# Patient Record
Sex: MALE | Race: WHITE | NOT HISPANIC OR LATINO | Employment: OTHER | URBAN - METROPOLITAN AREA
[De-identification: names, ages, dates, MRNs, and addresses within clinical notes are randomized per-mention and may not be internally consistent; named-entity substitution may affect disease eponyms.]

---

## 2017-05-22 ENCOUNTER — HOSPITAL ENCOUNTER (INPATIENT)
Facility: HOSPITAL | Age: 57
LOS: 5 days | Discharge: HOME | DRG: 254 | End: 2017-05-27
Attending: EMERGENCY MEDICINE | Admitting: SURGERY
Payer: MEDICAID

## 2017-05-22 DIAGNOSIS — I73.9 PAD (PERIPHERAL ARTERY DISEASE): ICD-10-CM

## 2017-05-22 DIAGNOSIS — I82.402 ACUTE EMBOLISM AND THROMBOSIS OF DEEP VEIN OF LEFT LOWER EXTREMITY: ICD-10-CM

## 2017-05-22 DIAGNOSIS — I70.209 ARTERIAL OCCLUSION, LOWER EXTREMITY: ICD-10-CM

## 2017-05-22 DIAGNOSIS — M79.671 ISCHEMIC PAIN OF RIGHT FOOT: ICD-10-CM

## 2017-05-22 DIAGNOSIS — Z99.11 ON MECHANICALLY ASSISTED VENTILATION: ICD-10-CM

## 2017-05-22 DIAGNOSIS — I10 UNCONTROLLED HYPERTENSION: Chronic | ICD-10-CM

## 2017-05-22 DIAGNOSIS — I99.8 ISCHEMIC PAIN OF RIGHT FOOT: ICD-10-CM

## 2017-05-22 DIAGNOSIS — I82.401 ACUTE DEEP VEIN THROMBOSIS (DVT) OF RIGHT LOWER EXTREMITY, UNSPECIFIED VEIN: ICD-10-CM

## 2017-05-22 DIAGNOSIS — I70.209 ARTERIAL OCCLUSION, LOWER EXTREMITY: Primary | ICD-10-CM

## 2017-05-22 DIAGNOSIS — E87.6 HYPOKALEMIA: ICD-10-CM

## 2017-05-22 DIAGNOSIS — E78.2 MIXED HYPERLIPIDEMIA: Chronic | ICD-10-CM

## 2017-05-22 DIAGNOSIS — J44.9 CHRONIC OBSTRUCTIVE PULMONARY DISEASE, UNSPECIFIED COPD TYPE: ICD-10-CM

## 2017-05-22 LAB
ALBUMIN SERPL BCP-MCNC: 3.6 G/DL
ALP SERPL-CCNC: 90 U/L
ALT SERPL W/O P-5'-P-CCNC: 17 U/L
ANION GAP SERPL CALC-SCNC: 8 MMOL/L
APTT BLDCRRT: 32.1 SEC
AST SERPL-CCNC: 22 U/L
BASOPHILS # BLD AUTO: 0.02 K/UL
BASOPHILS NFR BLD: 0.4 %
BILIRUB SERPL-MCNC: 0.3 MG/DL
BUN SERPL-MCNC: 15 MG/DL
CALCIUM SERPL-MCNC: 9.5 MG/DL
CHLORIDE SERPL-SCNC: 103 MMOL/L
CK SERPL-CCNC: 556 U/L
CO2 SERPL-SCNC: 27 MMOL/L
CREAT SERPL-MCNC: 0.9 MG/DL
DIFFERENTIAL METHOD: ABNORMAL
EOSINOPHIL # BLD AUTO: 0.1 K/UL
EOSINOPHIL NFR BLD: 2.2 %
ERYTHROCYTE [DISTWIDTH] IN BLOOD BY AUTOMATED COUNT: 13.2 %
EST. GFR  (AFRICAN AMERICAN): >60 ML/MIN/1.73 M^2
EST. GFR  (NON AFRICAN AMERICAN): >60 ML/MIN/1.73 M^2
GLUCOSE SERPL-MCNC: 100 MG/DL
HCT VFR BLD AUTO: 34.1 %
HGB BLD-MCNC: 11.4 G/DL
INR PPP: 1.2
LYMPHOCYTES # BLD AUTO: 1.4 K/UL
LYMPHOCYTES NFR BLD: 24.5 %
MCH RBC QN AUTO: 28.2 PG
MCHC RBC AUTO-ENTMCNC: 33.4 %
MCV RBC AUTO: 84 FL
MONOCYTES # BLD AUTO: 0.7 K/UL
MONOCYTES NFR BLD: 11.7 %
NEUTROPHILS # BLD AUTO: 3.4 K/UL
NEUTROPHILS NFR BLD: 61.2 %
PLATELET # BLD AUTO: 272 K/UL
PMV BLD AUTO: 8.5 FL
POTASSIUM SERPL-SCNC: 3.3 MMOL/L
PROT SERPL-MCNC: 7.4 G/DL
PROTHROMBIN TIME: 12.1 SEC
RBC # BLD AUTO: 4.04 M/UL
SODIUM SERPL-SCNC: 138 MMOL/L
WBC # BLD AUTO: 5.56 K/UL

## 2017-05-22 PROCEDURE — 63600175 PHARM REV CODE 636 W HCPCS: Performed by: EMERGENCY MEDICINE

## 2017-05-22 PROCEDURE — 11000001 HC ACUTE MED/SURG PRIVATE ROOM

## 2017-05-22 PROCEDURE — 25000003 PHARM REV CODE 250: Performed by: EMERGENCY MEDICINE

## 2017-05-22 PROCEDURE — 85025 COMPLETE CBC W/AUTO DIFF WBC: CPT

## 2017-05-22 PROCEDURE — 85610 PROTHROMBIN TIME: CPT

## 2017-05-22 PROCEDURE — 85730 THROMBOPLASTIN TIME PARTIAL: CPT

## 2017-05-22 PROCEDURE — 96374 THER/PROPH/DIAG INJ IV PUSH: CPT

## 2017-05-22 PROCEDURE — 82550 ASSAY OF CK (CPK): CPT

## 2017-05-22 PROCEDURE — 96376 TX/PRO/DX INJ SAME DRUG ADON: CPT

## 2017-05-22 PROCEDURE — 80053 COMPREHEN METABOLIC PANEL: CPT

## 2017-05-22 PROCEDURE — 99285 EMERGENCY DEPT VISIT HI MDM: CPT | Mod: 25

## 2017-05-22 PROCEDURE — 96375 TX/PRO/DX INJ NEW DRUG ADDON: CPT

## 2017-05-22 RX ORDER — MORPHINE SULFATE 4 MG/ML
4 INJECTION, SOLUTION INTRAMUSCULAR; INTRAVENOUS EVERY 4 HOURS PRN
Status: DISCONTINUED | OUTPATIENT
Start: 2017-05-23 | End: 2017-05-24

## 2017-05-22 RX ORDER — ATORVASTATIN CALCIUM 20 MG/1
10 TABLET, FILM COATED ORAL DAILY
COMMUNITY
End: 2023-06-08

## 2017-05-22 RX ORDER — ONDANSETRON 2 MG/ML
4 INJECTION INTRAMUSCULAR; INTRAVENOUS
Status: COMPLETED | OUTPATIENT
Start: 2017-05-22 | End: 2017-05-22

## 2017-05-22 RX ORDER — ONDANSETRON 2 MG/ML
4 INJECTION INTRAMUSCULAR; INTRAVENOUS EVERY 12 HOURS PRN
Status: DISCONTINUED | OUTPATIENT
Start: 2017-05-23 | End: 2017-05-27 | Stop reason: HOSPADM

## 2017-05-22 RX ORDER — NAPROXEN SODIUM 220 MG/1
81 TABLET, FILM COATED ORAL DAILY
COMMUNITY
End: 2019-05-20

## 2017-05-22 RX ORDER — DEXTROSE MONOHYDRATE AND SODIUM CHLORIDE 5; .45 G/100ML; G/100ML
INJECTION, SOLUTION INTRAVENOUS CONTINUOUS
Status: DISCONTINUED | OUTPATIENT
Start: 2017-05-23 | End: 2017-05-25

## 2017-05-22 RX ORDER — MORPHINE SULFATE 4 MG/ML
4 INJECTION, SOLUTION INTRAMUSCULAR; INTRAVENOUS
Status: COMPLETED | OUTPATIENT
Start: 2017-05-22 | End: 2017-05-22

## 2017-05-22 RX ORDER — HEPARIN SODIUM,PORCINE/D5W 25000/250
17 INTRAVENOUS SOLUTION INTRAVENOUS CONTINUOUS
Status: DISCONTINUED | OUTPATIENT
Start: 2017-05-22 | End: 2017-05-24

## 2017-05-22 RX ORDER — AMLODIPINE BESYLATE 10 MG/1
10 TABLET ORAL DAILY
COMMUNITY

## 2017-05-22 RX ADMIN — ONDANSETRON 4 MG: 2 INJECTION INTRAMUSCULAR; INTRAVENOUS at 10:05

## 2017-05-22 RX ADMIN — HEPARIN SODIUM AND DEXTROSE 17 UNITS/KG/HR: 10000; 5 INJECTION INTRAVENOUS at 10:05

## 2017-05-22 RX ADMIN — MORPHINE SULFATE 4 MG: 4 INJECTION, SOLUTION INTRAMUSCULAR; INTRAVENOUS at 10:05

## 2017-05-23 PROBLEM — J44.1 COPD EXACERBATION: Status: ACTIVE | Noted: 2017-05-23

## 2017-05-23 PROBLEM — E87.6 HYPOKALEMIA: Status: ACTIVE | Noted: 2017-05-23

## 2017-05-23 PROBLEM — J44.9 COPD (CHRONIC OBSTRUCTIVE PULMONARY DISEASE): Status: ACTIVE | Noted: 2017-05-23

## 2017-05-23 PROBLEM — I10 HTN (HYPERTENSION): Status: ACTIVE | Noted: 2017-05-23

## 2017-05-23 LAB — APTT BLDCRRT: 50.4 SEC

## 2017-05-23 PROCEDURE — 11000001 HC ACUTE MED/SURG PRIVATE ROOM

## 2017-05-23 PROCEDURE — 25000003 PHARM REV CODE 250: Performed by: EMERGENCY MEDICINE

## 2017-05-23 PROCEDURE — 63600175 PHARM REV CODE 636 W HCPCS: Performed by: EMERGENCY MEDICINE

## 2017-05-23 PROCEDURE — 63600175 PHARM REV CODE 636 W HCPCS: Performed by: SURGERY

## 2017-05-23 PROCEDURE — 21400001 HC TELEMETRY ROOM

## 2017-05-23 PROCEDURE — 25000003 PHARM REV CODE 250: Performed by: HOSPITALIST

## 2017-05-23 PROCEDURE — 63600175 PHARM REV CODE 636 W HCPCS: Performed by: HOSPITALIST

## 2017-05-23 PROCEDURE — 85730 THROMBOPLASTIN TIME PARTIAL: CPT

## 2017-05-23 PROCEDURE — 36415 COLL VENOUS BLD VENIPUNCTURE: CPT

## 2017-05-23 RX ORDER — DIPHENHYDRAMINE HYDROCHLORIDE 50 MG/ML
25 INJECTION INTRAMUSCULAR; INTRAVENOUS EVERY 6 HOURS PRN
Status: DISCONTINUED | OUTPATIENT
Start: 2017-05-23 | End: 2017-05-27 | Stop reason: HOSPADM

## 2017-05-23 RX ORDER — ATORVASTATIN CALCIUM 10 MG/1
20 TABLET, FILM COATED ORAL DAILY
Status: DISCONTINUED | OUTPATIENT
Start: 2017-05-23 | End: 2017-05-27 | Stop reason: HOSPADM

## 2017-05-23 RX ORDER — HYDRALAZINE HYDROCHLORIDE 20 MG/ML
10 INJECTION INTRAMUSCULAR; INTRAVENOUS EVERY 6 HOURS PRN
Status: DISCONTINUED | OUTPATIENT
Start: 2017-05-23 | End: 2017-05-24

## 2017-05-23 RX ORDER — AMLODIPINE BESYLATE 10 MG/1
10 TABLET ORAL DAILY
Status: DISCONTINUED | OUTPATIENT
Start: 2017-05-23 | End: 2017-05-27 | Stop reason: HOSPADM

## 2017-05-23 RX ORDER — POTASSIUM CHLORIDE 7.45 MG/ML
10 INJECTION INTRAVENOUS ONCE
Status: COMPLETED | OUTPATIENT
Start: 2017-05-23 | End: 2017-05-23

## 2017-05-23 RX ORDER — NAPROXEN SODIUM 220 MG/1
81 TABLET, FILM COATED ORAL DAILY
Status: DISCONTINUED | OUTPATIENT
Start: 2017-05-23 | End: 2017-05-27 | Stop reason: HOSPADM

## 2017-05-23 RX ORDER — IPRATROPIUM BROMIDE AND ALBUTEROL SULFATE 2.5; .5 MG/3ML; MG/3ML
3 SOLUTION RESPIRATORY (INHALATION) EVERY 4 HOURS PRN
Status: DISCONTINUED | OUTPATIENT
Start: 2017-05-23 | End: 2017-05-27 | Stop reason: HOSPADM

## 2017-05-23 RX ORDER — POTASSIUM CHLORIDE 14.9 MG/ML
20 INJECTION INTRAVENOUS ONCE
Status: DISCONTINUED | OUTPATIENT
Start: 2017-05-23 | End: 2017-05-23

## 2017-05-23 RX ADMIN — MORPHINE SULFATE 4 MG: 4 INJECTION, SOLUTION INTRAMUSCULAR; INTRAVENOUS at 04:05

## 2017-05-23 RX ADMIN — DEXTROSE AND SODIUM CHLORIDE: 5; .45 INJECTION, SOLUTION INTRAVENOUS at 10:05

## 2017-05-23 RX ADMIN — DEXTROSE AND SODIUM CHLORIDE: 5; .45 INJECTION, SOLUTION INTRAVENOUS at 05:05

## 2017-05-23 RX ADMIN — ATORVASTATIN CALCIUM 20 MG: 10 TABLET, FILM COATED ORAL at 10:05

## 2017-05-23 RX ADMIN — HEPARIN SODIUM AND DEXTROSE 17 UNITS/KG/HR: 10000; 5 INJECTION INTRAVENOUS at 12:05

## 2017-05-23 RX ADMIN — POTASSIUM CHLORIDE 10 MEQ: 10 INJECTION, SOLUTION INTRAVENOUS at 06:05

## 2017-05-23 RX ADMIN — POTASSIUM CHLORIDE 10 MEQ: 10 INJECTION, SOLUTION INTRAVENOUS at 08:05

## 2017-05-23 RX ADMIN — MORPHINE SULFATE 4 MG: 4 INJECTION, SOLUTION INTRAMUSCULAR; INTRAVENOUS at 08:05

## 2017-05-23 RX ADMIN — AMLODIPINE BESYLATE 10 MG: 10 TABLET ORAL at 10:05

## 2017-05-23 RX ADMIN — MORPHINE SULFATE 4 MG: 4 INJECTION, SOLUTION INTRAMUSCULAR; INTRAVENOUS at 11:05

## 2017-05-23 RX ADMIN — DEXTROSE AND SODIUM CHLORIDE: 5; .45 INJECTION, SOLUTION INTRAVENOUS at 12:05

## 2017-05-23 RX ADMIN — ASPIRIN 81 MG CHEWABLE TABLET 81 MG: 81 TABLET CHEWABLE at 10:05

## 2017-05-23 RX ADMIN — MORPHINE SULFATE 4 MG: 4 INJECTION, SOLUTION INTRAMUSCULAR; INTRAVENOUS at 12:05

## 2017-05-23 RX ADMIN — DIPHENHYDRAMINE HYDROCHLORIDE 25 MG: 50 INJECTION, SOLUTION INTRAMUSCULAR; INTRAVENOUS at 06:05

## 2017-05-23 RX ADMIN — MORPHINE SULFATE 4 MG: 4 INJECTION, SOLUTION INTRAMUSCULAR; INTRAVENOUS at 03:05

## 2017-05-23 NOTE — PROGRESS NOTES
Pt transferred from ER to tele. Pt pivoted from stretcher to bed, non weight bearing to RLE. Vitals stable, tele monitor placed. Hep gtt and INF continued. MD notified. Bed locked and in low position, call light in reach. Will continue to monitor.

## 2017-05-23 NOTE — ED NOTES
LOC: The patient is awake, alert and aware of environment with an appropriate affect, the patient is oriented x 3 and speaking appropriately.  APPEARANCE: Patient resting comfortably and in no acute distress, patient is clean and well groomed, patient's clothing is properly fastened.  SKIN: The skin is warm and dry, color consistent with ethnicity, patient has normal skin turgor and moist mucus membranes, skin intact, no breakdown or bruising noted. Pale cool right lower ext   MUSCULOSKELETAL: Patient moving all extremities spontaneously, no obvious swelling or deformities noted.  RESPIRATORY: Airway is open and patent, respirations are spontaneous, patient has a normal effort and rate, no accessory muscle use noted, bilateral breath sounds equal and clear to auscultation. Patient reports no shortness of breath, no distress noted.  CARDIAC: Patient has a normal rate and regular rhythm, no periphreal edema noted, capillary refill < 3 seconds. Right lower ext without pulse.   ABDOMEN: Soft and non tender to palpation, no distention noted, normoactive bowel sounds present in all four quadrants.  NEUROLOGIC: PERRL, 3 mm bilaterally, eyes open spontaneously, behavior appropriate to situation, follows commands, facial expression symmetrical, bilateral hand grasp equal and even, purposeful motor response noted, normal sensation in all extremities when touched with a finger.  PSCYH: Brief WNL

## 2017-05-23 NOTE — SUBJECTIVE & OBJECTIVE
Past Medical History:   Diagnosis Date    H/O aorto-femoral bypass     Hyperlipemia     Hypertension     Pre-diabetes        Past Surgical History:   Procedure Laterality Date    back fusion      FEMORAL BYPASS         Review of patient's allergies indicates:   Allergen Reactions    Pcn [penicillins] Other (See Comments)     Other      Ceclor [cefaclor] Rash    Latex, natural rubber Rash       No current facility-administered medications on file prior to encounter.      No current outpatient prescriptions on file prior to encounter.     Family History     Problem Relation (Age of Onset)    COPD Mother, Father    Diabetes Mother, Father    Heart attack Mother    Heart disease Mother    Stroke Mother        Social History Main Topics    Smoking status: Former Smoker    Smokeless tobacco: Former User     Quit date: 10/22/2016    Alcohol use No    Drug use: No    Sexual activity: Yes     Review of Systems   Constitutional: Negative for activity change, appetite change, chills, fatigue and fever.   HENT: Negative for congestion, rhinorrhea, sore throat and tinnitus.    Eyes: Negative for pain, discharge, redness and itching.   Respiratory: Negative for apnea, cough, choking, chest tightness, wheezing and stridor.    Cardiovascular: Negative for chest pain and leg swelling.   Gastrointestinal: Negative for abdominal distention, abdominal pain, constipation, diarrhea, nausea and vomiting.   Genitourinary: Negative for difficulty urinating, dysuria, flank pain, frequency and hematuria.   Musculoskeletal: Negative for arthralgias, back pain and neck pain.        Right foot pain    Neurological: Negative for dizziness, tremors, seizures, facial asymmetry, weakness and numbness.     Objective:     Vital Signs (Most Recent):  Temp: 97.5 °F (36.4 °C) (05/23/17 0802)  Pulse: 84 (05/23/17 0802)  Resp: 20 (05/23/17 0802)  BP: (!) 142/72 (05/23/17 0802)  SpO2: 98 % (05/23/17 0802) Vital Signs (24h Range):  Temp:  [97.5  °F (36.4 °C)-98.3 °F (36.8 °C)] 97.5 °F (36.4 °C)  Pulse:  [71-86] 84  Resp:  [13-20] 20  SpO2:  [97 %-100 %] 98 %  BP: (142-191)/(72-97) 142/72     Weight: 81.1 kg (178 lb 12.8 oz)  Body mass index is 25.66 kg/m².    Physical Exam   Constitutional: No distress.   HENT:   Head: Normocephalic and atraumatic.   Eyes: EOM are normal. Pupils are equal, round, and reactive to light.   Neck: Normal range of motion. Neck supple. No JVD present.   Cardiovascular: Normal rate and regular rhythm.  Exam reveals no gallop and no friction rub.    No murmur heard.  Pulmonary/Chest: Effort normal and breath sounds normal. No stridor. No respiratory distress. He has no wheezes. He has no rales. He exhibits no tenderness.   Abdominal: Soft. Bowel sounds are normal. He exhibits no distension. There is no tenderness. There is no rebound and no guarding.   Musculoskeletal: Normal range of motion. He exhibits no deformity.   There is absent pulse in the Rt. PD, Rt. PT, pale looking of the Rt.foot   Lymphadenopathy:     He has no cervical adenopathy.   Neurological: He displays normal reflexes. No cranial nerve deficit. He exhibits normal muscle tone. Coordination normal.   Skin: Skin is warm and dry. He is not diaphoretic.   Psychiatric: He has a normal mood and affect. His behavior is normal.        Significant Labs:   BMP:     Recent Labs  Lab 05/22/17 2120         K 3.3*      CO2 27   BUN 15   CREATININE 0.9   CALCIUM 9.5     CBC:     Recent Labs  Lab 05/22/17 2120   WBC 5.56   HGB 11.4*   HCT 34.1*          Significant Imaging:   Imaging Results          X-Ray Chest 1 View (Final result)  Result time 05/23/17 09:21:01    Final result by Omega Harris III, MD (05/23/17 09:21:01)                 Impression:     No acute disease identified in the chest.        Electronically signed by: OMEGA HARRIS MD  Date:     05/23/17  Time:    09:21              Narrative:    XR CHEST 1 VIEW    Clinical history: Chest  pain.      Findings: Cardiomediastinal silhouette is within normal limits for AP technique. The lungs appear clear of active disease. There is no evidence of pneumonia, pulmonary edema, pleural effusion, pneumothorax or other acute disease.                             US Lower Extremity Arteries Right (Final result)  Result time 05/22/17 21:51:18    Final result by Damaris Velasco MD (05/22/17 21:51:18)                 Impression:      No flow is seen distal to the common femoral artery.  The patient appears to have femoral graft and popliteal graft on the right but no flow is seen distally.      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    Comparison:  No prior relevant studies available    Findings: Standard technique was utilized with Doppler imaging.    The findings suggest the patient has a femoral graft with separate popliteal graft.  Normal flow is not identified within the superficial femoral artery, popliteal artery, anterior tibialis artery, posterior tibialis artery, or dorsalis pedis artery.  Profunda femoral artery velocity is 46.4 cm/s.  Common femoral artery is 90.4 cm/s.

## 2017-05-23 NOTE — SUBJECTIVE & OBJECTIVE
Past Medical History:   Diagnosis Date    H/O aorto-femoral bypass     Hyperlipemia     Hypertension     Pre-diabetes        Past Surgical History:   Procedure Laterality Date    back fusion      FEMORAL BYPASS         Review of patient's allergies indicates:   Allergen Reactions    Pcn [penicillins] Other (See Comments)     Other      Ceclor [cefaclor] Rash    Latex, natural rubber Rash       No current facility-administered medications on file prior to encounter.      No current outpatient prescriptions on file prior to encounter.     Family History     Problem Relation (Age of Onset)    COPD Mother, Father    Diabetes Mother, Father    Heart attack Mother    Heart disease Mother    Stroke Mother        Social History Main Topics    Smoking status: Former Smoker    Smokeless tobacco: Former User     Quit date: 10/22/2016    Alcohol use No    Drug use: No    Sexual activity: Yes     Review of Systems   Constitutional: Negative for activity change, appetite change, chills, fatigue and fever.   HENT: Negative for congestion, rhinorrhea, sore throat and tinnitus.    Eyes: Negative for pain, discharge, redness and itching.   Respiratory: Negative for apnea, cough, choking, chest tightness, wheezing and stridor.    Cardiovascular: Negative for chest pain and leg swelling.   Gastrointestinal: Negative for abdominal distention, abdominal pain, constipation, diarrhea, nausea and vomiting.   Genitourinary: Negative for difficulty urinating, dysuria, flank pain, frequency and hematuria.   Musculoskeletal: Negative for arthralgias, back pain and neck pain.        Leg pain    Neurological: Negative for dizziness, tremors, seizures, facial asymmetry, weakness and numbness.     Objective:     Vital Signs (Most Recent):  Temp: 97.8 °F (36.6 °C) (05/23/17 0354)  Pulse: 71 (05/23/17 0354)  Resp: 20 (05/23/17 0354)  BP: (!) 163/88 (05/23/17 0354)  SpO2: 97 % (05/23/17 0354) Vital Signs (24h Range):  Temp:  [97.8 °F  (36.6 °C)-98.3 °F (36.8 °C)] 97.8 °F (36.6 °C)  Pulse:  [71-86] 71  Resp:  [13-20] 20  SpO2:  [97 %-100 %] 97 %  BP: (151-191)/(78-97) 163/88     Weight: 81.1 kg (178 lb 12.8 oz)  Body mass index is 25.66 kg/m².    Physical Exam   Constitutional: No distress.   HENT:   Head: Normocephalic and atraumatic.   Eyes: EOM are normal. Pupils are equal, round, and reactive to light.   Neck: Normal range of motion. Neck supple. No JVD present.   Cardiovascular: Normal rate and regular rhythm.  Exam reveals no gallop and no friction rub.    No murmur heard.  Pulmonary/Chest: Effort normal and breath sounds normal. No stridor. No respiratory distress. He has no wheezes. He has no rales. He exhibits no tenderness.   Abdominal: Soft. Bowel sounds are normal. He exhibits no distension. There is no tenderness. There is no rebound and no guarding.   Musculoskeletal: Normal range of motion. He exhibits no deformity.   There is absent pulse in the Rt. PD, Rt. PT, pale looking of the Rt.foot   Lymphadenopathy:     He has no cervical adenopathy.   Neurological: He displays normal reflexes. No cranial nerve deficit. He exhibits normal muscle tone. Coordination normal.   Skin: Skin is warm and dry. He is not diaphoretic.   Psychiatric: He has a normal mood and affect. His behavior is normal.        Significant Labs:   BMP:   Recent Labs  Lab 05/22/17 2120         K 3.3*      CO2 27   BUN 15   CREATININE 0.9   CALCIUM 9.5     CBC:   Recent Labs  Lab 05/22/17 2120   WBC 5.56   HGB 11.4*   HCT 34.1*          Significant Imaging:   Imaging Results          US Lower Extremity Arteries Right (Final result)  Result time 05/22/17 21:51:18    Final result by Dennis Wong MD (05/22/17 21:51:18)                 Impression:      No flow is seen distal to the common femoral artery.  The patient appears to have femoral graft and popliteal graft on the right but no flow is seen distally.      Electronically signed  by: DAMARIS VELASCO MD  Date:     05/22/17  Time:    21:51              Narrative:    Exam: US LOWER EXTREMITY ARTERIES RIGHT,    Date:  05/22/17 21:15:00    History: Embolism and thrombosis of the arteries of the lower extremities.    Comparison:  No prior relevant studies available    Findings: Standard technique was utilized with Doppler imaging.    The findings suggest the patient has a femoral graft with separate popliteal graft.  Normal flow is not identified within the superficial femoral artery, popliteal artery, anterior tibialis artery, posterior tibialis artery, or dorsalis pedis artery.  Profunda femoral artery velocity is 46.4 cm/s.  Common femoral artery is 90.4 cm/s.

## 2017-05-23 NOTE — HPI
56 y.o male patient with PMHx of HTN, prediabetic, COPD, stroke, PAD s/p Bypass with graft in the Rt. LE , was referred from his PCP office due to complain of pain in the rt. Foot. Pain started couple days ago and start to get worse, it is cramping in nature and feels tight, he also noticed discoloration of the foot as well. Patient takes Xarelto for PAD. Patient had fall week ago where he went to urgent care for c/o rib pain, CXR was done and it was negative for fracture but told him there is spot there.  He was admitted here for medical management

## 2017-05-23 NOTE — PLAN OF CARE
Met with patient. Patient expressed concern about his health situation and possibility of amputation. Provided support and education. Patient stated that he has been unable to work for about a year, he was denied disability. 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 He stated that he has been unable to stand on his leg to allow him to cook. Reviewed SSD qualifications and encouraged him to reapply for disability. He stated that the family has had financial difficulty which has led to eviction notices and having to work with [employer] for utility assistance. Patient has knowledge of resources. Provided supportive services.      05/23/17 1103   Discharge Assessment   Assessment Type Discharge Planning Assessment   Confirmed/corrected address and phone number on facesheet? Yes   Assessment information obtained from? Patient;Medical Record   Expected Length of Stay (days) (TBD)   Communicated expected length of stay with patient/caregiver yes   Prior to hospitilization cognitive status: Alert/Oriented   Prior to hospitalization functional status: Needs Assistance   Current cognitive status: Alert/Oriented   Current Functional Status: Needs Assistance   Arrived From home or self-care   Lives With spouse   Able to Return to Prior Arrangements yes   Is patient able to care for self after discharge? No   How many people do you have in your home that can help with your care after discharge? 2   Who are your caregiver(s) and their phone number(s)? Ray Guardado, wife of 35 years, [phone]. Adult son, has two sons. Oldest son lives in his own home.    Patient's perception of discharge disposition home or selfcare   Readmission Within The Last 30 Days no previous admission in last 30 days   Patient currently being followed by outpatient case management? No   Patient currently receives home health services? No   Does the patient currently use HME? No  (Uses his mother's cane when needed.)   Patient currently  receives private duty nursing? No   Equipment Currently Used at Home none   Do you have any problems affording any of your prescribed medications? TBD   Is the patient taking medications as prescribed? yes   Do you have any financial concerns preventing you from receiving the healthcare you need? Yes  (Patient stated that he has been unable to work for a year but has not qualified for disability. )   Does the patient have transportation to healthcare appointments? Yes   Transportation Available family or friend will provide   On Dialysis? No   Discharge Plan A Home with family   Discharge Plan B (Dependent on the surgery. Possible rehab. )   Patient/Family In Agreement With Plan yes

## 2017-05-23 NOTE — H&P
[facility] -   History & Physical    Subjective:      Chief Complaint/Reason for Admission: right foot pain    HPI:    56 y.o male patient with PMHx of HTN, prediabetic, COPD, stroke, PAD s/p Bypass with graft in the Rt. LE , was referred from his PCP office due to complain of pain in the rt. Foot. Pain started couple days ago and start to get worse, it is more cramping and feel tight, he also noticed discoloration of the foot too. Patient takes Xarelto for PAD.  Patient had fall week ago where he went to urgent care, CXR was done and it was negative for fracture but told him there is spot there.     Had right femoral to above knee bypass which failed and now has right femoral to below knee PTFE bypass two months ago both with Dr. Hartley at [facility]. On Xarelto for bypass patency which he took yesterday afternoon.Now, has occlusion of right leg bypass with decreased senstion to right foot.     Past Medical History:   Diagnosis Date    H/O aorto-femoral bypass     Hyperlipemia     Hypertension     Pre-diabetes      Past Surgical History:   Procedure Laterality Date    back fusion      FEMORAL BYPASS       Family History   Problem Relation Age of Onset    COPD Mother     Diabetes Mother     Heart disease Mother     Stroke Mother     Heart attack Mother     COPD Father     Diabetes Father      Social History   Substance Use Topics    Smoking status: Former Smoker    Smokeless tobacco: Former User     Quit date: 10/22/2016    Alcohol use No       PTA Medications   Medication Sig    amlodipine (NORVASC) 10 MG tablet Take 10 mg by mouth once daily.    aspirin 81 MG Chew Take 81 mg by mouth once daily.    atorvastatin (LIPITOR) 20 MG tablet Take 20 mg by mouth once daily.    rivaroxaban (XARELTO) 20 mg Tab Take 20 mg by mouth daily with dinner or evening meal.     Review of patient's allergies indicates:   Allergen Reactions    Pcn [penicillins] Other (See Comments)     Other      Ceclor  [cefaclor] Rash    Latex, natural rubber Rash        ROS    Objective:      Vital Signs (Most Recent)  Temp: 97.8 °F (36.6 °C) (05/23/17 0354)  Pulse: 71 (05/23/17 0354)  Resp: 20 (05/23/17 0354)  BP: (!) 163/88 (05/23/17 0354)  SpO2: 97 % (05/23/17 0354)    Vital Signs Range (Last 24H):  Temp:  [97.8 °F (36.6 °C)-98.3 °F (36.8 °C)]   Pulse:  [71-86]   Resp:  [13-20]   BP: (151-191)/(78-97)   SpO2:  [97 %-100 %]     Physical Exam     [facility] RRR  Lungs CTAB  Vasc: + 2 bilateral femoral pulses with no palp right pop nor pedals.  Musc/neuro: full ROM right foot with decreased senstion    Data Review:    CBC:   Lab Results   Component Value Date    WBC 5.56 05/22/2017    RBC 4.04 (L) 05/22/2017    HGB 11.4 (L) 05/22/2017    HCT 34.1 (L) 05/22/2017     05/22/2017     BMP:   Lab Results   Component Value Date     05/22/2017     05/22/2017    K 3.3 (L) 05/22/2017     05/22/2017    CO2 27 05/22/2017    BUN 15 05/22/2017    CREATININE 0.9 05/22/2017    CALCIUM 9.5 05/22/2017     Coagulation:   Lab Results   Component Value Date    INR 1.2 05/22/2017    APTT 50.4 (H) 05/23/2017      ECG: normal sinus rhythm, no blocks or conduction defects, no ischemic changes.     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       Plan:    Class IIA ischemia right leg.  Unfortunately he is too high risk for bleeding today due to Xarelto. Therefore, we will have to wait until tomorrow for a right leg angiogram with TPA thrombolysis of PTFE graft. He stopped Xarelto today. He is still high risk for bleeding but he is also high risk for leg amputation as well. Difficult situation overall.

## 2017-05-23 NOTE — ED NOTES
Patient placed on continuous cardiac monitor, automatic blood pressure cuff and continuous pulse oximeter. SR on CM

## 2017-05-23 NOTE — PLAN OF CARE
Problem: Patient Care Overview  Goal: Plan of Care Review  Outcome: Ongoing (interventions implemented as appropriate)  Pt alert and oriented. RLE cool, pale, >3 cap refill,  with numbness to toes noted. C/o 10/10 pain, PRN pain medication administered. No pulse noted with doppler, weak pulse to LLE noted with doppler- area marked. Hep gtt @ 17 units/kg/hr, D5 1/2NS @ 125 ml/hr. NPO after midnight. Stable on RA. Encouraged pt to reposition self, urinal at bedside, pt remained free of falls during shift, wife in room and offers support, bed alarm set, call light in reach, room free of clutter, side rails up X2, pt on telemetry monitor, will continue to monitor.

## 2017-05-23 NOTE — HOSPITAL COURSE
Pt admitted with right foot pain and was found to have occlusion of right leg bypass/ischemic leg. Pt on Xarelto which is now on hold. Care discussed with vascular surgery who plans on right leg angiogram with TPA thrombolysis of PTFE graft in am. Pt denies complaints except for foot symptoms    S/PAortogram, Right leg angiogram, right fem-distal bypass EKOS lysis catheter placement. Intubated in ICU postoperatively for airway protection and  pain management. He is presently extubated.  5/27/17: Pt is extubated, sitting up in bed and stable for discharge. The patient was seen and examined today and deemed stable for discharge.

## 2017-05-23 NOTE — PROGRESS NOTES
[facility] Hospital Medicine  Progress Note    Patient Name: Ish Guardado  MRN: [id number]  Patient Class: IP- Inpatient   Admission Date: 5/22/2017  Length of Stay: 1 days  Attending Physician: Avila Houser MD  Primary Care Provider: Pollo Arana MD        Subjective:     Principal Problem:Arterial occlusion, lower extremity    HPI:  56 y.o male patient with PMHx of HTN, prediabetic, COPD, stroke, PAD s/p Bypass with graft in the Rt. LE , was referred from his PCP office due to complain of pain in the rt. Foot. Pain started couple days ago and start to get worse, it is more cramping and feel tight, he also noticed discoloration of the foot too. Patient takes Xarelto for PAD.  Patient had fall week ago where he went to urgent care, CXR was done and it was negative for fracture but told him there is spot there.      Hospital Course:  Pt admitted with right foot pain and was found to have occlusion of right leg bypass/ischemic leg. Pt on Xarelto which is now on hold. Care discussed with vascular surgery who plans on right leg angiogram with TPA thrombolysis of PTFE graft in am. Pt denies complaints except for foot symptoms     Past Medical History:   Diagnosis Date    H/O aorto-femoral bypass     Hyperlipemia     Hypertension     Pre-diabetes        Past Surgical History:   Procedure Laterality Date    back fusion      FEMORAL BYPASS         Review of patient's allergies indicates:   Allergen Reactions    Pcn [penicillins] Other (See Comments)     Other      Ceclor [cefaclor] Rash    Latex, natural rubber Rash       No current facility-administered medications on file prior to encounter.      No current outpatient prescriptions on file prior to encounter.     Family History     Problem Relation (Age of Onset)    COPD Mother, Father    Diabetes Mother, Father    Heart attack Mother    Heart disease Mother    Stroke Mother        Social History Main Topics    Smoking status: Former Smoker     Smokeless tobacco: Former User     Quit date: 10/22/2016    Alcohol use No    Drug use: No    Sexual activity: Yes     Review of Systems   Constitutional: Negative for activity change, appetite change, chills, fatigue and fever.   HENT: Negative for congestion, rhinorrhea, sore throat and tinnitus.    Eyes: Negative for pain, discharge, redness and itching.   Respiratory: Negative for apnea, cough, choking, chest tightness, wheezing and stridor.    Cardiovascular: Negative for chest pain and leg swelling.   Gastrointestinal: Negative for abdominal distention, abdominal pain, constipation, diarrhea, nausea and vomiting.   Genitourinary: Negative for difficulty urinating, dysuria, flank pain, frequency and hematuria.   Musculoskeletal: Negative for arthralgias, back pain and neck pain.        Right foot pain    Neurological: Negative for dizziness, tremors, seizures, facial asymmetry, weakness and numbness.     Objective:     Vital Signs (Most Recent):  Temp: 97.5 °F (36.4 °C) (05/23/17 0802)  Pulse: 84 (05/23/17 0802)  Resp: 20 (05/23/17 0802)  BP: (!) 142/72 (05/23/17 0802)  SpO2: 98 % (05/23/17 0802) Vital Signs (24h Range):  Temp:  [97.5 °F (36.4 °C)-98.3 °F (36.8 °C)] 97.5 °F (36.4 °C)  Pulse:  [71-86] 84  Resp:  [13-20] 20  SpO2:  [97 %-100 %] 98 %  BP: (142-191)/(72-97) 142/72     Weight: 81.1 kg (178 lb 12.8 oz)  Body mass index is 25.66 kg/m².    Physical Exam   Constitutional: No distress.   HENT:   Head: Normocephalic and atraumatic.   Eyes: EOM are normal. Pupils are equal, round, and reactive to light.   Neck: Normal range of motion. Neck supple. No JVD present.   Cardiovascular: Normal rate and regular rhythm.  Exam reveals no gallop and no friction rub.    No murmur heard.  Pulmonary/Chest: Effort normal and breath sounds normal. No stridor. No respiratory distress. He has no wheezes. He has no rales. He exhibits no tenderness.   Abdominal: Soft. Bowel sounds are normal. He exhibits no  distension. There is no tenderness. There is no rebound and no guarding.   Musculoskeletal: Normal range of motion. He exhibits no deformity.   There is absent pulse in the Rt. PD, Rt. PT, pale looking of the Rt.foot   Lymphadenopathy:     He has no cervical adenopathy.   Neurological: He displays normal reflexes. No cranial nerve deficit. He exhibits normal muscle tone. Coordination normal.   Skin: Skin is warm and dry. He is not diaphoretic.   Psychiatric: He has a normal mood and affect. His behavior is normal.        Significant Labs:   BMP:     Recent Labs  Lab 05/22/17 2120         K 3.3*      CO2 27   BUN 15   CREATININE 0.9   CALCIUM 9.5     CBC:     Recent Labs  Lab 05/22/17 2120   WBC 5.56   HGB 11.4*   HCT 34.1*          Significant Imaging:   Imaging Results          X-Ray Chest 1 View (Final result)  Result time 05/23/17 09:21:01    Final result by Omega Harris III, MD (05/23/17 09:21:01)                 Impression:     No acute disease identified in the chest.        Electronically signed by: OMEGA HARRIS MD  Date:     05/23/17  Time:    09:21              Narrative:    XR CHEST 1 VIEW    Clinical history: Chest pain.      Findings: Cardiomediastinal silhouette is within normal limits for AP technique. The lungs appear clear of active disease. There is no evidence of pneumonia, pulmonary edema, pleural effusion, pneumothorax or other acute disease.                             US Lower Extremity Arteries Right (Final result)  Result time 05/22/17 21:51:18    Final result by Dennis Wong MD (05/22/17 21:51:18)                 Impression:      No flow is seen distal to the common femoral artery.  The patient appears to have femoral graft and popliteal graft on the right but no flow is seen distally.      Electronically signed by: DENNIS WONG MD  Date:     05/22/17  Time:    21:51              Narrative:    Exam: US LOWER EXTREMITY ARTERIES RIGHT,    Date:  05/22/17  21:15:00    History: Embolism and thrombosis of the arteries of the lower extremities.    Comparison:  No prior relevant studies available    Findings: Standard technique was utilized with Doppler imaging.    The findings suggest the patient has a femoral graft with separate popliteal graft.  Normal flow is not identified within the superficial femoral artery, popliteal artery, anterior tibialis artery, posterior tibialis artery, or dorsalis pedis artery.  Profunda femoral artery velocity is 46.4 cm/s.  Common femoral artery is 90.4 cm/s.                                Assessment/Plan:      * Arterial occlusion, lower extremity    - Management per CVT  Xeralto on hold  - On Heparin drip, pain control with morphine   Right leg angiogram with TPA thrombolysis of PTFE graft in am           Hypokalemia    - Replace and recheck in am          COPD (chronic obstructive pulmonary disease)    - PRN Breathing tx           HTN (hypertension)    Cont amlodipine   - PRN hydralazine for SBP > 160            VTE Risk Mitigation         Ordered     Medium Risk of VTE  Once      05/22/17 2318     Reason for No Pharmacological VTE Prophylaxis  Once      05/22/17 2318          Fiorella Lucas NP  Department of Hospital Medicine   [facility]

## 2017-05-23 NOTE — ED PROVIDER NOTES
SCRIBE #1 NOTE: I, Paxton Morocho, am scribing for, and in the presence of, Paige Kern MD. I have scribed the entire note.      History      Chief Complaint   Patient presents with    Chest Wall Pain     pt fell and injured L ribs, was seen at urgent care and had an Xray but results were inconclusive    Circulatory Problem     pt with PMH of two R sided femoral bypasses, states he feels like his circulation is decreased again       Review of patient's allergies indicates:   Allergen Reactions    Pcn [penicillins] Other (See Comments)     Other      Ceclor [cefaclor] Rash    Latex, natural rubber Rash        HPI   HPI    5/22/2017, 8:57 PM   History obtained from the patient      History of Present Illness: Ish Guardado is a 56 y.o. male patient who presents to the Emergency Department for RLE pain which onset two days ago. Pt reports that his calf feels tight. The pain is constant and moderate in severity. No modifying factors reported. Associated symptoms include pallor to the right foot. Pt was referred to the ED by his PCP. Patient denies any headache, dizziness, light-headedness, CP, SOB, abdominal pain, N/V/D or any other sx at this time. Pt has a PSHx of Femoral Bypass surgery (2x). The pt is currently on Xarelto, his last dose being 1500 today. No further complaints or concerns at this time.     Arrival mode: Personal vehicle    PCP: Pollo Arana MD       Past Medical History:  Past Medical History:   Diagnosis Date    H/O aorto-femoral bypass     Hyperlipemia     Hypertension     Pre-diabetes        Past Surgical History:  Past Surgical History:   Procedure Laterality Date    back fusion      FEMORAL BYPASS           Family History:  Family History   Problem Relation Age of Onset    COPD Mother     Diabetes Mother     Heart disease Mother     Stroke Mother     Heart attack Mother     COPD Father     Diabetes Father        Social History:  Social History     Social History Main Topics     Smoking status: Former Smoker    Smokeless tobacco: Former User     Quit date: 10/22/2016    Alcohol use No    Drug use: No    Sexual activity: Yes       ROS   Review of Systems   Constitutional: Negative for fever.   HENT: Negative for sore throat.    Respiratory: Negative for shortness of breath.    Cardiovascular: Negative for chest pain.        Calf pain, right (+)   Gastrointestinal: Negative for abdominal pain, diarrhea, nausea and vomiting.   Genitourinary: Negative for dysuria.   Musculoskeletal: Negative for back pain.   Skin: Positive for pallor (foot right). Negative for rash.   Neurological: Negative for weakness.   Hematological: Does not bruise/bleed easily.       Physical Exam      Initial Vitals [05/22/17 1832]   BP Pulse Resp Temp SpO2   (!) 191/86 86 18 98 °F (36.7 °C) 98 %      Physical Exam  Nursing Notes and Vital Signs Reviewed.  Constitutional: Patient is in no acute distress. Well-developed and well-nourished.  Head: Atraumatic. Normocephalic.  Eyes: PERRL. EOM intact. Conjunctivae are not pale. No scleral icterus.  ENT: Mucous membranes are moist. Oropharynx is clear and symmetric.    Neck: Supple. Full ROM. No lymphadenopathy.  Cardiovascular: Regular rate. Regular rhythm. No murmurs, rubs, or gallops. Distal pulses are 2+ and symmetric.  Pulmonary/Chest: No respiratory distress. Clear to auscultation bilaterally. No wheezing, rales, or rhonchi.  Abdominal: Soft and non-distended.  There is no tenderness.  No rebound, guarding, or rigidity. Good bowel sounds.  Genitourinary: No CVA tenderness  Musculoskeletal: Moves all extremities. No obvious deformities. No edema. No calf tenderness.  Right Foot:  The foot is white and cold. There is no cap refill and no palpable pulse. There is no sensation.   Skin: Warm and dry.  Neurological:  Alert, awake, and appropriate.  Normal speech.  No acute focal neurological deficits are appreciated.  Psychiatric: Normal affect. Good eye contact.  "Appropriate in content.    ED Course    Procedures  ED Vital Signs:  Vitals:    05/22/17 1832 05/22/17 2043 05/22/17 2102   BP: (!) 191/86 (!) 176/87 (!) 156/78   Pulse: 86 80 74   Resp: 18 20 17   Temp: 98 °F (36.7 °C) 97.9 °F (36.6 °C)    TempSrc: Oral Oral    SpO2: 98% 98% 100%   Weight: 79.4 kg (175 lb)     Height: 5' 10" (1.778 m)         Abnormal Lab Results:  Labs Reviewed   CBC W/ AUTO DIFFERENTIAL - Abnormal; Notable for the following:        Result Value    RBC 4.04 (*)     Hemoglobin 11.4 (*)     Hematocrit 34.1 (*)     MPV 8.5 (*)     All other components within normal limits   APTT - Abnormal; Notable for the following:     aPTT 32.1 (*)     All other components within normal limits   PROTIME-INR   COMPREHENSIVE METABOLIC PANEL   CK        All Lab Results:  Results for orders placed or performed during the hospital encounter of 05/22/17   CBC auto differential   Result Value Ref Range    WBC 5.56 3.90 - 12.70 K/uL    RBC 4.04 (L) 4.60 - 6.20 M/uL    Hemoglobin 11.4 (L) 14.0 - 18.0 g/dL    Hematocrit 34.1 (L) 40.0 - 54.0 %    MCV 84 82 - 98 fL    MCH 28.2 27.0 - 31.0 pg    MCHC 33.4 32.0 - 36.0 %    RDW 13.2 11.5 - 14.5 %    Platelets 272 150 - 350 K/uL    MPV 8.5 (L) 9.2 - 12.9 fL    Gran # 3.4 1.8 - 7.7 K/uL    Lymph # 1.4 1.0 - 4.8 K/uL    Mono # 0.7 0.3 - 1.0 K/uL    Eos # 0.1 0.0 - 0.5 K/uL    Baso # 0.02 0.00 - 0.20 K/uL    Gran% 61.2 38.0 - 73.0 %    Lymph% 24.5 18.0 - 48.0 %    Mono% 11.7 4.0 - 15.0 %    Eosinophil% 2.2 0.0 - 8.0 %    Basophil% 0.4 0.0 - 1.9 %    Differential Method Automated    Protime-INR   Result Value Ref Range    Prothrombin Time 12.1 9.0 - 12.5 sec    INR 1.2 0.8 - 1.2   APTT   Result Value Ref Range    aPTT 32.1 (H) 21.0 - 32.0 sec         Imaging Results:  Imaging Results          US Lower Extremity Arteries Right (Final result)  Result time 05/22/17 21:51:18    Final result by Dennis Wong MD (05/22/17 21:51:18)                 Impression:      No flow is seen " distal to the common femoral artery.  The patient appears to have femoral graft and popliteal graft on the right but no flow is seen distally.      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    Comparison:  No prior relevant studies available    Findings: Standard technique was utilized with Doppler imaging.    The findings suggest the patient has a femoral graft with separate popliteal graft.  Normal flow is not identified within the superficial femoral artery, popliteal artery, anterior tibialis artery, posterior tibialis artery, or dorsalis pedis artery.  Profunda femoral artery velocity is 46.4 cm/s.  Common femoral artery is 90.4 cm/s.                                      The Emergency Provider reviewed the vital signs and test results, which are outlined above.    ED Discussion     10:17 PM: Discussed case with Dr. Houser (Vascular Surgery). Dr. Houser agrees with current care and management of pt and accepts admission. Consult to medicine is Recommended.   Admitting Service: Hospital medicine   Admitting Physician: Dr. Houser  Admit to: Med Surg    10:27 PM: Re-evaluated pt. D/w pt all pertinent results. D/w pt current plan for admission.  D/w pt any concerns expressed at this time. Answered all questions. Pt expresses understanding at this time.    ED Medication(s):  Medications   morphine injection 4 mg (4 mg Intravenous Given 5/22/17 2224)   ondansetron injection 4 mg (4 mg Intravenous Given 5/22/17 2224)           Medical Decision Making    Medical Decision Making:   Clinical Tests:   Lab Tests: Ordered and Reviewed  The following lab test(s) were unremarkable: CBC  Radiological Study: Ordered and Reviewed           Scribe Attestation:   Scribe #1: I performed the above scribed service and the  documentation accurately describes the services I performed. I attest to the accuracy of the note.    Attending:   Physician Attestation Statement for Scribe #1: I, Paige Kern MD, personally performed the services described in this documentation, as scribed by Paxton Morocho, in my presence, and it is both accurate and complete.          Clinical Impression       ICD-10-CM ICD-9-CM   1. Arterial occlusion, lower extremity I74.3 444.22       Disposition:   Disposition: Admitted  Condition: Fair       Paige Kern MD  05/23/17 0557

## 2017-05-23 NOTE — ASSESSMENT & PLAN NOTE
- Management per CVT  Xeralto on hold  - On Heparin drip, pain control with morphine   Right leg angiogram with TPA thrombolysis of PTFE graft in am

## 2017-05-24 PROBLEM — E78.2 MIXED HYPERLIPIDEMIA: Chronic | Status: ACTIVE | Noted: 2017-05-24

## 2017-05-24 PROBLEM — I99.8 ISCHEMIC PAIN OF RIGHT FOOT: Status: ACTIVE | Noted: 2017-05-24

## 2017-05-24 PROBLEM — I10 UNCONTROLLED HYPERTENSION: Chronic | Status: ACTIVE | Noted: 2017-05-23

## 2017-05-24 PROBLEM — M79.671 ISCHEMIC PAIN OF RIGHT FOOT: Status: ACTIVE | Noted: 2017-05-24

## 2017-05-24 PROBLEM — I73.9 PAD (PERIPHERAL ARTERY DISEASE): Chronic | Status: ACTIVE | Noted: 2017-05-24

## 2017-05-24 LAB
ANION GAP SERPL CALC-SCNC: 12 MMOL/L
APTT BLDCRRT: 32.6 SEC
APTT BLDCRRT: 56.5 SEC
APTT BLDCRRT: 57.8 SEC
BASOPHILS # BLD AUTO: 0.02 K/UL
BASOPHILS # BLD AUTO: 0.03 K/UL
BASOPHILS NFR BLD: 0.3 %
BASOPHILS NFR BLD: 0.3 %
BUN SERPL-MCNC: 16 MG/DL
CALCIUM SERPL-MCNC: 9 MG/DL
CHLORIDE SERPL-SCNC: 102 MMOL/L
CO2 SERPL-SCNC: 22 MMOL/L
CREAT SERPL-MCNC: 0.8 MG/DL
DIFFERENTIAL METHOD: ABNORMAL
DIFFERENTIAL METHOD: ABNORMAL
EOSINOPHIL # BLD AUTO: 0.3 K/UL
EOSINOPHIL # BLD AUTO: 0.4 K/UL
EOSINOPHIL NFR BLD: 3 %
EOSINOPHIL NFR BLD: 5.4 %
ERYTHROCYTE [DISTWIDTH] IN BLOOD BY AUTOMATED COUNT: 13.1 %
ERYTHROCYTE [DISTWIDTH] IN BLOOD BY AUTOMATED COUNT: 13.2 %
EST. GFR  (AFRICAN AMERICAN): >60 ML/MIN/1.73 M^2
EST. GFR  (NON AFRICAN AMERICAN): >60 ML/MIN/1.73 M^2
FIBRINOGEN PPP-MCNC: 367 MG/DL
GLUCOSE SERPL-MCNC: 115 MG/DL
HCT VFR BLD AUTO: 34.3 %
HCT VFR BLD AUTO: 35.2 %
HGB BLD-MCNC: 11.6 G/DL
HGB BLD-MCNC: 11.7 G/DL
LYMPHOCYTES # BLD AUTO: 1 K/UL
LYMPHOCYTES # BLD AUTO: 2.1 K/UL
LYMPHOCYTES NFR BLD: 32.9 %
LYMPHOCYTES NFR BLD: 9.4 %
MCH RBC QN AUTO: 28.3 PG
MCH RBC QN AUTO: 28.4 PG
MCHC RBC AUTO-ENTMCNC: 33.2 %
MCHC RBC AUTO-ENTMCNC: 33.8 %
MCV RBC AUTO: 84 FL
MCV RBC AUTO: 85 FL
MONOCYTES # BLD AUTO: 0.6 K/UL
MONOCYTES # BLD AUTO: 0.7 K/UL
MONOCYTES NFR BLD: 6.3 %
MONOCYTES NFR BLD: 9.1 %
NEUTROPHILS # BLD AUTO: 3.4 K/UL
NEUTROPHILS # BLD AUTO: 8.9 K/UL
NEUTROPHILS NFR BLD: 52.3 %
NEUTROPHILS NFR BLD: 81 %
PLATELET # BLD AUTO: 285 K/UL
PLATELET # BLD AUTO: 295 K/UL
PMV BLD AUTO: 8.4 FL
PMV BLD AUTO: 8.7 FL
POTASSIUM SERPL-SCNC: 3.9 MMOL/L
RBC # BLD AUTO: 4.09 M/UL
RBC # BLD AUTO: 4.13 M/UL
SODIUM SERPL-SCNC: 136 MMOL/L
WBC # BLD AUTO: 11 K/UL
WBC # BLD AUTO: 6.48 K/UL

## 2017-05-24 PROCEDURE — 25000003 PHARM REV CODE 250: Performed by: EMERGENCY MEDICINE

## 2017-05-24 PROCEDURE — 85025 COMPLETE CBC W/AUTO DIFF WBC: CPT | Mod: 91

## 2017-05-24 PROCEDURE — 63600175 PHARM REV CODE 636 W HCPCS

## 2017-05-24 PROCEDURE — 80048 BASIC METABOLIC PNL TOTAL CA: CPT

## 2017-05-24 PROCEDURE — 51702 INSERT TEMP BLADDER CATH: CPT

## 2017-05-24 PROCEDURE — 63600175 PHARM REV CODE 636 W HCPCS: Performed by: SURGERY

## 2017-05-24 PROCEDURE — B44FZZ3 ULTRASONOGRAPHY OF RIGHT LOWER EXTREMITY ARTERIES, INTRAVASCULAR: ICD-10-PCS | Performed by: SURGERY

## 2017-05-24 PROCEDURE — 85730 THROMBOPLASTIN TIME PARTIAL: CPT

## 2017-05-24 PROCEDURE — C1769 GUIDE WIRE: HCPCS

## 2017-05-24 PROCEDURE — 36415 COLL VENOUS BLD VENIPUNCTURE: CPT

## 2017-05-24 PROCEDURE — 0BH17EZ INSERTION OF ENDOTRACHEAL AIRWAY INTO TRACHEA, VIA NATURAL OR ARTIFICIAL OPENING: ICD-10-PCS | Performed by: NURSE PRACTITIONER

## 2017-05-24 PROCEDURE — 31500 INSERT EMERGENCY AIRWAY: CPT | Mod: ,,, | Performed by: NURSE PRACTITIONER

## 2017-05-24 PROCEDURE — 94002 VENT MGMT INPAT INIT DAY: CPT

## 2017-05-24 PROCEDURE — 63600175 PHARM REV CODE 636 W HCPCS: Performed by: EMERGENCY MEDICINE

## 2017-05-24 PROCEDURE — 20000000 HC ICU ROOM

## 2017-05-24 PROCEDURE — 85384 FIBRINOGEN ACTIVITY: CPT

## 2017-05-24 PROCEDURE — 85730 THROMBOPLASTIN TIME PARTIAL: CPT | Mod: 91

## 2017-05-24 PROCEDURE — 25000003 PHARM REV CODE 250: Performed by: SURGERY

## 2017-05-24 PROCEDURE — 99900035 HC TECH TIME PER 15 MIN (STAT)

## 2017-05-24 PROCEDURE — 25000003 PHARM REV CODE 250: Performed by: HOSPITALIST

## 2017-05-24 PROCEDURE — 5A1935Z RESPIRATORY VENTILATION, LESS THAN 24 CONSECUTIVE HOURS: ICD-10-PCS | Performed by: NURSE PRACTITIONER

## 2017-05-24 PROCEDURE — 25000003 PHARM REV CODE 250: Performed by: NURSE PRACTITIONER

## 2017-05-24 PROCEDURE — 63600175 PHARM REV CODE 636 W HCPCS: Performed by: NURSE PRACTITIONER

## 2017-05-24 PROCEDURE — 99291 CRITICAL CARE FIRST HOUR: CPT | Mod: 25,,, | Performed by: NURSE PRACTITIONER

## 2017-05-24 PROCEDURE — 25000003 PHARM REV CODE 250

## 2017-05-24 PROCEDURE — 3E05317 INTRODUCTION OF OTHER THROMBOLYTIC INTO PERIPHERAL ARTERY, PERCUTANEOUS APPROACH: ICD-10-PCS | Performed by: SURGERY

## 2017-05-24 RX ORDER — HEPARIN SODIUM 10000 [USP'U]/100ML
500 INJECTION, SOLUTION INTRAVENOUS CONTINUOUS
Status: DISCONTINUED | OUTPATIENT
Start: 2017-05-24 | End: 2017-05-25

## 2017-05-24 RX ORDER — LORAZEPAM 2 MG/ML
2 INJECTION INTRAMUSCULAR
Status: DISCONTINUED | OUTPATIENT
Start: 2017-05-24 | End: 2017-05-25

## 2017-05-24 RX ORDER — MORPHINE SULFATE 2 MG/ML
2 INJECTION, SOLUTION INTRAMUSCULAR; INTRAVENOUS
Status: DISCONTINUED | OUTPATIENT
Start: 2017-05-24 | End: 2017-05-24

## 2017-05-24 RX ORDER — ONDANSETRON 2 MG/ML
4 INJECTION INTRAMUSCULAR; INTRAVENOUS EVERY 12 HOURS PRN
Status: DISCONTINUED | OUTPATIENT
Start: 2017-05-24 | End: 2017-05-24

## 2017-05-24 RX ORDER — BISACODYL 10 MG
10 SUPPOSITORY, RECTAL RECTAL DAILY PRN
Status: DISCONTINUED | OUTPATIENT
Start: 2017-05-24 | End: 2017-05-27 | Stop reason: HOSPADM

## 2017-05-24 RX ORDER — HYDROMORPHONE HYDROCHLORIDE 1 MG/ML
1 INJECTION, SOLUTION INTRAMUSCULAR; INTRAVENOUS; SUBCUTANEOUS
Status: DISCONTINUED | OUTPATIENT
Start: 2017-05-24 | End: 2017-05-24

## 2017-05-24 RX ORDER — PROPOFOL 10 MG/ML
INJECTION, EMULSION INTRAVENOUS
Status: COMPLETED
Start: 2017-05-24 | End: 2017-05-24

## 2017-05-24 RX ORDER — HYDROMORPHONE HYDROCHLORIDE 2 MG/ML
INJECTION, SOLUTION INTRAMUSCULAR; INTRAVENOUS; SUBCUTANEOUS
Status: COMPLETED
Start: 2017-05-24 | End: 2017-05-24

## 2017-05-24 RX ORDER — NALOXONE HCL 0.4 MG/ML
0.02 VIAL (ML) INJECTION
Status: DISCONTINUED | OUTPATIENT
Start: 2017-05-24 | End: 2017-05-24

## 2017-05-24 RX ORDER — PROPOFOL 10 MG/ML
50 INJECTION, EMULSION INTRAVENOUS CONTINUOUS
Status: DISCONTINUED | OUTPATIENT
Start: 2017-05-24 | End: 2017-05-25

## 2017-05-24 RX ORDER — FENTANYL CITRAT/DEXTROSE 5%/PF 100 MCG/10
PATIENT CONTROLLED ANALGESIA SYRINGE INTRAVENOUS CONTINUOUS
Status: DISCONTINUED | OUTPATIENT
Start: 2017-05-24 | End: 2017-05-25

## 2017-05-24 RX ORDER — DOCUSATE SODIUM 100 MG/1
100 CAPSULE, LIQUID FILLED ORAL DAILY
Status: DISCONTINUED | OUTPATIENT
Start: 2017-05-25 | End: 2017-05-27 | Stop reason: HOSPADM

## 2017-05-24 RX ORDER — HYDRALAZINE HYDROCHLORIDE 20 MG/ML
20 INJECTION INTRAMUSCULAR; INTRAVENOUS EVERY 6 HOURS PRN
Status: DISCONTINUED | OUTPATIENT
Start: 2017-05-24 | End: 2017-05-27 | Stop reason: HOSPADM

## 2017-05-24 RX ORDER — CHLORHEXIDINE GLUCONATE ORAL RINSE 1.2 MG/ML
15 SOLUTION DENTAL 2 TIMES DAILY
Status: DISCONTINUED | OUTPATIENT
Start: 2017-05-24 | End: 2017-05-25

## 2017-05-24 RX ORDER — FAMOTIDINE 10 MG/ML
20 INJECTION INTRAVENOUS 2 TIMES DAILY
Status: DISCONTINUED | OUTPATIENT
Start: 2017-05-24 | End: 2017-05-26

## 2017-05-24 RX ORDER — HYDROMORPHONE HCL IN 0.9% NACL 6 MG/30 ML
PATIENT CONTROLLED ANALGESIA SYRINGE INTRAVENOUS CONTINUOUS
Status: DISCONTINUED | OUTPATIENT
Start: 2017-05-24 | End: 2017-05-24

## 2017-05-24 RX ORDER — FAMOTIDINE 20 MG/1
20 TABLET, FILM COATED ORAL 2 TIMES DAILY
Status: DISCONTINUED | OUTPATIENT
Start: 2017-05-24 | End: 2017-05-24

## 2017-05-24 RX ORDER — FENTANYL CITRATE 50 UG/ML
100 INJECTION, SOLUTION INTRAMUSCULAR; INTRAVENOUS
Status: DISCONTINUED | OUTPATIENT
Start: 2017-05-24 | End: 2017-05-24

## 2017-05-24 RX ADMIN — HEPARIN SODIUM AND DEXTROSE 17 UNITS/KG/HR: 10000; 5 INJECTION INTRAVENOUS at 03:05

## 2017-05-24 RX ADMIN — HYDROMORPHONE HYDROCHLORIDE 1 MG: 2 INJECTION, SOLUTION INTRAMUSCULAR; INTRAVENOUS; SUBCUTANEOUS at 04:05

## 2017-05-24 RX ADMIN — ATORVASTATIN CALCIUM 20 MG: 10 TABLET, FILM COATED ORAL at 09:05

## 2017-05-24 RX ADMIN — CHLORHEXIDINE GLUCONATE 15 ML: 1.2 RINSE ORAL at 09:05

## 2017-05-24 RX ADMIN — PROPOFOL 50 MCG/KG/MIN: 10 INJECTION, EMULSION INTRAVENOUS at 07:05

## 2017-05-24 RX ADMIN — HEPARIN SODIUM AND DEXTROSE 500 UNITS/HR: 10000; 5 INJECTION INTRAVENOUS at 09:05

## 2017-05-24 RX ADMIN — ASPIRIN 81 MG CHEWABLE TABLET 81 MG: 81 TABLET CHEWABLE at 09:05

## 2017-05-24 RX ADMIN — MORPHINE SULFATE 4 MG: 4 INJECTION, SOLUTION INTRAMUSCULAR; INTRAVENOUS at 01:05

## 2017-05-24 RX ADMIN — AMLODIPINE BESYLATE 10 MG: 10 TABLET ORAL at 09:05

## 2017-05-24 RX ADMIN — PROPOFOL 50 MCG/KG/MIN: 10 INJECTION, EMULSION INTRAVENOUS at 05:05

## 2017-05-24 RX ADMIN — Medication 150 MCG/HR: at 05:05

## 2017-05-24 RX ADMIN — MORPHINE SULFATE 4 MG: 4 INJECTION, SOLUTION INTRAMUSCULAR; INTRAVENOUS at 05:05

## 2017-05-24 RX ADMIN — ALTEPLASE 8 MG: 2.2 INJECTION, POWDER, LYOPHILIZED, FOR SOLUTION INTRAVENOUS at 10:05

## 2017-05-24 RX ADMIN — LORAZEPAM 2 MG: 2 INJECTION INTRAMUSCULAR; INTRAVENOUS at 06:05

## 2017-05-24 RX ADMIN — MORPHINE SULFATE 4 MG: 4 INJECTION, SOLUTION INTRAMUSCULAR; INTRAVENOUS at 12:05

## 2017-05-24 RX ADMIN — FAMOTIDINE 20 MG: 10 INJECTION, SOLUTION INTRAVENOUS at 11:05

## 2017-05-24 RX ADMIN — DEXTROSE AND SODIUM CHLORIDE: 5; .45 INJECTION, SOLUTION INTRAVENOUS at 07:05

## 2017-05-24 RX ADMIN — HYDRALAZINE HYDROCHLORIDE 20 MG: 20 INJECTION INTRAMUSCULAR; INTRAVENOUS at 07:05

## 2017-05-24 RX ADMIN — LORAZEPAM 2 MG: 2 INJECTION INTRAMUSCULAR; INTRAVENOUS at 05:05

## 2017-05-24 RX ADMIN — FENTANYL CITRATE 100 MCG: 50 INJECTION INTRAMUSCULAR; INTRAVENOUS at 04:05

## 2017-05-24 RX ADMIN — DEXTROSE AND SODIUM CHLORIDE: 5; .45 INJECTION, SOLUTION INTRAVENOUS at 01:05

## 2017-05-24 RX ADMIN — PROPOFOL 50 MCG/KG/MIN: 10 INJECTION, EMULSION INTRAVENOUS at 11:05

## 2017-05-24 RX ADMIN — Medication: at 04:05

## 2017-05-24 RX ADMIN — HEPARIN SODIUM AND DEXTROSE 19 UNITS/KG/HR: 10000; 5 INJECTION INTRAVENOUS at 07:05

## 2017-05-24 RX ADMIN — ALTEPLASE 8 MG: 2.2 INJECTION, POWDER, LYOPHILIZED, FOR SOLUTION INTRAVENOUS at 03:05

## 2017-05-24 RX ADMIN — LORAZEPAM 2 MG: 2 INJECTION INTRAMUSCULAR; INTRAVENOUS at 09:05

## 2017-05-24 NOTE — ASSESSMENT & PLAN NOTE
- BP suboptimal (may be r/t pain)  - Cont amlodipine   - PRN hydralazine for SBP > 160  - Monitor

## 2017-05-24 NOTE — PLAN OF CARE
Problem: Patient Care Overview  Goal: Plan of Care Review  Outcome: Ongoing (interventions implemented as appropriate)  Fall prevention precautions maintained, pt remained free of falls throughout shift, call bell and personal items within reach, pt's pain adequately controlled by prn pain medication. 24 hour chart check completed. Will continue to monitor

## 2017-05-24 NOTE — INTERVAL H&P NOTE
The patient has been examined and the H&P has been reviewed:    I concur with the findings and no changes have occurred since H&P was written.    Anesthesia/Surgery risks, benefits and alternative options discussed and understood by patient/family.          Active Hospital Problems    Diagnosis  POA    *Arterial occlusion, lower extremity [I74.3]  Yes    HTN (hypertension) [I10]  Yes    COPD (chronic obstructive pulmonary disease) [J44.9]  Yes    Hypokalemia [E87.6]  Yes      Resolved Hospital Problems    Diagnosis Date Resolved POA   No resolved problems to display.

## 2017-05-24 NOTE — CONSULTS
Critical Care Consult      Consulting Reason:  Post Op ICU care    HPI:    Ish Guardado is a 56 y.o. male with a PMH of HTN, HLD and PAD with hx Aorto Fem Bypass X 2 on home Xarelto.  Had right femoral to above knee bypass which failed and now has right femoral to below knee PTFE bypass two months ago both with Dr. Hartley at [facility]. He presented to [facility] ED evening 5/22 with complaints of progressive constant pain to RLE over 2 days with swelling and pallor of mod severity.  In ED /86 and RLE US revealed no arterial flow below CFA.  Admitted to Tele and Xarelto stopped.  Today taken for RLE angiogram with tPA and EKOS lysis catheter placement then admitted to ICU post op.      PMHx:      Past Medical History:   Diagnosis Date    H/O aorto-femoral bypass     Hyperlipemia     Hypertension     Pre-diabetes         PMSx:      Past Surgical History:   Procedure Laterality Date    back fusion      FEMORAL BYPASS          Social Hx:      Social History     Social History    Marital status:      Spouse name: N/A    Number of children: N/A    Years of education: N/A     Occupational History    Not on file.     Social History Main Topics    Smoking status: Former Smoker    Smokeless tobacco: Former User     Quit date: 10/22/2016    Alcohol use No    Drug use: No    Sexual activity: Yes     Other Topics Concern    Not on file     Social History Narrative    No narrative on file        Family Hx:      Family History   Problem Relation Age of Onset    COPD Mother     Diabetes Mother     Heart disease Mother     Stroke Mother     Heart attack Mother     COPD Father     Diabetes Father         Allergies:      Review of patient's allergies indicates:   Allergen Reactions    Pcn [penicillins] Other (See Comments)     Other      Ceclor [cefaclor] Rash    Latex, natural rubber Rash       Medications:      Current Facility-Administered Medications   Medication    albuterol-ipratropium  2.5mg-0.5mg/3mL nebulizer solution 3 mL    alteplase (CATHFLO ACTIVASE) 8 mg in sodium chloride 0.9% 100 mL infusion    amlodipine tablet 10 mg    aspirin chewable tablet 81 mg    atorvastatin tablet 20 mg    dextrose 5 % and 0.45 % NaCl infusion    diphenhydrAMINE injection 25 mg    heparin 25,000 units in dextrose 5% 250 mL (100 units/mL) bolus from bag; PRN BOLUS    heparin 25,000 units in dextrose 5% 250 mL (100 units/mL) bolus from bag; PRN BOLUS    heparin 25,000 units in dextrose 5% 250 mL (100 units/mL) infusion; MALE    hydrALAZINE injection 10 mg    morphine injection 4 mg    ondansetron injection 4 mg       ROS:  Review of Systems   Constitutional: Negative for chills, fever and malaise/fatigue.   HENT: Negative for congestion.    Eyes: Negative for blurred vision.   Respiratory: Negative for cough, sputum production and shortness of breath.    Cardiovascular: Negative for chest pain and leg swelling.   Gastrointestinal: Negative for abdominal pain, nausea and vomiting.   Genitourinary: Negative for dysuria.   Musculoskeletal: Positive for joint pain (severe 10/10 right foot pain). Negative for myalgias.   Skin: Negative for rash.   Neurological: Negative for dizziness, focal weakness, weakness and headaches.   Endo/Heme/Allergies: Does not bruise/bleed easily.   Psychiatric/Behavioral: The patient is not nervous/anxious.        Vital Signs (Most Recent)  Temp: 98.4 °F (36.9 °C) (05/24/17 1200)  Pulse: 61 (05/24/17 1300)  Resp: 18 (05/24/17 1200)  BP: (!) 162/87 (05/24/17 1200)  SpO2: 98 % (05/24/17 1200)    Vital Signs Range (Last 24H):  Temp:  [97.9 °F (36.6 °C)-98.4 °F (36.9 °C)]   Pulse:  [61-88]   Resp:  [18-20]   BP: (139-188)/(78-99)   SpO2:  [95 %-99 %]     I & O (Last 24H):  Intake/Output Summary (Last 24 hours) at 05/24/17 1529  Last data filed at 05/24/17 1005   Gross per 24 hour   Intake             2232 ml   Output              800 ml   Net             1432 ml     Physical Exam:    Physical Exam   Constitutional: He is oriented to person, place, and time. He appears to be writhing in pain. He appears not lethargic and not malnourished. He appears unhealthy.  Non-toxic appearance. He has a sickly appearance.   HENT:   Head: Normocephalic and atraumatic.   Mouth/Throat: Oropharynx is clear and moist.   Eyes: EOM and lids are normal.   Neck: Normal range of motion. Carotid bruit is not present. No tracheal deviation present.   Cardiovascular: Normal rate and regular rhythm.    Pulses:       Radial pulses are 1+ on the right side, and 1+ on the left side.        Dorsalis pedis pulses are 0 on the right side, and 0 on the left side.   Pulmonary/Chest: Breath sounds normal. No accessory muscle usage. No respiratory distress.   Abdominal: Soft. Normal appearance. He exhibits no distension. Bowel sounds are hypoactive. There is no tenderness.   Genitourinary: Penis normal.   Musculoskeletal: Normal range of motion.   No edema   Lymphadenopathy:     He has no cervical adenopathy.   Neurological: He is alert and oriented to person, place, and time. He appears not lethargic.   Skin: Skin is dry and intact. He is not diaphoretic. There is cyanosis (distal right foot ).        Psychiatric: Memory normal. His mood appears anxious. He is agitated. He expresses impulsivity.         Laboratory (Last 24H):  CBC:    Recent Labs  Lab 05/24/17  0447   WBC 6.48   HGB 11.7*   HCT 35.2*        CMP:    Recent Labs  Lab 05/24/17  0447   CALCIUM 9.0      K 3.9   CO2 22*      BUN 16   CREATININE 0.8       Chest X-Ray:   Film and report reviewed:  No acute pulm process noted    ASSESSMENT/PLAN:     Problem   Ischemic Pain of Right Foot   Arterial Occlusion, Lower Extremity   Pad (Peripheral Artery Disease)   Uncontrolled Hypertension   Mixed Hyperlipidemia       PLAN:    1. Neuro: ICU Neuro monitoring    2. Pulmonary: Encourage C&DB and OOB asap    3. Cardiac: ICU Cardiac monitoring cont ASA, Norvasc  and statin.      4. Renal:  monitor I/Os     5. Infectious Disease: Follow fever curve    6. Hematology/Oncology: Monitor for bleeding on Heparin and tPA    7. Endocrine: monitor glucose stable at 115    8. Fluids/Electrolytes/Nutrition/GI: IVFs and cardiac diet    9. Musculoskeletal:  ROM.  EKOS, intraarterial tPA and Heparin     10. Pain Management: unresponsive to 1mg IVP Dilaudid and 100mg Fentanyl IVP.  Will start Dilaudid PCA.    11. Discharge and Palliative Care: Plan home with family if improves, if not may need distal RLE amputation.     Preventive Measures and Monitoring:   Stress Ulcer: Pepcid  Nutrition: Cardiac diet  Glucose control: stable  Bowel prophylaxis: PRN Dulcolax  DVT prophylaxis: Heparin infusion  Hx CAD on B-Blocker: no hx CAD  Head of Bed/Reposition: Elevate HOB and turn Q1-2 hours   Early Mobility: Bedrest with EKOS  EKOS Day: #1  Code Status: Full  Pneumonia Vaccine: ordered    Counseling/Consultation:I have discussed the care of this patient in detail with the bedside nursing staff and Dr. Mcintosh and Dr. John    Critical Care Time greater than: 41 minutes    Critical care was time spent personally by me on the following activities: development of treatment plan with patient or surrogate and bedside caregivers, discussions with consultants, evaluation of patient's response to treatment, examination of patient, ordering and performing treatments and interventions, ordering and review of laboratory studies, ordering and review of radiographic studies, pulse oximetry, and re-evaluation of patient's condition.  This critical care time did not overlap with that of any other provider.    Thank you Dr. John for this consult and the pleasure of evaluating and caring for this patient    NIKKI Gale ACNP-BC [facility]

## 2017-05-24 NOTE — PROGRESS NOTES
[facility] Hospital Medicine  Progress Note    Patient Name: Ish Guardado  MRN: [id number]  Patient Class: IP- Inpatient   Admission Date: 5/22/2017  Length of Stay: 2 days  Attending Physician: Caryl Farmer MD  Primary Care Provider: Pollo Arana MD        Subjective:     Principal Problem:Arterial occlusion, lower extremity    HPI:  56 y.o male patient with PMHx of HTN, prediabetic, COPD, stroke, PAD s/p Bypass with graft in the Rt. LE , was referred from his PCP office due to complain of pain in the rt. Foot. Pain started couple days ago and start to get worse, it is cramping in nature and feels tight, he also noticed discoloration of the foot as well. Patient takes Xarelto for PAD.  Patient had fall week ago where he went to urgent care for c/o rib pain, CXR was done and it was negative for fracture but told him there is spot there.  He was admitted here for medical management    Hospital Course:  Pt admitted with right foot pain and was found to have occlusion of right leg bypass/ischemic leg. Pt on Xarelto which is now on hold. Care discussed with vascular surgery who plans on right leg angiogram with TPA thrombolysis of PTFE graft in am. Pt denies complaints except for foot symptoms     5/24/17- No acute events overnight.  Still with c/o RLE/Foot pain.  To undergo Angiogram with TPA thrombolysis of PTFE graft today.  No pulses noted in right foot, cold to touch with pale discoloration noted. Currently on Heparin gtt.      Interval History: 5/24/17- No acute events overnight.  Still with c/o RLE/Foot pain.  To undergo Angiogram with TPA thrombolysis of PTFE graft today.  No pulses noted in right foot, cold to touch with pale discoloration noted. Currently on Heparin gtt.        Review of Systems   Constitutional: Negative for activity change, appetite change, chills, fatigue and fever.   HENT: Negative for congestion, rhinorrhea, sore throat and tinnitus.    Eyes: Negative for pain,  discharge, redness and itching.   Respiratory: Negative for apnea, cough, choking, chest tightness, wheezing and stridor.    Cardiovascular: Negative for chest pain and leg swelling.   Gastrointestinal: Negative for abdominal distention, abdominal pain, constipation, diarrhea, nausea and vomiting.   Genitourinary: Negative for difficulty urinating, dysuria, flank pain, frequency and hematuria.   Musculoskeletal: Negative for arthralgias, back pain and neck pain.        Right foot pain    Skin: Positive for pallor.   Neurological: Negative for dizziness, tremors, seizures, facial asymmetry, weakness and numbness.     Objective:     Vital Signs (Most Recent):  Temp: 97.9 °F (36.6 °C) (05/24/17 0745)  Pulse: 64 (05/24/17 0745)  Resp: 18 (05/24/17 0745)  BP: 139/78 (05/24/17 0745)  SpO2: 99 % (05/24/17 0745) Vital Signs (24h Range):  Temp:  [97.9 °F (36.6 °C)-98.2 °F (36.8 °C)] 97.9 °F (36.6 °C)  Pulse:  [64-88] 64  Resp:  [18-20] 18  SpO2:  [95 %-99 %] 99 %  BP: (139-188)/(78-99) 139/78     Weight: 81.1 kg (178 lb 12.8 oz)  Body mass index is 25.66 kg/m².    Intake/Output Summary (Last 24 hours) at 05/24/17 1133  Last data filed at 05/24/17 1005   Gross per 24 hour   Intake             2232 ml   Output              800 ml   Net             1432 ml      Physical Exam   Constitutional: No distress.   HENT:   Head: Normocephalic and atraumatic.   Eyes: EOM are normal. Pupils are equal, round, and reactive to light.   Neck: Normal range of motion. Neck supple. No JVD present.   Cardiovascular: Normal rate and regular rhythm.  Exam reveals no gallop and no friction rub.    No murmur heard.  Pulmonary/Chest: Effort normal and breath sounds normal. No stridor. No respiratory distress. He has no wheezes. He has no rales. He exhibits no tenderness.   Abdominal: Soft. Bowel sounds are normal. He exhibits no distension. There is no tenderness. There is no rebound and no guarding.   Musculoskeletal: Normal range of motion. He  exhibits no deformity.   There is absent pulse in the Rt. DP, Rt. PT, Pallor noted to Rt.foot, cold to touch   Lymphadenopathy:     He has no cervical adenopathy.   Neurological: He displays normal reflexes. No cranial nerve deficit. He exhibits normal muscle tone. Coordination normal.   Skin: Skin is warm and dry. He is not diaphoretic.   Psychiatric: He has a normal mood and affect. His behavior is normal.       Significant Labs:   BMP:   Recent Labs  Lab 05/24/17  0447   *      K 3.9      CO2 22*   BUN 16   CREATININE 0.8   CALCIUM 9.0     CBC:   Recent Labs  Lab 05/22/17 2120 05/24/17 0447   WBC 5.56 6.48   HGB 11.4* 11.7*   HCT 34.1* 35.2*    295       Significant Imaging: I have reviewed all pertinent imaging results/findings within the past 24 hours.   Imaging Results          X-Ray Chest 1 View (Final result)  Result time 05/23/17 09:21:01    Final result by Omega Harris III, MD (05/23/17 09:21:01)                 Impression:     No acute disease identified in the chest.        Electronically signed by: OMEGA HARRIS MD  Date:     05/23/17  Time:    09:21              Narrative:    XR CHEST 1 VIEW    Clinical history: Chest pain.      Findings: Cardiomediastinal silhouette is within normal limits for AP technique. The lungs appear clear of active disease. There is no evidence of pneumonia, pulmonary edema, pleural effusion, pneumothorax or other acute disease.                             US Lower Extremity Arteries Right (Final result)  Result time 05/22/17 21:51:18    Final result by Dennis Wong MD (05/22/17 21:51:18)                 Impression:      No flow is seen distal to the common femoral artery.  The patient appears to have femoral graft and popliteal graft on the right but no flow is seen distally.      Electronically signed by: DENNIS WONG MD  Date:     05/22/17  Time:    21:51              Narrative:    Exam: US LOWER EXTREMITY ARTERIES RIGHT,    Date:   05/22/17 21:15:00    History: Embolism and thrombosis of the arteries of the lower extremities.    Comparison:  No prior relevant studies available    Findings: Standard technique was utilized with Doppler imaging.    The findings suggest the patient has a femoral graft with separate popliteal graft.  Normal flow is not identified within the superficial femoral artery, popliteal artery, anterior tibialis artery, posterior tibialis artery, or dorsalis pedis artery.  Profunda femoral artery velocity is 46.4 cm/s.  Common femoral artery is 90.4 cm/s.                            Assessment/Plan:      * Arterial occlusion, lower extremity    - Management per CVT  - To undergo Right leg angiogram with TPA thrombolysis of PTFE graft today  - Xarelto currenlty on hold  - On Heparin drip  - Contiue pain control with morphine   - Continue home ASA/Statin  - Monitor        HTN (hypertension)    - BP suboptimal (may be r/t pain)  - Cont amlodipine   - PRN hydralazine for SBP > 160  - Monitor          COPD (chronic obstructive pulmonary disease)    - Currently appears compensated  - PRN Breathing tx   - Monitor          Hypokalemia    - Normal S/P replacement  - BMP in AM            VTE Risk Mitigation         Ordered     Medium Risk of VTE  Once      05/22/17 2318     Reason for No Pharmacological VTE Prophylaxis  Once     On Heparin GTT 05/22/17 2318          Abi Null NP  Department of Hospital Medicine   [facility]

## 2017-05-24 NOTE — PROCEDURES
"Ish Guardado is a 56 y.o. male patient.    Temp: 98.4 °F (36.9 °C) (17 1200)  Pulse: 91 (17 1645)  Resp: 18 (17 1610)  BP: (!) 162/87 (17 1200)  SpO2: 100 % (17 1645)  Weight: 81.1 kg (178 lb 12.8 oz) (17 002)  Height: 5' 10" (177.8 cm) (17)  Mallampati Scale: Class II  ASA Classification: Class 2    Intubation  Date/Time: 2017 5:15 PM  Location procedure was performed: [facility] INTENSIVE CARE UNIT  Performed by: CHARLIE GALE  Authorized by: CHARLIE GALE   Pre-operative diagnosis: Severe right foot pain  Post-operative diagnosis: Uncontrolled pain  Consent Done: Yes  Consent: Verbal consent obtained.  Consent given by: spouse  Patient understanding: patient states understanding of the procedure being performed  Patient consent: the patient's understanding of the procedure matches consent given  Procedure consent: procedure consent matches procedure scheduled  Patient identity confirmed: name,  and verbally with patient  Indications: airway protection  Intubation method: oral  Patient status: awake  Preoxygenation: bag valve mask  Sedatives: propofol  Paralytic: none  Laryngoscope size: Mac 4  Tube size: 8.5 mm  Tube type: cuffed  Number of attempts: 1  Cricoid pressure: no  Cords visualized: yes  Post-procedure assessment: CO2 detector and chest rise  Breath sounds: clear and equal and absent over the epigastrium  Cuff inflated: yes  ETT to lip: 23 cm  Tube secured with: ETT wiley  Chest x-ray interpreted by me.  Chest x-ray findings: endotracheal tube in appropriate position  Patient tolerance: Patient tolerated the procedure well with no immediate complications  Complications: No  Estimated blood loss (mL): 0  Specimens: No  Implants: No          Charlie Gale  2017  "

## 2017-05-24 NOTE — PLAN OF CARE
Problem: Patient Care Overview  Goal: Plan of Care Review  Outcome: Ongoing (interventions implemented as appropriate)  Pt free from falls, educated to not bear weight on affected extremity.  Instructed to call before getting out of bed. Reviewed plan of care with patient.  Patient verbalized understanding and teachback.      12h chart check completed.

## 2017-05-24 NOTE — SUBJECTIVE & OBJECTIVE
Interval History: 5/24/17- No acute events overnight.  Still with c/o RLE/Foot pain.  To undergo Angiogram with TPA thrombolysis of PTFE graft today.  No pulses noted in right foot, cold to touch with pale discoloration noted. Currently on Heparin gtt.        Review of Systems   Constitutional: Negative for activity change, appetite change, chills, fatigue and fever.   HENT: Negative for congestion, rhinorrhea, sore throat and tinnitus.    Eyes: Negative for pain, discharge, redness and itching.   Respiratory: Negative for apnea, cough, choking, chest tightness, wheezing and stridor.    Cardiovascular: Negative for chest pain and leg swelling.   Gastrointestinal: Negative for abdominal distention, abdominal pain, constipation, diarrhea, nausea and vomiting.   Genitourinary: Negative for difficulty urinating, dysuria, flank pain, frequency and hematuria.   Musculoskeletal: Negative for arthralgias, back pain and neck pain.        Right foot pain    Skin: Positive for pallor.   Neurological: Negative for dizziness, tremors, seizures, facial asymmetry, weakness and numbness.     Objective:     Vital Signs (Most Recent):  Temp: 97.9 °F (36.6 °C) (05/24/17 0745)  Pulse: 64 (05/24/17 0745)  Resp: 18 (05/24/17 0745)  BP: 139/78 (05/24/17 0745)  SpO2: 99 % (05/24/17 0745) Vital Signs (24h Range):  Temp:  [97.9 °F (36.6 °C)-98.2 °F (36.8 °C)] 97.9 °F (36.6 °C)  Pulse:  [64-88] 64  Resp:  [18-20] 18  SpO2:  [95 %-99 %] 99 %  BP: (139-188)/(78-99) 139/78     Weight: 81.1 kg (178 lb 12.8 oz)  Body mass index is 25.66 kg/m².    Intake/Output Summary (Last 24 hours) at 05/24/17 1133  Last data filed at 05/24/17 1005   Gross per 24 hour   Intake             2232 ml   Output              800 ml   Net             1432 ml      Physical Exam   Constitutional: No distress.   HENT:   Head: Normocephalic and atraumatic.   Eyes: EOM are normal. Pupils are equal, round, and reactive to light.   Neck: Normal range of motion. Neck supple. No  JVD present.   Cardiovascular: Normal rate and regular rhythm.  Exam reveals no gallop and no friction rub.    No murmur heard.  Pulmonary/Chest: Effort normal and breath sounds normal. No stridor. No respiratory distress. He has no wheezes. He has no rales. He exhibits no tenderness.   Abdominal: Soft. Bowel sounds are normal. He exhibits no distension. There is no tenderness. There is no rebound and no guarding.   Musculoskeletal: Normal range of motion. He exhibits no deformity.   There is absent pulse in the Rt. DP, Rt. PT, Pallor noted to Rt.foot, cold to touch   Lymphadenopathy:     He has no cervical adenopathy.   Neurological: He displays normal reflexes. No cranial nerve deficit. He exhibits normal muscle tone. Coordination normal.   Skin: Skin is warm and dry. He is not diaphoretic.   Psychiatric: He has a normal mood and affect. His behavior is normal.       Significant Labs:   BMP:   Recent Labs  Lab 05/24/17  0447   *      K 3.9      CO2 22*   BUN 16   CREATININE 0.8   CALCIUM 9.0     CBC:   Recent Labs  Lab 05/22/17 2120 05/24/17  0447   WBC 5.56 6.48   HGB 11.4* 11.7*   HCT 34.1* 35.2*    295       Significant Imaging: I have reviewed all pertinent imaging results/findings within the past 24 hours.   Imaging Results          X-Ray Chest 1 View (Final result)  Result time 05/23/17 09:21:01    Final result by Omega Harris III, MD (05/23/17 09:21:01)                 Impression:     No acute disease identified in the chest.        Electronically signed by: OMEGA HARRIS MD  Date:     05/23/17  Time:    09:21              Narrative:    XR CHEST 1 VIEW    Clinical history: Chest pain.      Findings: Cardiomediastinal silhouette is within normal limits for AP technique. The lungs appear clear of active disease. There is no evidence of pneumonia, pulmonary edema, pleural effusion, pneumothorax or other acute disease.                             US Lower Extremity Arteries Right  (Final result)  Result time 05/22/17 21:51:18    Final result by Damaris Velasco MD (05/22/17 21:51:18)                 Impression:      No flow is seen distal to the common femoral artery.  The patient appears to have femoral graft and popliteal graft on the right but no flow is seen distally.      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    Comparison:  No prior relevant studies available    Findings: Standard technique was utilized with Doppler imaging.    The findings suggest the patient has a femoral graft with separate popliteal graft.  Normal flow is not identified within the superficial femoral artery, popliteal artery, anterior tibialis artery, posterior tibialis artery, or dorsalis pedis artery.  Profunda femoral artery velocity is 46.4 cm/s.  Common femoral artery is 90.4 cm/s.

## 2017-05-24 NOTE — PROGRESS NOTES
Patient still in excruciating pain from right foot not controlled with PCA Dilaudid.  Dr. John called again per nurses and agrees with intubation for inc sedation and narcotics.  I discussed this in detail with spouse and patient at bedside.     NIKKI Gale [facility]

## 2017-05-24 NOTE — BRIEF OP NOTE
[facility]  Surgery Department  Operative Note    SUMMARY     Date of Procedure: 5/24/2017     Procedure: Procedure(s) (LRB):  ANGIOGRAM-EXTREMITY-LOWER (Right)   EKOS lysis catheter placement    Surgeon(s) and Role:     * Jack John IV, MD - Primary    Assisting Surgeon: None    Pre-Operative Diagnosis: PAD (peripheral artery disease) [I73.9]    Post-Operative Diagnosis: Post-Op Diagnosis Codes:     * PAD (peripheral artery disease) [I73.9]    Anesthesia: RN IV Sedation    Technical Procedures Used: Aortogram, Right leg angiogram, right fem-distal bypass EKOS lysis catheter placement    Description of the Findings of the Procedure: see operative report    Significant Surgical Tasks Conducted by the Assistant(s), if Applicable: n/a    Complications: No    Estimated Blood Loss (EBL): * No values recorded between 5/24/2017 12:00 AM and 5/24/2017  3:26 PM *           Implants: * No implants in log *    Specimens:   Specimen (12h ago through future)    None                  Condition: Good    Disposition: ICU - extubated and stable.    Attestation: I was present and scrubbed for the entire procedure.

## 2017-05-24 NOTE — ASSESSMENT & PLAN NOTE
- Management per CVT  - To undergo Right leg angiogram with TPA thrombolysis of PTFE graft today  - Xarelto agustinay on hold  - On Heparin drip  - Contiue pain control with morphine   - Continue home ASA/Statin  - Monitor

## 2017-05-25 LAB
ALLENS TEST: ABNORMAL
ANION GAP SERPL CALC-SCNC: 10 MMOL/L
APTT BLDCRRT: 30.6 SEC
APTT BLDCRRT: 30.8 SEC
BASOPHILS # BLD AUTO: 0.01 K/UL
BASOPHILS # BLD AUTO: 0.02 K/UL
BASOPHILS NFR BLD: 0.2 %
BASOPHILS NFR BLD: 0.2 %
BUN SERPL-MCNC: 9 MG/DL
CALCIUM SERPL-MCNC: 8.9 MG/DL
CHLORIDE SERPL-SCNC: 102 MMOL/L
CO2 SERPL-SCNC: 24 MMOL/L
CREAT SERPL-MCNC: 0.8 MG/DL
DELSYS: ABNORMAL
DIFFERENTIAL METHOD: ABNORMAL
DIFFERENTIAL METHOD: ABNORMAL
EOSINOPHIL # BLD AUTO: 0.1 K/UL
EOSINOPHIL # BLD AUTO: 0.1 K/UL
EOSINOPHIL NFR BLD: 0.8 %
EOSINOPHIL NFR BLD: 2.8 %
ERYTHROCYTE [DISTWIDTH] IN BLOOD BY AUTOMATED COUNT: 13.4 %
ERYTHROCYTE [DISTWIDTH] IN BLOOD BY AUTOMATED COUNT: 13.5 %
ERYTHROCYTE [SEDIMENTATION RATE] IN BLOOD BY WESTERGREN METHOD: 18 MM/H
EST. GFR  (AFRICAN AMERICAN): >60 ML/MIN/1.73 M^2
EST. GFR  (NON AFRICAN AMERICAN): >60 ML/MIN/1.73 M^2
FIBRINOGEN PPP-MCNC: 175 MG/DL
FIBRINOGEN PPP-MCNC: 184 MG/DL
FIO2: 35
GLUCOSE SERPL-MCNC: 107 MG/DL
HCO3 UR-SCNC: 25.5 MMOL/L (ref 24–28)
HCT VFR BLD AUTO: 32.4 %
HCT VFR BLD AUTO: 36.3 %
HGB BLD-MCNC: 10.8 G/DL
HGB BLD-MCNC: 12 G/DL
LYMPHOCYTES # BLD AUTO: 1.2 K/UL
LYMPHOCYTES # BLD AUTO: 1.4 K/UL
LYMPHOCYTES NFR BLD: 14.2 %
LYMPHOCYTES NFR BLD: 23 %
MCH RBC QN AUTO: 28.2 PG
MCH RBC QN AUTO: 28.7 PG
MCHC RBC AUTO-ENTMCNC: 33.1 %
MCHC RBC AUTO-ENTMCNC: 33.3 %
MCV RBC AUTO: 85 FL
MCV RBC AUTO: 86 FL
MODE: ABNORMAL
MONOCYTES # BLD AUTO: 0.6 K/UL
MONOCYTES # BLD AUTO: 1.4 K/UL
MONOCYTES NFR BLD: 11 %
MONOCYTES NFR BLD: 14.3 %
NEUTROPHILS # BLD AUTO: 3.2 K/UL
NEUTROPHILS # BLD AUTO: 6.8 K/UL
NEUTROPHILS NFR BLD: 63 %
NEUTROPHILS NFR BLD: 70.5 %
PCO2 BLDA: 44.9 MMHG (ref 35–45)
PEEP: 5
PH SMN: 7.36 [PH] (ref 7.35–7.45)
PLATELET # BLD AUTO: 228 K/UL
PLATELET # BLD AUTO: 244 K/UL
PMV BLD AUTO: 8.6 FL
PMV BLD AUTO: 8.8 FL
PO2 BLDA: 118 MMHG (ref 80–100)
POC ACTIVATED CLOTTING TIME K: 142 SEC (ref 74–137)
POC BE: 0 MMOL/L
POC SATURATED O2: 98 % (ref 95–100)
POTASSIUM SERPL-SCNC: 3.6 MMOL/L
RBC # BLD AUTO: 3.76 M/UL
RBC # BLD AUTO: 4.25 M/UL
SAMPLE: ABNORMAL
SAMPLE: ABNORMAL
SITE: ABNORMAL
SODIUM SERPL-SCNC: 136 MMOL/L
VT: 450
WBC # BLD AUTO: 5.01 K/UL
WBC # BLD AUTO: 9.61 K/UL

## 2017-05-25 PROCEDURE — 25000003 PHARM REV CODE 250: Performed by: SURGERY

## 2017-05-25 PROCEDURE — 36600 WITHDRAWAL OF ARTERIAL BLOOD: CPT

## 2017-05-25 PROCEDURE — 63600175 PHARM REV CODE 636 W HCPCS: Performed by: NURSE PRACTITIONER

## 2017-05-25 PROCEDURE — 25000003 PHARM REV CODE 250

## 2017-05-25 PROCEDURE — 63600175 PHARM REV CODE 636 W HCPCS: Performed by: INTERNAL MEDICINE

## 2017-05-25 PROCEDURE — C1769 GUIDE WIRE: HCPCS

## 2017-05-25 PROCEDURE — 20000000 HC ICU ROOM

## 2017-05-25 PROCEDURE — 63600175 PHARM REV CODE 636 W HCPCS: Performed by: SURGERY

## 2017-05-25 PROCEDURE — 80048 BASIC METABOLIC PNL TOTAL CA: CPT

## 2017-05-25 PROCEDURE — 94003 VENT MGMT INPAT SUBQ DAY: CPT

## 2017-05-25 PROCEDURE — 25000003 PHARM REV CODE 250: Performed by: NURSE PRACTITIONER

## 2017-05-25 PROCEDURE — 99291 CRITICAL CARE FIRST HOUR: CPT | Mod: ,,, | Performed by: NURSE PRACTITIONER

## 2017-05-25 PROCEDURE — 99900035 HC TECH TIME PER 15 MIN (STAT)

## 2017-05-25 PROCEDURE — 25000003 PHARM REV CODE 250: Performed by: EMERGENCY MEDICINE

## 2017-05-25 PROCEDURE — 85730 THROMBOPLASTIN TIME PARTIAL: CPT

## 2017-05-25 PROCEDURE — 85025 COMPLETE CBC W/AUTO DIFF WBC: CPT

## 2017-05-25 PROCEDURE — 85384 FIBRINOGEN ACTIVITY: CPT

## 2017-05-25 PROCEDURE — 63600175 PHARM REV CODE 636 W HCPCS

## 2017-05-25 PROCEDURE — 047P3ZZ DILATION OF RIGHT ANTERIOR TIBIAL ARTERY, PERCUTANEOUS APPROACH: ICD-10-PCS | Performed by: SURGERY

## 2017-05-25 PROCEDURE — 36415 COLL VENOUS BLD VENIPUNCTURE: CPT

## 2017-05-25 PROCEDURE — 82803 BLOOD GASES ANY COMBINATION: CPT

## 2017-05-25 RX ORDER — FENTANYL CITRAT/DEXTROSE 5%/PF 100 MCG/10
PATIENT CONTROLLED ANALGESIA SYRINGE INTRAVENOUS CONTINUOUS
Status: DISCONTINUED | OUTPATIENT
Start: 2017-05-25 | End: 2017-05-25

## 2017-05-25 RX ORDER — PROPOFOL 10 MG/ML
50 INJECTION, EMULSION INTRAVENOUS CONTINUOUS
Status: DISCONTINUED | OUTPATIENT
Start: 2017-05-25 | End: 2017-05-25

## 2017-05-25 RX ORDER — SODIUM CHLORIDE 450 MG/100ML
20 INJECTION, SOLUTION INTRAVENOUS CONTINUOUS
Status: DISCONTINUED | OUTPATIENT
Start: 2017-05-25 | End: 2017-05-26

## 2017-05-25 RX ORDER — HYDROCODONE BITARTRATE AND ACETAMINOPHEN 5; 325 MG/1; MG/1
1 TABLET ORAL EVERY 4 HOURS PRN
Status: DISCONTINUED | OUTPATIENT
Start: 2017-05-25 | End: 2017-05-25

## 2017-05-25 RX ORDER — ENOXAPARIN SODIUM 100 MG/ML
40 INJECTION SUBCUTANEOUS EVERY 24 HOURS
Status: DISCONTINUED | OUTPATIENT
Start: 2017-05-25 | End: 2017-05-26

## 2017-05-25 RX ORDER — HYDROMORPHONE HYDROCHLORIDE 1 MG/ML
1 INJECTION, SOLUTION INTRAMUSCULAR; INTRAVENOUS; SUBCUTANEOUS
Status: DISCONTINUED | OUTPATIENT
Start: 2017-05-25 | End: 2017-05-26

## 2017-05-25 RX ORDER — ACETAMINOPHEN 10 MG/ML
1000 INJECTION, SOLUTION INTRAVENOUS ONCE
Status: COMPLETED | OUTPATIENT
Start: 2017-05-25 | End: 2017-05-25

## 2017-05-25 RX ORDER — MORPHINE SULFATE 2 MG/ML
2 INJECTION, SOLUTION INTRAMUSCULAR; INTRAVENOUS
Status: DISCONTINUED | OUTPATIENT
Start: 2017-05-25 | End: 2017-05-25

## 2017-05-25 RX ADMIN — DEXTROSE AND SODIUM CHLORIDE: 5; .45 INJECTION, SOLUTION INTRAVENOUS at 03:05

## 2017-05-25 RX ADMIN — ALTEPLASE 8 MG: 2.2 INJECTION, POWDER, LYOPHILIZED, FOR SOLUTION INTRAVENOUS at 05:05

## 2017-05-25 RX ADMIN — SODIUM CHLORIDE 1740 ML: 0.45 INJECTION, SOLUTION INTRAVENOUS at 10:05

## 2017-05-25 RX ADMIN — PROPOFOL 50 MCG/KG/MIN: 10 INJECTION, EMULSION INTRAVENOUS at 03:05

## 2017-05-25 RX ADMIN — FAMOTIDINE 20 MG: 10 INJECTION, SOLUTION INTRAVENOUS at 08:05

## 2017-05-25 RX ADMIN — HYDRALAZINE HYDROCHLORIDE 20 MG: 20 INJECTION INTRAMUSCULAR; INTRAVENOUS at 10:05

## 2017-05-25 RX ADMIN — ACETAMINOPHEN 1000 MG: 10 INJECTION, SOLUTION INTRAVENOUS at 06:05

## 2017-05-25 RX ADMIN — ENOXAPARIN SODIUM 40 MG: 100 INJECTION SUBCUTANEOUS at 06:05

## 2017-05-25 RX ADMIN — FAMOTIDINE 20 MG: 10 INJECTION, SOLUTION INTRAVENOUS at 10:05

## 2017-05-25 RX ADMIN — CHLORHEXIDINE GLUCONATE 15 ML: 1.2 RINSE ORAL at 10:05

## 2017-05-25 RX ADMIN — HYDRALAZINE HYDROCHLORIDE 20 MG: 20 INJECTION INTRAMUSCULAR; INTRAVENOUS at 08:05

## 2017-05-25 RX ADMIN — HYDROMORPHONE HYDROCHLORIDE 1 MG: 1 INJECTION, SOLUTION INTRAMUSCULAR; INTRAVENOUS; SUBCUTANEOUS at 04:05

## 2017-05-25 RX ADMIN — Medication 250 MCG/HR: at 03:05

## 2017-05-25 RX ADMIN — PROPOFOL 50 MCG/KG/MIN: 10 INJECTION, EMULSION INTRAVENOUS at 08:05

## 2017-05-25 NOTE — PLAN OF CARE
Problem: Patient Care Overview  Goal: Plan of Care Review  Outcome: Ongoing (interventions implemented as appropriate)  Patient sedated adequately on propofol at 50mcg/kg/min and analgesia with fentanyl infusing at 250mcg/hr to achieve RASS -1.  Dorsalis Pedal pulse on right foot remains undetected other pulses on lower extremities able to be detected with doppler.  R foot remain dusky in appearance, however is less cool since start of shift.  L groin EKOS cath site clean with no hematoma present at site.  EKOS system functioning properly without issue. NSR on telemetry monitor and BP stable during shift without use of pressor support.  Plans to go at 6 AM to cath lab to evaluate response to EKOS therapy.  Family and patient updated to plan of care with no questions at this time.

## 2017-05-25 NOTE — PROGRESS NOTES
1905 Assumed client care at this time.  Patient doing well on current sedation with only intermittent periods of anxiety, relieved with ativan PRN.  EKOS system in place L groin insertion site with small amount of dried blood, but no oozing.  L dorsalis pedis and Popliteal able to be easily dopplered.  R dorsalis absent at the moment, but R dorsalis pedis able to be dopplered.      Duane from cathlab called and plan is to take pt at 6:15 tomorrow morning for repeat.  Dorraine (sig other) updated to plan of care and no questions at this time.    2305 All pulses remain at baseline from start of shift, no pedal pulse on R foot detected still.  Patient has settled down and sedated adequately for safety at this time.  EKOS system functioning without issue.     0135 Lab called about coag studies.  Stated will check on and call back.

## 2017-05-25 NOTE — OP NOTE
DATE OF PROCEDURE:  05/24/2017    SURGEON:  Jack John IV, M.D.    ASSISTANT:  No assistants.    PREOPERATIVE DIAGNOSES:  Acute right lower extremity ischemia with thrombosed   right fem-distal bypass.    PROCEDURES PERFORMED:  1.  Ultrasound-guided left common femoral artery percutaneous access.  2.  Aortogram.  3.  Right lower extremity angiogram with tertiary vessel cannulation.  4.  Right lower extremity fem-distal bypass EKOS catheter-directed thrombolysis   catheter placement.    POSTOPERATIVE DIAGNOSES:  Acute right lower extremity ischemia with thrombosed   right fem-distal bypass.    INDICATIONS:  This is a 56-year-old male with severe peripheral vascular   disease.  The patient is status post left fem-pop bypass performed by Dr. Leroy Hartley, and also, a right fem-pop bypass which thrombosed.  This was then   converted to a fem-distal bypass with prosthetic, also performed by Dr. Hartley.    The patient was admitted to [facility] 2 days ago for thrombosis of   this right lower extremity fem-distal bypass.  The patient was on Xarelto for   bypass maintenance.  When he presented to the Emergency Department, this bypass   was occluded.  The patient's motor and sensory function to the right foot was   maintained; however, he was having ischemic rest pain.  The patient had taken   this morning Xarelto dose.  Dr. Houser has deferred right lower extremity   angiogram to the following day, Xarelto was held.  His right foot remained   neurologically intact with a good motor and sensory function.  The patient was   taken to the Angio Suite on 05/24/2017 for attempted endovascular salvage of   this right fem-distal bypass graft.    DESCRIPTION OF PROCEDURE:  After informed consent was obtained, the patient   brought to the Angio Suite and placed on the table in supine position.  The   patient's bilateral groins were prepped and draped in the usual standard sterile   fashion.  After timeout was  performed, under ultrasound guidance, the left   common femoral artery was percutaneously accessed and a micropuncture needle and   wire, micropuncture sheath placed, they progress to the aorta.  A 5-Filipino   sheath was placed.  A diagnostic catheter was placed at the level of the renals,   which showed a widely patent aorta and iliac arteries bilaterally.  At this   point, a 6-Filipino destination sheath was placed up and over and right lower   extremity angiogram was performed.  This showed occlusion of the right   fem-distal bypass.  The profunda was widely patent and robust.  The right   anterior tibial artery did reconstitute in the proximal, just distal to the knee   with runoff to the right foot.  The patient was fully heparinized at this   point, using a combination of a floppy Glidewire and an angled trailblazer.  The   thrombosed bypass was cannulated.  I was able to cross the occluded bypass and   gain entry into the true lumen of the right anterior tibial artery.  Angiogram   confirmed displacement.  At this point, a 50 cm EKOS catheter-directed   thrombolysis catheter was then placed through the occluded fem-pop bypass.  The   patient will be dripped with TPA with 1 mg of TPA per hour, 35 mL of normal   saline through the coolant port and 500 units of heparin through the 6-Filipino   sheath overnight with plan return to the Angio Suite tomorrow morning for repeat   angiogram and attempt at salvage of the right fem-distal bypass.  The patient   tolerated the procedure well.  There were no complications.      ALTAGRACIA  dd: 05/25/2017 07:22:48 (CDT)  td: 05/25/2017 11:09:57 (MIRIAN)  Doc ID   #[id number]  Job ID #[id number]    CC:

## 2017-05-25 NOTE — PROGRESS NOTES
Progress Note  Critical Care    Admit Date: 5/22/2017   LOS: 3 days     Follow-up For: Critical limb ischemia, On mechanical ventilation    Interval History: intubated last evening for airway protection with aggressive analgesia for severe re-perfusion pain to RLE   Returned to cath lab this morning which revealed some arterial flow to RLE, EKOS catheter discontinued, pt returned to ICU on mechanical ventilation with Lt femoral access sheath in place   Afebrile overnight    SUBJECTIVE:   ROS:  Unable s/t OETT, sedation, mechanical ventilation    Continuous Infusions:   sodium chloride 0.45% 1,740 mL (05/25/17 1028)    fentanyl      propofol       Review of patient's allergies indicates:   Allergen Reactions    Pcn [penicillins] Other (See Comments)     Other      Ceclor [cefaclor] Rash    Latex, natural rubber Rash       OBJECTIVE:     Vital Signs (Most Recent)  Temp: 99 °F (37.2 °C) (05/25/17 0833)  Pulse: 76 (05/25/17 1008)  Resp: (!) 0 (05/25/17 1008)  BP: (!) 196/86 (05/25/17 1000)  SpO2: 100 % (05/25/17 1008)    Vital Signs Range (Last 24H):  Temp:  [97.7 °F (36.5 °C)-99.1 °F (37.3 °C)]   Pulse:  [61-95]   Resp:  [0-18]   BP: (115-241)/()   SpO2:  [98 %-100 %]     I & O (Last 24H):  Intake/Output Summary (Last 24 hours) at 05/25/17 1101  Last data filed at 05/25/17 0833   Gross per 24 hour   Intake          3816.32 ml   Output             2070 ml   Net          1746.32 ml       Ventilator Data (Last 24H):     Vent Mode: A/C  Oxygen Concentration (%):  [35] 35  Resp Rate Total:  [18 br/min-22 br/min] 18 br/min  Vt Set:  [450 mL] 450 mL  PEEP/CPAP:  [5 cmH20] 5 cmH20  Pressure Support:  [0 cmH20] 0 cmH20  Mean Airway Pressure:  [8.5 [id number] cmH20] 9 cmH20    Physical Exam   Constitutional: He appears well-developed. No distress. He is intubated.   HENT:   Head: Atraumatic.   Eyes: Pupils are equal, round, and reactive to light.   Neck: No JVD present. No tracheal deviation present.   Cardiovascular:  Normal rate and regular rhythm.   No extrasystoles are present.   No murmur heard.  Pulses:       Radial pulses are 2+ on the right side, and 2+ on the left side.   Doppler bilateral DP and PT pulses   Pulmonary/Chest: Breath sounds normal. He is intubated. He has no wheezes. He has no rhonchi. He has no rales.   Vent controlled respiratory effort   Abdominal: Soft. Bowel sounds are normal. He exhibits no distension. There is no tenderness.   Skin: Skin is warm and dry.              Laboratory (Last 24H):  CBC:    Recent Labs  Lab 05/25/17  0510   WBC 5.01   HGB 10.8*   HCT 32.4*        CMP:    Recent Labs  Lab 05/25/17  0510   CALCIUM 8.9      K 3.6   CO2 24      BUN 9   CREATININE 0.8       Labs within the past 24 hours have been reviewed.  ABG    Recent Labs  Lab 05/25/17  0440   PH 7.362   PO2 118*   PCO2 44.9   HCO3 25.5   BE 0     Chest X-Ray:   Film and report reviewed:      ASSESSMENT/PLAN:     Problem   On Mechanically Assisted Ventilation   Arterial Occlusion, Lower Extremity   Ischemic Pain of Right Foot   Uncontrolled Hypertension   Hypokalemia   Copd (Chronic Obstructive Pulmonary Disease)       1. Neuro: sedation for RASS goal -1, SAT once sheath removed  2. Pulmonary: full vent support with SBT once sheath removed with anticipated extubation  3. Cardiac: monitor hemodynamics  4. Renal: accurate I/O; monitor creatinine  5. Infectious Disease: sepsis surveillance  6. Hematology/Oncology: monitor for bleeding  7. Endocrine: monitoring glucose trend  8. Fluids/Electrolytes/Nutrition/GI: IV hydration; replace potassium and monitor  9. Musculoskeletal: ROM  10. Pain Management: fentanyl infusion  11. Discharge and Palliative Care: anticipate home on discharge    Preventive Measures:   Nutrition: Goal: Tolerate oral diet and meet caloric needs  Stress Ulcer: Pepcid  DVT: LMWH/SCDs  Bowel prophylaxis: docusate  Head of Bed/Reposition: Elevate HOB and turn Q1-2 hours    Physical Therapy:  consult once stable.     Sedation Interruption for SAT/SBT   Vent Day: 2  OG Day: 2  Mejia Day: 2  Code Status: full    Counseling/Consultation: I have discussed the care of this patient in detail with nursing staff and Dr. Mcintosh and Dr Farmer.Status and plan of care discussed with team on multidisciplinary rounds.     Critical Care Time 50 minutes  Critical secondary to mechanical ventilation  Critical care was time spent personally by me on the following activities: development of treatment plan with patient or surrogate and bedside caregivers, discussions with consultants, evaluation of patient's response to treatment, examination of patient, ordering and performing treatments and interventions, ordering and review of laboratory studies, ordering and review of radiographic studies, pulse oximetry, re-evaluation of patient's condition.  This critical care time did not overlap with that of any other provider.    Shavon Hsu [facility] Center[facility]-BC  [facility]

## 2017-05-25 NOTE — PROGRESS NOTES
Patient just arrived back to ICU from cath lab. He was transported by cath lab staff with transport ventilator. He was immediately placed on  at previous settings when he arrived in ICU. ETT is secured with commercial tube tamer and still 23 cm at lips. Ambubag & mask with patient throughout transport & is at the bedside.

## 2017-05-25 NOTE — ASSESSMENT & PLAN NOTE
- Management per CVT  - S/P Right leg angiogram with TPA thrombolysis of PTFE graft   - Monitor  - Critical care time spent 45 min

## 2017-05-25 NOTE — PROGRESS NOTES
Patient placed on transport vent, full o2 tank, ambu bag at head of bed with patient. Patient transferred to Cath lab. Gave info if to call if needed.

## 2017-05-25 NOTE — PROGRESS NOTES
Received from [facility] Lab in restraints. Behavior requiring restraints no longer present. Restraints D/C'd.

## 2017-05-25 NOTE — SUBJECTIVE & OBJECTIVE
Interval History:    Review of Systems   Reason unable to perform ROS: sedated on Vent      Objective:     Vital Signs (Most Recent):  Temp: (!) 101.4 °F (38.6 °C) (05/25/17 1530)  Pulse: 86 (05/25/17 1610)  Resp: 10 (05/25/17 1610)  BP: (!) 169/85 (05/25/17 1600)  SpO2: 100 % (05/25/17 1610) Vital Signs (24h Range):  Temp:  [97.7 °F (36.5 °C)-101.4 °F (38.6 °C)] 101.4 °F (38.6 °C)  Pulse:  [71-98] 86  Resp:  [0-21] 10  SpO2:  [98 %-100 %] 100 %  BP: (115-241)/() 169/85     Weight: 87 kg (191 lb 14.4 oz)  Body mass index is 27.53 kg/m².    Intake/Output Summary (Last 24 hours) at 05/25/17 1640  Last data filed at 05/25/17 1630   Gross per 24 hour   Intake          4870.15 ml   Output             2480 ml   Net          2390.15 ml      Physical Exam   Constitutional: He appears well-developed and well-nourished.   HENT:   Head: Normocephalic and atraumatic.   Eyes: Conjunctivae and EOM are normal. Pupils are equal, round, and reactive to light.   Neck:   ETT in place   Cardiovascular: Normal rate, regular rhythm, normal heart sounds and intact distal pulses.    Pulmonary/Chest: Effort normal and breath sounds normal.   Abdominal: Soft. Bowel sounds are normal.   Musculoskeletal: Normal range of motion.   Skin: Skin is warm and dry.   Psychiatric:   Sedated on VENT    Nursing note and vitals reviewed.      Significant Labs:   BMP:   Recent Labs  Lab 05/25/17  0510         K 3.6      CO2 24   BUN 9   CREATININE 0.8   CALCIUM 8.9     CBC:   Recent Labs  Lab 05/24/17  1811 05/24/17  2359 05/25/17  0510   WBC 11.00 9.61 5.01   HGB 11.6* 12.0* 10.8*   HCT 34.3* 36.3* 32.4*    244 228     CMP:   Recent Labs  Lab 05/24/17  0447 05/25/17  0510    136   K 3.9 3.6    102   CO2 22* 24   * 107   BUN 16 9   CREATININE 0.8 0.8   CALCIUM 9.0 8.9   ANIONGAP 12 10   EGFRNONAA >60 >60       Significant Imaging: I have reviewed all pertinent imaging results/findings within the past 24  hours.

## 2017-05-25 NOTE — PLAN OF CARE
Patient status discussed during MDR's this am. Patient required intubation last pm due to severe pain. Patient take to cath lab this am. Unable to determine at this time if discharge plan will remain unchanged. CM will continue to assess for post hospital service needs.     05/25/17 1456   Discharge Reassessment   Assessment Type Discharge Planning Reassessment   Can the patient answer the patient profile reliably? No, cognitively impaired   How does the patient rate their overall health at the present time? Fair   Describe the patient's ability to walk at the present time. Major restrictions/daily assistance from another person   Discharge plan remains the same: (see CM progress note)

## 2017-05-25 NOTE — PLAN OF CARE
Problem: Patient Care Overview  Goal: Plan of Care Review  Outcome: Outcome(s) achieved Date Met: 05/25/17  Patient remains on ventilator with no changes.

## 2017-05-25 NOTE — PLAN OF CARE
Problem: Airway, Artificial (Adult)  Goal: Signs and Symptoms of Listed Potential Problems Will be Absent, Minimized or Managed (Airway, Artificial)  Signs and symptoms of listed potential problems will be absent, minimized or managed by discharge/transition of care (reference Airway, Artificial (Adult) CPG).   Outcome: Outcome(s) achieved Date Met: 05/25/17  Extubated

## 2017-05-25 NOTE — PLAN OF CARE
Problem: Ventilation, Mechanical Invasive (Adult)  Goal: Signs and Symptoms of Listed Potential Problems Will be Absent, Minimized or Managed (Ventilation, Mechanical Invasive)  Signs and symptoms of listed potential problems will be absent, minimized or managed by discharge/transition of care (reference Ventilation, Mechanical Invasive (Adult) CPG).   Outcome: Outcome(s) achieved Date Met: 05/25/17  Extubated

## 2017-05-25 NOTE — INTERVAL H&P NOTE
The patient has been examined and the H&P has been reviewed:    I concur with the findings and no changes have occurred since H&P was written.    Anesthesia/Surgery risks, benefits and alternative options discussed and understood by patient/family.  For f/u up angiogram thrombolysis check.  Pt intubated.  Will proceed as emerency        Active Hospital Problems    Diagnosis  POA    *Arterial occlusion, lower extremity [I74.3]  Yes     RLE      PAD (peripheral artery disease) [I73.9]  Yes     Chronic    Mixed hyperlipidemia [E78.2]  Yes     Chronic    Ischemic pain of right foot [M79.671, I99.9]  Yes    On mechanically assisted ventilation [Z99.11]  Not Applicable    Uncontrolled hypertension [I10]  Yes     Chronic    COPD (chronic obstructive pulmonary disease) [J44.9]  Yes    Hypokalemia [E87.6]  Yes      Resolved Hospital Problems    Diagnosis Date Resolved POA   No resolved problems to display.

## 2017-05-25 NOTE — PROGRESS NOTES
[facility] Hospital Medicine  Progress Note    Patient Name: Ish Guardado  MRN: [id number]  Patient Class: IP- Inpatient   Admission Date: 5/22/2017  Length of Stay: 3 days  Attending Physician: Caryl Farmer MD  Primary Care Provider: Pollo Arana MD        Subjective:     Principal Problem:Arterial occlusion, lower extremity    HPI:  56 y.o male patient with PMHx of HTN, prediabetic, COPD, stroke, PAD s/p Bypass with graft in the Rt. LE , was referred from his PCP office due to complain of pain in the rt. Foot. Pain started couple days ago and start to get worse, it is cramping in nature and feels tight, he also noticed discoloration of the foot as well. Patient takes Xarelto for PAD.  Patient had fall week ago where he went to urgent care for c/o rib pain, CXR was done and it was negative for fracture but told him there is spot there.  He was admitted here for medical management    Hospital Course:  Pt admitted with right foot pain and was found to have occlusion of right leg bypass/ischemic leg. Pt on Xarelto which is now on hold. Care discussed with vascular surgery who plans on right leg angiogram with TPA thrombolysis of PTFE graft in am. Pt denies complaints except for foot symptoms    S/PAortogram, Right leg angiogram, right fem-distal bypass EKOS lysis catheter placement. Intubated in ICU postoperatively for airway protection and  pain management     Interval History:    Review of Systems   Reason unable to perform ROS: sedated on Vent      Objective:     Vital Signs (Most Recent):  Temp: (!) 101.4 °F (38.6 °C) (05/25/17 1530)  Pulse: 86 (05/25/17 1610)  Resp: 10 (05/25/17 1610)  BP: (!) 169/85 (05/25/17 1600)  SpO2: 100 % (05/25/17 1610) Vital Signs (24h Range):  Temp:  [97.7 °F (36.5 °C)-101.4 °F (38.6 °C)] 101.4 °F (38.6 °C)  Pulse:  [71-98] 86  Resp:  [0-21] 10  SpO2:  [98 %-100 %] 100 %  BP: (115-241)/() 169/85     Weight: 87 kg (191 lb 14.4 oz)  Body mass index is 27.53  kg/m².    Intake/Output Summary (Last 24 hours) at 05/25/17 1640  Last data filed at 05/25/17 1630   Gross per 24 hour   Intake          4870.15 ml   Output             2480 ml   Net          2390.15 ml      Physical Exam   Constitutional: He appears well-developed and well-nourished.   HENT:   Head: Normocephalic and atraumatic.   Eyes: Conjunctivae and EOM are normal. Pupils are equal, round, and reactive to light.   Neck:   ETT in place   Cardiovascular: Normal rate, regular rhythm, normal heart sounds and intact distal pulses.    Pulmonary/Chest: Effort normal and breath sounds normal.   Abdominal: Soft. Bowel sounds are normal.   Musculoskeletal: Normal range of motion.   Skin: Skin is warm and dry.   Psychiatric:   Sedated on VENT    Nursing note and vitals reviewed.      Significant Labs:   BMP:   Recent Labs  Lab 05/25/17  0510         K 3.6      CO2 24   BUN 9   CREATININE 0.8   CALCIUM 8.9     CBC:   Recent Labs  Lab 05/24/17  1811 05/24/17  2359 05/25/17  0510   WBC 11.00 9.61 5.01   HGB 11.6* 12.0* 10.8*   HCT 34.3* 36.3* 32.4*    244 228     CMP:   Recent Labs  Lab 05/24/17  0447 05/25/17  0510    136   K 3.9 3.6    102   CO2 22* 24   * 107   BUN 16 9   CREATININE 0.8 0.8   CALCIUM 9.0 8.9   ANIONGAP 12 10   EGFRNONAA >60 >60       Significant Imaging: I have reviewed all pertinent imaging results/findings within the past 24 hours.    Assessment/Plan:      Hypokalemia    - Normal S/P replacement  - BMP in AM          COPD (chronic obstructive pulmonary disease)    -Stable  - PRN Breathing tx   - Monitor          Uncontrolled hypertension    - improved - Monitor          * Arterial occlusion, lower extremity    - Management per CVT  - S/P Right leg angiogram with TPA thrombolysis of PTFE graft   - Monitor  - Critical care time spent 45 min          VTE Risk Mitigation         Ordered     enoxaparin injection 40 mg  Daily     Route:  Subcutaneous         05/25/17 0935     Medium Risk of VTE  Once      05/22/17 2318     Reason for No Pharmacological VTE Prophylaxis  Once      05/22/17 2318          Caryl Farmer MD  Department of Hospital Medicine   [facility]

## 2017-05-25 NOTE — PROGRESS NOTES
Left groin sheath pulled at 1425. Manual pressure held for 25 min. Hemostasis maintained, no hematoma. Gauze DSG with tegaderm to site. LE doppler pulses unchanged from prior to sheath pull. VSS throughout.

## 2017-05-26 LAB
ANION GAP SERPL CALC-SCNC: 10 MMOL/L
BASOPHILS # BLD AUTO: 0.02 K/UL
BASOPHILS NFR BLD: 0.3 %
BUN SERPL-MCNC: 10 MG/DL
CALCIUM SERPL-MCNC: 9.1 MG/DL
CHLORIDE SERPL-SCNC: 101 MMOL/L
CO2 SERPL-SCNC: 26 MMOL/L
CREAT SERPL-MCNC: 0.8 MG/DL
DIFFERENTIAL METHOD: ABNORMAL
EOSINOPHIL # BLD AUTO: 0.2 K/UL
EOSINOPHIL NFR BLD: 2 %
ERYTHROCYTE [DISTWIDTH] IN BLOOD BY AUTOMATED COUNT: 13.7 %
EST. GFR  (AFRICAN AMERICAN): >60 ML/MIN/1.73 M^2
EST. GFR  (NON AFRICAN AMERICAN): >60 ML/MIN/1.73 M^2
GLUCOSE SERPL-MCNC: 92 MG/DL
HCT VFR BLD AUTO: 34.1 %
HGB BLD-MCNC: 11.3 G/DL
LYMPHOCYTES # BLD AUTO: 1.4 K/UL
LYMPHOCYTES NFR BLD: 18.1 %
MCH RBC QN AUTO: 28.6 PG
MCHC RBC AUTO-ENTMCNC: 33.1 %
MCV RBC AUTO: 86 FL
MONOCYTES # BLD AUTO: 1 K/UL
MONOCYTES NFR BLD: 13.3 %
NEUTROPHILS # BLD AUTO: 5.2 K/UL
NEUTROPHILS NFR BLD: 66.3 %
PLATELET # BLD AUTO: 234 K/UL
PMV BLD AUTO: 8.5 FL
POTASSIUM SERPL-SCNC: 3.7 MMOL/L
RBC # BLD AUTO: 3.95 M/UL
SODIUM SERPL-SCNC: 137 MMOL/L
WBC # BLD AUTO: 7.84 K/UL

## 2017-05-26 PROCEDURE — 99233 SBSQ HOSP IP/OBS HIGH 50: CPT | Mod: ,,, | Performed by: NURSE PRACTITIONER

## 2017-05-26 PROCEDURE — 63600175 PHARM REV CODE 636 W HCPCS: Performed by: INTERNAL MEDICINE

## 2017-05-26 PROCEDURE — 20000000 HC ICU ROOM

## 2017-05-26 PROCEDURE — 25000003 PHARM REV CODE 250: Performed by: NURSE PRACTITIONER

## 2017-05-26 PROCEDURE — 63600175 PHARM REV CODE 636 W HCPCS: Performed by: EMERGENCY MEDICINE

## 2017-05-26 PROCEDURE — 93010 ELECTROCARDIOGRAM REPORT: CPT | Mod: ,,, | Performed by: INTERNAL MEDICINE

## 2017-05-26 PROCEDURE — 93005 ELECTROCARDIOGRAM TRACING: CPT

## 2017-05-26 PROCEDURE — 80048 BASIC METABOLIC PNL TOTAL CA: CPT

## 2017-05-26 PROCEDURE — 25000003 PHARM REV CODE 250: Performed by: SURGERY

## 2017-05-26 PROCEDURE — 85025 COMPLETE CBC W/AUTO DIFF WBC: CPT

## 2017-05-26 PROCEDURE — 36415 COLL VENOUS BLD VENIPUNCTURE: CPT

## 2017-05-26 PROCEDURE — 25000003 PHARM REV CODE 250: Performed by: HOSPITALIST

## 2017-05-26 RX ORDER — HYDROMORPHONE HYDROCHLORIDE 1 MG/ML
1 INJECTION, SOLUTION INTRAMUSCULAR; INTRAVENOUS; SUBCUTANEOUS
Status: DISCONTINUED | OUTPATIENT
Start: 2017-05-26 | End: 2017-05-27 | Stop reason: HOSPADM

## 2017-05-26 RX ORDER — FAMOTIDINE 20 MG/1
20 TABLET, FILM COATED ORAL 2 TIMES DAILY
Status: DISCONTINUED | OUTPATIENT
Start: 2017-05-26 | End: 2017-05-27 | Stop reason: HOSPADM

## 2017-05-26 RX ORDER — POTASSIUM CHLORIDE 20 MEQ/1
40 TABLET, EXTENDED RELEASE ORAL ONCE
Status: COMPLETED | OUTPATIENT
Start: 2017-05-26 | End: 2017-05-26

## 2017-05-26 RX ADMIN — AMLODIPINE BESYLATE 10 MG: 10 TABLET ORAL at 10:05

## 2017-05-26 RX ADMIN — HYDROMORPHONE HYDROCHLORIDE 1 MG: 1 INJECTION, SOLUTION INTRAMUSCULAR; INTRAVENOUS; SUBCUTANEOUS at 10:05

## 2017-05-26 RX ADMIN — ASPIRIN 81 MG CHEWABLE TABLET 81 MG: 81 TABLET CHEWABLE at 10:05

## 2017-05-26 RX ADMIN — POTASSIUM CHLORIDE 40 MEQ: 1500 TABLET, EXTENDED RELEASE ORAL at 02:05

## 2017-05-26 RX ADMIN — HYDROMORPHONE HYDROCHLORIDE 1 MG: 1 INJECTION, SOLUTION INTRAMUSCULAR; INTRAVENOUS; SUBCUTANEOUS at 02:05

## 2017-05-26 RX ADMIN — DOCUSATE SODIUM 100 MG: 100 CAPSULE, LIQUID FILLED ORAL at 10:05

## 2017-05-26 RX ADMIN — FAMOTIDINE 20 MG: 10 INJECTION, SOLUTION INTRAVENOUS at 10:05

## 2017-05-26 RX ADMIN — HYDROMORPHONE HYDROCHLORIDE 1 MG: 1 INJECTION, SOLUTION INTRAMUSCULAR; INTRAVENOUS; SUBCUTANEOUS at 05:05

## 2017-05-26 RX ADMIN — RIVAROXABAN 20 MG: 10 TABLET, FILM COATED ORAL at 05:05

## 2017-05-26 RX ADMIN — FAMOTIDINE 20 MG: 20 TABLET ORAL at 08:05

## 2017-05-26 RX ADMIN — ATORVASTATIN CALCIUM 20 MG: 10 TABLET, FILM COATED ORAL at 10:05

## 2017-05-26 RX ADMIN — HYDROMORPHONE HYDROCHLORIDE 1 MG: 1 INJECTION, SOLUTION INTRAMUSCULAR; INTRAVENOUS; SUBCUTANEOUS at 08:05

## 2017-05-26 RX ADMIN — HYDROMORPHONE HYDROCHLORIDE 1 MG: 1 INJECTION, SOLUTION INTRAMUSCULAR; INTRAVENOUS; SUBCUTANEOUS at 11:05

## 2017-05-26 NOTE — PLAN OF CARE
Problem: Patient Care Overview  Goal: Plan of Care Review  Outcome: Ongoing (interventions implemented as appropriate)  Patient doing well, occasional pain to rt foot. Pulses to bilat feet verified with doppler throughout day. VSS. Awaiting floor bed. Spouse at bedside, update given, POC discussed.

## 2017-05-26 NOTE — PLAN OF CARE
Problem: Patient Care Overview  Goal: Plan of Care Review  Outcome: Ongoing (interventions implemented as appropriate)  Pt's more alert than start of shift, VSS, remains on 2 lpm NC w/ O2 sats %, NSR, UOP adequate, and tolerating PO w/o difficulty swallowing post extubation. Pt started shift off w/ a 102.5 deg fever, after IV tylenol pt remains at a low grade fever at 99.7. Pt's RLE remains warm, red/tom, and pulses heard via Doppler. Pt's sheath pulled prior to shift, left groin site CDI, no bleeding, redness, warmth, or hematoma noted, and distal pulses patent via doppler w/ no numbness/tingling present. POC discussed w/ pt and pt's spouse.

## 2017-05-26 NOTE — PROGRESS NOTES
"Progress Note  Critical Care    Admit Date: 5/22/2017   LOS: 4 days     Follow-up For: Critical limb ischemia, On mechanical ventilation    Interval History: tolerated extubation yesterday   Pain adequately controlled with intermittent analgesia   No acute issues overnight   Reports feeling well today    SUBJECTIVE:   ROS:  Review of Systems   Constitutional: Negative for chills and malaise/fatigue.   HENT: Negative.    Respiratory: Negative for cough and shortness of breath.    Cardiovascular: Negative for chest pain and palpitations.   Gastrointestinal: Negative for abdominal pain, nausea and vomiting.   Genitourinary: Negative.    Musculoskeletal:        RLE "sore" today   Skin: Negative.    Neurological: Negative for focal weakness and seizures.   Psychiatric/Behavioral: The patient is not nervous/anxious.        Continuous Infusions:     Review of patient's allergies indicates:   Allergen Reactions    Pcn [penicillins] Other (See Comments)     Other      Ceclor [cefaclor] Rash    Latex, natural rubber Rash       OBJECTIVE:     Vital Signs (Most Recent)  Temp: 99.3 °F (37.4 °C) (05/26/17 1100)  Pulse: 73 (05/26/17 1100)  Resp: 13 (05/26/17 1100)  BP: (!) 160/83 (05/26/17 1100)  SpO2: 97 % (05/26/17 1100)    Vital Signs Range (Last 24H):  Temp:  [99.3 °F (37.4 °C)-102.5 °F (39.2 °C)]   Pulse:  [71-98]   Resp:  [0-21]   BP: ()/(49-94)   SpO2:  [96 %-100 %]     I & O (Last 24H):    Intake/Output Summary (Last 24 hours) at 05/26/17 1243  Last data filed at 05/26/17 1000   Gross per 24 hour   Intake             2190 ml   Output             1705 ml   Net              485 ml       Ventilator Data (Last 24H):     Vent Mode: Spont  Oxygen Concentration (%):  [35] 35  Resp Rate Total:  [6.7 br/min-18 br/min] 6.7 br/min  Vt Set:  [450 mL] 450 mL  PEEP/CPAP:  [5 cmH20] 5 cmH20  Pressure Support:  [0 cmH20-10 cmH20] 10 cmH20  Mean Airway Pressure:  [7.7 cmH20-10 cmH20] 7.7 cmH20    Physical Exam   Constitutional: He " appears well-developed. No distress. He is intubated.   HENT:   Head: Atraumatic.   Eyes: Pupils are equal, round, and reactive to light.   Neck: No JVD present. No tracheal deviation present.   Cardiovascular: Normal rate and regular rhythm.   No extrasystoles are present.   No murmur heard.  Pulses:       Radial pulses are 2+ on the right side, and 2+ on the left side.   Doppler bilateral DP and PT pulses   Pulmonary/Chest: Breath sounds normal. He is intubated. He has no wheezes. He has no rhonchi. He has no rales.   Vent controlled respiratory effort   Abdominal: Soft. Bowel sounds are normal. He exhibits no distension. There is no tenderness.   Skin: Skin is warm and dry.              Laboratory (Last 24H):  CBC:    Recent Labs  Lab 05/26/17  0427   WBC 7.84   HGB 11.3*   HCT 34.1*        CMP:    Recent Labs  Lab 05/26/17  0427   CALCIUM 9.1      K 3.7   CO2 26      BUN 10   CREATININE 0.8       Labs within the past 24 hours have been reviewed.  ABG    Recent Labs  Lab 05/25/17  0440   PH 7.362   PO2 118*   PCO2 44.9   HCO3 25.5   BE 0     Chest X-Ray:   Film and report reviewed:      ASSESSMENT/PLAN:     Problem   Arterial Occlusion, Lower Extremity   Ischemic Pain of Right Foot   Pad (Peripheral Artery Disease)   Uncontrolled Hypertension   Hypokalemia   Copd (Chronic Obstructive Pulmonary Disease)   On Mechanically Assisted Ventilation (Resolved)       1. Neuro: monitor  2. Pulmonary: wean supplemental oxygen to keep sat > 92; encourage TCDB and mobilization  3. Cardiac: monitor hemodynamics  4. Renal: accurate I/O; monitor creatinine  5. Infectious Disease: sepsis surveillance  6. Hematology/Oncology: monitor for bleeding  7. Endocrine: monitoring glucose trend  8. Fluids/Electrolytes/Nutrition/GI: low cholesterol diet; replace potassium  9. Musculoskeletal: ROM  10. Pain Management: prn analgesia  11. Discharge and Palliative Care: anticipate home on discharge    Preventive Measures:    Nutrition: Goal: Tolerate oral diet and meet caloric needs  Stress Ulcer: Pepcid  DVT: LMWH/SCDs  Bowel prophylaxis: docusate  Head of Bed/Reposition: Elevate HOB and turn Q1-2 hours    Physical Therapy: consulted     Code Status: full    Counseling/Consultation: I have discussed the care of this patient in detail with nursing staff and Dr. Mcintosh and Dr Farmer.Status and plan of care discussed with team on multidisciplinary rounds.   OK for transfer out of ICU; will place orders as requested by vascular surgery and we will sign off on transfer out.    Shavon Hsu ACNP-BC  [facility]

## 2017-05-26 NOTE — PROGRESS NOTES
[facility] Hospital Medicine  Progress Note    Patient Name: Ish Guardado  MRN: [id number]  Patient Class: IP- Inpatient   Admission Date: 5/22/2017  Length of Stay: 4 days  Attending Physician: Caryl Farmer MD  Primary Care Provider: Pollo Arana MD        Subjective:     Principal Problem:Arterial occlusion, lower extremity    HPI:  56 y.o male patient with PMHx of HTN, prediabetic, COPD, stroke, PAD s/p Bypass with graft in the Rt. LE , was referred from his PCP office due to complain of pain in the rt. Foot. Pain started couple days ago and start to get worse, it is cramping in nature and feels tight, he also noticed discoloration of the foot as well. Patient takes Xarelto for PAD.  Patient had fall week ago where he went to urgent care for c/o rib pain, CXR was done and it was negative for fracture but told him there is spot there.  He was admitted here for medical management    Hospital Course:  Pt admitted with right foot pain and was found to have occlusion of right leg bypass/ischemic leg. Pt on Xarelto which is now on hold. Care discussed with vascular surgery who plans on right leg angiogram with TPA thrombolysis of PTFE graft in am. Pt denies complaints except for foot symptoms    S/PAortogram, Right leg angiogram, right fem-distal bypass EKOS lysis catheter placement. Intubated in ICU postoperatively for airway protection and  pain management . He is presently extubated .    Interval History:    Review of Systems   Constitutional: Positive for activity change.   HENT: Negative.    Eyes: Negative.    Respiratory: Negative.    Cardiovascular: Negative.    Gastrointestinal: Negative.    Endocrine: Negative.    Genitourinary: Negative.    Musculoskeletal: Negative.    Skin: Negative.    Allergic/Immunologic: Negative.    Neurological: Negative.    Hematological: Negative.    Psychiatric/Behavioral: Negative.      Objective:     Vital Signs (Most Recent):  Temp: 99.3 °F (37.4 °C)  (05/26/17 0700)  Pulse: 79 (05/26/17 1000)  Resp: 16 (05/26/17 1000)  BP: (!) 149/69 (05/26/17 1000)  SpO2: 97 % (05/26/17 1000) Vital Signs (24h Range):  Temp:  [99.3 °F (37.4 °C)-102.5 °F (39.2 °C)] 99.3 °F (37.4 °C)  Pulse:  [71-98] 79  Resp:  [0-21] 16  SpO2:  [96 %-100 %] 97 %  BP: ()/(49-94) 149/69     Weight: 89.2 kg (196 lb 10.4 oz)  Body mass index is 28.22 kg/m².    Intake/Output Summary (Last 24 hours) at 05/26/17 1047  Last data filed at 05/26/17 1000   Gross per 24 hour   Intake          2592.37 ml   Output             1825 ml   Net           767.37 ml      Physical Exam   Constitutional: He appears well-developed and well-nourished.   HENT:   Head: Normocephalic and atraumatic.   Eyes: Conjunctivae and EOM are normal. Pupils are equal, round, and reactive to light.   Neck:   ETT in place   Cardiovascular: Normal rate, regular rhythm, normal heart sounds and intact distal pulses.    Pulmonary/Chest: Effort normal and breath sounds normal.   Abdominal: Soft. Bowel sounds are normal.   Musculoskeletal: Normal range of motion.   Skin: Skin is warm and dry.   Psychiatric:   Sedated on VENT    Nursing note and vitals reviewed.    Interval History:    Review of Systems   Reason unable to perform ROS: sedated on Vent      Objective:     Vital Signs (Most Recent):  Temp: 99.3 °F (37.4 °C) (05/26/17 0700)  Pulse: 79 (05/26/17 1000)  Resp: 16 (05/26/17 1000)  BP: (!) 149/69 (05/26/17 1000)  SpO2: 97 % (05/26/17 1000) Vital Signs (24h Range):  Temp:  [99.3 °F (37.4 °C)-102.5 °F (39.2 °C)] 99.3 °F (37.4 °C)  Pulse:  [71-98] 79  Resp:  [0-21] 16  SpO2:  [96 %-100 %] 97 %  BP: ()/(49-94) 149/69     Weight: 89.2 kg (196 lb 10.4 oz)  Body mass index is 28.22 kg/m².    Intake/Output Summary (Last 24 hours) at 05/26/17 1047  Last data filed at 05/26/17 1000   Gross per 24 hour   Intake          2592.37 ml   Output             1825 ml   Net           767.37 ml      Physical Exam   Constitutional: He appears  well-developed and well-nourished.   HENT:   Head: Normocephalic and atraumatic.   Eyes: Conjunctivae and EOM are normal. Pupils are equal, round, and reactive to light.   Neck:   ETT in place   Cardiovascular: Normal rate, regular rhythm, normal heart sounds and intact distal pulses.    Pulmonary/Chest: Effort normal and breath sounds normal.   Abdominal: Soft. Bowel sounds are normal.   Musculoskeletal: Normal range of motion.   Skin: Skin is warm and dry.   Psychiatric:   Sedated on VENT    Nursing note and vitals reviewed.      Significant Labs:   BMP:     Recent Labs  Lab 05/26/17 0427   GLU 92      K 3.7      CO2 26   BUN 10   CREATININE 0.8   CALCIUM 9.1     CBC:     Recent Labs  Lab 05/24/17 2359 05/25/17 0510 05/26/17 0427   WBC 9.61 5.01 7.84   HGB 12.0* 10.8* 11.3*   HCT 36.3* 32.4* 34.1*    228 234     CMP:     Recent Labs  Lab 05/25/17 0510 05/26/17 0427    137   K 3.6 3.7    101   CO2 24 26    92   BUN 9 10   CREATININE 0.8 0.8   CALCIUM 8.9 9.1   ANIONGAP 10 10   EGFRNONAA >60 >60       Significant Imaging: I have reviewed all pertinent imaging results/findings within the past 24 hours.    Significant Labs:   BMP:     Recent Labs  Lab 05/26/17 0427   GLU 92      K 3.7      CO2 26   BUN 10   CREATININE 0.8   CALCIUM 9.1     CBC:     Recent Labs  Lab 05/24/17 2359 05/25/17 0510 05/26/17 0427   WBC 9.61 5.01 7.84   HGB 12.0* 10.8* 11.3*   HCT 36.3* 32.4* 34.1*    228 234     CMP:     Recent Labs  Lab 05/25/17 0510 05/26/17 0427    137   K 3.6 3.7    101   CO2 24 26    92   BUN 9 10   CREATININE 0.8 0.8   CALCIUM 8.9 9.1   ANIONGAP 10 10   EGFRNONAA >60 >60       Significant Imaging: I have reviewed all pertinent imaging results/findings within the past 24 hours.    Assessment/Plan:      Hypokalemia    - Normal S/P replacement  - BMP in AM          COPD (chronic obstructive pulmonary disease)    -Stable  - PRN  Breathing tx   - Monitor          Uncontrolled hypertension    - improved - Monitor          * Arterial occlusion, lower extremity    - Management per CVT  - S/P Right leg angiogram with TPA thrombolysis of PTFE graft   - Monitor  - Critical care time spent 45 min          VTE Risk Mitigation         Ordered     rivaroxaban tablet 20 mg  With dinner     Route:  Oral        05/26/17 0655     Medium Risk of VTE  Once      05/22/17 2318     Reason for No Pharmacological VTE Prophylaxis  Once      05/22/17 2318          Caryl Farmer MD  Department of Hospital Medicine   [facility]

## 2017-05-26 NOTE — PROGRESS NOTES
Progress Note  Vascular Surgery    Admit Date: 5/22/2017  Post-operative Day: 1 Day Post-Op  Hospital Day: 5    SUBJECTIVE:     Follow-up For:  Procedure(s) (LRB):  RE-LOOK-PERIPHERAL (Right)     S/p Right fem-distal bypass EKOS thrombolysis with angioplasty  Looks much better this am  Extubated  Resting comfortably  Dop DP/PT bilaterally  Right foot warm/well perfused  Motor/sensory function intact  Left groin puncture site c/d/i, no hematoma    OBJECTIVE:     Vital Signs (Most Recent)  Temp: 99.7 °F (37.6 °C) (05/26/17 0305)  Pulse: 74 (05/26/17 0605)  Resp: 15 (05/26/17 0605)  BP: (!) 142/60 (05/26/17 0605)  SpO2: 100 % (05/26/17 0605)    Vital Signs Range (Last 24H):  Temp:  [99 °F (37.2 °C)-102.5 °F (39.2 °C)]   Pulse:  [71-98]   Resp:  [0-21]   BP: ()/(49-94)   SpO2:  [97 %-100 %]     I & O (Last 24H):  Intake/Output Summary (Last 24 hours) at 05/26/17 0652  Last data filed at 05/26/17 0600   Gross per 24 hour   Intake          2376.33 ml   Output             1780 ml   Net           596.33 ml         ASSESSMENT/PLAN:   S/p Right fem-distal bypass EKOS thrombolysis with angioplasty  Restart xarelto 20mg daily today  Ok to transfer to floor  Expect d/c tomorrow

## 2017-05-26 NOTE — SUBJECTIVE & OBJECTIVE
Interval History:    Review of Systems   Constitutional: Positive for activity change.   HENT: Negative.    Eyes: Negative.    Respiratory: Negative.    Cardiovascular: Negative.    Gastrointestinal: Negative.    Endocrine: Negative.    Genitourinary: Negative.    Musculoskeletal: Negative.    Skin: Negative.    Allergic/Immunologic: Negative.    Neurological: Negative.    Hematological: Negative.    Psychiatric/Behavioral: Negative.      Objective:     Vital Signs (Most Recent):  Temp: 99.3 °F (37.4 °C) (05/26/17 0700)  Pulse: 79 (05/26/17 1000)  Resp: 16 (05/26/17 1000)  BP: (!) 149/69 (05/26/17 1000)  SpO2: 97 % (05/26/17 1000) Vital Signs (24h Range):  Temp:  [99.3 °F (37.4 °C)-102.5 °F (39.2 °C)] 99.3 °F (37.4 °C)  Pulse:  [71-98] 79  Resp:  [0-21] 16  SpO2:  [96 %-100 %] 97 %  BP: ()/(49-94) 149/69     Weight: 89.2 kg (196 lb 10.4 oz)  Body mass index is 28.22 kg/m².    Intake/Output Summary (Last 24 hours) at 05/26/17 1047  Last data filed at 05/26/17 1000   Gross per 24 hour   Intake          2592.37 ml   Output             1825 ml   Net           767.37 ml      Physical Exam   Constitutional: He appears well-developed and well-nourished.   HENT:   Head: Normocephalic and atraumatic.   Eyes: Conjunctivae and EOM are normal. Pupils are equal, round, and reactive to light.   Neck:   ETT in place   Cardiovascular: Normal rate, regular rhythm, normal heart sounds and intact distal pulses.    Pulmonary/Chest: Effort normal and breath sounds normal.   Abdominal: Soft. Bowel sounds are normal.   Musculoskeletal: Normal range of motion.   Skin: Skin is warm and dry.   Psychiatric:   Sedated on VENT    Nursing note and vitals reviewed.    Interval History:    Review of Systems   Reason unable to perform ROS: sedated on Vent      Objective:     Vital Signs (Most Recent):  Temp: 99.3 °F (37.4 °C) (05/26/17 0700)  Pulse: 79 (05/26/17 1000)  Resp: 16 (05/26/17 1000)  BP: (!) 149/69 (05/26/17 1000)  SpO2: 97 %  (05/26/17 1000) Vital Signs (24h Range):  Temp:  [99.3 °F (37.4 °C)-102.5 °F (39.2 °C)] 99.3 °F (37.4 °C)  Pulse:  [71-98] 79  Resp:  [0-21] 16  SpO2:  [96 %-100 %] 97 %  BP: ()/(49-94) 149/69     Weight: 89.2 kg (196 lb 10.4 oz)  Body mass index is 28.22 kg/m².    Intake/Output Summary (Last 24 hours) at 05/26/17 1047  Last data filed at 05/26/17 1000   Gross per 24 hour   Intake          2592.37 ml   Output             1825 ml   Net           767.37 ml      Physical Exam   Constitutional: He appears well-developed and well-nourished.   HENT:   Head: Normocephalic and atraumatic.   Eyes: Conjunctivae and EOM are normal. Pupils are equal, round, and reactive to light.   Neck:   ETT in place   Cardiovascular: Normal rate, regular rhythm, normal heart sounds and intact distal pulses.    Pulmonary/Chest: Effort normal and breath sounds normal.   Abdominal: Soft. Bowel sounds are normal.   Musculoskeletal: Normal range of motion.   Skin: Skin is warm and dry.   Psychiatric:   Sedated on VENT    Nursing note and vitals reviewed.      Significant Labs:   BMP:     Recent Labs  Lab 05/26/17 0427   GLU 92      K 3.7      CO2 26   BUN 10   CREATININE 0.8   CALCIUM 9.1     CBC:     Recent Labs  Lab 05/24/17  2359 05/25/17  0510 05/26/17  0427   WBC 9.61 5.01 7.84   HGB 12.0* 10.8* 11.3*   HCT 36.3* 32.4* 34.1*    228 234     CMP:     Recent Labs  Lab 05/25/17  0510 05/26/17  0427    137   K 3.6 3.7    101   CO2 24 26    92   BUN 9 10   CREATININE 0.8 0.8   CALCIUM 8.9 9.1   ANIONGAP 10 10   EGFRNONAA >60 >60       Significant Imaging: I have reviewed all pertinent imaging results/findings within the past 24 hours.    Significant Labs:   BMP:     Recent Labs  Lab 05/26/17 0427   GLU 92      K 3.7      CO2 26   BUN 10   CREATININE 0.8   CALCIUM 9.1     CBC:     Recent Labs  Lab 05/24/17  2359 05/25/17  0510 05/26/17  0427   WBC 9.61 5.01 7.84   HGB 12.0* 10.8* 11.3*   HCT  36.3* 32.4* 34.1*    228 234     CMP:     Recent Labs  Lab 05/25/17  0510 05/26/17  0427    137   K 3.6 3.7    101   CO2 24 26    92   BUN 9 10   CREATININE 0.8 0.8   CALCIUM 8.9 9.1   ANIONGAP 10 10   EGFRNONAA >60 >60       Significant Imaging: I have reviewed all pertinent imaging results/findings within the past 24 hours.

## 2017-05-26 NOTE — PLAN OF CARE
Problem: Patient Care Overview  Goal: Plan of Care Review  Outcome: Ongoing (interventions implemented as appropriate)  Patient remains on 3lnc with a spo2 of 96%.

## 2017-05-27 VITALS
SYSTOLIC BLOOD PRESSURE: 120 MMHG | OXYGEN SATURATION: 99 % | HEART RATE: 81 BPM | BODY MASS INDEX: 27.13 KG/M2 | DIASTOLIC BLOOD PRESSURE: 91 MMHG | HEIGHT: 70 IN | WEIGHT: 189.5 LBS | TEMPERATURE: 99 F | RESPIRATION RATE: 11 BRPM

## 2017-05-27 PROBLEM — I99.8 ISCHEMIC PAIN OF RIGHT FOOT: Status: RESOLVED | Noted: 2017-05-24 | Resolved: 2017-05-27

## 2017-05-27 PROBLEM — I70.209 ARTERIAL OCCLUSION, LOWER EXTREMITY: Status: RESOLVED | Noted: 2017-05-22 | Resolved: 2017-05-27

## 2017-05-27 PROBLEM — E87.6 HYPOKALEMIA: Status: RESOLVED | Noted: 2017-05-23 | Resolved: 2017-05-27

## 2017-05-27 PROBLEM — M79.671 ISCHEMIC PAIN OF RIGHT FOOT: Status: RESOLVED | Noted: 2017-05-24 | Resolved: 2017-05-27

## 2017-05-27 PROBLEM — I10 UNCONTROLLED HYPERTENSION: Chronic | Status: RESOLVED | Noted: 2017-05-23 | Resolved: 2017-05-27

## 2017-05-27 LAB
ANION GAP SERPL CALC-SCNC: 8 MMOL/L
BUN SERPL-MCNC: 12 MG/DL
CALCIUM SERPL-MCNC: 9.1 MG/DL
CHLORIDE SERPL-SCNC: 97 MMOL/L
CO2 SERPL-SCNC: 29 MMOL/L
CREAT SERPL-MCNC: 0.8 MG/DL
EST. GFR  (AFRICAN AMERICAN): >60 ML/MIN/1.73 M^2
EST. GFR  (NON AFRICAN AMERICAN): >60 ML/MIN/1.73 M^2
GLUCOSE SERPL-MCNC: 104 MG/DL
POTASSIUM SERPL-SCNC: 4 MMOL/L
SODIUM SERPL-SCNC: 134 MMOL/L

## 2017-05-27 PROCEDURE — 97162 PT EVAL MOD COMPLEX 30 MIN: CPT

## 2017-05-27 PROCEDURE — 25000003 PHARM REV CODE 250: Performed by: NURSE PRACTITIONER

## 2017-05-27 PROCEDURE — 36415 COLL VENOUS BLD VENIPUNCTURE: CPT

## 2017-05-27 PROCEDURE — G8980 MOBILITY D/C STATUS: HCPCS | Mod: CJ

## 2017-05-27 PROCEDURE — G8978 MOBILITY CURRENT STATUS: HCPCS | Mod: CJ

## 2017-05-27 PROCEDURE — 97116 GAIT TRAINING THERAPY: CPT

## 2017-05-27 PROCEDURE — 80048 BASIC METABOLIC PNL TOTAL CA: CPT

## 2017-05-27 PROCEDURE — 63600175 PHARM REV CODE 636 W HCPCS: Performed by: EMERGENCY MEDICINE

## 2017-05-27 PROCEDURE — G8979 MOBILITY GOAL STATUS: HCPCS | Mod: CJ

## 2017-05-27 PROCEDURE — 25000003 PHARM REV CODE 250: Performed by: HOSPITALIST

## 2017-05-27 RX ORDER — HYDROCODONE BITARTRATE AND ACETAMINOPHEN 10; 325 MG/1; MG/1
1 TABLET ORAL EVERY 6 HOURS PRN
Qty: 15 TABLET | Refills: 0 | Status: SHIPPED | OUTPATIENT
Start: 2017-05-27 | End: 2017-05-30

## 2017-05-27 RX ORDER — GABAPENTIN 100 MG/1
100 CAPSULE ORAL 3 TIMES DAILY
Qty: 42 CAPSULE | Refills: 0 | Status: SHIPPED | OUTPATIENT
Start: 2017-05-27 | End: 2019-05-20

## 2017-05-27 RX ADMIN — AMLODIPINE BESYLATE 10 MG: 10 TABLET ORAL at 08:05

## 2017-05-27 RX ADMIN — HYDROMORPHONE HYDROCHLORIDE 1 MG: 1 INJECTION, SOLUTION INTRAMUSCULAR; INTRAVENOUS; SUBCUTANEOUS at 04:05

## 2017-05-27 RX ADMIN — HYDROMORPHONE HYDROCHLORIDE 1 MG: 1 INJECTION, SOLUTION INTRAMUSCULAR; INTRAVENOUS; SUBCUTANEOUS at 06:05

## 2017-05-27 RX ADMIN — ATORVASTATIN CALCIUM 20 MG: 10 TABLET, FILM COATED ORAL at 08:05

## 2017-05-27 RX ADMIN — ASPIRIN 81 MG CHEWABLE TABLET 81 MG: 81 TABLET CHEWABLE at 08:05

## 2017-05-27 RX ADMIN — HYDROMORPHONE HYDROCHLORIDE 1 MG: 1 INJECTION, SOLUTION INTRAMUSCULAR; INTRAVENOUS; SUBCUTANEOUS at 08:05

## 2017-05-27 RX ADMIN — HYDROMORPHONE HYDROCHLORIDE 1 MG: 1 INJECTION, SOLUTION INTRAMUSCULAR; INTRAVENOUS; SUBCUTANEOUS at 02:05

## 2017-05-27 RX ADMIN — DOCUSATE SODIUM 100 MG: 100 CAPSULE, LIQUID FILLED ORAL at 08:05

## 2017-05-27 RX ADMIN — HYDROMORPHONE HYDROCHLORIDE 1 MG: 1 INJECTION, SOLUTION INTRAMUSCULAR; INTRAVENOUS; SUBCUTANEOUS at 10:05

## 2017-05-27 RX ADMIN — HYDROMORPHONE HYDROCHLORIDE 1 MG: 1 INJECTION, SOLUTION INTRAMUSCULAR; INTRAVENOUS; SUBCUTANEOUS at 12:05

## 2017-05-27 RX ADMIN — FAMOTIDINE 20 MG: 20 TABLET ORAL at 08:05

## 2017-05-27 NOTE — PROGRESS NOTES
Patient's ICU nurse advised TASHA that patient Needed walker for home use @1056H.  TASHA advised Dr. Farmer of need for order at 11:18a.m.  SW attempted f/u with patient to verify demographics at 11:19 a.m. and patient not in room.    Ashley Combs LMSW, JOANIE-TASHA, [facility].  05/27/2017

## 2017-05-27 NOTE — PLAN OF CARE
Problem: Patient Care Overview  Goal: Plan of Care Review  POC and interventions discussed with patient. VU.  C/O pain to RLE foot - analgesics adjusted and is more comfortable, but requires a lot of analgesic to do so.  L groin d/c'd cath insertion site wnl.  R foot reddened and blanchable, cap refill slightly > 3 sec.  R foot DP and PT palpable at +1.  Vitals stable.

## 2017-05-27 NOTE — PROGRESS NOTES
Progress Note  Vascular Surgery    Admit Date: 5/22/2017  Post-operative Day: 2 Days Post-Op  Hospital Day: 6    SUBJECTIVE:     Follow-up For:  Procedure(s) (LRB):  RE-LOOK-PERIPHERAL (Right)     S/p Right fem-distal bypass EKOS thrombolysis with angioplasty  Looks much better this am  Resting comfortably  Dop DP/PT bilaterally  Right foot warm/well perfused  Motor/sensory function intact  Left groin puncture site c/d/i, no hematoma        OBJECTIVE:     Vital Signs (Most Recent)  Temp: 98.4 °F (36.9 °C) (05/26/17 2000)  Pulse: 81 (05/27/17 0700)  Resp: 11 (05/27/17 0700)  BP: (!) 120/91 (05/27/17 0700)  SpO2: 99 % (05/26/17 2300)    Vital Signs Range (Last 24H):  Temp:  [98.4 °F (36.9 °C)-99.3 °F (37.4 °C)]   Pulse:  [73-88]   Resp:  [11-19]   BP: ()/(56-91)   SpO2:  [96 %-100 %]     I & O (Last 24H):  Intake/Output Summary (Last 24 hours) at 05/27/17 0805  Last data filed at 05/27/17 0500   Gross per 24 hour   Intake              860 ml   Output             2300 ml   Net            -1440 ml     Physical Exam:  General: well developed, well nourished, no distress  Heart: regular rate and rhythm, S1, S2 normal, no murmur, rub or gallop  Abdomen: soft, non-tender non-distented; bowel sounds normal; no masses,  no organomegaly  Extremities: warm, well perfused, no cyanosis or edema, or clubbing and Dop DP/PT bilaterally  Pulses: FEM: present 2+, DP: doppler and PT: doppler    Wound/Incision:  clean, dry, intact    Laboratory:  CBC:   Recent Labs  Lab 05/26/17 0427   WBC 7.84   RBC 3.95*   HGB 11.3*   HCT 34.1*      MCV 86   MCH 28.6   MCHC 33.1     BMP:   Recent Labs  Lab 05/27/17 0427      *   K 4.0   CL 97   CO2 29   BUN 12   CREATININE 0.8   CALCIUM 9.1     Coagulation:   Recent Labs  Lab 05/22/17 2120 05/25/17  0510   INR 1.2  --   --    APTT 32.1*  < > 30.6   < > = values in this interval not displayed.  Labs within the past 24 hours have been reviewed.        ASSESSMENT/PLAN:      S/p Right fem-distal bypass EKOS thrombolysis with angioplasty  Right foot pain likely secondary to neuropathy  Start neurontin 100mg TID  continue xarelto 20mg daily   Ok to d/c home today  F/u in 2 weeks

## 2017-05-27 NOTE — PROGRESS NOTES
VLADISLAV f/u with nursing who advised SW that patient's wife had to get to work and could not wait for walker.  Patient verified demographics prior to discharge and nurse passed information on to Vladislav.  VLADISLAV contacted [facility] on-call representative, Adalid, to advise of order for DME and to advise that patient had discharged to home.  Adalid advised VLADISLAV to fax information to [phone] and stated that he would speak with his supervisor to advise her that patient had discharged.     Ashley Combs LMSW, JOANIE-Vladislav, [facility].   05/27/2017

## 2017-05-27 NOTE — PT/OT/SLP EVAL
Physical Therapy  Evaluation    Ish Guardado   MRN: [id number]   Admitting Diagnosis: Arterial occlusion, lower extremity    PT Received On: 05/27/17  PT Start Time: 1040     PT Stop Time: 1105    PT Total Time (min): 25 min       Billable Minutes:  Evaluation 15 and Gait [id number]    Diagnosis: Arterial occlusion, lower extremity    Past Medical History:   Diagnosis Date    H/O aorto-femoral bypass     Hyperlipemia     Hypertension     Pre-diabetes       Past Surgical History:   Procedure Laterality Date    back fusion      FEMORAL BYPASS       Referring physician: DR. BAUTISTA  Date referred to PT: 5-26-17    General Precautions: Standard  Orthopedic Precautions: N/A   Braces: N/A       Patient History:  Lives With: spouse  Living Arrangements: house  Home Accessibility: stairs to enter home  Home Layout: Able to live on 1st floor  Number of Stairs to Enter Home: 3  Stair Railings at Home: none  Transportation Available: car, family or friend will provide  Living Environment Comment: PT LIVES WITH WIFE WHO IS ABLE TO ASSIST AND PROVIDE 24 HOUR CARE AT HOME, PT FUNCTIONALLY INDEP PTA  Equipment Currently Used at Home: cane, straight  DME owned (not currently used): shower chair and CRUTCHES    Previous Level of Function:  Ambulation Skills: independent (PT AMB INDEP OR WITH SPC AS NEEDED)  Transfer Skills: independent  ADL Skills: independent    Subjective:  Communicated with NURSE REBECCAL prior to session.  Pain/Comfort  Pain Rating 1: 0/10  Location - Side 1: Right  Location 1: leg     Objective:   PT FOUND SEATED AT EOB UPON ARRIVAL, READY FOR D/C HOME     Cognitive Exam:  Oriented to: Person, Place, Time and Situation    Follows Commands/attention: Follows one-step commands  Communication: clear/fluent  Safety awareness/insight to disability: intact    Physical Exam:  Postural examination/scapula alignment: No postural abnormalities identified    Skin integrity: Visible skin intact  Edema: None noted      Sensation:   Intact    Upper Extremity Range of Motion:  Right Upper Extremity: WFL  Left Upper Extremity: WFL    Upper Extremity Strength:  Right Upper Extremity: WFL  Left Upper Extremity: WFL    Lower Extremity Range of Motion:  Right Lower Extremity: WFL  Left Lower Extremity: WFL    Lower Extremity Strength:  Right Lower Extremity: WFL  Left Lower Extremity: WFL     Functional Mobility:  Bed Mobility:  Rolling/Turning to Left: Modified independent  Rolling/Turning Right: Modified independent  Scooting/Bridging: Modified Independent  Supine to Sit: Modified Independent  Sit to Supine: Modified Independent    Transfers:  Sit <> Stand Assistance: Supervision  Sit <> Stand Assistive Device: Rolling Walker (PT EDUCATED IN RW USE AND SAFETY DURING TF'S AND GAIT)  Bed <> Chair Technique: Stand Pivot  Bed <> Chair Assistance: Supervision  Bed <> Chair Assistive Device: Rolling Walker    Gait:   Gait Distance: PT AMB 80' WITH RW AND SPV, NO LOB OR SOB ON ROOM AIR  Assistance 1: Supervision  Gait Assistive Device: Rolling walker  Gait Pattern: swing-through gait    Balance:   Static Sit: GOOD  Dynamic Sit: GOOD  Static Stand: GOOD  Dynamic stand: GOOD-    Therapeutic Activities and Exercises:    AM-PAC 6 CLICK MOBILITY  How much help from another person does this patient currently need?   1 = Unable, Total/Dependent Assistance  2 = A lot, Maximum/Moderate Assistance  3 = A little, Minimum/Contact Guard/Supervision  4 = None, Modified [place]/Independent    Turning over in bed (including adjusting bedclothes, sheets and blankets)?: 4  Sitting down on and standing up from a chair with arms (e.g., wheelchair, bedside commode, etc.): 4  Moving from lying on back to sitting on the side of the bed?: 4  Moving to and from a bed to a chair (including a wheelchair)?: 4  Need to walk in hospital room?: 4  Climbing 3-5 steps with a railing?: 1 (NT)  Total Score: 21     AM-PAC Raw Score CMS G-Code Modifier Level of  "Impairment Assistance   6 % Total / Unable   7 - 9 CM 80 - 100% Maximal Assist   10 - 14 CL 60 - 80% Moderate Assist   15 - 19 CK 40 - 60% Moderate Assist   20 - 22 CJ 20 - 40% Minimal Assist   23 CI 1-20% SBA / CGA   24 CH 0% Independent/ Mod I     Patient left up in chair with all lines intact, call button in reach, NURSE notified and WIFE present.    Assessment:   Ish Guardado is a 56 y.o. male with a medical diagnosis of Arterial occlusion, lower extremity   PT IS NOT A CANDIDATE FOR INPATIENT SKILLED P.T. DUE TO HIGH LEVEL OF FUNCTION, PT WILL BENEFIT FROM [facility] FOR CONT. GAIT/TE    Rehab identified problem list/impairments:    Rehab potential is     Activity tolerance:     Discharge recommendations: Discharge Facility/Level Of Care Needs: home (PT DECLINED HOME HEALTH P.T. SERVICES, "I DON'T BELIEVE I NEED THAT")     Barriers to discharge: Barriers to Discharge: None    Equipment recommendations: Equipment Needed After Discharge: walker, rolling     GOALS:    Physical Therapy Goals     Not on file              PLAN:      (D/C PT FROM P.T. SERVICES TO [facility])  Plan of Care reviewed with: patient, spouse     PT ENCOURAGED TO CALL FOR ASSISTANCE WITH ALL NEEDS, PT AGREEABLE.  PT ENCOURAGED TO INCREASE TIME OOB IN CHAIR, ALL MEALS IN CHAIR OOB, PT AGREEABLE    Functional Assessment Tool Used: BOSTON AMPAC  Score: 21  Functional Limitation: Mobility: Walking and moving around  Mobility: Walking and Moving Around Current Status ():   Mobility: Walking and Moving Around Goal Status ():   Mobility: Walking and Moving Around Discharge Status (): OSCAR Lorenzo, PT  05/27/2017      "

## 2017-05-27 NOTE — PROGRESS NOTES
Still c/o pain to R foot, but says since the change in frequency of analgesic is much more comfortable.  R foot pulses DP and PT palpable at +1.  Color remains reddened and blanchable.

## 2017-05-27 NOTE — PLAN OF CARE
Problem: Patient Care Overview  Goal: Plan of Care Review  Outcome: Outcome(s) achieved Date Met: 05/27/17  Pt discharged home via private vehicle accompanied by wife; spoke with JN Ramirez and informed rolling walker to be delivered to pt's home; discharge instructions and paper scripts x 2 given to pt and wife; reviewed; questions; understanding verbalized; VSS; NADN.

## 2017-05-27 NOTE — DISCHARGE SUMMARY
[facility] Hospital Medicine  Discharge Summary      Patient Name: Ish Guardado  MRN: [id number]  Admission Date: 5/22/2017  Hospital Length of Stay: 5 days  Discharge Date and Time:  05/27/2017 10:11 AM  Attending Physician: Caryl Farmer MD   Discharging Provider: MILDRED Rosario  Primary Care Provider: Pollo Arana MD      HPI:   56 y.o male patient with PMHx of HTN, prediabetic, COPD, stroke, PAD s/p Bypass with graft in the Rt. LE , was referred from his PCP office due to complain of pain in the rt. Foot. Pain started couple days ago and start to get worse, it is cramping in nature and feels tight, he also noticed discoloration of the foot as well. Patient takes Xarelto for PAD.  Patient had fall week ago where he went to urgent care for c/o rib pain, CXR was done and it was negative for fracture but told him there is spot there.  He was admitted here for medical management    Procedure(s) (LRB):  RE-LOOK-PERIPHERAL (Right)      Indwelling Lines/Drains at time of discharge:   Lines/Drains/Airways          No matching active lines, drains, or airways        Hospital Course:   Pt admitted with right foot pain and was found to have occlusion of right leg bypass/ischemic leg. Pt on Xarelto which is now on hold. Care discussed with vascular surgery who plans on right leg angiogram with TPA thrombolysis of PTFE graft in am. Pt denies complaints except for foot symptoms    S/PAortogram, Right leg angiogram, right fem-distal bypass EKOS lysis catheter placement. Intubated in ICU postoperatively for airway protection and  pain management. He is presently extubated.  5/27/17: Pt is extubated, sitting up in bed and stable for discharge. The patient was seen and examined today and deemed stable for discharge.     Consults:   Consults         Status Ordering Provider     Inpatient consult to Hospitalist  Once     Provider:  Heidi Garcia MD    Completed GIA KHAN           Significant Diagnostic Studies: Labs: All labs within the past 24 hours have been reviewed     Final Active Diagnoses:    Diagnosis Date Noted POA    PAD (peripheral artery disease) [I73.9] 05/24/2017 Yes     Chronic    Mixed hyperlipidemia [E78.2] 05/24/2017 Yes     Chronic    Uncontrolled hypertension [I10] 05/23/2017 Yes     Chronic    Chronic obstructive pulmonary disease [J44.9] 05/23/2017 Yes      Problems Resolved During this Admission:    Diagnosis Date Noted Date Resolved POA    PRINCIPAL PROBLEM:  Arterial occlusion, lower extremity [I74.3] 05/22/2017 05/27/2017 Yes    Ischemic pain of right foot [M79.671, I99.9] 05/24/2017 05/27/2017 Yes    On mechanically assisted ventilation [Z99.11]  05/26/2017 Not Applicable    Hypokalemia [E87.6] 05/23/2017 05/27/2017 Yes      No new Assessment & Plan notes have been filed under this hospital service since the last note was generated.  Service: Hospital Medicine      Discharged Condition: stable    Disposition: Home or Self Care    Follow Up:  Follow-up Information     Jack John IV, MD In 2 weeks.    Specialty:  Vascular Surgery  Contact information:  [address]  3RD FLOOR  [facility]  [facility] [id number] [phone]             Pollo Arana MD In 3 days.    Specialty:  Family Medicine  Contact information:  [address] [address] [phone]                 Patient Instructions:     Diet general     Activity as tolerated     Call MD for:  temperature >100.4     Call MD for:  redness, tenderness, or signs of infection (pain, swelling, redness, odor or green/yellow discharge around incision site)     Call MD for:  severe uncontrolled pain       Medications:  Reconciled Home Medications:   Current Discharge Medication List      START taking these medications    Details   gabapentin (NEURONTIN) 100 MG capsule Take 1 capsule (100 mg total) by mouth 3 (three) times daily.  Qty: 42 capsule, Refills: 0       hydrocodone-acetaminophen 10-325mg (NORCO)  mg Tab Take 1 tablet by mouth every 6 (six) hours as needed for Pain.  Qty: 15 tablet, Refills: 0         CONTINUE these medications which have NOT CHANGED    Details   amlodipine (NORVASC) 10 MG tablet Take 10 mg by mouth once daily.      aspirin 81 MG Chew Take 81 mg by mouth once daily.      atorvastatin (LIPITOR) 20 MG tablet Take 20 mg by mouth once daily.      rivaroxaban (XARELTO) 20 mg Tab Take 20 mg by mouth daily with dinner or evening meal.           Time spent on the discharge of patient: 45 minutes    MILDRED Rosario  Department of Hospital Medicine  [facility]

## 2017-05-27 NOTE — PROGRESS NOTES
Call back received from Tejal with [facility] advising that he had spoken with his supv. who advised him that the walker would have to be delivered on Monday after authorization is obtained.  Ashley Combs LMSW, JOANIE-TASHA, [facility]  05/27/2017

## 2017-05-27 NOTE — PROGRESS NOTES
C/O pain rated 10/10 to R foot.  Says current pain medication not adequately controlling pain.  MD Abebe paged.   After call back new orders noted to increase frequency of current analgesic.  See Orders.

## 2017-05-29 ENCOUNTER — HOSPITAL ENCOUNTER (EMERGENCY)
Facility: HOSPITAL | Age: 57
Discharge: HOME | End: 2017-05-30
Attending: EMERGENCY MEDICINE
Payer: MEDICAID

## 2017-05-29 DIAGNOSIS — M79.604 LEG PAIN, RIGHT: Primary | ICD-10-CM

## 2017-05-29 DIAGNOSIS — M79.604 RIGHT LEG PAIN: ICD-10-CM

## 2017-05-29 LAB
APTT BLDCRRT: 26.6 SEC
INR PPP: 1
PROTHROMBIN TIME: 10.7 SEC

## 2017-05-29 PROCEDURE — 99284 EMERGENCY DEPT VISIT MOD MDM: CPT | Mod: 25

## 2017-05-29 PROCEDURE — 63600175 PHARM REV CODE 636 W HCPCS: Performed by: EMERGENCY MEDICINE

## 2017-05-29 PROCEDURE — 96374 THER/PROPH/DIAG INJ IV PUSH: CPT

## 2017-05-29 PROCEDURE — 85730 THROMBOPLASTIN TIME PARTIAL: CPT

## 2017-05-29 PROCEDURE — 85610 PROTHROMBIN TIME: CPT

## 2017-05-29 PROCEDURE — 80053 COMPREHEN METABOLIC PANEL: CPT

## 2017-05-29 PROCEDURE — 96375 TX/PRO/DX INJ NEW DRUG ADDON: CPT

## 2017-05-29 PROCEDURE — 85025 COMPLETE CBC W/AUTO DIFF WBC: CPT

## 2017-05-29 RX ORDER — ONDANSETRON 2 MG/ML
4 INJECTION INTRAMUSCULAR; INTRAVENOUS
Status: COMPLETED | OUTPATIENT
Start: 2017-05-29 | End: 2017-05-29

## 2017-05-29 RX ORDER — MORPHINE SULFATE 4 MG/ML
4 INJECTION, SOLUTION INTRAMUSCULAR; INTRAVENOUS
Status: COMPLETED | OUTPATIENT
Start: 2017-05-29 | End: 2017-05-29

## 2017-05-29 RX ADMIN — ONDANSETRON 4 MG: 2 INJECTION INTRAMUSCULAR; INTRAVENOUS at 11:05

## 2017-05-29 RX ADMIN — MORPHINE SULFATE 4 MG: 4 INJECTION, SOLUTION INTRAMUSCULAR; INTRAVENOUS at 11:05

## 2017-05-30 VITALS
SYSTOLIC BLOOD PRESSURE: 171 MMHG | WEIGHT: 180 LBS | OXYGEN SATURATION: 99 % | RESPIRATION RATE: 20 BRPM | HEART RATE: 75 BPM | HEIGHT: 70 IN | TEMPERATURE: 97 F | BODY MASS INDEX: 25.77 KG/M2 | DIASTOLIC BLOOD PRESSURE: 83 MMHG

## 2017-05-30 LAB
ALBUMIN SERPL BCP-MCNC: 3.3 G/DL
ALP SERPL-CCNC: 87 U/L
ALT SERPL W/O P-5'-P-CCNC: 27 U/L
ANION GAP SERPL CALC-SCNC: 9 MMOL/L
AST SERPL-CCNC: 30 U/L
BASOPHILS # BLD AUTO: 0.09 K/UL
BASOPHILS NFR BLD: 1.1 %
BILIRUB SERPL-MCNC: 0.2 MG/DL
BUN SERPL-MCNC: 16 MG/DL
CALCIUM SERPL-MCNC: 9.3 MG/DL
CHLORIDE SERPL-SCNC: 99 MMOL/L
CO2 SERPL-SCNC: 29 MMOL/L
CREAT SERPL-MCNC: 1 MG/DL
DIFFERENTIAL METHOD: ABNORMAL
EOSINOPHIL # BLD AUTO: 0.4 K/UL
EOSINOPHIL NFR BLD: 4.8 %
ERYTHROCYTE [DISTWIDTH] IN BLOOD BY AUTOMATED COUNT: 13.3 %
EST. GFR  (AFRICAN AMERICAN): >60 ML/MIN/1.73 M^2
EST. GFR  (NON AFRICAN AMERICAN): >60 ML/MIN/1.73 M^2
GLUCOSE SERPL-MCNC: 78 MG/DL
HCT VFR BLD AUTO: 34.3 %
HGB BLD-MCNC: 11.3 G/DL
LYMPHOCYTES # BLD AUTO: 2.4 K/UL
LYMPHOCYTES NFR BLD: 29 %
MCH RBC QN AUTO: 28.3 PG
MCHC RBC AUTO-ENTMCNC: 32.9 %
MCV RBC AUTO: 86 FL
MONOCYTES # BLD AUTO: 0.7 K/UL
MONOCYTES NFR BLD: 8.6 %
NEUTROPHILS # BLD AUTO: 4.7 K/UL
NEUTROPHILS NFR BLD: 56.5 %
PLATELET # BLD AUTO: 413 K/UL
PMV BLD AUTO: 8.4 FL
POTASSIUM SERPL-SCNC: 4.1 MMOL/L
PROT SERPL-MCNC: 7.4 G/DL
RBC # BLD AUTO: 4 M/UL
SODIUM SERPL-SCNC: 137 MMOL/L
WBC # BLD AUTO: 8.25 K/UL

## 2017-05-30 NOTE — ED PROVIDER NOTES
SCRIBE #1 NOTE: I, Adalid Jones, am scribing for, and in the presence of, Avila Abel Jr., MD. I have scribed the HPI, ROS, and PEx.     SCRIBE #2 NOTE: I, Ashley Medina, am scribing for, and in the presence of,  Paige Kern MD. I have scribed the remaining portions of the note not scribed by Scribe #1.     History      Chief Complaint   Patient presents with    Post-op Problem     had occlusion to RLE and angiogram was done on Wed; increased pain to foot and discoloration        Review of patient's allergies indicates:   Allergen Reactions    Pcn [penicillins] Other (See Comments)     Other      Ceclor [cefaclor] Rash    Latex, natural rubber Rash        HPI   HPI    5/29/2017, 11:19 PM   History obtained from the patient      History of Present Illness: Ish Guardado is a 56 y.o. male patient who presents to the Emergency Department for RLE pain which onset 12 hours PTA. Symptoms are constant and moderate in severity. Pt was seen in the ED 1 week ago and was dx with an occlusion to the RLE. Pt was admitted to the hospital. Pt was given TPA for thrombolysis. Pt had an angiogram performed 5 days ago. Pt reports increased pain and redness today. Pt is currently on Xarelto. No mitigating or exacerbating factors reported. No other associated sx reported. Patient denies any fever, chills, SOB, CP, n/v/d, increased numbness, increased weakness, back pain,  and all other sxs at this time. No further complaints or concerns at this time.         Arrival mode: Personal vehicle     PCP: Pollo Arana MD       Past Medical History:  Past Medical History:   Diagnosis Date    H/O aorto-femoral bypass     Hyperlipemia     Hypertension     Pre-diabetes        Past Surgical History:  Past Surgical History:   Procedure Laterality Date    back fusion      FEMORAL BYPASS           Family History:  Family History   Problem Relation Age of Onset    COPD Mother     Diabetes Mother     Heart disease Mother     Stroke Mother      Heart attack Mother     COPD Father     Diabetes Father        Social History:  Social History     Social History Main Topics    Smoking status: Former Smoker    Smokeless tobacco: Former User     Quit date: 10/22/2016    Alcohol use No    Drug use: No    Sexual activity: Yes       ROS   Review of Systems   Constitutional: Negative for chills and fever.   HENT: Negative for sore throat.    Respiratory: Negative for cough and shortness of breath.    Cardiovascular: Negative for chest pain and palpitations.   Gastrointestinal: Negative for abdominal pain, diarrhea, nausea and vomiting.   Genitourinary: Negative for dysuria.   Musculoskeletal: Negative for back pain and neck pain.        + RLE pain and redness   Skin: Negative for rash.   Neurological: Negative for dizziness, weakness, light-headedness, numbness and headaches.   Hematological: Does not bruise/bleed easily.       Physical Exam      Initial Vitals [05/29/17 2257]   BP Pulse Resp Temp SpO2   (!) 168/100 84 20 98 °F (36.7 °C) 97 %      Physical Exam  Nursing Notes and Vital Signs Reviewed.  Constitutional: Patient is in no acute distress. Well-developed and well-nourished.  Head: Atraumatic. Normocephalic.  Eyes: PERRL. EOM intact. Conjunctivae are not pale. No scleral icterus.  ENT: Mucous membranes are moist. Oropharynx is clear and symmetric.    Neck: Supple. Full ROM. No lymphadenopathy.  Cardiovascular: Regular rate. Regular rhythm. No murmurs, rubs, or gallops. Distal pulses are 2+ and symmetric.  Pulmonary/Chest: No respiratory distress. Clear to auscultation bilaterally. No wheezing, rales, or rhonchi.  Abdominal: Soft and non-distended.  There is no tenderness.  No rebound, guarding, or rigidity.   Musculoskeletal: Moves all extremities. No obvious deformities. No edema.   Skin: Warm and dry. Right foot is warm, tender, and erythematous with multiple petechia.   Neurological:  Alert, awake, and appropriate.  Normal speech.  No acute  "focal neurological deficits are appreciated.  Psychiatric: Normal affect. Good eye contact. Appropriate in content.    ED Course    Procedures  ED Vital Signs:  Vitals:    05/29/17 2257 05/30/17 0114   BP: (!) 168/100 (!) 171/83   Pulse: 84 75   Resp: 20 20   Temp: 98 °F (36.7 °C) 96.7 °F (35.9 °C)   TempSrc: Oral Oral   SpO2: 97% 99%   Weight: 81.6 kg (180 lb)    Height: 5' 10" (1.778 m)        Abnormal Lab Results:  Labs Reviewed   CBC W/ AUTO DIFFERENTIAL - Abnormal; Notable for the following:        Result Value    RBC 4.00 (*)     Hemoglobin 11.3 (*)     Hematocrit 34.3 (*)     Platelets 413 (*)     MPV 8.4 (*)     All other components within normal limits   COMPREHENSIVE METABOLIC PANEL - Abnormal; Notable for the following:     Albumin 3.3 (*)     All other components within normal limits   PROTIME-INR   APTT        All Lab Results:  Results for orders placed or performed during the hospital encounter of 05/29/17   CBC auto differential   Result Value Ref Range    WBC 8.25 3.90 - 12.70 K/uL    RBC 4.00 (L) 4.60 - 6.20 M/uL    Hemoglobin 11.3 (L) 14.0 - 18.0 g/dL    Hematocrit 34.3 (L) 40.0 - 54.0 %    MCV 86 82 - 98 fL    MCH 28.3 27.0 - 31.0 pg    MCHC 32.9 32.0 - 36.0 %    RDW 13.3 11.5 - 14.5 %    Platelets 413 (H) 150 - 350 K/uL    MPV 8.4 (L) 9.2 - 12.9 fL    Gran # 4.7 1.8 - 7.7 K/uL    Lymph # 2.4 1.0 - 4.8 K/uL    Mono # 0.7 0.3 - 1.0 K/uL    Eos # 0.4 0.0 - 0.5 K/uL    Baso # 0.09 0.00 - 0.20 K/uL    Gran% 56.5 38.0 - 73.0 %    Lymph% 29.0 18.0 - 48.0 %    Mono% 8.6 4.0 - 15.0 %    Eosinophil% 4.8 0.0 - 8.0 %    Basophil% 1.1 0.0 - 1.9 %    Differential Method Automated    Comprehensive metabolic panel   Result Value Ref Range    Sodium 137 136 - 145 mmol/L    Potassium 4.1 3.5 - 5.1 mmol/L    Chloride 99 95 - 110 mmol/L    CO2 29 23 - 29 mmol/L    Glucose 78 70 - 110 mg/dL    BUN, Bld 16 6 - 20 mg/dL    Creatinine 1.0 0.5 - 1.4 mg/dL    Calcium 9.3 8.7 - 10.5 mg/dL    Total Protein 7.4 6.0 - 8.4 " g/dL    Albumin 3.3 (L) 3.5 - 5.2 g/dL    Total Bilirubin 0.2 0.1 - 1.0 mg/dL    Alkaline Phosphatase 87 55 - 135 U/L    AST 30 10 - 40 U/L    ALT 27 10 - 44 U/L    Anion Gap 9 8 - 16 mmol/L    eGFR if African American >60 >60 mL/min/1.73 m^2    eGFR if non African American >60 >60 mL/min/1.73 m^2   Protime-INR   Result Value Ref Range    Prothrombin Time 10.7 9.0 - 12.5 sec    INR 1.0 0.8 - 1.2   APTT   Result Value Ref Range    aPTT 26.6 21.0 - 32.0 sec       Imaging Results:  Imaging Results          US Lower Extremity Arteries Right (In process)                Ordered, reviewed, and independently interpreted by the ED provider.  Study: US Lower Extremity Arteries Right  Findings: Right fem-tib bypass is patent via colorflow duplex with no evidence of any hemodynamically significant stenosis. Monophasic flow seen throughout right leg.         The Emergency Provider reviewed the vital signs and test results, which are outlined above.    ED Discussion     11:59 PM: Dr. Abel transfers care of pt to Dr. Kern, pending lab and US results.    12:18 AM: Dr. Kern evaluated pt. Pt is resting comfortably and is in no acute distress.  D/w pt all pertinent results. D/w pt any concerns expressed at this time. Answered all questions. Pt expresses understanding at this time.    1:06 AM: Reassessed pt at this time. Discussed with pt all pertinent ED information and results. Discussed pt dx of right leg pain and plan of tx. Gave pt all f/u and return to the ED instructions. All questions and concerns were addressed at this time. Pt expresses understanding of information and instructions, and is comfortable with plan to discharge. Pt is stable for discharge.    I discussed with patient and/or family/caretaker that evaluation in the ED does not suggest any emergent or life threatening medical conditions requiring immediate intervention beyond what was provided in the ED, and I believe patient is safe for discharge.   Regardless, an unremarkable evaluation in the ED does not preclude the development or presence of a serious of life threatening condition. As such, patient was instructed to return immediately for any worsening or change in current symptoms.      ED Medication(s):  Medications   morphine injection 4 mg (4 mg Intravenous Given 5/29/17 2334)   ondansetron injection 4 mg (4 mg Intravenous Given 5/29/17 2334)       Discharge Medication List as of 5/30/2017  1:03 AM          Follow-up Information     Pollo Arana MD in 2 days.    Specialty:  Family Medicine  Contact information:  [address] [facility]   [address]  [facility] [address]  [phone]             [facility]    Specialty:  Emergency Medicine  Why:  As needed, If symptoms worsen  Contact information:  [address] [facility] [address] [phone]                   Medical Decision Making    Medical Decision Making:   Clinical Tests:   Lab Tests: Ordered and Reviewed  Radiological Study: Ordered and Reviewed           Scribe Attestation:   Scribe #1: I performed the above scribed service and the documentation accurately describes the services I performed. I attest to the accuracy of the note.    Attending:   Physician Attestation Statement for Scribe #1: I, Avila Abel Jr., MD, personally performed the services described in this documentation, as scribed by Adalid Jones, in my presence, and it is both accurate and complete.       Scribe Attestation:   Scribe #2: I performed the above scribed service and the documentation accurately describes the services I performed. I attest to the accuracy of the note.    Attending Attestation:           Physician Attestation for Scribe:    Physician Attestation Statement for Scribe #2: I, Paige Kern MD, reviewed documentation, as scribed by Ashley Medina in my presence, and it is both accurate and complete. I also acknowledge and confirm the content of the note done by Brandee  #1.          Clinical Impression       ICD-10-CM ICD-9-CM   1. Leg pain, right M79.604 729.5   2. Right leg pain M79.604 729.5       Disposition:   Disposition: Discharged  Condition: Stable       Paige Kern MD  05/30/17 0552

## 2017-06-02 ENCOUNTER — HOSPITAL ENCOUNTER (INPATIENT)
Facility: HOSPITAL | Age: 57
LOS: 3 days | Discharge: HOME | DRG: 571 | End: 2017-06-05
Attending: SPECIALIST | Admitting: INTERNAL MEDICINE
Payer: MEDICAID

## 2017-06-02 DIAGNOSIS — L03.90 CELLULITIS DUE TO STAPHYLOCOCCUS: ICD-10-CM

## 2017-06-02 DIAGNOSIS — B95.8 CELLULITIS DUE TO STAPHYLOCOCCUS: ICD-10-CM

## 2017-06-02 DIAGNOSIS — L03.115 CELLULITIS OF RIGHT FOOT: Primary | ICD-10-CM

## 2017-06-02 DIAGNOSIS — L03.90 CELLULITIS: ICD-10-CM

## 2017-06-02 DIAGNOSIS — L97.519: ICD-10-CM

## 2017-06-02 PROBLEM — I96 GANGRENE OF FOOT: Status: ACTIVE | Noted: 2017-06-02

## 2017-06-02 LAB
ALBUMIN SERPL BCP-MCNC: 3.4 G/DL
ALP SERPL-CCNC: 97 U/L
ALT SERPL W/O P-5'-P-CCNC: 22 U/L
ANION GAP SERPL CALC-SCNC: 10 MMOL/L
APTT BLDCRRT: 26.8 SEC
AST SERPL-CCNC: 22 U/L
BASOPHILS # BLD AUTO: 0.1 K/UL
BASOPHILS NFR BLD: 1.4 %
BILIRUB SERPL-MCNC: 0.2 MG/DL
BNP SERPL-MCNC: 36 PG/ML
BUN SERPL-MCNC: 15 MG/DL
CALCIUM SERPL-MCNC: 9.2 MG/DL
CHLORIDE SERPL-SCNC: 103 MMOL/L
CO2 SERPL-SCNC: 24 MMOL/L
CORTIS SERPL-MCNC: 10.8 UG/DL
CREAT SERPL-MCNC: 0.8 MG/DL
DIFFERENTIAL METHOD: ABNORMAL
EOSINOPHIL # BLD AUTO: 0.4 K/UL
EOSINOPHIL NFR BLD: 6.1 %
ERYTHROCYTE [DISTWIDTH] IN BLOOD BY AUTOMATED COUNT: 13.2 %
EST. GFR  (AFRICAN AMERICAN): >60 ML/MIN/1.73 M^2
EST. GFR  (NON AFRICAN AMERICAN): >60 ML/MIN/1.73 M^2
GLUCOSE SERPL-MCNC: 83 MG/DL
HCT VFR BLD AUTO: 33.5 %
HGB BLD-MCNC: 11.3 G/DL
INR PPP: 1
LACTATE SERPL-SCNC: 0.8 MMOL/L
LACTATE SERPL-SCNC: 1.1 MMOL/L
LYMPHOCYTES # BLD AUTO: 2.2 K/UL
LYMPHOCYTES NFR BLD: 30 %
MAGNESIUM SERPL-MCNC: 2.3 MG/DL
MCH RBC QN AUTO: 28.7 PG
MCHC RBC AUTO-ENTMCNC: 33.7 %
MCV RBC AUTO: 85 FL
MONOCYTES # BLD AUTO: 0.7 K/UL
MONOCYTES NFR BLD: 9.9 %
NEUTROPHILS # BLD AUTO: 3.8 K/UL
NEUTROPHILS NFR BLD: 52.6 %
PHOSPHATE SERPL-MCNC: 3.9 MG/DL
PLATELET # BLD AUTO: 404 K/UL
PMV BLD AUTO: 8.1 FL
POTASSIUM SERPL-SCNC: 4.2 MMOL/L
PROCALCITONIN SERPL IA-MCNC: <0.09 NG/ML
PROT SERPL-MCNC: 7.3 G/DL
PROTHROMBIN TIME: 10.4 SEC
RBC # BLD AUTO: 3.94 M/UL
SODIUM SERPL-SCNC: 137 MMOL/L
TROPONIN I SERPL DL<=0.01 NG/ML-MCNC: <0.006 NG/ML
WBC # BLD AUTO: 7.19 K/UL

## 2017-06-02 PROCEDURE — 99285 EMERGENCY DEPT VISIT HI MDM: CPT

## 2017-06-02 PROCEDURE — 83735 ASSAY OF MAGNESIUM: CPT

## 2017-06-02 PROCEDURE — 96361 HYDRATE IV INFUSION ADD-ON: CPT

## 2017-06-02 PROCEDURE — 63600175 PHARM REV CODE 636 W HCPCS: Performed by: SPECIALIST

## 2017-06-02 PROCEDURE — 83605 ASSAY OF LACTIC ACID: CPT

## 2017-06-02 PROCEDURE — 84100 ASSAY OF PHOSPHORUS: CPT

## 2017-06-02 PROCEDURE — 93010 ELECTROCARDIOGRAM REPORT: CPT | Mod: ,,, | Performed by: INTERNAL MEDICINE

## 2017-06-02 PROCEDURE — 85025 COMPLETE CBC W/AUTO DIFF WBC: CPT

## 2017-06-02 PROCEDURE — 63600175 PHARM REV CODE 636 W HCPCS: Performed by: NURSE PRACTITIONER

## 2017-06-02 PROCEDURE — 96365 THER/PROPH/DIAG IV INF INIT: CPT

## 2017-06-02 PROCEDURE — 25000003 PHARM REV CODE 250: Performed by: NURSE PRACTITIONER

## 2017-06-02 PROCEDURE — 36415 COLL VENOUS BLD VENIPUNCTURE: CPT

## 2017-06-02 PROCEDURE — 93005 ELECTROCARDIOGRAM TRACING: CPT

## 2017-06-02 PROCEDURE — 87040 BLOOD CULTURE FOR BACTERIA: CPT

## 2017-06-02 PROCEDURE — 25500020 PHARM REV CODE 255: Performed by: INTERNAL MEDICINE

## 2017-06-02 PROCEDURE — 84484 ASSAY OF TROPONIN QUANT: CPT

## 2017-06-02 PROCEDURE — 83880 ASSAY OF NATRIURETIC PEPTIDE: CPT

## 2017-06-02 PROCEDURE — 84145 PROCALCITONIN (PCT): CPT

## 2017-06-02 PROCEDURE — 85610 PROTHROMBIN TIME: CPT

## 2017-06-02 PROCEDURE — 80053 COMPREHEN METABOLIC PANEL: CPT

## 2017-06-02 PROCEDURE — 11000001 HC ACUTE MED/SURG PRIVATE ROOM

## 2017-06-02 PROCEDURE — 83605 ASSAY OF LACTIC ACID: CPT | Mod: 91

## 2017-06-02 PROCEDURE — 25000003 PHARM REV CODE 250: Performed by: SPECIALIST

## 2017-06-02 PROCEDURE — 85730 THROMBOPLASTIN TIME PARTIAL: CPT

## 2017-06-02 PROCEDURE — 96375 TX/PRO/DX INJ NEW DRUG ADDON: CPT

## 2017-06-02 PROCEDURE — 82533 TOTAL CORTISOL: CPT

## 2017-06-02 PROCEDURE — A9585 GADOBUTROL INJECTION: HCPCS | Performed by: INTERNAL MEDICINE

## 2017-06-02 RX ORDER — ONDANSETRON 2 MG/ML
4 INJECTION INTRAMUSCULAR; INTRAVENOUS EVERY 8 HOURS PRN
Status: DISCONTINUED | OUTPATIENT
Start: 2017-06-02 | End: 2017-06-05 | Stop reason: HOSPADM

## 2017-06-02 RX ORDER — GABAPENTIN 100 MG/1
100 CAPSULE ORAL 3 TIMES DAILY
Status: DISCONTINUED | OUTPATIENT
Start: 2017-06-02 | End: 2017-06-05 | Stop reason: HOSPADM

## 2017-06-02 RX ORDER — AMLODIPINE BESYLATE 10 MG/1
10 TABLET ORAL DAILY
Status: DISCONTINUED | OUTPATIENT
Start: 2017-06-03 | End: 2017-06-05 | Stop reason: HOSPADM

## 2017-06-02 RX ORDER — GADOBUTROL 604.72 MG/ML
8 INJECTION INTRAVENOUS
Status: COMPLETED | OUTPATIENT
Start: 2017-06-02 | End: 2017-06-02

## 2017-06-02 RX ORDER — HYDROCODONE BITARTRATE AND ACETAMINOPHEN 10; 325 MG/1; MG/1
1 TABLET ORAL EVERY 6 HOURS PRN
Status: DISCONTINUED | OUTPATIENT
Start: 2017-06-02 | End: 2017-06-04

## 2017-06-02 RX ORDER — ONDANSETRON 2 MG/ML
4 INJECTION INTRAMUSCULAR; INTRAVENOUS
Status: COMPLETED | OUTPATIENT
Start: 2017-06-02 | End: 2017-06-02

## 2017-06-02 RX ORDER — MORPHINE SULFATE 4 MG/ML
4 INJECTION, SOLUTION INTRAMUSCULAR; INTRAVENOUS EVERY 4 HOURS PRN
Status: DISCONTINUED | OUTPATIENT
Start: 2017-06-02 | End: 2017-06-05 | Stop reason: HOSPADM

## 2017-06-02 RX ORDER — ATORVASTATIN CALCIUM 10 MG/1
20 TABLET, FILM COATED ORAL DAILY
Status: DISCONTINUED | OUTPATIENT
Start: 2017-06-03 | End: 2017-06-05 | Stop reason: HOSPADM

## 2017-06-02 RX ORDER — DIPHENHYDRAMINE HCL 25 MG
25 CAPSULE ORAL EVERY 6 HOURS PRN
Status: DISCONTINUED | OUTPATIENT
Start: 2017-06-02 | End: 2017-06-02

## 2017-06-02 RX ORDER — ENOXAPARIN SODIUM 100 MG/ML
40 INJECTION SUBCUTANEOUS EVERY 24 HOURS
Status: DISCONTINUED | OUTPATIENT
Start: 2017-06-03 | End: 2017-06-02

## 2017-06-02 RX ORDER — FAMOTIDINE 20 MG/1
20 TABLET, FILM COATED ORAL 2 TIMES DAILY
Status: DISCONTINUED | OUTPATIENT
Start: 2017-06-02 | End: 2017-06-05 | Stop reason: HOSPADM

## 2017-06-02 RX ORDER — MORPHINE SULFATE 4 MG/ML
4 INJECTION, SOLUTION INTRAMUSCULAR; INTRAVENOUS
Status: COMPLETED | OUTPATIENT
Start: 2017-06-02 | End: 2017-06-02

## 2017-06-02 RX ORDER — MORPHINE SULFATE 2 MG/ML
2 INJECTION, SOLUTION INTRAMUSCULAR; INTRAVENOUS EVERY 4 HOURS PRN
Status: DISCONTINUED | OUTPATIENT
Start: 2017-06-02 | End: 2017-06-02

## 2017-06-02 RX ORDER — ONDANSETRON 2 MG/ML
4 INJECTION INTRAMUSCULAR; INTRAVENOUS EVERY 12 HOURS PRN
Status: DISCONTINUED | OUTPATIENT
Start: 2017-06-02 | End: 2017-06-02

## 2017-06-02 RX ORDER — DIPHENHYDRAMINE HYDROCHLORIDE 50 MG/ML
12.5 INJECTION INTRAMUSCULAR; INTRAVENOUS EVERY 6 HOURS PRN
Status: DISCONTINUED | OUTPATIENT
Start: 2017-06-02 | End: 2017-06-05 | Stop reason: HOSPADM

## 2017-06-02 RX ORDER — NAPROXEN SODIUM 220 MG/1
81 TABLET, FILM COATED ORAL DAILY
Status: DISCONTINUED | OUTPATIENT
Start: 2017-06-03 | End: 2017-06-05 | Stop reason: HOSPADM

## 2017-06-02 RX ADMIN — GABAPENTIN 100 MG: 100 CAPSULE ORAL at 10:06

## 2017-06-02 RX ADMIN — Medication 1000 MG: at 10:06

## 2017-06-02 RX ADMIN — MORPHINE SULFATE 4 MG: 4 INJECTION, SOLUTION INTRAMUSCULAR; INTRAVENOUS at 04:06

## 2017-06-02 RX ADMIN — GADOBUTROL 8 ML: 604.72 INJECTION INTRAVENOUS at 03:06

## 2017-06-02 RX ADMIN — SODIUM CHLORIDE 1000 ML: 0.9 INJECTION, SOLUTION INTRAVENOUS at 09:06

## 2017-06-02 RX ADMIN — MORPHINE SULFATE 4 MG: 4 INJECTION, SOLUTION INTRAMUSCULAR; INTRAVENOUS at 08:06

## 2017-06-02 RX ADMIN — DIPHENHYDRAMINE HYDROCHLORIDE 25 MG: 25 CAPSULE ORAL at 04:06

## 2017-06-02 RX ADMIN — ONDANSETRON 4 MG: 2 INJECTION INTRAMUSCULAR; INTRAVENOUS at 11:06

## 2017-06-02 RX ADMIN — DIPHENHYDRAMINE HYDROCHLORIDE 12.5 MG: 50 INJECTION, SOLUTION INTRAMUSCULAR; INTRAVENOUS at 10:06

## 2017-06-02 RX ADMIN — RIVAROXABAN 20 MG: 20 TABLET, FILM COATED ORAL at 05:06

## 2017-06-02 RX ADMIN — VANCOMYCIN HYDROCHLORIDE 1000 MG: 1 INJECTION, POWDER, LYOPHILIZED, FOR SOLUTION INTRAVENOUS at 11:06

## 2017-06-02 RX ADMIN — FAMOTIDINE 20 MG: 20 TABLET ORAL at 01:06

## 2017-06-02 RX ADMIN — MORPHINE SULFATE 4 MG: 4 INJECTION, SOLUTION INTRAMUSCULAR; INTRAVENOUS at 11:06

## 2017-06-02 RX ADMIN — FAMOTIDINE 20 MG: 20 TABLET ORAL at 08:06

## 2017-06-02 NOTE — ED PROVIDER NOTES
SCRIBE #1 NOTE: I, Chevy Church, am scribing for, and in the presence of, Gabriela Mcfarlane MD. I have scribed the entire note.      History      Chief Complaint   Patient presents with    Circulatory Problem     pt reports poor circulation to right foot, recently d/c from ICU       Review of patient's allergies indicates:   Allergen Reactions    Pcn [penicillins] Other (See Comments)     Other      Ceclor [cefaclor] Rash    Latex, natural rubber Rash        HPI   HPI    6/2/2017, 9:08 AM   History obtained from the patient and wife      History of Present Illness: Ish Guardado is a 56 y.o. male patient who presents to the Emergency Department for right calf pain which onset gradually yesterday. Sxs are constant and moderate in severity. Pain described as There are no mitigating or exacerbating factors noted. Pt was admitted on 5/22 for an arterial occlusion. Associated sxs include RLE swelling.  Pt denies any fever, N/V/D, dizziness, chills, SOB, CP, numbness, and all other sxs at this time. Pt also notes wound to bottom of right foot. No further complaints or concerns at this time.         Arrival mode: Personal vehicle      PCP: Pollo Arana MD       Past Medical History:  Past Medical History:   Diagnosis Date    H/O aorto-femoral bypass     Hyperlipemia     Hypertension     Pre-diabetes        Past Surgical History:  Past Surgical History:   Procedure Laterality Date    back fusion      FEMORAL BYPASS           Family History:  Family History   Problem Relation Age of Onset    COPD Mother     Diabetes Mother     Heart disease Mother     Stroke Mother     Heart attack Mother     COPD Father     Diabetes Father        Social History:  Social History     Social History Main Topics    Smoking status: Former Smoker    Smokeless tobacco: Former User     Quit date: 10/22/2016    Alcohol use No    Drug use: No    Sexual activity: Yes       ROS   Review of Systems   Constitutional:  Negative for fever.   HENT: Negative for sore throat.    Respiratory: Negative for shortness of breath.    Cardiovascular: Positive for leg swelling (RLE). Negative for chest pain.   Gastrointestinal: Negative for abdominal pain, constipation, nausea and vomiting.   Genitourinary: Negative for dysuria.   Musculoskeletal: Positive for myalgias (R calf pain). Negative for back pain.   Skin: Positive for wound (R foot). Negative for rash.   Neurological: Negative for dizziness, weakness, numbness and headaches.   Hematological: Does not bruise/bleed easily.     Physical Exam      Initial Vitals [06/02/17 0854]   BP Pulse Resp Temp SpO2   (!) 171/78 74 18 98 °F (36.7 °C) 97 %      Physical Exam  Nursing Notes and Vital Signs Reviewed.  Constitutional: Patient is in no acute distress. Well-developed and well-nourished.  Head: Atraumatic. Normocephalic.  Eyes: PERRL. EOM intact. Conjunctivae are not pale. No scleral icterus.  ENT: Mucous membranes are moist. Oropharynx is clear and symmetric.    Neck: Supple. Full ROM. No lymphadenopathy.  Cardiovascular: Regular rate. Regular rhythm. No murmurs, rubs, or gallops. Distal pulses are 2+ and symmetric.  Pulmonary/Chest: No respiratory distress. Clear to auscultation bilaterally. No wheezing, rales, or rhonchi.  Abdominal: Soft and non-distended.  There is no tenderness.  No rebound, guarding, or rigidity. Good bowel sounds.  Genitourinary: No CVA tenderness  Musculoskeletal: Moves all extremities. No obvious deformities. No calf tenderness.  RLE: no evident deformity. Positive for swelling. ROM is normal. Cap refill distally is <2 seconds. DP and PT pulses are equal and 2+ bilaterally. No motor deficit. No distal sensory deficit  Right Foot: Wound to medial aspect with erythema and tenderness, consistent with cellulitis   Skin: Warm and dry.  Neurological:  Alert, awake, and appropriate.  Normal speech.  No acute focal neurological deficits are appreciated.  Psychiatric:  "Normal affect. Good eye contact. Appropriate in content.    ED Course    Procedures  ED Vital Signs:  Vitals:    06/02/17 0854 06/02/17 1002   BP: (!) 171/78 (!) 152/85   Pulse: 74 61   Resp: 18 16   Temp: 98 °F (36.7 °C)    TempSrc: Oral    SpO2: 97% 100%   Weight: 81.6 kg (180 lb)    Height: 5' 10" (1.778 m)        Abnormal Lab Results:  Labs Reviewed   CBC W/ AUTO DIFFERENTIAL - Abnormal; Notable for the following:        Result Value    RBC 3.94 (*)     Hemoglobin 11.3 (*)     Hematocrit 33.5 (*)     Platelets 404 (*)     MPV 8.1 (*)     All other components within normal limits   COMPREHENSIVE METABOLIC PANEL - Abnormal; Notable for the following:     Albumin 3.4 (*)     All other components within normal limits   MAGNESIUM   TROPONIN I   APTT   PROTIME-INR   B-TYPE NATRIURETIC PEPTIDE   CORTISOL, RANDOM   PROCALCITONIN   PHOSPHORUS   LACTIC ACID, PLASMA   PHOSPHORUS   CORTISOL, RANDOM   LACTIC ACID, PLASMA   PROCALCITONIN        All Lab Results:  Results for orders placed or performed during the hospital encounter of 06/02/17   CBC auto differential   Result Value Ref Range    WBC 7.19 3.90 - 12.70 K/uL    RBC 3.94 (L) 4.60 - 6.20 M/uL    Hemoglobin 11.3 (L) 14.0 - 18.0 g/dL    Hematocrit 33.5 (L) 40.0 - 54.0 %    MCV 85 82 - 98 fL    MCH 28.7 27.0 - 31.0 pg    MCHC 33.7 32.0 - 36.0 %    RDW 13.2 11.5 - 14.5 %    Platelets 404 (H) 150 - 350 K/uL    MPV 8.1 (L) 9.2 - 12.9 fL    Gran # 3.8 1.8 - 7.7 K/uL    Lymph # 2.2 1.0 - 4.8 K/uL    Mono # 0.7 0.3 - 1.0 K/uL    Eos # 0.4 0.0 - 0.5 K/uL    Baso # 0.10 0.00 - 0.20 K/uL    Gran% 52.6 38.0 - 73.0 %    Lymph% 30.0 18.0 - 48.0 %    Mono% 9.9 4.0 - 15.0 %    Eosinophil% 6.1 0.0 - 8.0 %    Basophil% 1.4 0.0 - 1.9 %    Differential Method Automated    Comprehensive metabolic panel   Result Value Ref Range    Sodium 137 136 - 145 mmol/L    Potassium 4.2 3.5 - 5.1 mmol/L    Chloride 103 95 - 110 mmol/L    CO2 24 23 - 29 mmol/L    Glucose 83 70 - 110 mg/dL    BUN, Bld " 15 6 - 20 mg/dL    Creatinine 0.8 0.5 - 1.4 mg/dL    Calcium 9.2 8.7 - 10.5 mg/dL    Total Protein 7.3 6.0 - 8.4 g/dL    Albumin 3.4 (L) 3.5 - 5.2 g/dL    Total Bilirubin 0.2 0.1 - 1.0 mg/dL    Alkaline Phosphatase 97 55 - 135 U/L    AST 22 10 - 40 U/L    ALT 22 10 - 44 U/L    Anion Gap 10 8 - 16 mmol/L    eGFR if African American >60 >60 mL/min/1.73 m^2    eGFR if non African American >60 >60 mL/min/1.73 m^2   Magnesium   Result Value Ref Range    Magnesium 2.3 1.6 - 2.6 mg/dL   Troponin I   Result Value Ref Range    Troponin I <0.006 0.000 - 0.026 ng/mL   APTT   Result Value Ref Range    aPTT 26.8 21.0 - 32.0 sec   Protime-INR   Result Value Ref Range    Prothrombin Time 10.4 9.0 - 12.5 sec    INR 1.0 0.8 - 1.2   Brain natriuretic peptide   Result Value Ref Range    BNP 36 0 - 99 pg/mL   Lactic acid, plasma   Result Value Ref Range    Lactate (Lactic Acid) 0.8 0.5 - 2.2 mmol/L   Phosphorus   Result Value Ref Range    Phosphorus 3.9 2.7 - 4.5 mg/dL         Imaging Results:  Imaging Results          X-Ray Chest 1 View (Final result)  Result time 06/02/17 10:53:58    Final result by Quincy Humphrey MD (06/02/17 10:53:58)                 Impression:     Negative      Electronically signed by: QUINCY HUMPHREY MD  Date:     06/02/17  Time:    10:53              Narrative:    History: Cellulitis, fever    Normal heart size. Clear lungs.                             X-Ray Foot Complete Right (Final result)  Result time 06/02/17 10:53:28    Final result by SERGE Albarran Sr., MD (06/02/17 10:53:28)                 Impression:      1. There are ill-defined areas of radiolucency projected over the soft tissues of the first through fifth toes. This is consistent with the patient's history and characteristic of gangrene.  2. There is no radiographic evidence of osteomyelitis.  3. There is mild hallux valgus deformity.       Electronically signed by: SERGE ALBARRAN MD  Date:     06/02/17  Time:    10:53              Narrative:     Three-view x-ray of the right foot    Clinical History:     Cellulitis, unspecified    Findings:     There is no fracture. There is no dislocation. There is mild hallux valgus deformity. There are ill-defined areas of radiolucency projected over the soft tissues of the first through fifth toes.                             US Lower Extremity Veins Right (Final result)  Result time 06/02/17 09:56:08    Final result by SERGE Albarran Sr., MD (06/02/17 09:56:08)                 Impression:        Normal study.      Electronically signed by: SERGE ALBARRAN MD  Date:     06/02/17  Time:    09:56              Narrative:    Ultrasound examination of the lower extremities with color flow and spectral Doppler imaging    History:     pain in the right lower extremity; swelling of the right lower extremity    Technique: Multiple static images are submitted for interpretation.    Findings:     The veins are normal in appearance and have normal compressibility. There are normal venous waveforms seen with augmentation during the compression of the veins. The right popliteal vein has a velocity of 15 cm/sec.                                    The EKG was ordered, reviewed, and independently interpreted by the ED provider.  Interpretation time: 9:20  Rate: 67 BPM  Rhythm: normal sinus rhythm  Interpretation: No acute ST changes. No STEMI.      The Emergency Provider reviewed the vital signs and test results, which are outlined above.    ED Discussion     12:21 PM: Re-evaluated pt. I have discussed test results, shared treatment plan, and the need for admission with patient and family at bedside. Pt and family express understanding at this time and agree with all information. All questions answered. Pt and family have no further questions or concerns at this time. Pt is ready for admit.    12:21 PM: Discussed case with Abi Null NP ([facility]). Abi agrees with current care and management of pt and accepts  admission.   Admitting Service: Hospital medicine   Admitting Physician: Dr. Lyle  Admit to: Med/Surg      ED Medication(s):  Medications   vancomycin 1 g in dextrose 5 % 250 mL IVPB (ready to mix system) (1,000 mg Intravenous New Bag 6/2/17 1107)   sodium chloride 0.9% bolus 1,000 mL (0 mLs Intravenous Stopped 6/2/17 1100)   morphine injection 4 mg (4 mg Intravenous Given 6/2/17 1108)   ondansetron injection 4 mg (4 mg Intravenous Given 6/2/17 1108)       Current Discharge Medication List                Medical Decision Making    Medical Decision Making:   Clinical Tests:   Lab Tests: Reviewed and Ordered  Radiological Study: Reviewed and Ordered  Medical Tests: Reviewed and Ordered           Scribe Attestation:   Scribe #1: I performed the above scribed service and the documentation accurately describes the services I performed. I attest to the accuracy of the note.    Attending:   Physician Attestation Statement for Scribe #1: I, Gabriela Mcfarlane MD, personally performed the services described in this documentation, as scribed by Chevy Church, in my presence, and it is both accurate and complete.          Clinical Impression       ICD-10-CM ICD-9-CM   1. Cellulitis of right foot L03.115 682.7   2. CellulitisAcute L03.90 682.9       Disposition:   Disposition: Discharged  Condition: Stable         Gabriela Mcfarlane MD  06/02/17 9230

## 2017-06-02 NOTE — CONSULTS
Clinical Pharmacy Consult Note: Vancomycin Dosing   Date: 6/2/2017      Pharmacy consulted by Dr. Mcfarlane to dose vancomycin for treatment of cellulitis.   Goal trough: 12-15 mcg/mL     Estimated Creatinine Clearance: 106.5 mL/min (based on Cr of 0.8).   BUN:15 WBC: 7.19  Tmax/24h: 98   Cultures:None     Ideal BW: 73 Kg <---Will use for dosing weight.   Actual BW: 81.6 Kg      Plan: Based on Estimated Creatinine Clearance: 106.5 mL/min (based on Cr of 0.8). and weight of 73 Kg, pharmacy will initiate vancomycin 1000 mg IV every 12 hours and draw a trough prior to the 4th dose. Vancomycin trough due 6/3/17 at 2200.      Pharmacy will continue to follow patients clinical status, renal function, C&S, and adjust dose as necessary to maintain trough levels between 12 and 15 mcg/mL.     Thank you for allowing us to participate in this patient's care.     Kate Guillen   6/2/2017 12:52 PM

## 2017-06-02 NOTE — SUBJECTIVE & OBJECTIVE
Past Medical History:   Diagnosis Date    H/O aorto-femoral bypass     Hyperlipemia     Hypertension     Pre-diabetes        Past Surgical History:   Procedure Laterality Date    back fusion      FEMORAL BYPASS         Review of patient's allergies indicates:   Allergen Reactions    Pcn [penicillins] Other (See Comments)     Other      Ceclor [cefaclor] Rash    Latex, natural rubber Rash       No current facility-administered medications on file prior to encounter.      Current Outpatient Prescriptions on File Prior to Encounter   Medication Sig    amlodipine (NORVASC) 10 MG tablet Take 10 mg by mouth once daily.    aspirin 81 MG Chew Take 81 mg by mouth once daily.    atorvastatin (LIPITOR) 20 MG tablet Take 20 mg by mouth once daily.    gabapentin (NEURONTIN) 100 MG capsule Take 1 capsule (100 mg total) by mouth 3 (three) times daily.    rivaroxaban (XARELTO) 20 mg Tab Take 20 mg by mouth daily with dinner or evening meal.     Family History     Problem Relation (Age of Onset)    COPD Mother, Father    Diabetes Mother, Father    Heart attack Mother    Heart disease Mother    Stroke Mother        Social History Main Topics    Smoking status: Former Smoker    Smokeless tobacco: Former User     Quit date: 10/22/2016    Alcohol use No    Drug use: No    Sexual activity: Yes     Review of Systems   Constitutional: Negative.    HENT: Negative.    Eyes: Negative.    Respiratory: Negative for apnea, cough, choking, chest tightness, shortness of breath, wheezing and stridor.    Cardiovascular: Positive for leg swelling. Negative for chest pain and palpitations.   Gastrointestinal: Negative for abdominal pain, blood in stool, constipation, diarrhea, nausea and vomiting.   Endocrine: Negative.    Genitourinary: Negative.    Musculoskeletal: Negative for neck pain and neck stiffness.   Skin: Positive for color change and wound.   Allergic/Immunologic: Negative.    Neurological: Negative for dizziness,  tremors, seizures, syncope, facial asymmetry, speech difficulty, weakness, light-headedness, numbness and headaches.   Hematological: Negative.    Psychiatric/Behavioral: Negative.      Objective:     Vital Signs (Most Recent):  Temp: 98 °F (36.7 °C) (06/02/17 0854)  Pulse: 61 (06/02/17 1002)  Resp: 16 (06/02/17 1002)  BP: (!) 152/85 (06/02/17 1002)  SpO2: 100 % (06/02/17 1002) Vital Signs (24h Range):  Temp:  [98 °F (36.7 °C)] 98 °F (36.7 °C)  Pulse:  [61-74] 61  Resp:  [16-18] 16  SpO2:  [97 %-100 %] 100 %  BP: (152-171)/(78-85) 152/85     Weight: 81.6 kg (180 lb)  Body mass index is 25.83 kg/m².    Physical Exam   Constitutional: He is oriented to person, place, and time. He appears well-developed and well-nourished.   HENT:   Head: Normocephalic and atraumatic.   Eyes: Conjunctivae and EOM are normal.   Neck: Normal range of motion. Neck supple.   Cardiovascular: Normal rate, regular rhythm, normal heart sounds and intact distal pulses.    No murmur heard.  Pulses:       Dorsalis pedis pulses are 2+ on the right side, and 2+ on the left side.   Pulmonary/Chest: Effort normal and breath sounds normal. No respiratory distress.   Abdominal: Soft. Bowel sounds are normal. He exhibits no distension. There is no tenderness. There is no guarding.   Feet:   Right Foot:   Skin Integrity: Positive for skin breakdown, erythema and warmth.   Neurological: He is alert and oriented to person, place, and time.   Skin: Skin is warm and dry. Capillary refill takes less than 2 seconds. There is erythema.   Psychiatric: He has a normal mood and affect. His behavior is normal. Judgment and thought content normal.                  Significant Labs:   CBC:   Recent Labs  Lab 06/02/17  0933   WBC 7.19   HGB 11.3*   HCT 33.5*   *     CMP:   Recent Labs  Lab 06/02/17  0933      K 4.2      CO2 24   GLU 83   BUN 15   CREATININE 0.8   CALCIUM 9.2   PROT 7.3   ALBUMIN 3.4*   BILITOT 0.2   ALKPHOS 97   AST 22   ALT 22    ANIONGAP 10   EGFRNONAA >60     Coagulation:   Recent Labs  Lab 06/02/17  0933   INR 1.0   APTT 26.8     Lactic Acid:   Recent Labs  Lab 06/02/17  1020   LACTATE 0.8     Troponin:   Recent Labs  Lab 06/02/17  0933   TROPONINI <0.006       Significant Imaging:   Imaging Results          X-Ray Chest 1 View (Final result)  Result time 06/02/17 10:53:58    Final result by Quincy Humphrey MD (06/02/17 10:53:58)                 Impression:     Negative      Electronically signed by: QUINCY HUMPHREY MD  Date:     06/02/17  Time:    10:53              Narrative:    History: Cellulitis, fever    Normal heart size. Clear lungs.                             X-Ray Foot Complete Right (Final result)  Result time 06/02/17 10:53:28    Final result by SERGE Albarran Sr., MD (06/02/17 10:53:28)                 Impression:      1. There are ill-defined areas of radiolucency projected over the soft tissues of the first through fifth toes. This is consistent with the patient's history and characteristic of gangrene.  2. There is no radiographic evidence of osteomyelitis.  3. There is mild hallux valgus deformity.       Electronically signed by: SERGE ALBARRAN MD  Date:     06/02/17  Time:    10:53              Narrative:    Three-view x-ray of the right foot    Clinical History:     Cellulitis, unspecified    Findings:     There is no fracture. There is no dislocation. There is mild hallux valgus deformity. There are ill-defined areas of radiolucency projected over the soft tissues of the first through fifth toes.                             US Lower Extremity Veins Right (Final result)  Result time 06/02/17 09:56:08    Final result by SERGE Albarran Sr., MD (06/02/17 09:56:08)                 Impression:        Normal study.      Electronically signed by: SERGE ALBARRAN MD  Date:     06/02/17  Time:    09:56              Narrative:    Ultrasound examination of the lower extremities with color flow and spectral Doppler  imaging    History:     pain in the right lower extremity; swelling of the right lower extremity    Technique: Multiple static images are submitted for interpretation.    Findings:     The veins are normal in appearance and have normal compressibility. There are normal venous waveforms seen with augmentation during the compression of the veins. The right popliteal vein has a velocity of 15 cm/sec.

## 2017-06-02 NOTE — H&P
[facility] Hospital Medicine  History & Physical    Patient Name: Ish Guardado  MRN: [id number]  Admission Date: 6/2/2017  Attending Physician: Leroy Lyle MD   Primary Care Provider: Pollo Arana MD         Patient information was obtained from patient and ER records.     Subjective:     Principal Problem:Gangrene of foot    Chief Complaint:   Chief Complaint   Patient presents with    Circulatory Problem     pt reports poor circulation to right foot, recently d/c from ICU        HPI: Ish Guardado is a 56 year old male with a PMHx of Pre DM, HTN, HLD, and s/p right fem-distal bypass EKOS thrombolysis with angioplasty on 5/25/17 with c/o RLE pain. Associated symptoms include: RLE swelling, erythema, and itching. ED workup revealed: Hgb/Hct 11.3/33.5. US RLE with no DVT. CXR unremarkable. Right foot xray with ill-defined areas of radiolucency projected over the soft tissues of the first through fifth toes. This is consistent with the patient's history and characteristic of gangrene. Pt admitted to Med Surg for RLE Cellulitis.     Past Medical History:   Diagnosis Date    H/O aorto-femoral bypass     Hyperlipemia     Hypertension     Pre-diabetes        Past Surgical History:   Procedure Laterality Date    back fusion      FEMORAL BYPASS         Review of patient's allergies indicates:   Allergen Reactions    Pcn [penicillins] Other (See Comments)     Other      Ceclor [cefaclor] Rash    Latex, natural rubber Rash       No current facility-administered medications on file prior to encounter.      Current Outpatient Prescriptions on File Prior to Encounter   Medication Sig    amlodipine (NORVASC) 10 MG tablet Take 10 mg by mouth once daily.    aspirin 81 MG Chew Take 81 mg by mouth once daily.    atorvastatin (LIPITOR) 20 MG tablet Take 20 mg by mouth once daily.    gabapentin (NEURONTIN) 100 MG capsule Take 1 capsule (100 mg total) by mouth 3 (three) times daily.     rivaroxaban (XARELTO) 20 mg Tab Take 20 mg by mouth daily with dinner or evening meal.     Family History     Problem Relation (Age of Onset)    COPD Mother, Father    Diabetes Mother, Father    Heart attack Mother    Heart disease Mother    Stroke Mother        Social History Main Topics    Smoking status: Former Smoker    Smokeless tobacco: Former User     Quit date: 10/22/2016    Alcohol use No    Drug use: No    Sexual activity: Yes     Review of Systems   Constitutional: Negative.    HENT: Negative.    Eyes: Negative.    Respiratory: Negative for apnea, cough, choking, chest tightness, shortness of breath, wheezing and stridor.    Cardiovascular: Positive for leg swelling. Negative for chest pain and palpitations.   Gastrointestinal: Negative for abdominal pain, blood in stool, constipation, diarrhea, nausea and vomiting.   Endocrine: Negative.    Genitourinary: Negative.    Musculoskeletal: Negative for neck pain and neck stiffness.   Skin: Positive for color change and wound.   Allergic/Immunologic: Negative.    Neurological: Negative for dizziness, tremors, seizures, syncope, facial asymmetry, speech difficulty, weakness, light-headedness, numbness and headaches.   Hematological: Negative.    Psychiatric/Behavioral: Negative.      Objective:     Vital Signs (Most Recent):  Temp: 98 °F (36.7 °C) (06/02/17 0854)  Pulse: 61 (06/02/17 1002)  Resp: 16 (06/02/17 1002)  BP: (!) 152/85 (06/02/17 1002)  SpO2: 100 % (06/02/17 1002) Vital Signs (24h Range):  Temp:  [98 °F (36.7 °C)] 98 °F (36.7 °C)  Pulse:  [61-74] 61  Resp:  [16-18] 16  SpO2:  [97 %-100 %] 100 %  BP: (152-171)/(78-85) 152/85     Weight: 81.6 kg (180 lb)  Body mass index is 25.83 kg/m².    Physical Exam   Constitutional: He is oriented to person, place, and time. He appears well-developed and well-nourished.   HENT:   Head: Normocephalic and atraumatic.   Eyes: Conjunctivae and EOM are normal.   Neck: Normal range of motion. Neck supple.    Cardiovascular: Normal rate, regular rhythm, normal heart sounds and intact distal pulses.    No murmur heard.  Pulses:       Dorsalis pedis pulses are 2+ on the right side, and 2+ on the left side.   Pulmonary/Chest: Effort normal and breath sounds normal. No respiratory distress.   Abdominal: Soft. Bowel sounds are normal. He exhibits no distension. There is no tenderness. There is no guarding.   Feet:   Right Foot:   Skin Integrity: Positive for skin breakdown, erythema and warmth.   Neurological: He is alert and oriented to person, place, and time.   Skin: Skin is warm and dry. Capillary refill takes less than 2 seconds. There is erythema.   Psychiatric: He has a normal mood and affect. His behavior is normal. Judgment and thought content normal.                  Significant Labs:   CBC:   Recent Labs  Lab 06/02/17  0933   WBC 7.19   HGB 11.3*   HCT 33.5*   *     CMP:   Recent Labs  Lab 06/02/17  0933      K 4.2      CO2 24   GLU 83   BUN 15   CREATININE 0.8   CALCIUM 9.2   PROT 7.3   ALBUMIN 3.4*   BILITOT 0.2   ALKPHOS 97   AST 22   ALT 22   ANIONGAP 10   EGFRNONAA >60     Coagulation:   Recent Labs  Lab 06/02/17  0933   INR 1.0   APTT 26.8     Lactic Acid:   Recent Labs  Lab 06/02/17  1020   LACTATE 0.8     Troponin:   Recent Labs  Lab 06/02/17  0933   TROPONINI <0.006       Significant Imaging:   Imaging Results          X-Ray Chest 1 View (Final result)  Result time 06/02/17 10:53:58    Final result by Quincy Humphrey MD (06/02/17 10:53:58)                 Impression:     Negative      Electronically signed by: QUINCY HUMPHREY MD  Date:     06/02/17  Time:    10:53              Narrative:    History: Cellulitis, fever    Normal heart size. Clear lungs.                             X-Ray Foot Complete Right (Final result)  Result time 06/02/17 10:53:28    Final result by SERGE Albarran Sr., MD (06/02/17 10:53:28)                 Impression:      1. There are ill-defined areas of  radiolucency projected over the soft tissues of the first through fifth toes. This is consistent with the patient's history and characteristic of gangrene.  2. There is no radiographic evidence of osteomyelitis.  3. There is mild hallux valgus deformity.       Electronically signed by: SERGE ALBARRAN MD  Date:     06/02/17  Time:    10:53              Narrative:    Three-view x-ray of the right foot    Clinical History:     Cellulitis, unspecified    Findings:     There is no fracture. There is no dislocation. There is mild hallux valgus deformity. There are ill-defined areas of radiolucency projected over the soft tissues of the first through fifth toes.                             US Lower Extremity Veins Right (Final result)  Result time 06/02/17 09:56:08    Final result by SERGE Albarran Sr., MD (06/02/17 09:56:08)                 Impression:        Normal study.      Electronically signed by: SERGE ALBARRAN MD  Date:     06/02/17  Time:    09:56              Narrative:    Ultrasound examination of the lower extremities with color flow and spectral Doppler imaging    History:     pain in the right lower extremity; swelling of the right lower extremity    Technique: Multiple static images are submitted for interpretation.    Findings:     The veins are normal in appearance and have normal compressibility. There are normal venous waveforms seen with augmentation during the compression of the veins. The right popliteal vein has a velocity of 15 cm/sec.                            Assessment/Plan:     * Gangrene of foot    - Admit to Med Surg  - S/p right fem-distal bypass EKOS thrombolysis with angioplasty on 05/25/17  - US RLE veins (-) for DVT  - US RLE arteries on 05/29/17 -- bypass graft patent without significant/focal stenosis   - Inpatient consult to Vascular surgery   - Inpatient consult to Podiatry -- ill-defined areas of radiolucency projected over the soft tissues of the first through fifth toes. This is  consistent with the patient's history and characteristic of gangrene  - MRI right foot w/wo contrast pending to rule osteomyelitis  - Blood cultures pending  - Continue IV Vancomycin   - Analgesics/Antiemetics prn          PAD (peripheral artery disease)    - S/p fem-distal bypass EKOS thrombolysis with angioplasty on 05/25/17  - Awaiting Vascular surgery input  - Continue ASA, Statin, and Eliquis          Mixed hyperlipidemia    - Continue ASA and Lipitor            VTE Risk Mitigation         Ordered     rivaroxaban tablet 20 mg  With dinner     Route:  Oral        06/02/17 1517     Medium Risk of VTE  Once      06/02/17 1250        PATY Flores  Department of Hospital Medicine   [facility]

## 2017-06-02 NOTE — PLAN OF CARE
Problem: Patient Care Overview  Goal: Plan of Care Review  Outcome: Ongoing (interventions implemented as appropriate)    Reviewed plan of care, including indications and possible side effects of administered medications. Remains free from injury at this time. Respirations even and unlabored. Bed locked and in lowest position. Call light within reach. Side rails up x2.  Patient verbalized understanding and teach back.     12 hour chart check complete.

## 2017-06-02 NOTE — HPI
Ish Guardado is a 56 year old male with a PMHx of Pre DM, HTN, HLD, and s/p right fem-distal bypass EKOS thrombolysis with angioplasty on 5/25/17 with c/o RLE pain. Associated symptoms include: RLE swelling, erythema, and itching. ED workup revealed: Hgb/Hct 11.3/33.5. US RLE with no DVT. CXR unremarkable. Right foot xray with ill-defined areas of radiolucency projected over the soft tissues of the first through fifth toes. This is consistent with the patient's history and characteristic of gangrene. Pt admitted to Med Surg for RLE Cellulitis.

## 2017-06-02 NOTE — ASSESSMENT & PLAN NOTE
- Admit to Med Surg  - S/p right fem-distal bypass EKOS thrombolysis with angioplasty on 05/25/17  - US RLE veins (-) for DVT  - US RLE arteries on 05/29/17 -- bypass graft patent without significant/focal stenosis   - Inpatient consult to Vascular surgery   - Inpatient consult to Podiatry -- ill-defined areas of radiolucency projected over the soft tissues of the first through fifth toes. This is consistent with the patient's history and characteristic of gangrene  - MRI right foot w/wo contrast pending to rule osteomyelitis  - Blood cultures pending  - Continue IV Vancomycin and Meropenum  - Analgesics/Antiemetics prn

## 2017-06-02 NOTE — PLAN OF CARE
06/02/17 1549   Discharge Assessment   Assessment Type Discharge Planning Assessment   Attempted to see patient for assessment and readmission. Patient out of the room.

## 2017-06-03 PROBLEM — L03.119 CELLULITIS OF FOOT: Status: ACTIVE | Noted: 2017-06-02

## 2017-06-03 LAB
ALBUMIN SERPL BCP-MCNC: 3.3 G/DL
ALP SERPL-CCNC: 97 U/L
ALT SERPL W/O P-5'-P-CCNC: 12 U/L
ANION GAP SERPL CALC-SCNC: 9 MMOL/L
AST SERPL-CCNC: 16 U/L
BASOPHILS # BLD AUTO: 0.05 K/UL
BASOPHILS NFR BLD: 0.6 %
BILIRUB SERPL-MCNC: 0.2 MG/DL
BUN SERPL-MCNC: 14 MG/DL
CALCIUM SERPL-MCNC: 9.3 MG/DL
CHLORIDE SERPL-SCNC: 102 MMOL/L
CO2 SERPL-SCNC: 27 MMOL/L
CREAT SERPL-MCNC: 0.9 MG/DL
DIFFERENTIAL METHOD: ABNORMAL
EOSINOPHIL # BLD AUTO: 0.6 K/UL
EOSINOPHIL NFR BLD: 7.8 %
ERYTHROCYTE [DISTWIDTH] IN BLOOD BY AUTOMATED COUNT: 13.6 %
EST. GFR  (AFRICAN AMERICAN): >60 ML/MIN/1.73 M^2
EST. GFR  (NON AFRICAN AMERICAN): >60 ML/MIN/1.73 M^2
GLUCOSE SERPL-MCNC: 97 MG/DL
HCT VFR BLD AUTO: 35.8 %
HGB BLD-MCNC: 11.8 G/DL
LYMPHOCYTES # BLD AUTO: 1.9 K/UL
LYMPHOCYTES NFR BLD: 24.2 %
MCH RBC QN AUTO: 28.5 PG
MCHC RBC AUTO-ENTMCNC: 33 %
MCV RBC AUTO: 87 FL
MONOCYTES # BLD AUTO: 0.7 K/UL
MONOCYTES NFR BLD: 9.3 %
NEUTROPHILS # BLD AUTO: 4.6 K/UL
NEUTROPHILS NFR BLD: 58.1 %
PLATELET # BLD AUTO: 464 K/UL
PMV BLD AUTO: 8.4 FL
POTASSIUM SERPL-SCNC: 4.4 MMOL/L
PROT SERPL-MCNC: 7.2 G/DL
RBC # BLD AUTO: 4.14 M/UL
SODIUM SERPL-SCNC: 138 MMOL/L
VANCOMYCIN TROUGH SERPL-MCNC: 11.8 UG/ML
WBC # BLD AUTO: 7.85 K/UL

## 2017-06-03 PROCEDURE — 85025 COMPLETE CBC W/AUTO DIFF WBC: CPT

## 2017-06-03 PROCEDURE — 11000001 HC ACUTE MED/SURG PRIVATE ROOM

## 2017-06-03 PROCEDURE — 11042 DBRDMT SUBQ TIS 1ST 20SQCM/<: CPT | Mod: ,,, | Performed by: PODIATRIST

## 2017-06-03 PROCEDURE — 87186 SC STD MICRODIL/AGAR DIL: CPT

## 2017-06-03 PROCEDURE — 36415 COLL VENOUS BLD VENIPUNCTURE: CPT

## 2017-06-03 PROCEDURE — 63600175 PHARM REV CODE 636 W HCPCS: Performed by: PODIATRIST

## 2017-06-03 PROCEDURE — 25000003 PHARM REV CODE 250: Performed by: NURSE PRACTITIONER

## 2017-06-03 PROCEDURE — 25000003 PHARM REV CODE 250: Performed by: SPECIALIST

## 2017-06-03 PROCEDURE — 80053 COMPREHEN METABOLIC PANEL: CPT

## 2017-06-03 PROCEDURE — 99232 SBSQ HOSP IP/OBS MODERATE 35: CPT | Mod: 25,,, | Performed by: PODIATRIST

## 2017-06-03 PROCEDURE — 80202 ASSAY OF VANCOMYCIN: CPT

## 2017-06-03 PROCEDURE — 87077 CULTURE AEROBIC IDENTIFY: CPT

## 2017-06-03 PROCEDURE — 0HBMXZZ EXCISION OF RIGHT FOOT SKIN, EXTERNAL APPROACH: ICD-10-PCS | Performed by: PODIATRIST

## 2017-06-03 PROCEDURE — 87070 CULTURE OTHR SPECIMN AEROBIC: CPT

## 2017-06-03 PROCEDURE — 63600175 PHARM REV CODE 636 W HCPCS: Performed by: NURSE PRACTITIONER

## 2017-06-03 PROCEDURE — 87075 CULTR BACTERIA EXCEPT BLOOD: CPT

## 2017-06-03 PROCEDURE — 63600175 PHARM REV CODE 636 W HCPCS: Performed by: SPECIALIST

## 2017-06-03 RX ORDER — MORPHINE SULFATE 2 MG/ML
2 INJECTION, SOLUTION INTRAMUSCULAR; INTRAVENOUS ONCE
Status: COMPLETED | OUTPATIENT
Start: 2017-06-03 | End: 2017-06-03

## 2017-06-03 RX ADMIN — DIPHENHYDRAMINE HYDROCHLORIDE 12.5 MG: 50 INJECTION, SOLUTION INTRAMUSCULAR; INTRAVENOUS at 01:06

## 2017-06-03 RX ADMIN — ONDANSETRON 4 MG: 2 INJECTION INTRAMUSCULAR; INTRAVENOUS at 09:06

## 2017-06-03 RX ADMIN — GABAPENTIN 100 MG: 100 CAPSULE ORAL at 01:06

## 2017-06-03 RX ADMIN — AMLODIPINE BESYLATE 10 MG: 10 TABLET ORAL at 09:06

## 2017-06-03 RX ADMIN — MORPHINE SULFATE 2 MG: 2 INJECTION, SOLUTION INTRAMUSCULAR; INTRAVENOUS at 03:06

## 2017-06-03 RX ADMIN — ASPIRIN 81 MG CHEWABLE TABLET 81 MG: 81 TABLET CHEWABLE at 09:06

## 2017-06-03 RX ADMIN — MORPHINE SULFATE 4 MG: 4 INJECTION, SOLUTION INTRAMUSCULAR; INTRAVENOUS at 12:06

## 2017-06-03 RX ADMIN — GABAPENTIN 100 MG: 100 CAPSULE ORAL at 06:06

## 2017-06-03 RX ADMIN — MORPHINE SULFATE 4 MG: 4 INJECTION, SOLUTION INTRAMUSCULAR; INTRAVENOUS at 09:06

## 2017-06-03 RX ADMIN — MORPHINE SULFATE 4 MG: 4 INJECTION, SOLUTION INTRAMUSCULAR; INTRAVENOUS at 01:06

## 2017-06-03 RX ADMIN — FAMOTIDINE 20 MG: 20 TABLET ORAL at 08:06

## 2017-06-03 RX ADMIN — GABAPENTIN 100 MG: 100 CAPSULE ORAL at 09:06

## 2017-06-03 RX ADMIN — Medication 1000 MG: at 11:06

## 2017-06-03 RX ADMIN — HYDROCODONE BITARTRATE AND ACETAMINOPHEN 1 TABLET: 10; 325 TABLET ORAL at 12:06

## 2017-06-03 RX ADMIN — FAMOTIDINE 20 MG: 20 TABLET ORAL at 09:06

## 2017-06-03 RX ADMIN — DIPHENHYDRAMINE HYDROCHLORIDE 12.5 MG: 50 INJECTION, SOLUTION INTRAMUSCULAR; INTRAVENOUS at 08:06

## 2017-06-03 RX ADMIN — ATORVASTATIN CALCIUM 20 MG: 10 TABLET, FILM COATED ORAL at 09:06

## 2017-06-03 RX ADMIN — DIPHENHYDRAMINE HYDROCHLORIDE 12.5 MG: 50 INJECTION, SOLUTION INTRAMUSCULAR; INTRAVENOUS at 04:06

## 2017-06-03 RX ADMIN — Medication 1000 MG: at 10:06

## 2017-06-03 RX ADMIN — MORPHINE SULFATE 4 MG: 4 INJECTION, SOLUTION INTRAMUSCULAR; INTRAVENOUS at 08:06

## 2017-06-03 RX ADMIN — MORPHINE SULFATE 4 MG: 4 INJECTION, SOLUTION INTRAMUSCULAR; INTRAVENOUS at 04:06

## 2017-06-03 RX ADMIN — RIVAROXABAN 20 MG: 20 TABLET, FILM COATED ORAL at 06:06

## 2017-06-03 NOTE — CONSULTS
Inpatient consult to Podiatry  Consult performed by: ROSHAN MORRIS  Consult ordered by: AKBAR SORTO  Reason for consult: right foot wound/cellulitis                       Podiatry Consult    Consulting Physician:     Reason for consultation:     Chief Complaint:  Gangrene of foot    HPI:    Ish Guardado is a 56 y.o. male seen bedside for right foot wound, swelling, redness, drainage pain. Patient recently had thrombolysis of prior fem distal bypass to the right LE. He was readmitted through ED for above symptoms. He states that he has had the wound over the bunion for about a year but was told by a wound doctor that he needed vascular procedure initially before any further treatment.    Past Medical History:   Diagnosis Date    H/O aorto-femoral bypass     Hyperlipemia     Hypertension     Pre-diabetes         Past Surgical History:   Procedure Laterality Date    back fusion      FEMORAL BYPASS          Family History   Problem Relation Age of Onset    COPD Mother     Diabetes Mother     Heart disease Mother     Stroke Mother     Heart attack Mother     COPD Father     Diabetes Father         Social History     Social History    Marital status:      Spouse name: N/A    Number of children: N/A    Years of education: N/A     Occupational History    Not on file.     Social History Main Topics    Smoking status: Former Smoker    Smokeless tobacco: Former User     Quit date: 10/22/2016    Alcohol use No    Drug use: No    Sexual activity: Yes     Other Topics Concern    Not on file     Social History Narrative    No narrative on file          Review of patient's allergies indicates:   Allergen Reactions    Pcn [penicillins] Other (See Comments)     Other      Ceclor [cefaclor] Rash    Latex, natural rubber Rash       Medications:      Current Facility-Administered Medications   Medication    amlodipine tablet 10 mg    aspirin chewable tablet 81 mg    atorvastatin tablet 20 mg     diphenhydrAMINE injection 12.5 mg    famotidine tablet 20 mg    gabapentin capsule 100 mg    hydrocodone-acetaminophen 10-325mg per tablet 1 tablet    morphine injection 4 mg    ondansetron injection 4 mg    promethazine (PHENERGAN) 6.25 mg in dextrose 5 % 50 mL IVPB    rivaroxaban tablet 20 mg    vancomycin 1 g in dextrose 5 % 250 mL IVPB (ready to mix system)       No current facility-administered medications on file prior to encounter.      Current Outpatient Prescriptions on File Prior to Encounter   Medication Sig Dispense Refill    amlodipine (NORVASC) 10 MG tablet Take 10 mg by mouth once daily.      aspirin 81 MG Chew Take 81 mg by mouth once daily.      atorvastatin (LIPITOR) 20 MG tablet Take 20 mg by mouth once daily.      gabapentin (NEURONTIN) 100 MG capsule Take 1 capsule (100 mg total) by mouth 3 (three) times daily. 42 capsule 0    rivaroxaban (XARELTO) 20 mg Tab Take 20 mg by mouth daily with dinner or evening meal.         INTAKE & OUTPUT (Last 24H):    Intake/Output Summary (Last 24 hours) at 06/03/17 1446  Last data filed at 06/03/17 1200   Gross per 24 hour   Intake             1210 ml   Output              875 ml   Net              335 ml       REVIEW OF SYSTEMS:        Patient has no current complaints of fever, chills, sweat, chest pain, shortness of breath, nausea, vomiting, or diarrhea. Positive pain to the right medial forefoot wound.    PHYSICAL EXAM:      Vital Signs (Most Recent)  Temp: 98.9 °F (37.2 °C) (06/03/17 1257)  Pulse: 78 (06/03/17 1257)  Resp: 20 (06/03/17 1257)  BP: (!) 141/82 (06/03/17 1257)  SpO2: 98 % (06/03/17 1257)    Vital Signs Range (Last 24H):  Temp:  [97.5 °F (36.4 °C)-98.9 °F (37.2 °C)]   Pulse:  [60-82]   Resp:  [14-20]   BP: (137-156)/(75-82)   SpO2:  [97 %-100 %]     Constitutional: Appears well-developed, well-nourished and in no acute distress.  Pt is oriented to person, place, and time.     LOWER EXTREMITY PHYSICAL EXAM:    VASCULAR: Pulse  absent left DP.  Pulse diminished right DP/PT.  Pulse palpable left PT. Capillary fill time is between 3 and 5 seconds bilaterally with pallor and blanching to right hallux.    DERMATOLOGIC: Lower extremities:    Thin, dry, atrophic bilaterally. Digital hair absent bilaterally. Digit cool to touch bilaterally with proximal warmth.    Wound: right medial 1st met head 1.5x1.3x.3cm full thickness granular red subcutaneous tissue wound post debridement.  Drainage: Serosanguinous  Malodor: None  Erythema: Mild local periwound  Edema: Mild local periwound  Varicosities: None  Pigmentary changes: None  Interdigital maceration: None  Skin lesions: None  Nails are dystrophic bilaterally.                NEURO: Plantar protective threshold sensation by touch via 5.07 SWMF diminished to the right. Pathologic reflex absent bilaterally.    MUSCULOSKELETAL Lower extremities:    Deformities: bilateral hallux valgus bunion.    Normal arch height and rectus feet bilaterally.     5/5 muscle strength and tone in all four quadrants bilaterally.     Pain-free range of motion in all four quadrants with stiffness and limitation bilaterally.     Pain: None    Laboratory Data:  Reviewed and noted in plan where applicable. Please see chart for full laboratory data.    No results for input(s): POCTGLUCOSE in the last 24 hours.     Lab Results   Component Value Date    INR 1.0 06/02/2017    INR 1.0 05/29/2017    INR 1.2 05/22/2017       Lab Results   Component Value Date    WBC 7.85 06/03/2017    HGB 11.8 (L) 06/03/2017    HCT 35.8 (L) 06/03/2017    MCV 87 06/03/2017     (H) 06/03/2017       BMP    Recent Labs  Lab 06/03/17  0623   GLU 97      K 4.4      CO2 27   BUN 14   CREATININE 0.9   CALCIUM 9.3       No results found for: HGBA1C    Diagnostic Results:    Imaging Results          MRI Lower Extremity W WO Contrast Right (Final result)  Result time 06/02/17 16:20:45    Final result by SERGE Albarran Sr., MD (06/02/17  16:20:45)                 Impression:      1. The MRI examination is limited secondary to patient motion. There is no definite evidence of osteomyelitis.   2. There are several fluid collections volar to the midfoot. One of the larger ones measures 12 mm in size. These are characteristic of abscesses.  3. There is a mild amount of edema in the subcutaneous fat in the dorsum of the foot. This is characteristic of cellulitis.  4. There are findings characteristic of bone infarcts in the first metatarsal and proximal phalanx of the big toe.   5. There is mild hallux valgus deformity.      Electronically signed by: SERGE NEVILLE MD  Date:     06/02/17  Time:    16:20              Narrative:    MRI of the right foot without and with IV contrast    Clinical History: Pain in the right to the midfoot. The largest one measures 12 mm in size. extremity; cellulitis; possible osteomyelitis    Technique: Standard foot MRI protocol without and with IV contrast was performed. 8 mL of gadolinium was used for this examination.       Finding: Comparison was made to a plain film examination of the right foot performed on 6/2/2017. The MRI examination is limited secondary to patient motion. There is no definite evidence of osteomyelitis. There are several fluid collections volar to the midfoot. One of the larger ones measures 12 mm in size. There is mild hallux valgus deformity. There are findings characteristic of bone infarcts in the first metatarsal and proximal phalanx of the big toe. There is a mild amount of edema in the subcutaneous fat in the dorsum of the foot.                             X-Ray Chest 1 View (Final result)  Result time 06/02/17 10:53:58    Final result by Quincy Humphrey MD (06/02/17 10:53:58)                 Impression:     Negative      Electronically signed by: QUINCY HUMPHREY MD  Date:     06/02/17  Time:    10:53              Narrative:    History: Cellulitis, fever    Normal heart size. Clear lungs.                              X-Ray Foot Complete Right (Final result)  Result time 06/02/17 10:53:28    Final result by SERGE Albarran Sr., MD (06/02/17 10:53:28)                 Impression:      1. There are ill-defined areas of radiolucency projected over the soft tissues of the first through fifth toes. This is consistent with the patient's history and characteristic of gangrene.  2. There is no radiographic evidence of osteomyelitis.  3. There is mild hallux valgus deformity.       Electronically signed by: SERGE ALBARRAN MD  Date:     06/02/17  Time:    10:53              Narrative:    Three-view x-ray of the right foot    Clinical History:     Cellulitis, unspecified    Findings:     There is no fracture. There is no dislocation. There is mild hallux valgus deformity. There are ill-defined areas of radiolucency projected over the soft tissues of the first through fifth toes.                             US Lower Extremity Veins Right (Final result)  Result time 06/02/17 09:56:08    Final result by SERGE Albarran Sr., MD (06/02/17 09:56:08)                 Impression:        Normal study.      Electronically signed by: SERGE ALBARRAN MD  Date:     06/02/17  Time:    09:56              Narrative:    Ultrasound examination of the lower extremities with color flow and spectral Doppler imaging    History:     pain in the right lower extremity; swelling of the right lower extremity    Technique: Multiple static images are submitted for interpretation.    Findings:     The veins are normal in appearance and have normal compressibility. There are normal venous waveforms seen with augmentation during the compression of the veins. The right popliteal vein has a velocity of 15 cm/sec.                              Microbiology Results (last 7 days)     Procedure Component Value Units Date/Time    Culture, Anaerobe [[id number]]     Order Status:  No result Specimen:  Wound from Foot, Right     Aerobic culture [[id number]]     Order  Status:  No result Specimen:  Wound from Foot, Right     Blood culture [[id number]] Collected:  17 1627    Order Status:  Completed Specimen:  Blood Updated:  17 0812     Blood Culture, Routine Gram stain aer bottle: Gram positive rods     Blood Culture, Routine Results called to and read back by:Ronit Huynh RN 2017  08:11    Blood culture [[id number]] Collected:  17    Order Status:  Completed Specimen:  Blood Updated:  17 0515     Blood Culture, Routine No Growth to date          ASSESSMENT/PLAN:     Active Hospital Problems    Diagnosis  POA    *Gangrene of foot [I96]  Yes    Mixed hyperlipidemia [E78.2]  Yes     Chronic    PAD (peripheral artery disease) [I73.9]  Yes     Chronic      Resolved Hospital Problems    Diagnosis Date Resolved POA   No resolved problems to display.       1. Chronic ischemic ulceration of right foot - with signs of infection.     TREATMENT/PLAN: Treatment today consisted of the followin. The patient was counseled regarding findings of my examination, my impressions, treatment options, results of diagnostic tests and usual treatment plan. MRI was reviewed and noted for fluid collection along the plantar arch. However on exam patient does not exhibit fluctuance or pain along those areas but rather along the medial 1st met head.   2. Ulcer was debrided excisionally with #15 blade and forceps to subcutaneous full granular thickness of viable and nonviable fibrotic tissue. Patient tolerated debridement well.   3. Wound was copiously irrigated with saline and culture obtained. Blood cultures gram positive rods.  4. Applied antibiotic ointment to ulcer and dressed with betadine, xeroform, 4x4, kerlix. Nursing order placed for daily dressing changes.  5. Patient to continue antibiotics per hospital medicine or ID to be adjusted according to culture results.   6. Discussed treatment options weighing risk and benefits. I did not advise exploratory  incision and debridement for plantar mid arch fluid pockets given patient history of chronic non healing ischemic wound. We discussed the likelihood of developing a non healing incision that could lead to further complications. . Rather we discussed local wound care of the current local medial forefoot wound and consultation to infectious disease specialist for IV antibiotic therapy given positive blood cultures. We also discussed high risk to still go on to amputations given changes that had progress despite successful thrombolysis of fem-pop bypass. Patient appeared to be in good understanding and agreement.  7. Will follow up bedside.     Thank you for allowing us to participate in the care of this patent.

## 2017-06-03 NOTE — SUBJECTIVE & OBJECTIVE
Review of Systems   Constitutional: Negative.    HENT: Negative.    Eyes: Negative.    Respiratory: Negative for apnea, cough, choking, chest tightness, shortness of breath, wheezing and stridor.    Cardiovascular: Positive for leg swelling. Negative for chest pain and palpitations.   Gastrointestinal: Negative for abdominal pain, blood in stool, constipation, diarrhea, nausea and vomiting.   Endocrine: Negative.    Genitourinary: Negative.    Musculoskeletal: Negative for neck pain and neck stiffness.   Skin: Positive for color change and wound.   Allergic/Immunologic: Negative.    Neurological: Negative for dizziness, tremors, seizures, syncope, facial asymmetry, speech difficulty, weakness, light-headedness, numbness and headaches.   Hematological: Negative.    Psychiatric/Behavioral: Negative.      Objective:     Vital Signs (Most Recent):  Temp: 98.5 °F (36.9 °C) (06/03/17 1600)  Pulse: 76 (06/03/17 1600)  Resp: 20 (06/03/17 1600)  BP: (!) 155/79 (06/03/17 1600)  SpO2: 97 % (06/03/17 1600) Vital Signs (24h Range):  Temp:  [97.5 °F (36.4 °C)-98.9 °F (37.2 °C)] 98.5 °F (36.9 °C)  Pulse:  [68-82] 76  Resp:  [14-20] 20  SpO2:  [97 %-100 %] 97 %  BP: (137-156)/(75-82) 155/79     Weight: 81.6 kg (180 lb)  Body mass index is 25.83 kg/m².    Intake/Output Summary (Last 24 hours) at 06/03/17 1750  Last data filed at 06/03/17 1600   Gross per 24 hour   Intake             1570 ml   Output             1375 ml   Net              195 ml      Physical Exam   Constitutional: He is oriented to person, place, and time. He appears well-developed and well-nourished.   HENT:   Head: Normocephalic and atraumatic.   Eyes: Conjunctivae and EOM are normal.   Neck: Normal range of motion. Neck supple.   Cardiovascular: Normal rate, regular rhythm and normal heart sounds.    No murmur heard.  Pulses:       Dorsalis pedis pulses are 2+ on the right side, and 2+ on the left side.   Pulmonary/Chest: Effort normal and breath sounds  normal. No respiratory distress.   Abdominal: Soft. Bowel sounds are normal. He exhibits no distension. There is no tenderness. There is no guarding.   Feet:   Right Foot:   Skin Integrity: Positive for skin breakdown, erythema and warmth.   Neurological: He is alert and oriented to person, place, and time.   Skin: Skin is warm and dry. Capillary refill takes less than 2 seconds. There is erythema.   Psychiatric: He has a normal mood and affect. His behavior is normal. Judgment and thought content normal.       Significant Labs:   CBC:   Recent Labs  Lab 06/02/17  0933 06/03/17  0623   WBC 7.19 7.85   HGB 11.3* 11.8*   HCT 33.5* 35.8*   * 464*     CMP:   Recent Labs  Lab 06/02/17  0933 06/03/17  0623    138   K 4.2 4.4    102   CO2 24 27   GLU 83 97   BUN 15 14   CREATININE 0.8 0.9   CALCIUM 9.2 9.3   PROT 7.3 7.2   ALBUMIN 3.4* 3.3*   BILITOT 0.2 0.2   ALKPHOS 97 97   AST 22 16   ALT 22 12   ANIONGAP 10 9   EGFRNONAA >60 >60     All pertinent labs within the past 24 hours have been reviewed.    Significant Imaging: I have reviewed all pertinent imaging results/findings within the past 24 hours.

## 2017-06-03 NOTE — PLAN OF CARE
Problem: Patient Care Overview  Goal: Plan of Care Review  Outcome: Ongoing (interventions implemented as appropriate)  Pt remains free of injury, pain managed adequately, no s/s of distress. 24 hour chart check completed. Will continue to monitor.

## 2017-06-03 NOTE — PROGRESS NOTES
Notified Dariela MOJICA that patient's aerobic blood culture came back positive for gram positive rods. MD verbalized understanding.

## 2017-06-03 NOTE — HOSPITAL COURSE
Pt is S/P right femoral distal bypass, EKOS thrombolysis with angioplasty on 5/25/17. He returned with right foot pain and swelling. Plain film of right foot mentioned gangrene. MRI of right foot mentioned possible abscesses and cellulitis, no osteomyelitis.  IV Vanco given. Podiatry debrided a darkened wound over the bunion at the right MTP joint. Vascular consulted and reviewing care for surgical option. Xarelto in progress. Preliminary blood culture note gram + rods - Bacillus species likely a contaminant. Wound culture + Staph Aureus (sens pending). Oral analgesics adjusted due to pain. 6/5/17- The patients symptoms improved with treatment. The patient was seen and examined today and and deemed stable for discharge. The patient is being discharged home. Attempted to discharge patient on xyvox however insurance would not approve. The patient is instead being discharged home on a 2 week coarse of bactrim. The patient will follow up with his PCP, Vascular surgery and Podiatry.

## 2017-06-03 NOTE — PROGRESS NOTES
[facility] Hospital Medicine  Progress Note    Patient Name: Ish Guardado  MRN: [id number]  Patient Class: IP- Inpatient   Admission Date: 6/2/2017  Length of Stay: 1 days  Attending Physician: Leroy Lyle MD  Primary Care Provider: Pollo Arana MD        Subjective:     Principal Problem:Cellulitis of foot    HPI:  Ish Guardado is a 56 year old male with a PMHx of Pre DM, HTN, HLD, and s/p right fem-distal bypass EKOS thrombolysis with angioplasty on 5/25/17 with c/o RLE pain. Associated symptoms include: RLE swelling, erythema, and itching. ED workup revealed: Hgb/Hct 11.3/33.5. US RLE with no DVT. CXR unremarkable. Right foot xray with ill-defined areas of radiolucency projected over the soft tissues of the first through fifth toes. This is consistent with the patient's history and characteristic of gangrene. Pt admitted to Med Surg for RLE Cellulitis.     Hospital Course:  Pt is S/P right femoral distal bypass, EKOS thrombolysis with angioplasty on 5/25/17. He returned with right foot pain and swelling. Plain film of right foot mentioned gangrene. MRI of right foot mentioned possible abscesses and cellulitis, no osteomyelitis.  IV Vanco given. Podiatry debrided a darkened wound over the bunion at the right MTP joint. Vascular consulted and reviewing care for surgical option. Xarelto in progress. Preliminary blood culture note gram + rods.         Review of Systems   Constitutional: Negative.    HENT: Negative.    Eyes: Negative.    Respiratory: Negative for apnea, cough, choking, chest tightness, shortness of breath, wheezing and stridor.    Cardiovascular: Positive for leg swelling. Negative for chest pain and palpitations.   Gastrointestinal: Negative for abdominal pain, blood in stool, constipation, diarrhea, nausea and vomiting.   Endocrine: Negative.    Genitourinary: Negative.    Musculoskeletal: Negative for neck pain and neck stiffness.   Skin: Positive for color change and  wound.   Allergic/Immunologic: Negative.    Neurological: Negative for dizziness, tremors, seizures, syncope, facial asymmetry, speech difficulty, weakness, light-headedness, numbness and headaches.   Hematological: Negative.    Psychiatric/Behavioral: Negative.      Objective:     Vital Signs (Most Recent):  Temp: 98.5 °F (36.9 °C) (06/03/17 1600)  Pulse: 76 (06/03/17 1600)  Resp: 20 (06/03/17 1600)  BP: (!) 155/79 (06/03/17 1600)  SpO2: 97 % (06/03/17 1600) Vital Signs (24h Range):  Temp:  [97.5 °F (36.4 °C)-98.9 °F (37.2 °C)] 98.5 °F (36.9 °C)  Pulse:  [68-82] 76  Resp:  [14-20] 20  SpO2:  [97 %-100 %] 97 %  BP: (137-156)/(75-82) 155/79     Weight: 81.6 kg (180 lb)  Body mass index is 25.83 kg/m².    Intake/Output Summary (Last 24 hours) at 06/03/17 1750  Last data filed at 06/03/17 1600   Gross per 24 hour   Intake             1570 ml   Output             1375 ml   Net              195 ml      Physical Exam   Constitutional: He is oriented to person, place, and time. He appears well-developed and well-nourished.   HENT:   Head: Normocephalic and atraumatic.   Eyes: Conjunctivae and EOM are normal.   Neck: Normal range of motion. Neck supple.   Cardiovascular: Normal rate, regular rhythm and normal heart sounds.    No murmur heard.  Pulses:       Dorsalis pedis pulses are 2+ on the right side, and 2+ on the left side.   Pulmonary/Chest: Effort normal and breath sounds normal. No respiratory distress.   Abdominal: Soft. Bowel sounds are normal. He exhibits no distension. There is no tenderness. There is no guarding.   Feet:   Right Foot:   Skin Integrity: Positive for skin breakdown, erythema and warmth.   Neurological: He is alert and oriented to person, place, and time.   Skin: Skin is warm and dry. Capillary refill takes less than 2 seconds. There is erythema.   Psychiatric: He has a normal mood and affect. His behavior is normal. Judgment and thought content normal.       Significant Labs:   CBC:   Recent  Labs  Lab 06/02/17  0933 06/03/17  0623   WBC 7.19 7.85   HGB 11.3* 11.8*   HCT 33.5* 35.8*   * 464*     CMP:   Recent Labs  Lab 06/02/17  0933 06/03/17  0623    138   K 4.2 4.4    102   CO2 24 27   GLU 83 97   BUN 15 14   CREATININE 0.8 0.9   CALCIUM 9.2 9.3   PROT 7.3 7.2   ALBUMIN 3.4* 3.3*   BILITOT 0.2 0.2   ALKPHOS 97 97   AST 22 16   ALT 22 12   ANIONGAP 10 9   EGFRNONAA >60 >60     All pertinent labs within the past 24 hours have been reviewed.    Significant Imaging: I have reviewed all pertinent imaging results/findings within the past 24 hours.    Assessment/Plan:      Mixed hyperlipidemia    - Continue ASA and Lipitor          PAD (peripheral artery disease)    - S/p fem-distal bypass EKOS thrombolysis with angioplasty on 05/25/17  - Vascular reviewing care for surgical option  - Continue ASA, Statin, and Eliquis          * Cellulitis of foot    - Admit to Med Surg  - S/p right fem-distal bypass EKOS thrombolysis with angioplasty on 05/25/17  - US RLE veins (-) for DVT  - US RLE arteries on 05/29/17 -- bypass graft patent without significant/focal stenosis   - Inpatient consult to Vascular surgery - Vascular reviewing care for surgical option    - Podiatry-- performed debridement of bunion ulceration  - MRI right foot w/wo contrast - negative for osteo, + cellulitis  - Blood cultures - preliminary gram + derek  - Continue IV Vancomycin   - Analgesics/Antiemetics prn            VTE Risk Mitigation         Ordered     rivaroxaban tablet 20 mg  With dinner     Route:  Oral        06/02/17 1517     Medium Risk of VTE  Once      06/02/17 1250          Elizabeth Bunn NP  Department of Hospital Medicine   [facility]

## 2017-06-03 NOTE — PLAN OF CARE
Problem: Patient Care Overview  Goal: Plan of Care Review  Outcome: Ongoing (interventions implemented as appropriate)  POC reviewed with pt, pt verbalized understanding. AAO x 4, VSS. Pt tolerating regular diet, denies nausea. Pt verbalizes satisfaction with PRN pain medication. 12 hour chart check completed. WCTM

## 2017-06-03 NOTE — ASSESSMENT & PLAN NOTE
- Admit to Med Surg  - S/p right fem-distal bypass EKOS thrombolysis with angioplasty on 05/25/17  - US RLE veins (-) for DVT  - US RLE arteries on 05/29/17 -- bypass graft patent without significant/focal stenosis   - Inpatient consult to Vascular surgery - Vascular reviewing care for surgical option    - Podiatry-- performed debridement of bunion ulceration  - MRI right foot w/wo contrast - negative for osteo, + cellulitis  - Blood cultures - preliminary gram + derek  - Continue IV Vancomycin   - Analgesics/Antiemetics prn

## 2017-06-03 NOTE — ASSESSMENT & PLAN NOTE
- S/p fem-distal bypass EKOS thrombolysis with angioplasty on 05/25/17  - Vascular reviewing care for surgical option  - Continue ASA, Statin, and Eliquis

## 2017-06-04 PROBLEM — B95.8 CELLULITIS DUE TO STAPHYLOCOCCUS: Status: ACTIVE | Noted: 2017-06-02

## 2017-06-04 PROBLEM — L03.90 CELLULITIS DUE TO STAPHYLOCOCCUS: Status: ACTIVE | Noted: 2017-06-02

## 2017-06-04 LAB
ALBUMIN SERPL BCP-MCNC: 3.4 G/DL
ALP SERPL-CCNC: 94 U/L
ALT SERPL W/O P-5'-P-CCNC: 13 U/L
ANION GAP SERPL CALC-SCNC: 10 MMOL/L
AST SERPL-CCNC: 16 U/L
BASOPHILS # BLD AUTO: 0.08 K/UL
BASOPHILS NFR BLD: 1.2 %
BILIRUB SERPL-MCNC: 0.2 MG/DL
BUN SERPL-MCNC: 16 MG/DL
CALCIUM SERPL-MCNC: 9.5 MG/DL
CHLORIDE SERPL-SCNC: 99 MMOL/L
CO2 SERPL-SCNC: 26 MMOL/L
CREAT SERPL-MCNC: 1 MG/DL
DIFFERENTIAL METHOD: ABNORMAL
EOSINOPHIL # BLD AUTO: 0.6 K/UL
EOSINOPHIL NFR BLD: 8 %
ERYTHROCYTE [DISTWIDTH] IN BLOOD BY AUTOMATED COUNT: 13.3 %
EST. GFR  (AFRICAN AMERICAN): >60 ML/MIN/1.73 M^2
EST. GFR  (NON AFRICAN AMERICAN): >60 ML/MIN/1.73 M^2
GLUCOSE SERPL-MCNC: 128 MG/DL
HCT VFR BLD AUTO: 35.8 %
HGB BLD-MCNC: 11.9 G/DL
LYMPHOCYTES # BLD AUTO: 2.3 K/UL
LYMPHOCYTES NFR BLD: 32.9 %
MCH RBC QN AUTO: 28.5 PG
MCHC RBC AUTO-ENTMCNC: 33.2 %
MCV RBC AUTO: 86 FL
MONOCYTES # BLD AUTO: 0.6 K/UL
MONOCYTES NFR BLD: 8.4 %
NEUTROPHILS # BLD AUTO: 3.4 K/UL
NEUTROPHILS NFR BLD: 49.5 %
PLATELET # BLD AUTO: 412 K/UL
PMV BLD AUTO: 8.2 FL
POCT GLUCOSE: 110 MG/DL (ref 70–110)
POCT GLUCOSE: 131 MG/DL (ref 70–110)
POCT GLUCOSE: 143 MG/DL (ref 70–110)
POCT GLUCOSE: 177 MG/DL (ref 70–110)
POTASSIUM SERPL-SCNC: 4.3 MMOL/L
PROT SERPL-MCNC: 7.5 G/DL
RBC # BLD AUTO: 4.18 M/UL
SODIUM SERPL-SCNC: 135 MMOL/L
WBC # BLD AUTO: 6.91 K/UL

## 2017-06-04 PROCEDURE — 25000003 PHARM REV CODE 250: Performed by: NURSE PRACTITIONER

## 2017-06-04 PROCEDURE — 80202 ASSAY OF VANCOMYCIN: CPT

## 2017-06-04 PROCEDURE — 63600175 PHARM REV CODE 636 W HCPCS: Performed by: NURSE PRACTITIONER

## 2017-06-04 PROCEDURE — 11000001 HC ACUTE MED/SURG PRIVATE ROOM

## 2017-06-04 PROCEDURE — 36415 COLL VENOUS BLD VENIPUNCTURE: CPT

## 2017-06-04 PROCEDURE — 85025 COMPLETE CBC W/AUTO DIFF WBC: CPT

## 2017-06-04 PROCEDURE — 25000003 PHARM REV CODE 250: Performed by: SPECIALIST

## 2017-06-04 PROCEDURE — 63600175 PHARM REV CODE 636 W HCPCS: Performed by: SPECIALIST

## 2017-06-04 PROCEDURE — 80053 COMPREHEN METABOLIC PANEL: CPT

## 2017-06-04 RX ORDER — OXYCODONE AND ACETAMINOPHEN 10; 325 MG/1; MG/1
1 TABLET ORAL EVERY 6 HOURS PRN
Status: DISCONTINUED | OUTPATIENT
Start: 2017-06-04 | End: 2017-06-05 | Stop reason: HOSPADM

## 2017-06-04 RX ADMIN — FAMOTIDINE 20 MG: 20 TABLET ORAL at 08:06

## 2017-06-04 RX ADMIN — HYDROCODONE BITARTRATE AND ACETAMINOPHEN 1 TABLET: 10; 325 TABLET ORAL at 01:06

## 2017-06-04 RX ADMIN — MORPHINE SULFATE 4 MG: 4 INJECTION, SOLUTION INTRAMUSCULAR; INTRAVENOUS at 12:06

## 2017-06-04 RX ADMIN — GABAPENTIN 100 MG: 100 CAPSULE ORAL at 02:06

## 2017-06-04 RX ADMIN — DIPHENHYDRAMINE HYDROCHLORIDE 12.5 MG: 50 INJECTION, SOLUTION INTRAMUSCULAR; INTRAVENOUS at 06:06

## 2017-06-04 RX ADMIN — OXYCODONE HYDROCHLORIDE AND ACETAMINOPHEN 1 TABLET: 10; 325 TABLET ORAL at 02:06

## 2017-06-04 RX ADMIN — OXYCODONE HYDROCHLORIDE AND ACETAMINOPHEN 1 TABLET: 10; 325 TABLET ORAL at 06:06

## 2017-06-04 RX ADMIN — MORPHINE SULFATE 4 MG: 4 INJECTION, SOLUTION INTRAMUSCULAR; INTRAVENOUS at 09:06

## 2017-06-04 RX ADMIN — MORPHINE SULFATE 4 MG: 4 INJECTION, SOLUTION INTRAMUSCULAR; INTRAVENOUS at 04:06

## 2017-06-04 RX ADMIN — FAMOTIDINE 20 MG: 20 TABLET ORAL at 09:06

## 2017-06-04 RX ADMIN — GABAPENTIN 100 MG: 100 CAPSULE ORAL at 06:06

## 2017-06-04 RX ADMIN — Medication 1000 MG: at 12:06

## 2017-06-04 RX ADMIN — DIPHENHYDRAMINE HYDROCHLORIDE 12.5 MG: 50 INJECTION, SOLUTION INTRAMUSCULAR; INTRAVENOUS at 04:06

## 2017-06-04 RX ADMIN — MORPHINE SULFATE 4 MG: 4 INJECTION, SOLUTION INTRAMUSCULAR; INTRAVENOUS at 08:06

## 2017-06-04 RX ADMIN — ATORVASTATIN CALCIUM 20 MG: 10 TABLET, FILM COATED ORAL at 08:06

## 2017-06-04 RX ADMIN — AMLODIPINE BESYLATE 10 MG: 10 TABLET ORAL at 08:06

## 2017-06-04 RX ADMIN — RIVAROXABAN 20 MG: 20 TABLET, FILM COATED ORAL at 06:06

## 2017-06-04 RX ADMIN — ASPIRIN 81 MG CHEWABLE TABLET 81 MG: 81 TABLET CHEWABLE at 08:06

## 2017-06-04 NOTE — PROGRESS NOTES
LTAC consulted canceled.  Patient will discharge home with HH per Dr. Lyle.  Ashley Combs LMSW, JOANIE-TASHA, CCM  06/04/2017

## 2017-06-04 NOTE — CONSULTS
06/03/17 1950   Discharge Assessment   Assessment Type Discharge Planning Assessment   Confirmed/corrected address and phone number on facesheet? Yes   Assessment information obtained from? Patient   Communicated expected length of stay with patient/caregiver no   If Healthcare Directive is received, is it scanned into Epic? no (comment)   Prior to hospitilization cognitive status: Alert/Oriented   Prior to hospitalization functional status: Assistive Equipment   Current cognitive status: Alert/Oriented;No Deficits   Current Functional Status: Assistive Equipment   Arrived From admitted as an inpatient;home or self-care   Lives With spouse   Able to Return to Prior Arrangements yes   Is patient able to care for self after discharge? Yes   How many people do you have in your home that can help with your care after discharge? 1   Who are your caregiver(s) and their phone number(s)? wife, Ray Guardado,  [phone]   Patient's perception of discharge disposition long-term acute care facility (LTAC)   Readmission Within The Last 30 Days previous discharge plan unsuccessful   Patient currently being followed by outpatient case management? No   Patient currently receives home health services? No   Does the patient currently use HME? Yes   Patient currently receives private duty nursing? No   Patient currently receives any other outside agency services? No   Equipment Currently Used at Home walker, rolling;crutches;shower chair;cane, straight   Do you have any problems affording any of your prescribed medications? No   Is the patient taking medications as prescribed? yes   Do you have any financial concerns preventing you from receiving the healthcare you need? No   Does the patient have transportation to healthcare appointments? Yes   Transportation Available family or friend will provide   On Dialysis? No   Does the patient receive services at the Coumadin Clinic? No  (Xarelto)   Discharge Plan A Long-term acute  care facility (LTAC)   Discharge Plan B Home Health   Patient/Family In Agreement With Plan yes   Patient from home with spouse.  Needs assistive equipment to assist with mobility.  Patient readmitted within the last 30 days.  Patient returned because of leg swelling and foot turning red and black.  Patient provided with list of LTACs covered by his insurance.  Patient selected [facility].  Patient's choice form signed.  Patient will discuss further with his spouse.  Ashley Combs LMSW, JOANIE-Mickie, CCM  06/03/2017

## 2017-06-04 NOTE — SUBJECTIVE & OBJECTIVE
Interval History:   Reports uncontrolled left foot pain        Review of Systems   Constitutional: Negative.    HENT: Negative.    Eyes: Negative.    Respiratory: Negative for apnea, cough, choking, chest tightness, shortness of breath, wheezing and stridor.    Cardiovascular: Positive for leg swelling. Negative for chest pain and palpitations.   Gastrointestinal: Negative for abdominal pain, blood in stool, constipation, diarrhea, nausea and vomiting.   Endocrine: Negative.    Genitourinary: Negative.    Musculoskeletal: Negative for neck pain and neck stiffness.   Skin: Positive for color change and wound.   Allergic/Immunologic: Negative.    Neurological: Negative for dizziness, tremors, seizures, syncope, facial asymmetry, speech difficulty, weakness, light-headedness, numbness and headaches.   Hematological: Negative.    Psychiatric/Behavioral: Negative.      Objective:     Vital Signs (Most Recent):  Temp: 98 °F (36.7 °C) (06/04/17 1125)  Pulse: 74 (06/04/17 1125)  Resp: 19 (06/04/17 1125)  BP: (!) 143/82 (06/04/17 1125)  SpO2: 99 % (06/04/17 1125) Vital Signs (24h Range):  Temp:  [98 °F (36.7 °C)-98.9 °F (37.2 °C)] 98 °F (36.7 °C)  Pulse:  [73-92] 74  Resp:  [18-20] 19  SpO2:  [97 %-99 %] 99 %  BP: (139-159)/(79-99) 143/82     Weight: 81.6 kg (180 lb)  Body mass index is 25.83 kg/m².    Intake/Output Summary (Last 24 hours) at 06/04/17 1233  Last data filed at 06/04/17 0800   Gross per 24 hour   Intake             1220 ml   Output              500 ml   Net              720 ml      Physical Exam   Constitutional: He is oriented to person, place, and time. He appears well-developed and well-nourished.   HENT:   Head: Normocephalic and atraumatic.   Eyes: Conjunctivae and EOM are normal.   Neck: Normal range of motion. Neck supple.   Cardiovascular: Normal rate, regular rhythm and normal heart sounds.    No murmur heard.  Pulses:       Dorsalis pedis pulses are 2+ on the right side, and 2+ on the left side.    Pulmonary/Chest: Effort normal and breath sounds normal. No respiratory distress.   Abdominal: Soft. Bowel sounds are normal. He exhibits no distension. There is no tenderness. There is no guarding.   Feet:   Right Foot:   Skin Integrity: Positive for skin breakdown, erythema and warmth.   Neurological: He is alert and oriented to person, place, and time.   Skin: Skin is warm and dry. Capillary refill takes less than 2 seconds. There is erythema.   Left foot with mild erythema to the 1st metatarsal area. There are scratches and abrasions extending up lower left leg   Psychiatric: His behavior is normal. Thought content normal. His affect is angry.       Significant Labs:   CBC:   Recent Labs  Lab 06/03/17 0623 06/04/17  0618   WBC 7.85 6.91   HGB 11.8* 11.9*   HCT 35.8* 35.8*   * 412*     CMP:   Recent Labs  Lab 06/03/17 0623 06/04/17  0618    135*   K 4.4 4.3    99   CO2 27 26   GLU 97 128*   BUN 14 16   CREATININE 0.9 1.0   CALCIUM 9.3 9.5   PROT 7.2 7.5   ALBUMIN 3.3* 3.4*   BILITOT 0.2 0.2   ALKPHOS 97 94   AST 16 16   ALT 12 13   ANIONGAP 9 10   EGFRNONAA >60 >60     All pertinent labs within the past 24 hours have been reviewed.    Significant Imaging: I have reviewed all pertinent imaging results/findings within the past 24 hours.

## 2017-06-04 NOTE — PROGRESS NOTES
Vancomycin trough level = 11.8 mcg/mL reported at 2116 on 6/3  Trough level goal = 12-15 mcg/mL    Scr 0.9, up slightly from yesterday @ 0.8  I/O +195 mL over the last 24 hrs    Pharmacy will continue dosing interval of Vancomycin 1 G every 12 hours and will collect another trough level after 3 continuous doses.  Next trough scheduled for 6/4 @ 2200.    Sera HansonD

## 2017-06-04 NOTE — CONSULTS
Principal Problem:Cellulitis due to Staphylococcus     HPI:  Ish Guardado is a 56 year old male with a PMHx of Pre DM, HTN, HLD, and s/p right fem-distal bypass EKOS thrombolysis with angioplasty on 5/25/17 with c/o RLE pain. Associated symptoms include: RLE swelling, erythema, and itching. ED workup revealed: Hgb/Hct 11.3/33.5. US RLE with no DVT. CXR unremarkable. Right foot xray with ill-defined areas of radiolucency projected over the soft tissues of the first through fifth toes. This is consistent with the patient's history and characteristic of gangrene. Pt admitted to Med Surg for RLE Cellulitis.      Hospital Course:  Pt is S/P right femoral distal bypass, EKOS thrombolysis with angioplasty on 5/25/17. He returned with right foot pain and swelling. Plain film of right foot mentioned gangrene. MRI of right foot mentioned possible abscesses and cellulitis, no osteomyelitis.  IV Vanco given. Podiatry debrided a darkened wound over the bunion at the right MTP joint. Vascular consulted and reviewing care for surgical option. Xarelto in progress. Preliminary blood culture note gram + rods - Bacillus species likely a contaminant. Wound culture + Staph Aureus (sens pending). Oral analgesics adjusted due to pain.      Interval History:   Reports uncontrolled left foot pain           Review of Systems   Constitutional: Negative.    HENT: Negative.    Eyes: Negative.    Respiratory: Negative for apnea, cough, choking, chest tightness, shortness of breath, wheezing and stridor.    Cardiovascular: Positive for leg swelling. Negative for chest pain and palpitations.   Gastrointestinal: Negative for abdominal pain, blood in stool, constipation, diarrhea, nausea and vomiting.   Endocrine: Negative.    Genitourinary: Negative.    Musculoskeletal: Negative for neck pain and neck stiffness.   Skin: Positive for color change and wound.   Allergic/Immunologic: Negative.    Neurological: Negative for dizziness, tremors,  seizures, syncope, facial asymmetry, speech difficulty, weakness, light-headedness, numbness and headaches.   Hematological: Negative.    Psychiatric/Behavioral: Negative.       Objective:      Vital Signs (Most Recent):  Temp: 98 °F (36.7 °C) (06/04/17 1125)  Pulse: 74 (06/04/17 1125)  Resp: 19 (06/04/17 1125)  BP: (!) 143/82 (06/04/17 1125)  SpO2: 99 % (06/04/17 1125) Vital Signs (24h Range):  Temp:  [98 °F (36.7 °C)-98.9 °F (37.2 °C)] 98 °F (36.7 °C)  Pulse:  [73-92] 74  Resp:  [18-20] 19  SpO2:  [97 %-99 %] 99 %  BP: (139-159)/(79-99) 143/82      Weight: 81.6 kg (180 lb)  Body mass index is 25.83 kg/m².     Intake/Output Summary (Last 24 hours) at 06/04/17 1233  Last data filed at 06/04/17 0800    Gross per 24 hour   Intake             1220 ml   Output              500 ml   Net              720 ml      Physical Exam   Constitutional: He is oriented to person, place, and time. He appears well-developed and well-nourished.   HENT:   Head: Normocephalic and atraumatic.   Eyes: Conjunctivae and EOM are normal.   Neck: Normal range of motion. Neck supple.   Cardiovascular: Normal rate, regular rhythm and normal heart sounds.    No murmur heard.  Pulses:       Dorsalis pedis pulses are 2+ on the right side, and 2+ on the left side.   Pulmonary/Chest: Effort normal and breath sounds normal. No respiratory distress.   Abdominal: Soft. Bowel sounds are normal. He exhibits no distension. There is no tenderness. There is no guarding.   Feet:   Right Foot:   Skin Integrity: Positive for skin breakdown, erythema and warmth.   Neurological: He is alert and oriented to person, place, and time.   Skin: Skin is warm and dry. Capillary refill takes less than 2 seconds. There is erythema.   Left foot with mild erythema to the 1st metatarsal area. There are scratches and abrasions extending up lower left leg   Psychiatric: His behavior is normal. Thought content normal. His affect is angry.         Significant Labs:   CBC:   Recent  "Labs  Lab 06/03/17 0623 06/04/17 0618   WBC 7.85 6.91   HGB 11.8* 11.9*   HCT 35.8* 35.8*   * 412*      CMP:   Recent Labs  Lab 06/03/17 0623 06/04/17 0618    135*   K 4.4 4.3    99   CO2 27 26   GLU 97 128*   BUN 14 16   CREATININE 0.9 1.0   CALCIUM 9.3 9.5   PROT 7.2 7.5   ALBUMIN 3.3* 3.4*   BILITOT 0.2 0.2   ALKPHOS 97 94   AST 16 16   ALT 12 13   ANIONGAP 9 10   EGFRNONAA >60 >60      All pertinent labs within the past 24 hours have been reviewed.     Significant Imaging: I have reviewed all pertinent imaging results/findings within the past 24 hours.     A/P:  Pt known to myself and [facility].  Has previous right fem->liliana bypass with CVT some time ago.  Presented to [facility] a few weeks ago with ischemic right leg from a thrombosed bypass.  He underwent catheter directed thrombolysis with Dr. John and was able to successfully "declot" the bypass.  He did also have distal tibial angioplasty at the time.  Since then he has been readmitted with pain and some worsening of his right 1st toe ulcer.  Non-invasive studies performed on this admission suggests patent right leg bypass with flow distally.  Mri and xray suggest no obvious osteomyelitis.  He has been evaluated by podiatry.  On exam to day his foot is warm.  The right 1st toe at the tip has some mild duskiness and the medial base ulcer appears clean with slight granulation.     At this point, I have d/w the pt and his wife that his circulation and perfusion appears to be intact and is probably as good as we can get.  He will need longterm outpt f/u and monitoring with our office but does not need any vascular intervention this admission.  Would recommend continued wound care/abx per primary team.    "

## 2017-06-04 NOTE — PROGRESS NOTES
[facility] Hospital Medicine  Progress Note    Patient Name: Ish Guardado  MRN: [id number]  Patient Class: IP- Inpatient   Admission Date: 6/2/2017  Length of Stay: 2 days  Attending Physician: Leroy Lyle MD  Primary Care Provider: Pollo Arana MD        Subjective:     Principal Problem:Cellulitis due to Staphylococcus    HPI:  Ish Guardado is a 56 year old male with a PMHx of Pre DM, HTN, HLD, and s/p right fem-distal bypass EKOS thrombolysis with angioplasty on 5/25/17 with c/o RLE pain. Associated symptoms include: RLE swelling, erythema, and itching. ED workup revealed: Hgb/Hct 11.3/33.5. US RLE with no DVT. CXR unremarkable. Right foot xray with ill-defined areas of radiolucency projected over the soft tissues of the first through fifth toes. This is consistent with the patient's history and characteristic of gangrene. Pt admitted to Med Surg for RLE Cellulitis.     Hospital Course:  Pt is S/P right femoral distal bypass, EKOS thrombolysis with angioplasty on 5/25/17. He returned with right foot pain and swelling. Plain film of right foot mentioned gangrene. MRI of right foot mentioned possible abscesses and cellulitis, no osteomyelitis.  IV Vanco given. Podiatry debrided a darkened wound over the bunion at the right MTP joint. Vascular consulted and reviewing care for surgical option. Xarelto in progress. Preliminary blood culture note gram + rods - Bacillus species likely a contaminant. Wound culture + Staph Aureus (sens pending). Oral analgesics adjusted due to pain.     Interval History:   Reports uncontrolled left foot pain        Review of Systems   Constitutional: Negative.    HENT: Negative.    Eyes: Negative.    Respiratory: Negative for apnea, cough, choking, chest tightness, shortness of breath, wheezing and stridor.    Cardiovascular: Positive for leg swelling. Negative for chest pain and palpitations.   Gastrointestinal: Negative for abdominal pain, blood in stool,  constipation, diarrhea, nausea and vomiting.   Endocrine: Negative.    Genitourinary: Negative.    Musculoskeletal: Negative for neck pain and neck stiffness.   Skin: Positive for color change and wound.   Allergic/Immunologic: Negative.    Neurological: Negative for dizziness, tremors, seizures, syncope, facial asymmetry, speech difficulty, weakness, light-headedness, numbness and headaches.   Hematological: Negative.    Psychiatric/Behavioral: Negative.      Objective:     Vital Signs (Most Recent):  Temp: 98 °F (36.7 °C) (06/04/17 1125)  Pulse: 74 (06/04/17 1125)  Resp: 19 (06/04/17 1125)  BP: (!) 143/82 (06/04/17 1125)  SpO2: 99 % (06/04/17 1125) Vital Signs (24h Range):  Temp:  [98 °F (36.7 °C)-98.9 °F (37.2 °C)] 98 °F (36.7 °C)  Pulse:  [73-92] 74  Resp:  [18-20] 19  SpO2:  [97 %-99 %] 99 %  BP: (139-159)/(79-99) 143/82     Weight: 81.6 kg (180 lb)  Body mass index is 25.83 kg/m².    Intake/Output Summary (Last 24 hours) at 06/04/17 1233  Last data filed at 06/04/17 0800   Gross per 24 hour   Intake             1220 ml   Output              500 ml   Net              720 ml      Physical Exam   Constitutional: He is oriented to person, place, and time. He appears well-developed and well-nourished.   HENT:   Head: Normocephalic and atraumatic.   Eyes: Conjunctivae and EOM are normal.   Neck: Normal range of motion. Neck supple.   Cardiovascular: Normal rate, regular rhythm and normal heart sounds.    No murmur heard.  Pulses:       Dorsalis pedis pulses are 2+ on the right side, and 2+ on the left side.   Pulmonary/Chest: Effort normal and breath sounds normal. No respiratory distress.   Abdominal: Soft. Bowel sounds are normal. He exhibits no distension. There is no tenderness. There is no guarding.   Feet:   Right Foot:   Skin Integrity: Positive for skin breakdown, erythema and warmth.   Neurological: He is alert and oriented to person, place, and time.   Skin: Skin is warm and dry. Capillary refill takes  less than 2 seconds. There is erythema.   Left foot with mild erythema to the 1st metatarsal area. There are scratches and abrasions extending up lower left leg   Psychiatric: His behavior is normal. Thought content normal. His affect is angry.       Significant Labs:   CBC:   Recent Labs  Lab 06/03/17 0623 06/04/17 0618   WBC 7.85 6.91   HGB 11.8* 11.9*   HCT 35.8* 35.8*   * 412*     CMP:   Recent Labs  Lab 06/03/17 0623 06/04/17 0618    135*   K 4.4 4.3    99   CO2 27 26   GLU 97 128*   BUN 14 16   CREATININE 0.9 1.0   CALCIUM 9.3 9.5   PROT 7.2 7.5   ALBUMIN 3.3* 3.4*   BILITOT 0.2 0.2   ALKPHOS 97 94   AST 16 16   ALT 12 13   ANIONGAP 9 10   EGFRNONAA >60 >60     All pertinent labs within the past 24 hours have been reviewed.    Significant Imaging: I have reviewed all pertinent imaging results/findings within the past 24 hours.    Assessment/Plan:      Mixed hyperlipidemia    - Continue ASA and Lipitor          PAD (peripheral artery disease)    - S/p fem-distal bypass EKOS thrombolysis with angioplasty on 05/25/17  - Vascular reviewing care for surgical option  - Continue ASA, Statin, and Eliquis          * Cellulitis due to Staphylococcus    STAPH Aureus cellulitis    - S/p right fem-distal bypass EKOS thrombolysis with angioplasty on 05/25/17  - US RLE veins (-) for DVT  - US RLE arteries on 05/29/17 -- bypass graft patent without significant/focal stenosis   - Inpatient consult to Vascular surgery - Vascular reviewing care for surgical option    - Podiatry-- performed debridement of bunion ulceration  - MRI right foot w/wo contrast - negative for osteo, + cellulitis  - Blood cultures - Bacillus species - contaminant  - Continue IV Vancomycin   - Analgesics/Antiemetics prn            VTE Risk Mitigation         Ordered     rivaroxaban tablet 20 mg  With dinner     Route:  Oral        06/02/17 1517     Medium Risk of VTE  Once      06/02/17 1250          Elizabeth Bunn NP  Department  of [facility] Medicine   [facility]

## 2017-06-04 NOTE — NURSING
"Pt asked if he could "soak" his foot in cold water to help relieve itching. I advised the patient not to remove the dressing, and not to "soak" his foot. Patient has removed dressing after being advised not to.  "

## 2017-06-04 NOTE — PLAN OF CARE
06/03/17 2001   Readmission Questionnaire   At the time of your discharge, did someone talk to you about what your health problems were? Yes   At the time of discharge, did someone talk to you about what to watch out for regarding worsening of your health problem? Yes   At the time of discharge, did someone talk to you about what to do if you experienced worsening of your health problem? Yes   At the time of discharge, did someone talk to you about which medication to take when you left the hospital and which ones to stop taking? Yes   At the time of discharge, did someone talk to you about when and where to follow up with a doctor after you left the hospital? Yes   What do you believe caused you to be sick enough to be re-admitted? Leg swelling and foot turning red and black   Do you have problems taking your medications as prescribed? No   Do you have any problems affording any of  your prescribed medications? No   Do you have problems obtaining/receiving your medications? No   Does the patient have transportation to healthcare appointments? Yes   Lives With spouse   Living Arrangements house   Does the patient have family/friends to help with healtcare needs after discharge? yes   Does your caregiver provide all the help you need? Yes   Are you currently feeling confused? No   Are you currently having problems thinking? No   Are you currently having memory problems? No   Have you felt down, depressed, or hopeless? 1  (Feels down because he has been unable to work and contribute to the household and help wife.)   Have you felt little interest or pleasure in doing things? 0   In the last 7 days, my sleep quality was: good   Ashley Combs LMSW, [facility], [facility]  06/03/2017

## 2017-06-04 NOTE — ASSESSMENT & PLAN NOTE
STAPH Aureus cellulitis    - S/p right fem-distal bypass EKOS thrombolysis with angioplasty on 05/25/17  - US RLE veins (-) for DVT  - US RLE arteries on 05/29/17 -- bypass graft patent without significant/focal stenosis   - Inpatient consult to Vascular surgery - Vascular reviewing care for surgical option    - Podiatry-- performed debridement of bunion ulceration  - MRI right foot w/wo contrast - negative for osteo, + cellulitis  - Blood cultures - Bacillus species - contaminant  - Continue IV Vancomycin   - Analgesics/Antiemetics prn

## 2017-06-04 NOTE — PLAN OF CARE
06/03/17 1950   Discharge Assessment   Assessment Type Discharge Planning Assessment   Confirmed/corrected address and phone number on facesheet? Yes   Assessment information obtained from? Patient   Communicated expected length of stay with patient/caregiver no   If Healthcare Directive is received, is it scanned into Epic? no (comment)   Prior to hospitilization cognitive status: Alert/Oriented   Prior to hospitalization functional status: Assistive Equipment   Current cognitive status: Alert/Oriented;No Deficits   Current Functional Status: Assistive Equipment   Arrived From admitted as an inpatient;home or self-care   Lives With spouse   Able to Return to Prior Arrangements yes   Is patient able to care for self after discharge? Yes   How many people do you have in your home that can help with your care after discharge? 1   Who are your caregiver(s) and their phone number(s)? wife, Ray Guardado,  [phone]   Patient's perception of discharge disposition long-term acute care facility (LTAC)   Readmission Within The Last 30 Days previous discharge plan unsuccessful   Patient currently being followed by outpatient case management? No   Patient currently receives home health services? No   Does the patient currently use HME? Yes   Patient currently receives private duty nursing? No   Patient currently receives any other outside agency services? No   Equipment Currently Used at Home walker, rolling;crutches;shower chair;cane, straight   Do you have any problems affording any of your prescribed medications? No   Is the patient taking medications as prescribed? yes   Do you have any financial concerns preventing you from receiving the healthcare you need? No   Does the patient have transportation to healthcare appointments? Yes   Transportation Available family or friend will provide   On Dialysis? No   Does the patient receive services at the Coumadin Clinic? No  (Xarelto)   Discharge Plan A Long-term acute  care facility (LTAC)   Discharge Plan B Home Health   Patient/Family In Agreement With Plan yes   Patient from home with spouse.  Needs assistive equipment to assist with mobility.  Patient readmitted within the last 30 days.  Patient returned because of leg swelling and foot turning red and black.  Patient provided with list of LTACs covered by his insurance.  Patient selected [facility].  Patient's choice form signed.  Patient will discuss further with his spouse.  Ashley Combs LMSW, [facility], CCM  06/03/2017

## 2017-06-04 NOTE — PLAN OF CARE
Problem: Patient Care Overview  Goal: Plan of Care Review  Outcome: Ongoing (interventions implemented as appropriate)  Patient continues to scratch his foot to the point that it is raw. Patient was educated on not scratching and also the importance of hand hygiene to avoid the spread of germs. Patient verbalized understanding. Patient has been advised not to remove wound dressing. Patient was educated on the importance of keeping the wound dressed, Pt verbalized understanding, Pt removed dressing after being educated.Pt remains free of injury, pain managed adequately, no s/s of distress. 24 hour chart check completed. Will continue to monitor.

## 2017-06-05 VITALS
HEART RATE: 74 BPM | OXYGEN SATURATION: 98 % | WEIGHT: 179.88 LBS | HEIGHT: 70 IN | TEMPERATURE: 98 F | RESPIRATION RATE: 18 BRPM | SYSTOLIC BLOOD PRESSURE: 132 MMHG | BODY MASS INDEX: 25.75 KG/M2 | DIASTOLIC BLOOD PRESSURE: 70 MMHG

## 2017-06-05 LAB
ALBUMIN SERPL BCP-MCNC: 3.1 G/DL
ALP SERPL-CCNC: 98 U/L
ALT SERPL W/O P-5'-P-CCNC: 12 U/L
ANION GAP SERPL CALC-SCNC: 8 MMOL/L
AST SERPL-CCNC: 17 U/L
BACTERIA BLD CULT: NORMAL
BACTERIA SPEC AEROBE CULT: NORMAL
BASOPHILS # BLD AUTO: 0.09 K/UL
BASOPHILS NFR BLD: 1.5 %
BILIRUB SERPL-MCNC: 0.2 MG/DL
BUN SERPL-MCNC: 13 MG/DL
CALCIUM SERPL-MCNC: 9 MG/DL
CHLORIDE SERPL-SCNC: 102 MMOL/L
CO2 SERPL-SCNC: 24 MMOL/L
CREAT SERPL-MCNC: 1 MG/DL
DIFFERENTIAL METHOD: ABNORMAL
EOSINOPHIL # BLD AUTO: 0.5 K/UL
EOSINOPHIL NFR BLD: 9.1 %
ERYTHROCYTE [DISTWIDTH] IN BLOOD BY AUTOMATED COUNT: 13.5 %
EST. GFR  (AFRICAN AMERICAN): >60 ML/MIN/1.73 M^2
EST. GFR  (NON AFRICAN AMERICAN): >60 ML/MIN/1.73 M^2
GLUCOSE SERPL-MCNC: 138 MG/DL
HCT VFR BLD AUTO: 34 %
HGB BLD-MCNC: 11.2 G/DL
LYMPHOCYTES # BLD AUTO: 1.7 K/UL
LYMPHOCYTES NFR BLD: 29.1 %
MCH RBC QN AUTO: 28.5 PG
MCHC RBC AUTO-ENTMCNC: 32.9 %
MCV RBC AUTO: 87 FL
MONOCYTES # BLD AUTO: 0.6 K/UL
MONOCYTES NFR BLD: 9.9 %
NEUTROPHILS # BLD AUTO: 2.9 K/UL
NEUTROPHILS NFR BLD: 50.4 %
PLATELET # BLD AUTO: 385 K/UL
PMV BLD AUTO: 8.3 FL
POCT GLUCOSE: 192 MG/DL (ref 70–110)
POTASSIUM SERPL-SCNC: 4.2 MMOL/L
PROT SERPL-MCNC: 6.8 G/DL
RBC # BLD AUTO: 3.93 M/UL
SODIUM SERPL-SCNC: 134 MMOL/L
VANCOMYCIN TROUGH SERPL-MCNC: 12.3 UG/ML
WBC # BLD AUTO: 5.84 K/UL

## 2017-06-05 PROCEDURE — 36415 COLL VENOUS BLD VENIPUNCTURE: CPT

## 2017-06-05 PROCEDURE — 85025 COMPLETE CBC W/AUTO DIFF WBC: CPT

## 2017-06-05 PROCEDURE — 63600175 PHARM REV CODE 636 W HCPCS: Performed by: SPECIALIST

## 2017-06-05 PROCEDURE — 25000003 PHARM REV CODE 250: Performed by: SPECIALIST

## 2017-06-05 PROCEDURE — 80053 COMPREHEN METABOLIC PANEL: CPT

## 2017-06-05 PROCEDURE — 63600175 PHARM REV CODE 636 W HCPCS: Performed by: NURSE PRACTITIONER

## 2017-06-05 PROCEDURE — 25000003 PHARM REV CODE 250: Performed by: NURSE PRACTITIONER

## 2017-06-05 RX ORDER — LINEZOLID 600 MG/1
600 TABLET, FILM COATED ORAL EVERY 12 HOURS
Qty: 28 TABLET | Refills: 0 | Status: SHIPPED | OUTPATIENT
Start: 2017-06-05 | End: 2017-06-05

## 2017-06-05 RX ORDER — SULFAMETHOXAZOLE AND TRIMETHOPRIM 800; 160 MG/1; MG/1
1 TABLET ORAL 2 TIMES DAILY
Qty: 28 TABLET | Refills: 0 | Status: SHIPPED | OUTPATIENT
Start: 2017-06-05 | End: 2017-06-19

## 2017-06-05 RX ORDER — OXYCODONE AND ACETAMINOPHEN 10; 325 MG/1; MG/1
1 TABLET ORAL EVERY 6 HOURS PRN
Qty: 15 TABLET | Refills: 0 | Status: SHIPPED | OUTPATIENT
Start: 2017-06-05 | End: 2017-06-07

## 2017-06-05 RX ORDER — LINEZOLID 600 MG/1
600 TABLET, FILM COATED ORAL EVERY 12 HOURS
Qty: 28 TABLET | Refills: 0 | Status: SHIPPED | OUTPATIENT
Start: 2017-06-05 | End: 2017-06-05 | Stop reason: HOSPADM

## 2017-06-05 RX ADMIN — MORPHINE SULFATE 4 MG: 4 INJECTION, SOLUTION INTRAMUSCULAR; INTRAVENOUS at 06:06

## 2017-06-05 RX ADMIN — OXYCODONE HYDROCHLORIDE AND ACETAMINOPHEN 1 TABLET: 10; 325 TABLET ORAL at 04:06

## 2017-06-05 RX ADMIN — GABAPENTIN 100 MG: 100 CAPSULE ORAL at 06:06

## 2017-06-05 RX ADMIN — FAMOTIDINE 20 MG: 20 TABLET ORAL at 08:06

## 2017-06-05 RX ADMIN — Medication 1000 MG: at 01:06

## 2017-06-05 RX ADMIN — OXYCODONE HYDROCHLORIDE AND ACETAMINOPHEN 1 TABLET: 10; 325 TABLET ORAL at 02:06

## 2017-06-05 RX ADMIN — GABAPENTIN 100 MG: 100 CAPSULE ORAL at 01:06

## 2017-06-05 RX ADMIN — MORPHINE SULFATE 4 MG: 4 INJECTION, SOLUTION INTRAMUSCULAR; INTRAVENOUS at 03:06

## 2017-06-05 RX ADMIN — Medication 1000 MG: at 02:06

## 2017-06-05 RX ADMIN — ATORVASTATIN CALCIUM 20 MG: 10 TABLET, FILM COATED ORAL at 08:06

## 2017-06-05 RX ADMIN — ASPIRIN 81 MG CHEWABLE TABLET 81 MG: 81 TABLET CHEWABLE at 08:06

## 2017-06-05 RX ADMIN — MORPHINE SULFATE 4 MG: 4 INJECTION, SOLUTION INTRAMUSCULAR; INTRAVENOUS at 11:06

## 2017-06-05 RX ADMIN — AMLODIPINE BESYLATE 10 MG: 10 TABLET ORAL at 08:06

## 2017-06-05 RX ADMIN — DIPHENHYDRAMINE HYDROCHLORIDE 12.5 MG: 50 INJECTION, SOLUTION INTRAMUSCULAR; INTRAVENOUS at 01:06

## 2017-06-05 RX ADMIN — MORPHINE SULFATE 4 MG: 4 INJECTION, SOLUTION INTRAMUSCULAR; INTRAVENOUS at 01:06

## 2017-06-05 RX ADMIN — GABAPENTIN 100 MG: 100 CAPSULE ORAL at 02:06

## 2017-06-05 NOTE — PROGRESS NOTES
No available appts with podiatry in 1 wk for follow up.  Message sent to Dr Barfield's nurse via appt desk for nurse to make follow up appt and contact pt with appt information.

## 2017-06-05 NOTE — CONSULTS
06/05/17 1005   Pain/Comfort Assessments   Pain Assessment Performed Yes       Number Scale   Presence of Pain denies   Skin   Skin WDL ex   Skin Color/Characteristics redness blanchable   Skin Temperature warm   Skin Moisture dry   Skin Elasticity quick return to original state   Skin Integrity wound(s);scar(s);pressure ulcer(s)   Vincent Risk Assessment   Sensory Perception 3-->slightly limited   Moisture 4-->rarely moist   Activity 1-->bedfast   Mobility 3-->slightly limited   Nutrition 3-->adequate   Friction and Shear 2-->potential problem   Vincent Score 16       Wound 06/02/17 Arterial ulcer medial metatarsal   Date First Assessed: 06/02/17   Pre-existing: Yes  Wound Type: Arterial ulcer  Side: Right  Orientation: medial  Location: metatarsal  Wound Length (cm): 1  Wound Width (cm): 1.5   Wound WDL ex   Dressing Appearance no dressing   Drainage Amount scant   Drainage Characteristics/Odor sanguineous   Wound Base unable to visualize   Wound Length (cm) 1   Wound Width (cm) 1.5       Pressure Ulcer 06/05/17 1005 Left plantar heel suspected deep tissue injury   Date First Assessed/Time First Assessed: 06/05/17 1005   Side: Left  Orientation: plantar  Location: heel  Staging: suspected deep tissue injury  Wound Length (cm): 1.5  Wound Width (cm): 2   Staging suspected deep tissue injury   Pressure Ulcer Risk Factors activity;mobility;nutrition;shear/friction;moisture   Dressing Appearance no dressing   Drainage Amount none   Appearance purple   Wound Length (cm) 1.5   Wound Width (cm) 2   Skin Interventions   Pressure Reduction Devices pressure-redistributing mattress utilized;positioning supports utilized   Pressure Reduction Techniques frequent weight shift encouraged;heels elevated off bed;positioned off wounds   Skin Protection adhesive use limited;incontinence pads utilized;skin sealant/moisture barrier applied;tubing/devices free from skin contact     Skin assessment completed on this 55 y/o M patient.  "Patient reports pressure sores in the past.  Vincent scale score 16 today.  Right foot medial metatarsal head wound noted and is being followed and treated by podiatry.  Currently LAURENT with dried sanguinous drainage noted to wound bed.  Nichole wound redness improved.  Left plantar surface of heel with suspected DTI measuring 1.5x2 cm with purple discoloration noted. Patient states he has had this "blood blister" for a couple of weeks. It is currently flat with no blistering noted, and appears to be resolving.  Painted with cavilon skin barrier. Patient is currently bed bound due to RLE wound, so no pressure should be placed on this heel.  Patient turned in bed, and sacral area intact with no breakdown or redness noted.  Please see below for wound care recommendations:    Skin Care Precautions / Pressure Injury Prevention:  1. Follow "Guidelines for Prevention of Pressure Ulcers in at Risk Patients"  2. Document wound assessment in [facility] using guidelines in Sarah's "Assessment : Wound" procedure  3. Limit the amount of linen/underpad between patient and mattress surface to ONE fitted sheet and ONE covidien underpad - NO draw sheet/briefs.  4. Obtain Easi Cleans Foam Wipes for providing nichole care - avoid the use of wash cloths to areas affected by IAD.  5. Apply Clear Barrier Ointment to perineal / perirectal areas in a thin even layer to clean dry skin BID and after each episode of pericare  6. Apply sween 24 moisturizer cream to all dry skin after daily bath and prn  7. Obtain foam wedge from materials management to assist with maintaining proper position changes at least q 2hours and document actual position in [facility] q 2hours  8. Please elevate heels off mattress and document in the frequent checks section of DOC Flow Sheets every 2 hours.  9. .Do NOT elevate HOB greater than 30 degrees unless contraindicated.    Left Plantar Heel sDTI:  1. Strict pressure offloading  2. Paint q shift with Cavilon skin barrier  "

## 2017-06-05 NOTE — NURSING
Verbalized understanding of discharge paperwork, follow up care, prescriptions, etc.  Denies having any questions.  Encouraged to call when dressed and ready to discharge.

## 2017-06-05 NOTE — PLAN OF CARE
Problem: Patient Care Overview  Goal: Plan of Care Review  Outcome: Outcome(s) achieved Date Met: 06/05/17  Reviewed and discussed plan of care with patient.  No acute distress noted.  Safety and fall precautions reviewed and discussed with patient.  Goals met adequately for discharge.

## 2017-06-05 NOTE — DISCHARGE SUMMARY
[facility] Hospital Medicine  Discharge Summary      Patient Name: Ish Guardado  MRN: [id number]  Admission Date: 6/2/2017  Hospital Length of Stay: 3 days  Discharge Date and Time:  06/05/2017 3:11 PM  Attending Physician: Leroy Lyle MD   Discharging Provider: Paxton Dahl NP  Primary Care Provider: Pollo Arana MD      HPI:   Ish Guardado is a 56 year old male with a PMHx of Pre DM, HTN, HLD, and s/p right fem-distal bypass EKOS thrombolysis with angioplasty on 5/25/17 with c/o RLE pain. Associated symptoms include: RLE swelling, erythema, and itching. ED workup revealed: Hgb/Hct 11.3/33.5. US RLE with no DVT. CXR unremarkable. Right foot xray with ill-defined areas of radiolucency projected over the soft tissues of the first through fifth toes. This is consistent with the patient's history and characteristic of gangrene. Pt admitted to Med Surg for RLE Cellulitis.     * No surgery found *      Indwelling Lines/Drains at time of discharge:   Lines/Drains/Airways     Pressure Ulcer                 Pressure Ulcer 06/05/17 1005 Left plantar heel suspected deep tissue injury less than 1 day              Hospital Course:   Pt is S/P right femoral distal bypass, EKOS thrombolysis with angioplasty on 5/25/17. He returned with right foot pain and swelling. Plain film of right foot mentioned gangrene. MRI of right foot mentioned possible abscesses and cellulitis, no osteomyelitis.  IV Vanco given. Podiatry debrided a darkened wound over the bunion at the right MTP joint. Vascular consulted and reviewing care for surgical option. Xarelto in progress. Preliminary blood culture note gram + rods - Bacillus species likely a contaminant. Wound culture + Staph Aureus (sens pending). Oral analgesics adjusted due to pain. 6/5/17- The patients symptoms improved with treatment. The patient was seen and examined today and and deemed stable for discharge. The patient is being discharged home. Attempted  to discharge patient on xyvox however insurance would not approve. The patient is instead being discharged home on a 2 week coarse of bactrim. The patient will follow up with his PCP, Vascular surgery and Podiatry.      Consults:   Consults         Status Ordering Provider     Inpatient consult to Infectious Diseases  Once     Provider:  Roe Gonzalez MD    Acknowledged ROSHAN MORRIS     Inpatient consult to Podiatry  Once     Provider:  Roshan Morris, DPM    Completed AKBAR SORTO     Inpatient consult to Social Work  Once     Provider:  (Not yet assigned)    Completed OMEGA EVERETT     Inpatient consult to Social Work  Once     Provider:  (Not yet assigned)    Completed MAR KEMP     Inpatient consult to Vascular Surgery  Once     Provider:  (Not yet assigned)    Completed AKBAR SORTO     Pharmacy to dose Vancomycin consult  Once     Provider:  (Not yet assigned)    Acknowledged SAMRA FLORIAN          Significant Diagnostic Studies:   Imaging Results          MRI Lower Extremity W WO Contrast Right (Final result)  Result time 06/02/17 16:20:45    Final result by SERGE Albarran Sr., MD (06/02/17 16:20:45)                 Impression:      1. The MRI examination is limited secondary to patient motion. There is no definite evidence of osteomyelitis.   2. There are several fluid collections volar to the midfoot. One of the larger ones measures 12 mm in size. These are characteristic of abscesses.  3. There is a mild amount of edema in the subcutaneous fat in the dorsum of the foot. This is characteristic of cellulitis.  4. There are findings characteristic of bone infarcts in the first metatarsal and proximal phalanx of the big toe.   5. There is mild hallux valgus deformity.      Electronically signed by: SERGE ALBARRAN MD  Date:     06/02/17  Time:    16:20              Narrative:    MRI of the right foot without and with IV contrast    Clinical History: Pain in the right to the midfoot.  The largest one measures 12 mm in size. extremity; cellulitis; possible osteomyelitis    Technique: Standard foot MRI protocol without and with IV contrast was performed. 8 mL of gadolinium was used for this examination.       Finding: Comparison was made to a plain film examination of the right foot performed on 6/2/2017. The MRI examination is limited secondary to patient motion. There is no definite evidence of osteomyelitis. There are several fluid collections volar to the midfoot. One of the larger ones measures 12 mm in size. There is mild hallux valgus deformity. There are findings characteristic of bone infarcts in the first metatarsal and proximal phalanx of the big toe. There is a mild amount of edema in the subcutaneous fat in the dorsum of the foot.                             X-Ray Chest 1 View (Final result)  Result time 06/02/17 10:53:58    Final result by Quincy Humphrey MD (06/02/17 10:53:58)                 Impression:     Negative      Electronically signed by: QUINCY HUMPHREY MD  Date:     06/02/17  Time:    10:53              Narrative:    History: Cellulitis, fever    Normal heart size. Clear lungs.                             X-Ray Foot Complete Right (Final result)  Result time 06/02/17 10:53:28    Final result by SERGE Albarran Sr., MD (06/02/17 10:53:28)                 Impression:      1. There are ill-defined areas of radiolucency projected over the soft tissues of the first through fifth toes. This is consistent with the patient's history and characteristic of gangrene.  2. There is no radiographic evidence of osteomyelitis.  3. There is mild hallux valgus deformity.       Electronically signed by: SERGE ALBARRAN MD  Date:     06/02/17  Time:    10:53              Narrative:    Three-view x-ray of the right foot    Clinical History:     Cellulitis, unspecified    Findings:     There is no fracture. There is no dislocation. There is mild hallux valgus deformity. There are ill-defined  areas of radiolucency projected over the soft tissues of the first through fifth toes.                             US Lower Extremity Veins Right (Final result)  Result time 06/02/17 09:56:08    Final result by SERGE Albarran Sr., MD (06/02/17 09:56:08)                 Impression:        Normal study.      Electronically signed by: SERGE ALBARRAN MD  Date:     06/02/17  Time:    09:56              Narrative:    Ultrasound examination of the lower extremities with color flow and spectral Doppler imaging    History:     pain in the right lower extremity; swelling of the right lower extremity    Technique: Multiple static images are submitted for interpretation.    Findings:     The veins are normal in appearance and have normal compressibility. There are normal venous waveforms seen with augmentation during the compression of the veins. The right popliteal vein has a velocity of 15 cm/sec.                                 Pending Diagnostic Studies:     None        Final Active Diagnoses:    Diagnosis Date Noted POA    PRINCIPAL PROBLEM:  Cellulitis due to Staphylococcus [L03.90, B95.8] 06/02/2017 Yes    Mixed hyperlipidemia [E78.2] 05/24/2017 Yes     Chronic    PAD (peripheral artery disease) [I73.9] 05/24/2017 Yes     Chronic      Problems Resolved During this Admission:    Diagnosis Date Noted Date Resolved POA      * Cellulitis due to Staphylococcus    STAPH Aureus cellulitis    - S/p right fem-distal bypass EKOS thrombolysis with angioplasty on 05/25/17  - US RLE veins (-) for DVT  - US RLE arteries on 05/29/17 -- bypass graft patent without significant/focal stenosis   - Inpatient consult to Vascular surgery - Vascular reviewing care for surgical option    - Podiatry-- performed debridement of bunion ulceration  - MRI right foot w/wo contrast - negative for osteo, + cellulitis  - Blood cultures - Bacillus species - contaminant  - Continue IV Vancomycin   - Analgesics/Antiemetics prn              Discharged  Condition: stable    Disposition:     Follow Up:  Follow-up Information     Pollo Arana MD. Schedule an appointment as soon as possible for a visit in 3 days.    Specialty:  Family Medicine  Contact information:  [address]   [address]  [facility] [address]  [phone]             Charanjit Mclean MD. Schedule an appointment as soon as possible for a visit in 2 weeks.    Specialty:  Vascular Surgery  Contact information:  [address]  [address]  [place] [address] [phone]             Felisha Bhagat DPM. Schedule an appointment as soon as possible for a visit in 1 week.    Specialty:  Podiatry  Contact information:  [address] [place] [address] [phone]                 Patient Instructions:   No discharge procedures on file.  Medications:  Reconciled Home Medications:   Current Discharge Medication List      START taking these medications    Details   oxycodone-acetaminophen (PERCOCET)  mg per tablet Take 1 tablet by mouth every 6 (six) hours as needed (for breakthrough pain).  Qty: 15 tablet, Refills: 0      sulfamethoxazole-trimethoprim 800-160mg (BACTRIM DS) 800-160 mg Tab Take 1 tablet by mouth 2 (two) times daily.  Qty: 28 tablet, Refills: 0         CONTINUE these medications which have NOT CHANGED    Details   amlodipine (NORVASC) 10 MG tablet Take 10 mg by mouth once daily.      aspirin 81 MG Chew Take 81 mg by mouth once daily.      atorvastatin (LIPITOR) 20 MG tablet Take 20 mg by mouth once daily.      gabapentin (NEURONTIN) 100 MG capsule Take 1 capsule (100 mg total) by mouth 3 (three) times daily.  Qty: 42 capsule, Refills: 0      rivaroxaban (XARELTO) 20 mg Tab Take 20 mg by mouth daily with dinner or evening meal.           Time spent on the discharge of patient: > 30 minutes    Paxton Dahl NP  Department of Hospital Medicine  [facility]

## 2017-06-05 NOTE — CONSULTS
Vancomycin trough drawn 2 hrs late. Level = 12.3 mcg/mL  Trough goal = 12-15 mcg/mL    Cultures: Staphylococcus Aureus - Susceptibility pending  Tmax: 98.6  Scr = 1.0, WBC = 6.91 I/O  +2,205 since 6/3 (no reported output, just intake)    I will continue the patient on the current dosage and regimen of Vancomycin 1 G every 12 hours and we will carefully monitor his renal function. Vancomycin levels are therapeutic at this time.    The next trough will be scheduled after the next 3 doses unless Scr increases significantly.     Adalid Liu, SeraD

## 2017-06-05 NOTE — PLAN OF CARE
"Consult noted that patient will be requiring Zyvox post-hospitalization.  Met with patient and obtained his pharmacy preference ([facility] in [place]).  Requested that NP for hospitalist here e-script this Rx to [facility] in [place].    Contacted [facility] ([phone]) and was informed that this medication requires insurance prior authorization.  Contacted insurance at [phone] and placed NP for hospitalist on phone with insurance to communicate needed clinical information for PA.   indicates that PA will be marked "Urgent"; however, they have up to 24 to 48 hours to complete this request.  (Ref# [id number]).    Later spoke with NP for hospitalist who indicates that patient will be discharged today on Bactrim rather than Zyvox.            "

## 2017-06-05 NOTE — PLAN OF CARE
06/05/17 1426   Final Note   Assessment Type Final Discharge Note   Discharge Disposition Home   What phone number can be called within the next 1-3 days to see how you are doing after discharge? [phone]   [facility] Yes  ([facility] does not accept patient's insurance)   Referral to Outpatient Case Management complete? n/a   Referral to / orders for Home Health Complete? n/a   Right Care Referral Info   Post Acute Recommendation No Care

## 2017-06-06 ENCOUNTER — TELEPHONE (OUTPATIENT)
Dept: PODIATRY | Facility: CLINIC | Age: 57
End: 2017-06-06

## 2017-06-06 NOTE — HPI
56 year old male with history of prediabetes , HTN, HLD, and s/p right fem-distal bypass ,angioplasty on 5/25/17 with c/o RLE painight foot xray with ill-defined areas of radiolucency projected over the soft tissues of the first through fifth toes.MRI of the right foot showed  no definite evidence of osteomyelitis.   2. There are several fluid collections volar to the midfoot. One of the larger ones measures 12 mm in size. These are characteristic of abscesses.  3. There is a mild amount of edema in the subcutaneous fat in the dorsum of the foot. This is characteristic of cellulitis.  4. There are findings characteristic of bone infarcts in the first metatarsal and proximal phalanx of the big toe.     Wound culture is positive for staph .

## 2017-06-06 NOTE — TELEPHONE ENCOUNTER
----- Message from Catia Cidite sent at 6/5/2017  3:54 PM CDT -----  Contact: Breanna with [facility]   Breanna called and stated that the pt needs a follow up appt in week. Pt can be reached at [phone].    Thanks,  TF

## 2017-06-06 NOTE — CONSULTS
[facility]  Infectious Disease  Consult Note    Patient Name: Ish Guardaod  MRN: [id number]  Admission Date: 6/2/2017  Hospital Length of Stay: 3 days  Attending Physician: No att. providers found  Primary Care Provider: Pollo Arana MD     Isolation Status: No active isolations    Patient information was obtained from patient, past medical records and ER records.      Consults  Assessment/Plan:     PAD (peripheral artery disease)    S/p fem-pop bypass,vascular follow up         * Cellulitis due to Staphylococcus    Will follow up final drug susceptibility to plan po regime, continue vanco for now.            Thank you for your consult. I will follow-up with patient. Please contact us if you have any additional questions.    Roe Gonzalez MD  Infectious Disease  [facility]    Subjective:     Principal Problem: Cellulitis due to Staphylococcus    HPI: 56 year old male with history of prediabetes , HTN, HLD, and s/p right fem-distal bypass ,angioplasty on 5/25/17 with c/o RLE painight foot xray with ill-defined areas of radiolucency projected over the soft tissues of the first through fifth toes.MRI of the right foot showed  no definite evidence of osteomyelitis.   2. There are several fluid collections volar to the midfoot. One of the larger ones measures 12 mm in size. These are characteristic of abscesses.  3. There is a mild amount of edema in the subcutaneous fat in the dorsum of the foot. This is characteristic of cellulitis.  4. There are findings characteristic of bone infarcts in the first metatarsal and proximal phalanx of the big toe.     Wound culture is positive for staph .    Past Medical History:   Diagnosis Date    H/O aorto-femoral bypass     Hyperlipemia     Hypertension     Pre-diabetes        Past Surgical History:   Procedure Laterality Date    back fusion      FEMORAL BYPASS         Review of patient's allergies indicates:   Allergen Reactions    Pcn  [penicillins] Other (See Comments)     Other      Ceclor [cefaclor] Rash    Latex, natural rubber Rash       Medications:  No prescriptions prior to admission.     Antibiotics     None        Antifungals     None        Antivirals     None           There is no immunization history for the selected administration types on file for this patient.    Family History     Problem Relation (Age of Onset)    COPD Mother, Father    Diabetes Mother, Father    Heart attack Mother    Heart disease Mother    Stroke Mother        Social History     Social History    Marital status:      Spouse name: N/A    Number of children: N/A    Years of education: N/A     Social History Main Topics    Smoking status: Former Smoker    Smokeless tobacco: Former User     Quit date: 10/22/2016    Alcohol use No    Drug use: No    Sexual activity: Yes     Other Topics Concern    None     Social History Narrative    None     Review of Systems   Constitutional: Negative.    HENT: Negative.    Eyes: Negative.    Respiratory: Negative for apnea, cough, choking, chest tightness, shortness of breath, wheezing and stridor.    Cardiovascular: Positive for leg swelling. Negative for chest pain and palpitations.   Gastrointestinal: Negative for abdominal pain, blood in stool, constipation, diarrhea, nausea and vomiting.   Endocrine: Negative.    Genitourinary: Negative.    Musculoskeletal: Negative for neck pain and neck stiffness.   Skin: Positive for color change and wound.   Allergic/Immunologic: Negative.    Neurological: Negative for dizziness, tremors, seizures, syncope, facial asymmetry, speech difficulty, weakness, light-headedness, numbness and headaches.   Hematological: Negative.    Psychiatric/Behavioral: Negative.      Objective:     Vital Signs (Most Recent):  Temp: 98 °F (36.7 °C) (06/05/17 1613)  Pulse: 74 (06/05/17 1613)  Resp: 18 (06/05/17 1613)  BP: 132/70 (06/05/17 1613)  SpO2: 98 % (06/05/17 1613) Vital Signs (24h  Range):  Temp:  [97.5 °F (36.4 °C)-98.2 °F (36.8 °C)] 98 °F (36.7 °C)  Pulse:  [74-78] 74  Resp:  [18] 18  SpO2:  [98 %-99 %] 98 %  BP: (132-152)/(70-82) 132/70     Weight: 81.6 kg (179 lb 14.3 oz)  Body mass index is 25.81 kg/m².    Estimated Creatinine Clearance: 85.2 mL/min (based on Cr of 1).    Physical Exam   Constitutional: He is oriented to person, place, and time. He appears well-developed and well-nourished.   HENT:   Head: Normocephalic and atraumatic.   Eyes: Conjunctivae and EOM are normal.   Neck: Normal range of motion. Neck supple.   Cardiovascular: Normal rate, regular rhythm and normal heart sounds.    No murmur heard.  Pulses:       Dorsalis pedis pulses are 2+ on the right side, and 2+ on the left side.   Pulmonary/Chest: Effort normal and breath sounds normal. No respiratory distress.   Abdominal: Soft. Bowel sounds are normal. He exhibits no distension. There is no tenderness. There is no guarding.   Feet:   Right Foot:   Skin Integrity: Positive for skin breakdown, erythema and warmth.   Neurological: He is alert and oriented to person, place, and time.   Skin: Skin is warm and dry. Capillary refill takes less than 2 seconds. There is erythema.   Left foot with mild erythema to the 1st metatarsal area. There are scratches and abrasions extending up lower left leg   Psychiatric: His behavior is normal. Thought content normal. His affect is angry.       Significant Labs:   Wound Culture:   Recent Labs  Lab 06/03/17  1509   LABAERO METHICILLIN RESISTANT STAPHYLOCOCCUS AUREUSModerate       Significant Imaging: I have reviewed all pertinent imaging results/findings within the past 24 hours.

## 2017-06-06 NOTE — SUBJECTIVE & OBJECTIVE
Past Medical History:   Diagnosis Date    H/O aorto-femoral bypass     Hyperlipemia     Hypertension     Pre-diabetes        Past Surgical History:   Procedure Laterality Date    back fusion      FEMORAL BYPASS         Review of patient's allergies indicates:   Allergen Reactions    Pcn [penicillins] Other (See Comments)     Other      Ceclor [cefaclor] Rash    Latex, natural rubber Rash       Medications:  No prescriptions prior to admission.     Antibiotics     None        Antifungals     None        Antivirals     None           There is no immunization history for the selected administration types on file for this patient.    Family History     Problem Relation (Age of Onset)    COPD Mother, Father    Diabetes Mother, Father    Heart attack Mother    Heart disease Mother    Stroke Mother        Social History     Social History    Marital status:      Spouse name: N/A    Number of children: N/A    Years of education: N/A     Social History Main Topics    Smoking status: Former Smoker    Smokeless tobacco: Former User     Quit date: 10/22/2016    Alcohol use No    Drug use: No    Sexual activity: Yes     Other Topics Concern    None     Social History Narrative    None     Review of Systems   Constitutional: Negative.    HENT: Negative.    Eyes: Negative.    Respiratory: Negative for apnea, cough, choking, chest tightness, shortness of breath, wheezing and stridor.    Cardiovascular: Positive for leg swelling. Negative for chest pain and palpitations.   Gastrointestinal: Negative for abdominal pain, blood in stool, constipation, diarrhea, nausea and vomiting.   Endocrine: Negative.    Genitourinary: Negative.    Musculoskeletal: Negative for neck pain and neck stiffness.   Skin: Positive for color change and wound.   Allergic/Immunologic: Negative.    Neurological: Negative for dizziness, tremors, seizures, syncope, facial asymmetry, speech difficulty, weakness, light-headedness,  numbness and headaches.   Hematological: Negative.    Psychiatric/Behavioral: Negative.      Objective:     Vital Signs (Most Recent):  Temp: 98 °F (36.7 °C) (06/05/17 1613)  Pulse: 74 (06/05/17 1613)  Resp: 18 (06/05/17 1613)  BP: 132/70 (06/05/17 1613)  SpO2: 98 % (06/05/17 1613) Vital Signs (24h Range):  Temp:  [97.5 °F (36.4 °C)-98.2 °F (36.8 °C)] 98 °F (36.7 °C)  Pulse:  [74-78] 74  Resp:  [18] 18  SpO2:  [98 %-99 %] 98 %  BP: (132-152)/(70-82) 132/70     Weight: 81.6 kg (179 lb 14.3 oz)  Body mass index is 25.81 kg/m².    Estimated Creatinine Clearance: 85.2 mL/min (based on Cr of 1).    Physical Exam   Constitutional: He is oriented to person, place, and time. He appears well-developed and well-nourished.   HENT:   Head: Normocephalic and atraumatic.   Eyes: Conjunctivae and EOM are normal.   Neck: Normal range of motion. Neck supple.   Cardiovascular: Normal rate, regular rhythm and normal heart sounds.    No murmur heard.  Pulses:       Dorsalis pedis pulses are 2+ on the right side, and 2+ on the left side.   Pulmonary/Chest: Effort normal and breath sounds normal. No respiratory distress.   Abdominal: Soft. Bowel sounds are normal. He exhibits no distension. There is no tenderness. There is no guarding.   Feet:   Right Foot:   Skin Integrity: Positive for skin breakdown, erythema and warmth.   Neurological: He is alert and oriented to person, place, and time.   Skin: Skin is warm and dry. Capillary refill takes less than 2 seconds. There is erythema.   Left foot with mild erythema to the 1st metatarsal area. There are scratches and abrasions extending up lower left leg   Psychiatric: His behavior is normal. Thought content normal. His affect is angry.       Significant Labs:   Wound Culture:   Recent Labs  Lab 06/03/17  1509   LABAERO METHICILLIN RESISTANT STAPHYLOCOCCUS AUREUSModerate       Significant Imaging: I have reviewed all pertinent imaging results/findings within the past 24 hours.

## 2017-06-07 ENCOUNTER — NURSE TRIAGE (OUTPATIENT)
Dept: ADMINISTRATIVE | Facility: CLINIC | Age: 57
End: 2017-06-07

## 2017-06-07 ENCOUNTER — HOSPITAL ENCOUNTER (EMERGENCY)
Facility: HOSPITAL | Age: 57
Discharge: HOME | End: 2017-06-07
Attending: EMERGENCY MEDICINE
Payer: MEDICAID

## 2017-06-07 ENCOUNTER — PATIENT OUTREACH (OUTPATIENT)
Dept: ADMINISTRATIVE | Facility: CLINIC | Age: 57
End: 2017-06-07
Payer: MEDICAID

## 2017-06-07 VITALS
BODY MASS INDEX: 27.2 KG/M2 | TEMPERATURE: 98 F | RESPIRATION RATE: 20 BRPM | DIASTOLIC BLOOD PRESSURE: 90 MMHG | HEART RATE: 94 BPM | HEIGHT: 70 IN | SYSTOLIC BLOOD PRESSURE: 156 MMHG | WEIGHT: 190 LBS | OXYGEN SATURATION: 98 %

## 2017-06-07 DIAGNOSIS — L97.909 PERIPHERAL VASCULAR DISEASE OF LOWER EXTREMITY WITH ULCERATION: ICD-10-CM

## 2017-06-07 DIAGNOSIS — M79.89 PAIN AND SWELLING OF TOE, RIGHT: ICD-10-CM

## 2017-06-07 DIAGNOSIS — L03.115 CELLULITIS OF RIGHT FOOT: ICD-10-CM

## 2017-06-07 DIAGNOSIS — L97.511 ULCER OF RIGHT FOOT, LIMITED TO BREAKDOWN OF SKIN: Primary | ICD-10-CM

## 2017-06-07 DIAGNOSIS — Z79.01 CHRONIC ANTICOAGULATION: ICD-10-CM

## 2017-06-07 DIAGNOSIS — M79.674 PAIN AND SWELLING OF TOE, RIGHT: ICD-10-CM

## 2017-06-07 DIAGNOSIS — I73.9 PERIPHERAL VASCULAR DISEASE OF LOWER EXTREMITY WITH ULCERATION: ICD-10-CM

## 2017-06-07 LAB
ALBUMIN SERPL BCP-MCNC: 3.5 G/DL
ALP SERPL-CCNC: 99 U/L
ALT SERPL W/O P-5'-P-CCNC: 21 U/L
ANION GAP SERPL CALC-SCNC: 10 MMOL/L
AST SERPL-CCNC: 21 U/L
BACTERIA BLD CULT: NORMAL
BASOPHILS # BLD AUTO: 0.1 K/UL
BASOPHILS NFR BLD: 1.5 %
BILIRUB SERPL-MCNC: 0.2 MG/DL
BUN SERPL-MCNC: 13 MG/DL
CALCIUM SERPL-MCNC: 9.3 MG/DL
CHLORIDE SERPL-SCNC: 103 MMOL/L
CO2 SERPL-SCNC: 23 MMOL/L
CREAT SERPL-MCNC: 1 MG/DL
CRP SERPL-MCNC: 2.9 MG/L
DIFFERENTIAL METHOD: ABNORMAL
EOSINOPHIL # BLD AUTO: 0.5 K/UL
EOSINOPHIL NFR BLD: 6.8 %
ERYTHROCYTE [DISTWIDTH] IN BLOOD BY AUTOMATED COUNT: 13.5 %
ERYTHROCYTE [SEDIMENTATION RATE] IN BLOOD BY WESTERGREN METHOD: 18 MM/HR
EST. GFR  (AFRICAN AMERICAN): >60 ML/MIN/1.73 M^2
EST. GFR  (NON AFRICAN AMERICAN): >60 ML/MIN/1.73 M^2
GLUCOSE SERPL-MCNC: 106 MG/DL
HCT VFR BLD AUTO: 33.6 %
HGB BLD-MCNC: 11.2 G/DL
LYMPHOCYTES # BLD AUTO: 1.5 K/UL
LYMPHOCYTES NFR BLD: 22.4 %
MCH RBC QN AUTO: 28.6 PG
MCHC RBC AUTO-ENTMCNC: 33.3 %
MCV RBC AUTO: 86 FL
MONOCYTES # BLD AUTO: 0.5 K/UL
MONOCYTES NFR BLD: 7.9 %
NEUTROPHILS # BLD AUTO: 4.1 K/UL
NEUTROPHILS NFR BLD: 61.4 %
PLATELET # BLD AUTO: 390 K/UL
PMV BLD AUTO: 8.2 FL
POTASSIUM SERPL-SCNC: 4.5 MMOL/L
PROT SERPL-MCNC: 7.3 G/DL
RBC # BLD AUTO: 3.91 M/UL
SODIUM SERPL-SCNC: 136 MMOL/L
WBC # BLD AUTO: 6.62 K/UL

## 2017-06-07 PROCEDURE — 96375 TX/PRO/DX INJ NEW DRUG ADDON: CPT

## 2017-06-07 PROCEDURE — 63600175 PHARM REV CODE 636 W HCPCS: Performed by: EMERGENCY MEDICINE

## 2017-06-07 PROCEDURE — 80053 COMPREHEN METABOLIC PANEL: CPT

## 2017-06-07 PROCEDURE — 85025 COMPLETE CBC W/AUTO DIFF WBC: CPT

## 2017-06-07 PROCEDURE — 99284 EMERGENCY DEPT VISIT MOD MDM: CPT | Mod: 25

## 2017-06-07 PROCEDURE — 85651 RBC SED RATE NONAUTOMATED: CPT

## 2017-06-07 PROCEDURE — 96374 THER/PROPH/DIAG INJ IV PUSH: CPT

## 2017-06-07 PROCEDURE — 86140 C-REACTIVE PROTEIN: CPT

## 2017-06-07 RX ORDER — OXYCODONE AND ACETAMINOPHEN 10; 325 MG/1; MG/1
1 TABLET ORAL EVERY 6 HOURS PRN
Qty: 20 TABLET | Refills: 0 | Status: SHIPPED | OUTPATIENT
Start: 2017-06-07 | End: 2019-05-20

## 2017-06-07 RX ORDER — ONDANSETRON 2 MG/ML
4 INJECTION INTRAMUSCULAR; INTRAVENOUS
Status: COMPLETED | OUTPATIENT
Start: 2017-06-07 | End: 2017-06-07

## 2017-06-07 RX ORDER — MORPHINE SULFATE 2 MG/ML
6 INJECTION, SOLUTION INTRAMUSCULAR; INTRAVENOUS
Status: COMPLETED | OUTPATIENT
Start: 2017-06-07 | End: 2017-06-07

## 2017-06-07 RX ADMIN — MORPHINE SULFATE 6 MG: 2 INJECTION, SOLUTION INTRAMUSCULAR; INTRAVENOUS at 02:06

## 2017-06-07 RX ADMIN — ONDANSETRON 4 MG: 2 INJECTION INTRAMUSCULAR; INTRAVENOUS at 02:06

## 2017-06-07 NOTE — TELEPHONE ENCOUNTER
"    Reason for Disposition   SEVERE pain (e.g., excruciating, unable to do any normal activities)    Protocols used: ST FOOT PAIN-A-OH    I call patient today for a TCC call following his recent discharge from the hospital for "RLE cellulitis due to staphylococcus".  I left a message and patient returned my call.  He stated he was in "excruiating pain 10-12/10" and the Percocet was not helping.  He is wt bearing with a cane and is presently on his way to an appointment with his  to try and get "disability".  Patient is also a recent discharge for right fem bypass. I spoke with nurses for both Dr. Bhagat and Dr. Barfield.  No appointment available today and they stated to have pt go to the ER for evaluation.  I instructed the patent to go to the ER, but he was unsure if he was going.  Stated, "I don't want to go and I will talk it over with my wife"   "

## 2017-06-07 NOTE — ED PROVIDER NOTES
SCRIBE #1 NOTE: I, Chevy Church, am scribing for, and in the presence of, Rosemary Villasenor MD. I have scribed the entire note.      History      Chief Complaint   Patient presents with    Cellulitis     recent admit for same. Reports worsened swelling/redness/pain to R foot       Review of patient's allergies indicates:   Allergen Reactions    Pcn [penicillins] Other (See Comments)     Other      Ceclor [cefaclor] Rash    Latex, natural rubber Rash        HPI   HPI    6/7/2017, 1:57 PM   History obtained from the patient      History of Present Illness: Ish Guardado is a 56 y.o. male patient who presents to the Emergency Department for right foot pain which onset gradually one week ago. Sxs are constant and moderate in severity. Pt reports being admitted last week for right foot cellulitis and started on PO ABX upon discharge 3 days ago. Pt reports increased pain today after running out of percocet 10 mg. There are no mitigating or exacerbating factors noted. Associated sxs include right foot swelling and erythema. Pt states his foot has improved since last being seen in ED and he is scheduled for an US of foot in 2 days. Pt denies any fever, N/V/D, chills, calf pain, leg pain, HA, CP, SOB, and all other sxs at this time. No further complaints or concerns at this time.    Arrival mode: Personal vehicle      PCP: Pollo Arana MD       Past Medical History:  Past Medical History:   Diagnosis Date    H/O aorto-femoral bypass     Hyperlipemia     Hypertension     Pre-diabetes        Past Surgical History:  Past Surgical History:   Procedure Laterality Date    back fusion      FEMORAL BYPASS           Family History:  Family History   Problem Relation Age of Onset    COPD Mother     Diabetes Mother     Heart disease Mother     Stroke Mother     Heart attack Mother     COPD Father     Diabetes Father        Social History:  Social History     Social History Main Topics    Smoking status:  Former Smoker    Smokeless tobacco: Former User     Quit date: 10/22/2016    Alcohol use No    Drug use: No    Sexual activity: Yes       ROS   Review of Systems   Constitutional: Negative for fever.   HENT: Negative for sore throat.    Respiratory: Negative for shortness of breath.    Cardiovascular: Negative for chest pain.   Gastrointestinal: Negative for nausea.   Genitourinary: Negative for dysuria.   Musculoskeletal: Negative for back pain.        (+) R foot pain  (+) R foot swelling   Skin: Positive for color change (R foot erythema). Negative for rash.   Neurological: Negative for weakness.   Hematological: Does not bruise/bleed easily.     Physical Exam      Initial Vitals [06/07/17 1353]   BP Pulse Resp Temp SpO2   (!) 145/102 94 18 98.4 °F (36.9 °C) 96 %      Physical Exam  Nursing Notes and Vital Signs Reviewed.  Constitutional: Patient is in no acute distress. Well-developed and well-nourished.  Head: Atraumatic. Normocephalic.  Eyes: PERRL. EOM intact. Conjunctivae are not pale. No scleral icterus.  ENT: Mucous membranes are moist. Oropharynx is clear and symmetric.    Neck: Supple. Full ROM. No lymphadenopathy.  Cardiovascular: Regular rate. Regular rhythm. No murmurs, rubs, or gallops. Distal pulses are 2+ and symmetric.  Pulmonary/Chest: No respiratory distress. Clear to auscultation bilaterally. No wheezing, rales, or rhonchi.  Abdominal: Soft and non-distended.  There is no tenderness.  No rebound, guarding, or rigidity. Good bowel sounds.  Genitourinary: No CVA tenderness  Musculoskeletal: Moves all extremities. No obvious deformities. No calf tenderness.  Right Foot: Mild erythema and warmth to dorsum aspect of right foot at first metatarsal. ulcer to lateral aspect of right mid foot.  No active drainage. No eschar. There is swelling.  There is mild tenderness.There is no fluctuance, drainage induration to suggest abscess.  Ankle dorsiflexion and ankle plantar flexion are intact.  Intact  "sensation to light touch. Distal capillary refill takes less than 2 seconds.  PT and DP pulses are 1+ bilaterally. FROM   Skin: Warm and dry.   Neurological:  Alert, awake, and appropriate.  Normal speech.  No acute focal neurological deficits are appreciated.  Psychiatric: Normal affect. Good eye contact. Appropriate in content.                ED Course    Procedures  ED Vital Signs:  Vitals:    06/07/17 1353 06/07/17 1515   BP: (!) 145/102 (!) 152/92   Pulse: 94 90   Resp: 18 20   Temp: 98.4 °F (36.9 °C)    TempSrc: Oral    SpO2: 96% 98%   Weight: 86.2 kg (190 lb)    Height: 5' 10" (1.778 m)        Abnormal Lab Results:  Labs Reviewed   CBC W/ AUTO DIFFERENTIAL - Abnormal; Notable for the following:        Result Value    RBC 3.91 (*)     Hemoglobin 11.2 (*)     Hematocrit 33.6 (*)     Platelets 390 (*)     MPV 8.2 (*)     All other components within normal limits   SEDIMENTATION RATE, MANUAL - Abnormal; Notable for the following:     Sed Rate 18 (*)     All other components within normal limits   COMPREHENSIVE METABOLIC PANEL   C-REACTIVE PROTEIN        All Lab Results:  Results for orders placed or performed during the hospital encounter of 06/07/17   CBC auto differential   Result Value Ref Range    WBC 6.62 3.90 - 12.70 K/uL    RBC 3.91 (L) 4.60 - 6.20 M/uL    Hemoglobin 11.2 (L) 14.0 - 18.0 g/dL    Hematocrit 33.6 (L) 40.0 - 54.0 %    MCV 86 82 - 98 fL    MCH 28.6 27.0 - 31.0 pg    MCHC 33.3 32.0 - 36.0 %    RDW 13.5 11.5 - 14.5 %    Platelets 390 (H) 150 - 350 K/uL    MPV 8.2 (L) 9.2 - 12.9 fL    Gran # 4.1 1.8 - 7.7 K/uL    Lymph # 1.5 1.0 - 4.8 K/uL    Mono # 0.5 0.3 - 1.0 K/uL    Eos # 0.5 0.0 - 0.5 K/uL    Baso # 0.10 0.00 - 0.20 K/uL    Gran% 61.4 38.0 - 73.0 %    Lymph% 22.4 18.0 - 48.0 %    Mono% 7.9 4.0 - 15.0 %    Eosinophil% 6.8 0.0 - 8.0 %    Basophil% 1.5 0.0 - 1.9 %    Differential Method Automated    Comprehensive metabolic panel   Result Value Ref Range    Sodium 136 136 - 145 mmol/L    " Potassium 4.5 3.5 - 5.1 mmol/L    Chloride 103 95 - 110 mmol/L    CO2 23 23 - 29 mmol/L    Glucose 106 70 - 110 mg/dL    BUN, Bld 13 6 - 20 mg/dL    Creatinine 1.0 0.5 - 1.4 mg/dL    Calcium 9.3 8.7 - 10.5 mg/dL    Total Protein 7.3 6.0 - 8.4 g/dL    Albumin 3.5 3.5 - 5.2 g/dL    Total Bilirubin 0.2 0.1 - 1.0 mg/dL    Alkaline Phosphatase 99 55 - 135 U/L    AST 21 10 - 40 U/L    ALT 21 10 - 44 U/L    Anion Gap 10 8 - 16 mmol/L    eGFR if African American >60 >60 mL/min/1.73 m^2    eGFR if non African American >60 >60 mL/min/1.73 m^2   Sedimentation rate, manual   Result Value Ref Range    Sed Rate 18 (H) 0 - 10 mm/Hr   C-reactive protein   Result Value Ref Range    CRP 2.9 0.0 - 8.2 mg/L         Imaging Results:  Imaging Results          X-Ray Foot Complete Right (Final result)  Result time 06/07/17 14:25:44    Final result by Darvin Olson Jr., MD (06/07/17 14:25:44)                 Impression:     No acute abnormality identified in the right foot.      Electronically signed by: DARVIN OLSON M.D.  Date:     06/07/17  Time:    14:25              Narrative:    XR FOOT COMPLETE 3 VIEW RIGHT    Clinical history: Pain in right foot.    Findings: No fracture is identified.  Joint alignment is anatomic.  Joint spaces are well maintained.                                      The Emergency Provider reviewed the vital signs and test results, which are outlined above.    ED Discussion     Compared pictures from inpatient documentation by podiatry no worsening noted, foot appears stable, NVI.     3:43 PM: Reassessed pt. Pt states their condition has improved at this time.  Discussed with pt all pertinent ED information and results. Discussed plan of treatment with pt. Pt instructed to continue taking BACTRIM and to keep foot elevated. Pt instructed to f/u with vascular surgeon as scheduled in 2 days. Gave pt all f/u and return to the ED instructions. All questions and concerns were addressed at this time. Pt understands and  agrees to plan as discussed. Pt is stable for discharge.       ED Medication(s):  Medications   morphine injection 6 mg (6 mg Intravenous Given 6/7/17 1450)   ondansetron injection 4 mg (4 mg Intravenous Given 6/7/17 1451)       Current Discharge Medication List          Follow-up Information     Local Vascuar Surgeon at [facility] on 6/9/2017.    Why:  Return to the Emergency Room, If symptoms worsen                   Medical Decision Making    Medical Decision Making:   Clinical Tests:   Lab Tests: Reviewed and Ordered  Radiological Study: Ordered and Reviewed           Scribe Attestation:   Scribe #1: I performed the above scribed service and the documentation accurately describes the services I performed. I attest to the accuracy of the note.    Attending:   Physician Attestation Statement for Scribe #1: I, Rosemary Villasenor MD, personally performed the services described in this documentation, as scribed by Chevy Church, in my presence, and it is both accurate and complete.          Clinical Impression       ICD-10-CM ICD-9-CM   1. Ulcer of right foot, limited to breakdown of skin L97.511 707.15   2. Pain and swelling of toe, right M79.674 729.5    M79.89    3. Peripheral vascular disease of lower extremity with ulceration I73.9 443.9    L97.909 707.10   4. Chronic anticoagulation Z79.01 V58.61   5. Cellulitis of right foot L03.115 682.7       Disposition:   Disposition: Discharged  Condition: Stable         Rosemary Villasenor MD  06/07/17 8555

## 2017-06-07 NOTE — PROGRESS NOTES
Discharge Information     Discharge Date:  6/5/17          Primary Discharge Diagnosis:  Cellulitis due to Staphylococcus         Meredith Mary RN attempted to contact patient. No answer. The following message was left for the patient to return the call:  Good morning,  I am a nurse calling on behalf of [facility] from the [facility].  This is a Transitional Care Call for Ish Guardado. When you have a moment please contact us at [phone] or [phone] Monday through Friday, between the hours of 8 am to 4 pm. We look forward to speaking with you. On behalf of [facility] have a nice day.    The patient has a scheduled [facility] appointment with  (Podiatry) on 6/13/17 @ 1640hrs. Message sent to Physician staff.

## 2017-06-08 ENCOUNTER — TELEPHONE (OUTPATIENT)
Dept: PODIATRY | Facility: CLINIC | Age: 57
End: 2017-06-08

## 2017-06-08 LAB — BACTERIA SPEC ANAEROBE CULT: NORMAL

## 2017-06-08 NOTE — TELEPHONE ENCOUNTER
Spoke with patient who declined to be scheduled at the [facility]. Patient states that he is fine with remaining scheduled on 6/14/2017. Patient verbalized understanding of the time, date, and location of his appointment.

## 2017-06-08 NOTE — TELEPHONE ENCOUNTER
----- Message from Felisha Bhagat DPM sent at 6/7/2017  4:46 PM CDT -----  Pt request earlier appt. He is currently scheduled with Dr. Barfield next week. Pls call pt and schedule appt per patient request.

## 2017-06-14 ENCOUNTER — OFFICE VISIT (OUTPATIENT)
Dept: PODIATRY | Facility: CLINIC | Age: 57
End: 2017-06-14
Payer: MEDICAID

## 2017-06-14 VITALS
HEIGHT: 70 IN | DIASTOLIC BLOOD PRESSURE: 91 MMHG | WEIGHT: 187.63 LBS | BODY MASS INDEX: 26.86 KG/M2 | SYSTOLIC BLOOD PRESSURE: 166 MMHG | HEART RATE: 78 BPM

## 2017-06-14 DIAGNOSIS — L98.492: Primary | ICD-10-CM

## 2017-06-14 DIAGNOSIS — I73.9 PERIPHERAL VASCULAR DISEASE: ICD-10-CM

## 2017-06-14 DIAGNOSIS — M20.11 HALLUX ABDUCTO VALGUS, RIGHT: ICD-10-CM

## 2017-06-14 PROCEDURE — 99999 PR PBB SHADOW E&M-EST. PATIENT-LVL III: CPT | Mod: PBBFAC,,, | Performed by: PODIATRIST

## 2017-06-14 PROCEDURE — 99213 OFFICE O/P EST LOW 20 MIN: CPT | Mod: PBBFAC | Performed by: PODIATRIST

## 2017-06-14 PROCEDURE — 99213 OFFICE O/P EST LOW 20 MIN: CPT | Mod: 25,S$PBB,, | Performed by: PODIATRIST

## 2017-06-14 PROCEDURE — 11042 DBRDMT SUBQ TIS 1ST 20SQCM/<: CPT | Mod: PBBFAC | Performed by: PODIATRIST

## 2017-06-14 PROCEDURE — 11042 DBRDMT SUBQ TIS 1ST 20SQCM/<: CPT | Mod: S$PBB,,, | Performed by: PODIATRIST

## 2017-06-14 NOTE — PROGRESS NOTES
Subjective:      Patient ID: Ish Guardado is a 56 y.o. male.    Chief Complaint: Wound Check    Ish is a 56 y.o. male who presents to the clinic with his wife for evaluation and treatment of high risk feet. Ish has a past medical history of H/O aorto-femoral bypass; Hyperlipemia; Hypertension; and Pre-diabetes. The patient's chief complaint is foot ulcer, right medial 1st met head. This patient has documented high risk feet requiring routine maintenance secondary to peripheral vascular disease.  Patient is following up from hospital admission just over a week ago.  He had initial debridement and cultures taken at that visit.  He was discharged home on oral Bactrim and with topical Bactroban to apply to the wound.  He is here today to follow-up on wound, x-ray and culture results.  Just prior to last admission he had been admitted for failed femoro bypass which was successfully reestablished with TPA.  He currently complains of 8 out of 10 throbbing burning pins and needle sensation especially along the right foot and ulcer site.    PCP: Pollo Arana MD    Date Last Seen by PCP: may 2017    Current shoe gear:  Affected Foot: Slip-on shoes     Unaffected Foot: Slip-on shoes    History of Trauma: negative  Sign of Infection: none    No results found for: HGBA1C      Review of Systems   Constitution: Negative for chills and fever.   Cardiovascular: Negative for chest pain.   Respiratory: Negative for shortness of breath.    Gastrointestinal: Negative for nausea and vomiting.           Objective:      Physical Exam   Constitutional: He is oriented to person, place, and time. He appears well-developed and well-nourished. No distress.   Cardiovascular:   Pulses:       Dorsalis pedis pulses are 1+ on the right side, and 0 on the left side.        Posterior tibial pulses are 1+ on the right side, and 2+ on the left side.   Capillary fill time 3-5 seconds to digits bilateral feet, improving on the right.  "  Musculoskeletal:   Lower extremities:    Deformities: hallux abductovalgus both feet    Normal arch height and rectus feet bilaterally.     5/5 muscle strength and tone in all four quadrants bilaterally.     Pain-free range of motion in all four quadrants with stiffness and limitation bilaterally.     Pain: right medial 1st met head.     Neurological: He is alert and oriented to person, place, and time. He displays no Babinski's sign on the right side. He displays no Babinski's sign on the left side.   Plantar protective threshold sensation by touch via 5.07 SWMF diminished right forefoot.    Skin:   Lower extremities:    Thin shiny atrophic skin bilaterally. Digital hair absent bilaterally. Warm equally bilaterally.    Wound: [id number] full thickness granular based ulcer post debridement of blood crusted eschar with clear borders right medial 1st met head.  Drainage: Serosanguinous  Malodor: None  Erythema: Mild local periwound and residual right foot vs possible resolving rubor.  Edema: Mild local periwound  Varicosities: None  Pigmentary changes: None  Interdigital maceration: None  Skin lesions: right plantarmedial 1st met head    Nails are discolored and brittle bilaterally.       Psychiatric: He has a normal mood and affect.   Nursing note and vitals reviewed.    Aerobic culture   Order: [id number]   Status:  Final result   Visible to patient:  No (Not Released) Next appt:  None Dx:  Cellulitis of right foot    11d ago   Aerobic Bacterial Culture  METHICILLIN RESISTANT STAPHYLOCOCCUS AUREUS   Moderate    Resulting Agency [facility]   Susceptibility      Methicillin resistant staphylococcus aureus     CULTURE, AEROBIC  (SPECIFY SOURCE)     Clindamycin >4  Resistant     Erythromycin >4  Resistant     Oxacillin >2  Resistant     Penicillin 8  Resistant     Tetracycline <=4 "><=4  Sensitive     Trimeth/Sulfa <=0.5/9.5 "><=0.5/9.5  Sensitive     Vancomycin 1  Sensitive              Specimen Collected: 06/03/17 15:09 Last " Resulted: 06/05/17 10:58 Lab Flowsheet Order Details View Encounter Lab and Collection Details Routing Result History              XR FOOT COMPLETE 3 VIEW RIGHT    Clinical history: Pain in right foot.    Findings: No fracture is identified.  Joint alignment is anatomic.  Joint spaces are well maintained.   Impression      No acute abnormality identified in the right foot.             Assessment:       Encounter Diagnoses   Name Primary?    Ischemic ulcer, with fat layer exposed Yes    Hallux abducto valgus, right     Peripheral vascular disease          Plan:       Ish was seen today for wound check.    Diagnoses and all orders for this visit:    Ischemic ulcer, with fat layer exposed    Hallux abducto valgus, right    Peripheral vascular disease      I counseled the patient on his conditions, their implications and medical management.  Explained to him given today's finding an interval changes he does appear to be having positive effects from successful TPA and reestablishment of circulation to the right extremity.  Culture results and x-ray were reviewed with the patient.  He was instructed to complete his remaining Bactrim which he has through the rest of the week.  He may also continue with application of Bactroban along the ulcer site on his right foot.    Ulcer wound was cleaned with alcohol wipes and sharply excisionally debrided of viable and nonviable tissue down to subcutaneous tissue level utilizing sterile #15 blade. Wound was irrigated with sterile saline and bleeding was controlled with direct pressure. The patient tolerated this well. Wound was then dressed with povidone-iodine ointment, coverall and placed in modified post operative shoe. Patient was given instructions on daily dressing changes with application of Bactroban. Patient was also instructed on the importance of keeping the wound and dressings clean dry and intact and keeping pressure off the wound area until complete healing of the  wound.       .

## 2017-06-14 NOTE — PATIENT INSTRUCTIONS
Keep wound and dressings clean and dry. Dressing changes as directed. Avoid pressure to the wound area and unnecessary ambulation until complete healing. The patient was instructed to call for any signs of worsening wound or infection.

## 2017-07-11 ENCOUNTER — TELEPHONE (OUTPATIENT)
Dept: PODIATRY | Facility: CLINIC | Age: 57
End: 2017-07-11

## 2017-07-11 NOTE — TELEPHONE ENCOUNTER
----- Message from Miguelito Arana sent at 7/11/2017 11:26 AM CDT -----  Contact: pt  He's calling in regards to being worked into the drs schedule today, please advise, [phone] (cell) or [phone] (home)

## 2017-07-27 ENCOUNTER — OFFICE VISIT (OUTPATIENT)
Dept: PODIATRY | Facility: CLINIC | Age: 57
End: 2017-07-27
Payer: MEDICAID

## 2017-07-27 VITALS
HEIGHT: 70 IN | HEART RATE: 63 BPM | RESPIRATION RATE: 20 BRPM | WEIGHT: 183 LBS | DIASTOLIC BLOOD PRESSURE: 83 MMHG | SYSTOLIC BLOOD PRESSURE: 133 MMHG | BODY MASS INDEX: 26.2 KG/M2

## 2017-07-27 DIAGNOSIS — I73.9 PERIPHERAL VASCULAR DISEASE: Primary | ICD-10-CM

## 2017-07-27 DIAGNOSIS — B35.3 TINEA PEDIS OF RIGHT FOOT: ICD-10-CM

## 2017-07-27 DIAGNOSIS — M20.11 HALLUX ABDUCTO VALGUS, RIGHT: ICD-10-CM

## 2017-07-27 PROCEDURE — 99213 OFFICE O/P EST LOW 20 MIN: CPT | Mod: PBBFAC | Performed by: PODIATRIST

## 2017-07-27 PROCEDURE — 99999 PR PBB SHADOW E&M-EST. PATIENT-LVL III: CPT | Mod: PBBFAC,,, | Performed by: PODIATRIST

## 2017-07-27 PROCEDURE — 99213 OFFICE O/P EST LOW 20 MIN: CPT | Mod: S$PBB,,, | Performed by: PODIATRIST

## 2017-07-27 RX ORDER — NAFTIFINE HYDROCHLORIDE 20 MG/G
1 CREAM TOPICAL DAILY
Qty: 45 G | Refills: 2 | Status: SHIPPED | OUTPATIENT
Start: 2017-07-27 | End: 2019-05-20

## 2017-07-27 NOTE — PROGRESS NOTES
Subjective:      Patient ID: Ish Guardado is a 57 y.o. male.    Chief Complaint: Foot Ulcer    Ish is a 57 y.o. male who presents to the clinic for evaluation and treatment of high risk feet. Ish has a past medical history of H/O aorto-femoral bypass; Hyperlipemia; Hypertension; and Pre-diabetes. The patient reports today that his wound has healed since last visit.  His primary complaint today is of persistent itching.  He states that the skin along the top of his right foot has become raw and tender because he scratches with his other foot at night involuntarily.  He states the itching has been going on since February with episodes of cellulitis and the bypass to this lower extremity.  He also continues to have neuropathic pain which she rates as a 6 out of 10 numb tingling pins and needles.      PCP: Pollo Arana MD    Date Last Seen by PCP: may 2017    Current shoe gear:  Affected Foot: Slip-on shoes     Unaffected Foot: Slip-on shoes    No results found for: HGBA1C      Review of Systems   Constitution: Negative for chills and fever.   Cardiovascular: Negative for chest pain.   Respiratory: Negative for shortness of breath.    Gastrointestinal: Negative for nausea and vomiting.           Objective:      Physical Exam   Constitutional: He is oriented to person, place, and time. He appears well-developed and well-nourished. No distress.   Cardiovascular:   Pulses:       Dorsalis pedis pulses are 1+ on the right side, and 0 on the left side.        Posterior tibial pulses are 1+ on the right side, and 2+ on the left side.   Capillary fill time 3-5 seconds to digits bilateral feet, improving on the right.   Musculoskeletal:   Lower extremities:    Deformities: hallux abductovalgus both feet    Normal arch height and rectus feet bilaterally.     5/5 muscle strength and tone in all four quadrants bilaterally.     Pain-free range of motion in all four quadrants with stiffness and limitation bilaterally.      Pain: right medial 1st met head.     Neurological: He is alert and oriented to person, place, and time. He displays no Babinski's sign on the right side. He displays no Babinski's sign on the left side.   Plantar protective threshold sensation by touch via 5.07 SWMF diminished right forefoot.    Skin:   Lower extremities:    Thin shiny atrophic skin bilaterally. Digital hair absent bilaterally. Warm equally bilaterally.    Wound: complete epithelialization medial first met head right foot.  Superficial excoriation dorsal right foot.  Drainage: None  Malodor: None  Erythema: Dorsal right foot redness from irritation Edema: None   Varicosities: None  Pigmentary changes: None  Interdigital maceration: None  Skin lesions: None   Nails are discolored and brittle bilaterally.       Psychiatric: He has a normal mood and affect.   Nursing note and vitals reviewed.            Assessment:       Encounter Diagnoses   Name Primary?    Peripheral vascular disease Yes    Hallux abducto valgus, right     Tinea pedis of right foot          Plan:       Ish was seen today for foot ulcer.    Diagnoses and all orders for this visit:    Peripheral vascular disease    Hallux abducto valgus, right    Tinea pedis of right foot    Other orders  -     naftifine (NAFTIN) 2 % Crea; Apply 1 application topically once daily.      I counseled the patient on his conditions, their implications and medical management.    Basic diabetic foot care education and management was reviewed with the patient according to changes exhibited based on today's exam.  The wound has appeared to completely epithelialized but he does have redness and excoriations due to scratching along the top of his right foot.    For the athletes foot, patient was prescribed naftin cream to apply Along the affected areas along the dorsal foot daily. In addition, recommendations were made to spray and disinfect shoes with Lysol and to alternate shoes. Also, for excessive  sweating patient was instructed to use Arid Extra Dry spray on the bottom of the feet with Tinactin powder in the digital interspaces.    .

## 2017-07-27 NOTE — PATIENT INSTRUCTIONS
ATHLETES FOOT (Tinea Pedis) or FUNGAL (Mycotic) NAILS    Athlete's foot is a fungal infection that develops in the moist areas between your toes and sometimes on other parts of your foot. Athlete's foot usually causes itching, stinging, burning and may also present with scaling and maceration.  When the feet or other areas of the body stay moist, warm, and irritated, this fungus can thrive and infect the upper layer of the skin.  The fungus sometimes also infects the nails as well and usually presents as a discolored, thickened or distorted toenail which the patient has noticed for a period of weeks, months, or years, and may involve one or all of the nails. Because the fungus lives under the nail, it is very difficult to eradicate germ completely.  Fungus toenails are very recalcitrant to treatment and, at best, the patients can hope to control the problem from spreading or diminish the effect of the germ on the nail. Because the germ is present in the environment and something patients are exposed to every day, the chances of completely eradication the fungus nail problem are not great.    Both oral and topical treatments of the fungus are available.  The oral treatments may have negative effects on the liver, so blood work is needed to begin oral therapy.  Even once the fungus has been eradicated, maintenance is required to prevent re-infection.  Topical treatment is also available, but requires persistence and diligence to see effective results.      My recommendations for severe athletes foot are as follows:    - Twice daily application of topical antifungal cream or gel after thorough cleansing and drying.  - If odor and excessive sweating of the feet are a problem an anti-perspirant spray should be applied to the bottoms of the feet followed by an antifungal powder or lambswool between the toes every day in the morning.  - Also, shoes should be sprayed with a disinfectant after use and allowed to dry for at  least 24 hours, therefore shoes should be alternated and not worn on consecutive days.    - In addition, if fungal nails are present, a topical nail lacquer may be applied twice daily with filing of the nail prior to each new coat of medication for better penetration.    Not all medications are effective on every type of fungus, so if problems persist you may need alterations or adjustments to your medication or maintenance regiment.               Karyn Barfield D.P.M.

## 2017-08-02 ENCOUNTER — TELEPHONE (OUTPATIENT)
Dept: PODIATRY | Facility: CLINIC | Age: 57
End: 2017-08-02

## 2017-08-02 NOTE — TELEPHONE ENCOUNTER
Left message for patient to return call upon receiving voicemail.    Josey Puentes MA  Office of Dr. Felisha Bhagat DPM   Podiatry Department, [facility]

## 2017-08-02 NOTE — TELEPHONE ENCOUNTER
----- Message from Whit Carl sent at 8/2/2017  8:09 AM CDT -----  Contact: pt   Pt states that he need something called in at the Pharmacy in [place] instead of [place] for the itch cream and want to get something cheaper than 22.00 cause he haven't worked in a year and cant afford that.   ..[phone] (home)

## 2017-08-02 NOTE — TELEPHONE ENCOUNTER
----- Message from Whit Carl sent at 8/2/2017 11:44 AM CDT -----  Contact: pt   .Pt was returning nurse call    ..[phone]

## 2017-10-30 ENCOUNTER — HOSPITAL ENCOUNTER (INPATIENT)
Facility: HOSPITAL | Age: 57
LOS: 3 days | Discharge: HOME | DRG: 272 | End: 2017-11-02
Attending: EMERGENCY MEDICINE | Admitting: INTERNAL MEDICINE
Payer: MEDICAID

## 2017-10-30 DIAGNOSIS — I99.8 ISCHEMIC FOOT: ICD-10-CM

## 2017-10-30 DIAGNOSIS — D64.9 ANEMIA, UNSPECIFIED TYPE: ICD-10-CM

## 2017-10-30 DIAGNOSIS — I99.8 VASCULAR OCCLUSION: ICD-10-CM

## 2017-10-30 DIAGNOSIS — I73.9 PVD (PERIPHERAL VASCULAR DISEASE) WITH CLAUDICATION: ICD-10-CM

## 2017-10-30 DIAGNOSIS — I73.9 PAD (PERIPHERAL ARTERY DISEASE): Primary | ICD-10-CM

## 2017-10-30 DIAGNOSIS — I73.9 PVD (PERIPHERAL VASCULAR DISEASE): ICD-10-CM

## 2017-10-30 PROBLEM — R73.03 PRE-DIABETES: Status: ACTIVE | Noted: 2017-03-14

## 2017-10-30 PROBLEM — I10 ESSENTIAL HYPERTENSION: Chronic | Status: ACTIVE | Noted: 2017-10-30

## 2017-10-30 PROBLEM — I82.409 DVT (DEEP VENOUS THROMBOSIS): Status: ACTIVE | Noted: 2017-10-30

## 2017-10-30 LAB
ABO + RH BLD: NORMAL
ALBUMIN SERPL BCP-MCNC: 3.6 G/DL
ALP SERPL-CCNC: 111 U/L
ALT SERPL W/O P-5'-P-CCNC: 24 U/L
ANION GAP SERPL CALC-SCNC: 11 MMOL/L
APTT BLDCRRT: 37.1 SEC
AST SERPL-CCNC: 23 U/L
BASOPHILS # BLD AUTO: 0.05 K/UL
BASOPHILS NFR BLD: 0.5 %
BILIRUB SERPL-MCNC: 0.2 MG/DL
BILIRUB UR QL STRIP: NEGATIVE
BLD GP AB SCN CELLS X3 SERPL QL: NORMAL
BUN SERPL-MCNC: 12 MG/DL
CALCIUM SERPL-MCNC: 9.7 MG/DL
CHLORIDE SERPL-SCNC: 103 MMOL/L
CHOLEST SERPL-MCNC: 166 MG/DL
CHOLEST/HDLC SERPL: 2.7 {RATIO}
CLARITY UR: CLEAR
CO2 SERPL-SCNC: 24 MMOL/L
COLOR UR: YELLOW
CREAT SERPL-MCNC: 0.9 MG/DL
DIFFERENTIAL METHOD: ABNORMAL
EOSINOPHIL # BLD AUTO: 0.3 K/UL
EOSINOPHIL NFR BLD: 2.6 %
ERYTHROCYTE [DISTWIDTH] IN BLOOD BY AUTOMATED COUNT: 14.2 %
EST. GFR  (AFRICAN AMERICAN): >60 ML/MIN/1.73 M^2
EST. GFR  (NON AFRICAN AMERICAN): >60 ML/MIN/1.73 M^2
FIBRINOGEN PPP-MCNC: 323 MG/DL
FIBRINOGEN PPP-MCNC: 359 MG/DL
GLUCOSE SERPL-MCNC: 92 MG/DL
GLUCOSE UR QL STRIP: NEGATIVE
HCT VFR BLD AUTO: 38.3 %
HDLC SERPL-MCNC: 62 MG/DL
HDLC SERPL: 37.3 %
HGB BLD-MCNC: 12.8 G/DL
HGB UR QL STRIP: ABNORMAL
INR PPP: 1.2
KETONES UR QL STRIP: NEGATIVE
LDLC SERPL CALC-MCNC: 94.2 MG/DL
LEUKOCYTE ESTERASE UR QL STRIP: NEGATIVE
LYMPHOCYTES # BLD AUTO: 1.5 K/UL
LYMPHOCYTES NFR BLD: 15.2 %
MCH RBC QN AUTO: 29.2 PG
MCHC RBC AUTO-ENTMCNC: 33.4 G/DL
MCV RBC AUTO: 87 FL
MICROSCOPIC COMMENT: NORMAL
MONOCYTES # BLD AUTO: 0.9 K/UL
MONOCYTES NFR BLD: 8.9 %
NEUTROPHILS # BLD AUTO: 7.3 K/UL
NEUTROPHILS NFR BLD: 72.8 %
NITRITE UR QL STRIP: NEGATIVE
NONHDLC SERPL-MCNC: 104 MG/DL
PH UR STRIP: 7 [PH] (ref 5–8)
PLATELET # BLD AUTO: 346 K/UL
PMV BLD AUTO: 8.3 FL
POTASSIUM SERPL-SCNC: 3.9 MMOL/L
PROT SERPL-MCNC: 7.7 G/DL
PROT UR QL STRIP: ABNORMAL
PROTHROMBIN TIME: 12.6 SEC
RBC # BLD AUTO: 4.39 M/UL
RBC #/AREA URNS HPF: 3 /HPF (ref 0–4)
SODIUM SERPL-SCNC: 138 MMOL/L
SP GR UR STRIP: 1.01 (ref 1–1.03)
TRIGL SERPL-MCNC: 49 MG/DL
URN SPEC COLLECT METH UR: ABNORMAL
UROBILINOGEN UR STRIP-ACNC: NEGATIVE EU/DL
WBC # BLD AUTO: 10.03 K/UL
WBC #/AREA URNS HPF: 1 /HPF (ref 0–5)

## 2017-10-30 PROCEDURE — 25000003 PHARM REV CODE 250: Performed by: INTERNAL MEDICINE

## 2017-10-30 PROCEDURE — 63600175 PHARM REV CODE 636 W HCPCS: Performed by: INTERNAL MEDICINE

## 2017-10-30 PROCEDURE — 63600175 PHARM REV CODE 636 W HCPCS: Performed by: EMERGENCY MEDICINE

## 2017-10-30 PROCEDURE — 25000003 PHARM REV CODE 250: Performed by: EMERGENCY MEDICINE

## 2017-10-30 PROCEDURE — 25000003 PHARM REV CODE 250: Performed by: NURSE PRACTITIONER

## 2017-10-30 PROCEDURE — 85384 FIBRINOGEN ACTIVITY: CPT

## 2017-10-30 PROCEDURE — 63600175 PHARM REV CODE 636 W HCPCS

## 2017-10-30 PROCEDURE — 25000242 PHARM REV CODE 250 ALT 637 W/ HCPCS: Performed by: EMERGENCY MEDICINE

## 2017-10-30 PROCEDURE — 51702 INSERT TEMP BLADDER CATH: CPT

## 2017-10-30 PROCEDURE — 99285 EMERGENCY DEPT VISIT HI MDM: CPT | Mod: 25

## 2017-10-30 PROCEDURE — 80061 LIPID PANEL: CPT

## 2017-10-30 PROCEDURE — 85730 THROMBOPLASTIN TIME PARTIAL: CPT

## 2017-10-30 PROCEDURE — 86850 RBC ANTIBODY SCREEN: CPT

## 2017-10-30 PROCEDURE — 86900 BLOOD TYPING SEROLOGIC ABO: CPT

## 2017-10-30 PROCEDURE — 85025 COMPLETE CBC W/AUTO DIFF WBC: CPT

## 2017-10-30 PROCEDURE — 25000003 PHARM REV CODE 250

## 2017-10-30 PROCEDURE — 93010 ELECTROCARDIOGRAM REPORT: CPT | Mod: 59,,, | Performed by: INTERNAL MEDICINE

## 2017-10-30 PROCEDURE — 25000003 PHARM REV CODE 250: Performed by: SURGERY

## 2017-10-30 PROCEDURE — 25500020 PHARM REV CODE 255: Performed by: EMERGENCY MEDICINE

## 2017-10-30 PROCEDURE — 36415 COLL VENOUS BLD VENIPUNCTURE: CPT

## 2017-10-30 PROCEDURE — 80053 COMPREHEN METABOLIC PANEL: CPT

## 2017-10-30 PROCEDURE — 99291 CRITICAL CARE FIRST HOUR: CPT | Mod: ,,, | Performed by: INTERNAL MEDICINE

## 2017-10-30 PROCEDURE — 20000000 HC ICU ROOM

## 2017-10-30 PROCEDURE — 96374 THER/PROPH/DIAG INJ IV PUSH: CPT

## 2017-10-30 PROCEDURE — 63600175 PHARM REV CODE 636 W HCPCS: Performed by: SURGERY

## 2017-10-30 PROCEDURE — C1769 GUIDE WIRE: HCPCS

## 2017-10-30 PROCEDURE — 93005 ELECTROCARDIOGRAM TRACING: CPT

## 2017-10-30 PROCEDURE — 96375 TX/PRO/DX INJ NEW DRUG ADDON: CPT

## 2017-10-30 PROCEDURE — 96376 TX/PRO/DX INJ SAME DRUG ADON: CPT

## 2017-10-30 PROCEDURE — 81000 URINALYSIS NONAUTO W/SCOPE: CPT

## 2017-10-30 PROCEDURE — C1894 INTRO/SHEATH, NON-LASER: HCPCS

## 2017-10-30 PROCEDURE — 85610 PROTHROMBIN TIME: CPT

## 2017-10-30 RX ORDER — AMLODIPINE BESYLATE 10 MG/1
10 TABLET ORAL DAILY
Status: DISCONTINUED | OUTPATIENT
Start: 2017-10-30 | End: 2017-11-02 | Stop reason: HOSPADM

## 2017-10-30 RX ORDER — ATORVASTATIN CALCIUM 10 MG/1
20 TABLET, FILM COATED ORAL DAILY
Status: DISCONTINUED | OUTPATIENT
Start: 2017-10-30 | End: 2017-11-02 | Stop reason: HOSPADM

## 2017-10-30 RX ORDER — SODIUM CHLORIDE 9 MG/ML
1000 INJECTION, SOLUTION INTRAVENOUS
Status: COMPLETED | OUTPATIENT
Start: 2017-10-30 | End: 2017-10-30

## 2017-10-30 RX ORDER — HYDROMORPHONE HYDROCHLORIDE 2 MG/ML
2 INJECTION, SOLUTION INTRAMUSCULAR; INTRAVENOUS; SUBCUTANEOUS EVERY 6 HOURS PRN
Status: DISCONTINUED | OUTPATIENT
Start: 2017-10-30 | End: 2017-10-30

## 2017-10-30 RX ORDER — ACETYLCYSTEINE 200 MG/ML
600 SOLUTION ORAL; RESPIRATORY (INHALATION) 2 TIMES DAILY
Status: DISCONTINUED | OUTPATIENT
Start: 2017-10-30 | End: 2017-11-02 | Stop reason: HOSPADM

## 2017-10-30 RX ORDER — FENTANYL CITRAT/DEXTROSE 5%/PF 100 MCG/10
PATIENT CONTROLLED ANALGESIA SYRINGE INTRAVENOUS CONTINUOUS
Status: DISCONTINUED | OUTPATIENT
Start: 2017-10-30 | End: 2017-10-31

## 2017-10-30 RX ORDER — OXYCODONE AND ACETAMINOPHEN 10; 325 MG/1; MG/1
1 TABLET ORAL EVERY 6 HOURS PRN
Status: DISCONTINUED | OUTPATIENT
Start: 2017-10-30 | End: 2017-10-31

## 2017-10-30 RX ORDER — ONDANSETRON 2 MG/ML
4 INJECTION INTRAMUSCULAR; INTRAVENOUS ONCE
Status: COMPLETED | OUTPATIENT
Start: 2017-10-30 | End: 2017-10-30

## 2017-10-30 RX ORDER — LORAZEPAM 2 MG/ML
2 INJECTION INTRAMUSCULAR EVERY 4 HOURS PRN
Status: DISCONTINUED | OUTPATIENT
Start: 2017-10-30 | End: 2017-10-31

## 2017-10-30 RX ORDER — MORPHINE SULFATE 4 MG/ML
4 INJECTION, SOLUTION INTRAMUSCULAR; INTRAVENOUS ONCE
Status: DISCONTINUED | OUTPATIENT
Start: 2017-10-30 | End: 2017-10-30

## 2017-10-30 RX ORDER — HYDROMORPHONE HYDROCHLORIDE 2 MG/ML
2 INJECTION, SOLUTION INTRAMUSCULAR; INTRAVENOUS; SUBCUTANEOUS EVERY 6 HOURS PRN
Status: DISCONTINUED | OUTPATIENT
Start: 2017-10-30 | End: 2017-10-31

## 2017-10-30 RX ORDER — HEPARIN SODIUM 200 [USP'U]/100ML
500 INJECTION, SOLUTION INTRAVENOUS CONTINUOUS
Status: DISCONTINUED | OUTPATIENT
Start: 2017-10-30 | End: 2017-10-30

## 2017-10-30 RX ORDER — HYDROMORPHONE HYDROCHLORIDE 1 MG/ML
1 INJECTION, SOLUTION INTRAMUSCULAR; INTRAVENOUS; SUBCUTANEOUS
Status: COMPLETED | OUTPATIENT
Start: 2017-10-30 | End: 2017-10-30

## 2017-10-30 RX ORDER — FAMOTIDINE 20 MG/1
20 TABLET, FILM COATED ORAL
COMMUNITY
Start: 2017-10-01 | End: 2018-10-01

## 2017-10-30 RX ORDER — PANTOPRAZOLE SODIUM 40 MG/10ML
40 INJECTION, POWDER, LYOPHILIZED, FOR SOLUTION INTRAVENOUS DAILY
Status: DISCONTINUED | OUTPATIENT
Start: 2017-10-30 | End: 2017-11-02

## 2017-10-30 RX ORDER — DEXMEDETOMIDINE HYDROCHLORIDE 4 UG/ML
0.4 INJECTION, SOLUTION INTRAVENOUS CONTINUOUS
Status: DISCONTINUED | OUTPATIENT
Start: 2017-10-30 | End: 2017-10-31

## 2017-10-30 RX ORDER — MORPHINE SULFATE 4 MG/ML
4 INJECTION, SOLUTION INTRAMUSCULAR; INTRAVENOUS EVERY 4 HOURS PRN
Status: DISCONTINUED | OUTPATIENT
Start: 2017-10-30 | End: 2017-10-30

## 2017-10-30 RX ORDER — SODIUM CHLORIDE 9 MG/ML
INJECTION, SOLUTION INTRAVENOUS CONTINUOUS
Status: DISCONTINUED | OUTPATIENT
Start: 2017-10-30 | End: 2017-10-31

## 2017-10-30 RX ORDER — HEPARIN SODIUM 10000 [USP'U]/100ML
500 INJECTION, SOLUTION INTRAVENOUS CONTINUOUS
Status: DISCONTINUED | OUTPATIENT
Start: 2017-10-30 | End: 2017-10-31

## 2017-10-30 RX ORDER — SODIUM CHLORIDE 9 MG/ML
INJECTION, SOLUTION INTRAVENOUS CONTINUOUS
Status: DISCONTINUED | OUTPATIENT
Start: 2017-10-30 | End: 2017-11-02 | Stop reason: HOSPADM

## 2017-10-30 RX ORDER — MORPHINE SULFATE 2 MG/ML
4 INJECTION, SOLUTION INTRAMUSCULAR; INTRAVENOUS
Status: COMPLETED | OUTPATIENT
Start: 2017-10-30 | End: 2017-10-30

## 2017-10-30 RX ORDER — NAPROXEN SODIUM 220 MG/1
81 TABLET, FILM COATED ORAL DAILY
Status: DISCONTINUED | OUTPATIENT
Start: 2017-10-30 | End: 2017-11-02 | Stop reason: HOSPADM

## 2017-10-30 RX ADMIN — SODIUM CHLORIDE: 0.9 INJECTION, SOLUTION INTRAVENOUS at 05:10

## 2017-10-30 RX ADMIN — DEXMEDETOMIDINE HYDROCHLORIDE 1 MCG/KG/HR: 4 INJECTION, SOLUTION INTRAVENOUS at 01:10

## 2017-10-30 RX ADMIN — MORPHINE SULFATE 4 MG: 4 INJECTION, SOLUTION INTRAMUSCULAR; INTRAVENOUS at 09:10

## 2017-10-30 RX ADMIN — HYDROMORPHONE HYDROCHLORIDE 1 MG: 1 INJECTION, SOLUTION INTRAMUSCULAR; INTRAVENOUS; SUBCUTANEOUS at 02:10

## 2017-10-30 RX ADMIN — ACETYLCYSTEINE 600 MG: 200 SOLUTION ORAL; RESPIRATORY (INHALATION) at 05:10

## 2017-10-30 RX ADMIN — SODIUM CHLORIDE: 0.9 INJECTION, SOLUTION INTRAVENOUS at 09:10

## 2017-10-30 RX ADMIN — ALTEPLASE 10 MG: 2.2 INJECTION, POWDER, LYOPHILIZED, FOR SOLUTION INTRAVENOUS at 07:10

## 2017-10-30 RX ADMIN — OXYCODONE HYDROCHLORIDE AND ACETAMINOPHEN 1 TABLET: 10; 325 TABLET ORAL at 06:10

## 2017-10-30 RX ADMIN — HYDROMORPHONE HYDROCHLORIDE 1 MG: 1 INJECTION, SOLUTION INTRAMUSCULAR; INTRAVENOUS; SUBCUTANEOUS at 04:10

## 2017-10-30 RX ADMIN — SODIUM CHLORIDE 1000 ML: 0.9 INJECTION, SOLUTION INTRAVENOUS at 04:10

## 2017-10-30 RX ADMIN — DEXMEDETOMIDINE HYDROCHLORIDE 1 MCG/KG/HR: 4 INJECTION, SOLUTION INTRAVENOUS at 05:10

## 2017-10-30 RX ADMIN — ACETYLCYSTEINE 600 MG: 200 SOLUTION ORAL; RESPIRATORY (INHALATION) at 08:10

## 2017-10-30 RX ADMIN — ONDANSETRON 4 MG: 2 INJECTION, SOLUTION INTRAMUSCULAR; INTRAVENOUS at 02:10

## 2017-10-30 RX ADMIN — HYDROMORPHONE HYDROCHLORIDE 2 MG: 2 INJECTION, SOLUTION INTRAMUSCULAR; INTRAVENOUS; SUBCUTANEOUS at 10:10

## 2017-10-30 RX ADMIN — Medication 50 MCG/HR: at 10:10

## 2017-10-30 RX ADMIN — HEPARIN SODIUM 500 UNITS/HR: 10000 INJECTION, SOLUTION INTRAVENOUS at 10:10

## 2017-10-30 RX ADMIN — LORAZEPAM 2 MG: 2 INJECTION INTRAMUSCULAR; INTRAVENOUS at 10:10

## 2017-10-30 RX ADMIN — ALTEPLASE 10 MG: 2.2 INJECTION, POWDER, LYOPHILIZED, FOR SOLUTION INTRAVENOUS at 09:10

## 2017-10-30 RX ADMIN — DEXMEDETOMIDINE HYDROCHLORIDE 1 MCG/KG/HR: 4 INJECTION, SOLUTION INTRAVENOUS at 08:10

## 2017-10-30 RX ADMIN — DEXMEDETOMIDINE HYDROCHLORIDE 0.4 MCG/KG/HR: 4 INJECTION, SOLUTION INTRAVENOUS at 09:10

## 2017-10-30 RX ADMIN — MORPHINE SULFATE 4 MG: 2 INJECTION, SOLUTION INTRAMUSCULAR; INTRAVENOUS at 01:10

## 2017-10-30 RX ADMIN — IOHEXOL 100 ML: 350 INJECTION, SOLUTION INTRAVENOUS at 03:10

## 2017-10-30 NOTE — ASSESSMENT & PLAN NOTE
- Remote h/o fem-pop bypass with occlusion in 3/2017 requiring fem-distal bypass.  - Patient with recurrent fem-distal bypass occlusion 5/25/17 s/p thrombolysis, and 6/3/17 s/p thrombolysis + PTA.  - Plan as above.

## 2017-10-30 NOTE — HPI
Mr. Guardado id a 55 yo male with a PMHx of uncontrolled HTN, prediabetes, COPD, h/o TIA, HLD, h/o substance abuse, noncompliance, and PAD with remote left fem-pop bypass 11/2014 + right fem-pop bypass 9/2016 + right fem-distal bypass 3/2017 with multiple right bypass occlusions, who presented to the ED with c/o right leg pain at rest that has progressively worsened over the past 1 week.  Pain is most severe in right calf and foot.  Associated decreased sensation to foot, skin coolness and discoloration (pale until turned red yesterday).  Patient is on Xarelto, and states he is compliant without missing any doses.  Initial work-up in ED with CTA RLE revealed occlusion of femoral artery to below-knee popliteal artery bypass graft; right femoral artery total occlusion; Occlusion of the origin of right profunda femoral artery; Occluded proximal popliteal artery; Severe stenosis of the tibioperoneal trunk; Severe stenosis of the proximal anterior tibial artery with multiple areas of near complete occlusion; Patent dorsalis pedis artery.  Case discussed with Vascular Surgery, who recommend peripheral angiogram.  No heparin drip due to patient being on Xarelto.  Hospital Medicine consulted for admission.  Currently, patient appears comfortable, in NAD.  BP notably elevated upon arrival to ED at 203/102.  Patient admits to noncompliance with BP meds, but insists he has never missed a dose of Xarelto.

## 2017-10-30 NOTE — PLAN OF CARE
Problem: Patient Care Overview  Goal: Plan of Care Review  Outcome: Ongoing (interventions implemented as appropriate)  Pt pain under control; resting; wife updated earlier and now at bedside; see flowsheet for medication rates and dosages; ECKOs remains in place; Dr. Houser will take pt back to cath lab to reevaluate tomorrow morning; right foot remains cold and pale with weak pulse to post tib via doppler; no pulse at dorsal pedalis with doppler; valerio cath placed for accurate I&Os; VSS; NADN.

## 2017-10-30 NOTE — PLAN OF CARE
Problem: Patient Care Overview  Goal: Plan of Care Review  Outcome: Ongoing (interventions implemented as appropriate)  Pt remained free of injury during shift, stable condition, reports little relief of pain after receiving PRN medication, no acute distress, receiving IV fluids, R foot has swelling/redness and scabs with pedal pulse absent and +2 pulses at ankle and <3 sec cap refill,  on cardiac monitoring (SR, HR 80s), and will continue to monitor. 24hr chart review performed.

## 2017-10-30 NOTE — ED NOTES
Report received from Aleksey BULL. Pt reports minimal relief in pain. Pt reports intermittent pain to right foot x 1 week. Unable to retrieve dorsal pedal pulse with doppler. R foot is red and cool to the touch. Pt denies sensation from the ankle and below. POC discussed. Will continue to monitor.

## 2017-10-30 NOTE — ASSESSMENT & PLAN NOTE
Class II A ischemia right leg.  Will do urgent right leg angio with tpa thrombolysis.  High risk of amputation and internal/external bleeding due to xarelto and needing tpa

## 2017-10-30 NOTE — ASSESSMENT & PLAN NOTE
- CTA of RLE revealedocclusion of femoral artery to below-knee popliteal artery bypass graft; right femoral artery total occlusion; Occlusion of the origin of right profunda femoral artery; Occluded proximal popliteal artery; Severe stenosis of the tibioperoneal trunk; Severe stenosis of the proximal anterior tibial artery with multiple areas of near complete occlusion; Patent dorsalis pedis artery.   - Case discussed with Vascular Surgery.  - Will admit to Telemetry.  - No heparin drip since patient is on chronic Xarelto.   - Plans for peripheral angiogram this AM.  - Resume Xarelto post-procedure; Last dose 10/29 PM.  - Continue ASA and statin therapy.  - Neurovascular checks Q4 hours.

## 2017-10-30 NOTE — SUBJECTIVE & OBJECTIVE
"Past Medical History:   Diagnosis Date    Femoral-popliteal bypass graft occlusion     Left and right    GERD (gastroesophageal reflux disease)     History of substance abuse     History of transient ischemic attack (TIA)     Hyperlipemia     Hypertension     Neuropathy     Occlusion of right femorotibial bypass graft 5/2017, 6/2017    Pre-diabetes     Status post femorotibial bypass        Past Surgical History:   Procedure Laterality Date    back fusion      FEMORAL BYPASS      left fem-pop bypass 11/2014; right fem-pop bypass 9/2016, right fem-distal bypass 3/2017       Review of patient's allergies indicates:   Allergen Reactions    Pcn [penicillins] Other (See Comments)     Other      Bactrim [sulfamethoxazole-trimethoprim] Rash     Patient requests medication be listed as an "Intolerance" rather than an allergy, stating the rash is not "so bad."    Ceclor [cefaclor] Rash    Latex, natural rubber Rash       No current facility-administered medications on file prior to encounter.      Current Outpatient Prescriptions on File Prior to Encounter   Medication Sig    amlodipine (NORVASC) 10 MG tablet Take 10 mg by mouth once daily.    aspirin 81 MG Chew Take 81 mg by mouth once daily.    atorvastatin (LIPITOR) 20 MG tablet Take 20 mg by mouth once daily.    naftifine (NAFTIN) 2 % Crea Apply 1 application topically once daily.    oxycodone-acetaminophen (PERCOCET)  mg per tablet Take 1 tablet by mouth every 6 (six) hours as needed for Pain (for breakthrough pain).    rivaroxaban (XARELTO) 20 mg Tab Take 20 mg by mouth daily with dinner or evening meal.    gabapentin (NEURONTIN) 100 MG capsule Take 1 capsule (100 mg total) by mouth 3 (three) times daily.     Family History     Problem Relation (Age of Onset)    COPD Mother, Father    Diabetes Mother, Father    Heart attack Mother    Heart disease Mother    Stroke Mother        Social History Main Topics    Smoking status: Former Smoker "    Smokeless tobacco: Former User     Quit date: 10/22/2016    Alcohol use No    Drug use: No      Comment: Former illicit drug user.  On Suboxone    Sexual activity: Yes     Review of Systems   Constitutional: Negative for activity change, chills, diaphoresis, fatigue, fever and unexpected weight change.   HENT: Negative for congestion, sore throat and trouble swallowing.    Eyes: Negative for visual disturbance.   Respiratory: Negative for apnea, cough, chest tightness, shortness of breath and wheezing.    Cardiovascular: Negative for chest pain, palpitations and leg swelling.   Gastrointestinal: Negative for abdominal distention, abdominal pain, blood in stool, constipation, diarrhea, nausea and vomiting.   Endocrine: Negative for polydipsia, polyphagia and polyuria.   Genitourinary: Negative for decreased urine volume, difficulty urinating, dysuria, frequency, hematuria and urgency.   Musculoskeletal: Positive for gait problem and myalgias. Negative for arthralgias, back pain and joint swelling.   Skin: Positive for color change and pallor. Negative for rash and wound.   Neurological: Positive for numbness. Negative for dizziness, seizures, syncope, facial asymmetry, speech difficulty, light-headedness and headaches.   Psychiatric/Behavioral: Negative for confusion. The patient is not nervous/anxious.    All other systems reviewed and are negative.    Objective:     Vital Signs (Most Recent):  Temp: 98.2 °F (36.8 °C) (10/30/17 0452)  Pulse: 85 (10/30/17 0452)  Resp: 17 (10/30/17 0452)  BP: (!) 165/84 (10/30/17 0452)  SpO2: (!) 94 % (10/30/17 0452) Vital Signs (24h Range):  Temp:  [97.2 °F (36.2 °C)-98.6 °F (37 °C)] 98.2 °F (36.8 °C)  Pulse:  [84-92] 85  Resp:  [17-20] 17  SpO2:  [94 %-99 %] 94 %  BP: (164-203)/() 165/84     Weight: 81 kg (178 lb 9.6 oz)  Body mass index is 25.63 kg/m².    Physical Exam   Constitutional: He is oriented to person, place, and time. He appears well-developed and  well-nourished. No distress.   HENT:   Head: Normocephalic and atraumatic.   Eyes: Conjunctivae and EOM are normal. Pupils are equal, round, and reactive to light.   Neck: Normal range of motion. Neck supple. No JVD present.   Cardiovascular: Normal rate, regular rhythm, normal heart sounds and intact distal pulses.  Exam reveals no gallop.    No murmur heard.  Pulmonary/Chest: Effort normal and breath sounds normal. No respiratory distress. He has no wheezes. He has no rales.   Abdominal: Soft. Bowel sounds are normal. He exhibits no distension. There is no tenderness.   Musculoskeletal: Normal range of motion. He exhibits no edema or deformity.        Right ankle: He exhibits abnormal pulse. Tenderness.        Left lower leg: He exhibits tenderness.   Redness to right foot with absent pulse.  Decreased sensation and muscle strength to right foot.   Neurological: He is alert and oriented to person, place, and time. He has normal reflexes. No cranial nerve deficit.   Skin: Skin is warm and dry. Capillary refill takes more than 3 seconds. to RLE. No rash noted. There is erythema (right foot).   Psychiatric: He has a normal mood and affect. Judgment normal.   Nursing note and vitals reviewed.       Significant Labs:   Recent Lab Results       10/30/17  0153 10/30/17  0150      Albumin  3.6     Alkaline Phosphatase  111     ALT  24     Anion Gap  11     aPTT  37.1  Comment:  aPTT therapeutic range = 39-69 seconds(H)     AST  23     Baso #  0.05     Basophil%  0.5     Total Bilirubin  0.2  Comment:  For infants and newborns, interpretation of results should be based  on gestational age, weight and in agreement with clinical  observations.  Premature Infant recommended reference ranges:  Up to 24 hours.............<8.0 mg/dL  Up to 48 hours............<12.0 mg/dL  3-5 days..................<15.0 mg/dL  6-29 days.................<15.0 mg/dL       BUN, Bld  12     Calcium  9.7     Chloride  103     CO2  24     Creatinine   0.9     Differential Method  Automated     eGFR if   >60     eGFR if non   >60  Comment:  Calculation used to obtain the estimated glomerular filtration  rate (eGFR) is the CKD-EPI equation. Since race is unknown   in our information system, the eGFR values for   -American and Non--American patients are given   for each creatinine result.       Eos #  0.3     Eosinophil%  2.6     Glucose  92     Gran #  7.3     Gran%  72.8     Group & Rh O POS      Hematocrit  38.3(L)     Hemoglobin  12.8(L)     INDIRECT MILI NEG      Coumadin Monitoring INR  1.2  Comment:  Coumadin Therapy:  2.0 - 3.0 for INR for all indicators except mechanical heart valves  and antiphospholipid syndromes which should use 2.5 - 3.5.       Lymph #  1.5     Lymph%  15.2(L)     MCH  29.2     MCHC  33.4     MCV  87     Mono #  0.9     Mono%  8.9     MPV  8.3(L)     Platelets  346     Potassium  3.9     Total Protein  7.7     Protime  12.6(H)     RBC  4.39(L)     RDW  14.2     Sodium  138     WBC  10.03         All pertinent labs within the past 24 hours have been reviewed.    Significant Imaging:   Imaging Results          CTA Lower Extremity Right (In process)  Result time 10/30/17 02:44:26   Procedure changed from CTA Runoff ABD PEL Bilat Lower Ext                I have reviewed all pertinent imaging results/findings within the past 24 hours.     EKG: (personally reviewed)  Normal sinus rhythm with no acute changes from previous tracings.

## 2017-10-30 NOTE — ED PROVIDER NOTES
"SCRIBE #1 NOTE: I, Marcelle Schroeder, am scribing for, and in the presence of, Meredith Lyons DO. I have scribed the entire note.      History      Chief Complaint   Patient presents with    Leg Pain     Pt reports R leg cramping and R ft discoloration x1wk.       Review of patient's allergies indicates:   Allergen Reactions    Pcn [penicillins] Other (See Comments)     Other      Bactrim [sulfamethoxazole-trimethoprim] Rash     Patient requests medication be listed as an "Intolerance" rather than an allergy, stating the rash is not "so bad."    Ceclor [cefaclor] Rash    Latex, natural rubber Rash        HPI   HPI    10/30/2017, 1:30 AM   History obtained from the patient      History of Present Illness: Ish Guardado is a 57 y.o. male patient who presents to the Emergency Department for leg pain which onset gradually 1 week ago. Symptoms are constant and moderate in severity. Pt rates his pain to be a 10/10. Pt's wife reports that his foot turned red yesterday; however before then it was "pale white". Pt reports that he is currently on Xarelto, pt last took a dose last night at 10PM. Pt reports having two procedures (fem pop) done on the same leg and a TPA done. Pt reports that he fractured his ankle a couple weeks ago. Pain is exacerbated with palpation to affected area. Associated sxs include R calf pain. Patient denies any CP, SOB, palpitations, fever, diaphoresis, hip pain, knee pain, abscess, and all other sxs at this time. No further complaints or concerns at this time.         Arrival mode: Personal vehicle    PCP: Pollo Arana MD       Past Medical History:  Past Medical History:   Diagnosis Date    H/O aorto-femoral bypass     Hyperlipemia     Hypertension     Pre-diabetes        Past Surgical History:  Past Surgical History:   Procedure Laterality Date    back fusion      FEMORAL BYPASS           Family History:  Family History   Problem Relation Age of Onset    COPD Mother     Diabetes " Mother     Heart disease Mother     Stroke Mother     Heart attack Mother     COPD Father     Diabetes Father        Social History:  Social History     Social History Main Topics    Smoking status: Former Smoker    Smokeless tobacco: Former User     Quit date: 10/22/2016    Alcohol use No    Drug use: No    Sexual activity: Yes       ROS   Review of Systems   Constitutional: Negative for fever.   HENT: Negative for sore throat.    Respiratory: Negative for shortness of breath.    Cardiovascular: Negative for chest pain.   Gastrointestinal: Negative for nausea.   Genitourinary: Negative for dysuria.   Musculoskeletal: Positive for arthralgias. Negative for back pain.        (+) LLE pain   Skin: Positive for color change (redness) and wound. Negative for rash.   Neurological: Negative for weakness.   Hematological: Does not bruise/bleed easily.       Physical Exam      Initial Vitals [10/30/17 0041]   BP Pulse Resp Temp SpO2   (!) 203/102 92 20 97.2 °F (36.2 °C) 99 %      MAP       135.67          Physical Exam  Nursing Notes and Vital Signs Reviewed.  Constitutional: Patient is in no apparent distress. Well-developed and well-nourished.  Head: Atraumatic. Normocephalic.  Eyes: PERRL. EOM intact. Conjunctivae are not pale. No scleral icterus.  ENT: Mucous membranes are moist. Oropharynx is clear and symmetric.    Neck: Supple. Full ROM. No lymphadenopathy.  Cardiovascular: Regular rate. Regular rhythm. No murmurs, rubs, or gallops. Distal pulses are 2+ and symmetric.  Pulmonary/Chest: No respiratory distress. Clear to auscultation bilaterally. No wheezing, rales, or rhonchi.  Abdominal: Soft and non-distended.  There is no tenderness.  No rebound, guarding, or rigidity. Good bowel sounds.  Genitourinary: No CVA tenderness  Musculoskeletal: Moves all extremities. No obvious deformities. No edema. R calf tenderness. TTP to dorsal aspect of right foot. Sluggish capillary refill >5 seconds. Difficulty to wiggle  "toes.   Skin: Warm and dry. Toes are cold to touch.  Multiple excoriations on dorsal aspect of the right foot. Red demarcation to the anterior part of the foot.  Neurological:  Alert, awake, and appropriate.  Normal speech.  No acute focal neurological deficits are appreciated.  Psychiatric: Normal affect. Good eye contact. Appropriate in content.        ED Course    Procedures  ED Vital Signs:  Vitals:    10/30/17 0041 10/30/17 0240 10/30/17 0318 10/30/17 0452   BP: (!) 203/102 (!) 174/88 (!) 164/84 (!) 165/84   Pulse: 92 84 84 85   Resp: 20 20 18 17   Temp: 97.2 °F (36.2 °C)  98.6 °F (37 °C) 98.2 °F (36.8 °C)   TempSrc: Oral  Oral Oral   SpO2: 99% 99% 99% (!) 94%   Weight: 83.9 kg (185 lb)   81 kg (178 lb 9.6 oz)   Height: 5' 10" (1.778 m)          Abnormal Lab Results:  Labs Reviewed   PROTIME-INR - Abnormal; Notable for the following:        Result Value    Prothrombin Time 12.6 (*)     All other components within normal limits   APTT - Abnormal; Notable for the following:     aPTT 37.1 (*)     All other components within normal limits   CBC W/ AUTO DIFFERENTIAL - Abnormal; Notable for the following:     RBC 4.39 (*)     Hemoglobin 12.8 (*)     Hematocrit 38.3 (*)     MPV 8.3 (*)     Lymph% 15.2 (*)     All other components within normal limits   COMPREHENSIVE METABOLIC PANEL   TYPE & SCREEN        All Lab Results:  Results for orders placed or performed during the hospital encounter of 10/30/17   Protime-INR   Result Value Ref Range    Prothrombin Time 12.6 (H) 9.0 - 12.5 sec    INR 1.2 0.8 - 1.2   APTT   Result Value Ref Range    aPTT 37.1 (H) 21.0 - 32.0 sec   CBC auto differential   Result Value Ref Range    WBC 10.03 3.90 - 12.70 K/uL    RBC 4.39 (L) 4.60 - 6.20 M/uL    Hemoglobin 12.8 (L) 14.0 - 18.0 g/dL    Hematocrit 38.3 (L) 40.0 - 54.0 %    MCV 87 82 - 98 fL    MCH 29.2 27.0 - 31.0 pg    MCHC 33.4 32.0 - 36.0 g/dL    RDW 14.2 11.5 - 14.5 %    Platelets 346 150 - 350 K/uL    MPV 8.3 (L) 9.2 - 12.9 fL    " Gran # 7.3 1.8 - 7.7 K/uL    Lymph # 1.5 1.0 - 4.8 K/uL    Mono # 0.9 0.3 - 1.0 K/uL    Eos # 0.3 0.0 - 0.5 K/uL    Baso # 0.05 0.00 - 0.20 K/uL    Gran% 72.8 38.0 - 73.0 %    Lymph% 15.2 (L) 18.0 - 48.0 %    Mono% 8.9 4.0 - 15.0 %    Eosinophil% 2.6 0.0 - 8.0 %    Basophil% 0.5 0.0 - 1.9 %    Differential Method Automated    Comprehensive metabolic panel   Result Value Ref Range    Sodium 138 136 - 145 mmol/L    Potassium 3.9 3.5 - 5.1 mmol/L    Chloride 103 95 - 110 mmol/L    CO2 24 23 - 29 mmol/L    Glucose 92 70 - 110 mg/dL    BUN, Bld 12 6 - 20 mg/dL    Creatinine 0.9 0.5 - 1.4 mg/dL    Calcium 9.7 8.7 - 10.5 mg/dL    Total Protein 7.7 6.0 - 8.4 g/dL    Albumin 3.6 3.5 - 5.2 g/dL    Total Bilirubin 0.2 0.1 - 1.0 mg/dL    Alkaline Phosphatase 111 55 - 135 U/L    AST 23 10 - 40 U/L    ALT 24 10 - 44 U/L    Anion Gap 11 8 - 16 mmol/L    eGFR if African American >60 >60 mL/min/1.73 m^2    eGFR if non African American >60 >60 mL/min/1.73 m^2   Type & Screen   Result Value Ref Range    Group & Rh O POS     Indirect Soraya NEG          Imaging Results:  Imaging Results          CTA Lower Extremity Right (In process)  Result time 10/30/17 02:44:26   Procedure changed from CTA Runoff ABD PEL Bilat Lower Ext                The EKG was ordered, reviewed, and independently interpreted by the ED provider.  Interpretation time: 1:41  Rate: 91 BPM  Rhythm: normal sinus rhythm  Interpretation: Septal infarct, no acute ST changes. No STEMI.      Ordered, reviewed, and independently interpreted by the ED provider.  Study: CT angiography of the right lower extremity with intravenous contrast  Findings:   1. Evidence of previous right common femoral artery to below-knee popliteal artery bypass graft which appears completely occluded.   2. The right femoral artery is occluded  3. There is occlusion of the origin of the right profunda femoral artery however the remainder of the artery appears patent  4. The proximal popliteal artery  is occluded. Some reconstitution is seen of the popliteal artery just above the knee with severe stenosis in the remainder of the popliteal artery.   5. Severe stenosis of the tibioperoneal trunk  6. Sever stenosis of the proximal anterior tibial artery with multiple areas of near complete occlusion. The dorsalis pedis artery is patent.  7. Evidence of previous talar fracture.          The Emergency Provider reviewed the vital signs and test results, which are outlined above.    ED Discussion     2:15 AM: Dr. Lyons discussed the pt's case with Dr. Houser (Vascular) who recommends getting a CTA of the L lower extremity. Hold heparin  As he took his xarelto at 22:00 tonight    3:43 AM Foot less red, pain improved.       4:04 AM: Dr. Lyons discussed the pt's case with Dr. Tijerina (Vasular) who recommends admitting pt. Holding off on heparin. Dr. Tijerina will perform an angiogram.    4:05 AM: Discussed case with Dr. Gutiérrez ([facility]) who agrees with current care and management of pt and accepts admission.   Admitting Service: [facility] medicine   Admitting Physician: Dr. Gutiérrez  Admit to: Telemetry    4:05 AM: Re-evaluated pt. I have discussed test results, shared treatment plan, and the need for admission with patient and family at bedside. Pt and family express understanding at this time and agree with all information. All questions answered. Pt and family have no further questions or concerns at this time. Pt is ready for admit.            ED Medication(s):  Medications   acetylcysteine 200 mg/ml (20%) solution 600 mg (600 mg Oral Given 10/30/17 0527)   0.9%  NaCl infusion ( Intravenous New Bag 10/30/17 0527)   pantoprazole injection 40 mg (not administered)   oxyCODONE-acetaminophen  mg per tablet 1 tablet (not administered)   amLODIPine tablet 10 mg (not administered)   aspirin chewable tablet 81 mg (not administered)   atorvastatin tablet 20 mg (not administered)   morphine injection 4 mg (4  mg Intravenous Given 10/30/17 0150)   HYDROmorphone injection 1 mg (1 mg Intravenous Given 10/30/17 0248)   ondansetron injection 4 mg (4 mg Intravenous Given 10/30/17 0249)   omnipaque 350 iohexol 100 mL (100 mLs Intravenous Given 10/30/17 0305)   0.9%  NaCl infusion (1,000 mLs Intravenous New Bag 10/30/17 0428)   HYDROmorphone injection 1 mg (1 mg Intravenous Given 10/30/17 0429)       Current Discharge Medication List                Medical Decision Making    Medical Decision Making:   Clinical Tests:   Lab Tests: Reviewed and Ordered  Radiological Study: Reviewed and Ordered  Medical Tests: Reviewed and Ordered           Scribe Attestation:   Scribe #1: I performed the above scribed service and the documentation accurately describes the services I performed. I attest to the accuracy of the note.    Attending:   Physician Attestation Statement for Scribe #1: I, Meredith Lyons DO, personally performed the services described in this documentation, as scribed by Marcelle Schroeder, in my presence, and it is both accurate and complete.          Clinical Impression       ICD-10-CM ICD-9-CM   1. PAD (peripheral artery disease) I73.9 443.9   2. Vascular occlusion I99.8 459.9   3. Anemia, unspecified type D64.9 285.9       Disposition:   Disposition: Admitted  Condition: Serious         Meredith Lyons DO  10/30/17 0653

## 2017-10-30 NOTE — CONSULTS
[facility] -   Vascular Surgery  Consult Note    Consults Serena  Subjective:     Chief Complaint/Reason for Admission: right foot pain    History of Present Illness: Mr. Guardado id a 57 yo male with a PMHx of uncontrolled HTN, prediabetes, COPD, h/o TIA, HLD, h/o substance abuse, noncompliance, and PAD with remote left fem-pop bypass 11/2014 + right fem-pop bypass 9/2016 + right fem-distal bypass 3/2017 with multiple right bypass occlusions, who presented to the ED with c/o right leg pain at rest that has progressively worsened over the past 1 week.  Pain is most severe in right calf and foot.  Associated decreased sensation to foot, skin coolness and discoloration (pale until turned red yesterday).  Patient is on Xarelto, and states he is compliant without missing any doses.  Initial work-up in ED with CTA RLE revealed occlusion of femoral artery to below-knee popliteal artery bypass graft; right femoral artery total occlusion; Occlusion of the origin of right profunda femoral artery; Occluded proximal popliteal artery; Severe stenosis of the tibioperoneal trunk; Severe stenosis of the proximal anterior tibial artery with multiple areas of near complete occlusion; Patent dorsalis pedis artery.  Case discussed with Vascular Surgery, who recommend peripheral angiogram.  No heparin drip due to patient being on Xarelto.  Hospital Medicine consulted for admission.  Currently, patient appears comfortable, in NAD.  BP notably elevated upon arrival to ED at 203/102.  Patient admits to noncompliance with BP meds, but insists he has never missed a dose of Xarelto.    Prescriptions Prior to Admission   Medication Sig Dispense Refill Last Dose    amlodipine (NORVASC) 10 MG tablet Take 10 mg by mouth once daily.   10/30/2017    aspirin 81 MG Chew Take 81 mg by mouth once daily.   10/30/2017    atorvastatin (LIPITOR) 20 MG tablet Take 20 mg by mouth once daily.   10/30/2017    famotidine (PEPCID) 20 MG  "tablet Take 20 mg by mouth.       naftifine (NAFTIN) 2 % Crea Apply 1 application topically once daily. 45 g 2 10/30/2017    oxycodone-acetaminophen (PERCOCET)  mg per tablet Take 1 tablet by mouth every 6 (six) hours as needed for Pain (for breakthrough pain). 20 tablet 0 Past Week    rivaroxaban (XARELTO) 20 mg Tab Take 20 mg by mouth daily with dinner or evening meal.   10/30/2017    gabapentin (NEURONTIN) 100 MG capsule Take 1 capsule (100 mg total) by mouth 3 (three) times daily. 42 capsule 0 Taking       Review of patient's allergies indicates:   Allergen Reactions    Pcn [penicillins] Other (See Comments)     Other      Bactrim [sulfamethoxazole-trimethoprim] Rash     Patient requests medication be listed as an "Intolerance" rather than an allergy, stating the rash is not "so bad."    Ceclor [cefaclor] Rash    Latex, natural rubber Rash       Past Medical History:   Diagnosis Date    Femoral-popliteal bypass graft occlusion     Left and right    GERD (gastroesophageal reflux disease)     History of substance abuse     History of transient ischemic attack (TIA)     Hyperlipemia     Hypertension     Neuropathy     Occlusion of right femorotibial bypass graft 5/2017, 6/2017    Pre-diabetes     Status post femorotibial bypass      Past Surgical History:   Procedure Laterality Date    back fusion      FEMORAL BYPASS      left fem-pop bypass 11/2014; right fem-pop bypass 9/2016, right fem-distal bypass 3/2017     Family History     Problem Relation (Age of Onset)    COPD Mother, Father    Diabetes Mother, Father    Heart attack Mother    Heart disease Mother    Stroke Mother        Social History Main Topics    Smoking status: Former Smoker    Smokeless tobacco: Former User     Quit date: 10/22/2016    Alcohol use No    Drug use: No      Comment: Former illicit drug user.  On Suboxone    Sexual activity: Yes     Review of Systems   Constitutional: Negative.    HENT: Negative.    Eyes: " Negative.    Respiratory: Negative.    Cardiovascular: Negative.    Gastrointestinal: Negative.    Endocrine: Negative.    Genitourinary: Negative.    Musculoskeletal: Negative.         As above   Skin: Positive for pallor.   Allergic/Immunologic: Negative.    Neurological: Negative.    Hematological: Negative.    Psychiatric/Behavioral: Negative.    All other systems reviewed and are negative.    Objective:     Vital Signs (Most Recent):  Temp: 98.2 °F (36.8 °C) (10/30/17 0452)  Pulse: 85 (10/30/17 0452)  Resp: 17 (10/30/17 0452)  BP: (!) 165/84 (10/30/17 0452)  SpO2: (!) 94 % (10/30/17 0452) Vital Signs (24h Range):  Temp:  [97.2 °F (36.2 °C)-98.6 °F (37 °C)] 98.2 °F (36.8 °C)  Pulse:  [84-92] 85  Resp:  [17-20] 17  SpO2:  [94 %-99 %] 94 %  BP: (164-203)/() 165/84     Weight: 81 kg (178 lb 9.6 oz)  Body mass index is 25.63 kg/m².    Physical Exam   Constitutional: He appears well-developed.   HENT:   Head: Normocephalic.   Eyes: Pupils are equal, round, and reactive to light.   Neck: Normal range of motion.   Cardiovascular: Normal rate.    Pulmonary/Chest: Effort normal.   Abdominal: Soft.   Musculoskeletal:   + 2 right femoral pulse with non palp pop nor pedals.  Right foot has superficial ulcers with dependent rubor but full rom and intact sensation   Neurological: He is alert.   Skin: Capillary refill takes more than 3 seconds. There is erythema.   Psychiatric: He has a normal mood and affect.   Vitals reviewed.      Significant Labs:  All pertinent labs from the last 24 hours have been reviewed.    Significant Diagnostics:  I have reviewed all pertinent imaging results/findings within the past 24 hours.    Assessment/Plan:     PAD with remote right fem-distal bypass    Class II A ischemia right leg.  Will do urgent right leg angio with tpa thrombolysis.  High risk of amputation and internal/external bleeding due to xarelto and needing tpa            Thank you for your consult. needs urgent right leg  angio as above    Avila Houser MD  Vascular Surgery  [facility]

## 2017-10-30 NOTE — PLAN OF CARE
D? C planning initiated. Patient asleep and hard to arouse. Patient had  Angiogram to rt leg and has thrombectomy/ EKOS done this AM. Due to medical condittion , CM unable to determine an optima; d/c plan. CM to f/u for safe transition.       10/30/17 1239   Discharge Assessment   Assessment Type Discharge Planning Assessment   Assessment information obtained from? Medical Record   Expected Length of Stay (days) (TBD)   Communicated expected length of stay with patient/caregiver no   Prior to hospitilization cognitive status: Unable to Assess   Current cognitive status: Unable to Assess   Lives With spouse   Able to Return to Prior Arrangements yes   Is patient able to care for self after discharge? Unable to determine at this time (comments)   Patient's perception of discharge disposition other (comments)   Readmission Within The Last 30 Days no previous admission in last 30 days   Patient currently being followed by outpatient case management? No   Patient currently receives any other outside agency services? No   Equipment Currently Used at Home walker, rolling;crutches, axillary;shower chair;cane, straight   Do you have any problems affording any of your prescribed medications? No   Is the patient taking medications as prescribed? yes   Does the patient have transportation home? Yes   Transportation Available family or friend will provide;car   Discharge Plan A Home with family;Home Health   Discharge Plan B Home with family   Patient/Family In Agreement With Plan unable to assess   \

## 2017-10-30 NOTE — ED NOTES
Report given to Carmelina BULL. Carmelina aware that pharmacy has not brought acetylcysteine. Carmelina to give medicine.

## 2017-10-30 NOTE — OP NOTE
[facility] -   Vascular Surgery  Operative Note    SUMMARY     Date of Procedure: 10/30/2017     Procedure: Procedure(s) (LRB):  ANGIOGRAM-EXTREMITY-LOWER (N/A)   Right leg bypass percutatneous mechanical thrombectomy with tpa thrombolysis using the angiojet device    6 x 6 mm and 3 x 4 mm pta inflow and outflow bypass stenosis    Placement of EKOS right leg    Surgeon(s) and Role:     * Avila Houser MD - Primary    Assisting Surgeon: None    Pre-Operative Diagnosis: PVD (peripheral vascular disease) [I73.9] with occluded right leg bypass with right foot ischemia    Post-Operative Diagnosis: Post-Op Diagnosis Codes:     * PVD (peripheral vascular disease) [I73.9] same    Anesthesia: RN IV Sedation    Technical Procedures Used: right leg angio as above    Description of the Findings of the Procedure: risk and beneifts explained.  To angio suite and supine position. Sedation administered and groins prep sterile. Lidocaine for local and left CFA micropuncture.  6 F sheath up and over with systemic heparin. Administered 5mg tpa pulse spray entire bypass graft then thrombectomy.  6 x 6mm pta and 3 x 4 mm pta inflow and outflow stenosis respectfully. Placed ekos with 80% removal of thrombus.     Significant Surgical Tasks Conducted by the Assistant(s), if Applicable: none    Complications: No    Estimated Blood Loss (EBL): * No values recorded between 10/30/2017 12:00 AM and 10/30/2017  9:04 AM *           Implants: * No implants in log *    Specimens:   Specimen (12h ago through future)    None                  Condition: Good    Disposition: ICU - extubated and stable.    Attestation: I performed the procedure.

## 2017-10-30 NOTE — ASSESSMENT & PLAN NOTE
- CTA of RLE revealedocclusion of femoral artery to below-knee popliteal artery bypass graft; right femoral artery total occlusion; Occlusion of the origin of right profunda femoral artery; Occluded proximal popliteal artery; Severe stenosis of the tibioperoneal trunk; Severe stenosis of the proximal anterior tibial artery with multiple areas of near complete occlusion; Patent dorsalis pedis artery.   - Case discussed with Vascular Surgery.  - Will admit to Telemetry.  - No heparin drip since patient is on chornic Xarelto.  Will hold Xarelto tonight in preparation for procedure.  - Plans for peripheral angiogram in AM.  - Continue ASA and statin therapy.  - Neurovascular checks Q4 hours.

## 2017-10-30 NOTE — H&P
[facility] Hospital Medicine  History & Physical    Patient Name: Ish Guardado  MRN: [id number]  Admission Date: 10/30/2017  Attending Physician: Eze Gutiérrez MD   Primary Care Provider: Pollo Arana MD         Patient information was obtained from patient, past medical records and ER records.     Subjective:     Principal Problem:Ischemic foot    Chief Complaint:   Chief Complaint   Patient presents with    Leg Pain     Pt reports R leg cramping and R ft discoloration x1wk.        HPI: Mr. Guardado id a 57 yo male with a PMHx of uncontrolled HTN, prediabetes, COPD, h/o TIA, HLD, h/o substance abuse, noncompliance, and PAD with remote left fem-pop bypass 11/2014 + right fem-pop bypass 9/2016 + right fem-distal bypass 3/2017 with multiple right bypass occlusions, who presented to the ED with c/o right leg pain at rest that has progressively worsened over the past 1 week.  Pain is most severe in right calf and foot.  Associated decreased sensation to foot, skin coolness and discoloration (pale until turned red yesterday).  Patient is on Xarelto, and states he is compliant without missing any doses.  Initial work-up in ED with CTA RLE revealed occlusion of femoral artery to below-knee popliteal artery bypass graft; right femoral artery total occlusion; Occlusion of the origin of right profunda femoral artery; Occluded proximal popliteal artery; Severe stenosis of the tibioperoneal trunk; Severe stenosis of the proximal anterior tibial artery with multiple areas of near complete occlusion; Patent dorsalis pedis artery.  Case discussed with Vascular Surgery, who recommend peripheral angiogram.  No heparin drip due to patient being on Xarelto.  Hospital Medicine consulted for admission.  Currently, patient appears comfortable, in NAD.  BP notably elevated upon arrival to ED at 203/102.  Patient admits to noncompliance with BP meds, but insists he has never missed a dose of Xarelto.    Past Medical  "History:   Diagnosis Date    Femoral-popliteal bypass graft occlusion     Left and right    GERD (gastroesophageal reflux disease)     History of substance abuse     History of transient ischemic attack (TIA)     Hyperlipemia     Hypertension     Neuropathy     Occlusion of right femorotibial bypass graft 5/2017, 6/2017    Pre-diabetes     Status post femorotibial bypass        Past Surgical History:   Procedure Laterality Date    back fusion      FEMORAL BYPASS      left fem-pop bypass 11/2014; right fem-pop bypass 9/2016, right fem-distal bypass 3/2017       Review of patient's allergies indicates:   Allergen Reactions    Pcn [penicillins] Other (See Comments)     Other      Bactrim [sulfamethoxazole-trimethoprim] Rash     Patient requests medication be listed as an "Intolerance" rather than an allergy, stating the rash is not "so bad."    Ceclor [cefaclor] Rash    Latex, natural rubber Rash       No current facility-administered medications on file prior to encounter.      Current Outpatient Prescriptions on File Prior to Encounter   Medication Sig    amlodipine (NORVASC) 10 MG tablet Take 10 mg by mouth once daily.    aspirin 81 MG Chew Take 81 mg by mouth once daily.    atorvastatin (LIPITOR) 20 MG tablet Take 20 mg by mouth once daily.    naftifine (NAFTIN) 2 % Crea Apply 1 application topically once daily.    oxycodone-acetaminophen (PERCOCET)  mg per tablet Take 1 tablet by mouth every 6 (six) hours as needed for Pain (for breakthrough pain).    rivaroxaban (XARELTO) 20 mg Tab Take 20 mg by mouth daily with dinner or evening meal.    gabapentin (NEURONTIN) 100 MG capsule Take 1 capsule (100 mg total) by mouth 3 (three) times daily.     Family History     Problem Relation (Age of Onset)    COPD Mother, Father    Diabetes Mother, Father    Heart attack Mother    Heart disease Mother    Stroke Mother        Social History Main Topics    Smoking status: Former Smoker    Smokeless " tobacco: Former User     Quit date: 10/22/2016    Alcohol use No    Drug use: No      Comment: Former illicit drug user.  On Suboxone    Sexual activity: Yes     Review of Systems   Constitutional: Negative for activity change, chills, diaphoresis, fatigue, fever and unexpected weight change.   HENT: Negative for congestion, sore throat and trouble swallowing.    Eyes: Negative for visual disturbance.   Respiratory: Negative for apnea, cough, chest tightness, shortness of breath and wheezing.    Cardiovascular: Negative for chest pain, palpitations and leg swelling.   Gastrointestinal: Negative for abdominal distention, abdominal pain, blood in stool, constipation, diarrhea, nausea and vomiting.   Endocrine: Negative for polydipsia, polyphagia and polyuria.   Genitourinary: Negative for decreased urine volume, difficulty urinating, dysuria, frequency, hematuria and urgency.   Musculoskeletal: Positive for gait problem and myalgias. Negative for arthralgias, back pain and joint swelling.   Skin: Positive for color change and pallor. Negative for rash and wound.   Neurological: Positive for numbness. Negative for dizziness, seizures, syncope, facial asymmetry, speech difficulty, light-headedness and headaches.   Psychiatric/Behavioral: Negative for confusion. The patient is not nervous/anxious.    All other systems reviewed and are negative.    Objective:     Vital Signs (Most Recent):  Temp: 98.2 °F (36.8 °C) (10/30/17 0452)  Pulse: 85 (10/30/17 0452)  Resp: 17 (10/30/17 0452)  BP: (!) 165/84 (10/30/17 0452)  SpO2: (!) 94 % (10/30/17 0452) Vital Signs (24h Range):  Temp:  [97.2 °F (36.2 °C)-98.6 °F (37 °C)] 98.2 °F (36.8 °C)  Pulse:  [84-92] 85  Resp:  [17-20] 17  SpO2:  [94 %-99 %] 94 %  BP: (164-203)/() 165/84     Weight: 81 kg (178 lb 9.6 oz)  Body mass index is 25.63 kg/m².    Physical Exam   Constitutional: He is oriented to person, place, and time. He appears well-developed and well-nourished. No  distress.   HENT:   Head: Normocephalic and atraumatic.   Eyes: Conjunctivae and EOM are normal. Pupils are equal, round, and reactive to light.   Neck: Normal range of motion. Neck supple. No JVD present.   Cardiovascular: Normal rate, regular rhythm, normal heart sounds and intact distal pulses.  Exam reveals no gallop.    No murmur heard.  Pulmonary/Chest: Effort normal and breath sounds normal. No respiratory distress. He has no wheezes. He has no rales.   Abdominal: Soft. Bowel sounds are normal. He exhibits no distension. There is no tenderness.   Musculoskeletal: Normal range of motion. He exhibits no edema or deformity.        Right ankle: He exhibits abnormal pulse. Tenderness.        Left lower leg: He exhibits tenderness.   Redness to right foot with absent pulse.  Decreased sensation and muscle strength to right foot.   Neurological: He is alert and oriented to person, place, and time. He has normal reflexes. No cranial nerve deficit.   Skin: Skin is warm and dry. Capillary refill takes more than 3 seconds. to RLE. No rash noted. There is erythema (right foot).   Psychiatric: He has a normal mood and affect. Judgment normal.   Nursing note and vitals reviewed.       Significant Labs:   Recent Lab Results       10/30/17  0153 10/30/17  0150      Albumin  3.6     Alkaline Phosphatase  111     ALT  24     Anion Gap  11     aPTT  37.1  Comment:  aPTT therapeutic range = 39-69 seconds(H)     AST  23     Baso #  0.05     Basophil%  0.5     Total Bilirubin  0.2  Comment:  For infants and newborns, interpretation of results should be based  on gestational age, weight and in agreement with clinical  observations.  Premature Infant recommended reference ranges:  Up to 24 hours.............<8.0 mg/dL  Up to 48 hours............<12.0 mg/dL  3-5 days..................<15.0 mg/dL  6-29 days.................<15.0 mg/dL       BUN, Bld  12     Calcium  9.7     Chloride  103     CO2  24     Creatinine  0.9      Differential Method  Automated     eGFR if   >60     eGFR if non   >60  Comment:  Calculation used to obtain the estimated glomerular filtration  rate (eGFR) is the CKD-EPI equation. Since race is unknown   in our information system, the eGFR values for   -American and Non--American patients are given   for each creatinine result.       Eos #  0.3     Eosinophil%  2.6     Glucose  92     Gran #  7.3     Gran%  72.8     Group & Rh O POS      Hematocrit  38.3(L)     Hemoglobin  12.8(L)     INDIRECT MILI NEG      Coumadin Monitoring INR  1.2  Comment:  Coumadin Therapy:  2.0 - 3.0 for INR for all indicators except mechanical heart valves  and antiphospholipid syndromes which should use 2.5 - 3.5.       Lymph #  1.5     Lymph%  15.2(L)     MCH  29.2     MCHC  33.4     MCV  87     Mono #  0.9     Mono%  8.9     MPV  8.3(L)     Platelets  346     Potassium  3.9     Total Protein  7.7     Protime  12.6(H)     RBC  4.39(L)     RDW  14.2     Sodium  138     WBC  10.03         All pertinent labs within the past 24 hours have been reviewed.    Significant Imaging:   Imaging Results          CTA Lower Extremity Right (In process)  Result time 10/30/17 02:44:26   Procedure changed from CTA Runoff ABD PEL Bilat Lower Ext                I have reviewed all pertinent imaging results/findings within the past 24 hours.     EKG: (personally reviewed)  Normal sinus rhythm with no acute changes from previous tracings.        Assessment/Plan:     * Ischemic right foot secondary to occluded fem-distal bypass graft    - CTA of RLE revealedocclusion of femoral artery to below-knee popliteal artery bypass graft; right femoral artery total occlusion; Occlusion of the origin of right profunda femoral artery; Occluded proximal popliteal artery; Severe stenosis of the tibioperoneal trunk; Severe stenosis of the proximal anterior tibial artery with multiple areas of near complete occlusion; Patent  dorsalis pedis artery.   - Case discussed with Vascular Surgery.  - Will admit to Telemetry.  - No heparin drip since patient is on chornic Xarelto.  - Plans for peripheral angiogram this AM.  - NPO.  - Continue ASA and statin therapy.  - Neurovascular checks Q4 hours.        PAD with remote right fem-distal bypass    - Remote h/o fem-pop bypass with occlusion in 3/2017 requiring fem-distal bypass.  - Patient with recurrent fem-distal bypass occlusion 5/25/17 s/p thrombolysis, and 6/3/17 s/p thrombolysis + PTA.  - Plan as above.         Uncontrolled hypertension    - Secondary to medication noncompliance.  - Resume home amlodipine 10mg daily.  Monitor BP trends, and optimize therapy as needed.         Mixed hyperlipidemia    - Continue statin.  - FLP.          VTE Risk Mitigation         Ordered     Medium Risk of VTE  Once      10/30/17 0439     Reason for No Pharmacological VTE Prophylaxis  Once      10/30/17 0439             PATY Anglin  Department of Hospital Medicine   [facility]

## 2017-10-30 NOTE — SUBJECTIVE & OBJECTIVE
"Prescriptions Prior to Admission   Medication Sig Dispense Refill Last Dose    amlodipine (NORVASC) 10 MG tablet Take 10 mg by mouth once daily.   10/30/2017    aspirin 81 MG Chew Take 81 mg by mouth once daily.   10/30/2017    atorvastatin (LIPITOR) 20 MG tablet Take 20 mg by mouth once daily.   10/30/2017    famotidine (PEPCID) 20 MG tablet Take 20 mg by mouth.       naftifine (NAFTIN) 2 % Crea Apply 1 application topically once daily. 45 g 2 10/30/2017    oxycodone-acetaminophen (PERCOCET)  mg per tablet Take 1 tablet by mouth every 6 (six) hours as needed for Pain (for breakthrough pain). 20 tablet 0 Past Week    rivaroxaban (XARELTO) 20 mg Tab Take 20 mg by mouth daily with dinner or evening meal.   10/30/2017    gabapentin (NEURONTIN) 100 MG capsule Take 1 capsule (100 mg total) by mouth 3 (three) times daily. 42 capsule 0 Taking       Review of patient's allergies indicates:   Allergen Reactions    Pcn [penicillins] Other (See Comments)     Other      Bactrim [sulfamethoxazole-trimethoprim] Rash     Patient requests medication be listed as an "Intolerance" rather than an allergy, stating the rash is not "so bad."    Ceclor [cefaclor] Rash    Latex, natural rubber Rash       Past Medical History:   Diagnosis Date    Femoral-popliteal bypass graft occlusion     Left and right    GERD (gastroesophageal reflux disease)     History of substance abuse     History of transient ischemic attack (TIA)     Hyperlipemia     Hypertension     Neuropathy     Occlusion of right femorotibial bypass graft 5/2017, 6/2017    Pre-diabetes     Status post femorotibial bypass      Past Surgical History:   Procedure Laterality Date    back fusion      FEMORAL BYPASS      left fem-pop bypass 11/2014; right fem-pop bypass 9/2016, right fem-distal bypass 3/2017     Family History     Problem Relation (Age of Onset)    COPD Mother, Father    Diabetes Mother, Father    Heart attack Mother    Heart disease " Mother    Stroke Mother        Social History Main Topics    Smoking status: Former Smoker    Smokeless tobacco: Former User     Quit date: 10/22/2016    Alcohol use No    Drug use: No      Comment: Former illicit drug user.  On Suboxone    Sexual activity: Yes     Review of Systems   Constitutional: Negative.    HENT: Negative.    Eyes: Negative.    Respiratory: Negative.    Cardiovascular: Negative.    Gastrointestinal: Negative.    Endocrine: Negative.    Genitourinary: Negative.    Musculoskeletal: Negative.         As above   Skin: Positive for pallor.   Allergic/Immunologic: Negative.    Neurological: Negative.    Hematological: Negative.    Psychiatric/Behavioral: Negative.    All other systems reviewed and are negative.    Objective:     Vital Signs (Most Recent):  Temp: 98.2 °F (36.8 °C) (10/30/17 0452)  Pulse: 85 (10/30/17 0452)  Resp: 17 (10/30/17 0452)  BP: (!) 165/84 (10/30/17 0452)  SpO2: (!) 94 % (10/30/17 0452) Vital Signs (24h Range):  Temp:  [97.2 °F (36.2 °C)-98.6 °F (37 °C)] 98.2 °F (36.8 °C)  Pulse:  [84-92] 85  Resp:  [17-20] 17  SpO2:  [94 %-99 %] 94 %  BP: (164-203)/() 165/84     Weight: 81 kg (178 lb 9.6 oz)  Body mass index is 25.63 kg/m².    Physical Exam   Constitutional: He appears well-developed.   HENT:   Head: Normocephalic.   Eyes: Pupils are equal, round, and reactive to light.   Neck: Normal range of motion.   Cardiovascular: Normal rate.    Pulmonary/Chest: Effort normal.   Abdominal: Soft.   Musculoskeletal:   + 2 right femoral pulse with non palp pop nor pedals.  Right foot has superficial ulcers with dependent rubor but full rom and intact sensation   Neurological: He is alert.   Skin: Capillary refill takes more than 3 seconds. There is erythema.   Psychiatric: He has a normal mood and affect.   Vitals reviewed.      Significant Labs:  All pertinent labs from the last 24 hours have been reviewed.    Significant Diagnostics:  I have reviewed all pertinent imaging  results/findings within the past 24 hours.

## 2017-10-30 NOTE — CONSULTS
[facility] -   Critical Care - Medicine  Consult Note    Patient Name: Ish Guardado  MRN: [id number]  Admission Date: 10/30/2017  Hospital Length of Stay: 0 days  Code Status: Full Code  Attending Physician: Yobani Harris MD  Primary Care Provider: Pollo Arana MD   Principal Problem: Ischemic foot    Inpatient consult to Pulmonology  Consult performed by: CARRIE MATAMOROS  Consult ordered by: RODRIGO RUEDA        Subjective: right foot and leg pain     HPI: 58 y/o with history of Peripheral Vascular Disease presents with 7 day history of leg pain. Evaluated by Vascular surgery and underwent emergent angiogram with TPA and thrombolysis with placement of ECOS catheter. history of  remote left fem-pop bypass 11/2014 + right fem-pop bypass 9/2016 + right fem-distal bypass 3/2017 with multiple right bypass occlusions. Past Medical history significant for hypertension, prediabetes, Chronic Obstructive Pulmonary Disease, hyperlipidemia and PAD.    Hospital/ICU Course: 10/30 Brought from cath lab to ICU. Severe pain on precedex drip given MS. Not effective - started on dilaudid drip. Patient requesting dilaudid.    Past Medical History:   Diagnosis Date    Femoral-popliteal bypass graft occlusion     Left and right    GERD (gastroesophageal reflux disease)     History of substance abuse     History of transient ischemic attack (TIA)     Hyperlipemia     Hypertension     Neuropathy     Occlusion of right femorotibial bypass graft 5/2017, 6/2017    Pre-diabetes     Status post femorotibial bypass        Past Surgical History:   Procedure Laterality Date    back fusion      FEMORAL BYPASS      left fem-pop bypass 11/2014; right fem-pop bypass 9/2016, right fem-distal bypass 3/2017       Review of patient's allergies indicates:   Allergen Reactions    Pcn [penicillins] Other (See Comments)     Other      Bactrim [sulfamethoxazole-trimethoprim] Rash     Patient requests medication be listed  "as an "Intolerance" rather than an allergy, stating the rash is not "so bad."    Ceclor [cefaclor] Rash    Latex, natural rubber Rash       Family History     Problem Relation (Age of Onset)    COPD Mother, Father    Diabetes Mother, Father    Heart attack Mother    Heart disease Mother    Stroke Mother        Social History Main Topics    Smoking status: Former Smoker    Smokeless tobacco: Former User     Quit date: 10/22/2016    Alcohol use No    Drug use: No      Comment: Former illicit drug user.  On Suboxone    Sexual activity: Yes      Review of Systems   Constitutional: Positive for fatigue. Negative for fever and unexpected weight change.   HENT: Negative for congestion, postnasal drip, rhinorrhea, sinus pressure and sneezing.    Respiratory: Positive for cough and shortness of breath.    Cardiovascular: Positive for leg swelling. Negative for chest pain and palpitations.   Gastrointestinal: Negative for abdominal pain and nausea.   Musculoskeletal: Positive for arthralgias. Negative for back pain.   Skin: Positive for color change and pallor. Negative for rash.   Neurological: Negative for dizziness, syncope, weakness and light-headedness.   Hematological: Negative for adenopathy. Does not bruise/bleed easily.   Psychiatric/Behavioral: Negative for dysphoric mood and sleep disturbance. The patient is not nervous/anxious.      Objective:     Vital Signs (Most Recent):  Temp: 98.2 °F (36.8 °C) (10/30/17 0452)  Pulse: 85 (10/30/17 0452)  Resp: 17 (10/30/17 0452)  BP: (!) 165/84 (10/30/17 0452)  SpO2: (!) 94 % (10/30/17 0452) Vital Signs (24h Range):  Temp:  [97.2 °F (36.2 °C)-98.6 °F (37 °C)] 98.2 °F (36.8 °C)  Pulse:  [84-92] 85  Resp:  [17-20] 17  SpO2:  [94 %-99 %] 94 %  BP: (164-203)/() 165/84     Weight: 81 kg (178 lb 9.6 oz)  Body mass index is 25.63 kg/m².      Intake/Output Summary (Last 24 hours) at 10/30/17 0903  Last data filed at 10/30/17 0700   Gross per 24 hour   Intake           " 116.25 ml   Output                0 ml   Net           116.25 ml       Physical Exam   Constitutional: He is oriented to person, place, and time. He is cooperative. He appears distressed.   HENT:   Head: Normocephalic.   Eyes: EOM are normal. Pupils are equal, round, and reactive to light.   Neck: Normal range of motion. Neck supple.   Cardiovascular: Regular rhythm.  Tachycardia present.    Pulses:       Dorsalis pedis pulses are 0 on the right side.        Posterior tibial pulses are 0 on the right side.   Pulmonary/Chest: He has decreased breath sounds in the right lower field and the left lower field. He has rhonchi in the right lower field and the left lower field.   Abdominal: Soft. Normal appearance. Bowel sounds are decreased.   Musculoskeletal: Normal range of motion.   Neurological: He is alert and oriented to person, place, and time.   Skin: There is pallor.   Ulcer on right foot, multiple petechia, pale skin   Psychiatric:   Agitated, anxious   Nursing note and vitals reviewed.      Vents:       Lines/Drains/Airways     Pressure Ulcer                 Pressure Ulcer 06/05/17 1005 Left plantar heel suspected deep tissue injury 146 days          Peripheral Intravenous Line                 Peripheral IV - Single Lumen 10/30/17 0149 Right Antecubital less than 1 day                Significant Labs:    CBC/Anemia Profile:    Recent Labs  Lab 10/30/17  0150   WBC 10.03   HGB 12.8*   HCT 38.3*      MCV 87   RDW 14.2        Chemistries:    Recent Labs  Lab 10/30/17  0150      K 3.9      CO2 24   BUN 12   CREATININE 0.9   CALCIUM 9.7   ALBUMIN 3.6   PROT 7.7   BILITOT 0.2   ALKPHOS 111   ALT 24   AST 23       ABGs: No results for input(s): PH, PCO2, HCO3, POCSATURATED, BE in the last 48 hours.  BMP:   Recent Labs  Lab 10/30/17  0150   GLU 92      K 3.9      CO2 24   BUN 12   CREATININE 0.9   CALCIUM 9.7     CMP:   Recent Labs  Lab 10/30/17  0150      K 3.9      CO2 24    GLU 92   BUN 12   CREATININE 0.9   CALCIUM 9.7   PROT 7.7   ALBUMIN 3.6   BILITOT 0.2   ALKPHOS 111   AST 23   ALT 24   ANIONGAP 11   EGFRNONAA >60     Coagulation:   Recent Labs  Lab 10/30/17  0150   INR 1.2   APTT 37.1*     POCT Glucose: No results for input(s): POCTGLUCOSE in the last 48 hours.  Prealbumin: No results for input(s): PREALBUMIN in the last 48 hours.  Respiratory Culture: No results for input(s): GSRESP, RESPIRATORYC in the last 48 hours.  Troponin: No results for input(s): TROPONINI in the last 48 hours.  All pertinent labs within the past 24 hours have been reviewed.    Significant Imaging: I have reviewed all pertinent imaging results/findings within the past 24 hours.    Assessment/Plan:     Active Diagnoses:    Diagnosis Date Noted POA    PRINCIPAL PROBLEM:  Ischemic right foot secondary to occluded fem-distal bypass graft [I99.8] 10/30/2017 Yes    PAD with remote right fem-distal bypass [I99.8] 10/30/2017 Yes     Chronic    Uncontrolled hypertension [I10] 10/30/2017 Yes     Chronic    Mixed hyperlipidemia [E78.2] 05/24/2017 Yes     Chronic    PAD (peripheral artery disease) [I73.9] 05/24/2017 Yes     Chronic      Problems Resolved During this Admission:    Diagnosis Date Noted Date Resolved POA              Critical Care Daily Checklist:    A: Awake: RASS Goal/Actual Goal:    Actual:     B: Spontaneous Breathing Trial Performed?     C: SAT & SBT Coordinated?  na                      D: Delirium: CAM-ICU     E: Early Mobility Performed? No   F: Feeding Goal:    Status:     Current Diet Order   Procedures    Diet NPO      AS: Analgesia/Sedation addressed   T: Thromboembolic Prophylaxis On heparin   H: HOB > 300 No   U: Stress Ulcer Prophylaxis (if needed) protonoix   G: Glucose Control adequate   B: Bowel Function Stool Occurrence: 0   I: Indwelling Catheter (Lines & Mejia) Necessity needed   D: De-escalation of Antimicrobials/Pharmacotherapies na    Plan for the day/ETD Pain control,  monitor limb ishchemia    Code Status:  Family/Goals of Care: Full Code  discussed     Critical Care Time: 60 minutes  Critical secondary to Patient has a condition that poses threat to life and bodily function: critical limb ischemia     Critical care was time spent personally by me on the following activities: development of treatment plan with patient or surrogate and bedside caregivers, discussions with consultants, evaluation of patient's response to treatment, examination of patient, ordering and performing treatments and interventions, ordering and review of laboratory studies, ordering and review of radiographic studies, pulse oximetry, re-evaluation of patient's condition.  This critical care time did not overlap with that of any other provider or involve time for any procedures.    Thank you for your consult. I will follow-up with patient. Please contact us if you have any additional questions.     Gage Marroquin MD  Critical Care - Medicine  [facility] [place]

## 2017-10-30 NOTE — ASSESSMENT & PLAN NOTE
- Secondary to medication noncompliance.  - Resume home amlodipine 10mg daily.  Monitor BP trends, and optimize therapy as needed.

## 2017-10-30 NOTE — HOSPITAL COURSE
Pt states right foot has been white and cool for about one week.  Took his xarelto 8 hours ago.  CTA reveals occluded right PTFE femoral to BK pop bypass.

## 2017-10-31 LAB
ANION GAP SERPL CALC-SCNC: 7 MMOL/L
BASOPHILS # BLD AUTO: 0.03 K/UL
BASOPHILS NFR BLD: 0.3 %
BUN SERPL-MCNC: 14 MG/DL
CALCIUM SERPL-MCNC: 9.3 MG/DL
CHLORIDE SERPL-SCNC: 104 MMOL/L
CO2 SERPL-SCNC: 23 MMOL/L
CREAT SERPL-MCNC: 0.8 MG/DL
DIFFERENTIAL METHOD: ABNORMAL
EOSINOPHIL # BLD AUTO: 0.1 K/UL
EOSINOPHIL NFR BLD: 1.3 %
ERYTHROCYTE [DISTWIDTH] IN BLOOD BY AUTOMATED COUNT: 14.5 %
EST. GFR  (AFRICAN AMERICAN): >60 ML/MIN/1.73 M^2
EST. GFR  (NON AFRICAN AMERICAN): >60 ML/MIN/1.73 M^2
FIBRINOGEN PPP-MCNC: 263 MG/DL
FIBRINOGEN PPP-MCNC: 282 MG/DL
GLUCOSE SERPL-MCNC: 93 MG/DL
HCT VFR BLD AUTO: 36.5 %
HGB BLD-MCNC: 11.8 G/DL
LYMPHOCYTES # BLD AUTO: 1 K/UL
LYMPHOCYTES NFR BLD: 10.8 %
MCH RBC QN AUTO: 28.7 PG
MCHC RBC AUTO-ENTMCNC: 32.3 G/DL
MCV RBC AUTO: 89 FL
MONOCYTES # BLD AUTO: 0.6 K/UL
MONOCYTES NFR BLD: 6.8 %
NEUTROPHILS # BLD AUTO: 7.5 K/UL
NEUTROPHILS NFR BLD: 80.8 %
PLATELET # BLD AUTO: 232 K/UL
PMV BLD AUTO: 8.5 FL
POC ACTIVATED CLOTTING TIME K: 153 SEC (ref 74–137)
POTASSIUM SERPL-SCNC: 4.4 MMOL/L
RBC # BLD AUTO: 4.11 M/UL
SAMPLE: ABNORMAL
SODIUM SERPL-SCNC: 134 MMOL/L
WBC # BLD AUTO: 9.29 K/UL

## 2017-10-31 PROCEDURE — 25000003 PHARM REV CODE 250: Performed by: EMERGENCY MEDICINE

## 2017-10-31 PROCEDURE — 20000000 HC ICU ROOM

## 2017-10-31 PROCEDURE — 36415 COLL VENOUS BLD VENIPUNCTURE: CPT

## 2017-10-31 PROCEDURE — 25000003 PHARM REV CODE 250: Performed by: SURGERY

## 2017-10-31 PROCEDURE — 85384 FIBRINOGEN ACTIVITY: CPT

## 2017-10-31 PROCEDURE — 63600175 PHARM REV CODE 636 W HCPCS: Performed by: INTERNAL MEDICINE

## 2017-10-31 PROCEDURE — 85025 COMPLETE CBC W/AUTO DIFF WBC: CPT

## 2017-10-31 PROCEDURE — 99291 CRITICAL CARE FIRST HOUR: CPT | Mod: ,,, | Performed by: INTERNAL MEDICINE

## 2017-10-31 PROCEDURE — 047K3Z1 DILATION OF RIGHT FEMORAL ARTERY USING DRUG-COATED BALLOON, PERCUTANEOUS APPROACH: ICD-10-PCS | Performed by: SURGERY

## 2017-10-31 PROCEDURE — 27000221 HC OXYGEN, UP TO 24 HOURS

## 2017-10-31 PROCEDURE — C9113 INJ PANTOPRAZOLE SODIUM, VIA: HCPCS | Performed by: EMERGENCY MEDICINE

## 2017-10-31 PROCEDURE — 25000003 PHARM REV CODE 250: Performed by: INTERNAL MEDICINE

## 2017-10-31 PROCEDURE — 3E05317 INTRODUCTION OF OTHER THROMBOLYTIC INTO PERIPHERAL ARTERY, PERCUTANEOUS APPROACH: ICD-10-PCS | Performed by: SURGERY

## 2017-10-31 PROCEDURE — 85347 COAGULATION TIME ACTIVATED: CPT

## 2017-10-31 PROCEDURE — 04CK3ZZ EXTIRPATION OF MATTER FROM RIGHT FEMORAL ARTERY, PERCUTANEOUS APPROACH: ICD-10-PCS | Performed by: SURGERY

## 2017-10-31 PROCEDURE — 37214 CESSJ THERAPY CATH REMOVAL: CPT

## 2017-10-31 PROCEDURE — 63600175 PHARM REV CODE 636 W HCPCS: Performed by: EMERGENCY MEDICINE

## 2017-10-31 PROCEDURE — 63600175 PHARM REV CODE 636 W HCPCS

## 2017-10-31 PROCEDURE — 63600175 PHARM REV CODE 636 W HCPCS: Performed by: NURSE PRACTITIONER

## 2017-10-31 PROCEDURE — 63600175 PHARM REV CODE 636 W HCPCS: Performed by: SURGERY

## 2017-10-31 PROCEDURE — 04CM3ZZ EXTIRPATION OF MATTER FROM RIGHT POPLITEAL ARTERY, PERCUTANEOUS APPROACH: ICD-10-PCS | Performed by: SURGERY

## 2017-10-31 PROCEDURE — 80048 BASIC METABOLIC PNL TOTAL CA: CPT

## 2017-10-31 PROCEDURE — 25000003 PHARM REV CODE 250: Performed by: NURSE PRACTITIONER

## 2017-10-31 PROCEDURE — 85384 FIBRINOGEN ACTIVITY: CPT | Mod: 91

## 2017-10-31 PROCEDURE — B41F1ZZ FLUOROSCOPY OF RIGHT LOWER EXTREMITY ARTERIES USING LOW OSMOLAR CONTRAST: ICD-10-PCS | Performed by: SURGERY

## 2017-10-31 PROCEDURE — 97802 MEDICAL NUTRITION INDIV IN: CPT

## 2017-10-31 PROCEDURE — 25000242 PHARM REV CODE 250 ALT 637 W/ HCPCS: Performed by: EMERGENCY MEDICINE

## 2017-10-31 RX ORDER — DIPHENHYDRAMINE HYDROCHLORIDE 50 MG/ML
12.5 INJECTION INTRAMUSCULAR; INTRAVENOUS EVERY 6 HOURS PRN
Status: DISCONTINUED | OUTPATIENT
Start: 2017-10-31 | End: 2017-11-02 | Stop reason: HOSPADM

## 2017-10-31 RX ORDER — ENOXAPARIN SODIUM 100 MG/ML
40 INJECTION SUBCUTANEOUS EVERY 24 HOURS
Status: DISCONTINUED | OUTPATIENT
Start: 2017-10-31 | End: 2017-11-01

## 2017-10-31 RX ORDER — LORAZEPAM 2 MG/ML
2 INJECTION INTRAMUSCULAR EVERY 4 HOURS PRN
Status: DISCONTINUED | OUTPATIENT
Start: 2017-11-01 | End: 2017-10-31

## 2017-10-31 RX ORDER — OXYCODONE AND ACETAMINOPHEN 10; 325 MG/1; MG/1
1 TABLET ORAL EVERY 4 HOURS PRN
Status: DISCONTINUED | OUTPATIENT
Start: 2017-10-31 | End: 2017-11-02 | Stop reason: HOSPADM

## 2017-10-31 RX ORDER — HYDROMORPHONE HYDROCHLORIDE 2 MG/ML
2 INJECTION, SOLUTION INTRAMUSCULAR; INTRAVENOUS; SUBCUTANEOUS EVERY 4 HOURS PRN
Status: DISCONTINUED | OUTPATIENT
Start: 2017-10-31 | End: 2017-11-02 | Stop reason: HOSPADM

## 2017-10-31 RX ORDER — DEXMEDETOMIDINE HYDROCHLORIDE 4 UG/ML
0.4 INJECTION, SOLUTION INTRAVENOUS CONTINUOUS
Status: DISCONTINUED | OUTPATIENT
Start: 2017-10-31 | End: 2017-11-01

## 2017-10-31 RX ADMIN — SODIUM CHLORIDE: 0.9 INJECTION, SOLUTION INTRAVENOUS at 05:10

## 2017-10-31 RX ADMIN — OXYCODONE HYDROCHLORIDE AND ACETAMINOPHEN 1 TABLET: 10; 325 TABLET ORAL at 06:10

## 2017-10-31 RX ADMIN — ENOXAPARIN SODIUM 40 MG: 100 INJECTION SUBCUTANEOUS at 05:10

## 2017-10-31 RX ADMIN — PANTOPRAZOLE SODIUM 40 MG: 40 INJECTION, POWDER, FOR SOLUTION INTRAVENOUS at 10:10

## 2017-10-31 RX ADMIN — ACETYLCYSTEINE 600 MG: 200 SOLUTION ORAL; RESPIRATORY (INHALATION) at 09:10

## 2017-10-31 RX ADMIN — LORAZEPAM 2 MG: 2 INJECTION INTRAMUSCULAR at 07:10

## 2017-10-31 RX ADMIN — ATORVASTATIN CALCIUM 20 MG: 10 TABLET, FILM COATED ORAL at 03:10

## 2017-10-31 RX ADMIN — HYDROMORPHONE HYDROCHLORIDE 2 MG: 2 INJECTION, SOLUTION INTRAMUSCULAR; INTRAVENOUS; SUBCUTANEOUS at 05:10

## 2017-10-31 RX ADMIN — HYDROMORPHONE HYDROCHLORIDE 2 MG: 2 INJECTION, SOLUTION INTRAMUSCULAR; INTRAVENOUS; SUBCUTANEOUS at 08:10

## 2017-10-31 RX ADMIN — LORAZEPAM 2 MG: 2 INJECTION INTRAMUSCULAR at 03:10

## 2017-10-31 RX ADMIN — DEXMEDETOMIDINE HYDROCHLORIDE 1.2 MCG/KG/HR: 4 INJECTION, SOLUTION INTRAVENOUS at 10:10

## 2017-10-31 RX ADMIN — OXYCODONE HYDROCHLORIDE AND ACETAMINOPHEN 1 TABLET: 10; 325 TABLET ORAL at 10:10

## 2017-10-31 RX ADMIN — LORAZEPAM 2 MG: 2 INJECTION INTRAMUSCULAR; INTRAVENOUS at 12:10

## 2017-10-31 RX ADMIN — DEXMEDETOMIDINE HYDROCHLORIDE 1.2 MCG/KG/HR: 4 INJECTION, SOLUTION INTRAVENOUS at 01:10

## 2017-10-31 RX ADMIN — DIPHENHYDRAMINE HYDROCHLORIDE 12.5 MG: 50 INJECTION, SOLUTION INTRAMUSCULAR; INTRAVENOUS at 10:10

## 2017-10-31 RX ADMIN — SODIUM CHLORIDE 500 ML: 0.9 INJECTION, SOLUTION INTRAVENOUS at 05:10

## 2017-10-31 RX ADMIN — ALTEPLASE 10 MG: 2.2 INJECTION, POWDER, LYOPHILIZED, FOR SOLUTION INTRAVENOUS at 03:10

## 2017-10-31 RX ADMIN — Medication 175 MCG/HR: at 01:10

## 2017-10-31 RX ADMIN — LORAZEPAM 2 MG: 2 INJECTION INTRAMUSCULAR; INTRAVENOUS at 11:10

## 2017-10-31 RX ADMIN — DEXMEDETOMIDINE HYDROCHLORIDE 1.2 MCG/KG/HR: 4 INJECTION, SOLUTION INTRAVENOUS at 05:10

## 2017-10-31 RX ADMIN — DEXMEDETOMIDINE HYDROCHLORIDE 1.2 MCG/KG/HR: 4 INJECTION, SOLUTION INTRAVENOUS at 02:10

## 2017-10-31 RX ADMIN — ASPIRIN 81 MG CHEWABLE TABLET 81 MG: 81 TABLET CHEWABLE at 03:10

## 2017-10-31 NOTE — ASSESSMENT & PLAN NOTE
Nutrition Problem  Nutrient decreased needs (sodium)    Related to (etiology):   Current medical condition    Signs and Symptoms (as evidenced by):   Hx of HTN     Interventions/Recommendations (treatment strategy):  Refer to RD recs above    Nutrition Diagnosis Status:   New

## 2017-10-31 NOTE — PLAN OF CARE
Problem: Patient Care Overview  Goal: Plan of Care Review  Fall precautions maintained. Bed in lowest position with wheels locked. Pt T Max 100.0. Fentanyl drip discontinued. IV Dilaudid PRN for pain. Precedex drip titrated off. Pt changes positions independently. Dressing to l. Groin clean, dry, and intact. DP Pulses dopplerable to BLE. 500cc bolus ordered and given for hypotension, pt responded well. Will continue to monitor.

## 2017-10-31 NOTE — PLAN OF CARE
Problem: Patient Care Overview  Goal: Plan of Care Review  Outcome: Ongoing (interventions implemented as appropriate)  Recommendations     Recommendation/Intervention: 1. Continue current diet. 2. Add oral supplements (boost plus BID). 3. Will attempt diet education next visit.   Goals: Intake of 50 % or better by next RD visit   Nutrition Goal Status: new  Communication of RD Recs:  (care plan and sticky note)

## 2017-10-31 NOTE — ASSESSMENT & PLAN NOTE
Endovascular thrombolysis 30 October with post-procedure monitoring in the ICU and ECOS.  Expect return for additional thrombolysis 31 October.

## 2017-10-31 NOTE — NURSING
Left femoral arterial sheath pulled per md order- pressure held to area x 15 minutes- hemostasis obtained- gauze drsg with tegaderm applied over area. Pt tolerated well

## 2017-10-31 NOTE — PLAN OF CARE
Problem: Patient Care Overview  Goal: Plan of Care Review  Outcome: Ongoing (interventions implemented as appropriate)  POC and interventions discussed with patient and spouse - VU.  Anxious and agitated when awake throughout the night despite sedation and pain medication.  Doppler pulses to RLE slightly improved.  Color also less mottled and more pinkish, RLE slightly warmer as well.  L groin insertion site for ECHOS wnl.  NPO after md for surgery in AM.  Vitals stable.

## 2017-10-31 NOTE — CONSULTS
[facility] -   Adult Nutrition  Consult Note    SUMMARY     Recommendations    Recommendation/Intervention: 1. Continue current diet. 2. Add oral supplements (boost plus BID). 3. Will attempt diet education next visit.   Goals: Intake of 50 % or better by next RD visit   Nutrition Goal Status: new  Communication of RD Recs:  (care plan and sticky note)      Reason for Assessment    Reason for Assessment: nurse/nurse practitioner consult (prompted by assessment)   Diagnosis:  1. PAD (peripheral artery disease)    2. Vascular occlusion    3. Anemia, unspecified type    4. PVD (peripheral vascular disease)    5. Ischemic foot    6. PVD (peripheral vascular disease) with claudication      Past Medical History:   Diagnosis Date    Femoral-popliteal bypass graft occlusion     Left and right    GERD (gastroesophageal reflux disease)     History of substance abuse     History of transient ischemic attack (TIA)     Hyperlipemia     Hypertension     Neuropathy     Occlusion of right femorotibial bypass graft 5/2017, 6/2017    Pre-diabetes     Status post femorotibial bypass            Interdisciplinary Rounds: attended     General Information Comments: Patient currently in ICU and sedated. Patient underwent revascularization surgery this am. Wife at bedside. She reports poor PO intake since yesterday due to sedation. Will attempt diet education next visit. Wife was given a handout from the nutrition care manual on Cardiac Nutrition Therapy.     Nutrition Discharge Planning: Home on Cardiac diet     Nutrition Prescription Ordered    Current Diet Order: Cardiac Diet            Evaluation of Received Nutrients/Fluid Intake       Energy Calories Required: not meeting needs      Protein Required: not meeting needs     % Intake of Estimated Energy Needs: 0 - 25 %  % Meal Intake: 0%     Nutrition Risk Screen     Nutrition Risk Screen: no indicators present    Nutrition/Diet History       Typical Food/Fluid  "Intake: Good intake at home  Food Preferences: none reported including Mandaeism or cultural          Labs/Tests/Procedures/Meds       Pertinent Labs Reviewed: reviewed  Pertinent Labs Comments: Na 134  Pertinent Medications Reviewed: reviewed       Physical Findings    Overall Physical Appearance: nourished     Oral/Mouth Cavity: WDL  Skin:  (Vincent Score 15)    Anthropometrics    Temp: 100.1 °F (37.8 °C)     Height: 5' 10" (177.8 cm)  Weight Method: Bed Scale  Weight: 81 kg (178 lb 9.2 oz)  Ideal Body Weight (IBW), Male: 166 lb     % Ideal Body Weight, Male (lb): 107.57 lb     BMI (Calculated): 25.7  BMI Grade: 25 - 29.9 - overweight       Estimated/Assessed Needs    Weight Used For Calorie Calculations: 81 kg (178 lb 9.2 oz)   Height (cm): 177.8 cm     Energy Need Method: Edmunds-St Jeor (x1.2 = 1969 )     RMR (Edmunds-St. Jeor Equation): 1641.25     Weight Used For Protein Calculations: 81 kg (178 lb 9.2 oz)     1.2 gm Protein (gm): 97.4     Fluid Need Method: RDA Method (1ml/rain or as neede per MD)          Assessment and Plan    Nutrition Problem  Inadequate energy intake    Related to (etiology):   Sedation     Signs and Symptoms (as evidenced by):   Poor oral intake    Interventions/Recommendations (treatment strategy):  Refer to RD recs above     Nutrition Diagnosis Status:   New      HTN (hypertension)    Nutrition Problem  Nutrient decreased needs (sodium)    Related to (etiology):   Current medical condition    Signs and Symptoms (as evidenced by):   Hx of HTN     Interventions/Recommendations (treatment strategy):  Refer to RD recs above    Nutrition Diagnosis Status:   New                Monitor and Evaluation    Food and Nutrient Intake: energy intake  Food and Nutrient Adminstration: diet order  Knowledge/Beliefs/Attitudes: beliefs and attitudes  Anthropometric Measurements: weight  Biochemical Data, Medical Tests and Procedures: electrolyte and renal panel, glucose/endocrine " profile  Nutrition-Focused Physical Findings: overall appearance    Nutrition Follow-Up    RD Follow-up?: Yes (2xweekly)

## 2017-10-31 NOTE — HOSPITAL COURSE
Catheterization laboratory on 30 October with Dr. Houser for endovascular thrombectomy and Angiojet lysis.  Post procedure monitoring in the ICU.  Severe pain difficult to control required Precedex and Fentanyl infusions.  Return for additional thrombolysis 31 October.

## 2017-10-31 NOTE — PROGRESS NOTES
[facility]  Critical Care - Medicine  Progress Note    Patient Name: Ish Guardado  MRN: [id number]  Admission Date: 10/30/2017  Hospital Length of Stay: 1 days  Code Status: Prior  Attending Provider: Yobani Harris MD  Primary Care Provider: Pollo Arana MD   Principal Problem: Ischemic foot    Subjective:     HPI: 58 y/o with history of Peripheral Vascular Disease presents with 7 day history of leg pain. Evaluated by Vascular surgery and underwent emergent angiogram with TPA and thrombolysis with placement of ECOS catheter. history of  remote left fem-pop bypass 11/2014 + right fem-pop bypass 9/2016 + right fem-distal bypass 3/2017 with multiple right bypass occlusions. Past Medical history significant for hypertension, prediabetes, Chronic Obstructive Pulmonary Disease, hyperlipidemia and PAD.     Hospital/ICU Course: 10/30 Brought from cath lab to ICU. Severe pain on precedex drip given MS. Not effective - started on dilaudid drip. Patient requesting dilaudid.    Interval History/Significant Events: 10/31/17 Underwent repeat revascularization surgery this am. Brought back to ICU. See note by Dr. Mclean    Review of Systems   Unable to perform ROS: Acuity of condition     Objective:     Vital Signs (Most Recent):  Temp: 100.1 °F (37.8 °C) (10/31/17 0830)  Pulse: 76 (10/31/17 0945)  Resp: 12 (10/31/17 0945)  BP: 110/71 (10/31/17 0945)  SpO2: (!) 94 % (10/31/17 0945) Vital Signs (24h Range):  Temp:  [98.3 °F (36.8 °C)-100.1 °F (37.8 °C)] 100.1 °F (37.8 °C)  Pulse:  [54-88] 76  Resp:  [10-22] 12  SpO2:  [92 %-100 %] 94 %  BP: ()/(47-87) 110/71     Weight: 81 kg (178 lb 9.6 oz)  Body mass index is 25.63 kg/m².      Intake/Output Summary (Last 24 hours) at 10/31/17 1041  Last data filed at 10/31/17 0900   Gross per 24 hour   Intake          2625.88 ml   Output             1505 ml   Net          1120.88 ml       Physical Exam   Constitutional: He appears well-developed and well-nourished.    HENT:   Head: Normocephalic and atraumatic.   Eyes: Conjunctivae are normal. Pupils are equal, round, and reactive to light.   Neck: Neck supple. No JVD present. No tracheal deviation present. No thyromegaly present.   Cardiovascular: Normal rate, regular rhythm and normal heart sounds.    Pulmonary/Chest: Effort normal. He has decreased breath sounds in the right lower field and the left lower field.   Abdominal: Soft.   Musculoskeletal: Normal range of motion.   Lymphadenopathy:     He has no cervical adenopathy.   Neurological:   sedated   Skin:   Left foot pale, dusky, petechia on surface, no palpable pulse   Nursing note and vitals reviewed.      Vents:  Oxygen Concentration (%): 28 (10/31/17 0827)    Lines/Drains/Airways     Drain                 Urethral Catheter 10/30/17 1625 less than 1 day          Pressure Ulcer                 Pressure Ulcer 06/05/17 1005 Left plantar heel suspected deep tissue injury 148 days          Peripheral Intravenous Line                 Peripheral IV - Single Lumen 10/30/17 0149 Right Antecubital 1 day         Peripheral IV - Single Lumen 10/30/17 0958 Left Hand 1 day                Significant Labs:    CBC/Anemia Profile:    Recent Labs  Lab 10/30/17  0150 10/31/17  0528   WBC 10.03 9.29   HGB 12.8* 11.8*   HCT 38.3* 36.5*    232   MCV 87 89   RDW 14.2 14.5        Chemistries:    Recent Labs  Lab 10/30/17  0150 10/31/17  0527    134*   K 3.9 4.4    104   CO2 24 23   BUN 12 14   CREATININE 0.9 0.8   CALCIUM 9.7 9.3   ALBUMIN 3.6  --    PROT 7.7  --    BILITOT 0.2  --    ALKPHOS 111  --    ALT 24  --    AST 23  --        BMP:   Recent Labs  Lab 10/31/17  0527   GLU 93   *   K 4.4      CO2 23   BUN 14   CREATININE 0.8   CALCIUM 9.3     CMP:   Recent Labs  Lab 10/30/17  0150 10/31/17  0527    134*   K 3.9 4.4    104   CO2 24 23   GLU 92 93   BUN 12 14   CREATININE 0.9 0.8   CALCIUM 9.7 9.3   PROT 7.7  --    ALBUMIN 3.6  --    BILITOT 0.2   --    ALKPHOS 111  --    AST 23  --    ALT 24  --    ANIONGAP 11 7*   EGFRNONAA >60 >60     Lactic Acid: No results for input(s): LACTATE in the last 48 hours.  Troponin: No results for input(s): TROPONINI in the last 48 hours.  All pertinent labs within the past 24 hours have been reviewed.    Significant Imaging:  I have reviewed all pertinent imaging results/findings within the past 24 hours.    Assessment/Plan:     Active Diagnoses:    Diagnosis Date Noted POA    PRINCIPAL PROBLEM:  Ischemic right foot secondary to occluded fem-distal bypass graft [I99.8] 10/30/2017 Yes    PAD with remote right fem-distal bypass [I99.8] 10/30/2017 Yes     Chronic    HTN (hypertension) [I10] 10/30/2017 Yes     Chronic    Mixed hyperlipidemia [E78.2] 05/24/2017 Yes     Chronic    PAD (peripheral artery disease) [I73.9] 05/24/2017 Yes     Chronic      Problems Resolved During this Admission:    Diagnosis Date Noted Date Resolved POA            Critical Care Daily Checklist:    A: Awake: RASS Goal/Actual Goal: RASS Goal: -2-->light sedation  Actual:     B: Spontaneous Breathing Trial Performed?     C: SAT & SBT Coordinated?  na                      D: Delirium: CAM-ICU Overall CAM-ICU: Negative   E: Early Mobility Performed? No   F: Feeding Goal:    Status:     Current Diet Order   Procedures    Diet Cardiac      AS: Analgesia/Sedation Adequate  Fentanyl, precedex and ativan   T: Thromboembolic Prophylaxis Y   H: HOB > 300 No   U: Stress Ulcer Prophylaxis (if needed) y   G: Glucose Control adequate   B: Bowel Function Stool Occurrence: 0   I: Indwelling Catheter (Lines & Mejia) Necessity needed   D: De-escalation of Antimicrobials/Pharmacotherapies na    Plan for the day/ETD Pain control    Code Status:  Family/Goals of Care: Prior  Discussed with wife     Critical Care Time: 40 minutes  Critical secondary to Patient has a condition that poses threat to life and bodily function: critical limb ioschemia     Critical care was  time spent personally by me on the following activities: development of treatment plan with patient or surrogate and bedside caregivers, discussions with consultants, evaluation of patient's response to treatment, examination of patient, ordering and performing treatments and interventions, ordering and review of laboratory studies, ordering and review of radiographic studies, pulse oximetry, re-evaluation of patient's condition.  This critical care time did not overlap with that of any other provider or involve time for any procedures.     Gage Marroquin MD  Critical Care - Medicine  [facility]

## 2017-10-31 NOTE — OP NOTE
DATE OF PROCEDURE:  10/31/2017.    PREOPERATIVE DIAGNOSIS:  Peripheral vascular disease, thrombosed bypass graft,   acute limb ischemia.    POSTOPERATIVE DIAGNOSIS:  Peripheral vascular disease, thrombosed bypass graft,   acute limb ischemia.    ATTENDING SURGEON:  Charanjit Mclean M.D.    ANESTHESIA:  Conscious sedation with local.    COMPLICATIONS:  None.    SPECIMENS:  None.    ESTIMATED BLOOD LOSS:  None.    PROCEDURE:  Right lower extremity angiogram, thrombolysis check.    INDICATIONS:  This is a 57-year-old male who has a history of known severe   peripheral vascular disease.  He has had previously placed right lower extremity   bypass.  He presented to [facility] with acute limb ischemia, was found   to have a thrombosed bypass graft.  He did undergo an angiogram with placement   of TPA for catheter-directed thrombolysis therapy yesterday and returns today   for the above procedure and to check for followup for his catheter-directed   therapy.    PROCEDURE IN DETAIL:  After discussion with the patient regarding risks,   benefits and potential complication to agreed and signed informed consent, he   was brought to the specialist procedure room and placed in the supine position.    Then, after adequate conscious sedation, we prepped and draped in the patient's   bilateral groins to include the previously placed infusion catheter and an   introducer sheath.  We then advanced 0.035 guidewire through the infusion   catheter and removed the infusion catheter.  We then shot a right lower   extremity runoff angiogram.    RADIOLOGIC FINDINGS:  The patient had patent common femoral and deep femoral   arteries.  The superficial femoral artery was chronically occluded.  There was a   patent fem below-knee pop bypass near the arterial inflow anastomosis in the   groin.  There appeared to be a focal severe residual stenosis.  The distal   anastomosis appeared to be widely patent.  There was patent tibioperoneal trunk    and single vessel runoff into the posterior tibial artery continuing down to the   foot.  At this point, we heparinized the patient with 8000 units of IV heparin   and advanced a 6 x 40 mm impact drug-eluting balloon.  This was inflated across   the inflow anastomotic stenosis for approximately 3 minutes at 10 mmHg.    Subsequent images revealed improvement in flow through this section.  There was   a free flow of contrast through the bypass.  At this point, we decided not to   proceed with further interventions.  All catheters and wires removed from the   groin.  Direct manual pressure was held for approximately 30 minutes.  The   patient tolerated the procedure and transferred back to the room with stable   vital signs.      VT/HN  dd: 10/31/2017 07:43:43 (CDT)  td: 10/31/2017 08:06:49 (CDT)  Doc ID   #[id number]  Job ID #[id number]    CC:

## 2017-10-31 NOTE — SUBJECTIVE & OBJECTIVE
Interval History:  Pain and tenderness right foot.  Plan for thrombolysis today.    Review of Systems   Constitutional: Negative.  Negative for chills and fever.   HENT: Negative.  Negative for congestion and sore throat.    Eyes: Negative.  Negative for visual disturbance.   Respiratory: Negative.  Negative for cough, shortness of breath and wheezing.    Cardiovascular: Negative.  Negative for chest pain.   Gastrointestinal: Negative for abdominal pain, diarrhea, nausea and vomiting.   Endocrine: Negative.    Genitourinary: Negative.    Musculoskeletal: Positive for arthralgias and myalgias. Negative for neck stiffness.   Skin: Positive for color change and pallor.   Allergic/Immunologic: Negative.    Neurological: Negative.    Hematological: Negative.    Psychiatric/Behavioral: Negative.    All other systems reviewed and are negative.    Objective:     Vital Signs (Most Recent):  Temp: 98.7 °F (37.1 °C) (10/30/17 1900)  Pulse: (!) 56 (10/30/17 1930)  Resp: 10 (10/30/17 1930)  BP: 109/64 (10/30/17 1930)  SpO2: 100 % (10/30/17 1930) Vital Signs (24h Range):  Temp:  [97.2 °F (36.2 °C)-98.7 °F (37.1 °C)] 98.7 °F (37.1 °C)  Pulse:  [] 56  Resp:  [10-21] 10  SpO2:  [94 %-100 %] 100 %  BP: (100-247)/() 109/64     Weight: 81 kg (178 lb 9.6 oz)  Body mass index is 25.63 kg/m².    Intake/Output Summary (Last 24 hours) at 10/30/17 2006  Last data filed at 10/30/17 1900   Gross per 24 hour   Intake          1549.37 ml   Output              440 ml   Net          1109.37 ml      Physical Exam   Constitutional: He is oriented to person, place, and time. He appears well-developed and well-nourished. No distress.   HENT:   Head: Normocephalic and atraumatic.   Mouth/Throat: Oropharynx is clear and moist.   Eyes: Conjunctivae and EOM are normal. Pupils are equal, round, and reactive to light.   Neck: No JVD present. No thyromegaly present.   Cardiovascular: Normal rate, regular rhythm and normal heart sounds.  Exam  reveals no gallop and no friction rub.    No murmur heard.  Pulmonary/Chest: Effort normal and breath sounds normal. No respiratory distress. He has no wheezes. He has no rales.   Abdominal: Soft. Bowel sounds are normal. He exhibits no distension. There is no tenderness. There is no rebound and no guarding.   Musculoskeletal: Normal range of motion. He exhibits tenderness. He exhibits no edema.   Lymphadenopathy:     He has no cervical adenopathy.   Neurological: He is alert and oriented to person, place, and time. He has normal reflexes. He displays normal reflexes. No cranial nerve deficit.   Skin: Skin is dry. Capillary refill takes more than 3 seconds. No rash noted. He is not diaphoretic. There is erythema. There is pallor.   Right foot mostly with pale erythema and cool compared to left foot.  Right toes pale white.  Tender to palpation.   Psychiatric: He has a normal mood and affect. His behavior is normal. Judgment and thought content normal.       Significant Labs:   CBC:   Recent Labs  Lab 10/30/17  0150   WBC 10.03   HGB 12.8*   HCT 38.3*        CMP:   Recent Labs  Lab 10/30/17  0150      K 3.9      CO2 24   GLU 92   BUN 12   CREATININE 0.9   CALCIUM 9.7   PROT 7.7   ALBUMIN 3.6   BILITOT 0.2   ALKPHOS 111   AST 23   ALT 24   ANIONGAP 11   EGFRNONAA >60       Significant Imaging: I have reviewed all pertinent imaging results/findings within the past 24 hours.

## 2017-10-31 NOTE — EICU
eICU Note :    Called by the [facility]:    Problem:Anxiety with h/o Suboxone use on Precedex and Fentanyl GTT,previously was given Ativan 2 mg x1     Pertinent History and labs reviewed :58 y/o male with femoral artery to below knee popliteal occlusion  Of the bypass graft on  EKOS catheter for thrombolysis.  Treatment /Intervention given:Ativan 2 mg IVP q2 PRN.x2 doses        Daisy Naqvi M.D  eICU Physician

## 2017-10-31 NOTE — PROGRESS NOTES
[facility] Hospital Medicine  Progress Note    Patient Name: Ish Guardado  MRN: [id number]  Patient Class: IP- Inpatient   Admission Date: 10/30/2017  Length of Stay: 0 days  Attending Physician: Yobani Harris MD  Primary Care Provider: Pollo Arana MD        Subjective:     Principal Problem:Ischemic foot    HPI:  Mr. Guardado id a 55 yo male with a PMHx of uncontrolled HTN, prediabetes, COPD, h/o TIA, HLD, h/o substance abuse, noncompliance, and PAD with remote left fem-pop bypass 11/2014 + right fem-pop bypass 9/2016 + right fem-distal bypass 3/2017 with multiple right bypass occlusions, who presented to the ED with c/o right leg pain at rest that has progressively worsened over the past 1 week.  Pain is most severe in right calf and foot.  Associated decreased sensation to foot, skin coolness and discoloration (pale until turned red yesterday).  Patient is on Xarelto, and states he is compliant without missing any doses.  Initial work-up in ED with CTA RLE revealed occlusion of femoral artery to below-knee popliteal artery bypass graft; right femoral artery total occlusion; Occlusion of the origin of right profunda femoral artery; Occluded proximal popliteal artery; Severe stenosis of the tibioperoneal trunk; Severe stenosis of the proximal anterior tibial artery with multiple areas of near complete occlusion; Patent dorsalis pedis artery.  Case discussed with Vascular Surgery, who recommend peripheral angiogram.  No heparin drip due to patient being on Xarelto.  Hospital Medicine consulted for admission.  Currently, patient appears comfortable, in NAD.  BP notably elevated upon arrival to ED at 203/102.  Patient admits to noncompliance with BP meds, but insists he has never missed a dose of Xarelto.    Hospital Course:  No notes on file    Interval History:  Pain and tenderness right foot.  Plan for thrombolysis today.    Review of Systems   Constitutional: Negative.  Negative for chills  and fever.   HENT: Negative.  Negative for congestion and sore throat.    Eyes: Negative.  Negative for visual disturbance.   Respiratory: Negative.  Negative for cough, shortness of breath and wheezing.    Cardiovascular: Negative.  Negative for chest pain.   Gastrointestinal: Negative for abdominal pain, diarrhea, nausea and vomiting.   Endocrine: Negative.    Genitourinary: Negative.    Musculoskeletal: Positive for arthralgias and myalgias. Negative for neck stiffness.   Skin: Positive for color change and pallor.   Allergic/Immunologic: Negative.    Neurological: Negative.    Hematological: Negative.    Psychiatric/Behavioral: Negative.    All other systems reviewed and are negative.    Objective:     Vital Signs (Most Recent):  Temp: 98.7 °F (37.1 °C) (10/30/17 1900)  Pulse: (!) 56 (10/30/17 1930)  Resp: 10 (10/30/17 1930)  BP: 109/64 (10/30/17 1930)  SpO2: 100 % (10/30/17 1930) Vital Signs (24h Range):  Temp:  [97.2 °F (36.2 °C)-98.7 °F (37.1 °C)] 98.7 °F (37.1 °C)  Pulse:  [] 56  Resp:  [10-21] 10  SpO2:  [94 %-100 %] 100 %  BP: (100-247)/() 109/64     Weight: 81 kg (178 lb 9.6 oz)  Body mass index is 25.63 kg/m².    Intake/Output Summary (Last 24 hours) at 10/30/17 2006  Last data filed at 10/30/17 1900   Gross per 24 hour   Intake          1549.37 ml   Output              440 ml   Net          1109.37 ml      Physical Exam   Constitutional: He is oriented to person, place, and time. He appears well-developed and well-nourished. No distress.   HENT:   Head: Normocephalic and atraumatic.   Mouth/Throat: Oropharynx is clear and moist.   Eyes: Conjunctivae and EOM are normal. Pupils are equal, round, and reactive to light.   Neck: No JVD present. No thyromegaly present.   Cardiovascular: Normal rate, regular rhythm and normal heart sounds.  Exam reveals no gallop and no friction rub.    No murmur heard.  Pulmonary/Chest: Effort normal and breath sounds normal. No respiratory distress. He has no  wheezes. He has no rales.   Abdominal: Soft. Bowel sounds are normal. He exhibits no distension. There is no tenderness. There is no rebound and no guarding.   Musculoskeletal: Normal range of motion. He exhibits tenderness. He exhibits no edema.   Lymphadenopathy:     He has no cervical adenopathy.   Neurological: He is alert and oriented to person, place, and time. He has normal reflexes. He displays normal reflexes. No cranial nerve deficit.   Skin: Skin is dry. Capillary refill takes more than 3 seconds. No rash noted. He is not diaphoretic. There is erythema. There is pallor.   Right foot mostly with pale erythema and cool compared to left foot.  Right toes pale white.  Tender to palpation.   Psychiatric: He has a normal mood and affect. His behavior is normal. Judgment and thought content normal.       Significant Labs:   CBC:   Recent Labs  Lab 10/30/17  0150   WBC 10.03   HGB 12.8*   HCT 38.3*        CMP:   Recent Labs  Lab 10/30/17  0150      K 3.9      CO2 24   GLU 92   BUN 12   CREATININE 0.9   CALCIUM 9.7   PROT 7.7   ALBUMIN 3.6   BILITOT 0.2   ALKPHOS 111   AST 23   ALT 24   ANIONGAP 11   EGFRNONAA >60       Significant Imaging: I have reviewed all pertinent imaging results/findings within the past 24 hours.    Assessment/Plan:      * Ischemic right foot secondary to occluded fem-distal bypass graft    - CTA of RLE revealedocclusion of femoral artery to below-knee popliteal artery bypass graft; right femoral artery total occlusion; Occlusion of the origin of right profunda femoral artery; Occluded proximal popliteal artery; Severe stenosis of the tibioperoneal trunk; Severe stenosis of the proximal anterior tibial artery with multiple areas of near complete occlusion; Patent dorsalis pedis artery.   - Case discussed with Vascular Surgery.  - Will admit to Telemetry.  - No heparin drip since patient is on chronic Xarelto.   - Plans for peripheral angiogram this AM.  - Resume Xarelto  post-procedure; Last dose 10/29 PM.  - Continue ASA and statin therapy.  - Neurovascular checks Q4 hours.        HTN (hypertension)    - Secondary to medication noncompliance.  - Resume home Amlodipine 10mg daily.  Monitor BP trends, and optimize therapy as needed.  Consider adding ACEI for additional benefit in PAD.        PAD with remote right fem-distal bypass    - Remote h/o fem-pop bypass with occlusion in 3/2017 requiring fem-distal bypass.  - Patient with recurrent fem-distal bypass occlusion 5/25/17 s/p thrombolysis, and 6/3/17 s/p thrombolysis + PTA.  - Plan as above.         Mixed hyperlipidemia    - Continue statin.  - FLP.        PAD (peripheral artery disease)    Continue Aspirin and Atorvastatin.          VTE Risk Mitigation         Ordered     heparin 25,000 units in dextrose 5% 250 mL (100 units/mL) infusion (heparin infusion)  Continuous     Route:  Intravenous        10/30/17 0956     Medium Risk of VTE  Once      10/30/17 0439     Reason for No Pharmacological VTE Prophylaxis  Once      10/30/17 0439          Critical care time spent on the evaluation and treatment of severe organ dysfunction, review of pertinent labs and imaging studies, discussions with consulting providers and discussions with patient/family: 30 minutes.    Yobani Harris MD  Department of Hospital Medicine   [facility]

## 2017-10-31 NOTE — NURSING
Act performed- 153. Sheath removed. Pressure held x15min. No bleeding or hematoma noted. Will continue to monitor.

## 2017-10-31 NOTE — SUBJECTIVE & OBJECTIVE
Interval History:  Right foot pain and tenderness.  Plan for thrombolysis today.    Objective:     Vital Signs (Most Recent):  Temp: 98.7 °F (37.1 °C) (10/30/17 1900)  Pulse: (!) 56 (10/30/17 1930)  Resp: 10 (10/30/17 1930)  BP: 109/64 (10/30/17 1930)  SpO2: 100 % (10/30/17 1930) Vital Signs (24h Range):  Temp:  [97.2 °F (36.2 °C)-98.7 °F (37.1 °C)] 98.7 °F (37.1 °C)  Pulse:  [] 56  Resp:  [10-21] 10  SpO2:  [94 %-100 %] 100 %  BP: (100-247)/() 109/64     Weight: 81 kg (178 lb 9.6 oz)  Body mass index is 25.63 kg/m².      Intake/Output Summary (Last 24 hours) at 10/30/17 2000  Last data filed at 10/30/17 1900   Gross per 24 hour   Intake          1549.37 ml   Output              440 ml   Net          1109.37 ml       Physical Exam   Constitutional: He is oriented to person, place, and time. He appears well-developed and well-nourished. No distress.   HENT:   Head: Normocephalic and atraumatic.   Mouth/Throat: Oropharynx is clear and moist.   Eyes: Conjunctivae and EOM are normal. Pupils are equal, round, and reactive to light.   Neck: No JVD present. No thyromegaly present.   Cardiovascular: Normal rate, regular rhythm and normal heart sounds.  Exam reveals no gallop and no friction rub.    No murmur heard.  Pulmonary/Chest: Effort normal and breath sounds normal. No respiratory distress. He has no wheezes. He has no rales.   Abdominal: Soft. Bowel sounds are normal. He exhibits no distension. There is no tenderness. There is no rebound and no guarding.   Musculoskeletal: Normal range of motion. He exhibits no edema or tenderness.   Lymphadenopathy:     He has no cervical adenopathy.   Neurological: He is alert and oriented to person, place, and time. He has normal reflexes. He displays normal reflexes. No cranial nerve deficit.   Skin: Skin is dry. Capillary refill takes more than 3 seconds. No rash noted. He is not diaphoretic. There is erythema. There is pallor.   Right foot with pale toes and cool to  the touch.  Very tender to palpation.  Pale erythema lower leg and foot.   Psychiatric: He has a normal mood and affect. His behavior is normal. Judgment and thought content normal.       Vents:       Lines/Drains/Airways     Drain                 Urethral Catheter 10/30/17 1625 less than 1 day          Pressure Ulcer                 Pressure Ulcer 06/05/17 1005 Left plantar heel suspected deep tissue injury 147 days          Peripheral Intravenous Line                 Peripheral IV - Single Lumen 10/30/17 0149 Right Antecubital less than 1 day         Peripheral IV - Single Lumen 10/30/17 0958 Left Hand less than 1 day                Significant Labs:    CBC/Anemia Profile:    Recent Labs  Lab 10/30/17  0150   WBC 10.03   HGB 12.8*   HCT 38.3*      MCV 87   RDW 14.2        Chemistries:    Recent Labs  Lab 10/30/17  0150      K 3.9      CO2 24   BUN 12   CREATININE 0.9   CALCIUM 9.7   ALBUMIN 3.6   PROT 7.7   BILITOT 0.2   ALKPHOS 111   ALT 24   AST 23       BMP:   Recent Labs  Lab 10/30/17  0150   GLU 92      K 3.9      CO2 24   BUN 12   CREATININE 0.9   CALCIUM 9.7     CMP:   Recent Labs  Lab 10/30/17  0150      K 3.9      CO2 24   GLU 92   BUN 12   CREATININE 0.9   CALCIUM 9.7   PROT 7.7   ALBUMIN 3.6   BILITOT 0.2   ALKPHOS 111   AST 23   ALT 24   ANIONGAP 11   EGFRNONAA >60       Significant Imaging:  I have reviewed all pertinent imaging results/findings within the past 24 hours.

## 2017-10-31 NOTE — ASSESSMENT & PLAN NOTE
- Secondary to medication noncompliance.  - Resume home Amlodipine 10mg daily.  Monitor BP trends, and optimize therapy as needed.  Consider adding ACEI for additional benefit in PAD.

## 2017-11-01 LAB
ALBUMIN SERPL BCP-MCNC: 2.8 G/DL
ALP SERPL-CCNC: 91 U/L
ALT SERPL W/O P-5'-P-CCNC: 17 U/L
ANION GAP SERPL CALC-SCNC: 9 MMOL/L
AST SERPL-CCNC: 20 U/L
BASOPHILS # BLD AUTO: 0.03 K/UL
BASOPHILS NFR BLD: 0.3 %
BILIRUB SERPL-MCNC: 0.4 MG/DL
BUN SERPL-MCNC: 14 MG/DL
CALCIUM SERPL-MCNC: 8.8 MG/DL
CHLORIDE SERPL-SCNC: 102 MMOL/L
CO2 SERPL-SCNC: 21 MMOL/L
CREAT SERPL-MCNC: 0.7 MG/DL
DIFFERENTIAL METHOD: ABNORMAL
EOSINOPHIL # BLD AUTO: 0.2 K/UL
EOSINOPHIL NFR BLD: 2 %
ERYTHROCYTE [DISTWIDTH] IN BLOOD BY AUTOMATED COUNT: 14.1 %
EST. GFR  (AFRICAN AMERICAN): >60 ML/MIN/1.73 M^2
EST. GFR  (NON AFRICAN AMERICAN): >60 ML/MIN/1.73 M^2
GLUCOSE SERPL-MCNC: 121 MG/DL
HCT VFR BLD AUTO: 32.6 %
HGB BLD-MCNC: 10.6 G/DL
LYMPHOCYTES # BLD AUTO: 1.3 K/UL
LYMPHOCYTES NFR BLD: 14.4 %
MCH RBC QN AUTO: 28.8 PG
MCHC RBC AUTO-ENTMCNC: 32.5 G/DL
MCV RBC AUTO: 89 FL
MONOCYTES # BLD AUTO: 1.3 K/UL
MONOCYTES NFR BLD: 14.1 %
NEUTROPHILS # BLD AUTO: 6.3 K/UL
NEUTROPHILS NFR BLD: 69.2 %
PLATELET # BLD AUTO: 213 K/UL
PMV BLD AUTO: 8.3 FL
POTASSIUM SERPL-SCNC: 3.8 MMOL/L
PROT SERPL-MCNC: 6.3 G/DL
RBC # BLD AUTO: 3.68 M/UL
SODIUM SERPL-SCNC: 132 MMOL/L
WBC # BLD AUTO: 9.08 K/UL

## 2017-11-01 PROCEDURE — 25000003 PHARM REV CODE 250: Performed by: INTERNAL MEDICINE

## 2017-11-01 PROCEDURE — 25000242 PHARM REV CODE 250 ALT 637 W/ HCPCS: Performed by: EMERGENCY MEDICINE

## 2017-11-01 PROCEDURE — 99233 SBSQ HOSP IP/OBS HIGH 50: CPT | Mod: ,,, | Performed by: INTERNAL MEDICINE

## 2017-11-01 PROCEDURE — 36415 COLL VENOUS BLD VENIPUNCTURE: CPT

## 2017-11-01 PROCEDURE — C9113 INJ PANTOPRAZOLE SODIUM, VIA: HCPCS | Performed by: EMERGENCY MEDICINE

## 2017-11-01 PROCEDURE — 25000003 PHARM REV CODE 250: Performed by: SURGERY

## 2017-11-01 PROCEDURE — 63600175 PHARM REV CODE 636 W HCPCS: Performed by: INTERNAL MEDICINE

## 2017-11-01 PROCEDURE — 85347 COAGULATION TIME ACTIVATED: CPT

## 2017-11-01 PROCEDURE — 21400001 HC TELEMETRY ROOM

## 2017-11-01 PROCEDURE — 25000003 PHARM REV CODE 250: Performed by: EMERGENCY MEDICINE

## 2017-11-01 PROCEDURE — 85025 COMPLETE CBC W/AUTO DIFF WBC: CPT

## 2017-11-01 PROCEDURE — 80053 COMPREHEN METABOLIC PANEL: CPT

## 2017-11-01 PROCEDURE — 63600175 PHARM REV CODE 636 W HCPCS: Performed by: EMERGENCY MEDICINE

## 2017-11-01 RX ORDER — ALPRAZOLAM 1 MG/1
1 TABLET ORAL 3 TIMES DAILY PRN
Status: DISCONTINUED | OUTPATIENT
Start: 2017-11-01 | End: 2017-11-02 | Stop reason: HOSPADM

## 2017-11-01 RX ORDER — GABAPENTIN 100 MG/1
100 CAPSULE ORAL 3 TIMES DAILY
Status: DISCONTINUED | OUTPATIENT
Start: 2017-11-01 | End: 2017-11-02 | Stop reason: HOSPADM

## 2017-11-01 RX ADMIN — HYDROMORPHONE HYDROCHLORIDE 2 MG: 2 INJECTION, SOLUTION INTRAMUSCULAR; INTRAVENOUS; SUBCUTANEOUS at 03:11

## 2017-11-01 RX ADMIN — ATORVASTATIN CALCIUM 20 MG: 10 TABLET, FILM COATED ORAL at 08:11

## 2017-11-01 RX ADMIN — HYDROMORPHONE HYDROCHLORIDE 2 MG: 2 INJECTION, SOLUTION INTRAMUSCULAR; INTRAVENOUS; SUBCUTANEOUS at 08:11

## 2017-11-01 RX ADMIN — OXYCODONE HYDROCHLORIDE AND ACETAMINOPHEN 1 TABLET: 10; 325 TABLET ORAL at 03:11

## 2017-11-01 RX ADMIN — SODIUM CHLORIDE: 0.9 INJECTION, SOLUTION INTRAVENOUS at 03:11

## 2017-11-01 RX ADMIN — OXYCODONE HYDROCHLORIDE AND ACETAMINOPHEN 1 TABLET: 10; 325 TABLET ORAL at 05:11

## 2017-11-01 RX ADMIN — HYDROMORPHONE HYDROCHLORIDE 2 MG: 2 INJECTION, SOLUTION INTRAMUSCULAR; INTRAVENOUS; SUBCUTANEOUS at 06:11

## 2017-11-01 RX ADMIN — GABAPENTIN 100 MG: 100 CAPSULE ORAL at 10:11

## 2017-11-01 RX ADMIN — OXYCODONE HYDROCHLORIDE AND ACETAMINOPHEN 1 TABLET: 10; 325 TABLET ORAL at 10:11

## 2017-11-01 RX ADMIN — ACETYLCYSTEINE 600 MG: 200 SOLUTION ORAL; RESPIRATORY (INHALATION) at 08:11

## 2017-11-01 RX ADMIN — ASPIRIN 81 MG CHEWABLE TABLET 81 MG: 81 TABLET CHEWABLE at 08:11

## 2017-11-01 RX ADMIN — GABAPENTIN 100 MG: 100 CAPSULE ORAL at 01:11

## 2017-11-01 RX ADMIN — HYDROMORPHONE HYDROCHLORIDE 2 MG: 2 INJECTION, SOLUTION INTRAMUSCULAR; INTRAVENOUS; SUBCUTANEOUS at 10:11

## 2017-11-01 RX ADMIN — AMLODIPINE BESYLATE 10 MG: 10 TABLET ORAL at 08:11

## 2017-11-01 RX ADMIN — OXYCODONE HYDROCHLORIDE AND ACETAMINOPHEN 1 TABLET: 10; 325 TABLET ORAL at 09:11

## 2017-11-01 RX ADMIN — ALPRAZOLAM 1 MG: 1 TABLET ORAL at 01:11

## 2017-11-01 RX ADMIN — HYDROMORPHONE HYDROCHLORIDE 2 MG: 2 INJECTION, SOLUTION INTRAMUSCULAR; INTRAVENOUS; SUBCUTANEOUS at 12:11

## 2017-11-01 RX ADMIN — ACETYLCYSTEINE 600 MG: 200 SOLUTION ORAL; RESPIRATORY (INHALATION) at 09:11

## 2017-11-01 RX ADMIN — OXYCODONE HYDROCHLORIDE AND ACETAMINOPHEN 1 TABLET: 10; 325 TABLET ORAL at 01:11

## 2017-11-01 RX ADMIN — RIVAROXABAN 20 MG: 20 TABLET, FILM COATED ORAL at 05:11

## 2017-11-01 RX ADMIN — PANTOPRAZOLE SODIUM 40 MG: 40 INJECTION, POWDER, FOR SOLUTION INTRAVENOUS at 08:11

## 2017-11-01 NOTE — PROGRESS NOTES
[facility] -   Vascular Surgery  Progress Note    Patient Name: Ish Guardado  MRN: [id number]  Admission Date: 10/30/2017  Primary Care Provider: Pollo Arana MD    Subjective:     Interval History: s/p repeat RLE angiogram after EKOS lysis of occlude right leg bypass with proximal anastamosis angioplasty    Right foot warm/well perfused  Dop DP/PT    Post-Op Info:  Procedure(s) (LRB):  ANGIOGRAM-EXTREMITY-LOWER (N/A)   1 Day Post-Op      Medications:  Continuous Infusions:   sodium chloride 0.9% 75 mL/hr at 11/01/17 0800     Scheduled Meds:   acetylcysteine 200 mg/ml (20%)  600 mg Oral BID    amLODIPine  10 mg Oral Daily    aspirin  81 mg Oral Daily    atorvastatin  20 mg Oral Daily    gabapentin  100 mg Oral TID    pantoprazole  40 mg Intravenous Daily    rivaroxaban  20 mg Oral Daily with dinner     PRN Meds:ALPRAZolam, diphenhydrAMINE, HYDROmorphone, oxyCODONE-acetaminophen     Objective:     Vital Signs (Most Recent):  Temp: 98.9 °F (37.2 °C) (11/01/17 0700)  Pulse: 92 (11/01/17 0800)  Resp: 18 (11/01/17 0800)  BP: (!) 151/93 (11/01/17 0800)  SpO2: 99 % (11/01/17 0800) Vital Signs (24h Range):  Temp:  [98.2 °F (36.8 °C)-99.9 °F (37.7 °C)] 98.9 °F (37.2 °C)  Pulse:  [63-95] 92  Resp:  [9-22] 18  SpO2:  [93 %-100 %] 99 %  BP: ()/() 151/93       Date 11/01/17 0700 - 11/02/17 0659   Shift 5236-1925 2929-8752 5844-0060 24 Hour Total   I  N  T  A  K  E   P.O. 240   240    I.V.  (mL/kg) 150  (1.9)   150  (1.9)    Shift Total  (mL/kg) 390  (4.8)   390  (4.8)   O  U  T  P  U  T   Urine  (mL/kg/hr) 600   600    Shift Total  (mL/kg) 600  (7.4)   600  (7.4)   Weight (kg) 81 81 81 81       Physical Exam   Right foot warm/well perfused  Dop DP/PT bilaterally  Left groin puncture site c/d/i, no hematoma    Significant Labs:  BMP:   Recent Labs  Lab 11/01/17  0454   *   *   K 3.8      CO2 21*   BUN 14   CREATININE 0.7   CALCIUM 8.8     CBC:   Recent Labs  Lab 11/01/17  0455    WBC 9.08   RBC 3.68*   HGB 10.6*   HCT 32.6*      MCV 89   MCH 28.8   MCHC 32.5     Coagulation: No results for input(s): LABPROT, INR, APTT in the last 48 hours.    Significant Diagnostics:  I have reviewed all pertinent imaging results/findings within the past 24 hours.    Assessment/Plan:     Active Diagnoses:    Diagnosis Date Noted POA    PRINCIPAL PROBLEM:  Ischemic right foot secondary to occluded fem-distal bypass graft [I99.8] 10/30/2017 Yes    PAD with remote right fem-distal bypass [I99.8] 10/30/2017 Yes     Chronic    HTN (hypertension) [I10] 10/30/2017 Yes     Chronic    Mixed hyperlipidemia [E78.2] 05/24/2017 Yes     Chronic    PAD (peripheral artery disease) [I73.9] 05/24/2017 Yes     Chronic      Problems Resolved During this Admission:    Diagnosis Date Noted Date Resolved POA     Right foot warm/well perfused  Recommend PT/OT eval  Transfer to telemetry  Ok to restart xarelto  Pt can follow up as outpatient  Will sign off    Jack John IV, MD  Vascular Surgery  [facility]

## 2017-11-01 NOTE — HOSPITAL COURSE
30 October:  Thrombolysis by 5 mg tPA pulse spray then thrombectomy.  Placed EKOS with 80% removal of thrombus.  Post procedure monitoring in the ICU.  Severe pain to the right foot and lower leg after reperfusion.  Controlled with Precedex and Fentanyl infusions.  31 October:  Thrombolysis check and distal ballooning.  01 November:  Hemodynamically stable with no signs of bleeding.  Transferred to telemetry.  Symptomatic improvement with better pain control.  Plan to return home and follow up in the next week with Vascular Surgery and continue Aspirin and Lipitor and start Xarelto 20 mg daily.  I have seen and examined Mr. Guardado on the day of discharge and deemed him appropriate to return home.

## 2017-11-01 NOTE — NURSING
MARCELINO Hsu notified of patients decreased urine output. Only 5ccs of output in the last hour. New order given for 500cc bolus. Will carry out order.

## 2017-11-01 NOTE — NURSING
Pt BP 80s/40s, NP Shadi notified, new orders given to D/C Fentanyl drip and titrate Precedex to a RASS of 0. Will carry out orders.

## 2017-11-01 NOTE — SUBJECTIVE & OBJECTIVE
Interval History:  Pain and tenderness right foot.  Thrombolysis check and possible additional interventions today.    Review of Systems   Constitutional: Negative.  Negative for chills and fever.   HENT: Negative.  Negative for congestion and sore throat.    Eyes: Negative.  Negative for visual disturbance.   Respiratory: Negative.  Negative for cough, shortness of breath and wheezing.    Cardiovascular: Negative.  Negative for chest pain.   Gastrointestinal: Negative for abdominal pain, diarrhea, nausea and vomiting.   Endocrine: Negative.    Genitourinary: Negative.    Musculoskeletal: Positive for arthralgias and myalgias. Negative for neck stiffness.   Skin: Positive for color change and pallor.   Allergic/Immunologic: Negative.    Neurological: Negative.    Hematological: Negative.    Psychiatric/Behavioral: Negative.    All other systems reviewed and are negative.    Objective:     Vital Signs (Most Recent):  Temp: 99.9 °F (37.7 °C) (10/31/17 1515)  Pulse: 82 (10/31/17 1938)  Resp: (!) 22 (10/31/17 1938)  BP: (!) 144/77 (10/31/17 1830)  SpO2: 98 % (10/31/17 1938) Vital Signs (24h Range):  Temp:  [99.9 °F (37.7 °C)-100.1 °F (37.8 °C)] 99.9 °F (37.7 °C)  Pulse:  [57-88] 82  Resp:  [9-22] 22  SpO2:  [92 %-100 %] 98 %  BP: ()/(45-87) 144/77     Weight: 81 kg (178 lb 9.2 oz)  Body mass index is 25.62 kg/m².    Intake/Output Summary (Last 24 hours) at 10/31/17 1954  Last data filed at 10/31/17 1748   Gross per 24 hour   Intake             2901 ml   Output             1322 ml   Net             1579 ml      Physical Exam   Constitutional: He is oriented to person, place, and time. He appears well-developed and well-nourished. No distress.   HENT:   Head: Normocephalic and atraumatic.   Mouth/Throat: Oropharynx is clear and moist.   Eyes: Conjunctivae and EOM are normal. Pupils are equal, round, and reactive to light.   Neck: No JVD present. No thyromegaly present.   Cardiovascular: Normal rate, regular rhythm  and normal heart sounds.  Exam reveals no gallop and no friction rub.    No murmur heard.  Pulmonary/Chest: Effort normal and breath sounds normal. No respiratory distress. He has no wheezes. He has no rales.   Abdominal: Soft. Bowel sounds are normal. He exhibits no distension. There is no tenderness. There is no rebound and no guarding.   Musculoskeletal: Normal range of motion. He exhibits tenderness. He exhibits no edema.   Lymphadenopathy:     He has no cervical adenopathy.   Neurological: He is alert and oriented to person, place, and time. He has normal reflexes. He displays normal reflexes. No cranial nerve deficit.   Skin: Skin is dry. Capillary refill takes more than 3 seconds. No rash noted. He is not diaphoretic. There is erythema. There is pallor.   Right foot mostly with pale erythema and cool compared to left foot.  Right toes pale white.  Tender to palpation.   Psychiatric: He has a normal mood and affect. His behavior is normal. Judgment and thought content normal.       Significant Labs:   CBC:     Recent Labs  Lab 10/30/17  0150 10/31/17  0528   WBC 10.03 9.29   HGB 12.8* 11.8*   HCT 38.3* 36.5*    232     CMP:     Recent Labs  Lab 10/30/17  0150 10/31/17  0527    134*   K 3.9 4.4    104   CO2 24 23   GLU 92 93   BUN 12 14   CREATININE 0.9 0.8   CALCIUM 9.7 9.3   PROT 7.7  --    ALBUMIN 3.6  --    BILITOT 0.2  --    ALKPHOS 111  --    AST 23  --    ALT 24  --    ANIONGAP 11 7*   EGFRNONAA >60 >60       Significant Imaging: I have reviewed all pertinent imaging results/findings within the past 24 hours.

## 2017-11-01 NOTE — PLAN OF CARE
Problem: Patient Care Overview  Goal: Plan of Care Review  Outcome: Ongoing (interventions implemented as appropriate)  Pt remains AAOx4, VSS, off of precedex gtt, UOP adequate, and TMAX 99.6. Pt does c/o right foot pain and prn meds adequately keeping pt's pain under control after the time frames changed to q4hrs. Pt's right foot still discolored but is warm up to the toes and red w/ pre existing petechiae noted, pt has full sensation in foot, DP/PT pulses heard via doppler and post popliteal pulse felt via palpation. POC discussed w/ pt and pt's spouse. Chart check done.

## 2017-11-01 NOTE — PROGRESS NOTES
[facility]  Critical Care - Medicine  Progress Note    Patient Name: Ish Guardado  MRN: [id number]  Admission Date: 10/30/2017  Hospital Length of Stay: 2 days  Code Status: Prior  Attending Provider: Yobani Harris MD  Primary Care Provider: Plolo Arana MD   Principal Problem: Ischemic foot    Subjective:     HPI: 60 y/o with history of Peripheral Vascular Disease presents with 7 day history of leg pain. Evaluated by Vascular surgery and underwent emergent angiogram with TPA and thrombolysis with placement of ECOS catheter. history of  remote left fem-pop bypass 11/2014 + right fem-pop bypass 9/2016 + right fem-distal bypass 3/2017 with multiple right bypass occlusions. Past Medical history significant for hypertension, prediabetes, Chronic Obstructive Pulmonary Disease, hyperlipidemia and PAD.     Hospital/ICU Course: 10/30 Brought from cath lab to ICU. Severe pain on precedex drip given MS. Not effective - started on dilaudid drip. Patient requesting dilaudid.   10/31/17 Underwent repeat revascularization surgery this am. Brought back to ICU. See note by Dr. Mclean    Interval History/Significant Events: 11/1 Less pain sittiing up in bed    Review of Systems   Constitutional: Negative.  Negative for fatigue, fever and unexpected weight change.   HENT: Negative for congestion, postnasal drip, rhinorrhea, sinus pressure and sneezing.    Cardiovascular: Positive for leg swelling. Negative for chest pain and palpitations.   Gastrointestinal: Negative for abdominal pain and nausea.   Musculoskeletal: Negative for arthralgias and back pain.   Skin: Positive for color change and pallor. Negative for rash.   Neurological: Negative for dizziness, syncope, weakness and light-headedness.   Hematological: Negative for adenopathy. Does not bruise/bleed easily.   Psychiatric/Behavioral: Positive for sleep disturbance. Negative for dysphoric mood. The patient is nervous/anxious.      Objective:     Vital  Signs (Most Recent):  Temp: 98.9 °F (37.2 °C) (11/01/17 0700)  Pulse: 92 (11/01/17 0800)  Resp: 18 (11/01/17 0800)  BP: (!) 151/93 (11/01/17 0800)  SpO2: 99 % (11/01/17 0800) Vital Signs (24h Range):  Temp:  [98.2 °F (36.8 °C)-100 °F (37.8 °C)] 98.9 °F (37.2 °C)  Pulse:  [63-95] 92  Resp:  [9-22] 18  SpO2:  [93 %-100 %] 99 %  BP: ()/() 151/93     Weight: 81 kg (178 lb 9.2 oz)  Body mass index is 25.62 kg/m².      Intake/Output Summary (Last 24 hours) at 11/01/17 0947  Last data filed at 11/01/17 0800   Gross per 24 hour   Intake             3380 ml   Output             1857 ml   Net             1523 ml       Physical Exam  Physical Exam   Constitutional: He appears well-developed and well-nourished.   HENT:   Head: Normocephalic and atraumatic.   Eyes: Conjunctivae are normal. Pupils are equal, round, and reactive to light.   Neck: Neck supple. No JVD present. No tracheal deviation present. No thyromegaly present.   Cardiovascular: Normal rate, regular rhythm and normal heart sounds.    Pulmonary/Chest: Effort normal. He has decreased breath sounds in the right lower field and the left lower field.   Abdominal: Soft.   Musculoskeletal: Normal range of motion.   Lymphadenopathy:     He has no cervical adenopathy.   Neurological: alert, orientated  Skin:   Right foot pale, dusky, petechia on surface.   Vents:  Oxygen Concentration (%): 28 (10/31/17 0827)    Lines/Drains/Airways     Pressure Ulcer                 Pressure Ulcer 06/05/17 1005 Left plantar heel suspected deep tissue injury 148 days          Peripheral Intravenous Line                 Peripheral IV - Single Lumen 10/30/17 0149 Right Antecubital 2 days         Peripheral IV - Single Lumen 10/30/17 0958 Left Hand 1 day                Significant Labs:    CBC/Anemia Profile:    Recent Labs  Lab 10/31/17  0528 11/01/17  0454   WBC 9.29 9.08   HGB 11.8* 10.6*   HCT 36.5* 32.6*    213   MCV 89 89   RDW 14.5 14.1        Chemistries:    Recent  Labs  Lab 10/31/17  0527 11/01/17  0454   * 132*   K 4.4 3.8    102   CO2 23 21*   BUN 14 14   CREATININE 0.8 0.7   CALCIUM 9.3 8.8   ALBUMIN  --  2.8*   PROT  --  6.3   BILITOT  --  0.4   ALKPHOS  --  91   ALT  --  17   AST  --  20       Blood Culture: No results for input(s): LABBLOO in the last 48 hours.  BMP:   Recent Labs  Lab 11/01/17  0454   *   *   K 3.8      CO2 21*   BUN 14   CREATININE 0.7   CALCIUM 8.8     CMP:   Recent Labs  Lab 10/31/17  0527 11/01/17  0454   * 132*   K 4.4 3.8    102   CO2 23 21*   GLU 93 121*   BUN 14 14   CREATININE 0.8 0.7   CALCIUM 9.3 8.8   PROT  --  6.3   ALBUMIN  --  2.8*   BILITOT  --  0.4   ALKPHOS  --  91   AST  --  20   ALT  --  17   ANIONGAP 7* 9   EGFRNONAA >60 >60     All pertinent labs within the past 24 hours have been reviewed.    Significant Imaging:  I have reviewed all pertinent imaging results/findings within the past 24 hours.    Assessment/Plan:     Active Diagnoses:    Diagnosis Date Noted POA    PRINCIPAL PROBLEM:  Ischemic right foot secondary to occluded fem-distal bypass graft [I99.8] 10/30/2017 Yes    PAD with remote right fem-distal bypass [I99.8] 10/30/2017 Yes     Chronic    HTN (hypertension) [I10] 10/30/2017 Yes     Chronic    Mixed hyperlipidemia [E78.2] 05/24/2017 Yes     Chronic    PAD (peripheral artery disease) [I73.9] 05/24/2017 Yes     Chronic      Problems Resolved During this Admission:    Diagnosis Date Noted Date Resolved POA            Critical Care Daily Checklist:    A: Awake: RASS Goal/Actual Goal: RASS Goal: 0-->alert and calm  Actual: Lee Agitation Sedation Scale (RASS): Alert and calm   B: Spontaneous Breathing Trial Performed?     C: SAT & SBT Coordinated?  na                      D: Delirium: CAM-ICU Overall CAM-ICU: Negative   E: Early Mobility Performed? Yes   F: Feeding Goal: Goals: Intake of 50 % or better by next RD visit   Status: Nutrition Goal Status: new   Current Diet  Order   Procedures    Diet Cardiac      AS: Analgesia/Sedation fair   T: Thromboembolic Prophylaxis Y   H: HOB > 300 Yes   U: Stress Ulcer Prophylaxis (if needed) Y   G: Glucose Control adequate   B: Bowel Function Stool Occurrence: 0   I: Indwelling Catheter (Lines & Mejia) Necessity removed   D: De-escalation of Antimicrobials/Pharmacotherapies na    Plan for the day/ETD Transfer  \switch to oral pain meds    Code Status:  Family/Goals of Care: Prior  discussed      Time: 35 minutes  Critical secondary to Patient has a condition that poses threat to life and bodily function: critical limb ischemia     Critical care was time spent personally by me on the following activities: development of treatment plan with patient or surrogate and bedside caregivers, discussions with consultants, evaluation of patient's response to treatment, examination of patient, ordering and performing treatments and interventions, ordering and review of laboratory studies, ordering and review of radiographic studies, pulse oximetry, re-evaluation of patient's condition.  This critical care time did not overlap with that of any other provider or involve time for any procedures.     Gage Marroquin MD  Critical Care - Medicine  [facility]

## 2017-11-01 NOTE — PROGRESS NOTES
[facility] Hospital Medicine  Progress Note    Patient Name: Ish Guardado  MRN: [id number]  Patient Class: IP- Inpatient   Admission Date: 10/30/2017  Length of Stay: 1 days  Attending Physician: Yobani Harris MD  Primary Care Provider: Pollo Arana MD        Subjective:     Principal Problem:Ischemic foot    HPI:  Mr. Guardado id a 55 yo male with a PMHx of uncontrolled HTN, prediabetes, COPD, h/o TIA, HLD, h/o substance abuse, noncompliance, and PAD with remote left fem-pop bypass 11/2014 + right fem-pop bypass 9/2016 + right fem-distal bypass 3/2017 with multiple right bypass occlusions, who presented to the ED with c/o right leg pain at rest that has progressively worsened over the past 1 week.  Pain is most severe in right calf and foot.  Associated decreased sensation to foot, skin coolness and discoloration (pale until turned red yesterday).  Patient is on Xarelto, and states he is compliant without missing any doses.  Initial work-up in ED with CTA RLE revealed occlusion of femoral artery to below-knee popliteal artery bypass graft; right femoral artery total occlusion; Occlusion of the origin of right profunda femoral artery; Occluded proximal popliteal artery; Severe stenosis of the tibioperoneal trunk; Severe stenosis of the proximal anterior tibial artery with multiple areas of near complete occlusion; Patent dorsalis pedis artery.  Case discussed with Vascular Surgery, who recommend peripheral angiogram.  No heparin drip due to patient being on Xarelto.  Hospital Medicine consulted for admission.  Currently, patient appears comfortable, in NAD.  BP notably elevated upon arrival to ED at 203/102.  Patient admits to noncompliance with BP meds, but insists he has never missed a dose of Xarelto.    Hospital Course:  30 October:  Thrombolysis by 5 mg tPA pulse spray then thrombectomy.  Placed  EKOS with 80% removal of thrombus.  Post procedure monitoring in the ICU.  Severe pain to  the right foot and lower leg after reperfusion.  Controlled with Precedex and Fentanyl infusions.  31 October:  Thrombolysis check and distal ballooning.     Interval History:  Pain and tenderness right foot.  Thrombolysis check and possible additional interventions today.    Review of Systems   Constitutional: Negative.  Negative for chills and fever.   HENT: Negative.  Negative for congestion and sore throat.    Eyes: Negative.  Negative for visual disturbance.   Respiratory: Negative.  Negative for cough, shortness of breath and wheezing.    Cardiovascular: Negative.  Negative for chest pain.   Gastrointestinal: Negative for abdominal pain, diarrhea, nausea and vomiting.   Endocrine: Negative.    Genitourinary: Negative.    Musculoskeletal: Positive for arthralgias and myalgias. Negative for neck stiffness.   Skin: Positive for color change and pallor.   Allergic/Immunologic: Negative.    Neurological: Negative.    Hematological: Negative.    Psychiatric/Behavioral: Negative.    All other systems reviewed and are negative.    Objective:     Vital Signs (Most Recent):  Temp: 99.9 °F (37.7 °C) (10/31/17 1515)  Pulse: 82 (10/31/17 1938)  Resp: (!) 22 (10/31/17 1938)  BP: (!) 144/77 (10/31/17 1830)  SpO2: 98 % (10/31/17 1938) Vital Signs (24h Range):  Temp:  [99.9 °F (37.7 °C)-100.1 °F (37.8 °C)] 99.9 °F (37.7 °C)  Pulse:  [57-88] 82  Resp:  [9-22] 22  SpO2:  [92 %-100 %] 98 %  BP: ()/(45-87) 144/77     Weight: 81 kg (178 lb 9.2 oz)  Body mass index is 25.62 kg/m².    Intake/Output Summary (Last 24 hours) at 10/31/17 1954  Last data filed at 10/31/17 1748   Gross per 24 hour   Intake             2901 ml   Output             1322 ml   Net             1579 ml      Physical Exam   Constitutional: He is oriented to person, place, and time. He appears well-developed and well-nourished. No distress.   HENT:   Head: Normocephalic and atraumatic.   Mouth/Throat: Oropharynx is clear and moist.   Eyes: Conjunctivae and  EOM are normal. Pupils are equal, round, and reactive to light.   Neck: No JVD present. No thyromegaly present.   Cardiovascular: Normal rate, regular rhythm and normal heart sounds.  Exam reveals no gallop and no friction rub.    No murmur heard.  Pulmonary/Chest: Effort normal and breath sounds normal. No respiratory distress. He has no wheezes. He has no rales.   Abdominal: Soft. Bowel sounds are normal. He exhibits no distension. There is no tenderness. There is no rebound and no guarding.   Musculoskeletal: Normal range of motion. He exhibits tenderness. He exhibits no edema.   Lymphadenopathy:     He has no cervical adenopathy.   Neurological: He is alert and oriented to person, place, and time. He has normal reflexes. He displays normal reflexes. No cranial nerve deficit.   Skin: Skin is dry. Capillary refill takes more than 3 seconds. No rash noted. He is not diaphoretic. There is erythema. There is pallor.   Right foot mostly with pale erythema and cool compared to left foot.  Right toes pale white.  Tender to palpation.   Psychiatric: He has a normal mood and affect. His behavior is normal. Judgment and thought content normal.       Significant Labs:   CBC:     Recent Labs  Lab 10/30/17  0150 10/31/17  0528   WBC 10.03 9.29   HGB 12.8* 11.8*   HCT 38.3* 36.5*    232     CMP:     Recent Labs  Lab 10/30/17  0150 10/31/17  0527    134*   K 3.9 4.4    104   CO2 24 23   GLU 92 93   BUN 12 14   CREATININE 0.9 0.8   CALCIUM 9.7 9.3   PROT 7.7  --    ALBUMIN 3.6  --    BILITOT 0.2  --    ALKPHOS 111  --    AST 23  --    ALT 24  --    ANIONGAP 11 7*   EGFRNONAA >60 >60       Significant Imaging: I have reviewed all pertinent imaging results/findings within the past 24 hours.    Assessment/Plan:      * Ischemic right foot secondary to occluded fem-distal bypass graft    CTA of RLE revealed occlusion of femoral artery to below-knee popliteal artery bypass graft; right femoral artery total  occlusion; Occlusion of the origin of right profunda femoral artery; Occluded proximal popliteal artery; Severe stenosis of the tibioperoneal trunk; Severe stenosis of the proximal anterior tibial artery with multiple areas of near complete occlusion; Patent dorsalis pedis artery.   No heparin drip since patient is on chronic Xarelto.  Resume Xarelto post-procedure; Last dose 10/29 PM.  Continue ASA and statin therapy.  Vascular Surgery performed thrombolysis 30 October with check and ballooning 31 October with improvement in distal flow.        HTN (hypertension)    - Secondary to medication noncompliance.  - Resume home Amlodipine 10mg daily.  Monitor BP trends, and optimize therapy as needed.  Consider adding ACEI for additional benefit in PAD.        PAD with remote right fem-distal bypass    - Remote h/o fem-pop bypass with occlusion in 3/2017 requiring fem-distal bypass.  - Patient with recurrent fem-distal bypass occlusion 5/25/17 s/p thrombolysis, and 6/3/17 s/p thrombolysis + PTA.  - Plan as above.         Mixed hyperlipidemia    - Continue statin.  - FLP.        PAD (peripheral artery disease)    Continue Aspirin and Atorvastatin.          VTE Risk Mitigation         Ordered     enoxaparin injection 40 mg  Daily     Route:  Subcutaneous        10/31/17 0952     Medium Risk of VTE  Once      10/30/17 0439     Reason for No Pharmacological VTE Prophylaxis  Once      10/30/17 0439          Critical care time spent on the evaluation and treatment of severe organ dysfunction, review of pertinent labs and imaging studies, discussions with consulting providers and discussions with patient/family: 30 minutes.    Yobani Harris MD  Department of Hospital Medicine   [facility]

## 2017-11-01 NOTE — ASSESSMENT & PLAN NOTE
CTA of RLE revealed occlusion of femoral artery to below-knee popliteal artery bypass graft; right femoral artery total occlusion; Occlusion of the origin of right profunda femoral artery; Occluded proximal popliteal artery; Severe stenosis of the tibioperoneal trunk; Severe stenosis of the proximal anterior tibial artery with multiple areas of near complete occlusion; Patent dorsalis pedis artery.   No heparin drip since patient is on chronic Xarelto.  Resume Xarelto post-procedure; Last dose 10/29 PM.  Continue ASA and statin therapy.  Vascular Surgery performed thrombolysis 30 October with check and ballooning 31 October with improvement in distal flow.

## 2017-11-01 NOTE — PLAN OF CARE
CM spoke with the patient. He stated he does not have any insurance and he cannot afford any meds. CM called patient's wife. She stated they are between a rock and hard place because he started receivng disabilty and Medicaid stopped. She stated she will be here for 2p visiting hours and wanted to meet F2F because she was getting dressed to come here.   1700- Patient stated his wife had been here and left. CM gave patient resouce for med thru [facility] and explained the procedure of how to obtain med. He stated his wife will be back tomorrow and I can speak with her at that time. Cm to f/u for safe transition     11/01/17 1233   Discharge Reassessment   Assessment Type Discharge Planning Reassessment   Provided patient/caregiver education on the expected discharge date and the discharge plan No   Do you have any problems affording any of your prescribed medications? Yes   If yes, what medications? ALL meds due to the patient does not have insurance.   Discharge Plan A Home with family   Discharge Plan B Home with family   Patient choice form signed by patient/caregiver N/A   Can the patient answer the patient profile reliably? Yes, cognitively intact   How does the patient rate their overall health at the present time? Fair   Describe the patient's ability to walk at the present time. No restrictions   How often would a person be available to care for the patient? Often   Number of comorbid conditions (as recorded on the chart) Four   During the past month, has the patient often been bothered by feeling down, depressed or hopeless? No   During the past month, has the patient often been bothered by little interest or pleasure in doing things? No   . JN stated we will talk about resources to assist with meds and safe d/c plan

## 2017-11-01 NOTE — PROGRESS NOTES
Pt transferred to tele room 204, family at bedside.  Bedside report completed.  Pt oriented to room, call light in reach, hourly rounding and the need to call for assistance, urinal at bedside, bed alarm on.  Pt pedal pulses doppler/+1 bilaterally.  Pt c/o pain to right foot, prn pain med schedule written on white board.  Will continue to monitor.

## 2017-11-01 NOTE — PLAN OF CARE
Problem: Patient Care Overview  Goal: Plan of Care Review  Outcome: Ongoing (interventions implemented as appropriate)  Pt stable. Pt is NSR on the heart monitor.  Pt c/o pain to right foot, prn pain med given, foot elevated on wedge.  IVF infusing.  Plan of care reviewed.  Pt verbalizes understanding.  Pt was free from falls or injuries during duration of shift.  Pt turned and repositioned self.  PIV intact with no redness, swelling or drainage.  Bed low, wheels locked, bed alarm on, call light in reach.  Pt instructed to call for assistance.  Will continue to monitor.

## 2017-11-01 NOTE — PROGRESS NOTES
Notified EICU, pt c/o sharp throbbing pain to RLE, pt's PRN meds previously given w/o much relief and are q6 hrs and not time for either. Informed that RLE is warm to the foot at the toes, and the toes are cool. Post tib pulse weak but palpable and the DP and PT pulses are heard via doppler. MD to change frequency of PRN meds to Q4 hrs and may give a dose of Dilaudid 2mg now w/ change in frequency.

## 2017-11-02 VITALS
WEIGHT: 188.94 LBS | TEMPERATURE: 99 F | OXYGEN SATURATION: 99 % | SYSTOLIC BLOOD PRESSURE: 155 MMHG | HEIGHT: 70 IN | BODY MASS INDEX: 27.05 KG/M2 | HEART RATE: 86 BPM | RESPIRATION RATE: 18 BRPM | DIASTOLIC BLOOD PRESSURE: 79 MMHG

## 2017-11-02 LAB
ALBUMIN SERPL BCP-MCNC: 3 G/DL
ANION GAP SERPL CALC-SCNC: 6 MMOL/L
BASOPHILS # BLD AUTO: 0.04 K/UL
BASOPHILS NFR BLD: 0.5 %
BUN SERPL-MCNC: 7 MG/DL
CALCIUM SERPL-MCNC: 9.4 MG/DL
CHLORIDE SERPL-SCNC: 100 MMOL/L
CO2 SERPL-SCNC: 29 MMOL/L
CREAT SERPL-MCNC: 0.7 MG/DL
DIFFERENTIAL METHOD: ABNORMAL
EOSINOPHIL # BLD AUTO: 0.3 K/UL
EOSINOPHIL NFR BLD: 3.2 %
ERYTHROCYTE [DISTWIDTH] IN BLOOD BY AUTOMATED COUNT: 14 %
EST. GFR  (AFRICAN AMERICAN): >60 ML/MIN/1.73 M^2
EST. GFR  (NON AFRICAN AMERICAN): >60 ML/MIN/1.73 M^2
GLUCOSE SERPL-MCNC: 99 MG/DL
HCT VFR BLD AUTO: 31.8 %
HGB BLD-MCNC: 10.3 G/DL
LYMPHOCYTES # BLD AUTO: 1.6 K/UL
LYMPHOCYTES NFR BLD: 17.9 %
MCH RBC QN AUTO: 28.4 PG
MCHC RBC AUTO-ENTMCNC: 32.4 G/DL
MCV RBC AUTO: 88 FL
MONOCYTES # BLD AUTO: 1 K/UL
MONOCYTES NFR BLD: 11 %
NEUTROPHILS # BLD AUTO: 5.9 K/UL
NEUTROPHILS NFR BLD: 67.4 %
PHOSPHATE SERPL-MCNC: 2.7 MG/DL
PLATELET # BLD AUTO: 261 K/UL
PMV BLD AUTO: 8.5 FL
POTASSIUM SERPL-SCNC: 4.1 MMOL/L
RBC # BLD AUTO: 3.63 M/UL
SODIUM SERPL-SCNC: 135 MMOL/L
WBC # BLD AUTO: 8.67 K/UL

## 2017-11-02 PROCEDURE — 63600175 PHARM REV CODE 636 W HCPCS: Performed by: INTERNAL MEDICINE

## 2017-11-02 PROCEDURE — 97162 PT EVAL MOD COMPLEX 30 MIN: CPT

## 2017-11-02 PROCEDURE — G8979 MOBILITY GOAL STATUS: HCPCS | Mod: CI

## 2017-11-02 PROCEDURE — 25000003 PHARM REV CODE 250: Performed by: INTERNAL MEDICINE

## 2017-11-02 PROCEDURE — 25000003 PHARM REV CODE 250: Performed by: SURGERY

## 2017-11-02 PROCEDURE — 25000242 PHARM REV CODE 250 ALT 637 W/ HCPCS: Performed by: EMERGENCY MEDICINE

## 2017-11-02 PROCEDURE — 97535 SELF CARE MNGMENT TRAINING: CPT

## 2017-11-02 PROCEDURE — G8987 SELF CARE CURRENT STATUS: HCPCS | Mod: CJ

## 2017-11-02 PROCEDURE — G8989 SELF CARE D/C STATUS: HCPCS | Mod: CJ

## 2017-11-02 PROCEDURE — G8988 SELF CARE GOAL STATUS: HCPCS | Mod: CJ

## 2017-11-02 PROCEDURE — 97116 GAIT TRAINING THERAPY: CPT

## 2017-11-02 PROCEDURE — 80069 RENAL FUNCTION PANEL: CPT

## 2017-11-02 PROCEDURE — G8978 MOBILITY CURRENT STATUS: HCPCS | Mod: CJ

## 2017-11-02 PROCEDURE — 85025 COMPLETE CBC W/AUTO DIFF WBC: CPT

## 2017-11-02 PROCEDURE — 36415 COLL VENOUS BLD VENIPUNCTURE: CPT

## 2017-11-02 PROCEDURE — 97165 OT EVAL LOW COMPLEX 30 MIN: CPT

## 2017-11-02 RX ORDER — OXYCODONE AND ACETAMINOPHEN 10; 325 MG/1; MG/1
1 TABLET ORAL EVERY 4 HOURS PRN
Qty: 20 TABLET | Refills: 0 | Status: SHIPPED | OUTPATIENT
Start: 2017-11-02 | End: 2019-05-20

## 2017-11-02 RX ORDER — PANTOPRAZOLE SODIUM 40 MG/1
40 TABLET, DELAYED RELEASE ORAL DAILY
Status: DISCONTINUED | OUTPATIENT
Start: 2017-11-02 | End: 2017-11-02 | Stop reason: HOSPADM

## 2017-11-02 RX ADMIN — GABAPENTIN 100 MG: 100 CAPSULE ORAL at 01:11

## 2017-11-02 RX ADMIN — RIVAROXABAN 20 MG: 20 TABLET, FILM COATED ORAL at 04:11

## 2017-11-02 RX ADMIN — ATORVASTATIN CALCIUM 20 MG: 10 TABLET, FILM COATED ORAL at 09:11

## 2017-11-02 RX ADMIN — HYDROMORPHONE HYDROCHLORIDE 2 MG: 2 INJECTION, SOLUTION INTRAMUSCULAR; INTRAVENOUS; SUBCUTANEOUS at 02:11

## 2017-11-02 RX ADMIN — PANTOPRAZOLE SODIUM 40 MG: 40 TABLET, DELAYED RELEASE ORAL at 09:11

## 2017-11-02 RX ADMIN — ALPRAZOLAM 1 MG: 1 TABLET ORAL at 09:11

## 2017-11-02 RX ADMIN — HYDROMORPHONE HYDROCHLORIDE 2 MG: 2 INJECTION, SOLUTION INTRAMUSCULAR; INTRAVENOUS; SUBCUTANEOUS at 12:11

## 2017-11-02 RX ADMIN — ASPIRIN 81 MG CHEWABLE TABLET 81 MG: 81 TABLET CHEWABLE at 09:11

## 2017-11-02 RX ADMIN — GABAPENTIN 100 MG: 100 CAPSULE ORAL at 05:11

## 2017-11-02 RX ADMIN — OXYCODONE HYDROCHLORIDE AND ACETAMINOPHEN 1 TABLET: 10; 325 TABLET ORAL at 01:11

## 2017-11-02 RX ADMIN — HYDROMORPHONE HYDROCHLORIDE 2 MG: 2 INJECTION, SOLUTION INTRAMUSCULAR; INTRAVENOUS; SUBCUTANEOUS at 04:11

## 2017-11-02 RX ADMIN — HYDROMORPHONE HYDROCHLORIDE 2 MG: 2 INJECTION, SOLUTION INTRAMUSCULAR; INTRAVENOUS; SUBCUTANEOUS at 06:11

## 2017-11-02 RX ADMIN — OXYCODONE HYDROCHLORIDE AND ACETAMINOPHEN 1 TABLET: 10; 325 TABLET ORAL at 05:11

## 2017-11-02 RX ADMIN — ACETYLCYSTEINE 600 MG: 200 SOLUTION ORAL; RESPIRATORY (INHALATION) at 09:11

## 2017-11-02 RX ADMIN — OXYCODONE HYDROCHLORIDE AND ACETAMINOPHEN 1 TABLET: 10; 325 TABLET ORAL at 09:11

## 2017-11-02 RX ADMIN — AMLODIPINE BESYLATE 10 MG: 10 TABLET ORAL at 09:11

## 2017-11-02 NOTE — PLAN OF CARE
Problem: Patient Care Overview  Goal: Plan of Care Review  Patient is SR on the monitor. All alarms are on and audible. Will continue to monitor.

## 2017-11-02 NOTE — PROGRESS NOTES
Pt confirmed that rx were delivered to bedside by bedside delivery pharmacy.  Pt c/o constant pain and does not have an pain med at home.  MD notified via secure chat but did not answer, Netta BENSON notified of needed pain med and will talk to MD.  Will prepare pt for discharge.

## 2017-11-02 NOTE — PLAN OF CARE
Met with patient discussed the xarelto ( discharging medication ) . Gave patient xarelto discount card with instructions.

## 2017-11-02 NOTE — PROGRESS NOTES
Pharmacist Intervention IV to PO Note    Ish Guardado is a 57 y.o. male being treated with IV medication, Pantoprazole Inj 40 mg    Patient Data:    Vital Signs (Most Recent):  Temp: 99.1 °F (37.3 °C) (11/01/17 2318)  Pulse: 84 (11/01/17 2318)  Resp: 20 (11/01/17 2318)  BP: (!) 157/83 (11/01/17 2318)  SpO2: 97 % (11/01/17 2318)   Vital Signs (72h Range):  Temp:  [98.2 °F (36.8 °C)-100.1 °F (37.8 °C)]   Pulse:  []   Resp:  [9-22]   BP: ()/()   SpO2:  [92 %-100 %]      CBC:    Recent Labs     Lab 10/30/17  0150 10/31/17  0528 11/01/17  0454   WBC 10.03 9.29 9.08   RBC 4.39* 4.11* 3.68*   HGB 12.8* 11.8* 10.6*   HCT 38.3* 36.5* 32.6*    232 213   MCV 87 89 89   MCH 29.2 28.7 28.8   MCHC 33.4 32.3 32.5     CMP:     Recent Labs     Lab 10/30/17  0150 10/31/17  0527 11/01/17  0454   GLU 92 93 121*   CALCIUM 9.7 9.3 8.8   ALBUMIN 3.6 --  2.8*   PROT 7.7 --  6.3    134* 132*   K 3.9 4.4 3.8   CO2 24 23 21*    104 102   BUN 12 14 14   CREATININE 0.9 0.8 0.7   ALKPHOS 111 --  91   ALT 24 --  17   AST 23 --  20   BILITOT 0.2 --  0.4       Dietary Orders:  Diet Orders            Diet Cardiac: Cardiac starting at 10/31 0753            Based on the following criteria, this patient qualifies for intravenous to oral conversion:  [x] The patients gastrointestinal tract is functioning (tolerating medications via oral or enteral route for 24 hours and tolerating food or enteral feeds for 24 hours).  [x] The patient is hemodynamically stable for 24 hours (heart rate <100 beats per minute, systolic blood pressure >99 mm Hg, and respiratory rate <20 breaths per minute).  [x] The patient shows clinical improvement (afebrile for at least 24 hours and white blood cell count downtrending or normalized). Additionally, the patient must be non-neutropenic (absolute neutrophil count >500 cells/mm3).  [x] For antimicrobials, the patient has received IV therapy for at least 24 hours.    IV medication,  Pantoprazole IV 40 mg daily will be changed to oral medication, Pantoprazole 40 mg tablets daily  Patient is tolerating all oral meds at this time.    Pharmacist's Name: Adalid Liu  Pharmacist's Extension: [phone]

## 2017-11-02 NOTE — NURSING
Left AC saline lock discontinued with canula intact; tolerated well.  Discharge instruction, new home medication list, and post discharge follow up appointment discussed with teach back received.  Discharged to wife, via wheelchair, in no apparent distress. All personal belongings taken with pt and wife. Encouraged continued compliance with MD directives.

## 2017-11-02 NOTE — SUBJECTIVE & OBJECTIVE
Interval History:  Pain and tenderness right foot improved.  Weaned from Precedex and Fentanyl.  Hemodynamically stable without signs of bleeding.  Transfer to floor.    Review of Systems   Constitutional: Negative.  Negative for chills and fever.   HENT: Negative.  Negative for congestion and sore throat.    Eyes: Negative.  Negative for visual disturbance.   Respiratory: Negative.  Negative for cough, shortness of breath and wheezing.    Cardiovascular: Negative.  Negative for chest pain.   Gastrointestinal: Negative for abdominal pain, diarrhea, nausea and vomiting.   Endocrine: Negative.    Genitourinary: Negative.    Musculoskeletal: Positive for arthralgias and myalgias. Negative for neck stiffness.   Skin: Positive for color change and pallor.   Allergic/Immunologic: Negative.    Neurological: Negative.    Hematological: Negative.    Psychiatric/Behavioral: Negative.    All other systems reviewed and are negative.    Objective:     Vital Signs (Most Recent):  Temp: 99.5 °F (37.5 °C) (11/01/17 1957)  Pulse: 85 (11/01/17 1957)  Resp: 16 (11/01/17 1957)  BP: (!) 177/84 (11/01/17 1957)  SpO2: 97 % (11/01/17 1957) Vital Signs (24h Range):  Temp:  [98.7 °F (37.1 °C)-99.6 °F (37.6 °C)] 99.5 °F (37.5 °C)  Pulse:  [76-95] 85  Resp:  [9-20] 16  SpO2:  [93 %-100 %] 97 %  BP: (110-185)/() 177/84     Weight: 81 kg (178 lb 9.2 oz)  Body mass index is 25.62 kg/m².    Intake/Output Summary (Last 24 hours) at 11/01/17 2114  Last data filed at 11/01/17 1907   Gross per 24 hour   Intake             2595 ml   Output             2550 ml   Net               45 ml      Physical Exam   Constitutional: He is oriented to person, place, and time. He appears well-developed and well-nourished. No distress.   HENT:   Head: Normocephalic and atraumatic.   Mouth/Throat: Oropharynx is clear and moist.   Eyes: Conjunctivae and EOM are normal. Pupils are equal, round, and reactive to light.   Neck: No JVD present. No thyromegaly present.    Cardiovascular: Normal rate, regular rhythm and normal heart sounds.  Exam reveals no gallop and no friction rub.    No murmur heard.  Pulmonary/Chest: Effort normal and breath sounds normal. No respiratory distress. He has no wheezes. He has no rales.   Abdominal: Soft. Bowel sounds are normal. He exhibits no distension. There is no tenderness. There is no rebound and no guarding.   Musculoskeletal: Normal range of motion. He exhibits tenderness. He exhibits no edema.   Lymphadenopathy:     He has no cervical adenopathy.   Neurological: He is alert and oriented to person, place, and time. He has normal reflexes. He displays normal reflexes. No cranial nerve deficit.   Skin: Skin is dry. Capillary refill takes more than 3 seconds. No rash noted. He is not diaphoretic. There is erythema. There is pallor.   Right foot mostly with pale erythema and cool compared to left foot.  Right toes pale white.  Tender to palpation.   Psychiatric: He has a normal mood and affect. His behavior is normal. Judgment and thought content normal.       Significant Labs:   CBC:     Recent Labs  Lab 10/31/17  0528 11/01/17  0454   WBC 9.29 9.08   HGB 11.8* 10.6*   HCT 36.5* 32.6*    213     CMP:     Recent Labs  Lab 10/31/17  0527 11/01/17  0454   * 132*   K 4.4 3.8    102   CO2 23 21*   GLU 93 121*   BUN 14 14   CREATININE 0.8 0.7   CALCIUM 9.3 8.8   PROT  --  6.3   ALBUMIN  --  2.8*   BILITOT  --  0.4   ALKPHOS  --  91   AST  --  20   ALT  --  17   ANIONGAP 7* 9   EGFRNONAA >60 >60       Significant Imaging: I have reviewed all pertinent imaging results/findings within the past 24 hours.

## 2017-11-02 NOTE — PT/OT/SLP EVAL
Physical Therapy  Evaluation    Ish Guardado   MRN: [id number]   Admitting Diagnosis: Ischemic foot    PT Received On: 11/02/17  PT Start Time: 0900     PT Stop Time: 0925    PT Total Time (min): 25 min       Billable Minutes:  Evaluation 15 and Gait [id number]    Diagnosis: Ischemic foot  P.T. TX DX: IMPAIRED FUNCTIONAL MOBILITY    Past Medical History:   Diagnosis Date    Femoral-popliteal bypass graft occlusion     Left and right    GERD (gastroesophageal reflux disease)     History of substance abuse     History of transient ischemic attack (TIA)     Hyperlipemia     Hypertension     Neuropathy     Occlusion of right femorotibial bypass graft 5/2017, 6/2017    Pre-diabetes     Status post femorotibial bypass       Past Surgical History:   Procedure Laterality Date    back fusion      FEMORAL BYPASS      left fem-pop bypass 11/2014; right fem-pop bypass 9/2016, right fem-distal bypass 3/2017     Referring physician: DR. MEYER  Date referred to PT: 11-1-17    General Precautions: Standard, fall  Orthopedic Precautions: N/A     Patient History:  Lives With: spouse  Living Arrangements: house  Home Accessibility: stairs to enter home  Home Layout: Able to live on 1st floor  Number of Stairs to Enter Home: 3  Stair Railings at Home: outside, present on right side  Transportation Available: car, family or friend will provide  Living Environment Comment: PT LIVES WITH WIFE WHO IS ABLE TO ASSIST AND PROVIDE 24 HOUR CARE, PT FUNCTIONALLY INDEP PTA  Equipment Currently Used at Home: none  DME owned (not currently used): rolling walker, single point cane, shower chair and wheelchair    Previous Level of Function:  Ambulation Skills: independent  Transfer Skills: independent  ADL Skills: independent    Subjective:  Communicated with NURSE CUELLAR prior to session.  Chief Complaint: PAIN TO R FOOT  Pain/Comfort  Pain Rating 1: 10/10  Location - Side 1: Right  Location 1: foot    Objective:   Patient found  with: telemetry, peripheral IV     Cognitive Exam:  Oriented to: Person, Place, Time and Situation    Follows Commands/attention: Follows one-step commands  Communication: clear/fluent  Safety awareness/insight to disability: impaired    Physical Exam:  Postural examination/scapula alignment: Rounded shoulder    Skin integrity: RED, SORES TO R FOOT  Edema: Moderate R FOOT    Sensation:   Impaired  light/touch RLE    Upper Extremity Range of Motion:  REFER TO O.T. EVAL    Lower Extremity Range of Motion:  Right Lower Extremity: WFL, SWELLING TO R ANKLE/FOOT  Left Lower Extremity: WFL    Lower Extremity Strength:  Right Lower Extremity: GROSSLY 4/5  Left Lower Extremity: GROSSLY 5/5     Functional Mobility:  Bed Mobility:  Rolling/Turning to Left: Supervision  Rolling/Turning Right: Supervision  Scooting/Bridging: Supervision  Supine to Sit: Supervision    Transfers:  Sit <> Stand Assistance: Stand By Assistance  Sit <> Stand Assistive Device: Rolling Walker (PT EDUCATED IN RW USE AND SAFETY DURING TF'S AND GAIT)  Bed <> Chair Technique: Stand Pivot  Bed <> Chair Assistance: Stand By Assistance  Bed <> Chair Assistive Device: Rolling Walker    Gait:   Gait Distance: PT AMB 20' IN ROOM WITH RW AND CGA/SBA, TREATING RLE AS NWB DUE TO C/O SEVERE PAIN, MILDLY UNSTEADY BUT NO GROSS LOB  Assistance 1: Stand by Assistance, Contact Guard Assistance  Gait Pattern: swing-to gait  Gait Deviation(s): decreased mary, decreased step length, decreased toe-to-floor clearance, decreased weight-shifting ability    Balance:   Static Sit: GOOD  Dynamic Sit: GOOD  Static Stand: FAIR  Dynamic stand: FAIR    Therapeutic Activities and Exercises:  PT EDUCATED IN BLE THEREX TO PERFORM WHILE SEATED IN CHAIR    AM-PAC 6 CLICK MOBILITY  How much help from another person does this patient currently need?   1 = Unable, Total/Dependent Assistance  2 = A lot, Maximum/Moderate Assistance  3 = A little, Minimum/Contact Guard/Supervision  4 = None,  Modified [place]/Independent    Turning over in bed (including adjusting bedclothes, sheets and blankets)?: 4  Sitting down on and standing up from a chair with arms (e.g., wheelchair, bedside commode, etc.): 4  Moving from lying on back to sitting on the side of the bed?: 4  Moving to and from a bed to a chair (including a wheelchair)?: 4  Need to walk in hospital room?: 3  Climbing 3-5 steps with a railing?: 1  Total Score: 20     AM-PAC Raw Score CMS G-Code Modifier Level of Impairment Assistance   6 % Total / Unable   7 - 9 CM 80 - 100% Maximal Assist   10 - 14 CL 60 - 80% Moderate Assist   15 - 19 CK 40 - 60% Moderate Assist   20 - 22 CJ 20 - 40% Minimal Assist   23 CI 1-20% SBA / CGA   24 CH 0% Independent/ Mod I     Patient left up in chair with all lines intact, call button in reach, NURSE notified and RLE ELEVATED.    Assessment:   Ish Guardado is a 57 y.o. male with a medical diagnosis of Ischemic foot and presents with IMPAIRED FUNCTIONAL MOBILITY. PT WILL BENEFIT FROM CONT. SKILLED P.T. TO ADDRESS IMPAIRMENTS    Rehab identified problem list/impairments: Rehab identified problem list/impairments: impaired endurance, impaired functional mobilty, gait instability, decreased coordination, decreased safety awareness, impaired balance, decreased lower extremity function, impaired skin, edema    Rehab potential is good.    Activity tolerance: Good    Discharge recommendations: Discharge Facility/Level Of Care Needs: home health PT     Barriers to discharge: Barriers to Discharge: Inaccessible home environment    Equipment recommendations: Equipment Needed After Discharge: none     GOALS:    Physical Therapy Goals        Problem: Physical Therapy Goal    Goal Priority Disciplines Outcome Goal Variances Interventions   Physical Therapy Goal     PT/OT, PT      Description:  LTG'S TO BE MET IN 7 DAYS (11-9-17)  1. PT WILL BE NADIYA WITH BED MOBILITY  2. PT WILL BE NADIYA WITH TF'S  3. PT WILL '  WITH RW AND SPV  4. PT WILL TOLERATE BLE THEREX X 20 REPS  5. PT WILL DEMO G DYNAMIC BALANCE DURING GAIT  6. PT WILL ASC/DESC. 3 STEPS USING 1 SIDE RAIL WITH SPV                  PLAN:    Patient to be seen 5 x/week to address the above listed problems via gait training, therapeutic activities, therapeutic exercises  Plan of Care expires: 11/09/17  Plan of Care reviewed with: patient    PT ENCOURAGED TO CALL FOR ASSISTANCE WITH ALL NEEDS DUE TO FALL RISK STATUS, PT AGREEABLE.  PT ENCOURAGED TO INCREASE TIME OOB IN CHAIR, ALL MEALS IN CHAIR OOB, PT AGREEABLE    Maria Lorenzo, PT  11/02/2017

## 2017-11-02 NOTE — PT/OT/SLP EVAL
Occupational Therapy  Evaluation    Ish Guardado   MRN: [id number]   Admitting Diagnosis: Ischemic foot    OT Date of Treatment: 11/02/17   OT Start Time: 1056  OT Stop Time: 1120  OT Total Time (min): 24 min    Billable Minutes:  Evaluation 10 minutes  Self Care/Home Management 14 minutes    Diagnosis: Ischemic foot       Past Medical History:   Diagnosis Date    Femoral-popliteal bypass graft occlusion     Left and right    GERD (gastroesophageal reflux disease)     History of substance abuse     History of transient ischemic attack (TIA)     Hyperlipemia     Hypertension     Neuropathy     Occlusion of right femorotibial bypass graft 5/2017, 6/2017    Pre-diabetes     Status post femorotibial bypass       Past Surgical History:   Procedure Laterality Date    back fusion      FEMORAL BYPASS      left fem-pop bypass 11/2014; right fem-pop bypass 9/2016, right fem-distal bypass 3/2017       Referring physician: dr harris  Date referred to OT: 11-1-17    General Precautions: Standard, fall  Orthopedic Precautions: N/A  Braces:            Patient History:  Living Environment  Lives With: spouse  Living Arrangements: house  Home Accessibility: stairs to enter home  Home Layout: Able to live on 1st floor  Number of Stairs to Enter Home: 3  Stair Railings at Home: none  Living Environment Comment: pt lives wife who able to assist and provide 24 hour care. pt reported (i) with adl'a and ambulation  Equipment Currently Used at Home: walker, rolling, crutches, shower chair, cane, straight    Prior level of function:   Bed Mobility/Transfers: independent  Grooming: independent  Bathing: independent  Upper Body Dressing: independent  Lower Body Dressing: independent  Toileting: independent  Home Management Skills: independent  Driving License: Yes  Mode of Transportation: Car  Occupation: On disability     Dominant hand: right    Subjective:  Communicated with nurse Hugh and [facility] chart review prior to  session.    Chief Complaint:  Patient/Family stated goals:     Pain/Comfort  Pain Rating 1: 10/10  Location - Side 1: Right  Location - Orientation 1: lower  Location 1: foot    Objective:  Patient found with: telemetry    Cognitive Exam:  Oriented to: Person, Place, Time and Situation  Follows Commands/attention: Follows multistep  commands  Communication: clear/fluent  Memory:  No Deficits noted  Safety awareness/insight to disability: intact  Coping skills/emotional control: Appropriate to situation    Visual/perceptual:  Intact    Physical Exam:  Postural examination/scapula alignment: Rounded shoulder and Head forward mildly  Skin integrity: Tear of sratches on e foot  Edema: Moderate r foot    Sensation:   Intact    Upper Extremity Range of Motion:  Right Upper Extremity: WFL  Left Upper Extremity: WFL    Upper Extremity Strength:  Right Upper Extremity: WFL  Left Upper Extremity: WFL   Strength: wfl    Fine motor coordination:   Intact    Gross motor coordination: WFL    Functional Mobility:  Bed Mobility:  Rolling/Turning to Left: Independent  Rolling/Turning Right: Independent  Scooting/Bridging: Independent  Supine to Sit: Independent  Sit to Supine: Independent    Transfers:  Sit <> Stand Assistance: Supervision  Sit <> Stand Assistive Device: Rolling Walker  Bed <> Chair Technique: Stand Pivot  Bed <> Chair Transfer Assistance: Supervision  Bed <> Chair Assistive Device: Rolling Walker    Functional Ambulation: pt ambulated with rw sba 5 steps.    Activities of Daily Living:     Feeding adaptive equipment: na  UE Dressing Level of Assistance: Supervision  UE adaptive equipment: na  LE Dressing Level of Assistance: Supervision  LE adaptive equipment: na                    Bathing adaptive equipment: na    Balance:   Static Sit: GOOD+: Takes MAXIMAL challenges from all directions.    Dynamic Sit: GOOD+: Maintains balance through MAXIMAL excursions of active trunk motion  Static Stand: GOOD-: Takes  "MODERATE challenges from all directions inconsistently  Dynamic stand: GOOD-: Needs SUPERVISION only during gait and able to self right with moderate     Therapeutic Activities and Exercises:      AM-PAC 6 CLICK ADL  How much help from another person does this patient currently need?  1 = Unable, Total/Dependent Assistance  2 = A lot, Maximum/Moderate Assistance  3 = A little, Minimum/Contact Guard/Supervision  4 = None, Modified [place]/Independent    Putting on and taking off regular lower body clothing? : 4  Bathing (including washing, rinsing, drying)?: 3  Toileting, which includes using toilet, bedpan, or urinal? : 4  Putting on and taking off regular upper body clothing?: 4  Taking care of personal grooming such as brushing teeth?: 4  Eating meals?: 4  Total Score: 23    AM-PAC Raw Score CMS "G-Code Modifier Level of Impairment Assistance   6 % Total / Unable   7 - 9 CM 80 - 100% Maximal Assist   10-14 CL 60 - 80% Moderate Assist   15 - 19 CK 40 - 60% Moderate Assist   20 - 22 CJ 20 - 40% Minimal Assist   23 CI 1-20% SBA / CGA   24 CH 0% Independent/ Mod I       Patient left HOB elevated and right foot elevated with all lines intact, call button in reach and nurse notified    Assessment:  Ish Guardado is a 57 y.o. male with a medical diagnosis of Ischemic foot and presents with sba to s with all adl's, functional mobility and t/f's. Pt discharged from skilled o.t and placed on people 's program.    Rehab identified problem list/impairments:      Rehab potential is good.    Activity tolerance: Good    Discharge recommendations:       Barriers to discharge: Barriers to Discharge: Inaccessible home environment    Equipment recommendations:       GOALS:    Occupational Therapy Goals        Problem: Occupational Therapy Goal    Goal Priority Disciplines Outcome Interventions   Occupational Therapy Goal     OT, PT/OT                     PLAN:  Patient to be seen 3 x/week to address the above " listed problems via    Plan of Care expires:    Plan of Care reviewed with: patient         Macy Watts, OT  11/02/2017

## 2017-11-03 NOTE — PLAN OF CARE
11/03/17 0923   Final Note   Assessment Type Final Discharge Note   Discharge Disposition Home  (With Pharmacy Assistance)

## 2017-11-04 ENCOUNTER — NURSE TRIAGE (OUTPATIENT)
Dept: ADMINISTRATIVE | Facility: CLINIC | Age: 57
End: 2017-11-04

## 2017-11-04 NOTE — TELEPHONE ENCOUNTER
"    Reason for Disposition   Caller has medication question only, adult not sick, and triager answers question    Answer Assessment - Initial Assessment Questions  1. SYMPTOMS: "Do you have any symptoms?"      None at time of call patient request discharge medication review' information provided/accepted.   2. SEVERITY: If symptoms are present, ask "Are they mild, moderate or severe?"      N/A    Protocols used: ST MEDICATION QUESTION CALL-A-      "

## 2017-11-05 NOTE — DISCHARGE SUMMARY
[facility] Hospital Medicine  Discharge Summary      Patient Name: Ish Guardado  MRN: [id number]  Admission Date: 10/30/2017  Hospital Length of Stay: 3 days  Discharge Date and Time: 11/2/2017  5:39 PM  Attending Physician:  Yobani Harris MD  Discharging Provider: Yobani Harris MD  Primary Care Provider: Pollo Arana MD      HPI:   Mr. Guardado id a 55 yo male with a PMHx of uncontrolled HTN, prediabetes, COPD, h/o TIA, HLD, h/o substance abuse, noncompliance, and PAD with remote left fem-pop bypass 11/2014 + right fem-pop bypass 9/2016 + right fem-distal bypass 3/2017 with multiple right bypass occlusions, who presented to the ED with c/o right leg pain at rest that has progressively worsened over the past 1 week.  Pain is most severe in right calf and foot.  Associated decreased sensation to foot, skin coolness and discoloration (pale until turned red yesterday).  Patient is on Xarelto, and states he is compliant without missing any doses.  Initial work-up in ED with CTA RLE revealed occlusion of femoral artery to below-knee popliteal artery bypass graft; right femoral artery total occlusion; Occlusion of the origin of right profunda femoral artery; Occluded proximal popliteal artery; Severe stenosis of the tibioperoneal trunk; Severe stenosis of the proximal anterior tibial artery with multiple areas of near complete occlusion; Patent dorsalis pedis artery.  Case discussed with Vascular Surgery, who recommend peripheral angiogram.  No heparin drip due to patient being on Xarelto.  Hospital Medicine consulted for admission.  Currently, patient appears comfortable, in NAD.  BP notably elevated upon arrival to ED at 203/102.  Patient admits to noncompliance with BP meds, but insists he has never missed a dose of Xarelto.    Procedure(s) (LRB):  ANGIOGRAM-EXTREMITY-LOWER (N/A)      Hospital Course:   30 October:  Thrombolysis by 5 mg tPA pulse spray then thrombectomy.  Placed  EKOS with  80% removal of thrombus.  Post procedure monitoring in the ICU.  Severe pain to the right foot and lower leg after reperfusion.  Controlled with Precedex and Fentanyl infusions.  31 October:  Thrombolysis check and distal ballooning.  01 November:  Hemodynamically stable with no signs of bleeding.  Transferred to telemetry.  Symptomatic improvement with better pain control.  Plan to return home and follow up in the next week with Vascular Surgery and continue Aspirin and Lipitor and start Xarelto 20 mg daily.  I have seen and examined Mr. Guardado on the day of discharge and deemed him appropriate to return home.     Consults:   Consults         Status Ordering Provider     Inpatient consult to Pulmonology  Once     Provider:  (Not yet assigned)    Completed RODRIGO RUEDA     IP consult to dietary  Once     Provider:  (Not yet assigned)    Completed RODRIGO RUEDA          No new Assessment & Plan notes have been filed under this hospital service since the last note was generated.  Service: Hospital Medicine    Final Active Diagnoses:    Diagnosis Date Noted POA    PRINCIPAL PROBLEM:  Ischemic right foot secondary to occluded fem-distal bypass graft [I99.8] 10/30/2017 Yes    PAD with remote right fem-distal bypass [I99.8] 10/30/2017 Yes     Chronic    HTN (hypertension) [I10] 10/30/2017 Yes     Chronic    Mixed hyperlipidemia [E78.2] 05/24/2017 Yes     Chronic    PAD (peripheral artery disease) [I73.9] 05/24/2017 Yes     Chronic      Problems Resolved During this Admission:    Diagnosis Date Noted Date Resolved POA       Discharged Condition: good    Disposition: Home or Self Care    Follow Up:  Follow-up Information     Charanjit Mclean MD In 1 week.    Specialty:  Vascular Surgery  Why:  Hospital follow up right lower extremity ischemia and reperfusion;  Apt scheduled for 11/7/17 at 8:30am.  Contact information:  [address]  [address] [place] [address]  [place] [address] [phone]                 Patient Instructions:      Diet Cardiac         Significant Diagnostic Studies: Labs: CMP No results for input(s): NA, K, CL, CO2, GLU, BUN, CREATININE, CALCIUM, PROT, ALBUMIN, BILITOT, ALKPHOS, AST, ALT, ANIONGAP, ESTGFRAFRICA, EGFRNONAA in the last 48 hours. and CBC No results for input(s): WBC, HGB, HCT, PLT in the last 48 hours.    Pending Diagnostic Studies:     None         Medications:  Reconciled Home Medications:   Discharge Medication List as of 11/2/2017  5:10 PM      CONTINUE these medications which have CHANGED    Details   !! oxyCODONE-acetaminophen (PERCOCET)  mg per tablet Take 1 tablet by mouth every 4 (four) hours as needed., Starting Thu 11/2/2017, Print      rivaroxaban (XARELTO) 20 mg Tab Take 1 tablet (20 mg total) by mouth once daily., Starting u 11/2/2017, Until Mon 1/1/2018, Normal       !! - Potential duplicate medications found. Please discuss with provider.      CONTINUE these medications which have NOT CHANGED    Details   amlodipine (NORVASC) 10 MG tablet Take 10 mg by mouth once daily., Historical Med      aspirin 81 MG Chew Take 81 mg by mouth once daily., Historical Med      atorvastatin (LIPITOR) 20 MG tablet Take 20 mg by mouth once daily., Historical Med      famotidine (PEPCID) 20 MG tablet Take 20 mg by mouth., Starting Sun 10/1/2017, Until Mon 10/1/2018, Historical Med      gabapentin (NEURONTIN) 100 MG capsule Take 1 capsule (100 mg total) by mouth 3 (three) times daily., Starting Sat 5/27/2017, Until Wed 6/14/2017, Print      naftifine (NAFTIN) 2 % Crea Apply 1 application topically once daily., Starting Thu 7/27/2017, Normal      !! oxycodone-acetaminophen (PERCOCET)  mg per tablet Take 1 tablet by mouth every 6 (six) hours as needed for Pain (for breakthrough pain)., Starting Wed 6/7/2017, Print       !! - Potential duplicate medications found. Please discuss with provider.          Indwelling Lines/Drains at time of discharge:   Lines/Drains/Airways          No matching active  lines, drains, or airways          Time spent on the discharge of patient: 30 minutes  Patient was seen and examined on the date of discharge and determined to be suitable for discharge.         Yobani Harris MD  Department of Hospital Medicine  [facility]

## 2019-05-20 ENCOUNTER — HOSPITAL ENCOUNTER (INPATIENT)
Facility: HOSPITAL | Age: 59
LOS: 11 days | Discharge: NURSING, REHAB OR LONG-TERM CARE | DRG: 917 | End: 2019-05-31
Attending: EMERGENCY MEDICINE | Admitting: INTERNAL MEDICINE
Payer: MEDICARE

## 2019-05-20 DIAGNOSIS — T82.594A CENTRAL LINE CLOTTED, INITIAL ENCOUNTER: ICD-10-CM

## 2019-05-20 DIAGNOSIS — E78.2 MIXED HYPERLIPIDEMIA: Chronic | ICD-10-CM

## 2019-05-20 DIAGNOSIS — I73.9 PAD (PERIPHERAL ARTERY DISEASE): Chronic | ICD-10-CM

## 2019-05-20 DIAGNOSIS — J96.01 ACUTE RESPIRATORY FAILURE WITH HYPOXIA AND HYPERCAPNIA: ICD-10-CM

## 2019-05-20 DIAGNOSIS — I95.9 HYPOTENSION, UNSPECIFIED HYPOTENSION TYPE: ICD-10-CM

## 2019-05-20 DIAGNOSIS — J96.02 ACUTE RESPIRATORY FAILURE WITH HYPOXIA AND HYPERCAPNIA: ICD-10-CM

## 2019-05-20 DIAGNOSIS — Z45.2 ENCOUNTER FOR CENTRAL LINE PLACEMENT: ICD-10-CM

## 2019-05-20 DIAGNOSIS — I50.9 CHF (CONGESTIVE HEART FAILURE): ICD-10-CM

## 2019-05-20 DIAGNOSIS — I49.9 CARDIAC RHYTHM DISORDER OR DISTURBANCE OR CHANGE: ICD-10-CM

## 2019-05-20 DIAGNOSIS — J69.0 ASPIRATION PNEUMONIA OF RIGHT LUNG DUE TO GASTRIC SECRETIONS, UNSPECIFIED PART OF LUNG: ICD-10-CM

## 2019-05-20 DIAGNOSIS — I63.9 ACUTE CVA (CEREBROVASCULAR ACCIDENT): Primary | ICD-10-CM

## 2019-05-20 DIAGNOSIS — T43.601A: ICD-10-CM

## 2019-05-20 DIAGNOSIS — R57.9 SHOCK, UNSPECIFIED: ICD-10-CM

## 2019-05-20 DIAGNOSIS — R09.02 HYPOXIA: ICD-10-CM

## 2019-05-20 DIAGNOSIS — N17.9 AKI (ACUTE KIDNEY INJURY): ICD-10-CM

## 2019-05-20 DIAGNOSIS — F19.10 POLYSUBSTANCE ABUSE: Chronic | ICD-10-CM

## 2019-05-20 DIAGNOSIS — J69.0 ASPIRATION PNEUMONIA OF RIGHT MIDDLE LOBE DUE TO VOMIT: ICD-10-CM

## 2019-05-20 DIAGNOSIS — K92.2 ACUTE UPPER GI BLEED: ICD-10-CM

## 2019-05-20 PROBLEM — A41.9 SEPTIC SHOCK: Status: ACTIVE | Noted: 2019-05-20

## 2019-05-20 PROBLEM — R65.21 SEPTIC SHOCK: Status: ACTIVE | Noted: 2019-05-20

## 2019-05-20 LAB
ABO + RH BLD: NORMAL
ALBUMIN SERPL BCP-MCNC: 3 G/DL (ref 3.5–5.2)
ALBUMIN SERPL BCP-MCNC: 3.9 G/DL (ref 3.5–5.2)
ALLENS TEST: ABNORMAL
ALLENS TEST: ABNORMAL
ALP SERPL-CCNC: 112 U/L (ref 55–135)
ALP SERPL-CCNC: 81 U/L (ref 55–135)
ALT SERPL W/O P-5'-P-CCNC: 16 U/L (ref 10–44)
ALT SERPL W/O P-5'-P-CCNC: 19 U/L (ref 10–44)
AMPHET+METHAMPHET UR QL: NORMAL
ANION GAP SERPL CALC-SCNC: 12 MMOL/L (ref 8–16)
ANION GAP SERPL CALC-SCNC: 18 MMOL/L (ref 8–16)
APAP SERPL-MCNC: <3 UG/ML (ref 10–20)
APTT BLDCRRT: 25.4 SEC (ref 21–32)
AST SERPL-CCNC: 26 U/L (ref 10–40)
AST SERPL-CCNC: 29 U/L (ref 10–40)
BARBITURATES UR QL SCN>200 NG/ML: NEGATIVE
BASOPHILS # BLD AUTO: 0 K/UL (ref 0–0.2)
BASOPHILS # BLD AUTO: 0.01 K/UL (ref 0–0.2)
BASOPHILS # BLD AUTO: 0.01 K/UL (ref 0–0.2)
BASOPHILS NFR BLD: 0 % (ref 0–1.9)
BASOPHILS NFR BLD: 0.1 % (ref 0–1.9)
BASOPHILS NFR BLD: 0.1 % (ref 0–1.9)
BENZODIAZ UR QL SCN>200 NG/ML: NEGATIVE
BILIRUB SERPL-MCNC: 0.2 MG/DL (ref 0.1–1)
BILIRUB SERPL-MCNC: 0.3 MG/DL (ref 0.1–1)
BILIRUB UR QL STRIP: NEGATIVE
BLD GP AB SCN CELLS X3 SERPL QL: NORMAL
BNP SERPL-MCNC: 31 PG/ML (ref 0–99)
BUN SERPL-MCNC: 32 MG/DL (ref 6–20)
BUN SERPL-MCNC: 33 MG/DL (ref 6–20)
BZE UR QL SCN: NEGATIVE
CALCIUM SERPL-MCNC: 8.1 MG/DL (ref 8.7–10.5)
CALCIUM SERPL-MCNC: 9.6 MG/DL (ref 8.7–10.5)
CANNABINOIDS UR QL SCN: NEGATIVE
CHLORIDE SERPL-SCNC: 101 MMOL/L (ref 95–110)
CHLORIDE SERPL-SCNC: 107 MMOL/L (ref 95–110)
CLARITY UR: CLEAR
CO2 SERPL-SCNC: 21 MMOL/L (ref 23–29)
CO2 SERPL-SCNC: 23 MMOL/L (ref 23–29)
COLOR UR: YELLOW
CREAT SERPL-MCNC: 2.4 MG/DL (ref 0.5–1.4)
CREAT SERPL-MCNC: 3.1 MG/DL (ref 0.5–1.4)
CREAT UR-MCNC: 78.7 MG/DL (ref 23–375)
DELSYS: ABNORMAL
DELSYS: ABNORMAL
DIASTOLIC DYSFUNCTION: NO
DIFFERENTIAL METHOD: ABNORMAL
EOSINOPHIL # BLD AUTO: 0 K/UL (ref 0–0.5)
EOSINOPHIL NFR BLD: 0 % (ref 0–8)
EOSINOPHIL NFR BLD: 0 % (ref 0–8)
EOSINOPHIL NFR BLD: 0.1 % (ref 0–8)
ERYTHROCYTE [DISTWIDTH] IN BLOOD BY AUTOMATED COUNT: 15.2 % (ref 11.5–14.5)
ERYTHROCYTE [DISTWIDTH] IN BLOOD BY AUTOMATED COUNT: 15.4 % (ref 11.5–14.5)
ERYTHROCYTE [DISTWIDTH] IN BLOOD BY AUTOMATED COUNT: 15.5 % (ref 11.5–14.5)
ERYTHROCYTE [SEDIMENTATION RATE] IN BLOOD BY WESTERGREN METHOD: 20 MM/H
ERYTHROCYTE [SEDIMENTATION RATE] IN BLOOD BY WESTERGREN METHOD: 24 MM/H
EST. GFR  (AFRICAN AMERICAN): 24 ML/MIN/1.73 M^2
EST. GFR  (AFRICAN AMERICAN): 33 ML/MIN/1.73 M^2
EST. GFR  (NON AFRICAN AMERICAN): 21 ML/MIN/1.73 M^2
EST. GFR  (NON AFRICAN AMERICAN): 29 ML/MIN/1.73 M^2
ESTIMATED PA SYSTOLIC PRESSURE: 23.23
ETHANOL SERPL-MCNC: <10 MG/DL
FIO2: 100
FIO2: 100
GLUCOSE SERPL-MCNC: 74 MG/DL (ref 70–110)
GLUCOSE SERPL-MCNC: 86 MG/DL (ref 70–110)
GLUCOSE SERPL-MCNC: 98 MG/DL (ref 70–110)
GLUCOSE UR QL STRIP: NEGATIVE
HCO3 UR-SCNC: 19.2 MMOL/L (ref 24–28)
HCO3 UR-SCNC: 25.6 MMOL/L (ref 24–28)
HCT VFR BLD AUTO: 40.1 % (ref 40–54)
HCT VFR BLD AUTO: 40.8 % (ref 40–54)
HCT VFR BLD AUTO: 41.5 % (ref 40–54)
HCT VFR BLD AUTO: 47.6 % (ref 40–54)
HCT VFR BLD CALC: 46 %PCV (ref 36–54)
HGB BLD-MCNC: 12.9 G/DL (ref 14–18)
HGB BLD-MCNC: 13.2 G/DL (ref 14–18)
HGB BLD-MCNC: 15 G/DL (ref 14–18)
HGB UR QL STRIP: NEGATIVE
INFLUENZA A, MOLECULAR: NEGATIVE
INFLUENZA B, MOLECULAR: NEGATIVE
INR PPP: 0.9 (ref 0.8–1.2)
KETONES UR QL STRIP: NEGATIVE
LACTATE SERPL-SCNC: 1.7 MMOL/L (ref 0.5–2.2)
LACTATE SERPL-SCNC: 3.8 MMOL/L (ref 0.5–2.2)
LEUKOCYTE ESTERASE UR QL STRIP: NEGATIVE
LIPASE SERPL-CCNC: 21 U/L (ref 4–60)
LYMPHOCYTES # BLD AUTO: 0.9 K/UL (ref 1–4.8)
LYMPHOCYTES # BLD AUTO: 1 K/UL (ref 1–4.8)
LYMPHOCYTES # BLD AUTO: 1.1 K/UL (ref 1–4.8)
LYMPHOCYTES NFR BLD: 11.3 % (ref 18–48)
LYMPHOCYTES NFR BLD: 12.2 % (ref 18–48)
LYMPHOCYTES NFR BLD: 7.8 % (ref 18–48)
MAGNESIUM SERPL-MCNC: 1.7 MG/DL (ref 1.6–2.6)
MCH RBC QN AUTO: 27.9 PG (ref 27–31)
MCH RBC QN AUTO: 27.9 PG (ref 27–31)
MCH RBC QN AUTO: 28.1 PG (ref 27–31)
MCHC RBC AUTO-ENTMCNC: 31.5 G/DL (ref 32–36)
MCHC RBC AUTO-ENTMCNC: 31.6 G/DL (ref 32–36)
MCHC RBC AUTO-ENTMCNC: 31.8 G/DL (ref 32–36)
MCV RBC AUTO: 88 FL (ref 82–98)
MCV RBC AUTO: 88 FL (ref 82–98)
MCV RBC AUTO: 89 FL (ref 82–98)
METHADONE UR QL SCN>300 NG/ML: NEGATIVE
MITRAL VALVE MOBILITY: NORMAL
MODE: ABNORMAL
MODE: ABNORMAL
MONOCYTES # BLD AUTO: 0.5 K/UL (ref 0.3–1)
MONOCYTES # BLD AUTO: 0.5 K/UL (ref 0.3–1)
MONOCYTES # BLD AUTO: 1 K/UL (ref 0.3–1)
MONOCYTES NFR BLD: 5.7 % (ref 4–15)
MONOCYTES NFR BLD: 5.9 % (ref 4–15)
MONOCYTES NFR BLD: 6.9 % (ref 4–15)
NEUTROPHILS # BLD AUTO: 12.1 K/UL (ref 1.8–7.7)
NEUTROPHILS # BLD AUTO: 6.6 K/UL (ref 1.8–7.7)
NEUTROPHILS # BLD AUTO: 6.8 K/UL (ref 1.8–7.7)
NEUTROPHILS NFR BLD: 81.8 % (ref 38–73)
NEUTROPHILS NFR BLD: 83 % (ref 38–73)
NEUTROPHILS NFR BLD: 85.1 % (ref 38–73)
NITRITE UR QL STRIP: NEGATIVE
OB PNL STL: NEGATIVE
OPIATES UR QL SCN: NORMAL
PCO2 BLDA: 42.1 MMHG (ref 35–45)
PCO2 BLDA: 83.5 MMHG (ref 35–45)
PCP UR QL SCN>25 NG/ML: NEGATIVE
PEEP: 5
PEEP: 5
PH SMN: 7.09 [PH] (ref 7.35–7.45)
PH SMN: 7.27 [PH] (ref 7.35–7.45)
PH UR STRIP: 6 [PH] (ref 5–8)
PLATELET # BLD AUTO: 251 K/UL (ref 150–350)
PLATELET # BLD AUTO: 285 K/UL (ref 150–350)
PLATELET # BLD AUTO: 337 K/UL (ref 150–350)
PMV BLD AUTO: 8.8 FL (ref 9.2–12.9)
PMV BLD AUTO: 8.9 FL (ref 9.2–12.9)
PMV BLD AUTO: 9.3 FL (ref 9.2–12.9)
PO2 BLDA: 78 MMHG (ref 80–100)
PO2 BLDA: 83 MMHG (ref 80–100)
POC BE: -4 MMOL/L
POC BE: -8 MMOL/L
POC IONIZED CALCIUM: 1.15 MMOL/L (ref 1.06–1.42)
POC SATURATED O2: 89 % (ref 95–100)
POC SATURATED O2: 95 % (ref 95–100)
POCT GLUCOSE: 115 MG/DL (ref 70–110)
POTASSIUM BLD-SCNC: 4.5 MMOL/L (ref 3.5–5.1)
POTASSIUM SERPL-SCNC: 4.5 MMOL/L (ref 3.5–5.1)
POTASSIUM SERPL-SCNC: 4.6 MMOL/L (ref 3.5–5.1)
PROCALCITONIN SERPL IA-MCNC: 0.93 NG/ML
PROT SERPL-MCNC: 6.1 G/DL (ref 6–8.4)
PROT SERPL-MCNC: 7.8 G/DL (ref 6–8.4)
PROT UR QL STRIP: NEGATIVE
PROTHROMBIN TIME: 10 SEC (ref 9–12.5)
RBC # BLD AUTO: 4.63 M/UL (ref 4.6–6.2)
RBC # BLD AUTO: 4.7 M/UL (ref 4.6–6.2)
RBC # BLD AUTO: 5.38 M/UL (ref 4.6–6.2)
RETIRED EF AND QEF - SEE NOTES: 60 (ref 55–65)
SALICYLATES SERPL-MCNC: <5 MG/DL (ref 15–30)
SAMPLE: ABNORMAL
SAMPLE: ABNORMAL
SITE: ABNORMAL
SITE: ABNORMAL
SODIUM BLD-SCNC: 141 MMOL/L (ref 136–145)
SODIUM SERPL-SCNC: 140 MMOL/L (ref 136–145)
SODIUM SERPL-SCNC: 142 MMOL/L (ref 136–145)
SP GR UR STRIP: 1.01 (ref 1–1.03)
SP02: 91
SPECIMEN SOURCE: NORMAL
TOXICOLOGY INFORMATION: NORMAL
TRICUSPID VALVE REGURGITATION: NORMAL
TROPONIN I SERPL DL<=0.01 NG/ML-MCNC: 0.02 NG/ML (ref 0–0.03)
URN SPEC COLLECT METH UR: NORMAL
UROBILINOGEN UR STRIP-ACNC: NEGATIVE EU/DL
VT: 470
VT: 470
WBC # BLD AUTO: 14.22 K/UL (ref 3.9–12.7)
WBC # BLD AUTO: 8.12 K/UL (ref 3.9–12.7)
WBC # BLD AUTO: 8.24 K/UL (ref 3.9–12.7)

## 2019-05-20 PROCEDURE — 84295 ASSAY OF SERUM SODIUM: CPT

## 2019-05-20 PROCEDURE — 84132 ASSAY OF SERUM POTASSIUM: CPT

## 2019-05-20 PROCEDURE — 96366 THER/PROPH/DIAG IV INF ADDON: CPT | Mod: 59

## 2019-05-20 PROCEDURE — 96374 THER/PROPH/DIAG INJ IV PUSH: CPT | Mod: 59

## 2019-05-20 PROCEDURE — 82272 OCCULT BLD FECES 1-3 TESTS: CPT

## 2019-05-20 PROCEDURE — 25000003 PHARM REV CODE 250: Performed by: INTERNAL MEDICINE

## 2019-05-20 PROCEDURE — C1751 CATH, INF, PER/CENT/MIDLINE: HCPCS

## 2019-05-20 PROCEDURE — 99291 PR CRITICAL CARE, E/M 30-74 MINUTES: ICD-10-PCS | Mod: ,,, | Performed by: NURSE PRACTITIONER

## 2019-05-20 PROCEDURE — 99900035 HC TECH TIME PER 15 MIN (STAT)

## 2019-05-20 PROCEDURE — 83690 ASSAY OF LIPASE: CPT

## 2019-05-20 PROCEDURE — 99291 CRITICAL CARE FIRST HOUR: CPT | Mod: 25

## 2019-05-20 PROCEDURE — 80329 ANALGESICS NON-OPIOID 1 OR 2: CPT

## 2019-05-20 PROCEDURE — 83880 ASSAY OF NATRIURETIC PEPTIDE: CPT

## 2019-05-20 PROCEDURE — 63600175 PHARM REV CODE 636 W HCPCS: Performed by: EMERGENCY MEDICINE

## 2019-05-20 PROCEDURE — 87205 SMEAR GRAM STAIN: CPT

## 2019-05-20 PROCEDURE — 96365 THER/PROPH/DIAG IV INF INIT: CPT | Mod: 59

## 2019-05-20 PROCEDURE — 36600 WITHDRAWAL OF ARTERIAL BLOOD: CPT

## 2019-05-20 PROCEDURE — 85610 PROTHROMBIN TIME: CPT

## 2019-05-20 PROCEDURE — C8929 TTE W OR WO FOL WCON,DOPPLER: HCPCS

## 2019-05-20 PROCEDURE — 20000000 HC ICU ROOM

## 2019-05-20 PROCEDURE — 25000003 PHARM REV CODE 250: Performed by: EMERGENCY MEDICINE

## 2019-05-20 PROCEDURE — 63600175 PHARM REV CODE 636 W HCPCS

## 2019-05-20 PROCEDURE — 63600175 PHARM REV CODE 636 W HCPCS: Performed by: INTERNAL MEDICINE

## 2019-05-20 PROCEDURE — 85347 COAGULATION TIME ACTIVATED: CPT

## 2019-05-20 PROCEDURE — 36556 INSERT NON-TUNNEL CV CATH: CPT

## 2019-05-20 PROCEDURE — 87147 CULTURE TYPE IMMUNOLOGIC: CPT

## 2019-05-20 PROCEDURE — 85014 HEMATOCRIT: CPT

## 2019-05-20 PROCEDURE — 85730 THROMBOPLASTIN TIME PARTIAL: CPT

## 2019-05-20 PROCEDURE — 27200966 HC CLOSED SUCTION SYSTEM

## 2019-05-20 PROCEDURE — 93010 ELECTROCARDIOGRAM REPORT: CPT | Mod: ,,, | Performed by: INTERNAL MEDICINE

## 2019-05-20 PROCEDURE — 36410 VNPNXR 3YR/> PHY/QHP DX/THER: CPT

## 2019-05-20 PROCEDURE — 82800 BLOOD PH: CPT

## 2019-05-20 PROCEDURE — 80307 DRUG TEST PRSMV CHEM ANLYZR: CPT

## 2019-05-20 PROCEDURE — 99223 PR INITIAL HOSPITAL CARE,LEVL III: ICD-10-PCS | Mod: ,,, | Performed by: INTERNAL MEDICINE

## 2019-05-20 PROCEDURE — 25000242 PHARM REV CODE 250 ALT 637 W/ HCPCS: Performed by: INTERNAL MEDICINE

## 2019-05-20 PROCEDURE — 80053 COMPREHEN METABOLIC PANEL: CPT

## 2019-05-20 PROCEDURE — 99291 CRITICAL CARE FIRST HOUR: CPT | Mod: ,,, | Performed by: NURSE PRACTITIONER

## 2019-05-20 PROCEDURE — 87040 BLOOD CULTURE FOR BACTERIA: CPT

## 2019-05-20 PROCEDURE — 86901 BLOOD TYPING SEROLOGIC RH(D): CPT

## 2019-05-20 PROCEDURE — 81003 URINALYSIS AUTO W/O SCOPE: CPT

## 2019-05-20 PROCEDURE — C9113 INJ PANTOPRAZOLE SODIUM, VIA: HCPCS | Performed by: EMERGENCY MEDICINE

## 2019-05-20 PROCEDURE — 83605 ASSAY OF LACTIC ACID: CPT | Mod: 91

## 2019-05-20 PROCEDURE — 83735 ASSAY OF MAGNESIUM: CPT

## 2019-05-20 PROCEDURE — 93306 2D ECHO WITH COLOR FLOW DOPPLER: ICD-10-PCS | Mod: 26,,, | Performed by: INTERNAL MEDICINE

## 2019-05-20 PROCEDURE — 25000003 PHARM REV CODE 250

## 2019-05-20 PROCEDURE — 93010 EKG 12-LEAD: ICD-10-PCS | Mod: ,,, | Performed by: INTERNAL MEDICINE

## 2019-05-20 PROCEDURE — 31500 INSERT EMERGENCY AIRWAY: CPT

## 2019-05-20 PROCEDURE — 84484 ASSAY OF TROPONIN QUANT: CPT

## 2019-05-20 PROCEDURE — 94002 VENT MGMT INPAT INIT DAY: CPT

## 2019-05-20 PROCEDURE — 87502 INFLUENZA DNA AMP PROBE: CPT

## 2019-05-20 PROCEDURE — 85025 COMPLETE CBC W/AUTO DIFF WBC: CPT | Mod: 91

## 2019-05-20 PROCEDURE — 96375 TX/PRO/DX INJ NEW DRUG ADDON: CPT | Mod: 59

## 2019-05-20 PROCEDURE — 84145 PROCALCITONIN (PCT): CPT

## 2019-05-20 PROCEDURE — 96367 TX/PROPH/DG ADDL SEQ IV INF: CPT | Mod: 59

## 2019-05-20 PROCEDURE — 80320 DRUG SCREEN QUANTALCOHOLS: CPT

## 2019-05-20 PROCEDURE — 93005 ELECTROCARDIOGRAM TRACING: CPT

## 2019-05-20 PROCEDURE — 99292 CRITICAL CARE ADDL 30 MIN: CPT | Mod: 25

## 2019-05-20 PROCEDURE — 93306 TTE W/DOPPLER COMPLETE: CPT | Mod: 26,,, | Performed by: INTERNAL MEDICINE

## 2019-05-20 PROCEDURE — 87449 NOS EACH ORGANISM AG IA: CPT

## 2019-05-20 PROCEDURE — 99223 1ST HOSP IP/OBS HIGH 75: CPT | Mod: ,,, | Performed by: INTERNAL MEDICINE

## 2019-05-20 PROCEDURE — 94640 AIRWAY INHALATION TREATMENT: CPT

## 2019-05-20 PROCEDURE — 36415 COLL VENOUS BLD VENIPUNCTURE: CPT

## 2019-05-20 PROCEDURE — 82330 ASSAY OF CALCIUM: CPT

## 2019-05-20 PROCEDURE — 86703 HIV-1/HIV-2 1 RESULT ANTBDY: CPT

## 2019-05-20 PROCEDURE — 87070 CULTURE OTHR SPECIMN AEROBIC: CPT

## 2019-05-20 RX ORDER — SODIUM CHLORIDE 9 MG/ML
INJECTION, SOLUTION INTRAVENOUS CONTINUOUS
Status: DISCONTINUED | OUTPATIENT
Start: 2019-05-20 | End: 2019-05-20

## 2019-05-20 RX ORDER — PANTOPRAZOLE SODIUM 40 MG/10ML
80 INJECTION, POWDER, LYOPHILIZED, FOR SOLUTION INTRAVENOUS
Status: COMPLETED | OUTPATIENT
Start: 2019-05-20 | End: 2019-05-20

## 2019-05-20 RX ORDER — ETOMIDATE 2 MG/ML
30 INJECTION INTRAVENOUS
Status: COMPLETED | OUTPATIENT
Start: 2019-05-20 | End: 2019-05-20

## 2019-05-20 RX ORDER — LEVOFLOXACIN 5 MG/ML
750 INJECTION, SOLUTION INTRAVENOUS
Status: COMPLETED | OUTPATIENT
Start: 2019-05-20 | End: 2019-05-20

## 2019-05-20 RX ORDER — DEXMEDETOMIDINE HYDROCHLORIDE 4 UG/ML
0.2 INJECTION, SOLUTION INTRAVENOUS CONTINUOUS
Status: DISCONTINUED | OUTPATIENT
Start: 2019-05-20 | End: 2019-05-20

## 2019-05-20 RX ORDER — BENZONATATE 200 MG/1
200 CAPSULE ORAL 3 TIMES DAILY PRN
Status: ON HOLD | COMMUNITY
End: 2019-05-31 | Stop reason: HOSPADM

## 2019-05-20 RX ORDER — BISACODYL 10 MG
10 SUPPOSITORY, RECTAL RECTAL DAILY PRN
Status: DISCONTINUED | OUTPATIENT
Start: 2019-05-20 | End: 2019-05-31 | Stop reason: HOSPADM

## 2019-05-20 RX ORDER — PANTOPRAZOLE SODIUM 40 MG/10ML
40 INJECTION, POWDER, LYOPHILIZED, FOR SOLUTION INTRAVENOUS 2 TIMES DAILY
Status: DISCONTINUED | OUTPATIENT
Start: 2019-05-20 | End: 2019-05-22

## 2019-05-20 RX ORDER — CLOPIDOGREL BISULFATE 75 MG/1
75 TABLET ORAL DAILY
Status: ON HOLD | COMMUNITY
End: 2023-06-15 | Stop reason: SDUPTHER

## 2019-05-20 RX ORDER — LORAZEPAM 2 MG/ML
INJECTION INTRAMUSCULAR
Status: COMPLETED
Start: 2019-05-20 | End: 2019-05-20

## 2019-05-20 RX ORDER — DEXMEDETOMIDINE HYDROCHLORIDE 4 UG/ML
0.2 INJECTION, SOLUTION INTRAVENOUS CONTINUOUS
Status: DISCONTINUED | OUTPATIENT
Start: 2019-05-20 | End: 2019-05-21

## 2019-05-20 RX ORDER — VANCOMYCIN HCL IN 5 % DEXTROSE 1.5G/250ML
1500 PLASTIC BAG, INJECTION (ML) INTRAVENOUS
Status: DISCONTINUED | OUTPATIENT
Start: 2019-05-28 | End: 2019-05-22

## 2019-05-20 RX ORDER — IPRATROPIUM BROMIDE AND ALBUTEROL SULFATE 2.5; .5 MG/3ML; MG/3ML
3 SOLUTION RESPIRATORY (INHALATION) EVERY 6 HOURS
Status: DISCONTINUED | OUTPATIENT
Start: 2019-05-20 | End: 2019-05-29

## 2019-05-20 RX ORDER — ACETAMINOPHEN 325 MG/1
650 TABLET ORAL ONCE
Status: COMPLETED | OUTPATIENT
Start: 2019-05-20 | End: 2019-05-20

## 2019-05-20 RX ORDER — ASPIRIN 81 MG/1
81 TABLET ORAL DAILY
Status: ON HOLD | COMMUNITY
End: 2023-06-15 | Stop reason: HOSPADM

## 2019-05-20 RX ORDER — TRIAMCINOLONE ACETONIDE 0.25 MG/G
CREAM TOPICAL 2 TIMES DAILY
COMMUNITY
End: 2023-06-08

## 2019-05-20 RX ORDER — SUCCINYLCHOLINE CHLORIDE 20 MG/ML
100 INJECTION INTRAMUSCULAR; INTRAVENOUS
Status: COMPLETED | OUTPATIENT
Start: 2019-05-20 | End: 2019-05-20

## 2019-05-20 RX ORDER — CHLORHEXIDINE GLUCONATE ORAL RINSE 1.2 MG/ML
15 SOLUTION DENTAL 2 TIMES DAILY
Status: DISCONTINUED | OUTPATIENT
Start: 2019-05-20 | End: 2019-05-21

## 2019-05-20 RX ORDER — POLYETHYLENE GLYCOL 3350 17 G/17G
17 POWDER, FOR SOLUTION ORAL DAILY
Status: DISCONTINUED | OUTPATIENT
Start: 2019-05-21 | End: 2019-05-31 | Stop reason: HOSPADM

## 2019-05-20 RX ORDER — NOREPINEPHRINE BITARTRATE/D5W 4MG/250ML
0.05 PLASTIC BAG, INJECTION (ML) INTRAVENOUS CONTINUOUS
Status: DISCONTINUED | OUTPATIENT
Start: 2019-05-20 | End: 2019-05-21

## 2019-05-20 RX ORDER — ATORVASTATIN CALCIUM 10 MG/1
10 TABLET, FILM COATED ORAL DAILY
Status: DISCONTINUED | OUTPATIENT
Start: 2019-05-20 | End: 2019-05-31 | Stop reason: HOSPADM

## 2019-05-20 RX ORDER — LEVOFLOXACIN 5 MG/ML
750 INJECTION, SOLUTION INTRAVENOUS
Status: DISCONTINUED | OUTPATIENT
Start: 2019-05-21 | End: 2019-05-26

## 2019-05-20 RX ORDER — PANTOPRAZOLE SODIUM 20 MG/1
20 TABLET, DELAYED RELEASE ORAL DAILY
COMMUNITY
End: 2023-06-08

## 2019-05-20 RX ORDER — PREGABALIN 225 MG/1
225 CAPSULE ORAL 3 TIMES DAILY
Status: ON HOLD | COMMUNITY
End: 2019-05-31 | Stop reason: HOSPADM

## 2019-05-20 RX ADMIN — DEXMEDETOMIDINE HYDROCHLORIDE 0.2 MCG/KG/HR: 400 INJECTION INTRAVENOUS at 03:05

## 2019-05-20 RX ADMIN — SODIUM CHLORIDE: 0.9 INJECTION, SOLUTION INTRAVENOUS at 02:05

## 2019-05-20 RX ADMIN — IPRATROPIUM BROMIDE AND ALBUTEROL SULFATE 3 ML: .5; 3 SOLUTION RESPIRATORY (INHALATION) at 07:05

## 2019-05-20 RX ADMIN — VANCOMYCIN HYDROCHLORIDE 2500 MG: 1 INJECTION, POWDER, FOR SOLUTION INTRAVENOUS at 01:05

## 2019-05-20 RX ADMIN — NOREPINEPHRINE BITARTRATE 0.05 MCG/KG/MIN: 4 SOLUTION at 12:05

## 2019-05-20 RX ADMIN — NOREPINEPHRINE BITARTRATE 0.05 MCG/KG/MIN: 4 SOLUTION at 11:05

## 2019-05-20 RX ADMIN — SODIUM CHLORIDE 1000 ML: 0.9 INJECTION, SOLUTION INTRAVENOUS at 09:05

## 2019-05-20 RX ADMIN — LORAZEPAM 2 MG: 2 INJECTION INTRAMUSCULAR; INTRAVENOUS at 12:05

## 2019-05-20 RX ADMIN — ETOMIDATE 30 MG: 2 INJECTION INTRAVENOUS at 09:05

## 2019-05-20 RX ADMIN — SODIUM CHLORIDE 2874 ML: 0.9 INJECTION, SOLUTION INTRAVENOUS at 09:05

## 2019-05-20 RX ADMIN — LEVOFLOXACIN 750 MG: 750 INJECTION, SOLUTION INTRAVENOUS at 11:05

## 2019-05-20 RX ADMIN — CHLORHEXIDINE GLUCONATE 0.12% ORAL RINSE 15 ML: 1.2 LIQUID ORAL at 08:05

## 2019-05-20 RX ADMIN — PANTOPRAZOLE SODIUM 40 MG: 40 INJECTION, POWDER, LYOPHILIZED, FOR SOLUTION INTRAVENOUS at 08:05

## 2019-05-20 RX ADMIN — PANTOPRAZOLE SODIUM 80 MG: 40 INJECTION, POWDER, FOR SOLUTION INTRAVENOUS at 09:05

## 2019-05-20 RX ADMIN — DEXMEDETOMIDINE HYDROCHLORIDE 0.2 MCG/KG/HR: 400 INJECTION INTRAVENOUS at 09:05

## 2019-05-20 RX ADMIN — ACETAMINOPHEN 650 MG: 325 TABLET ORAL at 08:05

## 2019-05-20 RX ADMIN — SUCCINYLCHOLINE CHLORIDE 100 MG: 20 INJECTION, SOLUTION INTRAMUSCULAR; INTRAVENOUS; PARENTERAL at 09:05

## 2019-05-20 RX ADMIN — LORAZEPAM 2 MG: 2 INJECTION INTRAMUSCULAR; INTRAVENOUS at 09:05

## 2019-05-20 RX ADMIN — DEXMEDETOMIDINE HYDROCHLORIDE 0.2 MCG/KG/HR: 400 INJECTION INTRAVENOUS at 06:05

## 2019-05-20 RX ADMIN — IPRATROPIUM BROMIDE AND ALBUTEROL SULFATE 3 ML: .5; 3 SOLUTION RESPIRATORY (INHALATION) at 01:05

## 2019-05-20 NOTE — PROGRESS NOTES
PT intermittently dyssynchronous with ventilator. RNs at bedside attempting to regulate sedation. RT will obtain post intubation ABG when ventilator settings are in place and pt is settled.

## 2019-05-20 NOTE — CONSULTS
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 EMT found lethargic, confused  Tried Narcan, Coffee grounds noted in NGT.  Hx of Substance abuse, Amphetamine and opiods.   Has been intubated 8.0 ETT @24, Right IJ TLC. Has Norepi @ 0.1 mcg, Precedex @ 0.2 mg NS @125mls, 500mls in Mejia.       Hospital/ICU Course:  05/20: examined at bedside, ER, DW nurse,and GI, Patient nods.    Past Medical History:   Diagnosis Date    Femoral-popliteal bypass graft occlusion     Left and right    GERD (gastroesophageal reflux disease)     History of substance abuse     History of transient ischemic attack (TIA)     Hyperlipemia     Hypertension     Neuropathy     Occlusion of right femorotibial bypass graft 5/2017, 6/2017    Pre-diabetes     Status post femorotibial bypass        Past Surgical History:   Procedure Laterality Date    ANGIOGRAM-EXTREMITY-LOWER N/A 10/31/2017    Performed [facility] CATH LAB    ANGIOGRAM-EXTREMITY-LOWER N/A 10/30/2017    Performed by Avila Houser [facility] at [facility] CATH LAB    ANGIOGRAM-EXTREMITY-LOWER Right 5/24/2017    Performed by Jack John IV, [facility] at [facility] CATH LAB    back fusion      FEMORAL BYPASS      left fem-pop bypass 11/2014; right fem-pop bypass 9/2016, right fem-distal bypass 3/2017    RE-LOOK-PERIPHERAL Right 5/25/2017    Performed by [facility]jit [facility]clean MD at [facility] CATH LAB  "      Review of patient's allergies indicates:   Allergen Reactions    Pcn [penicillins] Other (See Comments)     Other      Bactrim [sulfamethoxazole-trimethoprim] Rash     Patient requests medication be listed as an "Intolerance" rather than an allergy, stating the rash is not "so bad."    Ceclor [cefaclor] Rash    Latex, natural rubber Rash       Family History     Problem Relation (Age of Onset)    COPD Mother, Father    Diabetes Mother, Father    Heart attack Mother    Heart disease Mother    Stroke Mother        Tobacco Use    Smoking status: Former Smoker    Smokeless tobacco: Former User     Quit date: 10/22/2016   Substance and Sexual Activity    Alcohol use: No    Drug use: Yes     Types: Methamphetamines     Comment: opiates and methamphetamines    Sexual activity: Yes         Review of Systems   Unable to perform ROS: Intubated     Objective:     Vital Signs (Most Recent):  Pulse: 80 (05/20/19 1443)  Resp: (!) 25 (05/20/19 1443)  BP: 134/83 (05/20/19 1416)  SpO2: 97 % (05/20/19 1443) Vital Signs (24h Range):  Pulse:  [] 80  Resp:  [19-32] 25  SpO2:  [61 %-100 %] 97 %  BP: ()/(51-89) 134/83     Weight: 95.8 kg (211 lb 3.2 oz)  Body mass index is 31.19 kg/m².    No intake or output data in the 24 hours ending 05/20/19 1448    Physical Exam   Constitutional: He appears well-developed and well-nourished. He appears lethargic.  Non-toxic appearance. He has a sickly appearance. He appears ill. No distress. He is sedated, intubated and restrained.   HENT:   Head: Normocephalic and atraumatic.   Mouth/Throat: Oropharynx is clear and moist and mucous membranes are normal.   Eyes: Pupils are equal, round, and reactive to light. Lids are normal.   Neck: Trachea normal. Neck supple. Carotid bruit is not present.       Cardiovascular: Normal rate, regular rhythm and normal heart sounds.   Pulses:       Radial pulses are 2+ on the right side, and 2+ on the left side.        Dorsalis pedis pulses " are 1+ on the left side. Right dorsalis pedis pulse not accessible.        Right posterior tibial pulse not accessible.   Pulmonary/Chest: Effort normal. No accessory muscle usage. He is intubated. No respiratory distress. He has rhonchi. He has rales.   Abdominal: Soft. Normal appearance. He exhibits no distension. Bowel sounds are decreased. There is no tenderness.   Genitourinary: Penis normal.   Genitourinary Comments: Mejia in place   Musculoskeletal: Normal range of motion.        Right foot: There is deformity (old at knee amputation).        Left foot: There is no deformity.   No edema   Lymphadenopathy:     He has no cervical adenopathy.   Neurological: He appears lethargic.   Mild arousal with aggressive stimuli   Skin: Skin is warm, dry and intact. Capillary refill takes less than 2 seconds. No rash noted. No cyanosis.       Vents:  Vent Mode: A/C (05/20/19 1443)  Ventilator Initiated: Yes (05/20/19 0924)  Set Rate: 24 bmp (05/20/19 1443)  Vt Set: 470 mL (05/20/19 1443)  Pressure Support: 0 cmH20 (05/20/19 1443)  PEEP/CPAP: 8 cmH20 (05/20/19 1443)  Oxygen Concentration (%): 80 (05/20/19 1443)  Peak Airway Pressure: 12 cmH2O (05/20/19 1040)  Plateau Pressure: 0 cmH20 (05/20/19 1040)  Total Ve: 13.5 mL (05/20/19 1040)  F/VT Ratio<105 (RSBI): (!) 54.61 (05/20/19 1040)    Lines/Drains/Airways     Central Venous Catheter Line                 Percutaneous Central Line Insertion/Assessment - triple lumen  05/20/19 1215 right internal jugular less than 1 day          Drain                 NG/OG Tube 05/20/19 0912 orogastric 16 Fr. Right mouth less than 1 day          Airway                 Airway - Non-Surgical 05/20/19 0910 Endotracheal Tube less than 1 day          Peripheral Intravenous Line                 External Jugular IV 05/20/19 0900 less than 1 day         Midline Catheter Insertion/Assessment  - Single Lumen 05/20/19 0950 Left basilic vein (medial side of arm) 18g x 10cm less than 1 day                 Significant Labs:    CBC/Anemia Profile:  Recent Labs   Lab 05/20/19  0928 05/20/19  0952 05/20/19  1039 05/20/19  1259 05/20/19  1349   WBC 14.22*  --   --  8.12 8.24   HGB 15.0  --   --  12.9* 13.2*   HCT 47.6 46  --  40.8 41.5     --   --  285 251   MCV 89  --   --  88 88   RDW 15.4*  --   --  15.5* 15.2*   OCCULTBLOOD  --   --  Negative  --   --         Chemistries:  Recent Labs   Lab 05/20/19  0928 05/20/19  1349    140   K 4.5 4.6    107   CO2 23 21*   BUN 32* 33*   CREATININE 3.1* 2.4*   CALCIUM 9.6 8.1*   ALBUMIN 3.9 3.0*   PROT 7.8 6.1   BILITOT 0.2 0.3   ALKPHOS 112 81   ALT 19 16   AST 26 29       ABGs:   Recent Labs   Lab 05/20/19  1352   PH 7.268*   PCO2 42.1   HCO3 19.2*   POCSATURATED 95   BE -8     Troponin:   Recent Labs   Lab 05/20/19  0928   TROPONINI 0.021     Urine Studies:   Recent Labs   Lab 05/20/19  0957   COLORU Yellow   APPEARANCEUA Clear   PHUR 6.0   SPECGRAV 1.015   PROTEINUA Negative   GLUCUA Negative   KETONESU Negative   BILIRUBINUA Negative   OCCULTUA Negative   NITRITE Negative   UROBILINOGEN Negative   LEUKOCYTESUR Negative     All pertinent labs within the past 24 hours have been reviewed.    Significant Imaging:   CXR: I have reviewed all pertinent results/findings within the past 24 hours and my personal findings are:  RML and RLL infiltrate      ABG  Recent Labs   Lab 05/20/19  1352   PH 7.268*   PO2 83   PCO2 42.1   HCO3 19.2*   BE -8     Assessment/Plan:     Psychiatric  Polysubstance abuse  Encourage cessation    Overdose of illicit drug  IVFs and ICU monitoring  Encourage cessation post extubation    Pulmonary  * Acute respiratory failure with hypoxia and hypercapnia  Vent settings reviewed and adjusted  VAP prophylaxis  SAT/SBT in AM    Aspiration pneumonia of right middle lobe due to vomit  Cont Vanc and Levaquin (PCN allergy)  Cont Duonebs and PRN OET suctioning  Blood and sputum cultures pending    Cardiac/Vascular  Mixed hyperlipidemia  Cont  statin    PAD (peripheral artery disease)  ASA and Plavix held due to UGI bleeding    Renal/  KEHINDE (acute kidney injury)  Improved UOP  Cont IVFs  Repeat BMP in AM  Accurate I/Os    GI  Upper GI bleed  GI following  No bleeding noted now from OG suction  Cont PPI BID  H/H repeat pending    Other  Shock, unspecified  Suspect IVVD and narcotic induced  Cont IVFs and wean Levophed infusion as tolerated       Preventive Measures and Monitoring:   Stress Ulcer: PPI  Nutrition: NPO  Glucose control: stable  Bowel prophylaxis: MIralax and PRN Dulcolax  DVT prophylaxis: SCDs  Hx CAD on B-Blocker: no hx CAD  Head of Bed/Reposition: Elevate HOB and turn Q1-2 hours   Early Mobility: bed rest  SAT/SBT: in AM  RASS goal: -1  Vent Day: #1  OG Day: #1  Central Line Right IJ Day: #1  Mejia Day: #1  IVAB Day: #1  Code Status: Full  Pneumonia Vaccine: ordered    Counseling/Consultation:I have discussed the care of this patient in detail with the bedside nursing staff and Dr. Mcintosh and Dr. Robertson    Patient assessed.  Soft bilat wrist restraints renewed due to risk of pulling lines, tubes and/or climbing OOB.    Critical Care Time: 50 minutes  Critical secondary to Patient has a condition that poses threat to life and bodily function: Acute Renal Failure and Acute resp failure intubated on mechanical ventilation  Patient is currently on drug therapy requiring intensive monitoring for toxicity: Levophed infusion     Critical care was time spent personally by me on the following activities: development of treatment plan with patient or surrogate and bedside caregivers, discussions with consultants, evaluation of patient's response to treatment, examination of patient, ordering and performing treatments and interventions, ordering and review of laboratory studies, ordering and review of radiographic studies, pulse oximetry, re-evaluation of patient's condition. This critical care time did not overlap with that of any other provider  or involve time for any procedures.    Thank you for your consult. I will follow-up with patient. Please contact us if you have any additional questions.     Roe Gale NP  Critical Care Medicine  [facility]

## 2019-05-20 NOTE — ASSESSMENT & PLAN NOTE
Possible GIB   Pt with coffea colored hemesis POD  H/H stable on admission   Repeat  Serial H/H  Start PPI iv BID  Transfuse as need

## 2019-05-20 NOTE — PLAN OF CARE
Problem: Adult Inpatient Plan of Care  Goal: Plan of Care Review  NO FAMILY PRESENT ON ARRIVAL TO ICU. PT SEDATED/INTUBATED/LEVOPHED.

## 2019-05-20 NOTE — SUBJECTIVE & OBJECTIVE
"Past Medical History:   Diagnosis Date    Femoral-popliteal bypass graft occlusion     Left and right    GERD (gastroesophageal reflux disease)     History of substance abuse     History of transient ischemic attack (TIA)     Hyperlipemia     Hypertension     Neuropathy     Occlusion of right femorotibial bypass graft 5/2017, 6/2017    Pre-diabetes     Status post femorotibial bypass        Past Surgical History:   Procedure Laterality Date    ANGIOGRAM-EXTREMITY-LOWER N/A 10/31/2017    Performed by [facility] at [facility] CATH LAB    ANGIOGRAM-EXTREMITY-LOWER N/A 10/30/2017    Performed by Avila Houser MD at [facility] CATH LAB    ANGIOGRAM-EXTREMITY-LOWER Right 5/24/2017    Performed by Jack John IV, MD at [facility] CATH LAB    back fusion      FEMORAL BYPASS      left fem-pop bypass 11/2014; right fem-pop bypass 9/2016, right fem-distal bypass 3/2017    RE-LOOK-PERIPHERAL Right 5/25/2017    Performed by Charanjit Mclean MD at [facility] CATH LAB       Review of patient's allergies indicates:   Allergen Reactions    Pcn [penicillins] Other (See Comments)     Other      Bactrim [sulfamethoxazole-trimethoprim] Rash     Patient requests medication be listed as an "Intolerance" rather than an allergy, stating the rash is not "so bad."    Ceclor [cefaclor] Rash    Latex, natural rubber Rash     Family History     Problem Relation (Age of Onset)    COPD Mother, Father    Diabetes Mother, Father    Heart attack Mother    Heart disease Mother    Stroke Mother        Tobacco Use    Smoking status: Former Smoker    Smokeless tobacco: Former User     Quit date: 10/22/2016   Substance and Sexual Activity    Alcohol use: No    Drug use: Yes     Types: Methamphetamines     Comment: opiates and methamphetamines    Sexual activity: Yes     Review of Systems   Unable to perform ROS: Mental status change     Objective:     Vital Signs (Most Recent):  Pulse: 79 (05/20/19 1416)  Resp: (!) 24 (05/20/19 1416)  BP: 134/83 " (05/20/19 1416)  SpO2: 98 % (05/20/19 1416) Vital Signs (24h Range):  Pulse:  [] 79  Resp:  [19-32] 24  SpO2:  [61 %-100 %] 98 %  BP: ()/(51-89) 134/83     Weight: 95.8 kg (211 lb 3.2 oz) (05/20/19 0900)  Body mass index is 31.19 kg/m².    No intake or output data in the 24 hours ending 05/20/19 1438    Lines/Drains/Airways     Central Venous Catheter Line                 Percutaneous Central Line Insertion/Assessment - triple lumen  05/20/19 1215 right internal jugular less than 1 day          Drain                 NG/OG Tube 05/20/19 0912 orogastric 16 Fr. Right mouth less than 1 day          Airway                 Airway - Non-Surgical 05/20/19 0910 Endotracheal Tube less than 1 day          Peripheral Intravenous Line                 External Jugular IV 05/20/19 0900 less than 1 day         Midline Catheter Insertion/Assessment  - Single Lumen 05/20/19 0950 Left basilic vein (medial side of arm) 18g x 10cm less than 1 day                Physical Exam   Constitutional: He appears well-developed and well-nourished. He appears ill. He is intubated.   HENT:   Head: Normocephalic and atraumatic.   Cardiovascular: Normal rate, regular rhythm and normal heart sounds.   No murmur heard.  Pulmonary/Chest: He is intubated. He has rhonchi (right).   Abdominal: Soft. Bowel sounds are normal. He exhibits no distension and no mass. There is no hepatomegaly. There is no tenderness.   Musculoskeletal: He exhibits no edema.   Neurological: Gait normal.   Sedated, but opened his eyes for Dr. Mcintosh. Doesn't follow commands or try to communicate.    Skin: Skin is warm and dry. No rash noted.       Significant Labs:  CBC:   Recent Labs   Lab 05/20/19  0928 05/20/19  0952 05/20/19  1259 05/20/19  1349   WBC 14.22*  --  8.12 8.24   HGB 15.0  --  12.9* 13.2*   HCT 47.6 46 40.8 41.5     --  285 251     CMP:   Recent Labs   Lab 05/20/19  1349   GLU 98   CALCIUM 8.1*   ALBUMIN 3.0*   PROT 6.1      K 4.6   CO2  21*      BUN 33*   CREATININE 2.4*   ALKPHOS 81   ALT 16   AST 29   BILITOT 0.3     Coagulation:   Recent Labs   Lab 05/20/19  0928   INR 0.9   APTT 25.4       Significant Imaging:  Imaging results within the past 24 hours have been reviewed.

## 2019-05-20 NOTE — SIGNIFICANT EVENT
Tissue Perfusion Assessment        Vital signs reviewed. A focused perfusion assessment was completed.      Cardiovascular Exam: Normal rate, regular rhythm and normal pulses. No murmur heard.     Pulmonary Exam: Effort normal. No respiratory distress. He has wheezes. He has no rales.     Skin Exam: No erythema. No rash noted. Capillary refill <3 sec.                Sameer Goel

## 2019-05-20 NOTE — ED PROVIDER NOTES
"SCRIBE #1 NOTE: I, Jesussagar Dunbar, am scribing for, and in the presence of, Francisco Xiong MD. I have scribed the entire note.       History     Chief Complaint   Patient presents with    Drug Overdose     Review of patient's allergies indicates:   Allergen Reactions    Pcn [penicillins] Other (See Comments)     Other      Bactrim [sulfamethoxazole-trimethoprim] Rash     Patient requests medication be listed as an "Intolerance" rather than an allergy, stating the rash is not "so bad."    Ceclor [cefaclor] Rash    Latex, natural rubber Rash         History of Present Illness     HPI    5/20/2019, 9:15 AM  History obtained from the patient's family and [facility]      History of Present Illness: Ish Guardado is a 58 y.o. male patient with a PMHx of IV COPD, drug abuse, HTN, TIA who presents to the Emergency Department for evaluation of drug overdose which onset gradually last night.  Pt was found unresponsive in his truck in his driveway by a neighbor. Pt's neighbor contacted the pt's son who took care of the pt. Pt's son states that the pt told him he "snorted rosalba". Pt's wife got home and got the pt on the couch where the pt stayed throughout the night. Pt's wife notes that the pt was complaining about a strange smell throughout the night that the family did not appreciate and that the pt had a "chocolate-like" substance coming from his nose. Pt's wife called 911 this morning when the pt began to vomit the same "chocolate-like" substance. Symptoms are constant and moderate in severity. Prior Tx includes Narcan given PTA with no response.  Intubation attempted by [facility] however unsuccessful due to gag reflex.  No further complaints or concerns at this time.     HPI is limited by the pt's state of unconsciousness.    Arrival mode: [facility]    PCP: Pollo Arana MD        Past Medical History:  Past Medical History:   Diagnosis Date    Femoral-popliteal bypass graft occlusion     Left and right    GERD (gastroesophageal " reflux disease)     History of substance abuse     History of transient ischemic attack (TIA)     Hyperlipemia     Hypertension     Neuropathy     Occlusion of right femorotibial bypass graft 5/2017, 6/2017    Pre-diabetes     Status post femorotibial bypass        Past Surgical History:  Past Surgical History:   Procedure Laterality Date    ANGIOGRAM-EXTREMITY-LOWER N/A 10/31/2017    Performed by Charanjit Mclean MD at [facility] CATH LAB    ANGIOGRAM-EXTREMITY-LOWER N/A 10/30/2017    Performed by Avila Houser MD at [facility] CATH LAB    ANGIOGRAM-EXTREMITY-LOWER Right 5/24/2017    Performed by Jack John IV, MD at [facility] CATH LAB    back fusion      FEMORAL BYPASS      left fem-pop bypass 11/2014; right fem-pop bypass 9/2016, right fem-distal bypass 3/2017    LEG AMPUTATION THROUGH KNEE      RE-LOOK-PERIPHERAL Right 5/25/2017    Performed by Charanjit Mclean MD at [facility] CATH LAB         Family History:  Family History   Problem Relation Age of Onset    COPD Mother     Diabetes Mother     Heart disease Mother     Stroke Mother     Heart attack Mother     COPD Father     Diabetes Father        Social History:  Social History     Tobacco Use    Smoking status: Former Smoker    Smokeless tobacco: Former User     Quit date: 10/22/2016   Substance and Sexual Activity    Alcohol use: No    Drug use: Yes     Types: Methamphetamines     Comment: opiates and methamphetamines    Sexual activity: Yes        Review of Systems     Review of Systems   Unable to perform ROS: Patient unresponsive   Constitutional:        (+) drug overdose     Gastrointestinal: Positive for vomiting (coffee ground).        Physical Exam     Initial Vitals   BP Pulse Resp Temp SpO2   05/20/19 0900 05/20/19 0900 05/20/19 0926 05/20/19 1546 05/20/19 0900   139/62 106 (!) 25 100.2 °F (37.9 °C) (!) 89 %      MAP       --                 Physical Exam  Nursing Notes and Vital Signs Reviewed.  Constitutional: Patient is in severe distress.  "Well-developed and well-nourished.  Head: Atraumatic. Normocephalic.  Eyes: PERRL. EOM intact. Conjunctivae are not pale. No scleral icterus.  ENT: Mucous membranes are moist. Oropharynx is clear and symmetric.  Coffee ground emesis in nares and posterior pharnyx  Neck: Supple. Full ROM. No lymphadenopathy.  Cardiovascular: Regular rate. Regular rhythm. No murmurs, rubs, or gallops. Weak pulses.  Pulmonary/Chest: No respiratory distress. Coarse BS. No wheezing or rales.  Abdominal: Soft and non-distended.  There is no tenderness.  No rebound, guarding, or rigidity.  Genitourinary: No CVA tenderness  Musculoskeletal: Moves all present extremities. R AKA. No obvious acute deformities. No edema.  Skin: Warm and dry.  Pale.  Neurological: Somnolent. Limited due to pt altered LOC.     ED Course   Central Line  Date/Time: 5/20/2019 12:14 PM  Performed by: Francisco Xiong MD  Consent Done: Yes  Time out: Immediately prior to procedure a "time out" was called to verify the correct patient, procedure, equipment, support staff and site/side marked as required.  Indications: med administration and vascular access  Anesthesia: see MAR for details  Preparation: skin prepped with ChloraPrep  Skin prep agent dried: skin prep agent completely dried prior to procedure  Sterile barriers: all five maximum sterile barriers used - cap, mask, sterile gown, sterile gloves, and large sterile sheet  Hand hygiene: hand hygiene performed prior to central venous catheter insertion  Location details: right internal jugular  Catheter type: triple lumen  Catheter size: 7 Fr  Ultrasound guidance: yes  Needle advanced into vessel with real time Ultrasound guidance.  Guidewire confirmed in vessel.  Sterile sheath used.  Number of attempts: 1  Assessment: placement verified by x-ray  Complications: none  Specimens: No  Implants: No  Post-procedure: line sutured,  chlorhexidine patch,  sterile dressing applied and blood return through all " ports  Complications: No    Intubation  Date/Time: 5/20/2019 12:14 PM  Performed by: Francisco Xiong MD  Authorized by: Francisco Xiong MD   Consent Done: Emergent Situation  Indications: airway protection and  respiratory distress  Intubation method: direct  Patient status: sedated  Preoxygenation: bag valve mask  Sedatives: etomidate  Paralytic: succinylcholine  Tube size: 8.0 mm  Tube type: cuffed  Number of attempts: 1  Ventilation between attempts: BVM  Cricoid pressure: yes  Cords visualized: yes  Post-procedure assessment: chest rise ((+) color change)  Cuff inflated: yes  ETT to lip: 24 cm  Tube secured with: ETT wiley  Chest x-ray interpreted by radiologist.  Chest x-ray findings: endotracheal tube in appropriate position  Patient tolerance: Patient tolerated the procedure well with no immediate complications  Complications: No  Specimens: No  Implants: No    Critical Care  Date/Time: 5/20/2019 12:14 PM  Performed by: Francisco Xiong MD  Authorized by: Francisco Xiong MD   Direct patient critical care time: 10 minutes  Additional history critical care time: 20 minutes  Ordering / reviewing critical care time: 15 minutes  Documentation critical care time: 20 minutes  Consulting other physicians critical care time: 15 minutes  Consult with family critical care time: 10 minutes  Total critical care time (exclusive of procedural time) : 90 minutes  Critical care time was exclusive of separately billable procedures and treating other patients and teaching time.  Critical care was necessary to treat or prevent imminent or life-threatening deterioration of the following conditions: respiratory failure and toxidrome (narcotic OD; aspiration).  Critical care was time spent personally by me on the following activities: blood draw for specimens, development of treatment plan with patient or surrogate, discussions with consultants, interpretation of cardiac output measurements, evaluation of patient's response to treatment,  examination of patient, obtaining history from patient or surrogate, ordering and performing treatments and interventions, ordering and review of laboratory studies, ordering and review of radiographic studies, pulse oximetry, re-evaluation of patient's condition, review of old charts, ventilator management and vascular access procedures.    External Jugular IV  Date/Time: 5/20/2019 12:14 PM  Performed by: Francisco Xiong MD  Authorized by: Francisco Xiong MD   Consent Done: Emergent Situation  Anesthesia: see MAR for details  Location (Ext Jugular): Left.  Area Prepped With: Chlorohexidine.  Catheter Size: 18 ga.  Catheter Type: Twincath.  Number of attempts: 1  Fixation/Dressing: Taped in place, Sutured in place, Sterile Dressing and Tegaderm.  Patient tolerance: Patient tolerated the procedure well with no immediate complications        ED Vital Signs:  Vitals:    05/20/19 1216 05/20/19 1246 05/20/19 1301 05/20/19 1316   BP: (!) 70/52 (!) 74/51 108/67 106/70   Pulse: 87 84 84 83   Resp: (!) 27 (!) 24 (!) 24 (!) 24   Temp:       TempSrc:       SpO2: 100% (!) 92% 100% 97%   Weight:       Height:        05/20/19 1331 05/20/19 1333 05/20/19 1352 05/20/19 1357   BP: 101/68      Pulse: 82 82 80 80   Resp: (!) 24 (!) 24 (!) 24 (!) 24   Temp:       TempSrc:       SpO2: 97% 97% 97% 97%   Weight:       Height:        05/20/19 1401 05/20/19 1411 05/20/19 1416 05/20/19 1443   BP: (!) 86/57 (!) 147/89 134/83    Pulse: 79 81 79 80   Resp: (!) 24 (!) 24 (!) 24 (!) 25   Temp:       TempSrc:       SpO2: 97% 99% 98% 97%   Weight:       Height:        05/20/19 1446 05/20/19 1501 05/20/19 1546   BP: 137/81 (!) 140/72 118/72   Pulse: 80 84 78   Resp: (!) 24 (!) 27 (!) 24   Temp:   100.2 °F (37.9 °C)   TempSrc:   Rectal   SpO2: 97% 96% 97%   Weight:      Height:          Abnormal Lab Results:  Labs Reviewed   CBC W/ AUTO DIFFERENTIAL - Abnormal; Notable for the following components:       Result Value    WBC 14.22 (*)     Mean Corpuscular  Hemoglobin Conc 31.5 (*)     RDW 15.4 (*)     MPV 8.9 (*)     Gran # (ANC) 12.1 (*)     Gran% 85.1 (*)     Lymph% 7.8 (*)     All other components within normal limits   COMPREHENSIVE METABOLIC PANEL - Abnormal; Notable for the following components:    BUN, Bld 32 (*)     Creatinine 3.1 (*)     Anion Gap 18 (*)     eGFR if  24 (*)     eGFR if non  21 (*)     All other components within normal limits   LACTIC ACID, PLASMA - Abnormal; Notable for the following components:    Lactate (Lactic Acid) 3.8 (*)     All other components within normal limits    Narrative:     LA critical result(s) called and verbal readback obtained from Gwen Lind RN, 05/20/2019 10:00   SALICYLATE LEVEL - Abnormal; Notable for the following components:    Salicylate Lvl <5.0 (*)     All other components within normal limits   PROCALCITONIN - Abnormal; Notable for the following components:    Procalcitonin 0.93 (*)     All other components within normal limits   ACETAMINOPHEN LEVEL - Abnormal; Notable for the following components:    Acetaminophen (Tylenol), Serum <3.0 (*)     All other components within normal limits   CBC W/ AUTO DIFFERENTIAL - Abnormal; Notable for the following components:    Hemoglobin 12.9 (*)     Mean Corpuscular Hemoglobin Conc 31.6 (*)     RDW 15.5 (*)     Gran% 81.8 (*)     Lymph% 12.2 (*)     All other components within normal limits   CBC W/ AUTO DIFFERENTIAL - Abnormal; Notable for the following components:    Hemoglobin 13.2 (*)     Mean Corpuscular Hemoglobin Conc 31.8 (*)     RDW 15.2 (*)     MPV 8.8 (*)     Lymph # 0.9 (*)     Gran% 83.0 (*)     Lymph% 11.3 (*)     All other components within normal limits   COMPREHENSIVE METABOLIC PANEL - Abnormal; Notable for the following components:    CO2 21 (*)     BUN, Bld 33 (*)     Creatinine 2.4 (*)     Calcium 8.1 (*)     Albumin 3.0 (*)     eGFR if  33 (*)     eGFR if non  29 (*)     All other  components within normal limits   ISTAT PROCEDURE - Abnormal; Notable for the following components:    POC PH 7.095 (*)     POC PCO2 83.5 (*)     POC PO2 78 (*)     POC SATURATED O2 89 (*)     All other components within normal limits   ISTAT PROCEDURE - Abnormal; Notable for the following components:    POC PH 7.268 (*)     POC HCO3 19.2 (*)     All other components within normal limits   INFLUENZA A & B BY MOLECULAR   CULTURE, BLOOD   CULTURE, BLOOD   CULTURE, RESPIRATORY   PROTIME-INR   APTT   LIPASE   OCCULT BLOOD X 1, STOOL   DRUG SCREEN PANEL, URINE EMERGENCY   URINALYSIS, REFLEX TO URINE CULTURE    Narrative:     Preferred Collection Type->Urine, Clean Catch   LACTIC ACID, PLASMA   B-TYPE NATRIURETIC PEPTIDE   ALCOHOL,MEDICAL (ETHANOL)   PROCALCITONIN   TROPONIN I   SALICYLATE LEVEL   ACETAMINOPHEN LEVEL   ALCOHOL,MEDICAL (ETHANOL)   TROPONIN I   B-TYPE NATRIURETIC PEPTIDE   MAGNESIUM   MAGNESIUM   HIV 1 / 2 ANTIBODY   TYPE & SCREEN        All Lab Results:  Results for orders placed or performed during the hospital encounter of 05/20/19   Influenza A & B by Molecular   Result Value Ref Range    Influenza A, Molecular Negative Negative    Influenza B, Molecular Negative Negative    Flu A & B Source Nasal swab    CBC auto differential   Result Value Ref Range    WBC 14.22 (H) 3.90 - 12.70 K/uL    RBC 5.38 4.60 - 6.20 M/uL    Hemoglobin 15.0 14.0 - 18.0 g/dL    Hematocrit 47.6 40.0 - 54.0 %    Mean Corpuscular Volume 89 82 - 98 fL    Mean Corpuscular Hemoglobin 27.9 27.0 - 31.0 pg    Mean Corpuscular Hemoglobin Conc 31.5 (L) 32.0 - 36.0 g/dL    RDW 15.4 (H) 11.5 - 14.5 %    Platelets 337 150 - 350 K/uL    MPV 8.9 (L) 9.2 - 12.9 fL    Gran # (ANC) 12.1 (H) 1.8 - 7.7 K/uL    Lymph # 1.1 1.0 - 4.8 K/uL    Mono # 1.0 0.3 - 1.0 K/uL    Eos # 0.0 0.0 - 0.5 K/uL    Baso # 0.01 0.00 - 0.20 K/uL    Gran% 85.1 (H) 38.0 - 73.0 %    Lymph% 7.8 (L) 18.0 - 48.0 %    Mono% 6.9 4.0 - 15.0 %    Eosinophil% 0.1 0.0 - 8.0 %     Basophil% 0.1 0.0 - 1.9 %    Differential Method Automated    Comprehensive metabolic panel   Result Value Ref Range    Sodium 142 136 - 145 mmol/L    Potassium 4.5 3.5 - 5.1 mmol/L    Chloride 101 95 - 110 mmol/L    CO2 23 23 - 29 mmol/L    Glucose 74 70 - 110 mg/dL    BUN, Bld 32 (H) 6 - 20 mg/dL    Creatinine 3.1 (H) 0.5 - 1.4 mg/dL    Calcium 9.6 8.7 - 10.5 mg/dL    Total Protein 7.8 6.0 - 8.4 g/dL    Albumin 3.9 3.5 - 5.2 g/dL    Total Bilirubin 0.2 0.1 - 1.0 mg/dL    Alkaline Phosphatase 112 55 - 135 U/L    AST 26 10 - 40 U/L    ALT 19 10 - 44 U/L    Anion Gap 18 (H) 8 - 16 mmol/L    eGFR if African American 24 (A) >60 mL/min/1.73 m^2    eGFR if non African American 21 (A) >60 mL/min/1.73 m^2   Protime-INR   Result Value Ref Range    Prothrombin Time 10.0 9.0 - 12.5 sec    INR 0.9 0.8 - 1.2   APTT   Result Value Ref Range    aPTT 25.4 21.0 - 32.0 sec   Lipase   Result Value Ref Range    Lipase 21 4 - 60 U/L   Occult blood x 1, stool   Result Value Ref Range    Occult Blood Negative Negative   Lactic acid, plasma   Result Value Ref Range    Lactate (Lactic Acid) 3.8 (HH) 0.5 - 2.2 mmol/L   Drug screen panel, emergency   Result Value Ref Range    Benzodiazepines Negative     Methadone metabolites Negative     Cocaine (Metab.) Negative     Opiate Scrn, Ur Presumptive Positive     Barbiturate Screen, Ur Negative     Amphetamine Screen, Ur Presumptive Positive     THC Negative     Phencyclidine Negative     Creatinine, Random Ur 78.7 23.0 - 375.0 mg/dL    Toxicology Information SEE COMMENT    Urinalysis, Reflex to Urine Culture Urine, Clean Catch   Result Value Ref Range    Specimen UA Urine, Catheterized     Color, UA Yellow Yellow, Straw, Suzan    Appearance, UA Clear Clear    pH, UA 6.0 5.0 - 8.0    Specific Gravity, UA 1.015 1.005 - 1.030    Protein, UA Negative Negative    Glucose, UA Negative Negative    Ketones, UA Negative Negative    Bilirubin (UA) Negative Negative    Occult Blood UA Negative Negative     Nitrite, UA Negative Negative    Urobilinogen, UA Negative <2.0 EU/dL    Leukocytes, UA Negative Negative   Lactic acid, plasma #2   Result Value Ref Range    Lactate (Lactic Acid) 1.7 0.5 - 2.2 mmol/L   Ethanol   Result Value Ref Range    Alcohol, Medical, Serum <10 <10 mg/dL   Troponin I   Result Value Ref Range    Troponin I 0.021 0.000 - 0.026 ng/mL   Salicylate level   Result Value Ref Range    Salicylate Lvl <5.0 (L) 15.0 - 30.0 mg/dL   Brain natriuretic peptide   Result Value Ref Range    BNP 31 0 - 99 pg/mL   Procalcitonin   Result Value Ref Range    Procalcitonin 0.93 (H) <0.25 ng/mL   Acetaminophen level   Result Value Ref Range    Acetaminophen (Tylenol), Serum <3.0 (L) 10.0 - 20.0 ug/mL   CBC auto differential   Result Value Ref Range    WBC 8.12 3.90 - 12.70 K/uL    RBC 4.63 4.60 - 6.20 M/uL    Hemoglobin 12.9 (L) 14.0 - 18.0 g/dL    Hematocrit 40.8 40.0 - 54.0 %    Mean Corpuscular Volume 88 82 - 98 fL    Mean Corpuscular Hemoglobin 27.9 27.0 - 31.0 pg    Mean Corpuscular Hemoglobin Conc 31.6 (L) 32.0 - 36.0 g/dL    RDW 15.5 (H) 11.5 - 14.5 %    Platelets 285 150 - 350 K/uL    MPV 9.3 9.2 - 12.9 fL    Gran # (ANC) 6.6 1.8 - 7.7 K/uL    Lymph # 1.0 1.0 - 4.8 K/uL    Mono # 0.5 0.3 - 1.0 K/uL    Eos # 0.0 0.0 - 0.5 K/uL    Baso # 0.01 0.00 - 0.20 K/uL    Gran% 81.8 (H) 38.0 - 73.0 %    Lymph% 12.2 (L) 18.0 - 48.0 %    Mono% 5.9 4.0 - 15.0 %    Eosinophil% 0.0 0.0 - 8.0 %    Basophil% 0.1 0.0 - 1.9 %    Differential Method Automated    CBC auto differential   Result Value Ref Range    WBC 8.24 3.90 - 12.70 K/uL    RBC 4.70 4.60 - 6.20 M/uL    Hemoglobin 13.2 (L) 14.0 - 18.0 g/dL    Hematocrit 41.5 40.0 - 54.0 %    Mean Corpuscular Volume 88 82 - 98 fL    Mean Corpuscular Hemoglobin 28.1 27.0 - 31.0 pg    Mean Corpuscular Hemoglobin Conc 31.8 (L) 32.0 - 36.0 g/dL    RDW 15.2 (H) 11.5 - 14.5 %    Platelets 251 150 - 350 K/uL    MPV 8.8 (L) 9.2 - 12.9 fL    Gran # (ANC) 6.8 1.8 - 7.7 K/uL    Lymph # 0.9 (L)  1.0 - 4.8 K/uL    Mono # 0.5 0.3 - 1.0 K/uL    Eos # 0.0 0.0 - 0.5 K/uL    Baso # 0.00 0.00 - 0.20 K/uL    Gran% 83.0 (H) 38.0 - 73.0 %    Lymph% 11.3 (L) 18.0 - 48.0 %    Mono% 5.7 4.0 - 15.0 %    Eosinophil% 0.0 0.0 - 8.0 %    Basophil% 0.0 0.0 - 1.9 %    Differential Method Automated    Comprehensive metabolic panel   Result Value Ref Range    Sodium 140 136 - 145 mmol/L    Potassium 4.6 3.5 - 5.1 mmol/L    Chloride 107 95 - 110 mmol/L    CO2 21 (L) 23 - 29 mmol/L    Glucose 98 70 - 110 mg/dL    BUN, Bld 33 (H) 6 - 20 mg/dL    Creatinine 2.4 (H) 0.5 - 1.4 mg/dL    Calcium 8.1 (L) 8.7 - 10.5 mg/dL    Total Protein 6.1 6.0 - 8.4 g/dL    Albumin 3.0 (L) 3.5 - 5.2 g/dL    Total Bilirubin 0.3 0.1 - 1.0 mg/dL    Alkaline Phosphatase 81 55 - 135 U/L    AST 29 10 - 40 U/L    ALT 16 10 - 44 U/L    Anion Gap 12 8 - 16 mmol/L    eGFR if African American 33 (A) >60 mL/min/1.73 m^2    eGFR if non African American 29 (A) >60 mL/min/1.73 m^2   Magnesium   Result Value Ref Range    Magnesium 1.7 1.6 - 2.6 mg/dL   2D echo with color flow doppler   Result Value Ref Range    QEF 60 55 - 65    Diastolic Dysfunction No     Est. PA Systolic Pressure 23.23     Mitral Valve Mobility NORMAL     Tricuspid Valve Regurgitation MILD    Type & Screen   Result Value Ref Range    Group & Rh O POS     Indirect Soraya NEG    ISTAT PROCEDURE   Result Value Ref Range    POC PH 7.095 (LL) 7.35 - 7.45    POC PCO2 83.5 (HH) 35 - 45 mmHg    POC PO2 78 (L) 80 - 100 mmHg    POC HCO3 25.6 24 - 28 mmol/L    POC BE -4 -2 to 2 mmol/L    POC SATURATED O2 89 (L) 95 - 100 %    POC Glucose 86 70 - 110 mg/dL    POC Sodium 141 136 - 145 mmol/L    POC Potassium 4.5 3.5 - 5.1 mmol/L    POC Ionized Calcium 1.15 1.06 - 1.42 mmol/L    POC Hematocrit 46 36 - 54 %PCV    Rate 20     Sample ARTERIAL     Site RR     Allens Test Pass     DelSys Adult Vent     Mode AC/PRVC     Vt 470     PEEP 5     FiO2 100     Sp02 91    ISTAT PROCEDURE   Result Value Ref Range    POC PH  7.268 (LL) 7.35 - 7.45    POC PCO2 42.1 35 - 45 mmHg    POC PO2 83 80 - 100 mmHg    POC HCO3 19.2 (L) 24 - 28 mmol/L    POC BE -8 -2 to 2 mmol/L    POC SATURATED O2 95 95 - 100 %    Rate 24     Sample ARTERIAL     Site RR     Allens Test N/A     DelSys Adult Vent     Mode AC/PRVC     Vt 470     PEEP 5     FiO2 100          Imaging Results:  Imaging Results          US Retroperitoneal Complete (Kidney and (Final result)  Result time 05/20/19 13:26:13    Final result by Paulino Pacheco MD (05/20/19 13:26:13)                 Impression:      No significant abnormality.      Electronically signed by: Paulino Pacheco  Date:    05/20/2019  Time:    13:26             Narrative:    EXAMINATION:  US RETROPERITONEAL COMPLETE    CLINICAL HISTORY:  dorothy;    TECHNIQUE:  Ultrasound of the kidneys and urinary bladder was performed including color flow and Doppler evaluation of the kidneys.    COMPARISON:  None.    FINDINGS:  Right kidney: The right kidney measures 11.3 cm. No cortical thinning. No loss of corticomedullary distinction. Resistive index measures 0.56.  No mass. No renal stone. No hydronephrosis.    Left kidney: The left kidney measures 10.9 cm. No cortical thinning. No loss of corticomedullary distinction. Resistive index measures 0.51.  No mass. No renal stone. No hydronephrosis.    Bladder collapsed around a Mejia catheter.                               X-Ray Chest AP Portable (Final result)  Result time 05/20/19 13:13:03    Final result by SERGE Albarran Sr., MD (05/20/19 13:13:03)                 Impression:      1. There has been interval placement of a right internal jugular venous line. The tip is in the superior vena cava.  There is no pneumothorax.  2. There is a moderate amount of alveolar consolidation in the inferior 2/3 of the right lung.  This is characteristic of pneumonia.  3. The endotracheal and NG tubes remain in place.  .      Electronically signed by: Quincy Albarran  MD  Date:    05/20/2019  Time:    13:13             Narrative:    EXAMINATION:  XR CHEST AP PORTABLE    CLINICAL HISTORY:  Encounter for adjustment and management of vascular access device    COMPARISON:  A chest x-ray performed at 10:04 on 05/20/2019.    FINDINGS:  There has been interval placement of a right internal jugular venous line.  The tip is in the superior vena cava.  The endotracheal and NG tubes remain in place.  The size of the heart is prominent.  There is a moderate amount of alveolar consolidation in the inferior 2/3 of the right lung.  The left lung is clear.  There is no pneumothorax.  The costophrenic angles are sharp.  There is a healed fracture in the lateral aspect of the right 6th rib.                               CT Head Without Contrast (Final result)  Result time 05/20/19 10:39:15    Final result by SERGE Albarran Sr., MD (05/20/19 10:39:15)                 Impression:      1. There are chronic appearing ischemic changes scattered throughout both cerebral hemispheres. There is no evidence of an acute ischemic event. There is no intracranial hemorrhage.  2. There is mild partial opacification of the right frontal and maxillary sinuses.  This is characteristic of sinusitis.  3. There is an old-appearing fracture of the medial wall of the right orbit.  All CT scans at this facility use dose modulation, iterative reconstruction, and/or weight base dosing when appropriate to reduce radiation dose when appropriate to reduce radiation dose to as low as reasonably achievable.      Electronically signed by: Quincy Albarran MD  Date:    05/20/2019  Time:    10:39             Narrative:    EXAMINATION:  CT HEAD WITHOUT CONTRAST    CLINICAL HISTORY:  Confusion/delirium, altered LOC, unexplained;    TECHNIQUE:  Standard brain CT protocol without IV contrast was performed.    COMPARISON:  None    FINDINGS:  There are chronic appearing ischemic changes scattered throughout both cerebral hemispheres.   There is no evidence of an acute ischemic event.  There is no intracranial hemorrhage.  There is an old-appearing fracture of the medial wall of the right orbit.  There is mild partial opacification of the right frontal and maxillary sinuses.                               X-Ray Chest AP Portable (Final result)  Result time 05/20/19 10:10:54    Final result by Paulino Pacheco MD (05/20/19 10:10:54)                 Impression:      Patchy opacification at the right perihilar region and lung base suspicious for airspace disease/pneumonia. Left lung is clear. No pneumothorax. Likely tiny right pleural effusion.      Electronically signed by: Paulino Pacheco  Date:    05/20/2019  Time:    10:10             Narrative:    EXAMINATION:  XR CHEST AP PORTABLE    CLINICAL HISTORY:  . Hypoxemia    TECHNIQUE:  Single frontal portable view of the chest was performed.    COMPARISON:  11/01/2017    FINDINGS:  Support devices: Endotracheal tube tip projects 6.2 cm above the natan, satisfactory.  Esophagogastric tube also in satisfactory position.    Patchy opacification at the right perihilar region and lung base suspicious for airspace disease/pneumonia.  Left lung is clear.  No pneumothorax.  Likely tiny right pleural effusion.    The cardiac silhouette is normal in size. The hilar and mediastinal contours are unremarkable.    Bones are intact.                                 The EKG was ordered, reviewed, and independently interpreted by the ED provider.  Interpretation time: 9:04 AM  Rate: 105 BPM  Rhythm: sinus tachycardia  Interpretation: Nonspecific ST wave abnormality.  Nonspecific artifact. No STEMI.             The Emergency Provider reviewed the vital signs and test results, which are outlined above.     ED Discussion     10:04 AM: 500 CC's of coffee ground emesis have been suctioned out of the pt's airways.  Dr. Xiong believes that the pt may have aspirated some of the coffee ground emesis. Pt did not respond to additional  administration of narcan.    11:24 AM: Re-evaluated pt.  Pt is resting comfortably and in no acute distress.  Discussed pt's status and pertinent results with the pt's family.    11:28 AM: Dr. Xiong discussed the pt's case with Negrito Rousseau PA-C (GI) who will see the pt at the bedside.    11:41 AM: Pt's pressure dropped below 80 systolic.  Central line will be placed.    12:55 PM: Discussed case with Princses Kim ([facility]). Dr. Robertson agrees with current care and management of pt and accepts admission.   Admitting Service: [facility]  Admitting Physician: Dr. Robertson  Admit to: ICU    12:55 PM: Re-evaluated pt. I have discussed test results, shared treatment plan, and the need for admission with patient and family at bedside. Pt and family express understanding at this time and agree with all information. All questions answered. Pt and family have no further questions or concerns at this time. Pt is ready for admit.        ED Medication(s):  Medications   norepinephrine 4 mg in dextrose 5% 250 mL infusion (premix) (titrating) (0.1 mcg/kg/min × 95.8 kg Intravenous Rate/Dose Change 5/20/19 1406)   pantoprazole injection 40 mg (has no administration in time range)   levoFLOXacin 750 mg/150 mL IVPB 750 mg (has no administration in time range)   atorvastatin tablet 10 mg (10 mg Oral Not Given 5/20/19 1415)   albuterol-ipratropium 2.5 mg-0.5 mg/3 mL nebulizer solution 3 mL (3 mLs Nebulization Given 5/20/19 1357)   vancomycin 1.5 g ([facility] ONLY) in D5W 250 mL IVPB (has no administration in time range)   0.9%  NaCl infusion ( Intravenous New Bag 5/20/19 1417)   dexmedetomidine (PRECEDEX) 400mcg/100mL 0.9% NaCL infusion (0.2 mcg/kg/hr × 95.8 kg Intravenous New Bag 5/20/19 1530)   chlorhexidine 0.12 % solution 15 mL (has no administration in time range)   polyethylene glycol packet 17 g (has no administration in time range)   bisacodyl suppository 10 mg (has no administration in time range)   sodium  chloride 0.9% bolus 1,000 mL (0 mLs Intravenous Stopped 5/20/19 0944)   pantoprazole injection 80 mg (80 mg Intravenous Given 5/20/19 0926)   lorazepam (ATIVAN) injection 2 mg (2 mg Intravenous Given 5/20/19 0945)   etomidate injection 30 mg (30 mg Intravenous Given 5/20/19 0908)   succinylcholine injection 100 mg (100 mg Intravenous Given 5/20/19 0908)   sodium chloride 0.9% bolus 2,874 mL (0 mLs Intravenous Stopped 5/20/19 1200)   levoFLOXacin 750 mg/150 mL IVPB 750 mg (0 mg Intravenous Stopped 5/20/19 1230)   lorazepam (ATIVAN) 2 mg/mL injection (2 mg  Given 5/20/19 1208)   lorazepam (ATIVAN) injection 2 mg (0 mg Intravenous Override Pull 5/20/19 1208)   vancomycin (VANCOCIN) 2,500 mg in dextrose 5 % 500 mL IVPB (2,500 mg Intravenous New Bag 5/20/19 1309)       Current Discharge Medication List                    Medical Decision Making:   Clinical Tests:   Lab Tests: Ordered and Reviewed  Radiological Study: Ordered and Reviewed  Medical Tests: Ordered and Reviewed             Scribe Attestation:   Scribe #1: I performed the above scribed service and the documentation accurately describes the services I performed. I attest to the accuracy of the note.     Attending:   Physician Attestation Statement for Scribe #1: I, Francisco Xiong MD, personally performed the services described in this documentation, as scribed by Jesus Dunbar, in my presence, and it is both accurate and complete.           Clinical Impression       ICD-10-CM ICD-9-CM   1. Acute respiratory failure with hypoxia and hypercapnia J96.01 518.81    J96.02    2. Hypoxia R09.02 799.02   3. Acute upper GI bleed K92.2 578.9   4. Aspiration pneumonia of right lung due to gastric secretions, unspecified part of lung J69.0 507.0   5. Encounter for central line placement Z45.2 V58.81   6. CHF (congestive heart failure) I50.9 428.0   7. Central line clotted, initial encounter T82.594A 996.74   8. Hypotension, unspecified hypotension type I95.9 458.9   9. KEHINDE  (acute kidney injury) N17.9 584.9   10. Aspiration pneumonia of right middle lobe due to vomit J69.0 507.0   11. Mixed hyperlipidemia E78.2 272.2   12. Overdose of illicit drug T43.601A 305.90   13. PAD (peripheral artery disease) I73.9 443.9   14. Polysubstance abuse F19.10 305.90   15. Shock, unspecified R57.9 785.50       Disposition:   Disposition: Admitted  Condition: Critical         Francisco Xiong MD  05/20/19 1739       Francisco Xiong MD  05/20/19 1739

## 2019-05-20 NOTE — CONSULTS
[facility] -   Gastroenterology  Consult Note    Patient Name: Ish Guardado  MRN: [id number]  Admission Date: 5/20/2019  Hospital Length of Stay: 0 days  Code Status: Prior   Attending Provider: Francisco Xiong MD   Consulting Provider: Negrito Rousseau PA-C  Primary Care Physician: Pollo Arana MD  Principal Problem:Acute respiratory failure with hypoxia and hypercapnia    Inpatient consult to Gastroenterology  Consult performed by: Negrito Rousseau PA-C  Consult ordered by: Elizabeth Bunn NP  Reason for consult: GI bleed        Subjective:     HPI:  The patient was brought to the ER after being found unconscious in his car. He is currently in the ER intubated, sedated and on one pressor. He was noted to have dark bloody output from an OG tube. Hgb around 15 on admission. Repeat at 13.3. No transfusion at this point. No overtly bloody stools. FOBT was negative. BUN 33 and creatinine from 3.1 to 2.4. The history has been obtained from the medical record and medical staff. His wife reported to staff that the patient snorted opioids and took meth last night. Confirmed by tox screen. Alcohol level normal. LFTs in normal range. PLT and INR normal. No known liver disease. No prior EGD and colonoscopy found in our system or Care Everywhere. IV Protonix has been started. CXR showed moderate amount of alveolar consolidation in the inferior 2/3 of the right lung characteristic of pneumonia. CT head without acute changes.     Past Medical History:   Diagnosis Date    Femoral-popliteal bypass graft occlusion     Left and right    GERD (gastroesophageal reflux disease)     History of substance abuse     History of transient ischemic attack (TIA)     Hyperlipemia     Hypertension     Neuropathy     Occlusion of right femorotibial bypass graft 5/2017, 6/2017    Pre-diabetes     Status post femorotibial bypass        Past Surgical History:   Procedure Laterality Date    ANGIOGRAM-EXTREMITY-LOWER N/A  "10/31/2017    Performed by [facility] at [facility] CATH LAB    ANGIOGRAM-EXTREMITY-LOWER N/A 10/30/2017    Performed by Avila Houser [facility] at [facility] CATH LAB    ANGIOGRAM-EXTREMITY-LOWER Right 5/24/2017    Performed by Jack John IV, [facility] at [facility] CATH LAB    back fusion      FEMORAL BYPASS      left fem-pop bypass 11/2014; right fem-pop bypass 9/2016, right fem-distal bypass 3/2017    RE-LOOK-PERIPHERAL Right 5/25/2017    Performed by [facility]it [facility] at [facility] CATH LAB       Review of patient's allergies indicates:   Allergen Reactions    Pcn [penicillins] Other (See Comments)     Other      Bactrim [sulfamethoxazole-trimethoprim] Rash     Patient requests medication be listed as an "Intolerance" rather than an allergy, stating the rash is not "so bad."    Ceclor [cefaclor] Rash    Latex, natural rubber Rash     Family History     Problem Relation (Age of Onset)    COPD Mother, Father    Diabetes Mother, Father    Heart attack Mother    Heart disease Mother    Stroke Mother        Tobacco Use    Smoking status: Former Smoker    Smokeless tobacco: Former User     Quit date: 10/22/2016   Substance and Sexual Activity    Alcohol use: No    Drug use: Yes     Types: Methamphetamines     Comment: opiates and methamphetamines    Sexual activity: Yes     Review of Systems   Unable to perform ROS: Mental status change     Objective:     Vital Signs (Most Recent):  Pulse: 79 (05/20/19 1416)  Resp: (!) 24 (05/20/19 1416)  BP: 134/83 (05/20/19 1416)  SpO2: 98 % (05/20/19 1416) Vital Signs (24h Range):  Pulse:  [] 79  Resp:  [19-32] 24  SpO2:  [61 %-100 %] 98 %  BP: ()/(51-89) 134/83     Weight: 95.8 kg (211 lb 3.2 oz) (05/20/19 0900)  Body mass index is 31.19 kg/m².    No intake or output data in the 24 hours ending 05/20/19 1438    Lines/Drains/Airways     Central Venous Catheter Line                 Percutaneous Central Line Insertion/Assessment - triple lumen  05/20/19 1215 right internal jugular " less than 1 day          Drain                 NG/OG Tube 05/20/19 0912 orogastric 16 Fr. Right mouth less than 1 day          Airway                 Airway - Non-Surgical 05/20/19 0910 Endotracheal Tube less than 1 day          Peripheral Intravenous Line                 External Jugular IV 05/20/19 0900 less than 1 day         Midline Catheter Insertion/Assessment  - Single Lumen 05/20/19 0950 Left basilic vein (medial side of arm) 18g x 10cm less than 1 day                Physical Exam   Constitutional: He appears well-developed and well-nourished. He appears ill. He is intubated.   HENT:   Head: Normocephalic and atraumatic.   Cardiovascular: Normal rate, regular rhythm and normal heart sounds.   No murmur heard.  Pulmonary/Chest: He is intubated. He has rhonchi (right).   Abdominal: Soft. Bowel sounds are normal. He exhibits no distension and no mass. There is no hepatomegaly. There is no tenderness.   Musculoskeletal: He exhibits no edema.   Neurological: Gait normal.   Sedated, but opened his eyes for Dr. Mcintosh. Doesn't follow commands or try to communicate.    Skin: Skin is warm and dry. No rash noted.       Significant Labs:  CBC:   Recent Labs   Lab 05/20/19  0928 05/20/19  0952 05/20/19  1259 05/20/19  1349   WBC 14.22*  --  8.12 8.24   HGB 15.0  --  12.9* 13.2*   HCT 47.6 46 40.8 41.5     --  285 251     CMP:   Recent Labs   Lab 05/20/19  1349   GLU 98   CALCIUM 8.1*   ALBUMIN 3.0*   PROT 6.1      K 4.6   CO2 21*      BUN 33*   CREATININE 2.4*   ALKPHOS 81   ALT 16   AST 29   BILITOT 0.3     Coagulation:   Recent Labs   Lab 05/20/19  0928   INR 0.9   APTT 25.4       Significant Imaging:  Imaging results within the past 24 hours have been reviewed.    Assessment/Plan:     Upper GI bleed  Upper GI bleed suspected by OG output but Hgb ok and FOBT negative. No significant elevation in BUN.   Agree with IV Protonix bid.    No plan for urgent EGD at this time. May consider at later time  when more stable.   Recommend continued monitoring and resuscitation by HM and ICU teams.     Overdose of illicit drug  Management per HM. No history of alcohol abuse or liver disease.     Aspiration pneumonia of right middle lobe due to vomit  Pulmonary on board.     Septic shock  Agree with empiric antibiotics.         Thank you for your consult. I will follow-up with patient. Please contact us if you have any additional questions.    Negrito Rousseau PA-C  Gastroenterology  [facility]

## 2019-05-20 NOTE — ASSESSMENT & PLAN NOTE
Most likely due to PNA  Keep vasodepressor to Keep a MAP > 65  Cont IVAB Levaquin and vanc   F/u blood cx   S/p 3 lt ns

## 2019-05-20 NOTE — PROGRESS NOTES
Pt arrived to ICU and secured to monitor. Pt intubated and sedated. Precedex/levophed /fluids infusing to R IJ tl cl. VSS. OGT secured to suction with brown secretions noted. Vent settings confirmed. Restraint initiated for agitation/pulling at line. Safety maintained.

## 2019-05-20 NOTE — HPI
The patient was brought to the ER after being found unconscious in his car. He is currently in the ER intubated, sedated and on one pressor. He was noted to have dark bloody output from an OG tube. Hgb around 15 on admission. Repeat at 13.3. No transfusion at this point. No overtly bloody stools. FOBT was negative. BUN 33 and creatinine from 3.1 to 2.4. The history has been obtained from the medical record and medical staff. His wife reported to staff that the patient snorted opioids and took meth last night. Confirmed by tox screen. Alcohol level normal. LFTs in normal range. PLT and INR normal. No known liver disease. No prior EGD and colonoscopy found in our system or Care Everywhere. IV Protonix has been started. CXR showed moderate amount of alveolar consolidation in the inferior 2/3 of the right lung characteristic of pneumonia. CT head without acute changes.

## 2019-05-20 NOTE — SUBJECTIVE & OBJECTIVE
"Past Medical History:   Diagnosis Date    Femoral-popliteal bypass graft occlusion     Left and right    GERD (gastroesophageal reflux disease)     History of substance abuse     History of transient ischemic attack (TIA)     Hyperlipemia     Hypertension     Neuropathy     Occlusion of right femorotibial bypass graft 5/2017, 6/2017    Pre-diabetes     Status post femorotibial bypass        Past Surgical History:   Procedure Laterality Date    ANGIOGRAM-EXTREMITY-LOWER N/A 10/31/2017    Performed by [facility]jit Mclean [facility] at [facility] CATH LAB    ANGIOGRAM-EXTREMITY-LOWER N/A 10/30/2017    Performed by Avila Houser MD at [facility] CATH LAB    ANGIOGRAM-EXTREMITY-LOWER Right 5/24/2017    Performed by Jack John IV, MD at [facility] CATH LAB    back fusion      FEMORAL BYPASS      left fem-pop bypass 11/2014; right fem-pop bypass 9/2016, right fem-distal bypass 3/2017    RE-LOOK-PERIPHERAL Right 5/25/2017    Performed by Charanjit Mclean MD at [facility] CATH LAB       Review of patient's allergies indicates:   Allergen Reactions    Pcn [penicillins] Other (See Comments)     Other      Bactrim [sulfamethoxazole-trimethoprim] Rash     Patient requests medication be listed as an "Intolerance" rather than an allergy, stating the rash is not "so bad."    Ceclor [cefaclor] Rash    Latex, natural rubber Rash       Family History     Problem Relation (Age of Onset)    COPD Mother, Father    Diabetes Mother, Father    Heart attack Mother    Heart disease Mother    Stroke Mother        Tobacco Use    Smoking status: Former Smoker    Smokeless tobacco: Former User     Quit date: 10/22/2016   Substance and Sexual Activity    Alcohol use: No    Drug use: Yes     Types: Methamphetamines     Comment: opiates and methamphetamines    Sexual activity: Yes         Review of Systems   Unable to perform ROS: Intubated     Objective:     Vital Signs (Most Recent):  Pulse: 80 (05/20/19 1443)  Resp: (!) 25 (05/20/19 1443)  BP: 134/83 " (05/20/19 1416)  SpO2: 97 % (05/20/19 1443) Vital Signs (24h Range):  Pulse:  [] 80  Resp:  [19-32] 25  SpO2:  [61 %-100 %] 97 %  BP: ()/(51-89) 134/83     Weight: 95.8 kg (211 lb 3.2 oz)  Body mass index is 31.19 kg/m².    No intake or output data in the 24 hours ending 05/20/19 1448    Physical Exam   Constitutional: He appears well-developed and well-nourished. He appears lethargic.  Non-toxic appearance. He has a sickly appearance. He appears ill. No distress. He is sedated, intubated and restrained.   HENT:   Head: Normocephalic and atraumatic.   Mouth/Throat: Oropharynx is clear and moist and mucous membranes are normal.   Eyes: Pupils are equal, round, and reactive to light. Lids are normal.   Neck: Trachea normal. Neck supple. Carotid bruit is not present.       Cardiovascular: Normal rate, regular rhythm and normal heart sounds.   Pulses:       Radial pulses are 2+ on the right side, and 2+ on the left side.        Dorsalis pedis pulses are 1+ on the left side. Right dorsalis pedis pulse not accessible.        Right posterior tibial pulse not accessible.   Pulmonary/Chest: Effort normal. No accessory muscle usage. He is intubated. No respiratory distress. He has rhonchi. He has rales.   Abdominal: Soft. Normal appearance. He exhibits no distension. Bowel sounds are decreased. There is no tenderness.   Genitourinary: Penis normal.   Genitourinary Comments: Mejia in place   Musculoskeletal: Normal range of motion.        Right foot: There is deformity (old at knee amputation).        Left foot: There is no deformity.   No edema   Lymphadenopathy:     He has no cervical adenopathy.   Neurological: He appears lethargic.   Mild arousal with aggressive stimuli   Skin: Skin is warm, dry and intact. Capillary refill takes less than 2 seconds. No rash noted. No cyanosis.       Vents:  Vent Mode: A/C (05/20/19 1443)  Ventilator Initiated: Yes (05/20/19 0924)  Set Rate: 24 bmp (05/20/19 1443)  Vt Set: 470  mL (05/20/19 1443)  Pressure Support: 0 cmH20 (05/20/19 1443)  PEEP/CPAP: 8 cmH20 (05/20/19 1443)  Oxygen Concentration (%): 80 (05/20/19 1443)  Peak Airway Pressure: 12 cmH2O (05/20/19 1040)  Plateau Pressure: 0 cmH20 (05/20/19 1040)  Total Ve: 13.5 mL (05/20/19 1040)  F/VT Ratio<105 (RSBI): (!) 54.61 (05/20/19 1040)    Lines/Drains/Airways     Central Venous Catheter Line                 Percutaneous Central Line Insertion/Assessment - triple lumen  05/20/19 1215 right internal jugular less than 1 day          Drain                 NG/OG Tube 05/20/19 0912 orogastric 16 Fr. Right mouth less than 1 day          Airway                 Airway - Non-Surgical 05/20/19 0910 Endotracheal Tube less than 1 day          Peripheral Intravenous Line                 External Jugular IV 05/20/19 0900 less than 1 day         Midline Catheter Insertion/Assessment  - Single Lumen 05/20/19 0950 Left basilic vein (medial side of arm) 18g x 10cm less than 1 day                Significant Labs:    CBC/Anemia Profile:  Recent Labs   Lab 05/20/19  0928 05/20/19  0952 05/20/19  1039 05/20/19  1259 05/20/19  1349   WBC 14.22*  --   --  8.12 8.24   HGB 15.0  --   --  12.9* 13.2*   HCT 47.6 46  --  40.8 41.5     --   --  285 251   MCV 89  --   --  88 88   RDW 15.4*  --   --  15.5* 15.2*   OCCULTBLOOD  --   --  Negative  --   --         Chemistries:  Recent Labs   Lab 05/20/19  0928 05/20/19  1349    140   K 4.5 4.6    107   CO2 23 21*   BUN 32* 33*   CREATININE 3.1* 2.4*   CALCIUM 9.6 8.1*   ALBUMIN 3.9 3.0*   PROT 7.8 6.1   BILITOT 0.2 0.3   ALKPHOS 112 81   ALT 19 16   AST 26 29       ABGs:   Recent Labs   Lab 05/20/19  1352   PH 7.268*   PCO2 42.1   HCO3 19.2*   POCSATURATED 95   BE -8     Troponin:   Recent Labs   Lab 05/20/19  0928   TROPONINI 0.021     Urine Studies:   Recent Labs   Lab 05/20/19  0957   COLORU Yellow   APPEARANCEUA Clear   PHUR 6.0   SPECGRAV 1.015   PROTEINUA Negative   GLUCUA Negative   KETONESU  Negative   BILIRUBINUA Negative   OCCULTUA Negative   NITRITE Negative   UROBILINOGEN Negative   LEUKOCYTESUR Negative     All pertinent labs within the past 24 hours have been reviewed.    Significant Imaging:   CXR: I have reviewed all pertinent results/findings within the past 24 hours and my personal findings are:  RML and RLL infiltrate

## 2019-05-20 NOTE — PLAN OF CARE
Problem: Adult Inpatient Plan of Care  Goal: Plan of Care Review  PLAN OF CARE WITH WIFE/SON. VENT SETTINGS CONFIRMED. WEAN LEVO AS TOLERATED. MAINTAIN SEDATION. TURN Q 2HRS. RESTRAINTS.

## 2019-05-20 NOTE — ASSESSMENT & PLAN NOTE
Upper GI bleed suspected by OG output but Hgb ok and FOBT negative. No significant elevation in BUN.   Agree with IV Protonix bid.    No plan for urgent EGD at this time. May consider at later time when more stable.   Recommend continued monitoring and resuscitation by HM and ICU teams.

## 2019-05-20 NOTE — ASSESSMENT & PLAN NOTE
Cont Vanc and Levaquin (PCN allergy)  Cont Duonebs and PRN OET suctioning  Blood and sputum cultures pending

## 2019-05-20 NOTE — PROGRESS NOTES
Family to visit. Plan of care with wife. Restraints discussed. VSS. Sedation continued. Levophed weaned as tolerated.

## 2019-05-20 NOTE — HOSPITAL COURSE
05/20: examined at bedside, ER, DW nurse,and GI, Patient nods.  5/21 - sitting up in bed on SAT and tolerated SBT.  Nods head to questions.  On low dose Levophed infusion.  No distress.  Extubated   05/22: seen and examined: verbalised appropriately, wife at bedside, seen Dr Lobo recently: Mild COPD  05/23: seen and examined: has sitter  On RA. No respiratory issues

## 2019-05-20 NOTE — HPI
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 EMT found lethargic, confused  Tried Narcan, Coffee grounds noted in NGT.  Hx of Substance abuse, Amphetamine and opiods.   Has been intubated 8.0 ETT @24, Right IJ TLC. Has Norepi @ 0.1 mcg, Precedex @ 0.2 mg NS @125mls, 500mls in Mejia.

## 2019-05-20 NOTE — H&P
[facility] Hospital Medicine  History & Physical    Patient Name: Ish Guardado  MRN: [id number]  Admission Date: 5/20/2019  Attending Physician: Francisco Xiong MD   Primary Care Provider: Pollo Arana MD         Patient information was obtained from spouse/SO, past medical records and ER records.     Subjective:     Principal Problem:Septic shock    Chief Complaint:   Chief Complaint   Patient presents with    Drug Overdose        HPI:   59 y/o wm with a PMHx of COPD , GERD , Chronic cough , Drug abuse , HTN , HLD , PAD and Tobacco abuse presented to the ER with a c/o AMS since yesterday night . The history was taken from EMR and family members due to pt medical condition . Pt was found yesterday night inside his car   Confuse and altered . Pt was take to his house   By his son . He was responsive  but lethargic . Per family the  pt  Used amphetamine . This am he was found more conused and lethargic with red colored fluid coming through his nose and mouth . Per family pt was in his usual health of state before last night . They states he has a chronic cough  And Heartburn. The  denies any fever , sob , palpitation , weakness , chills , diarrhea , or gu problems  ER course: The ABG show respiratory acidosis , acute hypoxic and hypercapnic respiratory failure . Pt was intubated . The CXR show a right side infiltrate . WBC 14 , lactic acid  3 , troponin level wnl . He was started on Levaquin and vanc . He had 3 lt NS . He was started on levophed due to MAP > than 65 .   ER VS   Initial Vitals   BP Pulse Resp Temp SpO2   05/20/19 0900 05/20/19 0900 05/20/19 0926 -- 05/20/19 0900   139/62 106 (!) 25   (!) 89 %       Past Medical History:   Diagnosis Date    Femoral-popliteal bypass graft occlusion     Left and right    GERD (gastroesophageal reflux disease)     History of substance abuse     History of transient ischemic attack (TIA)     Hyperlipemia     Hypertension     Neuropathy     Occlusion  "of right femorotibial bypass graft 5/2017, 6/2017    Pre-diabetes     Status post femorotibial bypass        Past Surgical History:   Procedure Laterality Date    ANGIOGRAM-EXTREMITY-LOWER N/A 10/31/2017    Performed by Charanjit Mclean MD at [facility] CATH LAB    ANGIOGRAM-EXTREMITY-LOWER N/A 10/30/2017    Performed by Avila Houser MD at [facility] CATH LAB    ANGIOGRAM-EXTREMITY-LOWER Right 5/24/2017    Performed by Jack John IV, MD at [facility] CATH LAB    back fusion      FEMORAL BYPASS      left fem-pop bypass 11/2014; right fem-pop bypass 9/2016, right fem-distal bypass 3/2017    RE-LOOK-PERIPHERAL Right 5/25/2017    Performed by Charanjit Mclean MD at [facility] CATH LAB       Review of patient's allergies indicates:   Allergen Reactions    Pcn [penicillins] Other (See Comments)     Other      Bactrim [sulfamethoxazole-trimethoprim] Rash     Patient requests medication be listed as an "Intolerance" rather than an allergy, stating the rash is not "so bad."    Ceclor [cefaclor] Rash    Latex, natural rubber Rash       No current facility-administered medications on file prior to encounter.      Current Outpatient Medications on File Prior to Encounter   Medication Sig    amlodipine (NORVASC) 10 MG tablet Take 10 mg by mouth once daily.    aspirin (ECOTRIN) 81 MG EC tablet Take 81 mg by mouth once daily.    atorvastatin (LIPITOR) 20 MG tablet Take 10 mg by mouth once daily.     benzonatate (TESSALON) 200 MG capsule Take 200 mg by mouth 3 (three) times daily as needed for Cough.    clopidogrel (PLAVIX) 75 mg tablet Take 75 mg by mouth once daily.    pantoprazole (PROTONIX) 20 MG tablet Take 20 mg by mouth once daily.    pregabalin (LYRICA) 225 MG Cap Take 225 mg by mouth 3 (three) times daily.    ranitidine (ZANTAC) 300 MG tablet Take 300 mg by mouth every evening.    triamcinolone acetonide 0.025% (KENALOG) 0.025 % cream Apply topically 2 (two) times daily.    [DISCONTINUED] aspirin 81 MG Chew Take 81 mg by " mouth once daily.    [DISCONTINUED] gabapentin (NEURONTIN) 100 MG capsule Take 1 capsule (100 mg total) by mouth 3 (three) times daily.    [DISCONTINUED] naftifine (NAFTIN) 2 % Crea Apply 1 application topically once daily.    [DISCONTINUED] oxycodone-acetaminophen (PERCOCET)  mg per tablet Take 1 tablet by mouth every 6 (six) hours as needed for Pain (for breakthrough pain).    [DISCONTINUED] oxyCODONE-acetaminophen (PERCOCET)  mg per tablet Take 1 tablet by mouth every 4 (four) hours as needed.     Family History     Problem Relation (Age of Onset)    COPD Mother, Father    Diabetes Mother, Father    Heart attack Mother    Heart disease Mother    Stroke Mother        Tobacco Use    Smoking status: Former Smoker    Smokeless tobacco: Former User     Quit date: 10/22/2016   Substance and Sexual Activity    Alcohol use: No    Drug use: No     Comment: Former illicit drug user.  On Suboxone    Sexual activity: Yes     Review of Systems   Unable to perform ROS: Intubated     Objective:     Vital Signs (Most Recent):  Pulse: 89 (05/20/19 1212)  Resp: (!) 25 (05/20/19 1212)  BP: 97/73 (05/20/19 1117)  SpO2: 100 % (05/20/19 1212) Vital Signs (24h Range):  Pulse:  [] 89  Resp:  [19-32] 25  SpO2:  [61 %-100 %] 100 %  BP: ()/(61-81) 97/73     Weight: 95.8 kg (211 lb 3.2 oz)  Body mass index is 31.19 kg/m².    Physical Exam   Constitutional: He appears well-developed and well-nourished. No distress.   HENT:   Head: Normocephalic and atraumatic.   Eyes: Pupils are equal, round, and reactive to light. Right eye exhibits no discharge.   Neck: Normal range of motion. Neck supple. No JVD present. No tracheal deviation present. No thyromegaly present.   Cardiovascular: Normal rate, regular rhythm and normal heart sounds.   Pulmonary/Chest: Effort normal. He has wheezes. He has rales.   Abdominal: Soft. Bowel sounds are normal. He exhibits no distension and no mass. There is no tenderness. There is  no guarding.   Musculoskeletal: He exhibits no edema or deformity.   Right BKA   Skin: Skin is dry. No rash noted. No erythema.   Nursing note and vitals reviewed.        CRANIAL NERVES     CN III, IV, VI   Pupils are equal, round, and reactive to light.       Significant Labs: All pertinent labs within the past 24 hours have been reviewed.    Significant Imaging: I have reviewed all pertinent imaging results/findings within the past 24 hours.    Assessment/Plan:     * Septic shock  Most likely due to PNA  Keep vasodepressor to Keep a MAP > 65  Cont IVAB Levaquin and vanc   F/u blood cx   S/p 3 lt ns           GIB (gastrointestinal bleeding)  Possible GIB   Pt with coffea colored hemesis POD  H/H stable on admission   Repeat  Serial H/H  Start PPI iv BID  Transfuse as need           Polysubstance abuse  Pt used opoid and amphetamine  Cont current tx        Aspiration pneumonia of right middle lobe due to vomit  Cont IVAB  Cont mechanical ventilation       KEHINDE (acute kidney injury)  Most likely due to sepsis   Cont IVF  F/U retroperitoneal US  Monitor UOP and kidney function       Acute respiratory failure with hypoxia and hypercapnia  2/2 to COPD and aspiration PNA   Cont mechanical ventilation   ICU /Pulmonology consulted         HTN (hypertension)  Hold BP medication now  Due to sepsis   Resume accordantly       PAD with remote right fem-distal bypass  Resume statin       Mixed hyperlipidemia  Resume statin       PAD (peripheral artery disease)  Hold Plavix for possible GIB  Resume statin         VTE Risk Mitigation (From admission, onward)    None         Critical care time spent on the evaluation and treatment of severe organ dysfunction, review of pertinent labs and imaging studies, discussions with consulting providers and discussions with patient/family: 105 minutes.    Sameer Goel MD  Department of Hospital Medicine   [facility]

## 2019-05-20 NOTE — PROGRESS NOTES
"Pharmacokinetic Initial Assessment: IV Vancomycin    Assessment/Plan:    Initiate intravenous vancomycin with loading dose of 2500 mg once with subsequent doses when random concentrations are less than 20 mcg/ml.  Desired empiric serum trough concentration is 10 to 20 mcg/mL.  Draw vancomycin random level on 05/21/19 at 0900. (approximately 20 hours post dose)  Pharmacy will continue to follow and monitor vancomycin.      Please contact pharmacy at extension [phone] with any questions regarding this assessment.     Thank you for the consult,   Sera RezaD     Patient brief summary:  Ish Guardado is a 58 y.o. male initiated on antimicrobial therapy with IV Vancomycin for treatment of suspected lower respiratory infection    Drug Allergies:   Review of patient's allergies indicates:   Allergen Reactions    Pcn [penicillins] Other (See Comments)     Other      Bactrim [sulfamethoxazole-trimethoprim] Rash     Patient requests medication be listed as an "Intolerance" rather than an allergy, stating the rash is not "so bad."    Ceclor [cefaclor] Rash    Latex, natural rubber Rash       Actual Body Weight:   95.8 kg    Renal Function:   Estimated Creatinine Clearance: 29.6 mL/min (A) (based on SCr of 3.1 mg/dL (H))., which is elevated from previous SCr of 0.7 on 11/2/2017.     Dialysis Method (if applicable):  N/A     CBC (last 72 hours):  Recent Labs   Lab Result Units 05/20/19  0928   WBC K/uL 14.22*   Hemoglobin g/dL 15.0   Hematocrit % 47.6   Platelets K/uL 337   Gran% % 85.1*   Lymph% % 7.8*   Mono% % 6.9   Eosinophil% % 0.1   Basophil% % 0.1   Differential Method  Automated     Metabolic Panel (last 72 hours):  Recent Labs   Lab Result Units 05/20/19  0916 05/20/19  0928 05/20/19  0957   Sodium mmol/L  --  142  --    Potassium mmol/L  --  4.5  --    Chloride mmol/L  --  101  --    CO2 mmol/L  --  23  --    Glucose mg/dL  --  74  --    Glucose, UA   --   --  Negative   BUN, Bld mg/dL  --  32*  --  "   Creatinine mg/dL  --  3.1*  --    Creatinine, Random Ur mg/dL 78.7  --   --    Albumin g/dL  --  3.9  --    Total Bilirubin mg/dL  --  0.2  --    Alkaline Phosphatase U/L  --  112  --    AST U/L  --  26  --    ALT U/L  --  19  --      Drug levels (last 3 results):  No results for input(s): VANCOMYCINRA, VANCOMYCINPE, VANCOMYCINTR in the last 72 hours.    Microbiologic Results:  Microbiology Results (last 7 days)       Procedure Component Value Units Date/Time    Influenza A & B by Molecular [[id number]] Collected:  05/20/19 1055    Order Status:  Completed Specimen:  Nasopharyngeal Swab Updated:  05/20/19 1134     Influenza A, Molecular Negative     Influenza B, Molecular Negative     Flu A & B Source Nasal swab    Blood culture x two cultures. Draw prior to antibiotics. [[id number]] Collected:  05/20/19 1038    Order Status:  Sent Specimen:  Blood from Peripheral, Upper Arm, Left Updated:  05/20/19 1054    Blood culture x two cultures. Draw prior to antibiotics. [[id number]] Collected:  05/20/19 1053    Order Status:  Sent Specimen:  Blood from Peripheral, Upper Arm, Left Updated:  05/20/19 1053          Thank you for allowing us to participate in this patient's care.     Mallory Chappell PharmD 05/20/2019 1:37 PM

## 2019-05-20 NOTE — HPI
Ish Guardado is a 57 y/o wm with a PMHx of COPD , GERD , Chronic cough , Drug abuse , HTN , HLD , PAD and Tobacco abuse, who presented to the ER with a c/o AMS since yesterday night. The history was taken from EMR and family members due to pt medical condition . Pt was found yesterday night inside his car confused and altered . Pt was taken to his house by his son. He was responsive, but lethargic . Per family the patient used crystal methalamphetamine. This am he was found more conused and lethargic with red colored fluid coming through his nose and mouth . Per family pt was in his usual health of state before last night. They states he has a chronic cough and heartburn. The  denies any fever , sob , palpitation , weakness, chills, diarrhea, or gu problems.   ER course: The ABG show respiratory acidosis , acute hypoxic and hypercapnic respiratory failure . Pt was intubated . The CXR show a right side infiltrate . WBC 14 , lactic acid  3 , troponin level wnl . He was started on Levaquin and vanc . He had 3 lt NS . He was started on levophed due to MAP > than 65 .   ER VS   Initial Vitals   BP Pulse Resp Temp SpO2   05/20/19 0900 05/20/19 0900 05/20/19 0926 -- 05/20/19 0900   139/62 106 (!) 25   (!) 89 %   He was admitted to ICU with drug overdose, aspiration pneumonia. Wife, Ray Guardado, [phone] is the surrogate decision maker.

## 2019-05-21 LAB
ALLENS TEST: ABNORMAL
ANION GAP SERPL CALC-SCNC: 9 MMOL/L (ref 8–16)
BASOPHILS # BLD AUTO: 0.02 K/UL (ref 0–0.2)
BASOPHILS NFR BLD: 0.2 % (ref 0–1.9)
BUN SERPL-MCNC: 35 MG/DL (ref 6–20)
CALCIUM SERPL-MCNC: 8.4 MG/DL (ref 8.7–10.5)
CHLORIDE SERPL-SCNC: 107 MMOL/L (ref 95–110)
CO2 SERPL-SCNC: 23 MMOL/L (ref 23–29)
CREAT SERPL-MCNC: 1.5 MG/DL (ref 0.5–1.4)
DELSYS: ABNORMAL
DIFFERENTIAL METHOD: ABNORMAL
EOSINOPHIL # BLD AUTO: 0 K/UL (ref 0–0.5)
EOSINOPHIL NFR BLD: 0.3 % (ref 0–8)
ERYTHROCYTE [DISTWIDTH] IN BLOOD BY AUTOMATED COUNT: 15.5 % (ref 11.5–14.5)
ERYTHROCYTE [SEDIMENTATION RATE] IN BLOOD BY WESTERGREN METHOD: 18 MM/H
EST. GFR  (AFRICAN AMERICAN): 58 ML/MIN/1.73 M^2
EST. GFR  (NON AFRICAN AMERICAN): 51 ML/MIN/1.73 M^2
FIO2: 45
GLUCOSE SERPL-MCNC: 107 MG/DL (ref 70–110)
HCO3 UR-SCNC: 22.1 MMOL/L (ref 24–28)
HCT VFR BLD AUTO: 36.5 % (ref 40–54)
HGB BLD-MCNC: 11.6 G/DL (ref 14–18)
HIV 1+2 AB+HIV1 P24 AG SERPL QL IA: NEGATIVE
IT: 0.88
LYMPHOCYTES # BLD AUTO: 1.1 K/UL (ref 1–4.8)
LYMPHOCYTES NFR BLD: 10.4 % (ref 18–48)
MCH RBC QN AUTO: 27.2 PG (ref 27–31)
MCHC RBC AUTO-ENTMCNC: 31.8 G/DL (ref 32–36)
MCV RBC AUTO: 86 FL (ref 82–98)
MODE: ABNORMAL
MONOCYTES # BLD AUTO: 0.7 K/UL (ref 0.3–1)
MONOCYTES NFR BLD: 6.4 % (ref 4–15)
NEUTROPHILS # BLD AUTO: 8.6 K/UL (ref 1.8–7.7)
NEUTROPHILS NFR BLD: 82.7 % (ref 38–73)
PCO2 BLDA: 39 MMHG (ref 35–45)
PEEP: 8
PH SMN: 7.36 [PH] (ref 7.35–7.45)
PLATELET # BLD AUTO: 240 K/UL (ref 150–350)
PMV BLD AUTO: 9.1 FL (ref 9.2–12.9)
PO2 BLDA: 78 MMHG (ref 80–100)
POC BE: -3 MMOL/L
POC SATURATED O2: 95 % (ref 95–100)
POTASSIUM SERPL-SCNC: 4.2 MMOL/L (ref 3.5–5.1)
RBC # BLD AUTO: 4.26 M/UL (ref 4.6–6.2)
SAMPLE: ABNORMAL
SITE: ABNORMAL
SODIUM SERPL-SCNC: 139 MMOL/L (ref 136–145)
VANCOMYCIN SERPL-MCNC: 11.5 UG/ML
VT: 500
WBC # BLD AUTO: 10.45 K/UL (ref 3.9–12.7)

## 2019-05-21 PROCEDURE — 80202 ASSAY OF VANCOMYCIN: CPT

## 2019-05-21 PROCEDURE — 63600175 PHARM REV CODE 636 W HCPCS: Performed by: EMERGENCY MEDICINE

## 2019-05-21 PROCEDURE — 36600 WITHDRAWAL OF ARTERIAL BLOOD: CPT

## 2019-05-21 PROCEDURE — 25000242 PHARM REV CODE 250 ALT 637 W/ HCPCS: Performed by: INTERNAL MEDICINE

## 2019-05-21 PROCEDURE — 99900035 HC TECH TIME PER 15 MIN (STAT)

## 2019-05-21 PROCEDURE — 85025 COMPLETE CBC W/AUTO DIFF WBC: CPT

## 2019-05-21 PROCEDURE — 80048 BASIC METABOLIC PNL TOTAL CA: CPT

## 2019-05-21 PROCEDURE — C9113 INJ PANTOPRAZOLE SODIUM, VIA: HCPCS | Performed by: INTERNAL MEDICINE

## 2019-05-21 PROCEDURE — 25000003 PHARM REV CODE 250: Performed by: NURSE PRACTITIONER

## 2019-05-21 PROCEDURE — 99232 PR SUBSEQUENT HOSPITAL CARE,LEVL II: ICD-10-PCS | Mod: ,,, | Performed by: NURSE PRACTITIONER

## 2019-05-21 PROCEDURE — 25000003 PHARM REV CODE 250: Performed by: INTERNAL MEDICINE

## 2019-05-21 PROCEDURE — 94640 AIRWAY INHALATION TREATMENT: CPT

## 2019-05-21 PROCEDURE — 63600175 PHARM REV CODE 636 W HCPCS: Performed by: INTERNAL MEDICINE

## 2019-05-21 PROCEDURE — 99232 SBSQ HOSP IP/OBS MODERATE 35: CPT | Mod: ,,, | Performed by: NURSE PRACTITIONER

## 2019-05-21 PROCEDURE — 27000221 HC OXYGEN, UP TO 24 HOURS

## 2019-05-21 PROCEDURE — C9113 INJ PANTOPRAZOLE SODIUM, VIA: HCPCS | Performed by: EMERGENCY MEDICINE

## 2019-05-21 PROCEDURE — 99291 PR CRITICAL CARE, E/M 30-74 MINUTES: ICD-10-PCS | Mod: ,,, | Performed by: NURSE PRACTITIONER

## 2019-05-21 PROCEDURE — 99291 CRITICAL CARE FIRST HOUR: CPT | Mod: ,,, | Performed by: NURSE PRACTITIONER

## 2019-05-21 PROCEDURE — 21400001 HC TELEMETRY ROOM

## 2019-05-21 PROCEDURE — 82803 BLOOD GASES ANY COMBINATION: CPT

## 2019-05-21 RX ORDER — VANCOMYCIN HCL IN 5 % DEXTROSE 1.5G/250ML
1500 PLASTIC BAG, INJECTION (ML) INTRAVENOUS ONCE
Status: COMPLETED | OUTPATIENT
Start: 2019-05-21 | End: 2019-05-21

## 2019-05-21 RX ORDER — HYDRALAZINE HYDROCHLORIDE 25 MG/1
25 TABLET, FILM COATED ORAL EVERY 8 HOURS PRN
Status: DISCONTINUED | OUTPATIENT
Start: 2019-05-22 | End: 2019-05-22

## 2019-05-21 RX ORDER — SODIUM CHLORIDE 9 MG/ML
INJECTION, SOLUTION INTRAVENOUS CONTINUOUS
Status: DISCONTINUED | OUTPATIENT
Start: 2019-05-21 | End: 2019-05-22

## 2019-05-21 RX ADMIN — Medication 1500 MG: at 12:05

## 2019-05-21 RX ADMIN — PANTOPRAZOLE SODIUM 40 MG: 40 INJECTION, POWDER, LYOPHILIZED, FOR SOLUTION INTRAVENOUS at 10:05

## 2019-05-21 RX ADMIN — POLYETHYLENE GLYCOL 3350 17 G: 17 POWDER, FOR SOLUTION ORAL at 12:05

## 2019-05-21 RX ADMIN — IPRATROPIUM BROMIDE AND ALBUTEROL SULFATE 3 ML: .5; 3 SOLUTION RESPIRATORY (INHALATION) at 07:05

## 2019-05-21 RX ADMIN — PANTOPRAZOLE SODIUM 40 MG: 40 INJECTION, POWDER, LYOPHILIZED, FOR SOLUTION INTRAVENOUS at 09:05

## 2019-05-21 RX ADMIN — IPRATROPIUM BROMIDE AND ALBUTEROL SULFATE 3 ML: .5; 3 SOLUTION RESPIRATORY (INHALATION) at 12:05

## 2019-05-21 RX ADMIN — SODIUM CHLORIDE: 0.9 INJECTION, SOLUTION INTRAVENOUS at 05:05

## 2019-05-21 RX ADMIN — SODIUM CHLORIDE: 0.9 INJECTION, SOLUTION INTRAVENOUS at 09:05

## 2019-05-21 RX ADMIN — DEXMEDETOMIDINE HYDROCHLORIDE 1 MCG/KG/HR: 400 INJECTION INTRAVENOUS at 03:05

## 2019-05-21 RX ADMIN — ATORVASTATIN CALCIUM 10 MG: 10 TABLET, FILM COATED ORAL at 12:05

## 2019-05-21 RX ADMIN — LEVOFLOXACIN 750 MG: 750 INJECTION, SOLUTION INTRAVENOUS at 08:05

## 2019-05-21 RX ADMIN — IPRATROPIUM BROMIDE AND ALBUTEROL SULFATE 3 ML: .5; 3 SOLUTION RESPIRATORY (INHALATION) at 01:05

## 2019-05-21 NOTE — SUBJECTIVE & OBJECTIVE
Review of Systems   Unable to perform ROS: Intubated         Objective:     Vital Signs (Most Recent):  Temp: 99.1 °F (37.3 °C) (05/21/19 0715)  Pulse: 77 (05/21/19 0748)  Resp: 14 (05/21/19 0748)  BP: 103/65 (05/21/19 0745)  SpO2: (!) 92 % (05/21/19 0748) Vital Signs (24h Range):  Temp:  [99.1 °F (37.3 °C)-101.8 °F (38.8 °C)] 99.1 °F (37.3 °C)  Pulse:  [] 77  Resp:  [14-32] 14  SpO2:  [61 %-100 %] 92 %  BP: ()/(51-89) 103/65     Weight: 95.8 kg (211 lb 3.2 oz)  Body mass index is 31.19 kg/m².      Intake/Output Summary (Last 24 hours) at 5/21/2019 0812  Last data filed at 5/21/2019 0702  Gross per 24 hour   Intake 7836.97 ml   Output 1615 ml   Net 6221.97 ml       Physical Exam   Constitutional: He appears well-developed and well-nourished. He appears lethargic. He is easily aroused.  Non-toxic appearance. He has a sickly appearance. He appears ill. No distress. Nasal cannula in place.   HENT:   Head: Normocephalic and atraumatic.   Mouth/Throat: Oropharynx is clear and moist and mucous membranes are normal.   Eyes: Pupils are equal, round, and reactive to light. EOM and lids are normal.   Neck: Trachea normal and full passive range of motion without pain. Carotid bruit is not present.       Cardiovascular: Normal rate, regular rhythm and normal heart sounds.   Pulses:       Radial pulses are 2+ on the right side, and 2+ on the left side.        Dorsalis pedis pulses are 1+ on the left side. Right dorsalis pedis pulse not accessible.        Right posterior tibial pulse not accessible.   Pulmonary/Chest: Effort normal. No accessory muscle usage. No tachypnea. No respiratory distress. He has decreased breath sounds in the right lower field. He has rales.   Abdominal: Soft. Normal appearance. He exhibits no distension. Bowel sounds are decreased. There is no tenderness.   Genitourinary: Penis normal.   Genitourinary Comments: Mejia in place   Musculoskeletal: Normal range of motion.        Right foot:  There is deformity (old at knee amputation).        Left foot: There is no deformity.   No edema   Lymphadenopathy:     He has no cervical adenopathy.   Neurological: He is easily aroused. He appears lethargic.   One word verbal responses then goes back to sleep.  Follow one step commands intermittently.  No focal weakness noted.    Skin: Skin is warm, dry and intact. Capillary refill takes less than 2 seconds. No rash noted. No cyanosis.   Psychiatric: He is inattentive.       Vents:  Vent Mode: A/C (05/21/19 0530)  Ventilator Initiated: Yes (05/20/19 0924)  Set Rate: 18 bmp (05/21/19 0530)  Vt Set: 500 mL (05/21/19 0530)  Pressure Support: 0 cmH20 (05/21/19 0530)  PEEP/CPAP: 8 cmH20 (05/21/19 0530)  Oxygen Concentration (%): 40 (05/21/19 0730)  Peak Airway Pressure: 18 cmH2O (05/21/19 0530)  Plateau Pressure: 0 cmH20 (05/21/19 0530)  Total Ve: 11.3 mL (05/21/19 0530)  F/VT Ratio<105 (RSBI): (!) 47.06 (05/21/19 0130)    Lines/Drains/Airways     Central Venous Catheter Line                 Percutaneous Central Line Insertion/Assessment - triple lumen  05/20/19 1215 right internal jugular less than 1 day          Drain                 NG/OG Tube 05/20/19 0912 orogastric 16 Fr. Right mouth less than 1 day         Urethral Catheter 05/20/19 1717 less than 1 day          Peripheral Intravenous Line                 Midline Catheter Insertion/Assessment  - Single Lumen 05/20/19 0950 Left basilic vein (medial side of arm) 18g x 10cm less than 1 day                Significant Labs:    CBC/Anemia Profile:  Recent Labs   Lab 05/20/19  1039 05/20/19  1259 05/20/19  1349 05/20/19  1730 05/21/19  0346   WBC  --  8.12 8.24  --  10.45   HGB  --  12.9* 13.2*  --  11.6*   HCT  --  40.8 41.5 40.1 36.5*   PLT  --  285 251  --  240   MCV  --  88 88  --  86   RDW  --  15.5* 15.2*  --  15.5*   OCCULTBLOOD Negative  --   --   --   --         Chemistries:  Recent Labs   Lab 05/20/19  0928 05/20/19  1349 05/21/19  0346    140 139   K  4.5 4.6 4.2    107 107   CO2 23 21* 23   BUN 32* 33* 35*   CREATININE 3.1* 2.4* 1.5*   CALCIUM 9.6 8.1* 8.4*   ALBUMIN 3.9 3.0*  --    PROT 7.8 6.1  --    BILITOT 0.2 0.3  --    ALKPHOS 112 81  --    ALT 19 16  --    AST 26 29  --    MG  --  1.7  --        ABGs:   Recent Labs   Lab 05/21/19  0551   PH 7.361   PCO2 39.0   HCO3 22.1*   POCSATURATED 95   BE -3     All pertinent labs within the past 24 hours have been reviewed.    Significant Imaging:  CXR: I have reviewed all pertinent results/findings within the past 24 hours and my personal findings are:  no change from prior film

## 2019-05-21 NOTE — ASSESSMENT & PLAN NOTE
Tolerated SAT/SBT and therefore extubated this AM  Close ICU pulm monitoring post extubation  Low flow NC and wean as tolerated

## 2019-05-21 NOTE — HOSPITAL COURSE
Pt found altered and admitted with diagnoses of drug overdose (+ opiates, + amphetamines), combined respiratory failure, aspiration pneumonia, shock and concerns for GI bleed.   5/21 Pt was seen and examined at bedside . He is  Alert  But confuse .  He is off vasopressor . He was extubated this am .  He has a good UOP .  There was no acute event overnight   5/22 Pt was seen and examined at bedside . He is aaox 1 .  He  Is confuse on and off .  Pt was placed 1 to 1 .  The ct head  from the admission day did not show any acute finding . Pt with a Known history of polysubstance abuse .  The blood pressure was elevated  And respond well to clonidine . He was started on tramadol 50 mg po tid .   5/23 - Wife has explained substance abuse x 1 year with smoking crystal Methelamphetamine. Seroquel 25 mg and tramadol prescribed to prevent withdrawal. This AM, lethargic and arouses some and minimally follows commands. To hold seroquel due to lethargy - tramadol continued.  IV levaquin and DuoNeb treatments in progress. Pt too lethargic to participate with PT/OT - will attempt to increase activity tomorrow.  No signs of active bleeding noted. Spoke with wife regarding findings and plan of care.   5/24/19 - Still confused. Didn't know wife's name and day of the week. Will stop scheduled tramadol.  5/25/19-Patient remains confused. PT/OT recommends SNF. Social work consult for placement.   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 Dr. Valentino continue ASA/Plavix, Carotid u/s, CT Head without contrast. Attempted to contact wife for update.  5/27/19 - Wife brought his Leg. He Must participate in OT/PT so Social Service can arrange SNF. He is more alert today. Repeat CT head showed similar results of bilateral areas of suspected cerebral and cerebellar infarcts with areas of hemorrhagic conversion.   5/29/19 Somewhat better mentally. Case  Manager contacted 18 SNF facilities, 9 denials.  5/30/19 SNF placement Pending. Receiving ST/OT/PT therapy.  5/31/19 Patient accepted at [facility]. Case discussed with Dr. Harris. Patient ready for discharge. Patient to follow-up with PCP in 3 days for hospital follow-up. Patient to also follow-up with neurology outpatient. Patient seen and examined on the date of discharge and found suitable for discharge.

## 2019-05-21 NOTE — PROGRESS NOTES
"Pharmacokinetic Assessment Follow Up: IV Vancomycin    Vancomycin serum concentration assessment(s):    Vancomycin random level today at 0922 = 11.5 mcg/mL (collected approximately 20 hours post loading dose of 2500 mg yesterday).    The random level was drawn correctly and can be used to guide therapy at this time. The measurement is within the desired definitive target range of 10 to 20 mcg/mL.    Vancomycin Regimen Plan:    Vancomycin 1500 mg IV x 1 dose today.     Re-dose when the random level is less than 20 mcg/mL, next level to be drawn at 0430 on 05/22/19.     Pharmacy will continue to follow and monitor vancomycin.    Please contact pharmacy at extension [phone] for questions regarding this assessment.    Thank you for the consult,   Mallory Chappell PharmD     Patient brief summary:  Ish Guardado is a 58 y.o. male initiated on antimicrobial therapy with IV Vancomycin for treatment of suspected lower respiratory infection    The patient received a loading dose of 2500 mg, followed by the current treatment regimen: random levels with plan to redose when level is less than 20 mcg/mL    Drug Allergies:   Review of patient's allergies indicates:   Allergen Reactions    Pcn [penicillins] Other (See Comments)     Other      Bactrim [sulfamethoxazole-trimethoprim] Rash     Patient requests medication be listed as an "Intolerance" rather than an allergy, stating the rash is not "so bad."    Ceclor [cefaclor] Rash    Latex, natural rubber Rash     Actual Body Weight:   95.8 kg    Renal Function:   Estimated Creatinine Clearance: 61.3 mL/min (A) (based on SCr of 1.5 mg/dL (H))., which is improved from creatinine clearance of 29.6 mL/min (A) (based on SCr of 3.1 mg/dL (H) from yesterday.    CBC (last 72 hours):  Recent Labs   Lab Result Units 05/20/19  0928 05/20/19  1259 05/20/19  1349 05/20/19  1730 05/21/19  0346   WBC K/uL 14.22* 8.12 8.24  --  10.45   Hemoglobin g/dL 15.0 12.9* 13.2*  --  11.6*   Hematocrit % 47.6 " 40.8 41.5 40.1 36.5*   Platelets K/uL 337 285 251  --  240   Gran% % 85.1* 81.8* 83.0*  --  82.7*   Lymph% % 7.8* 12.2* 11.3*  --  10.4*   Mono% % 6.9 5.9 5.7  --  6.4   Eosinophil% % 0.1 0.0 0.0  --  0.3   Basophil% % 0.1 0.1 0.0  --  0.2   Differential Method  Automated Automated Automated  --  Automated     Metabolic Panel (last 72 hours):  Recent Labs   Lab Result Units 05/20/19  0916 05/20/19  0928 05/20/19  0957 05/20/19  1349 05/21/19  0346   Sodium mmol/L  --  142  --  140 139   Potassium mmol/L  --  4.5  --  4.6 4.2   Chloride mmol/L  --  101  --  107 107   CO2 mmol/L  --  23  --  21* 23   Glucose mg/dL  --  74  --  98 107   Glucose, UA   --   --  Negative  --   --    BUN, Bld mg/dL  --  32*  --  33* 35*   Creatinine mg/dL  --  3.1*  --  2.4* 1.5*   Creatinine, Random Ur mg/dL 78.7  --   --   --   --    Albumin g/dL  --  3.9  --  3.0*  --    Total Bilirubin mg/dL  --  0.2  --  0.3  --    Alkaline Phosphatase U/L  --  112  --  81  --    AST U/L  --  26  --  29  --    ALT U/L  --  19  --  16  --    Magnesium mg/dL  --   --   --  1.7  --      Vancomycin Administrations:  vancomycin given in the last 96 hours                     vancomycin (VANCOCIN) 2,500 mg in dextrose 5 % 500 mL IVPB (mg) 2,500 mg New Bag 05/20/19 1309                  Drug levels (last 3 results):  Recent Labs   Lab Result Units 05/21/19  0922   Vancomycin, Random ug/mL 11.5     Microbiologic Results:  Microbiology Results (last 7 days)       Procedure Component Value Units Date/Time    Culture, Respiratory with Gram Stain [[id number]] Collected:  05/20/19 1449    Order Status:  Completed Specimen:  Respiratory from Endotracheal Aspirate Updated:  05/21/19 0846     Respiratory Culture Insufficient incubation, culture in progress     Gram Stain (Respiratory) Moderate WBC's     Gram Stain (Respiratory) Few Gram positive cocci    Blood culture x two cultures. Draw prior to antibiotics. [[id number]] Collected:  05/20/19 1053    Order Status:   Completed Specimen:  Blood from Peripheral, Upper Arm, Left Updated:  05/21/19 0515     Blood Culture, Routine No Growth to date    Narrative:       Aerobic and anaerobic    Blood culture x two cultures. Draw prior to antibiotics. [[id number]] Collected:  05/20/19 1038    Order Status:  Completed Specimen:  Blood from Peripheral, Upper Arm, Left Updated:  05/21/19 0315     Blood Culture, Routine No Growth to date    Narrative:       Aerobic and anaerobic    Culture, Respiratory with Gram Stain [[id number]]     Order Status:  Canceled Specimen:  Respiratory     Influenza A & B by Molecular [[id number]] Collected:  05/20/19 1055    Order Status:  Completed Specimen:  Nasopharyngeal Swab Updated:  05/20/19 1134     Influenza A, Molecular Negative     Influenza B, Molecular Negative     Flu A & B Source Nasal swab          Thank you for allowing us to participate in this patient's care.   Mallory Chappell PharmD 05/21/2019 12:19 PM

## 2019-05-21 NOTE — ASSESSMENT & PLAN NOTE
No overt signs of bleeding since admission. Hgb 11.6.   Per wife, did have nose bleed at home prior to arrival to ER.  Agree with IV Protonix bid.    No plan for urgent EGD at this time. May benefit from outpatient EGD.    Recommend continued monitoring and resuscitation by HM and ICU teams.

## 2019-05-21 NOTE — PROGRESS NOTES
[facility] Hospital Medicine  Progress Note    Patient Name: Ish Guardado  MRN: [id number]  Patient Class: IP- Inpatient   Admission Date: 5/20/2019  Length of Stay: 1 days  Attending Physician: Sameer Goel, *  Primary Care Provider: Pollo Arana MD        Subjective:     Principal Problem:Acute upper GI bleed    HPI:  59 y/o wm with a PMHx of COPD , GERD , Chronic cough , Drug abuse , HTN , HLD , PAD and Tobacco abuse presented to the ER with a c/o AMS since yesterday night . The history was taken from EMR and family members due to pt medical condition . Pt was found yesterday night inside his car   Confuse and altered . Pt was take to his house   By his son . He was responsive  but lethargic . Per family the  pt  Used amphetamine . This am he was found more conused and lethargic with red colored fluid coming through his nose and mouth . Per family pt was in his usual health of state before last night . They states he has a chronic cough  And Heartburn. The  denies any fever , sob , palpitation , weakness , chills , diarrhea , or gu problems  ER course: The ABG show respiratory acidosis , acute hypoxic and hypercapnic respiratory failure . Pt was intubated . The CXR show a right side infiltrate . WBC 14 , lactic acid  3 , troponin level wnl . He was started on Levaquin and vanc . He had 3 lt NS . He was started on levophed due to MAP > than 65 .   ER VS   Initial Vitals   BP Pulse Resp Temp SpO2   05/20/19 0900 05/20/19 0900 05/20/19 0926 -- 05/20/19 0900   139/62 106 (!) 25   (!) 89 %       Hospital Course:  5/21 Pt was seen and examined at bedside . He is  Alert  But confuse .  He is off vasopressor . He was extubated this am .  He has a good UOP .  There was no acute event overnight     Interval History:     Review of Systems   Unable to perform ROS: Mental status change     Objective:     Vital Signs (Most Recent):  Temp: 99.1 °F (37.3 °C) (05/21/19 0715)  Pulse: 83 (05/21/19  1258)  Resp: 15 (05/21/19 1258)  BP: 103/65 (05/21/19 0745)  SpO2: (!) 94 % (05/21/19 1258) Vital Signs (24h Range):  Temp:  [99.1 °F (37.3 °C)-101.8 °F (38.8 °C)] 99.1 °F (37.3 °C)  Pulse:  [72-85] 83  Resp:  [14-27] 15  SpO2:  [88 %-99 %] 94 %  BP: ()/(52-89) 103/65     Weight: 95.8 kg (211 lb 3.2 oz)  Body mass index is 31.19 kg/m².    Intake/Output Summary (Last 24 hours) at 5/21/2019 1303  Last data filed at 5/21/2019 0702  Gross per 24 hour   Intake 7836.97 ml   Output 1615 ml   Net 6221.97 ml      Physical Exam   Constitutional: He appears well-developed and well-nourished. He appears lethargic. He is easily aroused.  Non-toxic appearance. He has a sickly appearance. He appears ill. No distress. Nasal cannula in place.   HENT:   Head: Normocephalic and atraumatic.   Mouth/Throat: Oropharynx is clear and moist and mucous membranes are normal.   Eyes: Pupils are equal, round, and reactive to light. EOM and lids are normal.   Neck: Trachea normal and full passive range of motion without pain. Carotid bruit is not present.       Cardiovascular: Normal rate, regular rhythm and normal heart sounds.   Pulses:       Radial pulses are 2+ on the right side, and 2+ on the left side.        Dorsalis pedis pulses are 1+ on the left side. Right dorsalis pedis pulse not accessible.        Right posterior tibial pulse not accessible.   Pulmonary/Chest: Effort normal. No accessory muscle usage. No tachypnea. No respiratory distress. He has decreased breath sounds in the right lower field. He has rales.   Abdominal: Soft. Normal appearance. He exhibits no distension. Bowel sounds are decreased. There is no tenderness.   Genitourinary: Penis normal.   Genitourinary Comments: Mejia in place   Musculoskeletal: Normal range of motion.        Right foot: There is deformity (old at knee amputation).        Left foot: There is no deformity.   No edema   Lymphadenopathy:     He has no cervical adenopathy.   Neurological: He is  easily aroused. He appears lethargic.   One word verbal responses then goes back to sleep.  Follow one step commands intermittently.  No focal weakness noted.    Skin: Skin is warm, dry and intact. Capillary refill takes less than 2 seconds. No rash noted. No cyanosis.   Psychiatric: He is inattentive.       Significant Labs: All pertinent labs within the past 24 hours have been reviewed.    Significant Imaging: I have reviewed all pertinent imaging results/findings within the past 24 hours.    Assessment/Plan:      * Acute upper GI bleed  Possible GIB   Pt with coffea colored hemesis POD  H/H  Slowly trending down  Cont monitor H/H   Start PPI iv BID  Transfuse as need           Aspiration pneumonia of right lung due to gastric secretions  Cont IVAB   Cont breathing TX       Overdose of illicit drug  Cont current tx       Polysubstance abuse  Pt used opoid and amphetamine  Cont current tx        Aspiration pneumonia of right middle lobe due to vomit  Cont IVAB        Shock, unspecified  Most likely due to PNA  S/p vasodepressor   Cont IVAB Levaquin and vanc   blood cx NGTD  S/p 3 lt ns           KEHINDE (acute kidney injury)  Most likely due to sepsis   Cont IVF  retroperitoneal US did not show any acute abnormality   Monitor UOP and kidney function   Improving     Acute respiratory failure with hypoxia and hypercapnia  2/2 to COPD and aspiration PNA   S/p mechanical ventilation   ICU /Pulmonology consulted         HTN (hypertension)  Hold BP medication now  Due to sepsis   Resume accordantly   S/P levophed due to hypotension with a MAP<65  Resolved       PAD with remote right fem-distal bypass  Resume statin       Mixed hyperlipidemia  Resume statin       PAD (peripheral artery disease)  Hold Plavix for possible GIB  Resume statin         VTE Risk Mitigation (From admission, onward)        Ordered     Place sequential compression device  Until discontinued      05/20/19 1435     IP VTE HIGH RISK PATIENT  Once       05/20/19 6722          Critical care time spent on the evaluation and treatment of severe organ dysfunction, review of pertinent labs and imaging studies, discussions with consulting providers and discussions with patient/family: 55 minutes.    Sameer Goel MD  Department of Hospital Medicine   [facility]

## 2019-05-21 NOTE — PROGRESS NOTES
[facility]  Gastroenterology  Progress Note    Patient Name: Ish Guardado  MRN: [id number]  Admission Date: 5/20/2019  Hospital Length of Stay: 1 days  Code Status: Prior   Attending Provider: Sameer Goel, *  Consulting Provider: Erica Welch NP  Primary Care Physician: Pollo Arana MD  Principal Problem: Acute upper GI bleed      Subjective:     Interval History: Patient is extubated and off pressors. There has been no continued overt bleeding. Hgb 11.6. Developed aspiration pneumonia. 101.8 temp overnight.     Review of Systems   Constitutional: Negative for activity change and appetite change.        As per interval history above   Respiratory: Negative for cough and shortness of breath.    Cardiovascular: Negative for chest pain.   Gastrointestinal: Negative for abdominal pain, blood in stool, constipation, diarrhea and rectal pain.   Skin: Negative for color change and rash.     Objective:     Vital Signs (Most Recent):  Temp: 99.1 °F (37.3 °C) (05/21/19 0715)  Pulse: 77 (05/21/19 0748)  Resp: 14 (05/21/19 0748)  BP: 103/65 (05/21/19 0745)  SpO2: (!) 92 % (05/21/19 0748) Vital Signs (24h Range):  Temp:  [99.1 °F (37.3 °C)-101.8 °F (38.8 °C)] 99.1 °F (37.3 °C)  Pulse:  [72-95] 77  Resp:  [14-27] 14  SpO2:  [88 %-100 %] 92 %  BP: ()/(51-89) 103/65     Weight: 95.8 kg (211 lb 3.2 oz) (05/20/19 0900)  Body mass index is 31.19 kg/m².      Intake/Output Summary (Last 24 hours) at 5/21/2019 1102  Last data filed at 5/21/2019 0702  Gross per 24 hour   Intake 7836.97 ml   Output 1615 ml   Net 6221.97 ml       Lines/Drains/Airways     Central Venous Catheter Line                 Percutaneous Central Line Insertion/Assessment - triple lumen  05/20/19 1215 right internal jugular less than 1 day          Drain                 NG/OG Tube 05/20/19 0912 orogastric 16 Fr. Right mouth 1 day         Urethral Catheter 05/20/19 2275 less than 1 day          Peripheral Intravenous Line                  Midline Catheter Insertion/Assessment  - Single Lumen 05/20/19 0950 Left basilic vein (medial side of arm) 18g x 10cm 1 day                Physical Exam   Constitutional: He is oriented to person, place, and time. He appears ill.   Cardiovascular: Normal rate, regular rhythm and normal heart sounds.   No murmur heard.  Pulmonary/Chest: Effort normal.   Coarse breath sounds   Abdominal: Soft. Bowel sounds are normal. He exhibits no distension. There is no tenderness.   Neurological: He is alert and oriented to person, place, and time.   Skin: Skin is warm and dry.       Significant Labs:  CBC:   Recent Labs   Lab 05/20/19  1259 05/20/19  1349 05/20/19  1730 05/21/19  0346   WBC 8.12 8.24  --  10.45   HGB 12.9* 13.2*  --  11.6*   HCT 40.8 41.5 40.1 36.5*    251  --  240     CMP:   Recent Labs   Lab 05/20/19  1349 05/21/19  0346   GLU 98 107   CALCIUM 8.1* 8.4*   ALBUMIN 3.0*  --    PROT 6.1  --     139   K 4.6 4.2   CO2 21* 23    107   BUN 33* 35*   CREATININE 2.4* 1.5*   ALKPHOS 81  --    ALT 16  --    AST 29  --    BILITOT 0.3  --          Significant Imaging:  Imaging results within the past 24 hours have been reviewed.    Assessment/Plan:     * Acute upper GI bleed  No overt signs of bleeding since admission. Hgb 11.6.   Per wife, did have nose bleed at home prior to arrival to ER.  Agree with IV Protonix bid.    No plan for urgent EGD at this time. May benefit from outpatient EGD.    Recommend continued monitoring and resuscitation by HM and ICU teams.     Aspiration pneumonia of right lung due to gastric secretions  Management per HM and ICU team    Overdose of illicit drug  Management per HM. No history of alcohol abuse or liver disease.       Thank you for your consult. I will follow-up with patient. Please contact us if you have any additional questions.    Erica Welch NP  Gastroenterology  [facility]

## 2019-05-21 NOTE — ASSESSMENT & PLAN NOTE
Possible GIB   Pt with coffea colored hemesis POD  H/H  Slowly trending down  Cont monitor H/H   Start PPI iv BID  Transfuse as need

## 2019-05-21 NOTE — ASSESSMENT & PLAN NOTE
Most likely due to sepsis   Cont IVF  retroperitoneal US did not show any acute abnormality   Monitor UOP and kidney function   Improving

## 2019-05-21 NOTE — PROGRESS NOTES
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, hx PSA (opiates/amphetamines) with admission for resp failure with likely pneumonia, bp 106/67, appears mostly synchronous with ventilator    - check legionella urine, atypicals still a possibility, though asp process from history seems most likely  - sputum cx  - hd better, on 0.5 mcg norepi; will finish current liter NACL and then hold crystalloid infusion and watch bp and uop and supplement with boluses as needed; with sig airspace dz, pt pulm function could worsen with vol overload with possible development of ARDS in addition to acute renal injury  - creat better from admit, will recheck, making 100 cc/hr per ICU nursing staff  - advance ETT 1.5-2 cm, d/w nursing staff; repeat abg

## 2019-05-21 NOTE — PLAN OF CARE
Patient extubated earlier today. Patient was able to answer questions regarding self and wife appropriately. Permission obtained to contact patient's spouse to complete assessment for discharge needs. Attempted to contact patient's wife , unsuccessful. CM will follow for discharge planning.   Updated white board with 's name and number. Transitional Care Folder, Discharge Planning Begins on Admission pamphlet, [facility] Delivery pamphlet, Advance Directive information given to patient along with the contact information given.Instructed patient or family to call with any questions or concerns.          [facility] [place] [facility] [place] [facility] [address], [address]  [address] [place] [address]  [facility] [address]  Phone: [phone] Fax: [phone]    [facility] [address] [place] [address]  Phone: [phone] Fax: [phone]    Pollo Arana MD  Payor: [employer] MANAGED MEDICARE / Plan: [employer] MEDICARE HMO / Product Type: Capitation /           05/21/19 1524   Discharge Assessment   Assessment Type Discharge Planning Assessment   Confirmed/corrected address and phone number on facesheet? Yes   Assessment information obtained from? Patient;Medical Record   Expected Length of Stay (days)   (tbd)   Communicated expected length of stay with patient/caregiver no   Prior to hospitilization cognitive status: Unable to Assess   Prior to hospitalization functional status: Assistive Equipment   Current cognitive status:   (Able to answers regarding self appropriately)   Current Functional Status: Completely Dependent   Facility Arrived From: home   Lives With spouse   Able to Return to Prior Arrangements yes   Is patient able to care for self after discharge? Unable to determine at this time (comments)   Who are your caregiver(s) and their phone number(s)? Ray Guardado ( spouse ) [phone]   Patient's perception of discharge  disposition home or selfcare   Readmission Within the Last 30 Days no previous admission in last 30 days   Patient currently being followed by outpatient case management? No   Do you have any problems affording any of your prescribed medications? TBD   Does the patient have transportation home? Yes   Transportation Anticipated family or friend will provide   Does the patient receive services at the [facility]? No   Discharge Plan A Home;Home with family   Discharge Plan B Home   DME Needed Upon Discharge  none   Patient/Family in Agreement with Plan unable to assess

## 2019-05-21 NOTE — SUBJECTIVE & OBJECTIVE
Interval History:     Review of Systems   Unable to perform ROS: Mental status change     Objective:     Vital Signs (Most Recent):  Temp: 99.1 °F (37.3 °C) (05/21/19 0715)  Pulse: 83 (05/21/19 1258)  Resp: 15 (05/21/19 1258)  BP: 103/65 (05/21/19 0745)  SpO2: (!) 94 % (05/21/19 1258) Vital Signs (24h Range):  Temp:  [99.1 °F (37.3 °C)-101.8 °F (38.8 °C)] 99.1 °F (37.3 °C)  Pulse:  [72-85] 83  Resp:  [14-27] 15  SpO2:  [88 %-99 %] 94 %  BP: ()/(52-89) 103/65     Weight: 95.8 kg (211 lb 3.2 oz)  Body mass index is 31.19 kg/m².    Intake/Output Summary (Last 24 hours) at 5/21/2019 1303  Last data filed at 5/21/2019 0702  Gross per 24 hour   Intake 7836.97 ml   Output 1615 ml   Net 6221.97 ml      Physical Exam   Constitutional: He appears well-developed and well-nourished. He appears lethargic. He is easily aroused.  Non-toxic appearance. He has a sickly appearance. He appears ill. No distress. Nasal cannula in place.   HENT:   Head: Normocephalic and atraumatic.   Mouth/Throat: Oropharynx is clear and moist and mucous membranes are normal.   Eyes: Pupils are equal, round, and reactive to light. EOM and lids are normal.   Neck: Trachea normal and full passive range of motion without pain. Carotid bruit is not present.       Cardiovascular: Normal rate, regular rhythm and normal heart sounds.   Pulses:       Radial pulses are 2+ on the right side, and 2+ on the left side.        Dorsalis pedis pulses are 1+ on the left side. Right dorsalis pedis pulse not accessible.        Right posterior tibial pulse not accessible.   Pulmonary/Chest: Effort normal. No accessory muscle usage. No tachypnea. No respiratory distress. He has decreased breath sounds in the right lower field. He has rales.   Abdominal: Soft. Normal appearance. He exhibits no distension. Bowel sounds are decreased. There is no tenderness.   Genitourinary: Penis normal.   Genitourinary Comments: Mejia in place   Musculoskeletal: Normal range of motion.         Right foot: There is deformity (old at knee amputation).        Left foot: There is no deformity.   No edema   Lymphadenopathy:     He has no cervical adenopathy.   Neurological: He is easily aroused. He appears lethargic.   One word verbal responses then goes back to sleep.  Follow one step commands intermittently.  No focal weakness noted.    Skin: Skin is warm, dry and intact. Capillary refill takes less than 2 seconds. No rash noted. No cyanosis.   Psychiatric: He is inattentive.       Significant Labs: All pertinent labs within the past 24 hours have been reviewed.    Significant Imaging: I have reviewed all pertinent imaging results/findings within the past 24 hours.

## 2019-05-21 NOTE — ASSESSMENT & PLAN NOTE
Cont Vanc and Levaquin (PCN allergy)  D/C Vanc tomorrow if blood cultures remain NGTD  Cont Duonebs and encourage C&DB and OOB  Blood and sputum cultures NGTD

## 2019-05-21 NOTE — NURSING
Patient arrived to floor. He is awake and alert, but appears lethargic. NS infusing at 150cc/hr. Patient denies pain or SOB. Patient on telemetry, NSR. Fall precautions in place. Bed locked and in lowest position. Bed alarm on bed and activated. Yellow fall risk band and non-skid socks on patient. Patient free from fall/injury. Plan of care reviewed with patient and family. All questions answered. Family to remain at bedside. Will continue to monitor.

## 2019-05-21 NOTE — PLAN OF CARE
Problem: Adult Inpatient Plan of Care  Goal: Plan of Care Review  Outcome: Ongoing (interventions implemented as appropriate)  Pt intubated sedated to RASS -2 with precedex. Pt has been increasingly agitated throughout night, sedation titrated up per orders. BP supported with levo. Febrile throughout night - Tmax 101.8, iced pt + EICU notified for tylenol orders. Pt responds to voice, purposeful movements, follows commands intermittently. Coughing fits at times - suctioned as needed - small amounts of thick tan secretions. No bowel movement this shift. Mejia intact. Fluids d/c per MD Lopez. UOP 40-60ml/hr once fluids discontinued.  Soft mack wrist restraints in place for safety. Bed low, wheels locked, POC reviewed.

## 2019-05-21 NOTE — PROGRESS NOTES
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 EMT found lethargic, confused  Tried Narcan, Coffee grounds noted in NGT.  Hx of Substance abuse, Amphetamine and opiods.   Has been intubated 8.0 ETT @24, Right IJ TLC. Has Norepi @ 0.1 mcg, Precedex @ 0.2 mg NS @125mls, 500mls in Mejia.       Hospital/ICU Course:  05/20: examined at bedside, ER, DW nurse,and GI, Patient nods.  5/21 - sitting up in bed on SAT and tolerated SBT.  Nods head to questions.  On low dose Levophed infusion.  No distress.  Extubated     Review of Systems   Unable to perform ROS: Intubated         Objective:     Vital Signs (Most Recent):  Temp: 99.1 °F (37.3 °C) (05/21/19 0715)  Pulse: 77 (05/21/19 0748)  Resp: 14 (05/21/19 0748)  BP: 103/65 (05/21/19 0745)  SpO2: (!) 92 % (05/21/19 0748) Vital Signs (24h Range):  Temp:  [99.1 °F (37.3 °C)-101.8 °F (38.8 °C)] 99.1 °F (37.3 °C)  Pulse:  [] 77  Resp:  [14-32] 14  SpO2:  [61 %-100 %] 92 %  BP: ()/(51-89) 103/65     Weight: 95.8 kg (211 lb 3.2 oz)  Body mass index is 31.19 kg/m².      Intake/Output Summary (Last 24 hours) at 5/21/2019 0812  Last data filed at 5/21/2019 0702  Gross per 24 hour   Intake 7836.97 ml   Output 1615 ml   Net 6221.97 ml       Physical Exam   Constitutional: He appears well-developed and well-nourished. He appears lethargic. He is easily aroused.  Non-toxic appearance. He has  a sickly appearance. He appears ill. No distress. Nasal cannula in place.   HENT:   Head: Normocephalic and atraumatic.   Mouth/Throat: Oropharynx is clear and moist and mucous membranes are normal.   Eyes: Pupils are equal, round, and reactive to light. EOM and lids are normal.   Neck: Trachea normal and full passive range of motion without pain. Carotid bruit is not present.       Cardiovascular: Normal rate, regular rhythm and normal heart sounds.   Pulses:       Radial pulses are 2+ on the right side, and 2+ on the left side.        Dorsalis pedis pulses are 1+ on the left side. Right dorsalis pedis pulse not accessible.        Right posterior tibial pulse not accessible.   Pulmonary/Chest: Effort normal. No accessory muscle usage. No tachypnea. No respiratory distress. He has decreased breath sounds in the right lower field. He has rales.   Abdominal: Soft. Normal appearance. He exhibits no distension. Bowel sounds are decreased. There is no tenderness.   Genitourinary: Penis normal.   Genitourinary Comments: Mejia in place   Musculoskeletal: Normal range of motion.        Right foot: There is deformity (old at knee amputation).        Left foot: There is no deformity.   No edema   Lymphadenopathy:     He has no cervical adenopathy.   Neurological: He is easily aroused. He appears lethargic.   One word verbal responses then goes back to sleep.  Follow one step commands intermittently.  No focal weakness noted.    Skin: Skin is warm, dry and intact. Capillary refill takes less than 2 seconds. No rash noted. No cyanosis.   Psychiatric: He is inattentive.       Vents:  Vent Mode: A/C (05/21/19 0530)  Ventilator Initiated: Yes (05/20/19 0924)  Set Rate: 18 bmp (05/21/19 0530)  Vt Set: 500 mL (05/21/19 0530)  Pressure Support: 0 cmH20 (05/21/19 0530)  PEEP/CPAP: 8 cmH20 (05/21/19 0530)  Oxygen Concentration (%): 40 (05/21/19 0730)  Peak Airway Pressure: 18 cmH2O (05/21/19 0530)  Plateau Pressure: 0 cmH20 (05/21/19  0530)  Total Ve: 11.3 mL (05/21/19 0530)  F/VT Ratio<105 (RSBI): (!) 47.06 (05/21/19 0130)    Lines/Drains/Airways     Central Venous Catheter Line                 Percutaneous Central Line Insertion/Assessment - triple lumen  05/20/19 1215 right internal jugular less than 1 day          Drain                 NG/OG Tube 05/20/19 0912 orogastric 16 Fr. Right mouth less than 1 day         Urethral Catheter 05/20/19 1717 less than 1 day          Peripheral Intravenous Line                 Midline Catheter Insertion/Assessment  - Single Lumen 05/20/19 0950 Left basilic vein (medial side of arm) 18g x 10cm less than 1 day                Significant Labs:    CBC/Anemia Profile:  Recent Labs   Lab 05/20/19  1039 05/20/19  1259 05/20/19  1349 05/20/19  1730 05/21/19  0346   WBC  --  8.12 8.24  --  10.45   HGB  --  12.9* 13.2*  --  11.6*   HCT  --  40.8 41.5 40.1 36.5*   PLT  --  285 251  --  240   MCV  --  88 88  --  86   RDW  --  15.5* 15.2*  --  15.5*   OCCULTBLOOD Negative  --   --   --   --         Chemistries:  Recent Labs   Lab 05/20/19  0928 05/20/19  1349 05/21/19  0346    140 139   K 4.5 4.6 4.2    107 107   CO2 23 21* 23   BUN 32* 33* 35*   CREATININE 3.1* 2.4* 1.5*   CALCIUM 9.6 8.1* 8.4*   ALBUMIN 3.9 3.0*  --    PROT 7.8 6.1  --    BILITOT 0.2 0.3  --    ALKPHOS 112 81  --    ALT 19 16  --    AST 26 29  --    MG  --  1.7  --        ABGs:   Recent Labs   Lab 05/21/19  0551   PH 7.361   PCO2 39.0   HCO3 22.1*   POCSATURATED 95   BE -3     All pertinent labs within the past 24 hours have been reviewed.    Significant Imaging:  CXR: I have reviewed all pertinent results/findings within the past 24 hours and my personal findings are:  no change from prior film      ABG  Recent Labs   Lab 05/21/19  0551   PH 7.361   PO2 78*   PCO2 39.0   HCO3 22.1*   BE -3     Assessment/Plan:     Psychiatric  Polysubstance abuse  Encourage cessation    Overdose of illicit drug  IVFs and ICU monitoring  Encourage  cessation     Pulmonary  * Acute respiratory failure with hypoxia and hypercapnia  Tolerated SAT/SBT and therefore extubated this AM  Close ICU pulm monitoring post extubation  Low flow NC and wean as tolerated    Aspiration pneumonia of right middle lobe due to vomit  Cont Vanc and Levaquin (PCN allergy)  D/C Vanc tomorrow if blood cultures remain NGTD  Cont Duonebs and encourage C&DB and OOB  Blood and sputum cultures NGTD    Cardiac/Vascular  Mixed hyperlipidemia  Cont statin    PAD (peripheral artery disease)  ASA and Plavix held due to UGI bleeding    Renal/  KEHINDE (acute kidney injury)  Cont IVFs  Repeat BMP in AM  Accurate I/Os    GI  Acute upper GI bleed  GI following  No bleeding since admit  Cont PPI BID  CBC daily    Other  Shock, unspecified  Suspect IVVD and narcotic induced  Cont IVFs and wean Levophed infusion as tolerated    Preventive Measures and Monitoring:   Stress Ulcer: PPI  Nutrition: NPO advance diet if tolerates extubation  Glucose control: stable  Bowel prophylaxis: MIralax and PRN Dulcolax  DVT prophylaxis: SCDs  Hx CAD on B-Blocker: no hx CAD  Head of Bed/Reposition: Elevate HOB and turn Q1-2 hours   Early Mobility: OOB later today if tolerates extubation  Central Line Right IJ Day: #2  Mejia Day: #2  IVAB Day: #2  Code Status: Full  Pneumonia Vaccine: ordered     Counseling/Consultation:I have discussed the care of this patient in detail with the bedside nursing staff and Dr. Mcintosh and Dr. Robertson.     Critical Care Time: 49 minutes  Critical secondary to Patient has a condition that poses threat to life and bodily function: Acute Renal Failure and Acute resp failure intubated on mechanical ventilation  Patient is currently on drug therapy requiring intensive monitoring for toxicity: Levophed infusion     Critical care was time spent personally by me on the following activities: development of treatment plan with patient or surrogate and bedside caregivers, discussions with  consultants, evaluation of patient's response to treatment, examination of patient, ordering and performing treatments and interventions, ordering and review of laboratory studies, ordering and review of radiographic studies, pulse oximetry, re-evaluation of patient's condition. This critical care time did not overlap with that of any other provider or involve time for any procedures.       Roe Gael, NP  Critical Care Medicine  [facility]

## 2019-05-21 NOTE — SUBJECTIVE & OBJECTIVE
Subjective:     Interval History: Patient is extubated and off pressors. There has been no continued overt bleeding. Hgb 11.6. Developed aspiration pneumonia. 101.8 temp overnight.     Review of Systems   Constitutional: Negative for activity change and appetite change.        As per interval history above   Respiratory: Negative for cough and shortness of breath.    Cardiovascular: Negative for chest pain.   Gastrointestinal: Negative for abdominal pain, blood in stool, constipation, diarrhea and rectal pain.   Skin: Negative for color change and rash.     Objective:     Vital Signs (Most Recent):  Temp: 99.1 °F (37.3 °C) (05/21/19 0715)  Pulse: 77 (05/21/19 0748)  Resp: 14 (05/21/19 0748)  BP: 103/65 (05/21/19 0745)  SpO2: (!) 92 % (05/21/19 0748) Vital Signs (24h Range):  Temp:  [99.1 °F (37.3 °C)-101.8 °F (38.8 °C)] 99.1 °F (37.3 °C)  Pulse:  [72-95] 77  Resp:  [14-27] 14  SpO2:  [88 %-100 %] 92 %  BP: ()/(51-89) 103/65     Weight: 95.8 kg (211 lb 3.2 oz) (05/20/19 0900)  Body mass index is 31.19 kg/m².      Intake/Output Summary (Last 24 hours) at 5/21/2019 1102  Last data filed at 5/21/2019 0702  Gross per 24 hour   Intake 7836.97 ml   Output 1615 ml   Net 6221.97 ml       Lines/Drains/Airways     Central Venous Catheter Line                 Percutaneous Central Line Insertion/Assessment - triple lumen  05/20/19 1215 right internal jugular less than 1 day          Drain                 NG/OG Tube 05/20/19 0912 orogastric 16 Fr. Right mouth 1 day         Urethral Catheter 05/20/19 1717 less than 1 day          Peripheral Intravenous Line                 Midline Catheter Insertion/Assessment  - Single Lumen 05/20/19 0950 Left basilic vein (medial side of arm) 18g x 10cm 1 day                Physical Exam   Constitutional: He is oriented to person, place, and time. He appears ill.   Cardiovascular: Normal rate, regular rhythm and normal heart sounds.   No murmur heard.  Pulmonary/Chest: Effort normal.    Coarse breath sounds   Abdominal: Soft. Bowel sounds are normal. He exhibits no distension. There is no tenderness.   Neurological: He is alert and oriented to person, place, and time.   Skin: Skin is warm and dry.       Significant Labs:  CBC:   Recent Labs   Lab 05/20/19  1259 05/20/19  1349 05/20/19  1730 05/21/19  0346   WBC 8.12 8.24  --  10.45   HGB 12.9* 13.2*  --  11.6*   HCT 40.8 41.5 40.1 36.5*    251  --  240     CMP:   Recent Labs   Lab 05/20/19  1349 05/21/19  0346   GLU 98 107   CALCIUM 8.1* 8.4*   ALBUMIN 3.0*  --    PROT 6.1  --     139   K 4.6 4.2   CO2 21* 23    107   BUN 33* 35*   CREATININE 2.4* 1.5*   ALKPHOS 81  --    ALT 16  --    AST 29  --    BILITOT 0.3  --          Significant Imaging:  Imaging results within the past 24 hours have been reviewed.

## 2019-05-21 NOTE — ASSESSMENT & PLAN NOTE
Hold BP medication now  Due to sepsis   Resume accordantly   S/P levophed due to hypotension with a MAP<65  Resolved

## 2019-05-21 NOTE — PROGRESS NOTES
Care assumed. Pt opens eyes and follows simple commands VSS. precedex on hold for weaning trail. Restraints secured.

## 2019-05-21 NOTE — ASSESSMENT & PLAN NOTE
Most likely due to PNA  S/p vasodepressor   Cont IVAB Levaquin and vanc   blood cx NGTD  S/p 3 lt ns

## 2019-05-22 PROBLEM — G93.41 ENCEPHALOPATHY, METABOLIC: Status: ACTIVE | Noted: 2019-05-22

## 2019-05-22 LAB
ALLENS TEST: ABNORMAL
ANION GAP SERPL CALC-SCNC: 12 MMOL/L (ref 8–16)
BASOPHILS # BLD AUTO: 0.01 K/UL (ref 0–0.2)
BASOPHILS NFR BLD: 0.1 % (ref 0–1.9)
BUN SERPL-MCNC: 17 MG/DL (ref 6–20)
CALCIUM SERPL-MCNC: 9.4 MG/DL (ref 8.7–10.5)
CHLORIDE SERPL-SCNC: 102 MMOL/L (ref 95–110)
CO2 SERPL-SCNC: 22 MMOL/L (ref 23–29)
CREAT SERPL-MCNC: 0.8 MG/DL (ref 0.5–1.4)
DELSYS: ABNORMAL
DIFFERENTIAL METHOD: ABNORMAL
EOSINOPHIL # BLD AUTO: 0.1 K/UL (ref 0–0.5)
EOSINOPHIL NFR BLD: 1 % (ref 0–8)
ERYTHROCYTE [DISTWIDTH] IN BLOOD BY AUTOMATED COUNT: 15.3 % (ref 11.5–14.5)
EST. GFR  (AFRICAN AMERICAN): >60 ML/MIN/1.73 M^2
EST. GFR  (NON AFRICAN AMERICAN): >60 ML/MIN/1.73 M^2
FIO2: 21
GLUCOSE SERPL-MCNC: 98 MG/DL (ref 70–110)
HCO3 UR-SCNC: 26.3 MMOL/L (ref 24–28)
HCT VFR BLD AUTO: 35 % (ref 40–54)
HGB BLD-MCNC: 11.3 G/DL (ref 14–18)
L PNEUMO AG UR QL IA: NOT DETECTED
LYMPHOCYTES # BLD AUTO: 1.4 K/UL (ref 1–4.8)
LYMPHOCYTES NFR BLD: 15.5 % (ref 18–48)
MAGNESIUM SERPL-MCNC: 1.5 MG/DL (ref 1.6–2.6)
MCH RBC QN AUTO: 27.4 PG (ref 27–31)
MCHC RBC AUTO-ENTMCNC: 32.3 G/DL (ref 32–36)
MCV RBC AUTO: 85 FL (ref 82–98)
MODE: ABNORMAL
MONOCYTES # BLD AUTO: 0.7 K/UL (ref 0.3–1)
MONOCYTES NFR BLD: 7.8 % (ref 4–15)
NEUTROPHILS # BLD AUTO: 7 K/UL (ref 1.8–7.7)
NEUTROPHILS NFR BLD: 75.6 % (ref 38–73)
PCO2 BLDA: 40.5 MMHG (ref 35–45)
PH SMN: 7.42 [PH] (ref 7.35–7.45)
PLATELET # BLD AUTO: 234 K/UL (ref 150–350)
PMV BLD AUTO: 9.3 FL (ref 9.2–12.9)
PO2 BLDA: 26 MMHG (ref 80–100)
POC BE: 2 MMOL/L
POC SATURATED O2: 49 % (ref 95–100)
POTASSIUM SERPL-SCNC: 3.5 MMOL/L (ref 3.5–5.1)
RBC # BLD AUTO: 4.12 M/UL (ref 4.6–6.2)
SAMPLE: ABNORMAL
SITE: ABNORMAL
SODIUM SERPL-SCNC: 136 MMOL/L (ref 136–145)
VANCOMYCIN SERPL-MCNC: 9 UG/ML
WBC # BLD AUTO: 9.29 K/UL (ref 3.9–12.7)

## 2019-05-22 PROCEDURE — 25000003 PHARM REV CODE 250: Performed by: NURSE PRACTITIONER

## 2019-05-22 PROCEDURE — 25000242 PHARM REV CODE 250 ALT 637 W/ HCPCS: Performed by: INTERNAL MEDICINE

## 2019-05-22 PROCEDURE — C9113 INJ PANTOPRAZOLE SODIUM, VIA: HCPCS | Performed by: INTERNAL MEDICINE

## 2019-05-22 PROCEDURE — 63600175 PHARM REV CODE 636 W HCPCS: Performed by: NURSE PRACTITIONER

## 2019-05-22 PROCEDURE — 99900035 HC TECH TIME PER 15 MIN (STAT)

## 2019-05-22 PROCEDURE — 80048 BASIC METABOLIC PNL TOTAL CA: CPT

## 2019-05-22 PROCEDURE — 21400001 HC TELEMETRY ROOM

## 2019-05-22 PROCEDURE — 82803 BLOOD GASES ANY COMBINATION: CPT

## 2019-05-22 PROCEDURE — 63600175 PHARM REV CODE 636 W HCPCS: Performed by: INTERNAL MEDICINE

## 2019-05-22 PROCEDURE — 36415 COLL VENOUS BLD VENIPUNCTURE: CPT

## 2019-05-22 PROCEDURE — 99233 PR SUBSEQUENT HOSPITAL CARE,LEVL III: ICD-10-PCS | Mod: ,,, | Performed by: INTERNAL MEDICINE

## 2019-05-22 PROCEDURE — 83735 ASSAY OF MAGNESIUM: CPT

## 2019-05-22 PROCEDURE — 94761 N-INVAS EAR/PLS OXIMETRY MLT: CPT

## 2019-05-22 PROCEDURE — 25000003 PHARM REV CODE 250: Performed by: INTERNAL MEDICINE

## 2019-05-22 PROCEDURE — 99499 UNLISTED E&M SERVICE: CPT | Mod: ,,, | Performed by: INTERNAL MEDICINE

## 2019-05-22 PROCEDURE — 99499 NO LOS: ICD-10-PCS | Mod: ,,, | Performed by: INTERNAL MEDICINE

## 2019-05-22 PROCEDURE — 94640 AIRWAY INHALATION TREATMENT: CPT

## 2019-05-22 PROCEDURE — 85025 COMPLETE CBC W/AUTO DIFF WBC: CPT

## 2019-05-22 PROCEDURE — 80202 ASSAY OF VANCOMYCIN: CPT

## 2019-05-22 PROCEDURE — 25000003 PHARM REV CODE 250: Performed by: PHYSICIAN ASSISTANT

## 2019-05-22 PROCEDURE — 99233 SBSQ HOSP IP/OBS HIGH 50: CPT | Mod: ,,, | Performed by: INTERNAL MEDICINE

## 2019-05-22 RX ORDER — PANTOPRAZOLE SODIUM 40 MG/1
40 TABLET, DELAYED RELEASE ORAL DAILY
Status: DISCONTINUED | OUTPATIENT
Start: 2019-05-22 | End: 2019-05-31 | Stop reason: HOSPADM

## 2019-05-22 RX ORDER — ONDANSETRON 2 MG/ML
4 INJECTION INTRAMUSCULAR; INTRAVENOUS EVERY 6 HOURS PRN
Status: DISCONTINUED | OUTPATIENT
Start: 2019-05-22 | End: 2019-05-31 | Stop reason: HOSPADM

## 2019-05-22 RX ORDER — AMLODIPINE BESYLATE 10 MG/1
10 TABLET ORAL DAILY
Status: DISCONTINUED | OUTPATIENT
Start: 2019-05-22 | End: 2019-05-22

## 2019-05-22 RX ORDER — CLONIDINE HYDROCHLORIDE 0.1 MG/1
0.1 TABLET ORAL EVERY 6 HOURS PRN
Status: DISCONTINUED | OUTPATIENT
Start: 2019-05-22 | End: 2019-05-31 | Stop reason: HOSPADM

## 2019-05-22 RX ORDER — ARFORMOTEROL TARTRATE 15 UG/2ML
15 SOLUTION RESPIRATORY (INHALATION) 2 TIMES DAILY
Status: DISCONTINUED | OUTPATIENT
Start: 2019-05-22 | End: 2019-05-31 | Stop reason: HOSPADM

## 2019-05-22 RX ORDER — TRAMADOL HYDROCHLORIDE 50 MG/1
50 TABLET ORAL EVERY 8 HOURS
Status: DISCONTINUED | OUTPATIENT
Start: 2019-05-22 | End: 2019-05-24

## 2019-05-22 RX ORDER — QUETIAPINE FUMARATE 25 MG/1
25 TABLET, FILM COATED ORAL NIGHTLY
Status: DISCONTINUED | OUTPATIENT
Start: 2019-05-22 | End: 2019-05-23

## 2019-05-22 RX ORDER — MAGNESIUM SULFATE HEPTAHYDRATE 40 MG/ML
2 INJECTION, SOLUTION INTRAVENOUS ONCE
Status: COMPLETED | OUTPATIENT
Start: 2019-05-22 | End: 2019-05-22

## 2019-05-22 RX ORDER — CLONIDINE HYDROCHLORIDE 0.1 MG/1
0.1 TABLET ORAL ONCE
Status: COMPLETED | OUTPATIENT
Start: 2019-05-22 | End: 2019-05-22

## 2019-05-22 RX ORDER — AMLODIPINE BESYLATE 10 MG/1
10 TABLET ORAL DAILY
Status: DISCONTINUED | OUTPATIENT
Start: 2019-05-22 | End: 2019-05-31 | Stop reason: HOSPADM

## 2019-05-22 RX ADMIN — IPRATROPIUM BROMIDE AND ALBUTEROL SULFATE 3 ML: .5; 3 SOLUTION RESPIRATORY (INHALATION) at 08:05

## 2019-05-22 RX ADMIN — LORAZEPAM 1 MG: 2 INJECTION INTRAMUSCULAR; INTRAVENOUS at 12:05

## 2019-05-22 RX ADMIN — ARFORMOTEROL TARTRATE 15 MCG: 15 SOLUTION RESPIRATORY (INHALATION) at 07:05

## 2019-05-22 RX ADMIN — ONDANSETRON 4 MG: 2 INJECTION INTRAMUSCULAR; INTRAVENOUS at 08:05

## 2019-05-22 RX ADMIN — SODIUM CHLORIDE: 0.9 INJECTION, SOLUTION INTRAVENOUS at 12:05

## 2019-05-22 RX ADMIN — POLYETHYLENE GLYCOL 3350 17 G: 17 POWDER, FOR SOLUTION ORAL at 08:05

## 2019-05-22 RX ADMIN — ARFORMOTEROL TARTRATE 15 MCG: 15 SOLUTION RESPIRATORY (INHALATION) at 08:05

## 2019-05-22 RX ADMIN — LEVOFLOXACIN 750 MG: 750 INJECTION, SOLUTION INTRAVENOUS at 11:05

## 2019-05-22 RX ADMIN — IPRATROPIUM BROMIDE AND ALBUTEROL SULFATE 3 ML: .5; 3 SOLUTION RESPIRATORY (INHALATION) at 12:05

## 2019-05-22 RX ADMIN — ATORVASTATIN CALCIUM 10 MG: 10 TABLET, FILM COATED ORAL at 08:05

## 2019-05-22 RX ADMIN — PANTOPRAZOLE SODIUM 40 MG: 40 INJECTION, POWDER, LYOPHILIZED, FOR SOLUTION INTRAVENOUS at 08:05

## 2019-05-22 RX ADMIN — AMLODIPINE BESYLATE 10 MG: 10 TABLET ORAL at 08:05

## 2019-05-22 RX ADMIN — CLONIDINE HYDROCHLORIDE 0.1 MG: 0.1 TABLET ORAL at 08:05

## 2019-05-22 RX ADMIN — QUETIAPINE FUMARATE 25 MG: 25 TABLET ORAL at 08:05

## 2019-05-22 RX ADMIN — IPRATROPIUM BROMIDE AND ALBUTEROL SULFATE 3 ML: .5; 3 SOLUTION RESPIRATORY (INHALATION) at 07:05

## 2019-05-22 RX ADMIN — LORAZEPAM 1 MG: 2 INJECTION INTRAMUSCULAR; INTRAVENOUS at 08:05

## 2019-05-22 RX ADMIN — TRAMADOL HYDROCHLORIDE 50 MG: 50 TABLET, COATED ORAL at 01:05

## 2019-05-22 RX ADMIN — HYDRALAZINE HYDROCHLORIDE 25 MG: 25 TABLET, FILM COATED ORAL at 05:05

## 2019-05-22 RX ADMIN — PANTOPRAZOLE SODIUM 40 MG: 40 TABLET, DELAYED RELEASE ORAL at 01:05

## 2019-05-22 RX ADMIN — MAGNESIUM SULFATE IN WATER 2 G: 40 INJECTION, SOLUTION INTRAVENOUS at 03:05

## 2019-05-22 RX ADMIN — HYDRALAZINE HYDROCHLORIDE 25 MG: 25 TABLET, FILM COATED ORAL at 12:05

## 2019-05-22 RX ADMIN — TRAMADOL HYDROCHLORIDE 50 MG: 50 TABLET, COATED ORAL at 08:05

## 2019-05-22 NOTE — ASSESSMENT & PLAN NOTE
Pt used opoid and amphetamine  Cont current tx    Started on tramadol 50 mg po tid and clonidine prn for withdrawals

## 2019-05-22 NOTE — PLAN OF CARE
05/22/19 1010   Medicare Message   Important Message from Medicare regarding Discharge Appeal Rights Given to patient/caregiver;Explained to patient/caregiver;Signed/date by patient/caregiver   Date IMM was signed 05/22/19   Time IMM was signed 1010

## 2019-05-22 NOTE — PLAN OF CARE
Problem: Fall Injury Risk  Goal: Absence of Fall and Fall-Related Injury  Outcome: Ongoing (interventions implemented as appropriate)  Instructed patient on fall precautions: keep pathways clear, never walk unassisted due to weakness, side rails up x2, bed in low position, bed alarm on, call light in reach.

## 2019-05-22 NOTE — NURSING
D/romelia Valerio catheter at this time. Charge nurse aware due to does not meet criteria to keep valerio. To have day nurse assess voiding ability within 6 hrs of removal

## 2019-05-22 NOTE — NURSING
Patient appears to be much more alert and oriented to his surroundings. He is sitting up in bed, calm. He also is not as restless or groaning. His speech is more understandable. He was able to grab medication cup and take his afternoon scheduled meds without any assistance.

## 2019-05-22 NOTE — NURSING
Patient very agitated and wife afraid that patient may pull iv out. Patient curently did not have an order for anything prn for agitation.nurse spoke to NP on duty Adalid who ordered Ativan. Also made noted that patient was agitated at shift change and bp systolic was 192. Patient asymptomatic.Md also ordered hydralazine. To give meds and reassess status. To continue to monitor

## 2019-05-22 NOTE — NURSING
Received call from pharmacy asking about the random Vancomycin level that was due at 0430 this am due to on her end it was showing none of the labs were collected. Nurse REVIEWED CHART WITH THE DAY NURSE Vicky AND NOTES THAT THE MORNING LABS WERE NEVER CHANGED TO LAB COLLECT IT STILL SHOWED UNIT COLLECT FROM ICU. Nurse changed lab to lab collect and also call the lab to ensure that they see to come get all labs.  states that lab just popped up and they are coming to collect.to continue to monitor. Day nurse Vicky is aware that lab has to get morning labs

## 2019-05-22 NOTE — SUBJECTIVE & OBJECTIVE
Interval History:     Review of Systems   Unable to perform ROS: Mental status change     Objective:     Vital Signs (Most Recent):  Temp: 99.2 °F (37.3 °C) (05/22/19 1228)  Pulse: 79 (05/22/19 1505)  Resp: 18 (05/22/19 1250)  BP: (!) 157/85 (05/22/19 1228)  SpO2: 96 % (05/22/19 1250) Vital Signs (24h Range):  Temp:  [97.8 °F (36.6 °C)-99.2 °F (37.3 °C)] 99.2 °F (37.3 °C)  Pulse:  [] 79  Resp:  [16-22] 18  SpO2:  [93 %-98 %] 96 %  BP: (124-193)/(69-98) 157/85     Weight: 93.9 kg (207 lb 0.2 oz)  Body mass index is 30.57 kg/m².    Intake/Output Summary (Last 24 hours) at 5/22/2019 1519  Last data filed at 5/22/2019 0800  Gross per 24 hour   Intake 2240 ml   Output 2750 ml   Net -510 ml      Physical Exam   Constitutional: Vital signs are normal. He appears well-developed and well-nourished.       HENT:   Head: Normocephalic and atraumatic.   Eyes: Pupils are equal, round, and reactive to light. Conjunctivae and EOM are normal.   Neck: Normal range of motion. Neck supple.   Cardiovascular: Normal rate, regular rhythm, normal heart sounds and intact distal pulses.   Pulmonary/Chest: Effort normal. He has wheezes.   Abdominal: Soft. Bowel sounds are normal.   Musculoskeletal: Normal range of motion. He exhibits no edema.   Neurological: He is alert.   Skin: Skin is warm and dry. Capillary refill takes 2 to 3 seconds.   Psychiatric: His affect is inappropriate. His speech is delayed. He is slowed. He expresses no suicidal ideation. He is inattentive.   Nursing note and vitals reviewed.      Significant Labs: All pertinent labs within the past 24 hours have been reviewed.    Significant Imaging: I have reviewed all pertinent imaging results/findings within the past 24 hours.

## 2019-05-22 NOTE — PROGRESS NOTES
[facility] Hospital Medicine  Progress Note    Patient Name: Ish Guardado  MRN: [id number]  Patient Class: IP- Inpatient   Admission Date: 5/20/2019  Length of Stay: 2 days  Attending Physician: Sameer Goel, *  Primary Care Provider: Pollo Arana MD        Subjective:     Principal Problem:Aspiration pneumonia of right middle lobe due to vomit    HPI:  57 y/o wm with a PMHx of COPD , GERD , Chronic cough , Drug abuse , HTN , HLD , PAD and Tobacco abuse presented to the ER with a c/o AMS since yesterday night . The history was taken from EMR and family members due to pt medical condition . Pt was found yesterday night inside his car   Confuse and altered . Pt was take to his house   By his son . He was responsive  but lethargic . Per family the  pt  Used amphetamine . This am he was found more conused and lethargic with red colored fluid coming through his nose and mouth . Per family pt was in his usual health of state before last night . They states he has a chronic cough  And Heartburn. The  denies any fever , sob , palpitation , weakness , chills , diarrhea , or gu problems  ER course: The ABG show respiratory acidosis , acute hypoxic and hypercapnic respiratory failure . Pt was intubated . The CXR show a right side infiltrate . WBC 14 , lactic acid  3 , troponin level wnl . He was started on Levaquin and vanc . He had 3 lt NS . He was started on levophed due to MAP > than 65 .   ER VS   Initial Vitals   BP Pulse Resp Temp SpO2   05/20/19 0900 05/20/19 0900 05/20/19 0926 -- 05/20/19 0900   139/62 106 (!) 25   (!) 89 %       Hospital Course:  5/21 Pt was seen and examined at bedside . He is  Alert  But confuse .  He is off vasopressor . He was extubated this am .  He has a good UOP .  There was no acute event overnight   5/22 Pt was seen and examined at bedside . He is aaox 1 .  He  Is confuse on and off .  Pt was placed 1 to 1 .  The ct head  from the admission day did not show any  acute finding . Pt with a Known history of polysubstance abuse .  The blood pressure was elevated  And respond well to clonidine . He was started on tramadol 50 mg po tid .      Interval History:     Review of Systems   Unable to perform ROS: Mental status change     Objective:     Vital Signs (Most Recent):  Temp: 99.2 °F (37.3 °C) (05/22/19 1228)  Pulse: 79 (05/22/19 1505)  Resp: 18 (05/22/19 1250)  BP: (!) 157/85 (05/22/19 1228)  SpO2: 96 % (05/22/19 1250) Vital Signs (24h Range):  Temp:  [97.8 °F (36.6 °C)-99.2 °F (37.3 °C)] 99.2 °F (37.3 °C)  Pulse:  [] 79  Resp:  [16-22] 18  SpO2:  [93 %-98 %] 96 %  BP: (124-193)/(69-98) 157/85     Weight: 93.9 kg (207 lb 0.2 oz)  Body mass index is 30.57 kg/m².    Intake/Output Summary (Last 24 hours) at 5/22/2019 1519  Last data filed at 5/22/2019 0800  Gross per 24 hour   Intake 2240 ml   Output 2750 ml   Net -510 ml      Physical Exam   Constitutional: Vital signs are normal. He appears well-developed and well-nourished.       HENT:   Head: Normocephalic and atraumatic.   Eyes: Pupils are equal, round, and reactive to light. Conjunctivae and EOM are normal.   Neck: Normal range of motion. Neck supple.   Cardiovascular: Normal rate, regular rhythm, normal heart sounds and intact distal pulses.   Pulmonary/Chest: Effort normal. He has wheezes.   Abdominal: Soft. Bowel sounds are normal.   Musculoskeletal: Normal range of motion. He exhibits no edema.   Neurological: He is alert.   Skin: Skin is warm and dry. Capillary refill takes 2 to 3 seconds.   Psychiatric: His affect is inappropriate. His speech is delayed. He is slowed. He expresses no suicidal ideation. He is inattentive.   Nursing note and vitals reviewed.      Significant Labs: All pertinent labs within the past 24 hours have been reviewed.    Significant Imaging: I have reviewed all pertinent imaging results/findings within the past 24 hours.    Assessment/Plan:      * Aspiration pneumonia of right middle  lobe due to vomit  Cont IVAB        Encephalopathy, metabolic  multifactorial : infection , withdrawal   Cont supportive tx  No neurological deficit       Aspiration pneumonia of right lung due to gastric secretions  Cont IVAB   Cont breathing TX       Overdose of illicit drug  Cont current tx       Acute upper GI bleed  Possible GIB   Pt with coffea colored hemesis POD  H/H  Slowly trending down  Cont monitor H/H   Start PPI iv BID  Transfuse as need           Polysubstance abuse  Pt used opoid and amphetamine  Cont current tx    Started on tramadol 50 mg po tid and clonidine prn for withdrawals       Shock, unspecified  Most likely due to PNA  S/p vasodepressor   Cont IVAB Levaquin and vanc   blood cx NGTD  S/p 3 lt ns           KEHINDE (acute kidney injury)  Most likely due to sepsis   Cont IVF  retroperitoneal US did not show any acute abnormality   Monitor UOP and kidney function   Improving     Acute respiratory failure with hypoxia and hypercapnia  2/2 to COPD and aspiration PNA   S/p mechanical ventilation   ICU /Pulmonology consulted         HTN (hypertension)  Hold BP medication now  Due to sepsis   Resume accordantly   S/P levophed due to hypotension with a MAP<65  Resolved       PAD with remote right fem-distal bypass  Resume statin       Mixed hyperlipidemia  Resume statin       PAD (peripheral artery disease)  Hold Plavix for possible GIB  Resume statin       Stage 1 mild COPD by GOLD classification  Cont breathing tx         VTE Risk Mitigation (From admission, onward)        Ordered     Place sequential compression device  Until discontinued      05/20/19 1435     IP VTE HIGH RISK PATIENT  Once      05/20/19 1435              Sameer Goel MD  Department of Hospital Medicine   [facility]

## 2019-05-22 NOTE — PLAN OF CARE
Problem: Adult Inpatient Plan of Care  Goal: Patient-Specific Goal (Individualization)  Outcome: Ongoing (interventions implemented as appropriate)  Patient awake and alert, in NAD. Patient had uneventful shift. He is more alert than previously, but still somewhat lethargic. VS stable at this time. Patient denies pain or SOB. Patient on telemetry, NSR. Fall precautions in place. Staff sitter and telesitter at bedside. Bed locked and in lowest position. Bed alarm on bed and activated. Yellow fall risk band and non-skid socks on patient. Patient free from fall/injury. Plan of care reviewed with patient. All questions answered. Will continue to monitor.

## 2019-05-22 NOTE — NURSING
Rounded on patient and patient had shifted to the foot of the bed and attempting to get up. Unable to understand patient due to speech is unclear. Wife at the bedside. Nurse and cna assited patient back into bed. Then discovered patient with stool smear in the bed. CNA changing patient at this time.     2:20 monitor tech called nurse asking if patient with hx of afib. ? Explained to monitor tech that patient was being cleaned up in the bed and linen is being changed. Wait to let patient get settled in the bed to monitor. After the bath noted patient normal sinus rhythm on the monitor. bp mow WNL.see trend

## 2019-05-22 NOTE — SUBJECTIVE & OBJECTIVE
Interval History: Reports improved.    Review of Systems   Constitutional: Negative for malaise/fatigue.   HENT: Negative.    Eyes: Negative.    Respiratory: Positive for wheezing.    Cardiovascular: Negative.    Gastrointestinal: Negative.    Genitourinary: Negative.    Musculoskeletal: Negative.    Skin: Negative.    Neurological: Negative.    Endo/Heme/Allergies: Negative.    Psychiatric/Behavioral: Negative.          Objective:     Vital Signs (Most Recent):  Temp: 98.4 °F (36.9 °C) (05/22/19 0732)  Pulse: 79 (05/22/19 0732)  Resp: (!) 22 (05/22/19 0732)  BP: (!) 193/98 (05/22/19 0732)  SpO2: 96 % (05/22/19 0732) Vital Signs (24h Range):  Temp:  [97.8 °F (36.6 °C)-98.9 °F (37.2 °C)] 98.4 °F (36.9 °C)  Pulse:  [] 79  Resp:  [12-22] 22  SpO2:  [84 %-98 %] 96 %  BP: ()/() 193/98     Weight: 93.9 kg (207 lb 0.2 oz)  Body mass index is 30.57 kg/m².      Intake/Output Summary (Last 24 hours) at 5/22/2019 0741  Last data filed at 5/22/2019 0602  Gross per 24 hour   Intake 2440 ml   Output 2750 ml   Net -310 ml       Physical Exam   Constitutional: Vital signs are normal. He appears well-developed and well-nourished.       HENT:   Head: Normocephalic and atraumatic.   Eyes: Pupils are equal, round, and reactive to light. Conjunctivae and EOM are normal.   Neck: Normal range of motion. Neck supple.   Cardiovascular: Normal rate, regular rhythm, normal heart sounds and intact distal pulses.   Pulmonary/Chest: Effort normal. He has wheezes.   Abdominal: Soft. Bowel sounds are normal.   Musculoskeletal: Normal range of motion. He exhibits no edema.   Neurological: He is alert.   Skin: Skin is warm and dry. Capillary refill takes 2 to 3 seconds.   Nursing note and vitals reviewed.      Vents:  Vent Mode: A/C (05/21/19 0530)  Ventilator Initiated: Yes (05/20/19 0924)  Set Rate: 18 bmp (05/21/19 0530)  Vt Set: 500 mL (05/21/19 0530)  Pressure Support: 0 cmH20 (05/21/19 0530)  PEEP/CPAP: 8 cmH20 (05/21/19  0530)  Oxygen Concentration (%): 21 (05/22/19 0040)  Peak Airway Pressure: 18 cmH2O (05/21/19 0530)  Plateau Pressure: 0 cmH20 (05/21/19 0530)  Total Ve: 11.3 mL (05/21/19 0530)  F/VT Ratio<105 (RSBI): (!) 47.06 (05/21/19 0130)    Lines/Drains/Airways     Peripheral Intravenous Line                 Midline Catheter Insertion/Assessment  - Single Lumen 05/20/19 0950 Left basilic vein (medial side of arm) 18g x 10cm 1 day         Peripheral IV - Single Lumen 05/21/19 1641 20 G Anterior;Left;Proximal Forearm less than 1 day                Significant Labs:    CBC/Anemia Profile:  Recent Labs   Lab 05/20/19  1039 05/20/19  1259 05/20/19  1349 05/20/19  1730 05/21/19  0346   WBC  --  8.12 8.24  --  10.45   HGB  --  12.9* 13.2*  --  11.6*   HCT  --  40.8 41.5 40.1 36.5*   PLT  --  285 251  --  240   MCV  --  88 88  --  86   RDW  --  15.5* 15.2*  --  15.5*   OCCULTBLOOD Negative  --   --   --   --         Chemistries:  Recent Labs   Lab 05/20/19  0928 05/20/19  1349 05/21/19  0346 05/22/19  0709    140 139 136   K 4.5 4.6 4.2 3.5    107 107 102   CO2 23 21* 23 22*   BUN 32* 33* 35* 17   CREATININE 3.1* 2.4* 1.5* 0.8   CALCIUM 9.6 8.1* 8.4* 9.4   ALBUMIN 3.9 3.0*  --   --    PROT 7.8 6.1  --   --    BILITOT 0.2 0.3  --   --    ALKPHOS 112 81  --   --    ALT 19 16  --   --    AST 26 29  --   --    MG  --  1.7  --  1.5*       ABGs:   Recent Labs   Lab 05/22/19  0022   PH 7.421   PCO2 40.5   HCO3 26.3   POCSATURATED 49*   BE 2     Blood Culture:   Recent Labs   Lab 05/20/19  1038 05/20/19  1053   LABBLOO No Growth to date  No Growth to date No Growth to date  No Growth to date     Cardiac Markers: No results for input(s): CKMB, TROPONINT, MYOGLOBIN in the last 48 hours.  Respiratory Culture:   Recent Labs   Lab 05/20/19  1449   GSRESP Moderate WBC's  Few Gram positive cocci   RESPIRATORYC Insufficient incubation, culture in progress     All pertinent labs within the past 24 hours have been  reviewed.    Significant Imaging:  I have reviewed and interpreted all pertinent imaging results/findings within the past 24 hours.

## 2019-05-22 NOTE — PROGRESS NOTES
Patient moved to the floor. Hgb remains stable. Change IV Protonix to po. Consider GI follow up as outpatient. Re-consult GI as needed.

## 2019-05-22 NOTE — NURSING
Notified Adalid BENSON of patient's bp trend and that it is currently back elevated to 188/74. Patient has ativan and hydralazine around midnight last night. Its been about 6 hrs. Order reads for hydralazine prn every 8 hrs. Adalid BENSON ordered nurse to go ahead and give another hydralazine now. To continue to monitor

## 2019-05-22 NOTE — ASSESSMENT & PLAN NOTE
Levaquin (PCN allergy)  D/C Vanc today:  blood cultures remain NGTD  Cont Duonebs and encourage C&DB and OOB  Blood and sputum cultures NGTD

## 2019-05-22 NOTE — PROGRESS NOTES
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 EMT found lethargic, confused  Tried Narcan, Coffee grounds noted in NGT.  Hx of Substance abuse, Amphetamine and opiods.   Has been intubated 8.0 ETT @24, Right IJ TLC. Has Norepi @ 0.1 mcg, Precedex @ 0.2 mg NS @125mls, 500mls in [place].    05/20: examined at bedside, ER, DW nurse,and GI, Patient nods.  5/21 - sitting up in bed on SAT and tolerated SBT.  Nods head to questions.  On low dose Levophed infusion.  No distress.  Extubated   05/22: seen and examined: verbalised appropriately, wife at bedside, seen Dr Lobo recently: Mild COPD    Interval History: Reports improved.    Review of Systems   Constitutional: Negative for malaise/fatigue.   HENT: Negative.    Eyes: Negative.    Respiratory: Positive for wheezing.    Cardiovascular: Negative.    Gastrointestinal: Negative.    Genitourinary: Negative.    Musculoskeletal: Negative.    Skin: Negative.    Neurological: Negative.    Endo/Heme/Allergies: Negative.    Psychiatric/Behavioral: Negative.          Objective:     Vital Signs (Most Recent):  Temp: 98.4 °F (36.9 °C) (05/22/19 0732)  Pulse: 79 (05/22/19 0732)  Resp: (!) 22 (05/22/19 0732)  BP: (!) 193/98 (05/22/19 0732)  SpO2: 96 % (05/22/19 0732) Vital Signs (24h Range):  Temp:  [97.8 °F (36.6 °C)-98.9 °F (37.2 °C)] 98.4 °F (36.9 °C)  Pulse:  [] 79  Resp:  [12-22] 22  SpO2:  [84 %-98 %]  96 %  BP: ()/() 193/98     Weight: 93.9 kg (207 lb 0.2 oz)  Body mass index is 30.57 kg/m².      Intake/Output Summary (Last 24 hours) at 5/22/2019 0741  Last data filed at 5/22/2019 0602  Gross per 24 hour   Intake 2440 ml   Output 2750 ml   Net -310 ml       Physical Exam   Constitutional: Vital signs are normal. He appears well-developed and well-nourished.       HENT:   Head: Normocephalic and atraumatic.   Eyes: Pupils are equal, round, and reactive to light. Conjunctivae and EOM are normal.   Neck: Normal range of motion. Neck supple.   Cardiovascular: Normal rate, regular rhythm, normal heart sounds and intact distal pulses.   Pulmonary/Chest: Effort normal. He has wheezes.   Abdominal: Soft. Bowel sounds are normal.   Musculoskeletal: Normal range of motion. He exhibits no edema.   Neurological: He is alert.   Skin: Skin is warm and dry. Capillary refill takes 2 to 3 seconds.   Nursing note and vitals reviewed.      Vents:  Vent Mode: A/C (05/21/19 0530)  Ventilator Initiated: Yes (05/20/19 0924)  Set Rate: 18 bmp (05/21/19 0530)  Vt Set: 500 mL (05/21/19 0530)  Pressure Support: 0 cmH20 (05/21/19 0530)  PEEP/CPAP: 8 cmH20 (05/21/19 0530)  Oxygen Concentration (%): 21 (05/22/19 0040)  Peak Airway Pressure: 18 cmH2O (05/21/19 0530)  Plateau Pressure: 0 cmH20 (05/21/19 0530)  Total Ve: 11.3 mL (05/21/19 0530)  F/VT Ratio<105 (RSBI): (!) 47.06 (05/21/19 0130)    Lines/Drains/Airways     Peripheral Intravenous Line                 Midline Catheter Insertion/Assessment  - Single Lumen 05/20/19 0950 Left basilic vein (medial side of arm) 18g x 10cm 1 day         Peripheral IV - Single Lumen 05/21/19 1641 20 G Anterior;Left;Proximal Forearm less than 1 day                Significant Labs:    CBC/Anemia Profile:  Recent Labs   Lab 05/20/19  1039 05/20/19  1259 05/20/19  1349 05/20/19  1730 05/21/19  0346   WBC  --  8.12 8.24  --  10.45   HGB  --  12.9* 13.2*  --  11.6*   HCT  --  40.8 41.5 40.1 36.5*    PLT  --  285 251  --  240   MCV  --  88 88  --  86   RDW  --  15.5* 15.2*  --  15.5*   OCCULTBLOOD Negative  --   --   --   --         Chemistries:  Recent Labs   Lab 05/20/19  0928 05/20/19  1349 05/21/19  0346 05/22/19  0709    140 139 136   K 4.5 4.6 4.2 3.5    107 107 102   CO2 23 21* 23 22*   BUN 32* 33* 35* 17   CREATININE 3.1* 2.4* 1.5* 0.8   CALCIUM 9.6 8.1* 8.4* 9.4   ALBUMIN 3.9 3.0*  --   --    PROT 7.8 6.1  --   --    BILITOT 0.2 0.3  --   --    ALKPHOS 112 81  --   --    ALT 19 16  --   --    AST 26 29  --   --    MG  --  1.7  --  1.5*       ABGs:   Recent Labs   Lab 05/22/19  0022   PH 7.421   PCO2 40.5   HCO3 26.3   POCSATURATED 49*   BE 2     Blood Culture:   Recent Labs   Lab 05/20/19  1038 05/20/19  1053   LABBLOO No Growth to date  No Growth to date No Growth to date  No Growth to date     Cardiac Markers: No results for input(s): CKMB, TROPONINT, MYOGLOBIN in the last 48 hours.  Respiratory Culture:   Recent Labs   Lab 05/20/19  1449   GSRESP Moderate WBC's  Few Gram positive cocci   RESPIRATORYC Insufficient incubation, culture in progress     All pertinent labs within the past 24 hours have been reviewed.    Significant Imaging:  I have reviewed and interpreted all pertinent imaging results/findings within the past 24 hours.      ABG  Recent Labs   Lab 05/22/19  0022   PH 7.421   PO2 26*   PCO2 40.5   HCO3 26.3   BE 2     Assessment/Plan:     * Aspiration pneumonia of right middle lobe due to vomit   Levaquin (PCN allergy)  D/C Vanc today:  blood cultures remain NGTD  Cont Duonebs and encourage C&DB and OOB  Blood and sputum cultures NGTD    Acute respiratory failure with hypoxia and hypercapnia  Post extubation  On RA  Keep SpO2 > 90%    Overdose of illicit drug  IVFs @100mls/hr  Encourage cessation     Acute upper GI bleed  GI following  No bleeding since admit  Cont PPI BID  CBC daily    Polysubstance abuse  Encourage cessation    Mixed hyperlipidemia  Atrovastatin    Stage  1 mild COPD by GOLD classification  Follow with Dr Lobo  Added Arfomoterol and Duoneb         I have reviewed all labs and imaging studies and compared to previous results. I have also discussed labs with all the teams in the medical care of the patient and my plan is outlined below        Cruzito Mcintosh MD  Pulmonology  [facility]

## 2019-05-22 NOTE — PROGRESS NOTES
Pharmacokinetic Assessment Sign Off: IV Vancomycin    Therapy with Vancomycin complete and/or consult discontinued by provider.  Pharmacy will sign off, please re-consult as needed.    Thank you for allowing us to participate in this patient's care.   Mallory Chappell PharmD 05/22/2019 11:34 AM

## 2019-05-22 NOTE — PLAN OF CARE
Problem: Adult Inpatient Plan of Care  Goal: Plan of Care Review  Outcome: Ongoing (interventions implemented as appropriate)  Patient 's spo2 and breathing are better.

## 2019-05-22 NOTE — PLAN OF CARE
05/22/19 1010   Medicare Message   Important Message from Medicare regarding Discharge Appeal Rights Other (comments)  (patient unable to sign, no family present in room)   Date IMM was signed 05/22/19   Time IMM was signed 1010

## 2019-05-23 LAB
BACTERIA SPEC AEROBE CULT: NORMAL
BACTERIA SPEC AEROBE CULT: NORMAL
GRAM STN SPEC: NORMAL
GRAM STN SPEC: NORMAL

## 2019-05-23 PROCEDURE — 25000003 PHARM REV CODE 250: Performed by: NURSE PRACTITIONER

## 2019-05-23 PROCEDURE — 63600175 PHARM REV CODE 636 W HCPCS: Performed by: NURSE PRACTITIONER

## 2019-05-23 PROCEDURE — 99232 SBSQ HOSP IP/OBS MODERATE 35: CPT | Mod: ,,, | Performed by: INTERNAL MEDICINE

## 2019-05-23 PROCEDURE — 94799 UNLISTED PULMONARY SVC/PX: CPT

## 2019-05-23 PROCEDURE — 63600175 PHARM REV CODE 636 W HCPCS: Performed by: INTERNAL MEDICINE

## 2019-05-23 PROCEDURE — 99232 PR SUBSEQUENT HOSPITAL CARE,LEVL II: ICD-10-PCS | Mod: ,,, | Performed by: INTERNAL MEDICINE

## 2019-05-23 PROCEDURE — 25000003 PHARM REV CODE 250: Performed by: INTERNAL MEDICINE

## 2019-05-23 PROCEDURE — 94640 AIRWAY INHALATION TREATMENT: CPT

## 2019-05-23 PROCEDURE — 25000242 PHARM REV CODE 250 ALT 637 W/ HCPCS: Performed by: INTERNAL MEDICINE

## 2019-05-23 PROCEDURE — 25000003 PHARM REV CODE 250: Performed by: PHYSICIAN ASSISTANT

## 2019-05-23 PROCEDURE — 21400001 HC TELEMETRY ROOM

## 2019-05-23 PROCEDURE — 99900035 HC TECH TIME PER 15 MIN (STAT)

## 2019-05-23 PROCEDURE — 94761 N-INVAS EAR/PLS OXIMETRY MLT: CPT

## 2019-05-23 RX ORDER — MAGNESIUM SULFATE HEPTAHYDRATE 40 MG/ML
2 INJECTION, SOLUTION INTRAVENOUS ONCE
Status: COMPLETED | OUTPATIENT
Start: 2019-05-23 | End: 2019-05-23

## 2019-05-23 RX ADMIN — CLONIDINE HYDROCHLORIDE 0.1 MG: 0.1 TABLET ORAL at 06:05

## 2019-05-23 RX ADMIN — MAGNESIUM SULFATE IN WATER 2 G: 40 INJECTION, SOLUTION INTRAVENOUS at 11:05

## 2019-05-23 RX ADMIN — ATORVASTATIN CALCIUM 10 MG: 10 TABLET, FILM COATED ORAL at 08:05

## 2019-05-23 RX ADMIN — POLYETHYLENE GLYCOL 3350 17 G: 17 POWDER, FOR SOLUTION ORAL at 08:05

## 2019-05-23 RX ADMIN — TRAMADOL HYDROCHLORIDE 50 MG: 50 TABLET, COATED ORAL at 05:05

## 2019-05-23 RX ADMIN — IPRATROPIUM BROMIDE AND ALBUTEROL SULFATE 3 ML: .5; 3 SOLUTION RESPIRATORY (INHALATION) at 07:05

## 2019-05-23 RX ADMIN — ARFORMOTEROL TARTRATE 15 MCG: 15 SOLUTION RESPIRATORY (INHALATION) at 07:05

## 2019-05-23 RX ADMIN — LEVOFLOXACIN 750 MG: 750 INJECTION, SOLUTION INTRAVENOUS at 11:05

## 2019-05-23 RX ADMIN — TRAMADOL HYDROCHLORIDE 50 MG: 50 TABLET, COATED ORAL at 08:05

## 2019-05-23 RX ADMIN — IPRATROPIUM BROMIDE AND ALBUTEROL SULFATE 3 ML: .5; 3 SOLUTION RESPIRATORY (INHALATION) at 01:05

## 2019-05-23 RX ADMIN — TRAMADOL HYDROCHLORIDE 50 MG: 50 TABLET, COATED ORAL at 01:05

## 2019-05-23 RX ADMIN — PANTOPRAZOLE SODIUM 40 MG: 40 TABLET, DELAYED RELEASE ORAL at 08:05

## 2019-05-23 RX ADMIN — AMLODIPINE BESYLATE 10 MG: 10 TABLET ORAL at 08:05

## 2019-05-23 NOTE — PROGRESS NOTES
[facility]  Pulmonology  Progress Note    Patient Name: Ish Guardado  MRN: [id number]  Admission Date: 5/20/2019  Hospital Length of Stay: 3 days  Code Status: Prior  Attending Provider: Yobani Harris MD  Primary Care Provider: Pollo Arana MD   Principal Problem: Aspiration pneumonia of right middle lobe due to vomit    Subjective:       Interval History:   Seen and examined at bedside. Hospital chart reviewed. No acute interval detrimental events noted.Afebrile. He reports that he  is unchanged    Review of Systems   Constitutional: Positive for malaise/fatigue.   Respiratory: Negative.    Cardiovascular: Negative.    Psychiatric/Behavioral: Positive for substance abuse.       Objective:     Vital Signs (Most Recent):  Temp: 98 °F (36.7 °C) (05/23/19 0538)  Pulse: 66 (05/23/19 0540)  Resp: 18 (05/23/19 0538)  BP: (!) 185/93 (05/23/19 0540)  SpO2: (!) 93 % (05/23/19 0538) Vital Signs (24h Range):  Temp:  [96.6 °F (35.9 °C)-99.2 °F (37.3 °C)] 98 °F (36.7 °C)  Pulse:  [63-91] 66  Resp:  [18-22] 18  SpO2:  [92 %-96 %] 93 %  BP: (101-199)/(64-98) 185/93     Weight: 93.9 kg (207 lb 0.2 oz)  Body mass index is 30.57 kg/m².      Intake/Output Summary (Last 24 hours) at 5/23/2019 0704  Last data filed at 5/22/2019 2100  Gross per 24 hour   Intake 1220 ml   Output --   Net 1220 ml       Physical Exam   Constitutional: Vital signs are normal. He appears well-developed and well-nourished.       HENT:   Head: Normocephalic and atraumatic.   Eyes: Pupils are equal, round, and reactive to light. Conjunctivae and EOM are normal.   Neck: Normal range of motion. Neck supple.   Cardiovascular: Normal rate, regular rhythm, normal heart sounds and intact distal pulses.   Pulmonary/Chest: Effort normal. No stridor. He has no wheezes. He has no rales. He exhibits no tenderness.   Abdominal: Soft. Bowel sounds are normal.   Musculoskeletal: Normal range of motion. He exhibits no edema.   Neurological: He is  alert.   Skin: Skin is warm and dry. Capillary refill takes 2 to 3 seconds.   Nursing note and vitals reviewed.      Vents:  Vent Mode: A/C (05/21/19 0530)  Ventilator Initiated: Yes (05/20/19 0924)  Set Rate: 18 bmp (05/21/19 0530)  Vt Set: 500 mL (05/21/19 0530)  Pressure Support: 0 cmH20 (05/21/19 0530)  PEEP/CPAP: 8 cmH20 (05/21/19 0530)  Oxygen Concentration (%): 21 (05/22/19 0040)  Peak Airway Pressure: 18 cmH2O (05/21/19 0530)  Plateau Pressure: 0 cmH20 (05/21/19 0530)  Total Ve: 11.3 mL (05/21/19 0530)  F/VT Ratio<105 (RSBI): (!) 47.06 (05/21/19 0130)    Lines/Drains/Airways     Peripheral Intravenous Line                 Midline Catheter Insertion/Assessment  - Single Lumen 05/20/19 0950 Left basilic vein (medial side of arm) 18g x 10cm 2 days         Peripheral IV - Single Lumen 05/21/19 1641 20 G Anterior;Left;Proximal Forearm 1 day                Significant Labs:    CBC/Anemia Profile:  Recent Labs   Lab 05/22/19  0709   WBC 9.29   HGB 11.3*   HCT 35.0*      MCV 85   RDW 15.3*        Chemistries:  Recent Labs   Lab 05/22/19  0709      K 3.5      CO2 22*   BUN 17   CREATININE 0.8   CALCIUM 9.4   MG 1.5*       Blood Culture: No results for input(s): LABBLOO in the last 48 hours.  Respiratory Culture: No results for input(s): GSRESP, RESPIRATORYC in the last 48 hours.  All pertinent labs within the past 24 hours have been reviewed.    Significant Imaging:  I have reviewed and interpreted all pertinent imaging results/findings within the past 24 hours.      ABG  Recent Labs   Lab 05/22/19  0022   PH 7.421   PO2 26*   PCO2 40.5   HCO3 26.3   BE 2     Assessment/Plan:     * Aspiration pneumonia of right middle lobe due to vomit   Levaquin (PCN allergy)  D/C Vanc today:  blood cultures remain NGTD  Cont Duonebs and encourage C&DB and OOB  Blood and sputum cultures NGTD    Acute respiratory failure with hypoxia and hypercapnia     On RA  Keep SpO2 > 90%    Overdose of illicit drug   monitor  for withdrawal  Encourage cessation     Acute upper GI bleed     No bleeding since admit  Cont PPI BID       Encephalopathy, metabolic  resolved    Polysubstance abuse  Encourage cessation    Stage 1 mild COPD by GOLD classification  Follow with Dr Lobo  Continue Arfomoterol and Duoneb      Sign off call for any questions       I have reviewed all labs and imaging studies and compared to previous results. I have also discussed labs with all the teams in the medical care of the patient and my plan is outlined below        Cruzito Mcintosh MD  Pulmonology  [facility]

## 2019-05-23 NOTE — ASSESSMENT & PLAN NOTE
Possible GIB   Pt with coffea colored hemesis POD  H/H  Slowly trending down  Cont monitor H/H   PPI once daily  Transfuse as need

## 2019-05-23 NOTE — ASSESSMENT & PLAN NOTE
multifactorial : infection , withdrawal   Cont supportive tx  No neurological deficit   CT of Head was negative for acute findings  Neuro checks

## 2019-05-23 NOTE — SUBJECTIVE & OBJECTIVE
Interval History: Only wakes up briefly, does not speak but follows commands. When asked he attempts to raise arms to command. Returns to sleeping immediately. Wife says he woke up and ate but needed assistance.     Review of Systems   Unable to perform ROS: Mental status change     Objective:     Vital Signs (Most Recent):  Temp: 98.2 °F (36.8 °C) (05/23/19 1159)  Pulse: 80 (05/23/19 1330)  Resp: 20 (05/23/19 1330)  BP: 132/65 (05/23/19 1159)  SpO2: (!) 93 % (05/23/19 1330) Vital Signs (24h Range):  Temp:  [96.6 °F (35.9 °C)-98.6 °F (37 °C)] 98.2 °F (36.8 °C)  Pulse:  [63-91] 80  Resp:  [18-20] 20  SpO2:  [92 %-97 %] 93 %  BP: (101-199)/(64-93) 132/65     Weight: 93.9 kg (207 lb 0.2 oz)  Body mass index is 30.57 kg/m².    Intake/Output Summary (Last 24 hours) at 5/23/2019 1417  Last data filed at 5/23/2019 0822  Gross per 24 hour   Intake 1000 ml   Output --   Net 1000 ml      Physical Exam   Constitutional: Vital signs are normal. He appears well-developed and well-nourished. He appears lethargic.       HENT:   Head: Normocephalic and atraumatic.   Eyes: Pupils are equal, round, and reactive to light. Conjunctivae and EOM are normal.   Eyes are glazed over   Neck: Normal range of motion. Neck supple.   Cardiovascular: Normal rate, regular rhythm, normal heart sounds and intact distal pulses.   Pulmonary/Chest: Effort normal. He has decreased breath sounds. He has no wheezes.   Abdominal: Soft. Bowel sounds are normal.   Musculoskeletal: He exhibits no edema.   Generalized weakness   Neurological: He appears lethargic.   Skin: Skin is warm and dry. Capillary refill takes 2 to 3 seconds.   Psychiatric: His affect is inappropriate. His speech is not delayed. He is slowed. Cognition and memory are impaired. He expresses no suicidal ideation. He is inattentive.   Nursing note and vitals reviewed.      Significant Labs:   CBC:   Recent Labs   Lab 05/22/19  0709   WBC 9.29   HGB 11.3*   HCT 35.0*        CMP:    Recent Labs   Lab 05/22/19  0709      K 3.5      CO2 22*   GLU 98   BUN 17   CREATININE 0.8   CALCIUM 9.4   ANIONGAP 12   EGFRNONAA >60     All pertinent labs within the past 24 hours have been reviewed.    Significant Imaging: I have reviewed all pertinent imaging results/findings within the past 24 hours.

## 2019-05-23 NOTE — NURSING
Notified Dr. Harris that bladder scanner showed 378 cc present orders given to do straight cath once. Patient tolerated well

## 2019-05-23 NOTE — PROGRESS NOTES
[facility] Hospital Medicine  Progress Note    Patient Name: Ish Guardado  MRN: [id number]  Patient Class: IP- Inpatient   Admission Date: 5/20/2019  Length of Stay: 3 days  Attending Physician: Yobani Harris MD  Primary Care Provider: Pollo Arana MD        Subjective:     Principal Problem:Aspiration pneumonia of right middle lobe due to vomit    HPI:  57 y/o wm with a PMHx of COPD , GERD , Chronic cough , Drug abuse , HTN , HLD , PAD and Tobacco abuse presented to the ER with a c/o AMS since yesterday night . The history was taken from EMR and family members due to pt medical condition . Pt was found yesterday night inside his car   Confuse and altered . Pt was take to his house   By his son . He was responsive  but lethargic . Per family the  pt  Used amphetamine . This am he was found more conused and lethargic with red colored fluid coming through his nose and mouth . Per family pt was in his usual health of state before last night . They states he has a chronic cough  And Heartburn. The  denies any fever , sob , palpitation , weakness , chills , diarrhea , or gu problems  ER course: The ABG show respiratory acidosis , acute hypoxic and hypercapnic respiratory failure . Pt was intubated . The CXR show a right side infiltrate . WBC 14 , lactic acid  3 , troponin level wnl . He was started on Levaquin and vanc . He had 3 lt NS . He was started on levophed due to MAP > than 65 .   ER VS   Initial Vitals   BP Pulse Resp Temp SpO2   05/20/19 0900 05/20/19 0900 05/20/19 0926 -- 05/20/19 0900   139/62 106 (!) 25   (!) 89 %       Hospital Course:  Pt found altered and admitted with diagnoses of drug overdose (+ opiates, + amphetamines), combined respiratory failure, aspiration pneumonia, shock and concerns for GI bleed.   5/21 Pt was seen and examined at bedside . He is  Alert  But confuse .  He is off vasopressor . He was extubated this am .  He has a good UOP .  There was no acute event  overnight   5/22 Pt was seen and examined at bedside . He is aaox 1 .  He  Is confuse on and off .  Pt was placed 1 to 1 .  The ct head  from the admission day did not show any acute finding . Pt with a Known history of polysubstance abuse .  The blood pressure was elevated  And respond well to clonidine . He was started on tramadol 50 mg po tid .   5/23 - Wife has explained substance abuse x 1 year. Seroquel 25 mg and tramadol prescribed to prevent withdrawal. This AM, lethargic and arouses some and minimally follows commands. To hold seroquel due to lethargy - tramadol continued.  IV levaquin and DuoNeb treatments in progress. Pt to lethargic to participate with PT/OT - will attempt to increase activity tomorrow.  No signs of active bleeding noted. Spoke with wife regarding findings and plan of care.     Interval History: Only wakes up briefly, does not speak but follows commands. When asked he attempts to raise arms to command. Returns to sleeping immediately. Wife says he woke up and ate but needed assistance.     Review of Systems   Unable to perform ROS: Mental status change     Objective:     Vital Signs (Most Recent):  Temp: 98.2 °F (36.8 °C) (05/23/19 1159)  Pulse: 80 (05/23/19 1330)  Resp: 20 (05/23/19 1330)  BP: 132/65 (05/23/19 1159)  SpO2: (!) 93 % (05/23/19 1330) Vital Signs (24h Range):  Temp:  [96.6 °F (35.9 °C)-98.6 °F (37 °C)] 98.2 °F (36.8 °C)  Pulse:  [63-91] 80  Resp:  [18-20] 20  SpO2:  [92 %-97 %] 93 %  BP: (101-199)/(64-93) 132/65     Weight: 93.9 kg (207 lb 0.2 oz)  Body mass index is 30.57 kg/m².    Intake/Output Summary (Last 24 hours) at 5/23/2019 1417  Last data filed at 5/23/2019 0822  Gross per 24 hour   Intake 1000 ml   Output --   Net 1000 ml      Physical Exam   Constitutional: Vital signs are normal. He appears well-developed and well-nourished. He appears lethargic.       HENT:   Head: Normocephalic and atraumatic.   Eyes: Pupils are equal, round, and reactive to light.  Conjunctivae and EOM are normal.   Eyes are glazed over   Neck: Normal range of motion. Neck supple.   Cardiovascular: Normal rate, regular rhythm, normal heart sounds and intact distal pulses.   Pulmonary/Chest: Effort normal. He has decreased breath sounds. He has no wheezes.   Abdominal: Soft. Bowel sounds are normal.   Musculoskeletal: He exhibits no edema.   Generalized weakness   Neurological: He appears lethargic.   Skin: Skin is warm and dry. Capillary refill takes 2 to 3 seconds.   Psychiatric: His affect is inappropriate. His speech is not delayed. He is slowed. Cognition and memory are impaired. He expresses no suicidal ideation. He is inattentive.   Nursing note and vitals reviewed.      Significant Labs:   CBC:   Recent Labs   Lab 05/22/19  0709   WBC 9.29   HGB 11.3*   HCT 35.0*        CMP:   Recent Labs   Lab 05/22/19  0709      K 3.5      CO2 22*   GLU 98   BUN 17   CREATININE 0.8   CALCIUM 9.4   ANIONGAP 12   EGFRNONAA >60     All pertinent labs within the past 24 hours have been reviewed.    Significant Imaging: I have reviewed all pertinent imaging results/findings within the past 24 hours.    Assessment/Plan:      * Aspiration pneumonia of right middle lobe due to vomit  Cont IVAB  Supplemental oxygen  DuoNebs  IV levaquin        Encephalopathy, metabolic  multifactorial : infection , withdrawal   Cont supportive tx  No neurological deficit   CT of Head was negative for acute findings  Neuro checks      Overdose of illicit drug  Cont current tx   + amphetamines and + opiates      Acute upper GI bleed  Possible GIB   Pt with coffea colored hemesis POD  H/H  Slowly trending down  Cont monitor H/H   PPI once daily  Transfuse as need             Polysubstance abuse  Pt used opoid and amphetamine  Cont current tx    Started on tramadol 50 mg po tid to prevent withdrawal and clonidine prn for withdrawals   Seroquel given one evening 5/22 then stopped and pt was lethargic      Shock,  unspecified  Most likely due to PNA  S/p vasodepressor   Cont IVAB Levaquin and vanc   blood cx NGTD  S/p 3 lt ns           KEHINDE (acute kidney injury)  Most likely due to sepsis   Cont IVF  retroperitoneal US did not show any acute abnormality   Monitor UOP and kidney function   Improving     Acute respiratory failure with hypoxia and hypercapnia  2/2 to COPD and aspiration PNA   S/p mechanical ventilation   ICU /Pulmonology consulted   Supplemental oxygen to maintain sats > 92 %        HTN (hypertension)  Hold BP medication now  Due to sepsis   Resume accordantly   S/P levophed due to hypotension with a MAP<65  Resolved       PAD with remote right fem-distal bypass  Resume statin       Mixed hyperlipidemia  Resume statin       PAD (peripheral artery disease)  Hold Plavix for possible GIB  Resume statin       Stage 1 mild COPD by GOLD classification  Cont breathing tx - duonebs and formoterol        VTE Risk Mitigation (From admission, onward)        Ordered     Place sequential compression device  Until discontinued      05/20/19 1435     IP VTE HIGH RISK PATIENT  Once      05/20/19 1435              Elizabeth Bunn NP  Department of Hospital Medicine   [facility]

## 2019-05-23 NOTE — ASSESSMENT & PLAN NOTE
Pt used opoid and amphetamine  Cont current tx    Started on tramadol 50 mg po tid to prevent withdrawal and clonidine prn for withdrawals   Seroquel given one evening 5/22 then stopped and pt was lethargic

## 2019-05-23 NOTE — NURSING
To give clonidine 0.1 mg now due to morning kym elevated greater than 170 systolic. To continue to monitor

## 2019-05-23 NOTE — ASSESSMENT & PLAN NOTE
2/2 to COPD and aspiration PNA   S/p mechanical ventilation   ICU /Pulmonology consulted   Supplemental oxygen to maintain sats > 92 %

## 2019-05-23 NOTE — SUBJECTIVE & OBJECTIVE
Interval History:   Seen and examined at bedside. Hospital chart reviewed. No acute interval detrimental events noted.Afebrile. He reports that he  is unchanged    Review of Systems   Constitutional: Positive for malaise/fatigue.   Respiratory: Negative.    Cardiovascular: Negative.    Psychiatric/Behavioral: Positive for substance abuse.       Objective:     Vital Signs (Most Recent):  Temp: 98 °F (36.7 °C) (05/23/19 0538)  Pulse: 66 (05/23/19 0540)  Resp: 18 (05/23/19 0538)  BP: (!) 185/93 (05/23/19 0540)  SpO2: (!) 93 % (05/23/19 0538) Vital Signs (24h Range):  Temp:  [96.6 °F (35.9 °C)-99.2 °F (37.3 °C)] 98 °F (36.7 °C)  Pulse:  [63-91] 66  Resp:  [18-22] 18  SpO2:  [92 %-96 %] 93 %  BP: (101-199)/(64-98) 185/93     Weight: 93.9 kg (207 lb 0.2 oz)  Body mass index is 30.57 kg/m².      Intake/Output Summary (Last 24 hours) at 5/23/2019 0704  Last data filed at 5/22/2019 2100  Gross per 24 hour   Intake 1220 ml   Output --   Net 1220 ml       Physical Exam   Constitutional: Vital signs are normal. He appears well-developed and well-nourished.       HENT:   Head: Normocephalic and atraumatic.   Eyes: Pupils are equal, round, and reactive to light. Conjunctivae and EOM are normal.   Neck: Normal range of motion. Neck supple.   Cardiovascular: Normal rate, regular rhythm, normal heart sounds and intact distal pulses.   Pulmonary/Chest: Effort normal. No stridor. He has no wheezes. He has no rales. He exhibits no tenderness.   Abdominal: Soft. Bowel sounds are normal.   Musculoskeletal: Normal range of motion. He exhibits no edema.   Neurological: He is alert.   Skin: Skin is warm and dry. Capillary refill takes 2 to 3 seconds.   Nursing note and vitals reviewed.      Vents:  Vent Mode: A/C (05/21/19 0530)  Ventilator Initiated: Yes (05/20/19 0924)  Set Rate: 18 bmp (05/21/19 0530)  Vt Set: 500 mL (05/21/19 0530)  Pressure Support: 0 cmH20 (05/21/19 0530)  PEEP/CPAP: 8 cmH20 (05/21/19 0530)  Oxygen Concentration  (%): 21 (05/22/19 0040)  Peak Airway Pressure: 18 cmH2O (05/21/19 0530)  Plateau Pressure: 0 cmH20 (05/21/19 0530)  Total Ve: 11.3 mL (05/21/19 0530)  F/VT Ratio<105 (RSBI): (!) 47.06 (05/21/19 0130)    Lines/Drains/Airways     Peripheral Intravenous Line                 Midline Catheter Insertion/Assessment  - Single Lumen 05/20/19 0950 Left basilic vein (medial side of arm) 18g x 10cm 2 days         Peripheral IV - Single Lumen 05/21/19 1641 20 G Anterior;Left;Proximal Forearm 1 day                Significant Labs:    CBC/Anemia Profile:  Recent Labs   Lab 05/22/19  0709   WBC 9.29   HGB 11.3*   HCT 35.0*      MCV 85   RDW 15.3*        Chemistries:  Recent Labs   Lab 05/22/19  0709      K 3.5      CO2 22*   BUN 17   CREATININE 0.8   CALCIUM 9.4   MG 1.5*       Blood Culture: No results for input(s): LABBLOO in the last 48 hours.  Respiratory Culture: No results for input(s): GSRESP, RESPIRATORYC in the last 48 hours.  All pertinent labs within the past 24 hours have been reviewed.    Significant Imaging:  I have reviewed and interpreted all pertinent imaging results/findings within the past 24 hours.

## 2019-05-23 NOTE — PLAN OF CARE
Message received from primary nurse, Adalid . Patient's wife has questions regarding insurance coverage. Attempted to meet with her, she was not in room with patient. Per Adalid she will be back later this evening. Left her voice mail. CM will continue to follow.       05/23/19 1410   Post-Acute Status   Post-Acute Authorization Other   Other Status Community Services

## 2019-05-23 NOTE — NURSING
Patient reports patient vomitted up some of dinner. Nurse to ss md to see ifsomething can be ordered for bnausea vomitting. To continue to monitor

## 2019-05-24 LAB
ALBUMIN SERPL BCP-MCNC: 3.2 G/DL (ref 3.5–5.2)
ALP SERPL-CCNC: 93 U/L (ref 55–135)
ALT SERPL W/O P-5'-P-CCNC: 19 U/L (ref 10–44)
ANION GAP SERPL CALC-SCNC: 11 MMOL/L (ref 8–16)
AST SERPL-CCNC: 22 U/L (ref 10–40)
BASOPHILS # BLD AUTO: 0.04 K/UL (ref 0–0.2)
BASOPHILS NFR BLD: 0.5 % (ref 0–1.9)
BILIRUB SERPL-MCNC: 0.5 MG/DL (ref 0.1–1)
BUN SERPL-MCNC: 26 MG/DL (ref 6–20)
CALCIUM SERPL-MCNC: 10.1 MG/DL (ref 8.7–10.5)
CHLORIDE SERPL-SCNC: 103 MMOL/L (ref 95–110)
CO2 SERPL-SCNC: 23 MMOL/L (ref 23–29)
CREAT SERPL-MCNC: 0.9 MG/DL (ref 0.5–1.4)
DIFFERENTIAL METHOD: ABNORMAL
EOSINOPHIL # BLD AUTO: 0.2 K/UL (ref 0–0.5)
EOSINOPHIL NFR BLD: 2.1 % (ref 0–8)
ERYTHROCYTE [DISTWIDTH] IN BLOOD BY AUTOMATED COUNT: 14.9 % (ref 11.5–14.5)
EST. GFR  (AFRICAN AMERICAN): >60 ML/MIN/1.73 M^2
EST. GFR  (NON AFRICAN AMERICAN): >60 ML/MIN/1.73 M^2
GLUCOSE SERPL-MCNC: 97 MG/DL (ref 70–110)
HCT VFR BLD AUTO: 39.6 % (ref 40–54)
HGB BLD-MCNC: 12.9 G/DL (ref 14–18)
LYMPHOCYTES # BLD AUTO: 1.2 K/UL (ref 1–4.8)
LYMPHOCYTES NFR BLD: 14.2 % (ref 18–48)
MAGNESIUM SERPL-MCNC: 2.1 MG/DL (ref 1.6–2.6)
MCH RBC QN AUTO: 27.3 PG (ref 27–31)
MCHC RBC AUTO-ENTMCNC: 32.6 G/DL (ref 32–36)
MCV RBC AUTO: 84 FL (ref 82–98)
MONOCYTES # BLD AUTO: 0.7 K/UL (ref 0.3–1)
MONOCYTES NFR BLD: 8.7 % (ref 4–15)
NEUTROPHILS # BLD AUTO: 6.3 K/UL (ref 1.8–7.7)
NEUTROPHILS NFR BLD: 74.5 % (ref 38–73)
PLATELET # BLD AUTO: 332 K/UL (ref 150–350)
PMV BLD AUTO: 8.8 FL (ref 9.2–12.9)
POTASSIUM SERPL-SCNC: 3.7 MMOL/L (ref 3.5–5.1)
PROT SERPL-MCNC: 7.5 G/DL (ref 6–8.4)
RBC # BLD AUTO: 4.73 M/UL (ref 4.6–6.2)
SODIUM SERPL-SCNC: 137 MMOL/L (ref 136–145)
WBC # BLD AUTO: 8.43 K/UL (ref 3.9–12.7)

## 2019-05-24 PROCEDURE — 25000003 PHARM REV CODE 250: Performed by: INTERNAL MEDICINE

## 2019-05-24 PROCEDURE — 94640 AIRWAY INHALATION TREATMENT: CPT

## 2019-05-24 PROCEDURE — 94799 UNLISTED PULMONARY SVC/PX: CPT

## 2019-05-24 PROCEDURE — 63600175 PHARM REV CODE 636 W HCPCS: Performed by: INTERNAL MEDICINE

## 2019-05-24 PROCEDURE — 97530 THERAPEUTIC ACTIVITIES: CPT

## 2019-05-24 PROCEDURE — 97167 OT EVAL HIGH COMPLEX 60 MIN: CPT

## 2019-05-24 PROCEDURE — 25000242 PHARM REV CODE 250 ALT 637 W/ HCPCS: Performed by: INTERNAL MEDICINE

## 2019-05-24 PROCEDURE — 63600175 PHARM REV CODE 636 W HCPCS: Performed by: NURSE PRACTITIONER

## 2019-05-24 PROCEDURE — 25000003 PHARM REV CODE 250: Performed by: PHYSICIAN ASSISTANT

## 2019-05-24 PROCEDURE — 36415 COLL VENOUS BLD VENIPUNCTURE: CPT

## 2019-05-24 PROCEDURE — 94761 N-INVAS EAR/PLS OXIMETRY MLT: CPT

## 2019-05-24 PROCEDURE — 85025 COMPLETE CBC W/AUTO DIFF WBC: CPT

## 2019-05-24 PROCEDURE — 80053 COMPREHEN METABOLIC PANEL: CPT

## 2019-05-24 PROCEDURE — 83735 ASSAY OF MAGNESIUM: CPT

## 2019-05-24 PROCEDURE — 21400001 HC TELEMETRY ROOM

## 2019-05-24 PROCEDURE — 97163 PT EVAL HIGH COMPLEX 45 MIN: CPT

## 2019-05-24 RX ORDER — TRAMADOL HYDROCHLORIDE 50 MG/1
50 TABLET ORAL EVERY 8 HOURS PRN
Status: DISCONTINUED | OUTPATIENT
Start: 2019-05-24 | End: 2019-05-25

## 2019-05-24 RX ADMIN — IPRATROPIUM BROMIDE AND ALBUTEROL SULFATE 3 ML: .5; 3 SOLUTION RESPIRATORY (INHALATION) at 02:05

## 2019-05-24 RX ADMIN — IPRATROPIUM BROMIDE AND ALBUTEROL SULFATE 3 ML: .5; 3 SOLUTION RESPIRATORY (INHALATION) at 07:05

## 2019-05-24 RX ADMIN — ONDANSETRON 4 MG: 2 INJECTION INTRAMUSCULAR; INTRAVENOUS at 06:05

## 2019-05-24 RX ADMIN — IPRATROPIUM BROMIDE AND ALBUTEROL SULFATE 3 ML: .5; 3 SOLUTION RESPIRATORY (INHALATION) at 01:05

## 2019-05-24 RX ADMIN — AMLODIPINE BESYLATE 10 MG: 10 TABLET ORAL at 08:05

## 2019-05-24 RX ADMIN — LEVOFLOXACIN 750 MG: 750 INJECTION, SOLUTION INTRAVENOUS at 10:05

## 2019-05-24 RX ADMIN — ARFORMOTEROL TARTRATE 15 MCG: 15 SOLUTION RESPIRATORY (INHALATION) at 08:05

## 2019-05-24 RX ADMIN — ATORVASTATIN CALCIUM 10 MG: 10 TABLET, FILM COATED ORAL at 08:05

## 2019-05-24 RX ADMIN — POLYETHYLENE GLYCOL 3350 17 G: 17 POWDER, FOR SOLUTION ORAL at 08:05

## 2019-05-24 RX ADMIN — PANTOPRAZOLE SODIUM 40 MG: 40 TABLET, DELAYED RELEASE ORAL at 08:05

## 2019-05-24 RX ADMIN — ARFORMOTEROL TARTRATE 15 MCG: 15 SOLUTION RESPIRATORY (INHALATION) at 07:05

## 2019-05-24 NOTE — ASSESSMENT & PLAN NOTE
2/2 to COPD and aspiration PNA   S/p mechanical ventilation: resolved   ICU /Pulmonology consulted   Supplemental oxygen to maintain sats > 92 %

## 2019-05-24 NOTE — PT/OT/SLP EVAL
Occupational Therapy   Evaluation    Name: Ish Guardado  MRN: [id number]  Admitting Diagnosis:  Aspiration pneumonia of right middle lobe due to vomit      Recommendations:     Discharge Recommendations: nursing facility, skilled  Discharge Equipment Recommendations:  tub bench, walker, rolling(TBD)  Barriers to discharge:       Assessment:     Ish Guardado is a 58 y.o. male with a medical diagnosis of Aspiration pneumonia of right middle lobe due to vomit.  He presents with DEBILITY AND GENERALIZED WEAKNESS. Performance deficits affecting function: weakness, impaired endurance, impaired self care skills, impaired functional mobilty, gait instability, impaired balance, decreased upper extremity function.      Rehab Prognosis: Fair; patient would benefit from acute skilled OT services to address these deficits and reach maximum level of function.       Plan:     Patient to be seen 3 x/week to address the above listed problems via self-care/home management, therapeutic activities, therapeutic exercises  · Plan of Care Expires:    · Plan of Care Reviewed with: patient    Subjective     Chief Complaint: DEBILITY AND GENERALIZED WEAKNESS  Patient/Family Comments/goals:     Occupational Profile:  Living Environment: LIVES WITH SPOUSE 1STORY HOUSE  Previous level of function: (I) WITH ADL'S AND POSSIBLE STILL DRIVES  Roles and Routines: OCCUPATIONAL THERAPY  Equipment Used at Home:  (UNKNOWN DUE TO CONFUSION)  Assistance upon Discharge:     Pain/Comfort:  · Pain Rating 1: 0/10    Patients cultural, spiritual, Hoahaoism conflicts given the current situation:      Objective:     Communicated with: NURSE SAM AND [facility] CHART REVIEW prior to session.  Patient found HOB elevated with telemetry upon OT entry to room.    General Precautions: Standard, fall   Orthopedic Precautions:N/A   Braces: N/A     Occupational Performance:    Bed Mobility:    · Patient completed Rolling/Turning to Left with  moderate  assistance  · Patient completed Rolling/Turning to Right with moderate assistance  · Patient completed Scooting/Bridging with moderate assistance  · Patient completed Supine to Sit with moderate assistance  · Patient completed Sit to Supine with moderate assistance    Functional Mobility/Transfers:  · Patient completed Sit <> Stand Transfer with maximal assistance  with  hand-held assist     Activities of Daily Living:  · Upper Body Dressing: maximal assistance .  · Lower Body Dressing: moderate assistance .    Cognitive/Visual Perceptual:  Cognitive/Psychosocial Skills:     -       Oriented to: Person, Place, Time and Situation   -       Follows Commands/attention:Follows multistep  commands  -       Communication: clear/fluent  -       Memory: No Deficits noted  -       Safety awareness/insight to disability: impaired   Visual/Perceptual:      -Intact .    Physical Exam:  Upper Extremity Range of Motion:     -       Right Upper Extremity: WFL  -       Left Upper Extremity: Deficits: MMT APPROX 165 SHOULDER FLEX  Upper Extremity Strength:    -       Right Upper Extremity: MMT: 3/5 GROSSLY  -       Left Upper Extremity: MMT: 3/5 GROSSLY   Strength:    -       Right Upper Extremity: MMT: 3/5 GROSSLY  -       Left Upper Extremity: MMT: 3/5 GROSSLY    AMPAC 6 Click ADL:  AMPAC Total Score: 10    Treatment & Education:    Education:    Patient left up in chair with all lines intact, call button in reach, bed alarm on, NURSE STAFF notified and KENNEDI SYSTEM AND 1:1 CNA  present    GOALS:   Multidisciplinary Problems     Occupational Therapy Goals        Problem: Occupational Therapy Goal    Goal Priority Disciplines Outcome Interventions   Occupational Therapy Goal     OT, PT/OT     Description:  OT GOALS TO MET BY 5-31-19  MIN A WITH UE DRESSING  S WITH LE DRESSING  PT WILL TOLERATE 1 SET X 15 REPS B UE ROM EXERCISE WITH MIN A                    History:     Past Medical History:   Diagnosis Date    Drug abuse,  amphetamine type     wife states he has been using smoking crystal meth x 1 year    Femoral-popliteal bypass graft occlusion     Left and right    GERD (gastroesophageal reflux disease)     History of substance abuse     History of transient ischemic attack (TIA)     Hyperlipemia     Hypertension     Neuropathy     Occlusion of right femorotibial bypass graft 5/2017, 6/2017    Pre-diabetes     Status post femorotibial bypass        Past Surgical History:   Procedure Laterality Date    ANGIOGRAM-EXTREMITY-LOWER N/A 10/31/2017    [facility] C[facility] LAB    ANGIOGRAM-EXTREMITY-LOWER N/A 10/30/2017    Per[facility]er [facility] at [facility] CATH LAB    ANGIOGRAM-EXTREMITY-LOWER Right 5/24/2017    Per[facility] by Jack John [facility], [facility] at [facility] CATH LAB    back fusion      FEMORAL BYPASS      left fem-pop bypass 11/2014; right fem-pop bypass 9/2016, right fem-distal bypass 3/2017    LEG AMPUTATION THROUGH KNEE      RE-LOOK-PERIPHERAL Right 5/25/2017    Performed by [facility]it [facility] at [facility] CATH LAB       Time Tracking:     OT Date of Treatment: 05/24/19  OT Start Time: 1650  OT Stop Time: 1715  OT Total Time (min): 25 min    Billable Minutes:Evaluation 10 MINUTES  Therapeutic Activity 15 MINUTES    Macy Watts, OT  5/24/2019

## 2019-05-24 NOTE — PT/OT/SLP EVAL
Physical Therapy Evaluation    Patient Name:  Ish Guardado   MRN:  [id number]    Recommendations:     Discharge Recommendations:  nursing facility, skilled, home health PT(DEPENDING ON PROGRESS WITH POC)   Discharge Equipment Recommendations: tub bench   Barriers to discharge: None    Assessment:     Ish Guardado is a 58 y.o. male admitted with a medical diagnosis of Aspiration pneumonia of right middle lobe due to vomit.  He presents with the following impairments/functional limitations:  weakness, impaired endurance, impaired functional mobilty, gait instability, impaired balance, decreased coordination, impaired cognition, decreased lower extremity function, decreased safety awareness.    Rehab Prognosis: Good; patient would benefit from acute skilled PT services to address these deficits and reach maximum level of function.    Recent Surgery: * No surgery found *    Plan:     During this hospitalization, patient to be seen 5 x/week to address the identified rehab impairments via gait training, therapeutic activities, therapeutic exercises and progress toward the following goals:    · Plan of Care Expires:  05/31/19    Subjective     Chief Complaint:   Patient/Family Comments/goals:   Pain/Comfort:  · Pain Rating 1: 0/10    Patients cultural, spiritual, Evangelical conflicts given the current situation:      Living Environment:  PT IS POOR HISTORIAN, UNABLE TO OFFER HISTORY/PLOF CONSISTENTLY, PT REPORTS HE LIVES WITH PARENTS AND DOES NOT HAVE A WIFE, 1 STORY HOUSE, AMB WITH RW, INDEP WITH ADL'S, NO FAMILY PRESENT  Prior to admission, patients level of function was ?.  Equipment used at home: walker, rolling.  DME owned (not currently used): none.  Upon discharge, patient will have assistance from ?.    Objective:     Communicated with NURSE SAM prior to session.  Patient found supine with telemetry, peripheral IV(1:1 SITTER, KENNEDI SYS)  upon PT entry to room.    General Precautions: Standard, fall(R AKA)    Orthopedic Precautions:N/A   Braces: (RLE PROSTHESIS NOT PRESENT DAY OF EVAL)     Exams:  · Cognitive Exam:  Patient is oriented to Person and PT CONFUSED/DISORIENTED, LETHARGIC, CONFUSION IMPROVED MINIMALLY WITH SITTING AT EOB, ABLE TO FOLLOW SOME SIMPLE COMMANDS BUT DELAYED, SLURRED SPEECH/RANDOM CONVERSATION  · Postural Exam:  Patient presented with the following abnormalities:    · -       Rounded shoulders  · -       Forward head  · Sensation:    · -       Intact  · RLE ROM: WFL  · RLE Strength: GROSSLY 3/5  · LLE ROM: WFL  · LLE Strength: GROSSLY 4-/5    Functional Mobility:  · Bed Mobility:     · Rolling Left:  minimum assistance  · Rolling Right: minimum assistance  · Scooting: minimum assistance  · Bridging: minimum assistance  · Supine to Sit: minimum assistance  · Sit to Supine: minimum assistance  · Transfers:     · Sit to Stand:  maximal assistance with rolling walker  · Balance: POOR    Therapeutic Activities and Exercises:   PT EDUCATED IN ROLE OF P.T. AND POC, PT EDUCATED IN RW USE AND SAFETY DURING TF'S AND GAIT, PT CONFUSED/DISORIENTED, CUES TO STAY ON TASK, PT ABLE TO SUPINE SCOOT TOWARD HEAD OF BED WITH MAX DIRECTION, MAXA FOR SIT<>STAND STAND TF, PT UNABLE TO STAND FULLY ERECT ON LLE, QUICK TO FATIGUE    AM-PAC 6 CLICK MOBILITY  Total Score:11     Patient left supine with all lines intact, call button in reach, bed alarm on, NURSE notified and 1:1 STAFF present.    GOALS:   Multidisciplinary Problems     Physical Therapy Goals        Problem: Physical Therapy Goal    Goal Priority Disciplines Outcome Goal Variances Interventions   Physical Therapy Goal     PT, PT/OT      Description:  LTG'S TO BE MET IN 7 DAYS (5-31-19)  1. PT WILL REQUIRE CGA FOR BED MOBILITY  2. PT WILL REQUIRE [facility] FOR TF'S  3. PT WILL AMB 50' WITH RW AND DIEGO, RLE PROSTHESIS DONNED  4. PT WILL TOLERATE BLE THEREX X 20 REPS AROM                      History:     Past Medical History:   Diagnosis Date    Drug abuse,  amphetamine type     wife states he has been using smoking crystal meth x 1 year    Femoral-popliteal bypass graft occlusion     Left and right    GERD (gastroesophageal reflux disease)     History of substance abuse     History of transient ischemic attack (TIA)     Hyperlipemia     Hypertension     Neuropathy     Occlusion of right femorotibial bypass graft 5/2017, 6/2017    Pre-diabetes     Status post femorotibial bypass        Past Surgical History:   Procedure Laterality Date    ANGIOGRAM-EXTREMITY-LOWER N/A 10/31/2017    Performed [facility] CATH LAB    ANGIOGRAM-EXTREMITY-LOWER N/A 10/30/2017    Performed [facility]er [facility] at [facility] CATH LAB    ANGIOGRAM-EXTREMITY-LOWER Right 5/24/2017    Performed by Jack John IV, [facility] at [facility] CATH LAB    back fusion      FEMORAL BYPASS      left fem-pop bypass 11/2014; right fem-pop bypass 9/2016, right fem-distal bypass 3/2017    LEG AMPUTATION THROUGH KNEE      RE-LOOK-PERIPHERAL Right 5/25/2017    Performed by [facility]jit Mclean [facility] at [facility] CATH LAB       Time Tracking:     PT Received On: 05/24/19  PT Start Time: 0715     PT Stop Time: 0740  PT Total Time (min): 25 min     Billable Minutes: Evaluation 15 and Therapeutic Activity 10     PT ENCOURAGED TO CALL FOR ASSISTANCE WITH ALL NEEDS DUE TO FALL RISK STATUS, PT AGREEABLE    Maria Lorenzo, PT  05/24/2019

## 2019-05-24 NOTE — ASSESSMENT & PLAN NOTE
Cont IVAB  Supplemental oxygen  DuoNebs  Respiratory culture: STREPTOCOCCUS AGALACTIAE (GROUP B)   IV Levaquin downgraded to po  Pulmonary consult

## 2019-05-24 NOTE — NURSING
Fyi: patient's wife has called twice this shift to vheck patient's status. States she will be coming this morning but she had a family emergency and was in [place] at the hospital. To continue to monitor

## 2019-05-24 NOTE — PLAN OF CARE
Problem: Adult Inpatient Plan of Care  Goal: Plan of Care Review  Outcome: Ongoing (interventions implemented as appropriate)  Patient AAOX3 disoriented to situation. No c/o pain at this time. Patient intermittently stares into space, MD made aware.   Patient NSR during shift on Tele monitor. Patient's vitals wnl during shift. Family present at bedside. Will continue to monitor patient

## 2019-05-24 NOTE — PLAN OF CARE
05/24/19 1220   Discharge Reassessment   Assessment Type Discharge Planning Reassessment   Provided patient/caregiver education on the expected discharge date and the discharge plan Yes   Do you have any problems affording any of your prescribed medications? TBD   Discharge Plan A Home with family   DME Needed Upon Discharge  none   Patient choice form signed by patient/caregiver N/A   Anticipated Discharge Disposition Home   Can the patient answer the patient profile reliably? No, cognitively impaired   How does the patient rate their overall health at the present time? Fair   Describe the patient's ability to walk at the present time. Walks with the help of equipment   How often would a person be available to care for the patient? Often   Number of comorbid conditions (as recorded on the chart) Four

## 2019-05-24 NOTE — PLAN OF CARE
"Met with patient and his wife per request. Patient's wife was upset, she stated that the patient , her  did not recognize her today.Talked with patient for a few minutes. He did not recall any of the events of the past week, did not remember his stay in ICU nor our earlier conversation.  Asked patient if I could speak to his wife about his discharge plans, he was agreeable. Patient's wife and I went to the family conference room. Patient's wife asked if patient could go to a drug rehab directly from the hospital.  We discussed that patient would need to be able to participate at the rehab. Patient would need to be more functionally independent. Requested that she bring patient's prosthesis so that he would be able to work with PT. Offered to print list of in network inpatient substance abuse facilities. Patient's wife stated that she is overwhelmed at the moment and would just loose the list. I will place the list in patient's blue folder. Also gave her my contact # so that she would be able to call if she does loose the list or folder. She stated that her and their sons were ready to do an "intervention" . Encouraged her to have family involvement and support. Offered support and instructed her to call me with any additional questions or concerns.         05/24/19 1140   Post-Acute Status   Post-Acute Authorization Other   Other Status Community Services     "

## 2019-05-24 NOTE — PROGRESS NOTES
[facility] Hospital Medicine  Progress Note    Patient Name: Ish Guardado  MRN: [id number]  Patient Class: IP- Inpatient   Admission Date: 5/20/2019  Length of Stay: 4 days  Attending Physician: Yobani Harris MD  Primary Care Provider: Pollo Arana MD        Subjective:     Principal Problem:Aspiration pneumonia of right middle lobe due to vomit    HPI:  Ish Guardado is a 57 y/o wm with a PMHx of COPD , GERD , Chronic cough , Drug abuse , HTN , HLD , PAD and Tobacco abuse, who presented to the ER with a c/o AMS since yesterday night. The history was taken from EMR and family members due to pt medical condition . Pt was found yesterday night inside his car confused and altered . Pt was taken to his house by his son. He was responsive, but lethargic . Per family the patient used crystal methalamphetamine. This am he was found more conused and lethargic with red colored fluid coming through his nose and mouth . Per family pt was in his usual health of state before last night. They states he has a chronic cough and heartburn. The  denies any fever , sob , palpitation , weakness, chills, diarrhea, or gu problems.   ER course: The ABG show respiratory acidosis , acute hypoxic and hypercapnic respiratory failure . Pt was intubated . The CXR show a right side infiltrate . WBC 14 , lactic acid  3 , troponin level wnl . He was started on Levaquin and vanc . He had 3 lt NS . He was started on levophed due to MAP > than 65 .   ER VS   Initial Vitals   BP Pulse Resp Temp SpO2   05/20/19 0900 05/20/19 0900 05/20/19 0926 -- 05/20/19 0900   139/62 106 (!) 25   (!) 89 %   He was admitted to ICU with drug overdose, aspiration pneumonia. Wife, Rya Guardado, [phone] is the surrogate decision maker.       Hospital Course:  Pt found altered and admitted with diagnoses of drug overdose (+ opiates, + amphetamines), combined respiratory failure, aspiration pneumonia, shock and concerns for GI bleed.    5/21 Pt was seen and examined at bedside . He is  Alert  But confuse .  He is off vasopressor . He was extubated this am .  He has a good UOP .  There was no acute event overnight   5/22 Pt was seen and examined at bedside . He is aaox 1 .  He  Is confuse on and off .  Pt was placed 1 to 1 .  The ct head  from the admission day did not show any acute finding . Pt with a Known history of polysubstance abuse .  The blood pressure was elevated  And respond well to clonidine . He was started on tramadol 50 mg po tid .   5/23 - Wife has explained substance abuse x 1 year with smoking crystal Methelamphetamine. Seroquel 25 mg and tramadol prescribed to prevent withdrawal. This AM, lethargic and arouses some and minimally follows commands. To hold seroquel due to lethargy - tramadol continued.  IV levaquin and DuoNeb treatments in progress. Pt too lethargic to participate with PT/OT - will attempt to increase activity tomorrow.  No signs of active bleeding noted. Spoke with wife regarding findings and plan of care.   5/24/19 - Still confused. Didn't know wife's name and day of the week. Will stop scheduled tramadol.      No new subjective & objective note has been filed under this hospital service since the last note was generated.    Assessment/Plan:      * Aspiration pneumonia of right middle lobe due to vomit  Cont IVAB  Supplemental oxygen  DuoNebs  Respiratory culture: STREPTOCOCCUS AGALACTIAE (GROUP B)   IV Levaquin downgraded to po  Pulmonary consult        Overdose of illicit drug  Cont current tx   + amphetamines and + opiates      Polysubstance abuse  Pt used opoid and crystal methylamphetamine  Cont current tx    Started on tramadol 50 mg po tid to prevent withdrawal and clonidine prn for withdrawals: stopped 5/24/19 due to confusion   Seroquel given one evening 5/22 then stopped and pt was lethargic      Acute respiratory failure with hypoxia and hypercapnia  2/2 to COPD and aspiration PNA   S/p mechanical  ventilation: resolved   ICU /Pulmonology consulted   Supplemental oxygen to maintain sats > 92 %        Encephalopathy, metabolic  multifactorial : infection, withdrawal   Cont supportive tx  No neurological deficit   CT of Head was negative for acute findings  Neuro checks      Acute upper GI bleed  Possible GIB   Pt with coffea colored hemesis POD  H/H  Slowly trending down  Cont monitor H/H   PPI once daily  Transfuse as need             Shock, unspecified  Most likely due to PNA  S/p vasodepressor   Cont IVAB Levaquin and vanc   blood cx NGTD  S/p 3 lt ns           KEHINDE (acute kidney injury)  Most likely due to sepsis   Cont IVF  retroperitoneal US did not show any acute abnormality   Monitor UOP and kidney function   Improving     HTN (hypertension)  Hold BP medication now  Due to sepsis   Resume accordantly   S/P levophed due to hypotension with a MAP<65  Resolved       PAD with remote right fem-distal bypass  Resume statin       Mixed hyperlipidemia  Resume statin       PAD (peripheral artery disease)  Hold Plavix for possible GIB  Resume statin       Stage 1 mild COPD by GOLD classification  Cont breathing tx - duonebs and formoterol        VTE Risk Mitigation (From admission, onward)        Ordered     Place sequential compression device  Until discontinued      05/20/19 1435     IP VTE HIGH RISK PATIENT  Once      05/20/19 1435        Denisha Warner NP  Department of Hospital Medicine   [facility]

## 2019-05-24 NOTE — ASSESSMENT & PLAN NOTE
Pt used opoid and crystal methylamphetamine  Cont current tx    Started on tramadol 50 mg po tid to prevent withdrawal and clonidine prn for withdrawals: stopped 5/24/19 due to confusion   Seroquel given one evening 5/22 then stopped and pt was lethargic

## 2019-05-24 NOTE — ASSESSMENT & PLAN NOTE
multifactorial : infection, withdrawal   Cont supportive tx  No neurological deficit   CT of Head was negative for acute findings  Neuro checks

## 2019-05-25 LAB
ALBUMIN SERPL BCP-MCNC: 3.2 G/DL (ref 3.5–5.2)
ALP SERPL-CCNC: 90 U/L (ref 55–135)
ALT SERPL W/O P-5'-P-CCNC: 16 U/L (ref 10–44)
ANION GAP SERPL CALC-SCNC: 10 MMOL/L (ref 8–16)
AST SERPL-CCNC: 22 U/L (ref 10–40)
BACTERIA BLD CULT: NORMAL
BACTERIA BLD CULT: NORMAL
BASOPHILS # BLD AUTO: 0.03 K/UL (ref 0–0.2)
BASOPHILS NFR BLD: 0.3 % (ref 0–1.9)
BILIRUB SERPL-MCNC: 0.4 MG/DL (ref 0.1–1)
BUN SERPL-MCNC: 25 MG/DL (ref 6–20)
CALCIUM SERPL-MCNC: 9.9 MG/DL (ref 8.7–10.5)
CHLORIDE SERPL-SCNC: 103 MMOL/L (ref 95–110)
CO2 SERPL-SCNC: 25 MMOL/L (ref 23–29)
CREAT SERPL-MCNC: 0.9 MG/DL (ref 0.5–1.4)
DIFFERENTIAL METHOD: ABNORMAL
EOSINOPHIL # BLD AUTO: 0.3 K/UL (ref 0–0.5)
EOSINOPHIL NFR BLD: 3.6 % (ref 0–8)
ERYTHROCYTE [DISTWIDTH] IN BLOOD BY AUTOMATED COUNT: 15 % (ref 11.5–14.5)
EST. GFR  (AFRICAN AMERICAN): >60 ML/MIN/1.73 M^2
EST. GFR  (NON AFRICAN AMERICAN): >60 ML/MIN/1.73 M^2
GLUCOSE SERPL-MCNC: 119 MG/DL (ref 70–110)
HCT VFR BLD AUTO: 38.3 % (ref 40–54)
HGB BLD-MCNC: 12.6 G/DL (ref 14–18)
LYMPHOCYTES # BLD AUTO: 1.8 K/UL (ref 1–4.8)
LYMPHOCYTES NFR BLD: 18.4 % (ref 18–48)
MAGNESIUM SERPL-MCNC: 2.1 MG/DL (ref 1.6–2.6)
MCH RBC QN AUTO: 27.6 PG (ref 27–31)
MCHC RBC AUTO-ENTMCNC: 32.9 G/DL (ref 32–36)
MCV RBC AUTO: 84 FL (ref 82–98)
MONOCYTES # BLD AUTO: 0.9 K/UL (ref 0.3–1)
MONOCYTES NFR BLD: 9.5 % (ref 4–15)
NEUTROPHILS # BLD AUTO: 6.5 K/UL (ref 1.8–7.7)
NEUTROPHILS NFR BLD: 68.2 % (ref 38–73)
PLATELET # BLD AUTO: 313 K/UL (ref 150–350)
PMV BLD AUTO: 8.9 FL (ref 9.2–12.9)
POTASSIUM SERPL-SCNC: 3.5 MMOL/L (ref 3.5–5.1)
PROT SERPL-MCNC: 7.2 G/DL (ref 6–8.4)
RBC # BLD AUTO: 4.57 M/UL (ref 4.6–6.2)
SODIUM SERPL-SCNC: 138 MMOL/L (ref 136–145)
WBC # BLD AUTO: 9.56 K/UL (ref 3.9–12.7)

## 2019-05-25 PROCEDURE — 25000003 PHARM REV CODE 250: Performed by: NURSE PRACTITIONER

## 2019-05-25 PROCEDURE — 94640 AIRWAY INHALATION TREATMENT: CPT

## 2019-05-25 PROCEDURE — 83735 ASSAY OF MAGNESIUM: CPT

## 2019-05-25 PROCEDURE — 36415 COLL VENOUS BLD VENIPUNCTURE: CPT

## 2019-05-25 PROCEDURE — 25000003 PHARM REV CODE 250: Performed by: INTERNAL MEDICINE

## 2019-05-25 PROCEDURE — 85025 COMPLETE CBC W/AUTO DIFF WBC: CPT

## 2019-05-25 PROCEDURE — 25000242 PHARM REV CODE 250 ALT 637 W/ HCPCS: Performed by: INTERNAL MEDICINE

## 2019-05-25 PROCEDURE — 25000003 PHARM REV CODE 250: Performed by: PHYSICIAN ASSISTANT

## 2019-05-25 PROCEDURE — 63600175 PHARM REV CODE 636 W HCPCS: Performed by: INTERNAL MEDICINE

## 2019-05-25 PROCEDURE — 97530 THERAPEUTIC ACTIVITIES: CPT

## 2019-05-25 PROCEDURE — 21400001 HC TELEMETRY ROOM

## 2019-05-25 PROCEDURE — 80053 COMPREHEN METABOLIC PANEL: CPT

## 2019-05-25 PROCEDURE — 94761 N-INVAS EAR/PLS OXIMETRY MLT: CPT

## 2019-05-25 RX ORDER — ACETAMINOPHEN 325 MG/1
650 TABLET ORAL EVERY 6 HOURS PRN
Status: DISCONTINUED | OUTPATIENT
Start: 2019-05-25 | End: 2019-05-31 | Stop reason: HOSPADM

## 2019-05-25 RX ADMIN — IPRATROPIUM BROMIDE AND ALBUTEROL SULFATE 3 ML: .5; 3 SOLUTION RESPIRATORY (INHALATION) at 12:05

## 2019-05-25 RX ADMIN — ACETAMINOPHEN 650 MG: 325 TABLET ORAL at 03:05

## 2019-05-25 RX ADMIN — POLYETHYLENE GLYCOL 3350 17 G: 17 POWDER, FOR SOLUTION ORAL at 08:05

## 2019-05-25 RX ADMIN — ATORVASTATIN CALCIUM 10 MG: 10 TABLET, FILM COATED ORAL at 08:05

## 2019-05-25 RX ADMIN — AMLODIPINE BESYLATE 10 MG: 10 TABLET ORAL at 08:05

## 2019-05-25 RX ADMIN — ARFORMOTEROL TARTRATE 15 MCG: 15 SOLUTION RESPIRATORY (INHALATION) at 07:05

## 2019-05-25 RX ADMIN — TRAMADOL HYDROCHLORIDE 50 MG: 50 TABLET, COATED ORAL at 01:05

## 2019-05-25 RX ADMIN — LEVOFLOXACIN 750 MG: 750 INJECTION, SOLUTION INTRAVENOUS at 11:05

## 2019-05-25 RX ADMIN — ACETAMINOPHEN 650 MG: 325 TABLET ORAL at 08:05

## 2019-05-25 RX ADMIN — PANTOPRAZOLE SODIUM 40 MG: 40 TABLET, DELAYED RELEASE ORAL at 08:05

## 2019-05-25 RX ADMIN — IPRATROPIUM BROMIDE AND ALBUTEROL SULFATE 3 ML: .5; 3 SOLUTION RESPIRATORY (INHALATION) at 07:05

## 2019-05-25 RX ADMIN — IPRATROPIUM BROMIDE AND ALBUTEROL SULFATE 3 ML: .5; 3 SOLUTION RESPIRATORY (INHALATION) at 08:05

## 2019-05-25 RX ADMIN — ARFORMOTEROL TARTRATE 15 MCG: 15 SOLUTION RESPIRATORY (INHALATION) at 08:05

## 2019-05-25 NOTE — PLAN OF CARE
Problem: Adult Inpatient Plan of Care  Goal: Plan of Care Review  Outcome: Ongoing (interventions implemented as appropriate)  POC discussed with patient, verbalized understanding.   NSR on monitor.   VSS. Alert. Oriented to person. Requires reorienting to place, time, and situation.  Voids per brief.   Turns independently in bed.   Fall precautions in place.   Side rails up X2.    Avasys and sitter at bedside for safety.  Spoke with patient wife, Ray this AM. Stated that she will be bringing patient prosthesis for participation in PT.  Call bell on, working and within reach.   Bed locked in low position.   Remains free from falls/injuries.   Denies needs at this time.   Will continue to monitor.

## 2019-05-25 NOTE — PT/OT/SLP PROGRESS
Physical Therapy  Treatment    Ish Guardado   MRN: [id number]   Admitting Diagnosis: Aspiration pneumonia of right middle lobe due to vomit    PT Received On: 05/25/19  PT Start Time: 0942     PT Stop Time: 0957    PT Total Time (min): 15 min       Billable Minutes:  Therapeutic Activity 15 minutes    Treatment Type: Treatment  PT/PTA: PTA     PTA Visit Number: 1       General Precautions: Standard, fall  Orthopedic Precautions: (R LE prosthesis)   Braces:      Spiritual, Cultural Beliefs, Gnosticism Practices, Values that Affect Care: yes    Subjective:  Communicated with epic and nurse Crystal prior to session.      Pain/Comfort  Pain Rating 1: 0/10    Objective:   Patient found with: peripheral IV, telemetry    Functional Mobility:  Bed Mobility: mod assisy       Transfers:max assist x 2       Gait: unable       Stairs:n/a          Balance:   Static Sit: FAIR+: Able to take MINIMAL challenges from all directions  Dynamic Sit: FAIR+: Maintains balance through MINIMAL excursions of active trunk motion  Static Stand: 0: Needs MAXIMAL assist to maintain   Dynamic stand: 0: N/A     Therapeutic Activities and Exercises:  Attempted gait training with R LE prosthesis donned. Unable to advance. Sit to stand x 2 with RW, max assist x 2. Also scooting toward HOB with max assist and return to supine.      AM-PAC 6 CLICK MOBILITY  How much help from another person does this patient currently need?   1 = Unable, Total/Dependent Assistance  2 = A lot, Maximum/Moderate Assistance  3 = A little, Minimum/Contact Guard/Supervision  4 = None, Modified [place]/Independent    Turning over in bed (including adjusting bedclothes, sheets and blankets)?: 3  Sitting down on and standing up from a chair with arms (e.g., wheelchair, bedside commode, etc.): 1  Moving from lying on back to sitting on the side of the bed?: 2  Moving to and from a bed to a chair (including a wheelchair)?: 1  Need to walk in hospital room?: 1  Climbing 3-5  steps with a railing?: 1  Basic Mobility Total Score: 9    AM-PAC Raw Score CMS G-Code Modifier Level of Impairment Assistance   6 % Total / Unable   7 - 9 CM 80 - 100% Maximal Assist   10 - 14 CL 60 - 80% Moderate Assist   15 - 19 CK 40 - 60% Moderate Assist   20 - 22 CJ 20 - 40% Minimal Assist   23 CI 1-20% SBA / CGA   24 CH 0% Independent/ Mod I     Patient left supine with all lines intact, call button in reach, nursing notified and nursing present.    Assessment:  Ish Guardado is a 58 y.o. male with a medical diagnosis of Aspiration pneumonia of right middle lobe due to vomit.    Rehab identified problem list/impairments: Rehab identified problem list/impairments: weakness, impaired endurance, decreased safety awareness, decreased coordination, impaired balance, impaired self care skills, other (comment)(R LE prosthesis bieng fitted properly)    Rehab potential is fair.    Activity tolerance: Poor    Discharge recommendations: Discharge Facility/Level of Care Needs: rehabilitation facility, nursing facility, skilled(depending on progress)     Barriers to discharge:      Equipment recommendations:       GOALS:   Multidisciplinary Problems     Physical Therapy Goals        Problem: Physical Therapy Goal    Goal Priority Disciplines Outcome Goal Variances Interventions   Physical Therapy Goal     PT, PT/OT      Description:  LTG'S TO BE MET IN 7 DAYS (5-31-19)  1. PT WILL REQUIRE CGA FOR BED MOBILITY  2. PT WILL REQUIRE [facility] FOR TF'S  3. PT WILL AMB 50' WITH RW AND DIEGO, RLE PROSTHESIS DONNED  4. PT WILL TOLERATE BLE THEREX X 20 REPS AROM                      PLAN:    Patient to be seen 5 x/week  to address the above listed problems via gait training, therapeutic activities, therapeutic exercises  Plan of Care expires: 05/31/19  Plan of Care reviewed with: patient         Roosevelt Weiss, PTA  05/25/2019

## 2019-05-25 NOTE — ASSESSMENT & PLAN NOTE
multifactorial : infection, withdrawal   Cont supportive tx  No neurological deficit   CT of Head was negative for acute findings  Neuro checks  5/25/19  Remains confused   Continue Neuro checks

## 2019-05-25 NOTE — PROGRESS NOTES
[facility] Hospital Medicine  Progress Note    Patient Name: Ish Guardado  MRN: [id number]  Patient Class: IP- Inpatient   Admission Date: 5/20/2019  Length of Stay: 5 days  Attending Physician: Yobani Harris MD  Primary Care Provider: Pollo Arana MD        Subjective:     Principal Problem:Aspiration pneumonia of right middle lobe due to vomit    HPI:  Ish Guardado is a 59 y/o wm with a PMHx of COPD , GERD , Chronic cough , Drug abuse , HTN , HLD , PAD and Tobacco abuse, who presented to the ER with a c/o AMS since yesterday night. The history was taken from EMR and family members due to pt medical condition . Pt was found yesterday night inside his car confused and altered . Pt was taken to his house by his son. He was responsive, but lethargic . Per family the patient used crystal methalamphetamine. This am he was found more conused and lethargic with red colored fluid coming through his nose and mouth . Per family pt was in his usual health of state before last night. They states he has a chronic cough and heartburn. The  denies any fever , sob , palpitation , weakness, chills, diarrhea, or gu problems.   ER course: The ABG show respiratory acidosis , acute hypoxic and hypercapnic respiratory failure . Pt was intubated . The CXR show a right side infiltrate . WBC 14 , lactic acid  3 , troponin level wnl . He was started on Levaquin and vanc . He had 3 lt NS . He was started on levophed due to MAP > than 65 .   ER VS   Initial Vitals   BP Pulse Resp Temp SpO2   05/20/19 0900 05/20/19 0900 05/20/19 0926 -- 05/20/19 0900   139/62 106 (!) 25   (!) 89 %   He was admitted to ICU with drug overdose, aspiration pneumonia. Wife, Ray Guardado, [phone] is the surrogate decision maker.       Hospital Course:  Pt found altered and admitted with diagnoses of drug overdose (+ opiates, + amphetamines), combined respiratory failure, aspiration pneumonia, shock and concerns for GI bleed.    5/21 Pt was seen and examined at bedside . He is  Alert  But confuse .  He is off vasopressor . He was extubated this am .  He has a good UOP .  There was no acute event overnight   5/22 Pt was seen and examined at bedside . He is aaox 1 .  He  Is confuse on and off .  Pt was placed 1 to 1 .  The ct head  from the admission day did not show any acute finding . Pt with a Known history of polysubstance abuse .  The blood pressure was elevated  And respond well to clonidine . He was started on tramadol 50 mg po tid .   5/23 - Wife has explained substance abuse x 1 year with smoking crystal Methelamphetamine. Seroquel 25 mg and tramadol prescribed to prevent withdrawal. This AM, lethargic and arouses some and minimally follows commands. To hold seroquel due to lethargy - tramadol continued.  IV levaquin and DuoNeb treatments in progress. Pt too lethargic to participate with PT/OT - will attempt to increase activity tomorrow.  No signs of active bleeding noted. Spoke with wife regarding findings and plan of care.   5/24/19 - Still confused. Didn't know wife's name and day of the week. Will stop scheduled tramadol.  5/25/19-Patient remains confused. PT/OT recommends SNF. Social work consult for placement.     Interval History: Patient remains confused. PT/OT recommends SNF. Social work consult for placement.     Review of Systems   Unable to perform ROS: Mental status change     Objective:     Vital Signs (Most Recent):  Temp: 98.6 °F (37 °C) (05/25/19 1214)  Pulse: 79 (05/25/19 1315)  Resp: 18 (05/25/19 1214)  BP: (!) 166/85 (05/25/19 1214)  SpO2: (!) 94 % (05/25/19 1214) Vital Signs (24h Range):  Temp:  [97.4 °F (36.3 °C)-98.6 °F (37 °C)] 98.6 °F (37 °C)  Pulse:  [70-91] 79  Resp:  [16-18] 18  SpO2:  [90 %-95 %] 94 %  BP: (125-169)/(73-93) 166/85     Weight: 87.2 kg (192 lb 3.9 oz)  Body mass index is 28.39 kg/m².    Intake/Output Summary (Last 24 hours) at 5/25/2019 1529  Last data filed at 5/25/2019 0749  Gross per  24 hour   Intake 225 ml   Output 500 ml   Net -275 ml      Physical Exam   Constitutional: Vital signs are normal. He appears well-developed and well-nourished.       HENT:   Head: Normocephalic and atraumatic.   Eyes: Pupils are equal, round, and reactive to light. Conjunctivae and EOM are normal.   Neck: Normal range of motion. Neck supple.   Cardiovascular: Normal rate, regular rhythm, normal heart sounds and intact distal pulses.   Pulmonary/Chest: Effort normal. No stridor. He has no wheezes. He has no rales. He exhibits no tenderness.   Abdominal: Soft. Bowel sounds are normal.   Musculoskeletal: Normal range of motion. He exhibits no edema.   Neurological: He is alert.   Skin: Skin is warm and dry. Capillary refill takes 2 to 3 seconds.   Nursing note and vitals reviewed.      Significant Labs:   BMP:   Recent Labs   Lab 05/25/19  0459   *      K 3.5      CO2 25   BUN 25*   CREATININE 0.9   CALCIUM 9.9   MG 2.1     CBC:   Recent Labs   Lab 05/24/19  1453 05/25/19  0459   WBC 8.43 9.56   HGB 12.9* 12.6*   HCT 39.6* 38.3*    313     CMP:   Recent Labs   Lab 05/24/19  1453 05/25/19  0459    138   K 3.7 3.5    103   CO2 23 25   GLU 97 119*   BUN 26* 25*   CREATININE 0.9 0.9   CALCIUM 10.1 9.9   PROT 7.5 7.2   ALBUMIN 3.2* 3.2*   BILITOT 0.5 0.4   ALKPHOS 93 90   AST 22 22   ALT 19 16   ANIONGAP 11 10   EGFRNONAA >60 >60     All pertinent labs within the past 24 hours have been reviewed.    Significant Imaging:   Imaging Results          X-Ray Chest AP Portable (Final result)  Result time 05/20/19 22:13:56    Final result by Quincy Ruiz MD (05/20/19 22:13:56)                 Impression:      1. Right perihilar infiltrate, unchanged since 05/20/2019.  2. Endotracheal tube, nasogastric tube and right internal jugular line as detailed above.      Electronically signed by: Quincy Ruiz MD  Date:    05/20/2019  Time:    22:13             Narrative:     EXAMINATION:  XR CHEST AP PORTABLE    CLINICAL HISTORY:  ET tube placement;    COMPARISON:  05/20/2019 at 12:51 hours    FINDINGS:  Endotracheal tube is in place with the distal tip above the natan at the level of the clavicles.  No significant change in position in comparison with the prior exam.  Right internal jugular line identified with distal tip overlying the proximal SVC.  Nasogastric tube in place.  Distal tip overlies the proximal body of the stomach.  Mild, hazy right perihilar infiltrate is present and unchanged.  Left lung clear.  Heart size within normal limits.No significant bony findings.                               US Retroperitoneal Complete (Kidney and (Final result)  Result time 05/20/19 13:26:13    Final result by Paulino Pacheco MD (05/20/19 13:26:13)                 Impression:      No significant abnormality.      Electronically signed by: Paulino Pacheco  Date:    05/20/2019  Time:    13:26             Narrative:    EXAMINATION:  US RETROPERITONEAL COMPLETE    CLINICAL HISTORY:  dorothy;    TECHNIQUE:  Ultrasound of the kidneys and urinary bladder was performed including color flow and Doppler evaluation of the kidneys.    COMPARISON:  None.    FINDINGS:  Right kidney: The right kidney measures 11.3 cm. No cortical thinning. No loss of corticomedullary distinction. Resistive index measures 0.56.  No mass. No renal stone. No hydronephrosis.    Left kidney: The left kidney measures 10.9 cm. No cortical thinning. No loss of corticomedullary distinction. Resistive index measures 0.51.  No mass. No renal stone. No hydronephrosis.    Bladder collapsed around a Mejia catheter.                               X-Ray Chest AP Portable (Final result)  Result time 05/20/19 13:13:03    Final result by SERGE Albarran Sr., MD (05/20/19 13:13:03)                 Impression:      1. There has been interval placement of a right internal jugular venous line. The tip is in the superior vena cava.  There is no  pneumothorax.  2. There is a moderate amount of alveolar consolidation in the inferior 2/3 of the right lung.  This is characteristic of pneumonia.  3. The endotracheal and NG tubes remain in place.  .      Electronically signed by: Quincy Albarran MD  Date:    05/20/2019  Time:    13:13             Narrative:    EXAMINATION:  XR CHEST AP PORTABLE    CLINICAL HISTORY:  Encounter for adjustment and management of vascular access device    COMPARISON:  A chest x-ray performed at 10:04 on 05/20/2019.    FINDINGS:  There has been interval placement of a right internal jugular venous line.  The tip is in the superior vena cava.  The endotracheal and NG tubes remain in place.  The size of the heart is prominent.  There is a moderate amount of alveolar consolidation in the inferior 2/3 of the right lung.  The left lung is clear.  There is no pneumothorax.  The costophrenic angles are sharp.  There is a healed fracture in the lateral aspect of the right 6th rib.                               CT Head Without Contrast (Final result)  Result time 05/20/19 10:39:15    Final result by SERGE Albarran Sr., MD (05/20/19 10:39:15)                 Impression:      1. There are chronic appearing ischemic changes scattered throughout both cerebral hemispheres. There is no evidence of an acute ischemic event. There is no intracranial hemorrhage.  2. There is mild partial opacification of the right frontal and maxillary sinuses.  This is characteristic of sinusitis.  3. There is an old-appearing fracture of the medial wall of the right orbit.  All CT scans at this facility use dose modulation, iterative reconstruction, and/or weight base dosing when appropriate to reduce radiation dose when appropriate to reduce radiation dose to as low as reasonably achievable.      Electronically signed by: Quincy Albarran MD  Date:    05/20/2019  Time:    10:39             Narrative:    EXAMINATION:  CT HEAD WITHOUT CONTRAST    CLINICAL  HISTORY:  Confusion/delirium, altered LOC, unexplained;    TECHNIQUE:  Standard brain CT protocol without IV contrast was performed.    COMPARISON:  None    FINDINGS:  There are chronic appearing ischemic changes scattered throughout both cerebral hemispheres.  There is no evidence of an acute ischemic event.  There is no intracranial hemorrhage.  There is an old-appearing fracture of the medial wall of the right orbit.  There is mild partial opacification of the right frontal and maxillary sinuses.                               X-Ray Chest AP Portable (Final result)  Result time 05/20/19 10:10:54    Final result by Paulino Pacheco MD (05/20/19 10:10:54)                 Impression:      Patchy opacification at the right perihilar region and lung base suspicious for airspace disease/pneumonia. Left lung is clear. No pneumothorax. Likely tiny right pleural effusion.      Electronically signed by: Paulino Pacheco  Date:    05/20/2019  Time:    10:10             Narrative:    EXAMINATION:  XR CHEST AP PORTABLE    CLINICAL HISTORY:  . Hypoxemia    TECHNIQUE:  Single frontal portable view of the chest was performed.    COMPARISON:  11/01/2017    FINDINGS:  Support devices: Endotracheal tube tip projects 6.2 cm above the natan, satisfactory.  Esophagogastric tube also in satisfactory position.    Patchy opacification at the right perihilar region and lung base suspicious for airspace disease/pneumonia.  Left lung is clear.  No pneumothorax.  Likely tiny right pleural effusion.    The cardiac silhouette is normal in size. The hilar and mediastinal contours are unremarkable.    Bones are intact.                              Assessment/Plan:      * Aspiration pneumonia of right middle lobe due to vomit  Cont IVAB  Supplemental oxygen  DuoNebs  Respiratory culture: STREPTOCOCCUS AGALACTIAE (GROUP B)   IV Levaquin downgraded to po  Pulmonary consult  Blood cultures remain NGTD        Encephalopathy, metabolic  multifactorial :  infection, withdrawal   Cont supportive tx  No neurological deficit   CT of Head was negative for acute findings  Neuro checks  5/25/19  Remains confused   Continue Neuro checks       Overdose of illicit drug  Cont current tx   + amphetamines and + opiates      Acute upper GI bleed  Possible GIB   Pt with coffea colored hemesis POD  H/H  Slowly trending down  Cont monitor H/H   PPI once daily  Transfuse as need   5/25/19  -H/H stable             Polysubstance abuse  Pt used opoid and crystal methylamphetamine  Cont current tx    Started on tramadol 50 mg po tid to prevent withdrawal and clonidine prn for withdrawals: stopped 5/24/19 due to confusion   Seroquel given one evening 5/22 then stopped and pt was lethargic      Shock, unspecified  Most likely due to PNA  S/p vasodepressor   Cont IVAB Levaquin and vanc   blood cx NGTD  S/p 3 lt ns           KEHINDE (acute kidney injury)  Most likely due to sepsis   Cont IVF  retroperitoneal US did not show any acute abnormality   Monitor UOP and kidney function   Improving     Acute respiratory failure with hypoxia and hypercapnia  2/2 to COPD and aspiration PNA   S/p mechanical ventilation: resolved   ICU /Pulmonology consulted   Supplemental oxygen to maintain sats > 92 %  5/25/19  -Pt. On RA   -O2 sats 945      HTN (hypertension)  Hold BP medication now  Due to sepsis   Resume accordantly   S/P levophed due to hypotension with a MAP<65  Resolved       PAD with remote right fem-distal bypass  Resume statin       Mixed hyperlipidemia  Resume statin       PAD (peripheral artery disease)  Hold Plavix for possible GIB  Resume statin       Stage 1 mild COPD by GOLD classification  Cont breathing tx - duonebs and formoterol        VTE Risk Mitigation (From admission, onward)        Ordered     Place sequential compression device  Until discontinued      05/20/19 1435     IP VTE HIGH RISK PATIENT  Once      05/20/19 1435              Netta Webster NP  Department of Hospital  Medicine   [facility]

## 2019-05-25 NOTE — ASSESSMENT & PLAN NOTE
Cont IVAB  Supplemental oxygen  DuoNebs  Respiratory culture: STREPTOCOCCUS AGALACTIAE (GROUP B)   IV Levaquin downgraded to po  Pulmonary consult  Blood cultures remain NGTD

## 2019-05-25 NOTE — SUBJECTIVE & OBJECTIVE
Interval History: Patient remains confused. PT/OT recommends SNF. Social work consult for placement.     Review of Systems   Unable to perform ROS: Mental status change     Objective:     Vital Signs (Most Recent):  Temp: 98.6 °F (37 °C) (05/25/19 1214)  Pulse: 79 (05/25/19 1315)  Resp: 18 (05/25/19 1214)  BP: (!) 166/85 (05/25/19 1214)  SpO2: (!) 94 % (05/25/19 1214) Vital Signs (24h Range):  Temp:  [97.4 °F (36.3 °C)-98.6 °F (37 °C)] 98.6 °F (37 °C)  Pulse:  [70-91] 79  Resp:  [16-18] 18  SpO2:  [90 %-95 %] 94 %  BP: (125-169)/(73-93) 166/85     Weight: 87.2 kg (192 lb 3.9 oz)  Body mass index is 28.39 kg/m².    Intake/Output Summary (Last 24 hours) at 5/25/2019 1529  Last data filed at 5/25/2019 0749  Gross per 24 hour   Intake 225 ml   Output 500 ml   Net -275 ml      Physical Exam   Constitutional: Vital signs are normal. He appears well-developed and well-nourished.       HENT:   Head: Normocephalic and atraumatic.   Eyes: Pupils are equal, round, and reactive to light. Conjunctivae and EOM are normal.   Neck: Normal range of motion. Neck supple.   Cardiovascular: Normal rate, regular rhythm, normal heart sounds and intact distal pulses.   Pulmonary/Chest: Effort normal. No stridor. He has no wheezes. He has no rales. He exhibits no tenderness.   Abdominal: Soft. Bowel sounds are normal.   Musculoskeletal: Normal range of motion. He exhibits no edema.   Neurological: He is alert.   Skin: Skin is warm and dry. Capillary refill takes 2 to 3 seconds.   Nursing note and vitals reviewed.      Significant Labs:   BMP:   Recent Labs   Lab 05/25/19  0459   *      K 3.5      CO2 25   BUN 25*   CREATININE 0.9   CALCIUM 9.9   MG 2.1     CBC:   Recent Labs   Lab 05/24/19  1453 05/25/19  0459   WBC 8.43 9.56   HGB 12.9* 12.6*   HCT 39.6* 38.3*    313     CMP:   Recent Labs   Lab 05/24/19  1453 05/25/19  0459    138   K 3.7 3.5    103   CO2 23 25   GLU 97 119*   BUN 26* 25*   CREATININE  0.9 0.9   CALCIUM 10.1 9.9   PROT 7.5 7.2   ALBUMIN 3.2* 3.2*   BILITOT 0.5 0.4   ALKPHOS 93 90   AST 22 22   ALT 19 16   ANIONGAP 11 10   EGFRNONAA >60 >60     All pertinent labs within the past 24 hours have been reviewed.    Significant Imaging:   Imaging Results          X-Ray Chest AP Portable (Final result)  Result time 05/20/19 22:13:56    Final result by Quincy Ruiz MD (05/20/19 22:13:56)                 Impression:      1. Right perihilar infiltrate, unchanged since 05/20/2019.  2. Endotracheal tube, nasogastric tube and right internal jugular line as detailed above.      Electronically signed by: Quincy Ruiz MD  Date:    05/20/2019  Time:    22:13             Narrative:    EXAMINATION:  XR CHEST AP PORTABLE    CLINICAL HISTORY:  ET tube placement;    COMPARISON:  05/20/2019 at 12:51 hours    FINDINGS:  Endotracheal tube is in place with the distal tip above the natan at the level of the clavicles.  No significant change in position in comparison with the prior exam.  Right internal jugular line identified with distal tip overlying the proximal SVC.  Nasogastric tube in place.  Distal tip overlies the proximal body of the stomach.  Mild, hazy right perihilar infiltrate is present and unchanged.  Left lung clear.  Heart size within normal limits.No significant bony findings.                               US Retroperitoneal Complete (Kidney and (Final result)  Result time 05/20/19 13:26:13    Final result by Paulino Pacheco MD (05/20/19 13:26:13)                 Impression:      No significant abnormality.      Electronically signed by: Paulino Pacheco  Date:    05/20/2019  Time:    13:26             Narrative:    EXAMINATION:  US RETROPERITONEAL COMPLETE    CLINICAL HISTORY:  dorothy;    TECHNIQUE:  Ultrasound of the kidneys and urinary bladder was performed including color flow and Doppler evaluation of the kidneys.    COMPARISON:  None.    FINDINGS:  Right kidney: The right kidney measures 11.3  cm. No cortical thinning. No loss of corticomedullary distinction. Resistive index measures 0.56.  No mass. No renal stone. No hydronephrosis.    Left kidney: The left kidney measures 10.9 cm. No cortical thinning. No loss of corticomedullary distinction. Resistive index measures 0.51.  No mass. No renal stone. No hydronephrosis.    Bladder collapsed around a Mejia catheter.                               X-Ray Chest AP Portable (Final result)  Result time 05/20/19 13:13:03    Final result by SERGE Albarran Sr., MD (05/20/19 13:13:03)                 Impression:      1. There has been interval placement of a right internal jugular venous line. The tip is in the superior vena cava.  There is no pneumothorax.  2. There is a moderate amount of alveolar consolidation in the inferior 2/3 of the right lung.  This is characteristic of pneumonia.  3. The endotracheal and NG tubes remain in place.  .      Electronically signed by: Quincy Albarran MD  Date:    05/20/2019  Time:    13:13             Narrative:    EXAMINATION:  XR CHEST AP PORTABLE    CLINICAL HISTORY:  Encounter for adjustment and management of vascular access device    COMPARISON:  A chest x-ray performed at 10:04 on 05/20/2019.    FINDINGS:  There has been interval placement of a right internal jugular venous line.  The tip is in the superior vena cava.  The endotracheal and NG tubes remain in place.  The size of the heart is prominent.  There is a moderate amount of alveolar consolidation in the inferior 2/3 of the right lung.  The left lung is clear.  There is no pneumothorax.  The costophrenic angles are sharp.  There is a healed fracture in the lateral aspect of the right 6th rib.                               CT Head Without Contrast (Final result)  Result time 05/20/19 10:39:15    Final result by SERGE Albarran Sr., MD (05/20/19 10:39:15)                 Impression:      1. There are chronic appearing ischemic changes scattered throughout both  cerebral hemispheres. There is no evidence of an acute ischemic event. There is no intracranial hemorrhage.  2. There is mild partial opacification of the right frontal and maxillary sinuses.  This is characteristic of sinusitis.  3. There is an old-appearing fracture of the medial wall of the right orbit.  All CT scans at this facility use dose modulation, iterative reconstruction, and/or weight base dosing when appropriate to reduce radiation dose when appropriate to reduce radiation dose to as low as reasonably achievable.      Electronically signed by: Quincy Albarran MD  Date:    05/20/2019  Time:    10:39             Narrative:    EXAMINATION:  CT HEAD WITHOUT CONTRAST    CLINICAL HISTORY:  Confusion/delirium, altered LOC, unexplained;    TECHNIQUE:  Standard brain CT protocol without IV contrast was performed.    COMPARISON:  None    FINDINGS:  There are chronic appearing ischemic changes scattered throughout both cerebral hemispheres.  There is no evidence of an acute ischemic event.  There is no intracranial hemorrhage.  There is an old-appearing fracture of the medial wall of the right orbit.  There is mild partial opacification of the right frontal and maxillary sinuses.                               X-Ray Chest AP Portable (Final result)  Result time 05/20/19 10:10:54    Final result by Paulino Pacheco MD (05/20/19 10:10:54)                 Impression:      Patchy opacification at the right perihilar region and lung base suspicious for airspace disease/pneumonia. Left lung is clear. No pneumothorax. Likely tiny right pleural effusion.      Electronically signed by: Paulino Pacheco  Date:    05/20/2019  Time:    10:10             Narrative:    EXAMINATION:  XR CHEST AP PORTABLE    CLINICAL HISTORY:  . Hypoxemia    TECHNIQUE:  Single frontal portable view of the chest was performed.    COMPARISON:  11/01/2017    FINDINGS:  Support devices: Endotracheal tube tip projects 6.2 cm above the natan, satisfactory.   Esophagogastric tube also in satisfactory position.    Patchy opacification at the right perihilar region and lung base suspicious for airspace disease/pneumonia.  Left lung is clear.  No pneumothorax.  Likely tiny right pleural effusion.    The cardiac silhouette is normal in size. The hilar and mediastinal contours are unremarkable.    Bones are intact.

## 2019-05-25 NOTE — PLAN OF CARE
Problem: Physical Therapy Goal  Goal: Physical Therapy Goal  LTG'S TO BE MET IN 7 DAYS (5-31-19)  1. PT WILL REQUIRE CGA FOR BED MOBILITY  2. PT WILL REQUIRE DIEGO FOR TF'S  3. PT WILL AMB 50' WITH RW AND DIEGO, RLE PROSTHESIS DONNED  4. PT WILL TOLERATE BLE THEREX X 20 REPS AROM     Outcome: Ongoing (interventions implemented as appropriate)  Patient was max assist x 2 for sit to stand. Unable to advance with RW and R LE prosthesis. Very confused and unsafe with attempts to transfer or ambulate this session.

## 2019-05-25 NOTE — ASSESSMENT & PLAN NOTE
Possible GIB   Pt with coffea colored hemesis POD  H/H  Slowly trending down  Cont monitor H/H   PPI once daily  Transfuse as need   5/25/19  -H/H stable

## 2019-05-25 NOTE — ASSESSMENT & PLAN NOTE
2/2 to COPD and aspiration PNA   S/p mechanical ventilation: resolved   ICU /Pulmonology consulted   Supplemental oxygen to maintain sats > 92 %  5/25/19  -Pt. On RA   -O2 sats 945

## 2019-05-25 NOTE — PLAN OF CARE
Problem: Adult Inpatient Plan of Care  Goal: Patient-Specific Goal (Individualization)  Outcome: Ongoing (interventions implemented as appropriate)  Pt oriented to self only   avasys in place   Wife at bedside today   Neuro checks Q 4   Dc planning for rehab or SNF  Fall precautions in place

## 2019-05-26 PROBLEM — I63.9 ACUTE CVA (CEREBROVASCULAR ACCIDENT): Status: ACTIVE | Noted: 2019-05-26

## 2019-05-26 PROCEDURE — 25000003 PHARM REV CODE 250: Performed by: PHYSICIAN ASSISTANT

## 2019-05-26 PROCEDURE — 97110 THERAPEUTIC EXERCISES: CPT

## 2019-05-26 PROCEDURE — 25000003 PHARM REV CODE 250: Performed by: INTERNAL MEDICINE

## 2019-05-26 PROCEDURE — 63600175 PHARM REV CODE 636 W HCPCS: Performed by: INTERNAL MEDICINE

## 2019-05-26 PROCEDURE — 25000242 PHARM REV CODE 250 ALT 637 W/ HCPCS: Performed by: INTERNAL MEDICINE

## 2019-05-26 PROCEDURE — 25500020 PHARM REV CODE 255: Performed by: INTERNAL MEDICINE

## 2019-05-26 PROCEDURE — A9585 GADOBUTROL INJECTION: HCPCS | Performed by: INTERNAL MEDICINE

## 2019-05-26 PROCEDURE — 99223 1ST HOSP IP/OBS HIGH 75: CPT | Mod: ,,, | Performed by: PSYCHIATRY & NEUROLOGY

## 2019-05-26 PROCEDURE — 99223 PR INITIAL HOSPITAL CARE,LEVL III: ICD-10-PCS | Mod: ,,, | Performed by: PSYCHIATRY & NEUROLOGY

## 2019-05-26 PROCEDURE — 94640 AIRWAY INHALATION TREATMENT: CPT

## 2019-05-26 PROCEDURE — 21400001 HC TELEMETRY ROOM

## 2019-05-26 PROCEDURE — 94761 N-INVAS EAR/PLS OXIMETRY MLT: CPT

## 2019-05-26 RX ORDER — GADOBUTROL 604.72 MG/ML
10 INJECTION INTRAVENOUS
Status: COMPLETED | OUTPATIENT
Start: 2019-05-26 | End: 2019-05-26

## 2019-05-26 RX ADMIN — PANTOPRAZOLE SODIUM 40 MG: 40 TABLET, DELAYED RELEASE ORAL at 09:05

## 2019-05-26 RX ADMIN — ATORVASTATIN CALCIUM 10 MG: 10 TABLET, FILM COATED ORAL at 09:05

## 2019-05-26 RX ADMIN — ARFORMOTEROL TARTRATE 15 MCG: 15 SOLUTION RESPIRATORY (INHALATION) at 07:05

## 2019-05-26 RX ADMIN — GADOBUTROL 8 ML: 604.72 INJECTION INTRAVENOUS at 02:05

## 2019-05-26 RX ADMIN — IPRATROPIUM BROMIDE AND ALBUTEROL SULFATE 3 ML: .5; 3 SOLUTION RESPIRATORY (INHALATION) at 12:05

## 2019-05-26 RX ADMIN — LEVOFLOXACIN 750 MG: 750 INJECTION, SOLUTION INTRAVENOUS at 10:05

## 2019-05-26 RX ADMIN — IPRATROPIUM BROMIDE AND ALBUTEROL SULFATE 3 ML: .5; 3 SOLUTION RESPIRATORY (INHALATION) at 07:05

## 2019-05-26 RX ADMIN — POLYETHYLENE GLYCOL 3350 17 G: 17 POWDER, FOR SOLUTION ORAL at 09:05

## 2019-05-26 RX ADMIN — AMLODIPINE BESYLATE 10 MG: 10 TABLET ORAL at 09:05

## 2019-05-26 NOTE — PROGRESS NOTES
Patient seen and examined. Spoke with wife on the phone reviewing labs, imaging, plan for SNF. ppt

## 2019-05-26 NOTE — PLAN OF CARE
Problem: Adult Inpatient Plan of Care  Goal: Plan of Care Review  Outcome: Ongoing (interventions implemented as appropriate)  POC discussed with patient, verbalized understanding.   NSR on monitor. VSS. Alert and oriented to self; requires frequent reorienting to place, time, and situation.   Voids per brief.   Turns independently in bed. Numerous attempts made to get out of bed. Patient pulled IV out. IV found in bed.  Redirects easily at times.   Bed alarm on and working.  Fall precautions in place.   Side rails up X2.    Call bell on, working and within reach.   Bed locked in low position.   Remains free from falls/injuries.   Denies needs at this time.   Will continue to monitor.

## 2019-05-26 NOTE — PLAN OF CARE
Problem: Adult Inpatient Plan of Care  Goal: Patient-Specific Goal (Individualization)  Outcome: Ongoing (interventions implemented as appropriate)  Pt oriented to self only   avasys in place   SR on tele  Wife at bedside today   Neuro checks Q 4   MRI today , positive for infarcts   Neuro consulted   CT ordered per neuro for tomorrow  Carotid US pending   PT/OT   ST ordered for AM   Levaquin tx completed today     fall precautions in place  Bed alarm activated

## 2019-05-26 NOTE — PROGRESS NOTES
[facility] Hospital Medicine  Progress Note    Patient Name: Ish Guardado  MRN: [id number]  Patient Class: IP- Inpatient   Admission Date: 5/20/2019  Length of Stay: 6 days  Attending Physician: Yobani Harris MD  Primary Care Provider: Pollo Arana MD    Subjective:     Principal Problem:Aspiration pneumonia of right middle lobe due to vomit    HPI:  Ish Guardado is a 57 y/o wm with a PMHx of COPD , GERD , Chronic cough , Drug abuse , HTN , HLD , PAD and Tobacco abuse, who presented to the ER with a c/o AMS since yesterday night. The history was taken from EMR and family members due to pt medical condition . Pt was found yesterday night inside his car confused and altered . Pt was taken to his house by his son. He was responsive, but lethargic . Per family the patient used crystal methalamphetamine. This am he was found more conused and lethargic with red colored fluid coming through his nose and mouth . Per family pt was in his usual health of state before last night. They states he has a chronic cough and heartburn. The  denies any fever , sob , palpitation , weakness, chills, diarrhea, or gu problems.   ER course: The ABG show respiratory acidosis , acute hypoxic and hypercapnic respiratory failure . Pt was intubated . The CXR show a right side infiltrate . WBC 14 , lactic acid  3 , troponin level wnl . He was started on Levaquin and vanc . He had 3 lt NS . He was started on levophed due to MAP > than 65 .   ER VS   Initial Vitals   BP Pulse Resp Temp SpO2   05/20/19 0900 05/20/19 0900 05/20/19 0926 -- 05/20/19 0900   139/62 106 (!) 25   (!) 89 %   He was admitted to ICU with drug overdose, aspiration pneumonia. Wife, Ray Guardado, [phone] is the surrogate decision maker.       Hospital Course:  Pt found altered and admitted with diagnoses of drug overdose (+ opiates, + amphetamines), combined respiratory failure, aspiration pneumonia, shock and concerns for GI bleed.   5/21  Pt was seen and examined at bedside . He is  Alert  But confuse .  He is off vasopressor . He was extubated this am .  He has a good UOP .  There was no acute event overnight   5/22 Pt was seen and examined at bedside . He is aaox 1 .  He  Is confuse on and off .  Pt was placed 1 to 1 .  The ct head  from the admission day did not show any acute finding . Pt with a Known history of polysubstance abuse .  The blood pressure was elevated  And respond well to clonidine . He was started on tramadol 50 mg po tid .   5/23 - Wife has explained substance abuse x 1 year with smoking crystal Methelamphetamine. Seroquel 25 mg and tramadol prescribed to prevent withdrawal. This AM, lethargic and arouses some and minimally follows commands. To hold seroquel due to lethargy - tramadol continued.  IV levaquin and DuoNeb treatments in progress. Pt too lethargic to participate with PT/OT - will attempt to increase activity tomorrow.  No signs of active bleeding noted. Spoke with wife regarding findings and plan of care.   5/24/19 - Still confused. Didn't know wife's name and day of the week. Will stop scheduled tramadol.  5/25/19-Patient remains confused. PT/OT recommends SNF. Social work consult for placement.   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 Dr. Valentino continue ASA/Plavix, Carotid u/s, CT Head without contrast. Attempted to contact wife for update.    Interval History: MRI showed acute CVA.     Review of Systems   Unable to perform ROS: Mental status change     Objective:     Vital Signs (Most Recent):  Temp: 99.3 °F (37.4 °C) (05/26/19 1605)  Pulse: 79 (05/26/19 1605)  Resp: 18 (05/26/19 1605)  BP: (!) 140/75 (05/26/19 1605)  SpO2: 95 % (05/26/19 1605) Vital Signs (24h Range):  Temp:  [98.2 °F (36.8 °C)-99.3 °F (37.4 °C)] 99.3 °F (37.4 °C)  Pulse:  [60-97] 79  Resp:  [16-20] 18  SpO2:  [93 %-98 %] 95 %  BP: (120-168)/(75-84)  140/75     Weight: 87.2 kg (192 lb 3.9 oz)  Body mass index is 28.39 kg/m².    Intake/Output Summary (Last 24 hours) at 5/26/2019 1645  Last data filed at 5/25/2019 1800  Gross per 24 hour   Intake 120 ml   Output --   Net 120 ml      Physical Exam   Constitutional: Vital signs are normal. He appears well-developed and well-nourished. He appears lethargic.       HENT:   Head: Normocephalic and atraumatic.   Nose: Nose normal.   Eyes: Pupils are equal, round, and reactive to light. Conjunctivae and EOM are normal.   Eyes are glazed over   Neck: Normal range of motion. Neck supple. No JVD present. No tracheal deviation present. No thyromegaly present.   Cardiovascular: Normal rate, regular rhythm, normal heart sounds and intact distal pulses. Exam reveals no gallop and no friction rub.   No murmur heard.  Pulmonary/Chest: Effort normal. No stridor. No respiratory distress. He has decreased breath sounds. He has no wheezes. He has no rales. He exhibits no tenderness.   Abdominal: Soft. Bowel sounds are normal. He exhibits no distension. There is no tenderness. There is no rebound and no guarding.   Musculoskeletal: Normal range of motion. He exhibits no edema, tenderness or deformity.   Generalized weakness   Neurological: He has normal reflexes. He appears lethargic. No cranial nerve deficit. Coordination normal.   Reflex Scores:       Tricep reflexes are 2+ on the right side and 2+ on the left side.       Bicep reflexes are 2+ on the right side and 2+ on the left side.       Brachioradialis reflexes are 2+ on the right side and 2+ on the left side.       Patellar reflexes are 2+ on the right side and 2+ on the left side.       Achilles reflexes are 2+ on the right side and 2+ on the left side.  He is confused. He talks but does not follow directions and commands. He moves his extremities but appeared to me that he is weaker on the right side. Not sure about sensory deficits.     Skin: Skin is warm and dry. Capillary  refill takes 2 to 3 seconds. No rash noted. No erythema. No pallor.   Psychiatric: Judgment and thought content normal. His affect is inappropriate. His speech is not delayed. He is slowed. Cognition and memory are impaired. He expresses no suicidal ideation. He is inattentive.   Nursing note and vitals reviewed.    Significant Labs:   CBC:   Recent Labs   Lab 05/25/19 0459   WBC 9.56   HGB 12.6*   HCT 38.3*        CMP:   Recent Labs   Lab 05/25/19 0459      K 3.5      CO2 25   *   BUN 25*   CREATININE 0.9   CALCIUM 9.9   PROT 7.2   ALBUMIN 3.2*   BILITOT 0.4   ALKPHOS 90   AST 22   ALT 16   ANIONGAP 10   EGFRNONAA >60     Cardiac Markers: No results for input(s): CKMB, MYOGLOBIN, BNP, TROPISTAT in the last 48 hours.    Significant Imaging: I have reviewed all pertinent imaging results/findings within the past 24 hours.   Imaging Results          X-Ray Chest AP Portable (Final result)  Result time 05/20/19 22:13:56    Final result by Quincy Ruiz MD (05/20/19 22:13:56)                 Impression:      1. Right perihilar infiltrate, unchanged since 05/20/2019.  2. Endotracheal tube, nasogastric tube and right internal jugular line as detailed above.      Electronically signed by: Quincy Ruiz MD  Date:    05/20/2019  Time:    22:13             Narrative:    EXAMINATION:  XR CHEST AP PORTABLE    CLINICAL HISTORY:  ET tube placement;    COMPARISON:  05/20/2019 at 12:51 hours    FINDINGS:  Endotracheal tube is in place with the distal tip above the natan at the level of the clavicles.  No significant change in position in comparison with the prior exam.  Right internal jugular line identified with distal tip overlying the proximal SVC.  Nasogastric tube in place.  Distal tip overlies the proximal body of the stomach.  Mild, hazy right perihilar infiltrate is present and unchanged.  Left lung clear.  Heart size within normal limits.No significant bony findings.                                US Retroperitoneal Complete (Kidney and (Final result)  Result time 05/20/19 13:26:13    Final result by Paulino Pacheco MD (05/20/19 13:26:13)                 Impression:      No significant abnormality.      Electronically signed by: Paulino Pacheco  Date:    05/20/2019  Time:    13:26             Narrative:    EXAMINATION:  US RETROPERITONEAL COMPLETE    CLINICAL HISTORY:  dorothy;    TECHNIQUE:  Ultrasound of the kidneys and urinary bladder was performed including color flow and Doppler evaluation of the kidneys.    COMPARISON:  None.    FINDINGS:  Right kidney: The right kidney measures 11.3 cm. No cortical thinning. No loss of corticomedullary distinction. Resistive index measures 0.56.  No mass. No renal stone. No hydronephrosis.    Left kidney: The left kidney measures 10.9 cm. No cortical thinning. No loss of corticomedullary distinction. Resistive index measures 0.51.  No mass. No renal stone. No hydronephrosis.    Bladder collapsed around a Mejia catheter.                               X-Ray Chest AP Portable (Final result)  Result time 05/20/19 13:13:03    Final result by SERGE Albarran Sr., MD (05/20/19 13:13:03)                 Impression:      1. There has been interval placement of a right internal jugular venous line. The tip is in the superior vena cava.  There is no pneumothorax.  2. There is a moderate amount of alveolar consolidation in the inferior 2/3 of the right lung.  This is characteristic of pneumonia.  3. The endotracheal and NG tubes remain in place.  .      Electronically signed by: Quincy Albarran MD  Date:    05/20/2019  Time:    13:13             Narrative:    EXAMINATION:  XR CHEST AP PORTABLE    CLINICAL HISTORY:  Encounter for adjustment and management of vascular access device    COMPARISON:  A chest x-ray performed at 10:04 on 05/20/2019.    FINDINGS:  There has been interval placement of a right internal jugular venous line.  The tip is in the superior vena cava.  The  endotracheal and NG tubes remain in place.  The size of the heart is prominent.  There is a moderate amount of alveolar consolidation in the inferior 2/3 of the right lung.  The left lung is clear.  There is no pneumothorax.  The costophrenic angles are sharp.  There is a healed fracture in the lateral aspect of the right 6th rib.                               CT Head Without Contrast (Final result)  Result time 05/20/19 10:39:15    Final result by SERGE Albarran Sr., MD (05/20/19 10:39:15)                 Impression:      1. There are chronic appearing ischemic changes scattered throughout both cerebral hemispheres. There is no evidence of an acute ischemic event. There is no intracranial hemorrhage.  2. There is mild partial opacification of the right frontal and maxillary sinuses.  This is characteristic of sinusitis.  3. There is an old-appearing fracture of the medial wall of the right orbit.  All CT scans at this facility use dose modulation, iterative reconstruction, and/or weight base dosing when appropriate to reduce radiation dose when appropriate to reduce radiation dose to as low as reasonably achievable.      Electronically signed by: Quincy Albarran MD  Date:    05/20/2019  Time:    10:39             Narrative:    EXAMINATION:  CT HEAD WITHOUT CONTRAST    CLINICAL HISTORY:  Confusion/delirium, altered LOC, unexplained;    TECHNIQUE:  Standard brain CT protocol without IV contrast was performed.    COMPARISON:  None    FINDINGS:  There are chronic appearing ischemic changes scattered throughout both cerebral hemispheres.  There is no evidence of an acute ischemic event.  There is no intracranial hemorrhage.  There is an old-appearing fracture of the medial wall of the right orbit.  There is mild partial opacification of the right frontal and maxillary sinuses.                               X-Ray Chest AP Portable (Final result)  Result time 05/20/19 10:10:54    Final result by Paulino Pacheco MD  (05/20/19 10:10:54)                 Impression:      Patchy opacification at the right perihilar region and lung base suspicious for airspace disease/pneumonia. Left lung is clear. No pneumothorax. Likely tiny right pleural effusion.      Electronically signed by: Paulino Pacheco  Date:    05/20/2019  Time:    10:10             Narrative:    EXAMINATION:  XR CHEST AP PORTABLE    CLINICAL HISTORY:  . Hypoxemia    TECHNIQUE:  Single frontal portable view of the chest was performed.    COMPARISON:  11/01/2017    FINDINGS:  Support devices: Endotracheal tube tip projects 6.2 cm above the natan, satisfactory.  Esophagogastric tube also in satisfactory position.    Patchy opacification at the right perihilar region and lung base suspicious for airspace disease/pneumonia.  Left lung is clear.  No pneumothorax.  Likely tiny right pleural effusion.    The cardiac silhouette is normal in size. The hilar and mediastinal contours are unremarkable.    Bones are intact.                              Assessment/Plan:      * Aspiration pneumonia of right middle lobe due to vomit  Cont IVAB  Supplemental oxygen  DuoNebs  Respiratory culture: STREPTOCOCCUS AGALACTIAE (GROUP B)   IV Levaquin downgraded to po and Completed course  Pulmonary consult  Blood cultures remain NGTD        Acute CVA (cerebrovascular accident)  -MRI: multifocal bilateral cerebral and cerebellar acute infarcts some of which demonstrate hemorrhagic conversion  -Neurology consult    Encephalopathy, metabolic  multifactorial : infection, withdrawal   5/26/19: MRI brain shows acute multi CVA  Cont supportive tx  No neurological deficit   CT of Head was negative for acute findings  Neuro checks  5/26/19  Remains confused   Continue Neuro checks   Neurology consult    Overdose of illicit drug  Cont current tx   + amphetamines and + opiates      Polysubstance abuse  Pt used opoid and crystal methylamphetamine  Cont current tx    Started on tramadol 50 mg po tid to prevent  withdrawal and clonidine prn for withdrawals: stopped 5/24/19 due to confusion   Seroquel given one evening 5/22 then stopped and pt was lethargic      Acute respiratory failure with hypoxia and hypercapnia  2/2 to COPD and aspiration PNA   S/p mechanical ventilation: resolved   ICU /Pulmonology consulted   Supplemental oxygen to maintain sats > 92 %  5/25/19  -Pt. On RA   -O2 sats 945      Acute upper GI bleed  Possible GIB   Pt with coffea colored hemesis POD  H/H  Slowly trending down  Cont monitor H/H   PPI once daily  Transfuse as need   5/25/19  -H/H stable             Shock, unspecified  Most likely due to PNA  S/p vasodepressor   Cont IVAB Levaquin and vanc   blood cx NGTD  S/p 3 lt ns           KEHINDE (acute kidney injury)  Most likely due to sepsis   Cont IVF  retroperitoneal US did not show any acute abnormality   Monitor UOP and kidney function   Improving     HTN (hypertension)  Hold BP medication now  Due to sepsis   Resume accordantly   S/P levophed due to hypotension with a MAP<65  Resolved       PAD with remote right fem-distal bypass  Resume statin       Mixed hyperlipidemia  Resume statin       PAD (peripheral artery disease)  Hold Plavix for possible GIB  Resume statin       Stage 1 mild COPD by GOLD classification  Cont breathing tx - duonebs and formoterol        VTE Risk Mitigation (From admission, onward)        Ordered     Place sequential compression device  Until discontinued      05/20/19 1435     IP VTE HIGH RISK PATIENT  Once      05/20/19 1435        Denisha Warner NP  Department of Hospital Medicine   [facility]

## 2019-05-26 NOTE — PROGRESS NOTES
Pt back from MRI  Fall precautions in place bed alarm activated  avasys in place  MRI resulted and reviewed by MD   Neuro consulted

## 2019-05-26 NOTE — CONSULTS
Inpatient consult to Neurology  Consult performed by: Lena Valentino MD  Consult ordered by: Yobani Harris MD        Consult Requested By: Yobani Harris MD  Reason for Consult:  Acute stroke     SUBJECTIVE:       HPI:   No body at the bedside and he is a poor historian.  History is from the chart. Care every where gave a lot of information.  He has long history of severe PAD s/p femoral by pass surgery s/p right Above knee  amputation and development of  Phantom pain. He has history of drug abuse , smoker and COPD    Pt was found yesterday night inside his car   Confuse and altered . Pt was take to his house   By his son . He was responsive  but lethargic . Per family the  pt  Used amphetamine . This am he was found more conused and lethargic with red colored fluid coming through his nose and mouth . Per family pt was in his usual health of state before last night . They states he has a chronic cough  And Heartburn. The  denies any fever , sob , palpitation , weakness , chills , diarrhea , or gu problems  ER course: The ABG show respiratory acidosis , acute hypoxic and hypercapnic respiratory failure . Pt was intubated . The CXR show a right side infiltrate . WBC 14 , lactic acid  3 , troponin level wnl . He was started on Levaquin and vanc . He had 3 lt NS .  He has one MRI of the head done and showed acute stroke .  He was showing waxing and waning  In mental status and Hospital medicine ordered MRI of the brain .      Past Medical History:   Diagnosis Date    Drug abuse, amphetamine type     wife states he has been using smoking crystal meth x 1 year    Femoral-popliteal bypass graft occlusion     Left and right    GERD (gastroesophageal reflux disease)     History of substance abuse     History of transient ischemic attack (TIA)     Hyperlipemia     Hypertension     Neuropathy     Occlusion of right femorotibial bypass graft 5/2017, 6/2017    Pre-diabetes     Status post femorotibial  "bypass      Past Surgical History:   Procedure Laterality Date    ANGIOGRAM-EXTREMITY-LOWER N/A 10/31/2017    Performed by Charanjit Mclean MD at [facility] C[facility]    ANGIOGRAM-EXTREMITY-LOWER N/A 10/30/2017    Performed by Avila Houser MD at [facility] CATH LAB    ANGIOGRAM-EXTREMITY-LOWER Right 5/24/2017    Performed by Jack John IV, MD at [facility] CATH LAB    back fusion      FEMORAL BYPASS      left fem-pop bypass 11/2014; right fem-pop bypass 9/2016, right fem-distal bypass 3/2017    LEG AMPUTATION THROUGH KNEE      RE-LOOK-PERIPHERAL Right 5/25/2017    Performed by Charanjit Mclean MD at [facility] CATH LAB     Family History   Problem Relation Age of Onset    COPD Mother     Diabetes Mother     Heart disease Mother     Stroke Mother     Heart attack Mother     COPD Father     Diabetes Father      Social History     Tobacco Use    Smoking status: Current Every Day Smoker     Packs/day: 1.50     Years: 41.00     Pack years: 61.50    Smokeless tobacco: Former User     Quit date: 10/22/2016   Substance Use Topics    Alcohol use: No    Drug use: Yes     Types: Methamphetamines     Comment: opiates and methamphetamines: smokes crystal meth Daily     Review of patient's allergies indicates:   Allergen Reactions    Pcn [penicillins] Other (See Comments)     Other      Bactrim [sulfamethoxazole-trimethoprim] Rash     Patient requests medication be listed as an "Intolerance" rather than an allergy, stating the rash is not "so bad."    Ceclor [cefaclor] Rash    Latex, natural rubber Rash       Scheduled Meds:   albuterol-ipratropium  3 mL Nebulization Q6H    amLODIPine  10 mg Oral Daily    arformoterol  15 mcg Nebulization BID    atorvastatin  10 mg Oral Daily    pantoprazole  40 mg Oral Daily    polyethylene glycol  17 g Oral Daily         Review of Systems:    Review of Systems   Constitutional: Positive for malaise/fatigue. Negative for fever and weight loss.   HENT: Negative for ear pain, hearing loss and " tinnitus.    Eyes: Negative for blurred vision, double vision, photophobia, pain, discharge and redness.   Respiratory: Negative for cough and shortness of breath.    Cardiovascular: Negative for chest pain, palpitations, claudication and leg swelling.   Gastrointestinal: Negative for abdominal pain, heartburn, nausea and vomiting.   Genitourinary: Negative for dysuria, flank pain, frequency and urgency.   Musculoskeletal: Negative for back pain, falls, joint pain, myalgias and neck pain.   Skin: Negative for itching and rash.   Neurological: Positive for weakness. Negative for dizziness, tingling, sensory change, speech change, focal weakness, seizures, loss of consciousness and headaches.        Confused  And unable to give good history.   Endo/Heme/Allergies: Does not bruise/bleed easily.   Psychiatric/Behavioral: Positive for depression.         OBJECTIVE:     Vital Signs (Most Recent)  Temp: 98.6 °F (37 °C) (05/26/19 1152)  Pulse: 88 (05/26/19 1348)  Resp: 18 (05/26/19 1249)  BP: 120/80 (05/26/19 1152)  SpO2: 97 % (05/26/19 1249)  Physical Exam   Constitutional: He appears well-developed and well-nourished. No distress.   HENT:   Head: Normocephalic.   Right Ear: External ear normal.   Left Ear: External ear normal.   Mouth/Throat: Oropharynx is clear and moist.   Eyes: Pupils are equal, round, and reactive to light. Conjunctivae and EOM are normal.   Neck: Normal range of motion. Neck supple. No tracheal deviation present. No thyromegaly present.   Cardiovascular: Regular rhythm, normal heart sounds and intact distal pulses.   Pulmonary/Chest: Effort normal and breath sounds normal. No respiratory distress.   Abdominal: Soft. Bowel sounds are normal. There is no tenderness.   Musculoskeletal: He exhibits no edema or tenderness.   Lymphadenopathy:     He has no cervical adenopathy.   Neurological: He is alert. He has normal reflexes. He displays no tremor and normal reflexes. No cranial nerve deficit. He  exhibits normal muscle tone. He displays no Babinski's sign on the right side. He displays no Babinski's sign on the left side.   Reflex Scores:       Tricep reflexes are 2+ on the right side and 2+ on the left side.       Bicep reflexes are 2+ on the right side and 2+ on the left side.       Brachioradialis reflexes are 2+ on the right side and 2+ on the left side.       Patellar reflexes are 2+ on the right side and 2+ on the left side.       Achilles reflexes are 2+ on the right side and 2+ on the left side.  He is confused. He talks but does not follow directions and commands. He moves his extremities but appeared to me that he is weaker on the right side. Not sure about sensory deficits.    Skin: Skin is warm and dry. No rash noted. He is not diaphoretic. No erythema. No pallor.   Psychiatric: He has a normal mood and affect. His behavior is normal. Judgment and thought content normal.       Laboratory:  Lab Results   Component Value Date    WBC 9.56 05/25/2019    HGB 12.6 (L) 05/25/2019    HCT 38.3 (L) 05/25/2019     05/25/2019    CHOL 166 10/30/2017    TRIG 49 10/30/2017    HDL 62 10/30/2017    ALT 16 05/25/2019    AST 22 05/25/2019     05/25/2019    K 3.5 05/25/2019     05/25/2019    CREATININE 0.9 05/25/2019    BUN 25 (H) 05/25/2019    CO2 25 05/25/2019    INR 0.9 05/20/2019     MRI of the brain: 5/26/2019         Impression       Findings most consistent with multifocal bilateral cerebral and cerebellar acute infarcts some of which demonstrate hemorrhagic conversion.  Findings could be secondary to embolic phenomena   RCHO: 5/20/2019  CONCLUSIONS     1 - Normal left ventricular systolic function (EF 60-65%).     2 - Normal left ventricular diastolic function.     3 - Right ventricle is upper limit of normal in size with mildly depressed systolic function.     4 - The estimated PA systolic pressure is greater than 23 mmHg.     5 - Concentric remodeling.     6 - No wall motion  abnormalities.     7 - Mild tricuspid regurgitation.   Per Hospital medicine: There is no Afib in Tele recording.   Results for AMBER NGUYEN (MRN [id number]) as of 5/26/2019 15:59   Ref. Range 5/20/2019 09:16   Benzodiazepines Unknown Negative   Methadone metabolites Unknown Negative   Phencyclidine Unknown Negative   Cocaine (Metab.) Unknown Negative   Opiate Scrn, Ur Unknown Presumptive Positive   Barbiturate Screen, Ur Unknown Negative   Amphetamine Screen, Ur Unknown Presumptive Positive   Marijuana (THC) Metabolite Unknown Negative         ASSESSMENT/PLAN:     Primary Diagnoses:  1. Acute embolic stroke : if cardiac source is eliminated , then we have to make drug induced ( Methamphetamine) acute stroke.  There is hemorrhagic conversion already so we have to be careful in Oral anticoagulation.  Will prefer CT head without contrast to evaluate bleeding status.  Recommend Carotid Ultrasound.    Patient Active Problem List   Diagnosis    Stage 1 mild COPD by GOLD classification    PAD (peripheral artery disease)    Mixed hyperlipidemia    Cellulitis of right foot    Cellulitis due to Staphylococcus    PAD with remote right fem-distal bypass    Ischemic right foot secondary to occluded fem-distal bypass graft    HTN (hypertension)    DVT (deep venous thrombosis)    Pre-diabetes    Acute respiratory failure with hypoxia and hypercapnia    KEHINDE (acute kidney injury)    Shock, unspecified    Aspiration pneumonia of right middle lobe due to vomit    Polysubstance abuse    Acute upper GI bleed    Overdose of illicit drug    Encephalopathy, metabolic        Plan:  1. PT/OT/ST  2. Continue baby aspirin and plavix .  3. CT head without contrast.   4. Carotid Ultrasound

## 2019-05-26 NOTE — SUBJECTIVE & OBJECTIVE
Interval History: MRI showed acute CVA.     Review of Systems   Unable to perform ROS: Mental status change     Objective:     Vital Signs (Most Recent):  Temp: 99.3 °F (37.4 °C) (05/26/19 1605)  Pulse: 79 (05/26/19 1605)  Resp: 18 (05/26/19 1605)  BP: (!) 140/75 (05/26/19 1605)  SpO2: 95 % (05/26/19 1605) Vital Signs (24h Range):  Temp:  [98.2 °F (36.8 °C)-99.3 °F (37.4 °C)] 99.3 °F (37.4 °C)  Pulse:  [60-97] 79  Resp:  [16-20] 18  SpO2:  [93 %-98 %] 95 %  BP: (120-168)/(75-84) 140/75     Weight: 87.2 kg (192 lb 3.9 oz)  Body mass index is 28.39 kg/m².    Intake/Output Summary (Last 24 hours) at 5/26/2019 1645  Last data filed at 5/25/2019 1800  Gross per 24 hour   Intake 120 ml   Output --   Net 120 ml      Physical Exam   Constitutional: Vital signs are normal. He appears well-developed and well-nourished. He appears lethargic.       HENT:   Head: Normocephalic and atraumatic.   Nose: Nose normal.   Eyes: Pupils are equal, round, and reactive to light. Conjunctivae and EOM are normal.   Eyes are glazed over   Neck: Normal range of motion. Neck supple. No JVD present. No tracheal deviation present. No thyromegaly present.   Cardiovascular: Normal rate, regular rhythm, normal heart sounds and intact distal pulses. Exam reveals no gallop and no friction rub.   No murmur heard.  Pulmonary/Chest: Effort normal. No stridor. No respiratory distress. He has decreased breath sounds. He has no wheezes. He has no rales. He exhibits no tenderness.   Abdominal: Soft. Bowel sounds are normal. He exhibits no distension. There is no tenderness. There is no rebound and no guarding.   Musculoskeletal: Normal range of motion. He exhibits no edema, tenderness or deformity.   Generalized weakness   Neurological: He has normal reflexes. He appears lethargic. No cranial nerve deficit. Coordination normal.   Reflex Scores:       Tricep reflexes are 2+ on the right side and 2+ on the left side.       Bicep reflexes are 2+ on the right  side and 2+ on the left side.       Brachioradialis reflexes are 2+ on the right side and 2+ on the left side.       Patellar reflexes are 2+ on the right side and 2+ on the left side.       Achilles reflexes are 2+ on the right side and 2+ on the left side.  He is confused. He talks but does not follow directions and commands. He moves his extremities but appeared to me that he is weaker on the right side. Not sure about sensory deficits.     Skin: Skin is warm and dry. Capillary refill takes 2 to 3 seconds. No rash noted. No erythema. No pallor.   Psychiatric: Judgment and thought content normal. His affect is inappropriate. His speech is not delayed. He is slowed. Cognition and memory are impaired. He expresses no suicidal ideation. He is inattentive.   Nursing note and vitals reviewed.    Significant Labs:   CBC:   Recent Labs   Lab 05/25/19  0459   WBC 9.56   HGB 12.6*   HCT 38.3*        CMP:   Recent Labs   Lab 05/25/19 0459      K 3.5      CO2 25   *   BUN 25*   CREATININE 0.9   CALCIUM 9.9   PROT 7.2   ALBUMIN 3.2*   BILITOT 0.4   ALKPHOS 90   AST 22   ALT 16   ANIONGAP 10   EGFRNONAA >60     Cardiac Markers: No results for input(s): CKMB, MYOGLOBIN, BNP, TROPISTAT in the last 48 hours.    Significant Imaging: I have reviewed all pertinent imaging results/findings within the past 24 hours.   Imaging Results          X-Ray Chest AP Portable (Final result)  Result time 05/20/19 22:13:56    Final result by Quincy Ruiz MD (05/20/19 22:13:56)                 Impression:      1. Right perihilar infiltrate, unchanged since 05/20/2019.  2. Endotracheal tube, nasogastric tube and right internal jugular line as detailed above.      Electronically signed by: Quincy Ruiz MD  Date:    05/20/2019  Time:    22:13             Narrative:    EXAMINATION:  XR CHEST AP PORTABLE    CLINICAL HISTORY:  ET tube placement;    COMPARISON:  05/20/2019 at 12:51  hours    FINDINGS:  Endotracheal tube is in place with the distal tip above the natan at the level of the clavicles.  No significant change in position in comparison with the prior exam.  Right internal jugular line identified with distal tip overlying the proximal SVC.  Nasogastric tube in place.  Distal tip overlies the proximal body of the stomach.  Mild, hazy right perihilar infiltrate is present and unchanged.  Left lung clear.  Heart size within normal limits.No significant bony findings.                               US Retroperitoneal Complete (Kidney and (Final result)  Result time 05/20/19 13:26:13    Final result by Paulino Pacheco MD (05/20/19 13:26:13)                 Impression:      No significant abnormality.      Electronically signed by: Paulino Pacheco  Date:    05/20/2019  Time:    13:26             Narrative:    EXAMINATION:  US RETROPERITONEAL COMPLETE    CLINICAL HISTORY:  dorothy;    TECHNIQUE:  Ultrasound of the kidneys and urinary bladder was performed including color flow and Doppler evaluation of the kidneys.    COMPARISON:  None.    FINDINGS:  Right kidney: The right kidney measures 11.3 cm. No cortical thinning. No loss of corticomedullary distinction. Resistive index measures 0.56.  No mass. No renal stone. No hydronephrosis.    Left kidney: The left kidney measures 10.9 cm. No cortical thinning. No loss of corticomedullary distinction. Resistive index measures 0.51.  No mass. No renal stone. No hydronephrosis.    Bladder collapsed around a Mejia catheter.                               X-Ray Chest AP Portable (Final result)  Result time 05/20/19 13:13:03    Final result by SERGE Albarran Sr., MD (05/20/19 13:13:03)                 Impression:      1. There has been interval placement of a right internal jugular venous line. The tip is in the superior vena cava.  There is no pneumothorax.  2. There is a moderate amount of alveolar consolidation in the inferior 2/3 of the right lung.  This is  characteristic of pneumonia.  3. The endotracheal and NG tubes remain in place.  .      Electronically signed by: Quincy Albarran MD  Date:    05/20/2019  Time:    13:13             Narrative:    EXAMINATION:  XR CHEST AP PORTABLE    CLINICAL HISTORY:  Encounter for adjustment and management of vascular access device    COMPARISON:  A chest x-ray performed at 10:04 on 05/20/2019.    FINDINGS:  There has been interval placement of a right internal jugular venous line.  The tip is in the superior vena cava.  The endotracheal and NG tubes remain in place.  The size of the heart is prominent.  There is a moderate amount of alveolar consolidation in the inferior 2/3 of the right lung.  The left lung is clear.  There is no pneumothorax.  The costophrenic angles are sharp.  There is a healed fracture in the lateral aspect of the right 6th rib.                               CT Head Without Contrast (Final result)  Result time 05/20/19 10:39:15    Final result by SERGE Albarran Sr., MD (05/20/19 10:39:15)                 Impression:      1. There are chronic appearing ischemic changes scattered throughout both cerebral hemispheres. There is no evidence of an acute ischemic event. There is no intracranial hemorrhage.  2. There is mild partial opacification of the right frontal and maxillary sinuses.  This is characteristic of sinusitis.  3. There is an old-appearing fracture of the medial wall of the right orbit.  All CT scans at this facility use dose modulation, iterative reconstruction, and/or weight base dosing when appropriate to reduce radiation dose when appropriate to reduce radiation dose to as low as reasonably achievable.      Electronically signed by: Quincy Albarran MD  Date:    05/20/2019  Time:    10:39             Narrative:    EXAMINATION:  CT HEAD WITHOUT CONTRAST    CLINICAL HISTORY:  Confusion/delirium, altered LOC, unexplained;    TECHNIQUE:  Standard brain CT protocol without IV contrast was  performed.    COMPARISON:  None    FINDINGS:  There are chronic appearing ischemic changes scattered throughout both cerebral hemispheres.  There is no evidence of an acute ischemic event.  There is no intracranial hemorrhage.  There is an old-appearing fracture of the medial wall of the right orbit.  There is mild partial opacification of the right frontal and maxillary sinuses.                               X-Ray Chest AP Portable (Final result)  Result time 05/20/19 10:10:54    Final result by Paulino Pacheco MD (05/20/19 10:10:54)                 Impression:      Patchy opacification at the right perihilar region and lung base suspicious for airspace disease/pneumonia. Left lung is clear. No pneumothorax. Likely tiny right pleural effusion.      Electronically signed by: Paulino Pacheco  Date:    05/20/2019  Time:    10:10             Narrative:    EXAMINATION:  XR CHEST AP PORTABLE    CLINICAL HISTORY:  . Hypoxemia    TECHNIQUE:  Single frontal portable view of the chest was performed.    COMPARISON:  11/01/2017    FINDINGS:  Support devices: Endotracheal tube tip projects 6.2 cm above the natan, satisfactory.  Esophagogastric tube also in satisfactory position.    Patchy opacification at the right perihilar region and lung base suspicious for airspace disease/pneumonia.  Left lung is clear.  No pneumothorax.  Likely tiny right pleural effusion.    The cardiac silhouette is normal in size. The hilar and mediastinal contours are unremarkable.    Bones are intact.

## 2019-05-26 NOTE — ASSESSMENT & PLAN NOTE
multifactorial : infection, withdrawal   5/26/19: MRI brain shows acute multi CVA  Cont supportive tx  No neurological deficit   CT of Head was negative for acute findings  Neuro checks  5/26/19  Remains confused   Continue Neuro checks   Neurology consult

## 2019-05-26 NOTE — PT/OT/SLP PROGRESS
Physical Therapy  Treatment    Ish Guardado   MRN: [id number]   Admitting Diagnosis: Aspiration pneumonia of right middle lobe due to vomit    PT Received On: 05/26/19  PT Start Time: 0950     PT Stop Time: 1000    PT Total Time (min): 10 min       Billable Minutes:  Therapeutic Exercise 10 minutes    Treatment Type: Treatment  PT/PTA: PTA     PTA Visit Number: 2       General Precautions: Standard, fall  Orthopedic Precautions: (R LE prosthesis)   Braces:      Spiritual, Cultural Beliefs, Voodoo Practices, Values that Affect Care: yes    Subjective:  Communicated with epic and nurse Nicol prior to session.      Pain/Comfort  Pain Rating 1: 0/10    Objective:   Patient found with: peripheral IV, telemetry    Functional Mobility:  Bed Mobility: max assist       Transfers:not performed this session       Gait: not performed       Stairs:n/a          Balance:   Static Sit: N/A this session  Dynamic Sit: N/A this session  Static Stand: N/A this session  Dynamic stand: N/A this session    Therapeutic Activities and Exercises:  Patient difficult to awake but did agree to participate in exercise. Completed L LE SLR, hip abd/add, heel slides, AP and GS.    AM-PAC 6 CLICK MOBILITY  How much help from another person does this patient currently need?   1 = Unable, Total/Dependent Assistance  2 = A lot, Maximum/Moderate Assistance  3 = A little, Minimum/Contact Guard/Supervision  4 = None, Modified [place]/Independent    Turning over in bed (including adjusting bedclothes, sheets and blankets)?: 3  Sitting down on and standing up from a chair with arms (e.g., wheelchair, bedside commode, etc.): 2  Moving from lying on back to sitting on the side of the bed?: 2  Moving to and from a bed to a chair (including a wheelchair)?: 1  Need to walk in hospital room?: 1  Climbing 3-5 steps with a railing?: 1  Basic Mobility Total Score: 10    AM-PAC Raw Score CMS G-Code Modifier Level of Impairment Assistance   6 % Total  / Unable   7 - 9 CM 80 - 100% Maximal Assist   10 - 14 CL 60 - 80% Moderate Assist   15 - 19 CK 40 - 60% Moderate Assist   20 - 22 CJ 20 - 40% Minimal Assist   23 CI 1-20% SBA / CGA   24 CH 0% Independent/ Mod I     Patient left supine with all lines intact, call button in reach and nursing notified.    Assessment:  Ish Guardado is a 58 y.o. male with a medical diagnosis of Aspiration pneumonia of right middle lobe due to vomit.    Rehab identified problem list/impairments: Rehab identified problem list/impairments: weakness, impaired endurance, impaired self care skills, impaired balance, decreased coordination, decreased safety awareness, decreased lower extremity function    Rehab potential is fair.    Activity tolerance: Fair    Discharge recommendations: Discharge Facility/Level of Care Needs: nursing facility, skilled     Barriers to discharge:      Equipment recommendations:       GOALS:   Multidisciplinary Problems     Physical Therapy Goals        Problem: Physical Therapy Goal    Goal Priority Disciplines Outcome Goal Variances Interventions   Physical Therapy Goal     PT, PT/OT Ongoing (interventions implemented as appropriate)     Description:  LTG'S TO BE MET IN 7 DAYS (5-31-19)  1. PT WILL REQUIRE [facility] FOR BED MOBILITY  2. PT WILL REQUIRE [facility] FOR TF'S  3. PT WILL AMB 50' WITH RW AND DIEGO, RLE PROSTHESIS DONNED  4. PT WILL TOLERATE BLE THEREX X 20 REPS AROM                      PLAN:    Patient to be seen 5 x/week  to address the above listed problems via gait training, therapeutic activities, therapeutic exercises  Plan of Care expires: 05/31/19  Plan of Care reviewed with: patient         Roosevelt Weiss, PTA  05/26/2019

## 2019-05-26 NOTE — ASSESSMENT & PLAN NOTE
Cont IVAB  Supplemental oxygen  DuoNebs  Respiratory culture: STREPTOCOCCUS AGALACTIAE (GROUP B)   IV Levaquin downgraded to po and Completed course  Pulmonary consult  Blood cultures remain NGTD

## 2019-05-26 NOTE — NURSING
Patient IV found taped to his back. Patient stated he does not know how it got there. IV start attempted by Fiorella Marquez RN X2 to right and left forearm unsuccessful. IV start attempted X2 per JORGITO Granados unsuccessful. IV restarted per JORGITO Cotto X2 sticks with 20G to right forearm.

## 2019-05-26 NOTE — ASSESSMENT & PLAN NOTE
-MRI: multifocal bilateral cerebral and cerebellar acute infarcts some of which demonstrate hemorrhagic conversion  -Neurology consult

## 2019-05-27 PROBLEM — K92.2 ACUTE UPPER GI BLEED: Status: RESOLVED | Noted: 2019-05-20 | Resolved: 2019-05-27

## 2019-05-27 PROCEDURE — 25000003 PHARM REV CODE 250: Performed by: INTERNAL MEDICINE

## 2019-05-27 PROCEDURE — 63600175 PHARM REV CODE 636 W HCPCS: Performed by: NURSE PRACTITIONER

## 2019-05-27 PROCEDURE — 25000242 PHARM REV CODE 250 ALT 637 W/ HCPCS: Performed by: INTERNAL MEDICINE

## 2019-05-27 PROCEDURE — 97802 MEDICAL NUTRITION INDIV IN: CPT

## 2019-05-27 PROCEDURE — 25000003 PHARM REV CODE 250: Performed by: PHYSICIAN ASSISTANT

## 2019-05-27 PROCEDURE — 94761 N-INVAS EAR/PLS OXIMETRY MLT: CPT

## 2019-05-27 PROCEDURE — 97530 THERAPEUTIC ACTIVITIES: CPT

## 2019-05-27 PROCEDURE — 94640 AIRWAY INHALATION TREATMENT: CPT

## 2019-05-27 PROCEDURE — 92523 SPEECH SOUND LANG COMPREHEN: CPT

## 2019-05-27 PROCEDURE — 92610 EVALUATE SWALLOWING FUNCTION: CPT

## 2019-05-27 PROCEDURE — 21400001 HC TELEMETRY ROOM

## 2019-05-27 RX ADMIN — ARFORMOTEROL TARTRATE 15 MCG: 15 SOLUTION RESPIRATORY (INHALATION) at 07:05

## 2019-05-27 RX ADMIN — PANTOPRAZOLE SODIUM 40 MG: 40 TABLET, DELAYED RELEASE ORAL at 09:05

## 2019-05-27 RX ADMIN — POLYETHYLENE GLYCOL 3350 17 G: 17 POWDER, FOR SOLUTION ORAL at 09:05

## 2019-05-27 RX ADMIN — IPRATROPIUM BROMIDE AND ALBUTEROL SULFATE 3 ML: .5; 3 SOLUTION RESPIRATORY (INHALATION) at 12:05

## 2019-05-27 RX ADMIN — IPRATROPIUM BROMIDE AND ALBUTEROL SULFATE 3 ML: .5; 3 SOLUTION RESPIRATORY (INHALATION) at 07:05

## 2019-05-27 RX ADMIN — LORAZEPAM 1 MG: 2 INJECTION INTRAMUSCULAR; INTRAVENOUS at 06:05

## 2019-05-27 RX ADMIN — IPRATROPIUM BROMIDE AND ALBUTEROL SULFATE 3 ML: .5; 3 SOLUTION RESPIRATORY (INHALATION) at 01:05

## 2019-05-27 RX ADMIN — ATORVASTATIN CALCIUM 10 MG: 10 TABLET, FILM COATED ORAL at 09:05

## 2019-05-27 RX ADMIN — AMLODIPINE BESYLATE 10 MG: 10 TABLET ORAL at 09:05

## 2019-05-27 NOTE — ASSESSMENT & PLAN NOTE
2/2 to COPD and aspiration PNA   S/p mechanical ventilation: resolved   ICU /Pulmonology consulted   Supplemental oxygen to maintain sats > 92 %  5/25/19  -Pt. On RA   -O2 sats 94%

## 2019-05-27 NOTE — PT/OT/SLP PROGRESS
Occupational Therapy  Treatment    Ish Guardado   MRN: [id number]   Admitting Diagnosis: Aspiration pneumonia of right middle lobe due to vomit    OT Date of Treatment: 05/27/19   OT Start Time: 1038  OT Stop Time: 1108  OT Total Time (min): 30 min    Billable Minutes:  Therapeutic Activity 30 minutes    General Precautions: Standard, fall  Orthopedic Precautions: N/A  Braces: N/A(pt has r le prosthesis in room , however has no use it in over 3 months)         Subjective:  Communicated with nurse Klein and epic chart review prior to session.    Pain/Comfort  Pain Rating 1: 0/10    Objective:  Patient found with: telemetry, peripheral IV     Functional Mobility:  Bed Mobility:     Mod a   Transfers:    mod a     Functional Ambulation: na    Activities of Daily Living:     Feeding adaptive equipment: NA     UE adaptive equipment: na     LE adaptive equipment: max a ; pt req max verbal and tactile cues to perform charity /doff socks and shoe     Bathing adaptive equipment: na    Balance:   Static Sit: FAIR+: Able to take MINIMAL challenges from all directions  Dynamic Sit: FAIR: Cannot move trunk without losing balance  Static Stand: POOR: Needs MODERATE assist to maintain  Dynamic stand: poor-    Therapeutic Activities and Exercises:    PT SEEN IN ROOM  WITH L LE HANGING OUT OF BED. PT REQ SUP>SIT EOB WITH MOD A. PT SCOOTED TO EOB WITH MOD A. PT EDUCATED ON PROSTHESIS AND UNABLE TO PERFORMED ACTIVITY WITH MAX A AND VC'S. PT WIFE REPORTED PT HAD NOT  USED PROSTHESIS IN APPROX 3 MONTHS. PT WAS ABLE TO T/F TO CHAIR AND MANEUVER MOD I ABOUT 3 MONTHS AGO. . PT REQ MOD A X 2 WITH SIT<>STAND T/F'S APPROX  4 TRIALS WITH RW MAX CUES FOR HAND PLACEMENT AND SAFETY WITH RW USE. PT RETURNED SEATED EOB FOR SAFETY. PT SCOOTED TO EOB WITH MOD A X 3 TRIALS PT SUP IN BED WITH MOD A AND LEFT WITH HOB ELEVATED AND BED ALARM ON  AM-PAC 6 CLICK ADL   How much help from another person does this patient currently need?   1 = Unable,  "Total/Dependent Assistance  2 = A lot, Maximum/Moderate Assistance  3 = A little, Minimum/Contact Guard/Supervision  4 = None, Modified [place]/Independent    Putting on and taking off regular lower body clothing? : 2  Bathing (including washing, rinsing, drying)?: 2  Toileting, which includes using toilet, bedpan, or urinal? : 2  Putting on and taking off regular upper body clothing?: 2  Taking care of personal grooming such as brushing teeth?: 3  Eating meals?: 3  Daily Activity Total Score: 14     AM-PAC Raw Score CMS "G-Code Modifier Level of Impairment Assistance   6 % Total / Unable   7 - 8 CM 80 - 100% Maximal Assist   9-13 CL 60 - 80% Moderate Assist   14 - 19 CK 40 - 60% Moderate Assist   20 - 22 CJ 20 - 40% Minimal Assist   23 CI 1-20% SBA / CGA   24 CH 0% Independent/ Mod I       Patient left up in chair with all lines intact, call button in reach, bed alarm on and NURSE notified    ASSESSMENT:  Ish Guardado is a 58 y.o. male with a medical diagnosis of Aspiration pneumonia of right middle lobe due to vomit and presents with DEBILITY AND GENERALIZED WEAKNESS. PT WILL CONTINUE TO BENEFIT FROM SKILLED OT..    Rehab identified problem list/impairments: Rehab identified problem list/impairments: weakness, impaired self care skills, impaired balance, decreased safety awareness, impaired functional mobilty, impaired endurance, decreased upper extremity function, gait instability    Rehab potential is good.    Activity tolerance: Good    Discharge recommendations: Discharge Facility/Level of Care Needs: rehabilitation facility, nursing facility, skilled     Barriers to discharge:      Equipment recommendations: none     GOALS:   Multidisciplinary Problems     Occupational Therapy Goals        Problem: Occupational Therapy Goal    Goal Priority Disciplines Outcome Interventions   Occupational Therapy Goal     OT, PT/OT Ongoing (interventions implemented as appropriate)    Description:  OT GOALS TO " MET BY 5-31-19  MIN A WITH UE DRESSING  S WITH LE DRESSING  PT WILL TOLERATE 1 SET X 15 REPS B UE ROM EXERCISE WITH MIN A                    Plan:  Patient to be seen 3 x/week to address the above listed problems via self-care/home management, therapeutic activities, therapeutic exercises  Plan of Care expires:    Plan of Care reviewed with: patient, spouse         Macybran Watts, OT  05/27/2019

## 2019-05-27 NOTE — PLAN OF CARE
Problem: SLP Goal  Goal: SLP Goal  1. Pt will complete simple expressive speech tasks with mod cues for word finding   2. Pt will complete simple cognitive tasks at 75% acc for increased memory, orientation, problem solving, organization, and safety awareness  3. Pt will complete simple receptive speech tasks with 75% acc     Outcome: Ongoing (interventions implemented as appropriate)  ST completed evaluation at bedside with pt exhibiting expressive aphasia, decreased overall cognitive abilities, poor insight and safety awareness, and decreased receptive language abilities.  He will benefit from skilled ST for stated deficits

## 2019-05-27 NOTE — PLAN OF CARE
Problem: Physical Therapy Goal  Goal: Physical Therapy Goal  LTG'S TO BE MET IN 7 DAYS (5-31-19)  1. PT WILL REQUIRE CGA FOR BED MOBILITY  2. PT WILL REQUIRE DIEGO FOR TF'S  3. PT WILL AMB 50' WITH RW AND DIEGO, RLE PROSTHESIS DONNED  4. PT WILL TOLERATE BLE THEREX X 20 REPS AROM     Outcome: Ongoing (interventions implemented as appropriate)  PT STOOD X 4 TRIALS MOD A X 2 WITH RW X APPROX 30 SEC.

## 2019-05-27 NOTE — ASSESSMENT & PLAN NOTE
multifactorial : infection, withdrawal   5/26/19: MRI brain shows acute multi CVA  Cont supportive tx  No neurological deficit   CT of Head was negative for acute findings  Neuro checks  5/27/19  Remains confused, but improving   Continue Neuro checks   Neurology consult

## 2019-05-27 NOTE — PLAN OF CARE
Patient AAOX2 confused to situation and time during shift. No c/o pain at this time. Patient NSR during shift on Tele monitor. Patient's vitals wnl during shift.  Will continue to monitor patient

## 2019-05-27 NOTE — PLAN OF CARE
Problem: Adult Inpatient Plan of Care  Goal: Plan of Care Review  Outcome: Ongoing (interventions implemented as appropriate)  Recommendations     Recommendation: 1. Add cardiac restriction to current diet. 2. Add boost plus BID. 3. RD to f/u.   Intervention: coordination of care   Goals: Meet > 85 % EEN/EPN while admitted  Nutrition Goal Status: new  Communication of RD Recs: (POc, sticky note, reviewed with RN)

## 2019-05-27 NOTE — SUBJECTIVE & OBJECTIVE
Interval History: More alert today. Wife brought his Leg for OT/PT, but he hasn't used in 3 months.    Review of Systems   Unable to perform ROS: Mental status change   Constitutional: Positive for activity change, appetite change and fatigue. Negative for chills, diaphoresis, fever and unexpected weight change.   HENT: Negative for congestion, ear discharge, ear pain, facial swelling, hearing loss, postnasal drip, sore throat, tinnitus, trouble swallowing and voice change.    Eyes: Negative for photophobia, pain, discharge, redness, itching and visual disturbance.   Respiratory: Negative for cough, choking, chest tightness, shortness of breath, wheezing and stridor.    Cardiovascular: Negative for chest pain, palpitations and leg swelling.   Gastrointestinal: Negative for abdominal distention, abdominal pain, blood in stool, constipation, diarrhea, nausea and vomiting.   Endocrine: Negative for cold intolerance, heat intolerance, polydipsia, polyphagia and polyuria.   Genitourinary: Negative for difficulty urinating, flank pain, frequency, hematuria and urgency.   Musculoskeletal: Negative for arthralgias, back pain, gait problem and myalgias.   Skin: Negative for color change, pallor, rash and wound.   Neurological: Positive for speech difficulty and weakness. Negative for dizziness, tremors, seizures, syncope, facial asymmetry, light-headedness, numbness and headaches.   Hematological: Negative for adenopathy. Does not bruise/bleed easily.   Psychiatric/Behavioral: Positive for confusion. Negative for agitation, hallucinations and suicidal ideas. The patient is not nervous/anxious.    All other systems reviewed and are negative.    Objective:     Vital Signs (Most Recent):  Temp: 98.5 °F (36.9 °C) (05/27/19 1518)  Pulse: 72 (05/27/19 1715)  Resp: 17 (05/27/19 1518)  BP: 128/70 (05/27/19 1518)  SpO2: 97 % (05/27/19 1518) Vital Signs (24h Range):  Temp:  [98.1 °F (36.7 °C)-98.7 °F (37.1 °C)] 98.5 °F (36.9  °C)  Pulse:  [66-96] 72  Resp:  [16-20] 17  SpO2:  [93 %-97 %] 97 %  BP: (124-184)/(70-83) 128/70     Weight: 87.2 kg (192 lb 3.9 oz)  Body mass index is 28.39 kg/m².    Intake/Output Summary (Last 24 hours) at 5/27/2019 1845  Last data filed at 5/27/2019 1800  Gross per 24 hour   Intake 480 ml   Output --   Net 480 ml      Physical Exam   Constitutional: Vital signs are normal. He appears well-developed and well-nourished. He appears lethargic.       HENT:   Head: Normocephalic and atraumatic.   Nose: Nose normal.   Eyes: Pupils are equal, round, and reactive to light. Conjunctivae and EOM are normal.   Eyes are glazed over   Neck: Normal range of motion. Neck supple. No JVD present. No tracheal deviation present. No thyromegaly present.   Cardiovascular: Normal rate, regular rhythm, normal heart sounds and intact distal pulses. Exam reveals no gallop and no friction rub.   No murmur heard.  Pulmonary/Chest: Effort normal. No stridor. No respiratory distress. He has decreased breath sounds. He has no wheezes. He has no rales. He exhibits no tenderness.   Abdominal: Soft. Bowel sounds are normal. He exhibits no distension. There is no tenderness. There is no rebound and no guarding.   Musculoskeletal: Normal range of motion. He exhibits no edema, tenderness or deformity.   Generalized weakness   Neurological: He has normal reflexes. He appears lethargic. No cranial nerve deficit. Coordination normal.   Reflex Scores:       Tricep reflexes are 2+ on the right side and 2+ on the left side.       Bicep reflexes are 2+ on the right side and 2+ on the left side.       Brachioradialis reflexes are 2+ on the right side and 2+ on the left side.       Patellar reflexes are 2+ on the right side and 2+ on the left side.       Achilles reflexes are 2+ on the right side and 2+ on the left side.  He is confused. He talks but does not follow directions and commands. He moves his extremities but appeared to me that he is weaker on  the right side. Not sure about sensory deficits.     Skin: Skin is warm and dry. Capillary refill takes 2 to 3 seconds. No rash noted. No erythema. No pallor.   Psychiatric: His affect is inappropriate. His speech is not delayed. He is slowed. Cognition and memory are impaired. He expresses no suicidal ideation.   Confused, but knew the day of the week, and his name. He is inattentive.   Nursing note and vitals reviewed.    Significant Labs: CBC: No results for input(s): WBC, HGB, HCT, PLT in the last 48 hours.  CMP: No results for input(s): NA, K, CL, CO2, GLU, BUN, CREATININE, CALCIUM, PROT, ALBUMIN, BILITOT, ALKPHOS, AST, ALT, ANIONGAP, EGFRNONAA in the last 48 hours.    Invalid input(s): ESTGFAFRICA  Cardiac Markers: No results for input(s): CKMB, MYOGLOBIN, BNP, TROPISTAT in the last 48 hours.  Lipid Panel: No results for input(s): CHOL, HDL, LDLCALC, TRIG, CHOLHDL in the last 48 hours.    Significant Imaging: I have reviewed all pertinent imaging results/findings within the past 24 hours.   Imaging Results          X-Ray Chest AP Portable (Final result)  Result time 05/20/19 22:13:56    Final result by Quincy Ruiz MD (05/20/19 22:13:56)                 Impression:      1. Right perihilar infiltrate, unchanged since 05/20/2019.  2. Endotracheal tube, nasogastric tube and right internal jugular line as detailed above.      Electronically signed by: Quincy Ruiz MD  Date:    05/20/2019  Time:    22:13             Narrative:    EXAMINATION:  XR CHEST AP PORTABLE    CLINICAL HISTORY:  ET tube placement;    COMPARISON:  05/20/2019 at 12:51 hours    FINDINGS:  Endotracheal tube is in place with the distal tip above the natan at the level of the clavicles.  No significant change in position in comparison with the prior exam.  Right internal jugular line identified with distal tip overlying the proximal SVC.  Nasogastric tube in place.  Distal tip overlies the proximal body of the stomach.  Mild,  hazy right perihilar infiltrate is present and unchanged.  Left lung clear.  Heart size within normal limits.No significant bony findings.                               US Retroperitoneal Complete (Kidney and (Final result)  Result time 05/20/19 13:26:13    Final result by Paulino Pacheco MD (05/20/19 13:26:13)                 Impression:      No significant abnormality.      Electronically signed by: Paulino Pacheco  Date:    05/20/2019  Time:    13:26             Narrative:    EXAMINATION:  US RETROPERITONEAL COMPLETE    CLINICAL HISTORY:  dorothy;    TECHNIQUE:  Ultrasound of the kidneys and urinary bladder was performed including color flow and Doppler evaluation of the kidneys.    COMPARISON:  None.    FINDINGS:  Right kidney: The right kidney measures 11.3 cm. No cortical thinning. No loss of corticomedullary distinction. Resistive index measures 0.56.  No mass. No renal stone. No hydronephrosis.    Left kidney: The left kidney measures 10.9 cm. No cortical thinning. No loss of corticomedullary distinction. Resistive index measures 0.51.  No mass. No renal stone. No hydronephrosis.    Bladder collapsed around a Mejia catheter.                               X-Ray Chest AP Portable (Final result)  Result time 05/20/19 13:13:03    Final result by SERGE Albarran Sr., MD (05/20/19 13:13:03)                 Impression:      1. There has been interval placement of a right internal jugular venous line. The tip is in the superior vena cava.  There is no pneumothorax.  2. There is a moderate amount of alveolar consolidation in the inferior 2/3 of the right lung.  This is characteristic of pneumonia.  3. The endotracheal and NG tubes remain in place.  .      Electronically signed by: Quincy Albarran MD  Date:    05/20/2019  Time:    13:13             Narrative:    EXAMINATION:  XR CHEST AP PORTABLE    CLINICAL HISTORY:  Encounter for adjustment and management of vascular access device    COMPARISON:  A chest x-ray performed at  10:04 on 05/20/2019.    FINDINGS:  There has been interval placement of a right internal jugular venous line.  The tip is in the superior vena cava.  The endotracheal and NG tubes remain in place.  The size of the heart is prominent.  There is a moderate amount of alveolar consolidation in the inferior 2/3 of the right lung.  The left lung is clear.  There is no pneumothorax.  The costophrenic angles are sharp.  There is a healed fracture in the lateral aspect of the right 6th rib.                               CT Head Without Contrast (Final result)  Result time 05/20/19 10:39:15    Final result by SERGE Albarran Sr., MD (05/20/19 10:39:15)                 Impression:      1. There are chronic appearing ischemic changes scattered throughout both cerebral hemispheres. There is no evidence of an acute ischemic event. There is no intracranial hemorrhage.  2. There is mild partial opacification of the right frontal and maxillary sinuses.  This is characteristic of sinusitis.  3. There is an old-appearing fracture of the medial wall of the right orbit.  All CT scans at this facility use dose modulation, iterative reconstruction, and/or weight base dosing when appropriate to reduce radiation dose when appropriate to reduce radiation dose to as low as reasonably achievable.      Electronically signed by: Quincy Albarran MD  Date:    05/20/2019  Time:    10:39             Narrative:    EXAMINATION:  CT HEAD WITHOUT CONTRAST    CLINICAL HISTORY:  Confusion/delirium, altered LOC, unexplained;    TECHNIQUE:  Standard brain CT protocol without IV contrast was performed.    COMPARISON:  None    FINDINGS:  There are chronic appearing ischemic changes scattered throughout both cerebral hemispheres.  There is no evidence of an acute ischemic event.  There is no intracranial hemorrhage.  There is an old-appearing fracture of the medial wall of the right orbit.  There is mild partial opacification of the right frontal and  maxillary sinuses.                               X-Ray Chest AP Portable (Final result)  Result time 05/20/19 10:10:54    Final result by Paulino Pacheco MD (05/20/19 10:10:54)                 Impression:      Patchy opacification at the right perihilar region and lung base suspicious for airspace disease/pneumonia. Left lung is clear. No pneumothorax. Likely tiny right pleural effusion.      Electronically signed by: Paulino Pacheco  Date:    05/20/2019  Time:    10:10             Narrative:    EXAMINATION:  XR CHEST AP PORTABLE    CLINICAL HISTORY:  . Hypoxemia    TECHNIQUE:  Single frontal portable view of the chest was performed.    COMPARISON:  11/01/2017    FINDINGS:  Support devices: Endotracheal tube tip projects 6.2 cm above the natan, satisfactory.  Esophagogastric tube also in satisfactory position.    Patchy opacification at the right perihilar region and lung base suspicious for airspace disease/pneumonia.  Left lung is clear.  No pneumothorax.  Likely tiny right pleural effusion.    The cardiac silhouette is normal in size. The hilar and mediastinal contours are unremarkable.    Bones are intact.

## 2019-05-27 NOTE — PLAN OF CARE
Problem: Adult Inpatient Plan of Care  Goal: Plan of Care Review  POC discussed with patient, verbalized understanding.   NSR on monitor.   VSS. AAO X 1, self. Requires frequent reorienting to time, place and situation.   Patient is able to state his name and date of birth.   Voids per brief.   Moves around in bed frequently  Unable to maintain patient in a turned position as patient is restless in bed.   Fall precautions in place.   Side rails up X2.    Call bell on, working and within reach.   Bed locked in low position.   Remains free from falls/injuries.   Denies needs at this time.   Will continue to monitor.

## 2019-05-27 NOTE — CONSULTS
"  [facility] -   Adult Nutrition  Consult Note    SUMMARY     Recommendations    Recommendation: 1. Add cardiac restriction to current diet. 2. Add boost plus BID. 3. RD to f/u.   Intervention: coordination of care   Goals: Meet > 85 % EEN/EPN while admitted  Nutrition Goal Status: new  Communication of RD Recs: (POc, sticky note, reviewed with RN)    Reason for Assessment    Reason For Assessment: consult   Dx: Acute respiratory failure, CHF, KEHINDE, PAD  Hx: Drug abuse, GERD, HLD, HTN  General info comments: Pt admitted w/ AMS. Pt currently on regular diet w/ fair intake (PO intake 50 % today).  Pt confused.  No family members at bedside at time of visit. Per epic records, last weight recorded 188 lbs on 11/2/17, current wt= 192 lbs (no wt loss). NFPE performed 5/27/19, no muscle wasting/ fat depletion noted.   Discharge plan: cardiac diet      Nutrition Risk Screen    Nutrition Risk Screen: no indicators present    Nutrition/Diet History    Spiritual, Cultural Beliefs, Baptist Practices, Values that Affect Care: yes    Anthropometrics    Temp: 98.3 °F (36.8 °C)  Height: 5' 9" (175.3 cm)  Height (inches): 69 in  Weight Method: Bed Scale  Weight: 87.2 kg (192 lb 3.9 oz)  Weight (lb): 192.24 lb  Ideal Body Weight (IBW), Male: 160 lb  % Ideal Body Weight, Male (lb): 116.71 lb  BMI (Calculated): 27.6  BMI Grade: 25 - 29.9 - overweight       Lab/Procedures/Meds    Pertinent Labs Reviewed: reviewed  BMP  Lab Results   Component Value Date     05/25/2019    K 3.5 05/25/2019     05/25/2019    CO2 25 05/25/2019    BUN 25 (H) 05/25/2019    CREATININE 0.9 05/25/2019    CALCIUM 9.9 05/25/2019    ANIONGAP 10 05/25/2019    ESTGFRAFRICA >60 05/25/2019    EGFRNONAA >60 05/25/2019     Lab Results   Component Value Date    CALCIUM 9.9 05/25/2019    PHOS 2.7 11/02/2017     Lab Results   Component Value Date    ALBUMIN 3.2 (L) 05/25/2019     No results for input(s): POCTGLUCOSE in the last 24 hours.  No " results found for: LABA1C, HGBA1C    Pertinent Medications Reviewed: reviewed    Physical Findings/Assessment     skin: mehnaz 18     Estimated/Assessed Needs    Weight Used For Calorie Calculations: 87.2 kg (192 lb 3.9 oz)  Energy Calorie Requirements (kcal): 2018  Energy Need Method: [place]-St Jeor(x1.2)  Protein Requirements: 104 g  Weight Used For Protein Calculations: 87.2 kg (192 lb 3.9 oz)     Estimated Fluid Requirement Method: RDA Method(or per MD)  RDA Method (mL): 2018         Nutrition Prescription Ordered    Nutrition Order Comments: regular diet    Evaluation of Received Nutrient/Fluid Intake    Intake/Output Summary (Last 24 hours) at 5/27/2019 1424  Last data filed at 5/27/2019 1200  Gross per 24 hour   Intake 240 ml   Output --   Net 240 ml   \       % Intake of Estimated Energy Needs: 50 - 75 %  % Meal Intake: 50 - 75 %    Nutrition Risk      1xweekly    Assessment and Plan    Nutrition Problem  Decreased nutrient needs ( sodium, fat)    Related to (etiology):   Current medical condition and past medical hx    Signs and Symptoms (as evidenced by):   CHF, and hx of HLD, HTN    Interventions/Recommendations (treatment strategy):  See above     Nutrition Diagnosis Status:   New        Monitor and Evaluation    Food and Nutrient Intake: energy intake, food and beverage intake  Food and Nutrient Adminstration: diet order  Anthropometric Measurements: weight  Biochemical Data, Medical Tests and Procedures: electrolyte and renal panel, glucose/endocrine profile  Nutrition-Focused Physical Findings: overall appearance       Nutrition Follow-Up    RD Follow-up?: Yes

## 2019-05-27 NOTE — PT/OT/SLP PROGRESS
Physical Therapy  Treatment    Ish Guardado   MRN: [id number]   Admitting Diagnosis: Aspiration pneumonia of right middle lobe due to vomit    PT Received On: 05/27/19  PT Start Time: 1038     PT Stop Time: 1108    PT Total Time (min): 30 min       Billable Minutes:  Therapeutic Activity 30    Treatment Type: Treatment  PT/PTA: PT     PTA Visit Number: 2       General Precautions: Standard, fall  Orthopedic Precautions: N/A   Braces: (PT HAS R LE PROSTHESIS HOWEVER HE HAS NOT USED IT IN APPROX 3 MONTHS PER WIFE)    Spiritual, Cultural Beliefs, [employer]an Practices, Values that Affect Care: yes    Subjective:  Communicated with NURSE SAM AND Epic CHART REVIEW prior to session.  PT MET IN  AGREED TO TX     Pain/Comfort  Pain Rating 1: 0/10  Pain Rating Post-Intervention 1: 0/10    Objective:   Patient found with: telemetry, peripheral IV    Functional Mobility:  PT MET IN  WITH L LE HANGING OUT OF BED. PT SUP.SIT EOB WITH MOD A. PT SCOOTED TO EOB WITH MOD A. PT GIVEN PROSTHESIS TO [place] HOWEVER PT CONTINUED TO FUMBLE WITH SOCK. P.T. CALLED PT WIFE. PT WIFE REPORTED PT HAND USED PROSTHESIS IN APPROX 3 MONTHS. PT WAS ABLE TO T/F TO CHAIR AND MANEUVER MOD I ABOUT 3 MONTHS AGO. PT COORDINATION ASSESSED AND LIMITED. PT STOOD X 4 TRIALS WITH RW AND MOD A X 2 WITH CUES FOR HAND PLACEMENT AND SAFETY WITH RW USE. PT RETURNED SEATED EOB FOR SAFETY. PT SCOOTED TO EOB WITH DIEGO X 3 TRIALS PT SUP IN BED WITH MOD A AND LEFT WITH HOB ELEVATED AND BED ALARM ON.     AM-PAC 6 CLICK MOBILITY  How much help from another person does this patient currently need?   1 = Unable, Total/Dependent Assistance  2 = A lot, Maximum/Moderate Assistance  3 = A little, Minimum/Contact Guard/Supervision  4 = None, Modified Dolores/Independent    Turning over in bed (including adjusting bedclothes, sheets and blankets)?: 2  Sitting down on and standing up from a chair with arms (e.g., wheelchair, bedside commode, etc.): 2  Moving from lying on  back to sitting on the side of the bed?: 2  Moving to and from a bed to a chair (including a wheelchair)?: 1  Need to walk in hospital room?: 1  Climbing 3-5 steps with a railing?: 1  Basic Mobility Total Score: 9    AM-PAC Raw Score CMS G-Code Modifier Level of Impairment Assistance   6 % Total / Unable   7 - 9 CM 80 - 100% Maximal Assist   10 - 14 CL 60 - 80% Moderate Assist   15 - 19 CK 40 - 60% Moderate Assist   20 - 22 CJ 20 - 40% Minimal Assist   23 CI 1-20% SBA / CGA   24 CH 0% Independent/ Mod I     Patient left HOB elevated with all lines intact, call button in reach and bed alarm on.    Assessment:  PT PROGRESSING WITH GROSS FUNC MOBILITY. PT WILL CONT TO BENEFIT FROM P.T. TO ADDRESS IMPAIRMENTS LISTED.     Rehab identified problem list/impairments: Rehab identified problem list/impairments: weakness, impaired endurance, impaired balance, impaired self care skills, impaired cognition, decreased coordination, impaired functional mobilty, decreased lower extremity function, decreased upper extremity function, gait instability, decreased ROM, decreased safety awareness, impaired coordination    Rehab potential is good.    Activity tolerance: Poor    Discharge recommendations: Discharge Facility/Level of Care Needs: rehabilitation facility     Barriers to discharge:      Equipment recommendations: Equipment Needed After Discharge: (TBD)     GOALS:   Multidisciplinary Problems     Physical Therapy Goals        Problem: Physical Therapy Goal    Goal Priority Disciplines Outcome Goal Variances Interventions   Physical Therapy Goal     PT, PT/OT Ongoing (interventions implemented as appropriate)     Description:  LTG'S TO BE MET IN 7 DAYS (5-31-19)  1. PT WILL REQUIRE CGA FOR BED MOBILITY  2. PT WILL REQUIRE DIEGO FOR TF'S  3. PT WILL AMB 50' WITH RW AND DIEGO, RLE PROSTHESIS DONNED  4. PT WILL TOLERATE BLE THEREX X 20 REPS AROM                      PLAN:    Patient to be seen 5 x/week  to address the above  listed problems via wheelchair management/training, therapeutic exercises, therapeutic activities, gait training  Plan of Care expires: 05/31/19  Plan of Care reviewed with: patient, spouse         Venessa Real, PT  05/27/2019

## 2019-05-27 NOTE — PT/OT/SLP PROGRESS
Occupational Therapy      Patient Name:  Ish Guardado   MRN:  [id number]  S: WIFE REPORTED WANTING TO SPEAK WITH THERAPIST REGARDING PT PARTICIPATION  O: PT/ SPOUSE EDUCATED ON PT PROGRESSION FO THERAPIST. PT AWARE OF MISCOMMUNICATION FROM NP TO SPOUSE. PT REQ TO MAX A INITIALLY SELF FEEDING. SPOUSE EDUCATED ON ALLOWING PT TO TRY TO SELF FEED. PT PROGRESS TO MIN A AT TIMES WITH EATING  P: CONTINUE WITH POC  Macy Watts OT  5/27/2019   [id number]  1 TA

## 2019-05-27 NOTE — PT/OT/SLP EVAL
Speech Language Pathology Evaluation  Cognitive/Bedside Swallow    Patient Name:  Ish Guardado   MRN:  [id number]  Admitting Diagnosis: Aspiration pneumonia of right middle lobe due to vomit    Recommendations:                  General Recommendations:  Speech/language therapy and Cognitive-linguistic therapy  Diet recommendations:  Mechanical soft, Thin   Aspiration Precautions: HOB to 90 degrees and Remain upright 30 minutes post meal   General Precautions: Standard, aspiration, fall    History:     Past Medical History:   Diagnosis Date    Drug abuse, amphetamine type     wife states he has been using smoking crystal meth x 1 year    Femoral-popliteal bypass graft occlusion     Left and right    GERD (gastroesophageal reflux disease)     History of substance abuse     History of transient ischemic attack (TIA)     Hyperlipemia     Hypertension     Neuropathy     Occlusion of right femorotibial bypass graft 5/2017, 6/2017    Pre-diabetes     Status post femorotibial bypass        Past Surgical History:   Procedure Laterality Date    ANGIOGRAM-EXTREMITY-LOWER N/A 10/31/2017    Performed by Charanjit Mclean MD at [facility] CATH LAB    ANGIOGRAM-EXTREMITY-LOWER N/A 10/30/2017    Performed by Avila Houser MD at [facility] CATH LAB    ANGIOGRAM-EXTREMITY-LOWER Right 5/24/2017    Performed by Jack John IV, MD at [facility] CATH LAB    back fusion      FEMORAL BYPASS      left fem-pop bypass 11/2014; right fem-pop bypass 9/2016, right fem-distal bypass 3/2017    LEG AMPUTATION THROUGH KNEE      RE-LOOK-PERIPHERAL Right 5/25/2017    Performed by Charanjit Mclean MD at [facility] CATH LAB       Social History: Patient lives with his wife.  No family at bedside     Prior Intubation HX:  Pt was intubated 5/20/19 and extubated 5/21/19    Chest X-Rays: 5/22/19   There has been interval removal of the line and tubes.  The size of the heart is normal.  There is a moderate amount of alveolar consolidation in the lower half of the  right lung.  There is no focal pulmonary infiltrate visualized in the left lung.  There is no pneumothorax.  The costophrenic angles are sharp.          Prior diet: Regular.  Per Epic chart review, the pt does experience reflux    Subjective     Pt was seen at bedside with no family presnet  Patient goals: none reported    Pain/Comfort:  · Pain Rating 1: 0/10    Objective:     Cognitive Status:    Pt presents with mod-max deficits with problem solving, sequencing, orientation, safety awareness, and organization.      Receptive Language:   Comprehension:      · Pt was able to answer simple yes/no questions with 90% acc, complete simple 1 step directions with 85% acc   · He exhibits mod deficits with more complex receptive tasks    Pragmatics:    Pt kept his eyes closed during eval and would only open them when prompted, he has poor insight to his deficits    Expressive Language:  Verbal:    Non-fluent Expressive aphasia.  He is unable to effectively communicate his wants and needs with expression only.  Pt was able to complete rote tasks with min cueing and increased word finding abilities with open ended statements        Motor Speech:  Dysarthria with slurred speech    Visual-Spatial:  pt stated no deficits, however unable to completly assess      Oral Musculature Evaluation  · Oral Musculature: unable to assess due to poor participation/comprehension  · Dentition: edentulous    Bedside Swallow Eval:   Consistencies Assessed:  · Thin liquids, puree, and solids      Oral Phase:   · WFL    Pharyngeal Phase:   · no overt clinical signs/symptoms of aspiration      Assessment:     Ish Guardado is a 58 y.o. male with an SLP diagnosis of Aphasia, Dysarthria and Cognitive-Linguistic Impairment.  He presents with no overt signs of dysphagia, however pt with little po intake.  Further swallow assessment to follow.  Pt will benefit from skilled ST for stated deficits a min of 3 days a week.    Goals:   Multidisciplinary  Problems     SLP Goals        Problem: SLP Goal    Goal Priority Disciplines Outcome   SLP Goal     SLP Ongoing (interventions implemented as appropriate)   Description:  1. Pt will complete simple expressive speech tasks with mod cues for word finding   2. Pt will complete simple cognitive tasks at 75% acc for increased memory, orientation, problem solving, organization, and safety awareness  3. Pt will complete simple receptive speech tasks with 75% acc                       Plan:     · Patient to be seen:  3 x/week   · Plan of Care expires:  06/03/19  · Plan of Care reviewed with:  patient   · SLP Follow-Up:  Yes       Discharge recommendations:  Discharge Facility/Level of Care Needs: nursing facility, skilled, rehabilitation facility   Barriers to Discharge:  None    Time Tracking:     SLP Treatment Date:   05/27/19  Speech Start Time:  1030  Speech Stop Time:  1055     Speech Total Time (min):  25 min    Billable Minutes: Eval 15  and Eval Swallow and Oral Function 10    Janet Mendoza CCC-SLP  05/27/2019

## 2019-05-27 NOTE — ASSESSMENT & PLAN NOTE
Pt used opoid,  Crystal methylamphetamine x 1 year Daily  Cont current tx    Started on tramadol 50 mg po tid to prevent withdrawal and clonidine prn for withdrawals: stopped 5/24/19 due to confusion   Seroquel given one evening 5/22 then stopped and pt was lethargic

## 2019-05-27 NOTE — PROGRESS NOTES
[facility] Hospital Medicine  Progress Note    Patient Name: Ish Guardado  MRN: [id number]  Patient Class: IP- Inpatient   Admission Date: 5/20/2019  Length of Stay: 7 days  Attending Physician: Yobani Harris MD  Primary Care Provider: Pollo Arana MD    Subjective:     Principal Problem:Aspiration pneumonia of right middle lobe due to vomit    HPI:  Ish Guardado is a 59 y/o wm with a PMHx of COPD , GERD , Chronic cough , Drug abuse , HTN , HLD , PAD and Tobacco abuse, who presented to the ER with a c/o AMS since yesterday night. The history was taken from EMR and family members due to pt medical condition . Pt was found yesterday night inside his car confused and altered . Pt was taken to his house by his son. He was responsive, but lethargic . Per family the patient used crystal methalamphetamine. This am he was found more conused and lethargic with red colored fluid coming through his nose and mouth . Per family pt was in his usual health of state before last night. They states he has a chronic cough and heartburn. The  denies any fever , sob , palpitation , weakness, chills, diarrhea, or gu problems.   ER course: The ABG show respiratory acidosis , acute hypoxic and hypercapnic respiratory failure . Pt was intubated . The CXR show a right side infiltrate . WBC 14 , lactic acid  3 , troponin level wnl . He was started on Levaquin and vanc . He had 3 lt NS . He was started on levophed due to MAP > than 65 .   ER VS   Initial Vitals   BP Pulse Resp Temp SpO2   05/20/19 0900 05/20/19 0900 05/20/19 0926 -- 05/20/19 0900   139/62 106 (!) 25   (!) 89 %   He was admitted to ICU with drug overdose, aspiration pneumonia. Wife, Ray Guardado, [phone] is the surrogate decision maker.       Hospital Course:  Pt found altered and admitted with diagnoses of drug overdose (+ opiates, + amphetamines), combined respiratory failure, aspiration pneumonia, shock and concerns for GI bleed.   5/21  Pt was seen and examined at bedside . He is  Alert  But confuse .  He is off vasopressor . He was extubated this am .  He has a good UOP .  There was no acute event overnight   5/22 Pt was seen and examined at bedside . He is aaox 1 .  He  Is confuse on and off .  Pt was placed 1 to 1 .  The ct head  from the admission day did not show any acute finding . Pt with a Known history of polysubstance abuse .  The blood pressure was elevated  And respond well to clonidine . He was started on tramadol 50 mg po tid .   5/23 - Wife has explained substance abuse x 1 year with smoking crystal Methelamphetamine. Seroquel 25 mg and tramadol prescribed to prevent withdrawal. This AM, lethargic and arouses some and minimally follows commands. To hold seroquel due to lethargy - tramadol continued.  IV levaquin and DuoNeb treatments in progress. Pt too lethargic to participate with PT/OT - will attempt to increase activity tomorrow.  No signs of active bleeding noted. Spoke with wife regarding findings and plan of care.   5/24/19 - Still confused. Didn't know wife's name and day of the week. Will stop scheduled tramadol.  5/25/19-Patient remains confused. PT/OT recommends SNF. Social work consult for placement.   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 Dr. Valentino continue ASA/Plavix, Carotid u/s, CT Head without contrast. Attempted to contact wife for update.  5/27/19 - Wife brought his Leg. He Must participate in OT/PT so Social Service can arrange SNF. He is more alert today. Repeat CT head showed similar results of bilateral areas of suspected cerebral and cerebellar infarcts with areas of hemorrhagic conversion.     Interval History: More alert today. Wife brought his Leg for OT/PT, but he hasn't used in 3 months.    Review of Systems   Unable to perform ROS: Mental status change   Constitutional: Positive for activity change,  appetite change and fatigue. Negative for chills, diaphoresis, fever and unexpected weight change.   HENT: Negative for congestion, ear discharge, ear pain, facial swelling, hearing loss, postnasal drip, sore throat, tinnitus, trouble swallowing and voice change.    Eyes: Negative for photophobia, pain, discharge, redness, itching and visual disturbance.   Respiratory: Negative for cough, choking, chest tightness, shortness of breath, wheezing and stridor.    Cardiovascular: Negative for chest pain, palpitations and leg swelling.   Gastrointestinal: Negative for abdominal distention, abdominal pain, blood in stool, constipation, diarrhea, nausea and vomiting.   Endocrine: Negative for cold intolerance, heat intolerance, polydipsia, polyphagia and polyuria.   Genitourinary: Negative for difficulty urinating, flank pain, frequency, hematuria and urgency.   Musculoskeletal: Negative for arthralgias, back pain, gait problem and myalgias.   Skin: Negative for color change, pallor, rash and wound.   Neurological: Positive for speech difficulty and weakness. Negative for dizziness, tremors, seizures, syncope, facial asymmetry, light-headedness, numbness and headaches.   Hematological: Negative for adenopathy. Does not bruise/bleed easily.   Psychiatric/Behavioral: Positive for confusion. Negative for agitation, hallucinations and suicidal ideas. The patient is not nervous/anxious.    All other systems reviewed and are negative.    Objective:     Vital Signs (Most Recent):  Temp: 98.5 °F (36.9 °C) (05/27/19 1518)  Pulse: 72 (05/27/19 1715)  Resp: 17 (05/27/19 1518)  BP: 128/70 (05/27/19 1518)  SpO2: 97 % (05/27/19 1518) Vital Signs (24h Range):  Temp:  [98.1 °F (36.7 °C)-98.7 °F (37.1 °C)] 98.5 °F (36.9 °C)  Pulse:  [66-96] 72  Resp:  [16-20] 17  SpO2:  [93 %-97 %] 97 %  BP: (124-184)/(70-83) 128/70     Weight: 87.2 kg (192 lb 3.9 oz)  Body mass index is 28.39 kg/m².    Intake/Output Summary (Last 24 hours) at 5/27/2019  1845  Last data filed at 5/27/2019 1800  Gross per 24 hour   Intake 480 ml   Output --   Net 480 ml      Physical Exam   Constitutional: Vital signs are normal. He appears well-developed and well-nourished. He appears lethargic.       HENT:   Head: Normocephalic and atraumatic.   Nose: Nose normal.   Eyes: Pupils are equal, round, and reactive to light. Conjunctivae and EOM are normal.   Eyes are glazed over   Neck: Normal range of motion. Neck supple. No JVD present. No tracheal deviation present. No thyromegaly present.   Cardiovascular: Normal rate, regular rhythm, normal heart sounds and intact distal pulses. Exam reveals no gallop and no friction rub.   No murmur heard.  Pulmonary/Chest: Effort normal. No stridor. No respiratory distress. He has decreased breath sounds. He has no wheezes. He has no rales. He exhibits no tenderness.   Abdominal: Soft. Bowel sounds are normal. He exhibits no distension. There is no tenderness. There is no rebound and no guarding.   Musculoskeletal: Normal range of motion. He exhibits no edema, tenderness or deformity.   Generalized weakness   Neurological: He has normal reflexes. He appears lethargic. No cranial nerve deficit. Coordination normal.   Reflex Scores:       Tricep reflexes are 2+ on the right side and 2+ on the left side.       Bicep reflexes are 2+ on the right side and 2+ on the left side.       Brachioradialis reflexes are 2+ on the right side and 2+ on the left side.       Patellar reflexes are 2+ on the right side and 2+ on the left side.       Achilles reflexes are 2+ on the right side and 2+ on the left side.  He is confused. He talks but does not follow directions and commands. He moves his extremities but appeared to me that he is weaker on the right side. Not sure about sensory deficits.     Skin: Skin is warm and dry. Capillary refill takes 2 to 3 seconds. No rash noted. No erythema. No pallor.   Psychiatric: His affect is inappropriate. His speech is not  delayed. He is slowed. Cognition and memory are impaired. He expresses no suicidal ideation.   Confused, but knew the day of the week, and his name. He is inattentive.   Nursing note and vitals reviewed.    Significant Labs: CBC: No results for input(s): WBC, HGB, HCT, PLT in the last 48 hours.  CMP: No results for input(s): NA, K, CL, CO2, GLU, BUN, CREATININE, CALCIUM, PROT, ALBUMIN, BILITOT, ALKPHOS, AST, ALT, ANIONGAP, EGFRNONAA in the last 48 hours.    Invalid input(s): ESTGFAFRICA  Cardiac Markers: No results for input(s): CKMB, MYOGLOBIN, BNP, TROPISTAT in the last 48 hours.  Lipid Panel: No results for input(s): CHOL, HDL, LDLCALC, TRIG, CHOLHDL in the last 48 hours.    Significant Imaging: I have reviewed all pertinent imaging results/findings within the past 24 hours.   Imaging Results          X-Ray Chest AP Portable (Final result)  Result time 05/20/19 22:13:56    Final result by Quincy Ruiz MD (05/20/19 22:13:56)                 Impression:      1. Right perihilar infiltrate, unchanged since 05/20/2019.  2. Endotracheal tube, nasogastric tube and right internal jugular line as detailed above.      Electronically signed by: Quincy Ruiz MD  Date:    05/20/2019  Time:    22:13             Narrative:    EXAMINATION:  XR CHEST AP PORTABLE    CLINICAL HISTORY:  ET tube placement;    COMPARISON:  05/20/2019 at 12:51 hours    FINDINGS:  Endotracheal tube is in place with the distal tip above the natan at the level of the clavicles.  No significant change in position in comparison with the prior exam.  Right internal jugular line identified with distal tip overlying the proximal SVC.  Nasogastric tube in place.  Distal tip overlies the proximal body of the stomach.  Mild, hazy right perihilar infiltrate is present and unchanged.  Left lung clear.  Heart size within normal limits.No significant bony findings.                               US Retroperitoneal Complete (Kidney and (Final result)   Result time 05/20/19 13:26:13    Final result by Paulino Pacheco MD (05/20/19 13:26:13)                 Impression:      No significant abnormality.      Electronically signed by: Paulino Pacheco  Date:    05/20/2019  Time:    13:26             Narrative:    EXAMINATION:  US RETROPERITONEAL COMPLETE    CLINICAL HISTORY:  dorothy;    TECHNIQUE:  Ultrasound of the kidneys and urinary bladder was performed including color flow and Doppler evaluation of the kidneys.    COMPARISON:  None.    FINDINGS:  Right kidney: The right kidney measures 11.3 cm. No cortical thinning. No loss of corticomedullary distinction. Resistive index measures 0.56.  No mass. No renal stone. No hydronephrosis.    Left kidney: The left kidney measures 10.9 cm. No cortical thinning. No loss of corticomedullary distinction. Resistive index measures 0.51.  No mass. No renal stone. No hydronephrosis.    Bladder collapsed around a Mejia catheter.                               X-Ray Chest AP Portable (Final result)  Result time 05/20/19 13:13:03    Final result by SERGE Albarran Sr., MD (05/20/19 13:13:03)                 Impression:      1. There has been interval placement of a right internal jugular venous line. The tip is in the superior vena cava.  There is no pneumothorax.  2. There is a moderate amount of alveolar consolidation in the inferior 2/3 of the right lung.  This is characteristic of pneumonia.  3. The endotracheal and NG tubes remain in place.  .      Electronically signed by: Quincy Albarran MD  Date:    05/20/2019  Time:    13:13             Narrative:    EXAMINATION:  XR CHEST AP PORTABLE    CLINICAL HISTORY:  Encounter for adjustment and management of vascular access device    COMPARISON:  A chest x-ray performed at 10:04 on 05/20/2019.    FINDINGS:  There has been interval placement of a right internal jugular venous line.  The tip is in the superior vena cava.  The endotracheal and NG tubes remain in place.  The size of the heart is  prominent.  There is a moderate amount of alveolar consolidation in the inferior 2/3 of the right lung.  The left lung is clear.  There is no pneumothorax.  The costophrenic angles are sharp.  There is a healed fracture in the lateral aspect of the right 6th rib.                               CT Head Without Contrast (Final result)  Result time 05/20/19 10:39:15    Final result by SERGE Albarran Sr., MD (05/20/19 10:39:15)                 Impression:      1. There are chronic appearing ischemic changes scattered throughout both cerebral hemispheres. There is no evidence of an acute ischemic event. There is no intracranial hemorrhage.  2. There is mild partial opacification of the right frontal and maxillary sinuses.  This is characteristic of sinusitis.  3. There is an old-appearing fracture of the medial wall of the right orbit.  All CT scans at this facility use dose modulation, iterative reconstruction, and/or weight base dosing when appropriate to reduce radiation dose when appropriate to reduce radiation dose to as low as reasonably achievable.      Electronically signed by: Quincy Albarran MD  Date:    05/20/2019  Time:    10:39             Narrative:    EXAMINATION:  CT HEAD WITHOUT CONTRAST    CLINICAL HISTORY:  Confusion/delirium, altered LOC, unexplained;    TECHNIQUE:  Standard brain CT protocol without IV contrast was performed.    COMPARISON:  None    FINDINGS:  There are chronic appearing ischemic changes scattered throughout both cerebral hemispheres.  There is no evidence of an acute ischemic event.  There is no intracranial hemorrhage.  There is an old-appearing fracture of the medial wall of the right orbit.  There is mild partial opacification of the right frontal and maxillary sinuses.                               X-Ray Chest AP Portable (Final result)  Result time 05/20/19 10:10:54    Final result by Paulino Pacheco MD (05/20/19 10:10:54)                 Impression:      Patchy opacification  at the right perihilar region and lung base suspicious for airspace disease/pneumonia. Left lung is clear. No pneumothorax. Likely tiny right pleural effusion.      Electronically signed by: Paulino Pacheco  Date:    05/20/2019  Time:    10:10             Narrative:    EXAMINATION:  XR CHEST AP PORTABLE    CLINICAL HISTORY:  . Hypoxemia    TECHNIQUE:  Single frontal portable view of the chest was performed.    COMPARISON:  11/01/2017    FINDINGS:  Support devices: Endotracheal tube tip projects 6.2 cm above the natan, satisfactory.  Esophagogastric tube also in satisfactory position.    Patchy opacification at the right perihilar region and lung base suspicious for airspace disease/pneumonia.  Left lung is clear.  No pneumothorax.  Likely tiny right pleural effusion.    The cardiac silhouette is normal in size. The hilar and mediastinal contours are unremarkable.    Bones are intact.                                  Assessment/Plan:      * Aspiration pneumonia of right middle lobe due to vomit  Cont IVAB  Supplemental oxygen  DuoNebs  Respiratory culture: STREPTOCOCCUS AGALACTIAE (GROUP B)   IV Levaquin downgraded to po and Completed course  Pulmonary consult  Blood cultures remain NGTD        Acute CVA (cerebrovascular accident)  -MRI: multifocal bilateral cerebral and cerebellar acute infarcts some of which demonstrate hemorrhagic conversion  -Neurology consult    Encephalopathy, metabolic  multifactorial : infection, withdrawal   5/26/19: MRI brain shows acute multi CVA  Cont supportive tx  No neurological deficit   CT of Head was negative for acute findings  Neuro checks  5/27/19  Remains confused, but improving   Continue Neuro checks   Neurology consult    Overdose of illicit drug  Cont current tx   + amphetamines and + opiates      Polysubstance abuse  Pt used opoid,  Crystal methylamphetamine x 1 year Daily  Cont current tx    Started on tramadol 50 mg po tid to prevent withdrawal and clonidine prn for  withdrawals: stopped 5/24/19 due to confusion   Seroquel given one evening 5/22 then stopped and pt was lethargic      Acute respiratory failure with hypoxia and hypercapnia  2/2 to COPD and aspiration PNA   S/p mechanical ventilation: resolved   ICU /Pulmonology consulted   Supplemental oxygen to maintain sats > 92 %  5/25/19  -Pt. On RA   -O2 sats 94%      Shock, unspecified  Most likely due to PNA  S/p vasodepressor   Cont IVAB Levaquin and vanc   blood cx NGTD  S/p 3 lt ns           KEHINDE (acute kidney injury)  Most likely due to sepsis   Cont IVF  retroperitoneal US did not show any acute abnormality   Monitor UOP and kidney function   Improving     HTN (hypertension)  Hold BP medication now  Due to sepsis   Resume accordantly   S/P levophed due to hypotension with a MAP<65  Resolved       PAD with remote right fem-distal bypass  Resume statin       Mixed hyperlipidemia  Resume statin       PAD (peripheral artery disease)  Hold Plavix for possible GIB  Resume statin       Stage 1 mild COPD by GOLD classification  Cont breathing tx - duonebs and formoterol        VTE Risk Mitigation (From admission, onward)        Ordered     Place sequential compression device  Until discontinued      05/20/19 1435     IP VTE HIGH RISK PATIENT  Once      05/20/19 1435        Denisha Warner NP  Department of Hospital Medicine   [facility]

## 2019-05-28 PROCEDURE — 99233 PR SUBSEQUENT HOSPITAL CARE,LEVL III: ICD-10-PCS | Mod: ,,, | Performed by: PSYCHIATRY & NEUROLOGY

## 2019-05-28 PROCEDURE — 21400001 HC TELEMETRY ROOM

## 2019-05-28 PROCEDURE — 93010 ELECTROCARDIOGRAM REPORT: CPT | Mod: ,,, | Performed by: INTERNAL MEDICINE

## 2019-05-28 PROCEDURE — 25000242 PHARM REV CODE 250 ALT 637 W/ HCPCS: Performed by: INTERNAL MEDICINE

## 2019-05-28 PROCEDURE — 93041 RHYTHM ECG TRACING: CPT

## 2019-05-28 PROCEDURE — 25000003 PHARM REV CODE 250: Performed by: INTERNAL MEDICINE

## 2019-05-28 PROCEDURE — 94640 AIRWAY INHALATION TREATMENT: CPT

## 2019-05-28 PROCEDURE — 99233 SBSQ HOSP IP/OBS HIGH 50: CPT | Mod: ,,, | Performed by: PSYCHIATRY & NEUROLOGY

## 2019-05-28 PROCEDURE — 93010 EKG 12-LEAD: ICD-10-PCS | Mod: ,,, | Performed by: INTERNAL MEDICINE

## 2019-05-28 PROCEDURE — 25000003 PHARM REV CODE 250: Performed by: PHYSICIAN ASSISTANT

## 2019-05-28 PROCEDURE — 25000003 PHARM REV CODE 250: Performed by: NURSE PRACTITIONER

## 2019-05-28 PROCEDURE — 93005 ELECTROCARDIOGRAM TRACING: CPT

## 2019-05-28 PROCEDURE — 97530 THERAPEUTIC ACTIVITIES: CPT | Performed by: PHYSICAL THERAPIST

## 2019-05-28 RX ORDER — LORAZEPAM 1 MG/1
1 TABLET ORAL EVERY 6 HOURS PRN
Status: DISCONTINUED | OUTPATIENT
Start: 2019-05-28 | End: 2019-05-31 | Stop reason: HOSPADM

## 2019-05-28 RX ADMIN — IPRATROPIUM BROMIDE AND ALBUTEROL SULFATE 3 ML: .5; 3 SOLUTION RESPIRATORY (INHALATION) at 12:05

## 2019-05-28 RX ADMIN — IPRATROPIUM BROMIDE AND ALBUTEROL SULFATE 3 ML: .5; 3 SOLUTION RESPIRATORY (INHALATION) at 07:05

## 2019-05-28 RX ADMIN — AMLODIPINE BESYLATE 10 MG: 10 TABLET ORAL at 08:05

## 2019-05-28 RX ADMIN — POLYETHYLENE GLYCOL 3350 17 G: 17 POWDER, FOR SOLUTION ORAL at 08:05

## 2019-05-28 RX ADMIN — ATORVASTATIN CALCIUM 10 MG: 10 TABLET, FILM COATED ORAL at 08:05

## 2019-05-28 RX ADMIN — ARFORMOTEROL TARTRATE 15 MCG: 15 SOLUTION RESPIRATORY (INHALATION) at 07:05

## 2019-05-28 RX ADMIN — PANTOPRAZOLE SODIUM 40 MG: 40 TABLET, DELAYED RELEASE ORAL at 08:05

## 2019-05-28 RX ADMIN — LORAZEPAM 1 MG: 1 TABLET ORAL at 11:05

## 2019-05-28 NOTE — NURSING
Notified NP of rhythm change on tele monitor. Orders given to confirm with EKG  Patient stable no change in neurologic change, or vital  Signs. Will continue to monitor

## 2019-05-28 NOTE — PT/OT/SLP PROGRESS
Physical Therapy  Treatment    Ish Guardado   MRN: [id number]   Admitting Diagnosis: Aspiration pneumonia of right middle lobe due to vomit    PT Received On: 05/28/19  PT Start Time: 1100     PT Stop Time: 1125    PT Total Time (min): 25 min       Billable Minutes:  Therapeutic Activity 25 min    Treatment Type: Treatment  PT/PTA: PT     PTA Visit Number: 0       General Precautions: Standard, fall  Orthopedic Precautions: N/A   Braces: N/A    Spiritual, Cultural Beliefs, Mu-ism Practices, Values that Affect Care: no    Subjective:  Communicated with Nurse Klein and epic chart review prior to session.  Pt found supine in bed and agreeable to tx at this time.    Pain/Comfort  Pain Rating 1: 0/10  Pain Rating Post-Intervention 1: 0/10    Objective:   Patient found with: peripheral IV, telemetry    Functional Mobility:  Therapeutic Activities and Exercises:  Pt performed supine>sit with Min A, scooted to EOB with SBA. Pt and bed were found to be soiled, therefore returned to supine with Min A so he could be cleaned and brief changed. Pt required Total A to be cleaned and changed. Pt performed supine>sit with Min A, scooted to EOB with SBA, sit>stnad using RW with Mod A, stand pivot t/f using RW with Mod A x 2.      AM-PAC 6 CLICK MOBILITY  How much help from another person does this patient currently need?   1 = Unable, Total/Dependent Assistance  2 = A lot, Maximum/Moderate Assistance  3 = A little, Minimum/Contact Guard/Supervision  4 = None, Modified [place]/Independent    Turning over in bed (including adjusting bedclothes, sheets and blankets)?: 3  Sitting down on and standing up from a chair with arms (e.g., wheelchair, bedside commode, etc.): 2  Moving from lying on back to sitting on the side of the bed?: 3  Moving to and from a bed to a chair (including a wheelchair)?: 2  Need to walk in hospital room?: 1  Climbing 3-5 steps with a railing?: 1  Basic Mobility Total Score: 12    AM-PAC Raw Score CMS  G-Code Modifier Level of Impairment Assistance   6 % Total / Unable   7 - 9 CM 80 - 100% Maximal Assist   10 - 14 CL 60 - 80% Moderate Assist   15 - 19 CK 40 - 60% Moderate Assist   20 - 22 CJ 20 - 40% Minimal Assist   23 CI 1-20% SBA / CGA   24 CH 0% Independent/ Mod I     Patient left up in chair with all lines intact, call button in reach, chair alarm on, Nurse Latoya notified and PCT Alice present.    Assessment:  Ish Guardado is a 58 y.o. male with a medical diagnosis of Aspiration pneumonia of right middle lobe due to vomit and presents with  impaired functional mobility. Pt will benefit from continued skilled PT in order to address impairments.     Rehab identified problem list/impairments: Rehab identified problem list/impairments: weakness, impaired endurance, impaired self care skills, impaired functional mobilty, decreased coordination, decreased safety awareness, impaired balance, gait instability    Rehab potential is fair.    Activity tolerance: Fair    Discharge recommendations: Discharge Facility/Level of Care Needs: nursing facility, skilled     Barriers to discharge:      Equipment recommendations:       GOALS:   Multidisciplinary Problems     Physical Therapy Goals        Problem: Physical Therapy Goal    Goal Priority Disciplines Outcome Goal Variances Interventions   Physical Therapy Goal     PT, PT/OT Ongoing (interventions implemented as appropriate)     Description:  LTG'S TO BE MET IN 7 DAYS (5-31-19)  1. PT WILL REQUIRE CGA FOR BED MOBILITY  2. PT WILL REQUIRE DIEGO FOR TF'S  3. PT WILL AMB 50' WITH RW AND DIEGO, RLE PROSTHESIS DONNED  4. PT WILL TOLERATE BLE THEREX X 20 REPS AROM                      PLAN:    Patient to be seen 5 x/week  to address the above listed problems via gait training, therapeutic activities, therapeutic exercises  Plan of Care expires: 05/31/19  Plan of Care reviewed with:      PT G-Codes  Functional Assessment Tool Used: [facility]  Score: 12    Chelo  Wayne, PT/OT  05/28/2019

## 2019-05-28 NOTE — PLAN OF CARE
Problem: Adult Inpatient Plan of Care  Goal: Plan of Care Review  Outcome: Ongoing (interventions implemented as appropriate)  POC reviewed with patient. Pt verbalized understanding  Pt remains free of injuries and falls; fall precaution in place.    NR on tele monitor. HR 80's-90's.  IV saline locked; intact.  Avasis and bed alarm in use.   Neuro checks q4hr maintained.  No other c/o at this time.  Bed low, side rails x2, call light in reach, personal belongings at bedside.  Reminded to call for assistance.  Repositioned independently.  Hourly rounding complete. Will continue to monitor.

## 2019-05-28 NOTE — PLAN OF CARE
Patient AAOX2 disoriented to situation and time. No c/o pain at this time. Patient NSR during shift on Tele monitor. Patient's vitals wnl during shift. Will continue to monitor patient

## 2019-05-28 NOTE — SUBJECTIVE & OBJECTIVE
Interval History:  working on SNF placement    Review of Systems   Constitutional: Positive for activity change, appetite change and fatigue. Negative for chills, diaphoresis, fever and unexpected weight change.   HENT: Negative for congestion, ear discharge, ear pain, facial swelling, hearing loss, postnasal drip, sore throat, tinnitus, trouble swallowing and voice change.    Eyes: Negative for photophobia, pain, discharge, redness, itching and visual disturbance.   Respiratory: Negative for cough, choking, chest tightness, shortness of breath, wheezing and stridor.    Cardiovascular: Negative for chest pain, palpitations and leg swelling.   Gastrointestinal: Negative for abdominal distention, abdominal pain, blood in stool, constipation, diarrhea, nausea and vomiting.   Endocrine: Negative for cold intolerance, heat intolerance, polydipsia, polyphagia and polyuria.   Genitourinary: Negative for difficulty urinating, flank pain, frequency, hematuria and urgency.   Musculoskeletal: Negative for arthralgias, back pain, gait problem and myalgias.        Right AKA   Skin: Negative for color change, pallor, rash and wound.   Neurological: Positive for speech difficulty and weakness. Negative for dizziness, tremors, seizures, syncope, facial asymmetry, light-headedness, numbness and headaches.   Hematological: Negative for adenopathy. Does not bruise/bleed easily.   Psychiatric/Behavioral: Positive for confusion. Negative for agitation, hallucinations and suicidal ideas. The patient is not nervous/anxious.    All other systems reviewed and are negative.    Objective:     Vital Signs (Most Recent):  Temp: 98.2 °F (36.8 °C) (05/28/19 1516)  Pulse: 63 (05/28/19 1723)  Resp: 15 (05/28/19 1516)  BP: (!) 141/79 (05/28/19 1516)  SpO2: 98 % (05/28/19 1516) Vital Signs (24h Range):  Temp:  [98.2 °F (36.8 °C)-99.4 °F (37.4 °C)] 98.2 °F (36.8 °C)  Pulse:  [63-91] 63  Resp:  [15-20] 15  SpO2:  [93 %-98 %] 98 %  BP:  (118-164)/(64-80) 141/79     Weight: 86.7 kg (191 lb 2.2 oz)  Body mass index is 28.23 kg/m².    Intake/Output Summary (Last 24 hours) at 5/28/2019 1829  Last data filed at 5/28/2019 1542  Gross per 24 hour   Intake 476 ml   Output --   Net 476 ml      Physical Exam   Constitutional: Vital signs are normal. He appears well-developed and well-nourished. He appears lethargic.       HENT:   Head: Normocephalic and atraumatic.   Nose: Nose normal.   Eyes: Pupils are equal, round, and reactive to light. Conjunctivae and EOM are normal.   Eyes are glazed over   Neck: Normal range of motion. Neck supple. No JVD present. No tracheal deviation present. No thyromegaly present.   Cardiovascular: Normal rate, regular rhythm, normal heart sounds and intact distal pulses. Exam reveals no gallop and no friction rub.   No murmur heard.  Pulmonary/Chest: Effort normal. No stridor. No respiratory distress. He has decreased breath sounds. He has no wheezes. He has no rales. He exhibits no tenderness.   Abdominal: Soft. Bowel sounds are normal. He exhibits no distension. There is no tenderness. There is no rebound and no guarding.   Musculoskeletal: Normal range of motion. He exhibits no edema, tenderness or deformity.   Generalized weakness   Neurological: He has normal reflexes. He appears lethargic. No cranial nerve deficit. Coordination normal.   Reflex Scores:       Tricep reflexes are 2+ on the right side and 2+ on the left side.       Bicep reflexes are 2+ on the right side and 2+ on the left side.       Brachioradialis reflexes are 2+ on the right side and 2+ on the left side.       Patellar reflexes are 2+ on the right side and 2+ on the left side.       Achilles reflexes are 2+ on the right side and 2+ on the left side.  He is confused. He talks but does not follow directions and commands. He moves his extremities but appeared to me that he is weaker on the right side. Not sure about sensory deficits.     Skin: Skin is warm  and dry. Capillary refill takes 2 to 3 seconds. No rash noted. No erythema. No pallor.   Psychiatric: His affect is inappropriate. His speech is not delayed. He is slowed. Cognition and memory are impaired. He expresses no suicidal ideation.   Confused, but knew the day of the week, and his name. He is inattentive.   Nursing note and vitals reviewed.    Significant Labs: CBC: No results for input(s): WBC, HGB, HCT, PLT in the last 48 hours.  CMP: No results for input(s): NA, K, CL, CO2, GLU, BUN, CREATININE, CALCIUM, PROT, ALBUMIN, BILITOT, ALKPHOS, AST, ALT, ANIONGAP, EGFRNONAA in the last 48 hours.    Invalid input(s): ESTGF[place]    Significant Imaging: I have reviewed all pertinent imaging results/findings within the past 24 hours.

## 2019-05-28 NOTE — PLAN OF CARE
Attempted to meet with patient's wife today. Went to room, patient's wife not present. Left VM for patient's wife to call with her preference for skilled placement. Will attempt to obtain choice again tomorrow.       05/28/19 1602   Post-Acute Status   Post-Acute Authorization Placement   Discharge Delays (!) Patient and Family Barriers

## 2019-05-28 NOTE — PROGRESS NOTES
[facility] Hospital Medicine  Progress Note    Patient Name: Ish Guardado  MRN: [id number]  Patient Class: IP- Inpatient   Admission Date: 5/20/2019  Length of Stay: 8 days  Attending Physician: Yobani Harris MD  Primary Care Provider: Pollo Arana MD    Subjective:     Principal Problem:Aspiration pneumonia of right middle lobe due to vomit    HPI:  Ish Guardado is a 59 y/o wm with a PMHx of COPD , GERD , Chronic cough , Drug abuse , HTN , HLD , PAD and Tobacco abuse, who presented to the ER with a c/o AMS since yesterday night. The history was taken from EMR and family members due to pt medical condition . Pt was found yesterday night inside his car confused and altered . Pt was taken to his house by his son. He was responsive, but lethargic . Per family the patient used crystal methalamphetamine. This am he was found more conused and lethargic with red colored fluid coming through his nose and mouth . Per family pt was in his usual health of state before last night. They states he has a chronic cough and heartburn. The  denies any fever , sob , palpitation , weakness, chills, diarrhea, or gu problems.   ER course: The ABG show respiratory acidosis , acute hypoxic and hypercapnic respiratory failure . Pt was intubated . The CXR show a right side infiltrate . WBC 14 , lactic acid  3 , troponin level wnl . He was started on Levaquin and vanc . He had 3 lt NS . He was started on levophed due to MAP > than 65 .   ER VS   Initial Vitals   BP Pulse Resp Temp SpO2   05/20/19 0900 05/20/19 0900 05/20/19 0926 -- 05/20/19 0900   139/62 106 (!) 25   (!) 89 %   He was admitted to ICU with drug overdose, aspiration pneumonia. Wife, Ray Guardado, [phone] is the surrogate decision maker.       Hospital Course:  Pt found altered and admitted with diagnoses of drug overdose (+ opiates, + amphetamines), combined respiratory failure, aspiration pneumonia, shock and concerns for GI bleed.   5/21  Pt was seen and examined at bedside . He is  Alert  But confuse .  He is off vasopressor . He was extubated this am .  He has a good UOP .  There was no acute event overnight   5/22 Pt was seen and examined at bedside . He is aaox 1 .  He  Is confuse on and off .  Pt was placed 1 to 1 .  The ct head  from the admission day did not show any acute finding . Pt with a Known history of polysubstance abuse .  The blood pressure was elevated  And respond well to clonidine . He was started on tramadol 50 mg po tid .   5/23 - Wife has explained substance abuse x 1 year with smoking crystal Methelamphetamine. Seroquel 25 mg and tramadol prescribed to prevent withdrawal. This AM, lethargic and arouses some and minimally follows commands. To hold seroquel due to lethargy - tramadol continued.  IV levaquin and DuoNeb treatments in progress. Pt too lethargic to participate with PT/OT - will attempt to increase activity tomorrow.  No signs of active bleeding noted. Spoke with wife regarding findings and plan of care.   5/24/19 - Still confused. Didn't know wife's name and day of the week. Will stop scheduled tramadol.  5/25/19-Patient remains confused. PT/OT recommends SNF. Social work consult for placement.   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 Dr. Valentino continue ASA/Plavix, Carotid u/s, CT Head without contrast. Attempted to contact wife for update.  5/27/19 - Wife brought his Leg. He Must participate in OT/PT so Social Service can arrange SNF. He is more alert today. Repeat CT head showed similar results of bilateral areas of suspected cerebral and cerebellar infarcts with areas of hemorrhagic conversion.     Interval History:  working on SNF placement    Review of Systems   Constitutional: Positive for activity change, appetite change and fatigue. Negative for chills, diaphoresis, fever and unexpected weight  change.   HENT: Negative for congestion, ear discharge, ear pain, facial swelling, hearing loss, postnasal drip, sore throat, tinnitus, trouble swallowing and voice change.    Eyes: Negative for photophobia, pain, discharge, redness, itching and visual disturbance.   Respiratory: Negative for cough, choking, chest tightness, shortness of breath, wheezing and stridor.    Cardiovascular: Negative for chest pain, palpitations and leg swelling.   Gastrointestinal: Negative for abdominal distention, abdominal pain, blood in stool, constipation, diarrhea, nausea and vomiting.   Endocrine: Negative for cold intolerance, heat intolerance, polydipsia, polyphagia and polyuria.   Genitourinary: Negative for difficulty urinating, flank pain, frequency, hematuria and urgency.   Musculoskeletal: Negative for arthralgias, back pain, gait problem and myalgias.        Right AKA   Skin: Negative for color change, pallor, rash and wound.   Neurological: Positive for speech difficulty and weakness. Negative for dizziness, tremors, seizures, syncope, facial asymmetry, light-headedness, numbness and headaches.   Hematological: Negative for adenopathy. Does not bruise/bleed easily.   Psychiatric/Behavioral: Positive for confusion. Negative for agitation, hallucinations and suicidal ideas. The patient is not nervous/anxious.    All other systems reviewed and are negative.    Objective:     Vital Signs (Most Recent):  Temp: 98.2 °F (36.8 °C) (05/28/19 1516)  Pulse: 63 (05/28/19 1723)  Resp: 15 (05/28/19 1516)  BP: (!) 141/79 (05/28/19 1516)  SpO2: 98 % (05/28/19 1516) Vital Signs (24h Range):  Temp:  [98.2 °F (36.8 °C)-99.4 °F (37.4 °C)] 98.2 °F (36.8 °C)  Pulse:  [63-91] 63  Resp:  [15-20] 15  SpO2:  [93 %-98 %] 98 %  BP: (118-164)/(64-80) 141/79     Weight: 86.7 kg (191 lb 2.2 oz)  Body mass index is 28.23 kg/m².    Intake/Output Summary (Last 24 hours) at 5/28/2019 3127  Last data filed at 5/28/2019 1542  Gross per 24 hour   Intake 475  ml   Output --   Net 476 ml      Physical Exam   Constitutional: Vital signs are normal. He appears well-developed and well-nourished. He appears lethargic.       HENT:   Head: Normocephalic and atraumatic.   Nose: Nose normal.   Eyes: Pupils are equal, round, and reactive to light. Conjunctivae and EOM are normal.   Eyes are glazed over   Neck: Normal range of motion. Neck supple. No JVD present. No tracheal deviation present. No thyromegaly present.   Cardiovascular: Normal rate, regular rhythm, normal heart sounds and intact distal pulses. Exam reveals no gallop and no friction rub.   No murmur heard.  Pulmonary/Chest: Effort normal. No stridor. No respiratory distress. He has decreased breath sounds. He has no wheezes. He has no rales. He exhibits no tenderness.   Abdominal: Soft. Bowel sounds are normal. He exhibits no distension. There is no tenderness. There is no rebound and no guarding.   Musculoskeletal: Normal range of motion. He exhibits no edema, tenderness or deformity.   Generalized weakness   Neurological: He has normal reflexes. He appears lethargic. No cranial nerve deficit. Coordination normal.   Reflex Scores:       Tricep reflexes are 2+ on the right side and 2+ on the left side.       Bicep reflexes are 2+ on the right side and 2+ on the left side.       Brachioradialis reflexes are 2+ on the right side and 2+ on the left side.       Patellar reflexes are 2+ on the right side and 2+ on the left side.       Achilles reflexes are 2+ on the right side and 2+ on the left side.  He is confused. He talks but does not follow directions and commands. He moves his extremities but appeared to me that he is weaker on the right side. Not sure about sensory deficits.     Skin: Skin is warm and dry. Capillary refill takes 2 to 3 seconds. No rash noted. No erythema. No pallor.   Psychiatric: His affect is inappropriate. His speech is not delayed. He is slowed. Cognition and memory are impaired. He expresses  no suicidal ideation.   Confused, but knew the day of the week, and his name. He is inattentive.   Nursing note and vitals reviewed.    Significant Labs: CBC: No results for input(s): WBC, HGB, HCT, PLT in the last 48 hours.  CMP: No results for input(s): NA, K, CL, CO2, GLU, BUN, CREATININE, CALCIUM, PROT, ALBUMIN, BILITOT, ALKPHOS, AST, ALT, ANIONGAP, EGFRNONAA in the last 48 hours.    Invalid input(s): [place]    Significant Imaging: I have reviewed all pertinent imaging results/findings within the past 24 hours.    Assessment/Plan:      * Aspiration pneumonia of right middle lobe due to vomit  Cont IVAB  Supplemental oxygen  DuoNebs  Respiratory culture: STREPTOCOCCUS AGALACTIAE (GROUP B)   IV Levaquin downgraded to po and Completed course  Pulmonary consult  Blood cultures remain NGTD        Acute CVA (cerebrovascular accident)  -MRI: multifocal bilateral cerebral and cerebellar acute infarcts some of which demonstrate hemorrhagic conversion  -Neurology consult  - NO ASA/Plavix due to hemorrhagic conversion    Encephalopathy, metabolic  multifactorial : infection, withdrawal   5/26/19: MRI brain shows acute multi CVA  Cont supportive tx  No neurological deficit   CT of Head was negative for acute findings  Neuro checks  5/27/19  Remains confused, but improving   Continue Neuro checks   Neurology consult    Overdose of illicit drug  Cont current tx   + amphetamines and + opiates      Polysubstance abuse  Pt used opoid,  Crystal methylamphetamine x 1 year Daily  Cont current tx    Started on tramadol 50 mg po tid to prevent withdrawal and clonidine prn for withdrawals: stopped 5/24/19 due to confusion   Seroquel given one evening 5/22 then stopped and pt was lethargic      Acute respiratory failure with hypoxia and hypercapnia  2/2 to COPD and aspiration PNA   S/p mechanical ventilation: resolved   ICU /Pulmonology consulted   Supplemental oxygen to maintain sats > 92 %  5/25/19  -Pt. On RA   -O2 sats  94%      Shock, unspecified  Most likely due to PNA  S/p vasodepressor   Cont IVAB Levaquin and vanc   blood cx NGTD  S/p 3 lt ns           KEHINDE (acute kidney injury)  Most likely due to sepsis   Cont IVF  retroperitoneal US did not show any acute abnormality   Monitor UOP and kidney function   Improving     HTN (hypertension)  Hold BP medication now  Due to sepsis   Resume accordantly   S/P levophed due to hypotension with a MAP<65  Resolved       PAD with remote right fem-distal bypass  Resume statin       Mixed hyperlipidemia  Resume statin       PAD (peripheral artery disease)  Hold Plavix for possible GIB  Resume statin       Stage 1 mild COPD by GOLD classification  Cont breathing tx - duonebs and formoterol        VTE Risk Mitigation (From admission, onward)        Ordered     Place sequential compression device  Until discontinued      05/20/19 1435     IP VTE HIGH RISK PATIENT  Once      05/20/19 1435        Denisha Warner NP  Department of Hospital Medicine   [facility]

## 2019-05-28 NOTE — PLAN OF CARE
Jessica called in to Long term access. Pasrr faxed to [facility]. Will place 142 on chart once received.       05/28/19 1510   Post-Acute Status   Post-Acute Authorization Placement   Post-Acute Placement Status Pending State Direction

## 2019-05-28 NOTE — PLAN OF CARE
Problem: Physical Therapy Goal  Goal: Physical Therapy Goal  LTG'S TO BE MET IN 7 DAYS (5-31-19)  1. PT WILL REQUIRE CGA FOR BED MOBILITY  2. PT WILL REQUIRE DIEGO FOR TF'S  3. PT WILL AMB 50' WITH RW AND DIEGO, RLE PROSTHESIS DONNED  4. PT WILL TOLERATE BLE THEREX X 20 REPS AROM     Outcome: Ongoing (interventions implemented as appropriate)  Bed mobility with Min A, changed brief with Total A, sit<>stand using RW with Mod A, t/f to chair using RW with Mod A x 2

## 2019-05-28 NOTE — ASSESSMENT & PLAN NOTE
-MRI: multifocal bilateral cerebral and cerebellar acute infarcts some of which demonstrate hemorrhagic conversion  -Neurology consult  - NO ASA/Plavix due to hemorrhagic conversion

## 2019-05-28 NOTE — PROGRESS NOTES
[facility] -   Neurology  Progress Note    Patient Name: Ish Guardado  MRN: [id number]  Admission Date: 5/20/2019  Hospital Length of Stay: 8 days  Code Status: Prior   Attending Provider: Yobani Harris MD  Primary Care Physician: Pollo Arana MD   Principal Problem:Aspiration pneumonia of right middle lobe due to vomit    Subjective:     Interval History: Patient continues at a very low level.  Follow up CT scan brain shows mild hemorrhagic conversion in the frontal lobe infarct and the right cerebellar infarct, but no mass effect seen.  Issue remains regarding disposition.  PT/OT notes reviewed indicating inconsistent participation in therapy.    Current Neurological Medications: See below    Current Facility-Administered Medications   Medication Dose Route Frequency Provider Last Rate Last Dose    acetaminophen tablet 650 mg  650 mg Oral Q6H PRN Yobani Harris MD   650 mg at 05/25/19 1520    albuterol-ipratropium 2.5 mg-0.5 mg/3 mL nebulizer solution 3 mL  3 mL Nebulization Q6H Sameer Goel MD   3 mL at 05/28/19 0032    amLODIPine tablet 10 mg  10 mg Oral Daily Sameer Goel MD   10 mg at 05/27/19 0955    arformoterol nebulizer solution 15 mcg  15 mcg Nebulization BID Cruzito Mcintosh MD   15 mcg at 05/27/19 1915    atorvastatin tablet 10 mg  10 mg Oral Daily Sameer Goel MD   10 mg at 05/27/19 0955    bisacodyl suppository 10 mg  10 mg Rectal Daily PRN Sameer Goel MD        cloNIDine tablet 0.1 mg  0.1 mg Oral Q6H PRN Sameer Goel MD   0.1 mg at 05/23/19 0606    lorazepam (ATIVAN) injection 1 mg  1 mg Intravenous Q6H PRN Netta Webster NP   1 mg at 05/27/19 1847    ondansetron injection 4 mg  4 mg Intravenous Q6H PRN Cheyenne Olivo NP   4 mg at 05/24/19 1847    pantoprazole EC tablet 40 mg  40 mg Oral Daily Negrito Rousseau PA-C   40 mg at 05/27/19 0955    polyethylene glycol packet 17 g  17 g Oral Daily  Sameer Goel MD   17 g at 05/27/19 0955    promethazine (PHENERGAN) 6.25 mg in dextrose 5 % 50 mL IVPB  6.25 mg Intravenous Q6H PRN Cheyenne Olivo NP           Review of Systems   ROS not accurate due to lack of patient response to all questions.    Objective:     Vital Signs (Most Recent):  Temp: 98.7 °F (37.1 °C) (05/28/19 0701)  Pulse: 66 (05/28/19 0701)  Resp: 20 (05/28/19 0701)  BP: (!) 164/78 (05/28/19 0701)  SpO2: (!) 94 % (05/28/19 0701) Vital Signs (24h Range):  Temp:  [98.3 °F (36.8 °C)-99.4 °F (37.4 °C)] 98.7 °F (37.1 °C)  Pulse:  [66-96] 66  Resp:  [16-20] 20  SpO2:  [93 %-98 %] 94 %  BP: (118-164)/(64-78) 164/78     Weight: 86.7 kg (191 lb 2.2 oz)  Body mass index is 28.23 kg/m².    Physical Exam   APPEARANCE: Well nourished, well developed, in no acute distress. Patient is apathetic, does not answer questions or follow one step commands this AM   HEAD: Normocephalic, atraumatic.  EYES: PERRL.   Non-icteric sclerae. Patient then closed eyes and would not open to command     NOSE: Mucosa pink. Airway clear. No nasal discharge  MOUTH & THROAT: Patient would not open mouth for exam  NECK: Supple. No bruits.  CHEST: Lungs clear to auscultation.  CARDIOVASCULAR: Regular rhythm without significant murmurs.  MUSCULOSKELETAL:  Right AK  NEUROLOGIC:   Mental Status:  The patient is well oriented to person, time, place, and situation.  The patient is attentive to the environment and cooperative for the exam.  Cranial Nerves: II-XII grossly intact. Fundoscopic exam not possible due to lack of patient cooperation. The extraocular muscles are intact in the cardinal directions of gaze.  No ptosis is present. Facial features are symmetrical.  Speech is monosyllable, does not respond consistently.  Tongue protrudes in the midline.    Gait and Station:  Patient is bed confined.  Motor:  Patient kept right hand closed in fist and was randomly opening/closing the left hand.  He did exhibit plantar flexion  of the left foot on one occasion.  Sensory:  Sensory exam is not at all reliable   Cerebellar:  Patient would not perform finger to nose.  No involuntary movements or tremor seen.  Reflexes: Stretch reflexes difficult to elicit without patient cooperation.       Significant Labs: CBC: No results for input(s): WBC, HGB, HCT, PLT in the last 48 hours.  CMP: No results for input(s): GLU, NA, K, CL, CO2, BUN, CREATININE, CALCIUM, MG, PROT, ALBUMIN, BILITOT, ALKPHOS, AST, ALT, ANIONGAP, EGFRNONAA in the last 48 hours.  All pertinent lab results from the past 24 hours have been reviewed.    Significant Imaging: I have reviewed and interpreted all pertinent imaging results/findings within the past 24 hours.  He has multiple bilateral strokes, the largest of which are involving the posterior circulation.  The slight hemorrhage seen does not give any mass effect.    Assessment and Plan:     1.  Multiple acute strokes with developing vascular dementia due to the strokes    The patient would not seem to be candidate for rehab at this time, but would be more appropriate for SNF until he is able to participate more fully in therapy.    Active Diagnoses:    Diagnosis Date Noted POA    PRINCIPAL PROBLEM:  Aspiration pneumonia of right middle lobe due to vomit [J69.0] 05/20/2019 Yes    Acute CVA (cerebrovascular accident) [I63.9] 05/26/2019 Yes    Encephalopathy, metabolic [G93.41] 05/22/2019 Yes    Acute respiratory failure with hypoxia and hypercapnia [J96.01, J96.02] 05/20/2019 Yes    Polysubstance abuse [F19.10] 05/20/2019 Yes     Chronic    Overdose of illicit drug [T43.601A] 05/20/2019 Yes    Stage 1 mild COPD by GOLD classification [J44.9] 05/23/2017 Yes      Problems Resolved During this Admission:    Diagnosis Date Noted Date Resolved POA    Aspiration pneumonia of right lung due to gastric secretions [J69.0]  05/23/2019 Yes    Acute upper GI bleed [K92.2] 05/20/2019 05/27/2019 Yes       VTE Risk Mitigation (From  admission, onward)        Ordered     Place sequential compression device  Until discontinued      05/20/19 1435     IP VTE HIGH RISK PATIENT  Once      05/20/19 1435          Doroteo Aguayo MD  Neurology  [facility]

## 2019-05-29 PROBLEM — R13.10 DYSPHAGIA: Status: ACTIVE | Noted: 2019-05-29

## 2019-05-29 PROCEDURE — 25000003 PHARM REV CODE 250: Performed by: INTERNAL MEDICINE

## 2019-05-29 PROCEDURE — 94640 AIRWAY INHALATION TREATMENT: CPT

## 2019-05-29 PROCEDURE — 25000003 PHARM REV CODE 250: Performed by: NURSE PRACTITIONER

## 2019-05-29 PROCEDURE — 99900035 HC TECH TIME PER 15 MIN (STAT)

## 2019-05-29 PROCEDURE — 25000242 PHARM REV CODE 250 ALT 637 W/ HCPCS: Performed by: INTERNAL MEDICINE

## 2019-05-29 PROCEDURE — 94761 N-INVAS EAR/PLS OXIMETRY MLT: CPT

## 2019-05-29 PROCEDURE — 92507 TX SP LANG VOICE COMM INDIV: CPT

## 2019-05-29 PROCEDURE — 21400001 HC TELEMETRY ROOM

## 2019-05-29 PROCEDURE — 25000003 PHARM REV CODE 250: Performed by: PHYSICIAN ASSISTANT

## 2019-05-29 PROCEDURE — 97127 HC THERAPEUTIC INTVTN, COGN FUNCTION - ST: CPT

## 2019-05-29 PROCEDURE — 97530 THERAPEUTIC ACTIVITIES: CPT

## 2019-05-29 RX ORDER — IPRATROPIUM BROMIDE AND ALBUTEROL SULFATE 2.5; .5 MG/3ML; MG/3ML
3 SOLUTION RESPIRATORY (INHALATION) EVERY 6 HOURS PRN
Status: DISCONTINUED | OUTPATIENT
Start: 2019-05-29 | End: 2019-05-31 | Stop reason: HOSPADM

## 2019-05-29 RX ADMIN — ATORVASTATIN CALCIUM 10 MG: 10 TABLET, FILM COATED ORAL at 09:05

## 2019-05-29 RX ADMIN — IPRATROPIUM BROMIDE AND ALBUTEROL SULFATE 3 ML: .5; 3 SOLUTION RESPIRATORY (INHALATION) at 09:05

## 2019-05-29 RX ADMIN — POLYETHYLENE GLYCOL 3350 17 G: 17 POWDER, FOR SOLUTION ORAL at 09:05

## 2019-05-29 RX ADMIN — PANTOPRAZOLE SODIUM 40 MG: 40 TABLET, DELAYED RELEASE ORAL at 09:05

## 2019-05-29 RX ADMIN — AMLODIPINE BESYLATE 10 MG: 10 TABLET ORAL at 09:05

## 2019-05-29 RX ADMIN — ARFORMOTEROL TARTRATE 15 MCG: 15 SOLUTION RESPIRATORY (INHALATION) at 09:05

## 2019-05-29 RX ADMIN — LORAZEPAM 1 MG: 1 TABLET ORAL at 08:05

## 2019-05-29 NOTE — PLAN OF CARE
Discussed discharge with patient and wife. Referrals faxed to multiple facilities.       05/29/19 0348   Discharge Reassessment   Assessment Type Discharge Planning Reassessment   Provided patient/caregiver education on the expected discharge date and the discharge plan Yes   Do you have any problems affording any of your prescribed medications? TBD   Discharge Plan A Skilled Nursing Facility   DME Needed Upon Discharge  none   Patient choice form signed by patient/caregiver Yes   Anticipated Discharge Disposition SNF   Can the patient answer the patient profile reliably? No, cognitively impaired   How does the patient rate their overall health at the present time? Fair   Describe the patient's ability to walk at the present time. Walks with the help of equipment   How often would a person be available to care for the patient? Often   Number of comorbid conditions (as recorded on the chart) Four

## 2019-05-29 NOTE — PT/OT/SLP PROGRESS
Physical Therapy  Treatment    Ish Guardado   MRN: [id number]   Admitting Diagnosis: Aspiration pneumonia of right middle lobe due to vomit    PT Received On: 05/29/19  PT Start Time: 0825     PT Stop Time: 0850    PT Total Time (min): 25 min       Billable Minutes:  Therapeutic Activity 25    Treatment Type: Treatment  PT/PTA: PT          General Precautions: Standard, fall, aphasia(RLE PROSTHESIS)  Orthopedic Precautions: N/A   Braces: N/A    Subjective:  Communicated with NURSE GRIJALVA prior to session.  Pain/Comfort  Pain Rating 1: 0/10    Objective:   Patient found with: telemetry, peripheral IV    Functional Mobility:  Therapeutic Activities and Exercises:  PT FOUND SUPINE IN BED UPON ARRIVAL, VERY LETHARGIC, DIFFICULT TO STAY AROUSED, EXPRESSIVE APHASIA, PT REQUIRED MODA FOR SUP>SIT WITH WITH EXTRA TIME AND CUES TO COMPLETE TASK, PT WASHED FACE WITH TOWEL WITH SET UP TO ASSIST IN AROUSAL, PT'S WIFE PRESENT AND HELPFUL, TOTAL ASSIST FOR DONNING RLE PROSTHESIS PER WIFE WITH EXTRA TIME, SIT>STAND X 3 TRIALS WITH RW AND MODA WITH SEATED REST IN BETWEEN TRIALS, MODA X 2 FOR TF FROM BED TO CHAIR, PT SLOW/DELAYED IN PROCESSING OF ACTIVITY SO TIME ALLOWED FOR PT TO UNDERSTAND TASK, CUES GIVEN AS NEEDED, PT SET UP FOR BREAKFAST    AM-PAC 6 CLICK MOBILITY  How much help from another person does this patient currently need?   1 = Unable, Total/Dependent Assistance  2 = A lot, Maximum/Moderate Assistance  3 = A little, Minimum/Contact Guard/Supervision  4 = None, Modified Onward/Independent    Turning over in bed (including adjusting bedclothes, sheets and blankets)?: 2  Sitting down on and standing up from a chair with arms (e.g., wheelchair, bedside commode, etc.): 2  Moving from lying on back to sitting on the side of the bed?: 2  Moving to and from a bed to a chair (including a wheelchair)?: 2  Need to walk in hospital room?: 1  Climbing 3-5 steps with a railing?: 1  Basic Mobility Total Score: 10    AM-PAC Raw  Score CMS G-Code Modifier Level of Impairment Assistance   6 % Total / Unable   7 - 9 CM 80 - 100% Maximal Assist   10 - 14 CL 60 - 80% Moderate Assist   15 - 19 CK 40 - 60% Moderate Assist   20 - 22 CJ 20 - 40% Minimal Assist   23 CI 1-20% SBA / CGA   24 CH 0% Independent/ Mod I     Patient left up in chair with all lines intact, call button in reach, chair alarm on, NURSE notified and WIFE present.    Assessment:  Ish Guardado is a 58 y.o. male with a medical diagnosis of Aspiration pneumonia of right middle lobe due to vomit and presents with IMPAIRED FUNCTIONAL MOBILITY. PT WILL BENEFIT FROM CONT. SKILLED P.T. TO ADDRESS IMPAIRMENTS    Rehab identified problem list/impairments: Rehab identified problem list/impairments: weakness, impaired endurance, impaired functional mobilty, impaired balance, decreased coordination, decreased safety awareness    Rehab potential is good.    Activity tolerance: Good    Discharge recommendations: Discharge Facility/Level of Care Needs: nursing facility, skilled, rehabilitation facility     Barriers to discharge:      Equipment recommendations: Equipment Needed After Discharge: none     GOALS:   Multidisciplinary Problems     Physical Therapy Goals        Problem: Physical Therapy Goal    Goal Priority Disciplines Outcome Goal Variances Interventions   Physical Therapy Goal     PT, PT/OT Ongoing (interventions implemented as appropriate)     Description:  LTG'S TO BE MET IN 7 DAYS (5-31-19)  1. PT WILL REQUIRE CGA FOR BED MOBILITY  2. PT WILL REQUIRE DIEGO FOR TF'S  3. PT WILL AMB 50' WITH RW AND DIEGO, RLE PROSTHESIS DONNED  4. PT WILL TOLERATE BLE THEREX X 20 REPS AROM                      PLAN:    Patient to be seen 5 x/week  to address the above listed problems via therapeutic activities, gait training  Plan of Care expires: 05/31/19  Plan of Care reviewed with: patient, spouse    PT ENCOURAGED TO CALL FOR ASSISTANCE WITH ALL NEEDS DUE TO FALL RISK STATUS, PT/WIFE  AGREEABLE    Maria Lorenzo, PT  05/29/2019

## 2019-05-29 NOTE — SUBJECTIVE & OBJECTIVE
Interval History: Somewhat better mentally, but still doesn't answer appropriately. SNF pending.    Review of Systems   Constitutional: Positive for activity change, appetite change and fatigue. Negative for chills, diaphoresis, fever and unexpected weight change.   HENT: Negative for congestion, ear discharge, ear pain, facial swelling, hearing loss, postnasal drip, sore throat, tinnitus, trouble swallowing and voice change.    Eyes: Negative for photophobia, pain, discharge, redness, itching and visual disturbance.   Respiratory: Negative for cough, choking, chest tightness, shortness of breath, wheezing and stridor.    Cardiovascular: Negative for chest pain, palpitations and leg swelling.   Gastrointestinal: Negative for abdominal distention, abdominal pain, blood in stool, constipation, diarrhea, nausea and vomiting.   Endocrine: Negative for cold intolerance, heat intolerance, polydipsia, polyphagia and polyuria.   Genitourinary: Negative for difficulty urinating, flank pain, frequency, hematuria and urgency.   Musculoskeletal: Negative for arthralgias, back pain, gait problem and myalgias.        Right AKA   Skin: Negative for color change, pallor, rash and wound.   Neurological: Positive for speech difficulty and weakness. Negative for dizziness, tremors, seizures, syncope, facial asymmetry, light-headedness, numbness and headaches.   Hematological: Negative for adenopathy. Does not bruise/bleed easily.   Psychiatric/Behavioral: Positive for confusion. Negative for agitation, hallucinations and suicidal ideas. The patient is not nervous/anxious.    All other systems reviewed and are negative.    Objective:     Vital Signs (Most Recent):  Temp: 97 °F (36.1 °C) (05/29/19 1237)  Pulse: 80 (05/29/19 1237)  Resp: 14 (05/29/19 1237)  BP: 131/75 (05/29/19 1237)  SpO2: 96 % (05/29/19 1237) Vital Signs (24h Range):  Temp:  [97 °F (36.1 °C)-98.9 °F (37.2 °C)] 97 °F (36.1 °C)  Pulse:  [63-89] 80  Resp:  [14-18]  14  SpO2:  [96 %-98 %] 96 %  BP: (123-145)/(67-93) 131/75     Weight: 86.7 kg (191 lb 2.2 oz)  Body mass index is 28.23 kg/m².    Intake/Output Summary (Last 24 hours) at 5/29/2019 1514  Last data filed at 5/29/2019 0900  Gross per 24 hour   Intake 238 ml   Output --   Net 238 ml      Physical Exam   Constitutional: Vital signs are normal. He appears well-developed and well-nourished. He appears lethargic.       HENT:   Head: Normocephalic and atraumatic.   Nose: Nose normal.   Eyes: Pupils are equal, round, and reactive to light. Conjunctivae and EOM are normal.   Eyes are glazed over   Neck: Normal range of motion. Neck supple. No JVD present. No tracheal deviation present. No thyromegaly present.   Cardiovascular: Normal rate, regular rhythm, normal heart sounds and intact distal pulses. Exam reveals no gallop and no friction rub.   No murmur heard.  Pulmonary/Chest: Effort normal. No stridor. No respiratory distress. He has decreased breath sounds. He has no wheezes. He has no rales. He exhibits no tenderness.   Abdominal: Soft. Bowel sounds are normal. He exhibits no distension. There is no tenderness. There is no rebound and no guarding.   Musculoskeletal: Normal range of motion. He exhibits no edema, tenderness or deformity.   Generalized weakness. RIGHT aka   Neurological: He has normal reflexes. He appears lethargic. No cranial nerve deficit. Coordination normal.   Reflex Scores:       Tricep reflexes are 2+ on the right side and 2+ on the left side.       Bicep reflexes are 2+ on the right side and 2+ on the left side.       Brachioradialis reflexes are 2+ on the right side and 2+ on the left side.       Patellar reflexes are 2+ on the right side and 2+ on the left side.       Achilles reflexes are 2+ on the right side and 2+ on the left side.  He is confused. He talks but does not follow directions and commands. He moves his extremities but appeared to me that he is weaker on the right side. Not sure  about sensory deficits.     Skin: Skin is warm and dry. Capillary refill takes 2 to 3 seconds. No rash noted. No erythema. No pallor.   Psychiatric: His affect is inappropriate. His speech is not delayed. He is slowed. Cognition and memory are impaired. He expresses no suicidal ideation.   Confused, but knew the day of the week, and his name. He is inattentive.   Nursing note and vitals reviewed.    Significant Labs: CBC: No results for input(s): WBC, HGB, HCT, PLT in the last 48 hours.  CMP: No results for input(s): NA, K, CL, CO2, GLU, BUN, CREATININE, CALCIUM, PROT, ALBUMIN, BILITOT, ALKPHOS, AST, ALT, ANIONGAP, EGFRNONAA in the last 48 hours.    Invalid input(s): ESTGFA[place]    Significant Imaging: I have reviewed all pertinent imaging results/findings within the past 24 hours.

## 2019-05-29 NOTE — PROGRESS NOTES
[facility] Hospital Medicine  Progress Note    Patient Name: Ish Guardado  MRN: [id number]  Patient Class: IP- Inpatient   Admission Date: 5/20/2019  Length of Stay: 9 days  Attending Physician: Yobani Harris MD  Primary Care Provider: Pollo Arana MD    Subjective:     Principal Problem:Aspiration pneumonia of right middle lobe due to vomit    HPI:  Ish Guardado is a 59 y/o wm with a PMHx of COPD , GERD , Chronic cough , Drug abuse , HTN , HLD , PAD and Tobacco abuse, who presented to the ER with a c/o AMS since yesterday night. The history was taken from EMR and family members due to pt medical condition . Pt was found yesterday night inside his car confused and altered . Pt was taken to his house by his son. He was responsive, but lethargic . Per family the patient used crystal methalamphetamine. This am he was found more conused and lethargic with red colored fluid coming through his nose and mouth . Per family pt was in his usual health of state before last night. They states he has a chronic cough and heartburn. The  denies any fever , sob , palpitation , weakness, chills, diarrhea, or gu problems.   ER course: The ABG show respiratory acidosis , acute hypoxic and hypercapnic respiratory failure . Pt was intubated . The CXR show a right side infiltrate . WBC 14 , lactic acid  3 , troponin level wnl . He was started on Levaquin and vanc . He had 3 lt NS . He was started on levophed due to MAP > than 65 .   ER VS   Initial Vitals   BP Pulse Resp Temp SpO2   05/20/19 0900 05/20/19 0900 05/20/19 0926 -- 05/20/19 0900   139/62 106 (!) 25   (!) 89 %   He was admitted to ICU with drug overdose, aspiration pneumonia. Wife, Ray Guardado, [phone] is the surrogate decision maker.       Hospital Course:  Pt found altered and admitted with diagnoses of drug overdose (+ opiates, + amphetamines), combined respiratory failure, aspiration pneumonia, shock and concerns for GI bleed.   5/21  Pt was seen and examined at bedside . He is  Alert  But confuse .  He is off vasopressor . He was extubated this am .  He has a good UOP .  There was no acute event overnight   5/22 Pt was seen and examined at bedside . He is aaox 1 .  He  Is confuse on and off .  Pt was placed 1 to 1 .  The ct head  from the admission day did not show any acute finding . Pt with a Known history of polysubstance abuse .  The blood pressure was elevated  And respond well to clonidine . He was started on tramadol 50 mg po tid .   5/23 - Wife has explained substance abuse x 1 year with smoking crystal Methelamphetamine. Seroquel 25 mg and tramadol prescribed to prevent withdrawal. This AM, lethargic and arouses some and minimally follows commands. To hold seroquel due to lethargy - tramadol continued.  IV levaquin and DuoNeb treatments in progress. Pt too lethargic to participate with PT/OT - will attempt to increase activity tomorrow.  No signs of active bleeding noted. Spoke with wife regarding findings and plan of care.   5/24/19 - Still confused. Didn't know wife's name and day of the week. Will stop scheduled tramadol.  5/25/19-Patient remains confused. PT/OT recommends SNF. Social work consult for placement.   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 Dr. Valentino continue ASA/Plavix, Carotid u/s, CT Head without contrast. Attempted to contact wife for update.  5/27/19 - Wife brought his Leg. He Must participate in OT/PT so Social Service can arrange SNF. He is more alert today. Repeat CT head showed similar results of bilateral areas of suspected cerebral and cerebellar infarcts with areas of hemorrhagic conversion.   5/29/19 Somewhat better mentally.  contacted 18 SNF facilities, 9 denials.    Interval History: Somewhat better mentally, but still doesn't answer appropriately. SNF pending.    Review of Systems    Constitutional: Positive for activity change, appetite change and fatigue. Negative for chills, diaphoresis, fever and unexpected weight change.   HENT: Negative for congestion, ear discharge, ear pain, facial swelling, hearing loss, postnasal drip, sore throat, tinnitus, trouble swallowing and voice change.    Eyes: Negative for photophobia, pain, discharge, redness, itching and visual disturbance.   Respiratory: Negative for cough, choking, chest tightness, shortness of breath, wheezing and stridor.    Cardiovascular: Negative for chest pain, palpitations and leg swelling.   Gastrointestinal: Negative for abdominal distention, abdominal pain, blood in stool, constipation, diarrhea, nausea and vomiting.   Endocrine: Negative for cold intolerance, heat intolerance, polydipsia, polyphagia and polyuria.   Genitourinary: Negative for difficulty urinating, flank pain, frequency, hematuria and urgency.   Musculoskeletal: Negative for arthralgias, back pain, gait problem and myalgias.        Right AKA   Skin: Negative for color change, pallor, rash and wound.   Neurological: Positive for speech difficulty and weakness. Negative for dizziness, tremors, seizures, syncope, facial asymmetry, light-headedness, numbness and headaches.   Hematological: Negative for adenopathy. Does not bruise/bleed easily.   Psychiatric/Behavioral: Positive for confusion. Negative for agitation, hallucinations and suicidal ideas. The patient is not nervous/anxious.    All other systems reviewed and are negative.    Objective:     Vital Signs (Most Recent):  Temp: 97 °F (36.1 °C) (05/29/19 1237)  Pulse: 80 (05/29/19 1237)  Resp: 14 (05/29/19 1237)  BP: 131/75 (05/29/19 1237)  SpO2: 96 % (05/29/19 1237) Vital Signs (24h Range):  Temp:  [97 °F (36.1 °C)-98.9 °F (37.2 °C)] 97 °F (36.1 °C)  Pulse:  [63-89] 80  Resp:  [14-18] 14  SpO2:  [96 %-98 %] 96 %  BP: (123-145)/(67-93) 131/75     Weight: 86.7 kg (191 lb 2.2 oz)  Body mass index is 28.23  kg/m².    Intake/Output Summary (Last 24 hours) at 5/29/2019 1514  Last data filed at 5/29/2019 0900  Gross per 24 hour   Intake 238 ml   Output --   Net 238 ml      Physical Exam   Constitutional: Vital signs are normal. He appears well-developed and well-nourished. He appears lethargic.       HENT:   Head: Normocephalic and atraumatic.   Nose: Nose normal.   Eyes: Pupils are equal, round, and reactive to light. Conjunctivae and EOM are normal.   Eyes are glazed over   Neck: Normal range of motion. Neck supple. No JVD present. No tracheal deviation present. No thyromegaly present.   Cardiovascular: Normal rate, regular rhythm, normal heart sounds and intact distal pulses. Exam reveals no gallop and no friction rub.   No murmur heard.  Pulmonary/Chest: Effort normal. No stridor. No respiratory distress. He has decreased breath sounds. He has no wheezes. He has no rales. He exhibits no tenderness.   Abdominal: Soft. Bowel sounds are normal. He exhibits no distension. There is no tenderness. There is no rebound and no guarding.   Musculoskeletal: Normal range of motion. He exhibits no edema, tenderness or deformity.   Generalized weakness. RIGHT aka   Neurological: He has normal reflexes. He appears lethargic. No cranial nerve deficit. Coordination normal.   Reflex Scores:       Tricep reflexes are 2+ on the right side and 2+ on the left side.       Bicep reflexes are 2+ on the right side and 2+ on the left side.       Brachioradialis reflexes are 2+ on the right side and 2+ on the left side.       Patellar reflexes are 2+ on the right side and 2+ on the left side.       Achilles reflexes are 2+ on the right side and 2+ on the left side.  He is confused. He talks but does not follow directions and commands. He moves his extremities but appeared to me that he is weaker on the right side. Not sure about sensory deficits.     Skin: Skin is warm and dry. Capillary refill takes 2 to 3 seconds. No rash noted. No erythema.  No pallor.   Psychiatric: His affect is inappropriate. His speech is not delayed. He is slowed. Cognition and memory are impaired. He expresses no suicidal ideation.   Confused, but knew the day of the week, and his name. He is inattentive.   Nursing note and vitals reviewed.    Significant Labs: CBC: No results for input(s): WBC, HGB, HCT, PLT in the last 48 hours.  CMP: No results for input(s): NA, K, CL, CO2, GLU, BUN, CREATININE, CALCIUM, PROT, ALBUMIN, BILITOT, ALKPHOS, AST, ALT, ANIONGAP, EGFRNONAA in the last 48 hours.    Invalid input(s): [place]GF[place]    Significant Imaging: I have reviewed all pertinent imaging results/findings within the past 24 hours.    Assessment/Plan:      * Aspiration pneumonia of right middle lobe due to vomit  Cont IVAB  Supplemental oxygen  DuoNebs  Respiratory culture: STREPTOCOCCUS AGALACTIAE (GROUP B)   IV Levaquin downgraded to po and Completed course  Pulmonary consult  Blood cultures remain NGTD        Acute CVA (cerebrovascular accident)  -MRI: multifocal bilateral cerebral and cerebellar acute infarcts some of which demonstrate hemorrhagic conversion  -Neurology consult  - NO ASA/Plavix due to hemorrhagic conversion    Encephalopathy, metabolic  multifactorial : infection, withdrawal   5/26/19: MRI brain shows acute multi CVA  Cont supportive tx  No neurological deficit   CT of Head was negative for acute findings  Neuro checks  5/29/19  Remains confused, but improving   Continue Neuro checks   Neurology consult    Overdose of illicit drug  Cont current tx   + amphetamines and + opiates      Polysubstance abuse  Pt used opoid,  Crystal methylamphetamine x 1 year Daily  Cont current tx    Started on tramadol 50 mg po tid to prevent withdrawal and clonidine prn for withdrawals: stopped 5/24/19 due to confusion   Seroquel given one evening 5/22 then stopped and pt was lethargic      Acute respiratory failure with hypoxia and hypercapnia  2/2 to COPD and aspiration PNA   S/p  mechanical ventilation: resolved   Pulmonology consulted   Supplemental oxygen to maintain sats > 92 %  5/29/19  -Pt. On RA   -O2 sats 94%      Shock, unspecified  Most likely due to PNA  S/p vasodepressor   Cont IVAB Levaquin and vanc   blood cx NGTD  S/p 3 lt ns           KEHINDE (acute kidney injury)  Most likely due to sepsis   Cont IVF  retroperitoneal US did not show any acute abnormality   Monitor UOP and kidney function   Improving     HTN (hypertension)  Hold BP medication now  Due to sepsis   Resume accordantly   S/P levophed due to hypotension with a MAP<65  Resolved       PAD with remote right fem-distal bypass  Resume statin       Mixed hyperlipidemia  Resume statin       PAD (peripheral artery disease)  Hold Plavix for possible GIB  Resume statin       Stage 1 mild COPD by GOLD classification  Cont breathing tx - duonebs and formoterol        VTE Risk Mitigation (From admission, onward)        Ordered     Place sequential compression device  Until discontinued      05/20/19 1435     IP VTE HIGH RISK PATIENT  Once      05/20/19 1435        Denisha Warner NP  Department of Hospital Medicine   [facility]

## 2019-05-29 NOTE — ASSESSMENT & PLAN NOTE
multifactorial : infection, withdrawal   5/26/19: MRI brain shows acute multi CVA  Cont supportive tx  No neurological deficit   CT of Head was negative for acute findings  Neuro checks  5/29/19  Remains confused, but improving   Continue Neuro checks   Neurology consult

## 2019-05-29 NOTE — PLAN OF CARE
Problem: Adult Inpatient Plan of Care  Goal: Plan of Care Review  Outcome: Ongoing (interventions implemented as appropriate)  Plan of care reviewed with patient and wife, both verbalized understanding.  Pt remains free from falls this shift, call light within reach, babak sys monitoring, bed alarm on, and pt encouraged to call for assistance.  Pt remains free from skin breakdown, he turns and repositions frequently while in bed.   Pt has expressive aphasia and is intermittently confused. He is oriented to only him self and place.  Pt is on room air, NSR on the tele monitor, VSS.  No IV access, MD aware.  PT/OT/SPL working with pt.  Pt assisted to chair today with walker.  Hourly rounding complete.  Pt currently resting comfortably in bed.  Family at bedside.  Will continue to monitor.

## 2019-05-29 NOTE — ASSESSMENT & PLAN NOTE
2/2 to COPD and aspiration PNA   S/p mechanical ventilation: resolved   Pulmonology consulted   Supplemental oxygen to maintain sats > 92 %  5/29/19  -Pt. On RA   -O2 sats 94%

## 2019-05-29 NOTE — PLAN OF CARE
Problem: SLP Goal  Goal: SLP Goal  1. Pt will complete simple expressive speech tasks with mod cues for word finding   2. Pt will complete simple cognitive tasks at 75% acc for increased memory, orientation, problem solving, organization, and safety awareness  3. Pt will complete simple receptive speech tasks with 75% acc      Outcome: Ongoing (interventions implemented as appropriate)  Pt completed exp speech tasks: rote tasks with mod-max cueing for completion, open ended statements with 40%, object naming with 10%  Pt completed rec speech tasks: object ID with 25%, object usage with 10%, 1 step directions with 50%, simple 60%   Pt keeping his eyes closed and required constant cueing to attend to tasks, visual tasks with max cueing required

## 2019-05-29 NOTE — PLAN OF CARE
Met with patient and family, discussed obstacles for finding an accepting skilled facility. Discussed options for receiving skilled care. Preference letter obtained for first available. Referrals sent via [facility] to 18 [facility] facilities.  [facility], [facility], [facility], [facility], [place], [facility], [place] , [facility], [place] [facility], [facility], [place], [facility], [facility].       05/29/19 1045   Post-Acute Status   Post-Acute Authorization Placement   Post-Acute Placement Status Referrals Sent

## 2019-05-29 NOTE — PLAN OF CARE
Problem: Physical Therapy Goal  Goal: Physical Therapy Goal  LTG'S TO BE MET IN 7 DAYS (5-31-19)  1. PT WILL REQUIRE CGA FOR BED MOBILITY  2. PT WILL REQUIRE DIEGO FOR TF'S  3. PT WILL AMB 50' WITH RW AND DIEGO, RLE PROSTHESIS DONNED  4. PT WILL TOLERATE BLE THEREX X 20 REPS AROM     Outcome: Ongoing (interventions implemented as appropriate)  MODA FOR BED MOBILITY AND SIT<>STAND, MODA X 2 FOR BED<>CHAIR TF, TOTAL ASSIST FOR DONNING RLE PROSTHESIS

## 2019-05-29 NOTE — PLAN OF CARE
05/29/19 1045   Medicare Message   Important Message from Medicare regarding Discharge Appeal Rights Given to patient/caregiver;Signed/date by patient/caregiver;Explained to patient/caregiver   Date IMM was signed 05/29/19   Time IMM was signed 1043

## 2019-05-29 NOTE — PT/OT/SLP PROGRESS
Speech Language Pathology Treatment    Patient Name:  Ish Guardado   MRN:  [id number]  Admitting Diagnosis: Aspiration pneumonia of right middle lobe due to vomit    Recommendations:                 General Recommendations:  Speech/language therapy and Cognitive-linguistic therapy  Diet recommendations:  Mechanical soft, Liquid Diet Level: Thin   Aspiration Precautions: 1 bite/sip at a time, HOB to 90 degrees and Remain upright 30 minutes post meal   General Precautions: Standard, fall  Communication strategies:  yes/no questions only    Subjective     Pt was seen at bedside with his wife present  Patient goals: none stated    Pain/Comfort:  · Pain Rating 1: 0/10    Objective:     Has the patient been evaluated by SLP for swallowing?   Yes  Keep patient NPO? No   Current Respiratory Status: room air      Pt completed exp speech tasks: rote tasks with mod-max cueing for completion, open ended statements with 40%, object naming with 10%, repeats of single words with 25%  Pt completed rec speech tasks: object ID with 25%, object usage with 10%, 1 step directions with 50%, simple 60%   Pt keeping his eyes closed and required constant cueing to attend to tasks, visual tasks with max cueing required      Assessment:     Ish Guardado is a 58 y.o. male with an SLP diagnosis of expressive and receptive aphasia, decreased cognition, and visual neglect.  He presents with poor communication and lethargy.  He will benefit from continued ST for stated deficits.    Goals:   Multidisciplinary Problems     SLP Goals        Problem: SLP Goal    Goal Priority Disciplines Outcome   SLP Goal     SLP Ongoing (interventions implemented as appropriate)   Description:  1. Pt will complete simple expressive speech tasks with mod cues for word finding   2. Pt will complete simple cognitive tasks at 75% acc for increased memory, orientation, problem solving, organization, and safety awareness  3. Pt will complete simple receptive speech  tasks with 75% acc                       Plan:     · Patient to be seen:  3 x/week   · Plan of Care expires:  06/03/19  · Plan of Care reviewed with:  patient, spouse   · SLP Follow-Up:  Yes       Discharge recommendations:  nursing facility, skilled, rehabilitation facility   Barriers to Discharge:  None    Time Tracking:     SLP Treatment Date:   05/29/19  Speech Start Time:  1030  Speech Stop Time:  1100     Speech Total Time (min):  30 min    Billable Minutes: Speech Therapy Individual 30    Janet Mendoza CCC-SLP  05/29/2019

## 2019-05-30 PROCEDURE — 94640 AIRWAY INHALATION TREATMENT: CPT

## 2019-05-30 PROCEDURE — 21400001 HC TELEMETRY ROOM

## 2019-05-30 PROCEDURE — 25000003 PHARM REV CODE 250: Performed by: INTERNAL MEDICINE

## 2019-05-30 PROCEDURE — 97110 THERAPEUTIC EXERCISES: CPT

## 2019-05-30 PROCEDURE — 92507 TX SP LANG VOICE COMM INDIV: CPT

## 2019-05-30 PROCEDURE — 97110 THERAPEUTIC EXERCISES: CPT | Performed by: PHYSICAL THERAPIST

## 2019-05-30 PROCEDURE — 97530 THERAPEUTIC ACTIVITIES: CPT | Performed by: PHYSICAL THERAPIST

## 2019-05-30 PROCEDURE — 94761 N-INVAS EAR/PLS OXIMETRY MLT: CPT

## 2019-05-30 PROCEDURE — 25000003 PHARM REV CODE 250: Performed by: PHYSICIAN ASSISTANT

## 2019-05-30 PROCEDURE — 25000003 PHARM REV CODE 250: Performed by: NURSE PRACTITIONER

## 2019-05-30 PROCEDURE — 97530 THERAPEUTIC ACTIVITIES: CPT

## 2019-05-30 PROCEDURE — 25000242 PHARM REV CODE 250 ALT 637 W/ HCPCS: Performed by: INTERNAL MEDICINE

## 2019-05-30 RX ADMIN — POLYETHYLENE GLYCOL 3350 17 G: 17 POWDER, FOR SOLUTION ORAL at 08:05

## 2019-05-30 RX ADMIN — PANTOPRAZOLE SODIUM 40 MG: 40 TABLET, DELAYED RELEASE ORAL at 08:05

## 2019-05-30 RX ADMIN — ARFORMOTEROL TARTRATE 15 MCG: 15 SOLUTION RESPIRATORY (INHALATION) at 08:05

## 2019-05-30 RX ADMIN — LORAZEPAM 1 MG: 1 TABLET ORAL at 04:05

## 2019-05-30 RX ADMIN — ATORVASTATIN CALCIUM 10 MG: 10 TABLET, FILM COATED ORAL at 08:05

## 2019-05-30 RX ADMIN — AMLODIPINE BESYLATE 10 MG: 10 TABLET ORAL at 08:05

## 2019-05-30 NOTE — PLAN OF CARE
Problem: SLP Goal  Goal: SLP Goal  1. Pt will complete simple expressive speech tasks with mod cues for word finding   2. Pt will complete simple cognitive tasks at 75% acc for increased memory, orientation, problem solving, organization, and safety awareness  3. Pt will complete simple receptive speech tasks with 75% acc      Outcome: Ongoing (interventions implemented as appropriate)  Pt named objects with max cues and ID body parts with 50% acc with extra time and repetitions.

## 2019-05-30 NOTE — PROGRESS NOTES
[facility] Hospital Medicine  Progress Note    Patient Name: Ish Guardado  MRN: [id number]  Patient Class: IP- Inpatient   Admission Date: 5/20/2019  Length of Stay: 10 days  Attending Physician: Yobani Harris MD  Primary Care Provider: Pollo Arana MD    Subjective:     Principal Problem:Aspiration pneumonia of right middle lobe due to vomit    HPI:  Ish Guardado is a 59 y/o wm with a PMHx of COPD , GERD , Chronic cough , Drug abuse , HTN , HLD , PAD and Tobacco abuse, who presented to the ER with a c/o AMS since yesterday night. The history was taken from EMR and family members due to pt medical condition . Pt was found yesterday night inside his car confused and altered . Pt was taken to his house by his son. He was responsive, but lethargic . Per family the patient used crystal methalamphetamine. This am he was found more conused and lethargic with red colored fluid coming through his nose and mouth . Per family pt was in his usual health of state before last night. They states he has a chronic cough and heartburn. The  denies any fever , sob , palpitation , weakness, chills, diarrhea, or gu problems.   ER course: The ABG show respiratory acidosis , acute hypoxic and hypercapnic respiratory failure . Pt was intubated . The CXR show a right side infiltrate . WBC 14 , lactic acid  3 , troponin level wnl . He was started on Levaquin and vanc . He had 3 lt NS . He was started on levophed due to MAP > than 65 .   ER VS   Initial Vitals   BP Pulse Resp Temp SpO2   05/20/19 0900 05/20/19 0900 05/20/19 0926 -- 05/20/19 0900   139/62 106 (!) 25   (!) 89 %   He was admitted to ICU with drug overdose, aspiration pneumonia. Wife, Ray Guardado, [phone] is the surrogate decision maker.       Hospital Course:  Pt found altered and admitted with diagnoses of drug overdose (+ opiates, + amphetamines), combined respiratory failure, aspiration pneumonia, shock and concerns for GI bleed.   5/21  Pt was seen and examined at bedside . He is  Alert  But confuse .  He is off vasopressor . He was extubated this am .  He has a good UOP .  There was no acute event overnight   5/22 Pt was seen and examined at bedside . He is aaox 1 .  He  Is confuse on and off .  Pt was placed 1 to 1 .  The ct head  from the admission day did not show any acute finding . Pt with a Known history of polysubstance abuse .  The blood pressure was elevated  And respond well to clonidine . He was started on tramadol 50 mg po tid .   5/23 - Wife has explained substance abuse x 1 year with smoking crystal Methelamphetamine. Seroquel 25 mg and tramadol prescribed to prevent withdrawal. This AM, lethargic and arouses some and minimally follows commands. To hold seroquel due to lethargy - tramadol continued.  IV levaquin and DuoNeb treatments in progress. Pt too lethargic to participate with PT/OT - will attempt to increase activity tomorrow.  No signs of active bleeding noted. Spoke with wife regarding findings and plan of care.   5/24/19 - Still confused. Didn't know wife's name and day of the week. Will stop scheduled tramadol.  5/25/19-Patient remains confused. PT/OT recommends SNF. Social work consult for placement.   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 Dr. Valentino continue ASA/Plavix, Carotid u/s, CT Head without contrast. Attempted to contact wife for update.  5/27/19 - Wife brought his Leg. He Must participate in OT/PT so Social Service can arrange SNF. He is more alert today. Repeat CT head showed similar results of bilateral areas of suspected cerebral and cerebellar infarcts with areas of hemorrhagic conversion.   5/29/19 Somewhat better mentally.  contacted 18 SNF facilities, 9 denials.  5/30/19 SNF placement Pending. Receiving ST/OT/PT therapy.    Interval History: SNF Placement Pending    Review of Systems    Constitutional: Positive for activity change, appetite change and fatigue. Negative for chills, diaphoresis, fever and unexpected weight change.   HENT: Negative for congestion, ear discharge, ear pain, facial swelling, hearing loss, postnasal drip, sore throat, tinnitus, trouble swallowing and voice change.    Eyes: Negative for photophobia, pain, discharge, redness, itching and visual disturbance.   Respiratory: Negative for cough, choking, chest tightness, shortness of breath, wheezing and stridor.    Cardiovascular: Negative for chest pain, palpitations and leg swelling.   Gastrointestinal: Negative for abdominal distention, abdominal pain, blood in stool, constipation, diarrhea, nausea and vomiting.   Endocrine: Negative for cold intolerance, heat intolerance, polydipsia, polyphagia and polyuria.   Genitourinary: Negative for difficulty urinating, flank pain, frequency, hematuria and urgency.   Musculoskeletal: Negative for arthralgias, back pain, gait problem and myalgias.        Right AKA   Skin: Negative for color change, pallor, rash and wound.   Neurological: Positive for speech difficulty and weakness. Negative for dizziness, tremors, seizures, syncope, facial asymmetry, light-headedness, numbness and headaches.   Hematological: Negative for adenopathy. Does not bruise/bleed easily.   Psychiatric/Behavioral: Positive for confusion. Negative for agitation, hallucinations and suicidal ideas. The patient is not nervous/anxious.    All other systems reviewed and are negative.    Objective:     Vital Signs (Most Recent):  Temp: 98.3 °F (36.8 °C) (05/30/19 1129)  Pulse: 80 (05/30/19 1129)  Resp: 18 (05/30/19 0750)  BP: 110/64 (05/30/19 1129)  SpO2: 96 % (05/30/19 1129) Vital Signs (24h Range):  Temp:  [97.6 °F (36.4 °C)-98.4 °F (36.9 °C)] 98.3 °F (36.8 °C)  Pulse:  [70-91] 80  Resp:  [18-20] 18  SpO2:  [95 %-98 %] 96 %  BP: (110-159)/(63-83) 110/64     Weight: 86.7 kg (191 lb 2.2 oz)  Body mass index is 28.23  kg/m².    Intake/Output Summary (Last 24 hours) at 5/30/2019 1411  Last data filed at 5/30/2019 0830  Gross per 24 hour   Intake 120 ml   Output 600 ml   Net -480 ml      Physical Exam   Constitutional: Vital signs are normal. He appears well-developed and well-nourished. He appears lethargic.       HENT:   Head: Normocephalic and atraumatic.   Nose: Nose normal.   Eyes: Pupils are equal, round, and reactive to light. Conjunctivae and EOM are normal.   Eyes are glazed over   Neck: Normal range of motion. Neck supple. No JVD present. No tracheal deviation present. No thyromegaly present.   Cardiovascular: Normal rate, regular rhythm, normal heart sounds and intact distal pulses. Exam reveals no gallop and no friction rub.   No murmur heard.  Pulmonary/Chest: Effort normal. No stridor. No respiratory distress. He has decreased breath sounds. He has no wheezes. He has no rales. He exhibits no tenderness.   Abdominal: Soft. Bowel sounds are normal. He exhibits no distension. There is no tenderness. There is no rebound and no guarding.   Musculoskeletal: Normal range of motion. He exhibits no edema, tenderness or deformity.   Generalized weakness. RIGHT aka   Neurological: He has normal reflexes. He appears lethargic. No cranial nerve deficit. Coordination normal.   Reflex Scores:       Tricep reflexes are 2+ on the right side and 2+ on the left side.       Bicep reflexes are 2+ on the right side and 2+ on the left side.       Brachioradialis reflexes are 2+ on the right side and 2+ on the left side.       Patellar reflexes are 2+ on the right side and 2+ on the left side.       Achilles reflexes are 2+ on the right side and 2+ on the left side.  He is confused. He talks but does not follow directions and commands. He moves his extremities but appeared to me that he is weaker on the right side. Not sure about sensory deficits.     Skin: Skin is warm and dry. Capillary refill takes 2 to 3 seconds. No rash noted. No  erythema. No pallor.   Psychiatric: His affect is inappropriate. His speech is not delayed. He is slowed. Cognition and memory are impaired. He expresses no suicidal ideation.   Confused, but knew the day of the week, and his name. He is inattentive.   Nursing note and vitals reviewed.    Significant Labs: CBC: No results for input(s): WBC, HGB, HCT, PLT in the last 48 hours.  CMP: No results for input(s): NA, K, CL, CO2, GLU, BUN, CREATININE, CALCIUM, PROT, ALBUMIN, BILITOT, ALKPHOS, AST, ALT, ANIONGAP, EGFRNONAA in the last 48 hours.    Invalid input(s): ESTGFAFRICA  Cardiac Markers: No results for input(s): CKMB, MYOGLOBIN, BNP, TROPISTAT in the last 48 hours.    Significant Imaging: I have reviewed all pertinent imaging results/findings within the past 24 hours.    Assessment/Plan:      * Aspiration pneumonia of right middle lobe due to vomit  Cont IVAB  Supplemental oxygen  DuoNebs  Respiratory culture: STREPTOCOCCUS AGALACTIAE (GROUP B)   IV Levaquin downgraded to po and Completed course  Pulmonary consult  Blood cultures remain NGTD        Acute CVA (cerebrovascular accident)  -MRI: multifocal bilateral cerebral and cerebellar acute infarcts some of which demonstrate hemorrhagic conversion  -Neurology consult  - NO ASA/Plavix due to hemorrhagic conversion    Encephalopathy, metabolic  multifactorial : infection, withdrawal   5/26/19: MRI brain shows acute multi CVA  Cont supportive tx  No neurological deficit   CT of Head was negative for acute findings  Neuro checks  5/30/19  Remains confused, but improving   Continue Neuro checks   Neurology consult    Overdose of illicit drug  Cont current tx   + amphetamines and + opiates      Polysubstance abuse  Pt used opoid,  Crystal methylamphetamine x 1 year Daily  Cont current tx    Started on tramadol 50 mg po tid to prevent withdrawal and clonidine prn for withdrawals: stopped 5/24/19 due to confusion   Seroquel given one evening 5/22 then stopped and pt was  lethargic      Acute respiratory failure with hypoxia and hypercapnia  2/2 to COPD and aspiration PNA   S/p mechanical ventilation: resolved   Pulmonology consulted   Supplemental oxygen to maintain sats > 92 %  5/30/19  -Pt. On RA   -O2 sats 94%      Dysphagia  Diet: Mechanical soft: Cardiac diet  ST daily      Shock, unspecified  Most likely due to PNA  S/p vasodepressor   Cont IVAB Levaquin and vanc   blood cx NGTD  S/p 3 lt ns           KEHINDE (acute kidney injury)  Most likely due to sepsis   Cont IVF  retroperitoneal US did not show any acute abnormality   Monitor UOP and kidney function   Improving     HTN (hypertension)  Hold BP medication now  Due to sepsis   Resume accordantly   S/P levophed due to hypotension with a MAP<65  Resolved       PAD with remote right fem-distal bypass  Resume statin       Mixed hyperlipidemia  Resume statin       PAD (peripheral artery disease)  Hold Plavix for possible GIB  Resume statin       Stage 1 mild COPD by GOLD classification  Cont breathing tx - duonebs and formoterol        VTE Risk Mitigation (From admission, onward)        Ordered     Place sequential compression device  Until discontinued      05/20/19 1435     IP VTE HIGH RISK PATIENT  Once      05/20/19 1435        Denisha Warner NP  Department of Hospital Medicine   [facility]

## 2019-05-30 NOTE — PLAN OF CARE
Problem: Adult Inpatient Plan of Care  Goal: Plan of Care Review  Outcome: Ongoing (interventions implemented as appropriate)  Assumed care. Pt up in bed watching tv with wife at bedside. No c/o pain. VSS with NADN. 12 hr chart check complete. Will continue to monitor.

## 2019-05-30 NOTE — PT/OT/SLP PROGRESS
"Occupational Therapy  Treatment    Ish Guardado   MRN: [id number]   Admitting Diagnosis: Aspiration pneumonia of right middle lobe due to vomit    OT Date of Treatment: 05/30/19   OT Start Time: 1045  OT Stop Time: 1110  OT Total Time (min): 25 min    Billable Minutes:  Therapeutic Activity 15 min and Therapeutic Exercise 10 min    General Precautions: Standard, aphasia, fall  Orthopedic Precautions: N/A  Braces: N/A         Subjective:  Communicated with Nurse Roth and epic chart review prior to session.  Pt found supine in bed and agreeable to tx at this time.   Pain/Comfort  Pain Rating 1: 0/10  Pain Rating Post-Intervention 1: 0/10    Objective:  Patient found with: peripheral IV, telemetry     Functional Mobility:  Therapeutic Activities and Exercises:  Pt performed supine>sit with CGA and verbal cues for correct techniques, scooted to EOB with CGA and verbal/tactile cues for technique and safety, donned sock on (L) LE with SBA, sit>stand using RW with Min A, stand pivot t/f to chiar using RW with Mod A. Pt performed (B) UE AAROM Exercises in sitting 1 x 15 reps: shoulder flex, chest press, bicep curls, finger flex/ ext.      AM-PAC 6 CLICK ADL   How much help from another person does this patient currently need?   1 = Unable, Total/Dependent Assistance  2 = A lot, Maximum/Moderate Assistance  3 = A little, Minimum/Contact Guard/Supervision  4 = None, Modified [place]/Independent    Putting on and taking off regular lower body clothing? : 2  Bathing (including washing, rinsing, drying)?: 2  Toileting, which includes using toilet, bedpan, or urinal? : 2  Putting on and taking off regular upper body clothing?: 3  Taking care of personal grooming such as brushing teeth?: 3  Eating meals?: 3  Daily Activity Total Score: 15     AM-PAC Raw Score CMS "G-Code Modifier Level of Impairment Assistance   6 % Total / Unable   7 - 8 CM 80 - 100% Maximal Assist   9-13 CL 60 - 80% Moderate Assist   14 - 19 CK 40 - " 60% Moderate Assist   20 - 22 CJ 20 - 40% Minimal Assist   23 CI 1-20% SBA / CGA   24 CH 0% Independent/ Mod I       Patient left up in chair with all lines intact, call button in reach, chair alarm on, Nurse Ira notified and wife present    ASSESSMENT:  Ish Guardado is a 58 y.o. male with a medical diagnosis of Aspiration pneumonia of right middle lobe due to vomit and presents with  impaired functional mobility and ADLs. Pt will benefit from continued skilled OT in order to address impairments.    Rehab identified problem list/impairments: Rehab identified problem list/impairments: weakness, impaired endurance, impaired self care skills, impaired functional mobilty, decreased coordination, gait instability, impaired balance, decreased upper extremity function, decreased lower extremity function, impaired cognition, decreased safety awareness    Rehab potential is fair.    Activity tolerance: Good    Discharge recommendations: Discharge Facility/Level of Care Needs: rehabilitation facility     Barriers to discharge:      Equipment recommendations:       GOALS:   Multidisciplinary Problems     Occupational Therapy Goals        Problem: Occupational Therapy Goal    Goal Priority Disciplines Outcome Interventions   Occupational Therapy Goal     OT, PT/OT Ongoing (interventions implemented as appropriate)    Description:  OT GOALS TO MET BY 5-31-19  MIN A WITH UE DRESSING  S WITH LE DRESSING  PT WILL TOLERATE 1 SET X 15 REPS B UE ROM EXERCISE WITH MIN A                    Plan:  Patient to be seen 3 x/week to address the above listed problems via self-care/home management, therapeutic activities, therapeutic exercises  Plan of Care expires: 05/31/19  Plan of Care reviewed with: patient    OT G-codes  Functional Assessment Tool Used: [facility]  Score: 15    Chelo Black, PT/OT  05/30/2019

## 2019-05-30 NOTE — PT/OT/SLP PROGRESS
Physical Therapy  Treatment    Ish Guardado   MRN: [id number]   Admitting Diagnosis: Aspiration pneumonia of right middle lobe due to vomit    PT Received On: 05/30/19  PT Start Time: 1025     PT Stop Time: 1050    PT Total Time (min): 25 min       Billable Minutes:  Therapeutic Activity 15 and Therapeutic Exercise 10    Treatment Type: Treatment  PT/PTA: PT          General Precautions: Standard, aphasia, fall  Orthopedic Precautions: N/A   Braces: N/A    Subjective:  Communicated with NURSE CEDENO prior to session.  Pain/Comfort  Pain Rating 1: 0/10    Objective:   Patient found with: telemetry, peripheral IV    Functional Mobility:  Therapeutic Activities and Exercises:  PT FOUND SUPINE IN BED UPON ARRIVAL, MORE AWAKE/ALERT THIS AM, PT ABLE TO FOLLOW COMMANDS BUT STILL REQUIRING VERBAL AND TACTILE CUES TO STAY ON TASK, SUP>SIT WITH CGA, SEATED SCOOT TO EOB WITH CGA, DIEGO FOR SIT>STAND AND CUES, SAFETY CUES FOR RW USE, MODA FOR BED TO CHAIR TF, P DYNAMIC BALANCE, PT EDUCATED IN AND PERFORMED BLE THEREX X 15 REPS AROM    AM-PAC 6 CLICK MOBILITY  How much help from another person does this patient currently need?   1 = Unable, Total/Dependent Assistance  2 = A lot, Maximum/Moderate Assistance  3 = A little, Minimum/Contact Guard/Supervision  4 = None, Modified [place]/Independent    Turning over in bed (including adjusting bedclothes, sheets and blankets)?: 3  Sitting down on and standing up from a chair with arms (e.g., wheelchair, bedside commode, etc.): 3  Moving from lying on back to sitting on the side of the bed?: 3  Moving to and from a bed to a chair (including a wheelchair)?: 2  Need to walk in hospital room?: 1  Climbing 3-5 steps with a railing?: 1  Basic Mobility Total Score: 13    AM-PAC Raw Score CMS G-Code Modifier Level of Impairment Assistance   6 % Total / Unable   7 - 9 CM 80 - 100% Maximal Assist   10 - 14 CL 60 - 80% Moderate Assist   15 - 19 CK 40 - 60% Moderate Assist   20 - 22 CJ  20 - 40% Minimal Assist   23 CI 1-20% SBA / CGA   24 CH 0% Independent/ Mod I     Patient left up in chair with all lines intact, call button in reach, chair alarm on, NURSE notified and WIFE present.    Assessment:  Ish Guardado is a 58 y.o. male with a medical diagnosis of Aspiration pneumonia of right middle lobe due to vomit and presents with IMPAIRED FUNCTIONAL MOBILITY. PT WILL BENEFIT FROM CONT. SKILLED P.T. TO ADDRESS IMPAIRMENTS    Rehab identified problem list/impairments: Rehab identified problem list/impairments: weakness, impaired functional mobilty, impaired balance, decreased coordination, decreased safety awareness    Rehab potential is good.    Activity tolerance: Good    Discharge recommendations: Discharge Facility/Level of Care Needs: rehabilitation facility     Barriers to discharge:      Equipment recommendations: Equipment Needed After Discharge: none     GOALS:   Multidisciplinary Problems     Physical Therapy Goals        Problem: Physical Therapy Goal    Goal Priority Disciplines Outcome Goal Variances Interventions   Physical Therapy Goal     PT, PT/OT Ongoing (interventions implemented as appropriate)     Description:  LTG'S TO BE MET IN 7 DAYS (5-31-19)  1. PT WILL REQUIRE CGA FOR BED MOBILITY  2. PT WILL REQUIRE DIEGO FOR TF'S  3. PT WILL AMB 50' WITH RW AND DIEGO, RLE PROSTHESIS DONNED  4. PT WILL TOLERATE BLE THEREX X 20 REPS AROM                      PLAN:    Patient to be seen 5 x/week  to address the above listed problems via gait training, therapeutic activities, therapeutic exercises  Plan of Care expires: 05/31/19  Plan of Care reviewed with: patient    PT ENCOURAGED TO CALL FOR ASSISTANCE WITH ALL NEEDS DUE TO FALL RISK STATUS, PT AGREEABLE    Maria Lorenzo, PT  05/30/2019

## 2019-05-30 NOTE — SUBJECTIVE & OBJECTIVE
Interval History: SNF Placement Pending    Review of Systems   Constitutional: Positive for activity change, appetite change and fatigue. Negative for chills, diaphoresis, fever and unexpected weight change.   HENT: Negative for congestion, ear discharge, ear pain, facial swelling, hearing loss, postnasal drip, sore throat, tinnitus, trouble swallowing and voice change.    Eyes: Negative for photophobia, pain, discharge, redness, itching and visual disturbance.   Respiratory: Negative for cough, choking, chest tightness, shortness of breath, wheezing and stridor.    Cardiovascular: Negative for chest pain, palpitations and leg swelling.   Gastrointestinal: Negative for abdominal distention, abdominal pain, blood in stool, constipation, diarrhea, nausea and vomiting.   Endocrine: Negative for cold intolerance, heat intolerance, polydipsia, polyphagia and polyuria.   Genitourinary: Negative for difficulty urinating, flank pain, frequency, hematuria and urgency.   Musculoskeletal: Negative for arthralgias, back pain, gait problem and myalgias.        Right AKA   Skin: Negative for color change, pallor, rash and wound.   Neurological: Positive for speech difficulty and weakness. Negative for dizziness, tremors, seizures, syncope, facial asymmetry, light-headedness, numbness and headaches.   Hematological: Negative for adenopathy. Does not bruise/bleed easily.   Psychiatric/Behavioral: Positive for confusion. Negative for agitation, hallucinations and suicidal ideas. The patient is not nervous/anxious.    All other systems reviewed and are negative.    Objective:     Vital Signs (Most Recent):  Temp: 98.3 °F (36.8 °C) (05/30/19 1129)  Pulse: 80 (05/30/19 1129)  Resp: 18 (05/30/19 0750)  BP: 110/64 (05/30/19 1129)  SpO2: 96 % (05/30/19 1129) Vital Signs (24h Range):  Temp:  [97.6 °F (36.4 °C)-98.4 °F (36.9 °C)] 98.3 °F (36.8 °C)  Pulse:  [70-91] 80  Resp:  [18-20] 18  SpO2:  [95 %-98 %] 96 %  BP: (110-159)/(63-83) 110/64      Weight: 86.7 kg (191 lb 2.2 oz)  Body mass index is 28.23 kg/m².    Intake/Output Summary (Last 24 hours) at 5/30/2019 1411  Last data filed at 5/30/2019 0830  Gross per 24 hour   Intake 120 ml   Output 600 ml   Net -480 ml      Physical Exam   Constitutional: Vital signs are normal. He appears well-developed and well-nourished. He appears lethargic.       HENT:   Head: Normocephalic and atraumatic.   Nose: Nose normal.   Eyes: Pupils are equal, round, and reactive to light. Conjunctivae and EOM are normal.   Eyes are glazed over   Neck: Normal range of motion. Neck supple. No JVD present. No tracheal deviation present. No thyromegaly present.   Cardiovascular: Normal rate, regular rhythm, normal heart sounds and intact distal pulses. Exam reveals no gallop and no friction rub.   No murmur heard.  Pulmonary/Chest: Effort normal. No stridor. No respiratory distress. He has decreased breath sounds. He has no wheezes. He has no rales. He exhibits no tenderness.   Abdominal: Soft. Bowel sounds are normal. He exhibits no distension. There is no tenderness. There is no rebound and no guarding.   Musculoskeletal: Normal range of motion. He exhibits no edema, tenderness or deformity.   Generalized weakness. RIGHT aka   Neurological: He has normal reflexes. He appears lethargic. No cranial nerve deficit. Coordination normal.   Reflex Scores:       Tricep reflexes are 2+ on the right side and 2+ on the left side.       Bicep reflexes are 2+ on the right side and 2+ on the left side.       Brachioradialis reflexes are 2+ on the right side and 2+ on the left side.       Patellar reflexes are 2+ on the right side and 2+ on the left side.       Achilles reflexes are 2+ on the right side and 2+ on the left side.  He is confused. He talks but does not follow directions and commands. He moves his extremities but appeared to me that he is weaker on the right side. Not sure about sensory deficits.     Skin: Skin is warm and dry.  Capillary refill takes 2 to 3 seconds. No rash noted. No erythema. No pallor.   Psychiatric: His affect is inappropriate. His speech is not delayed. He is slowed. Cognition and memory are impaired. He expresses no suicidal ideation.   Confused, but knew the day of the week, and his name. He is inattentive.   Nursing note and vitals reviewed.    Significant Labs: CBC: No results for input(s): WBC, HGB, HCT, PLT in the last 48 hours.  CMP: No results for input(s): NA, K, CL, CO2, GLU, BUN, CREATININE, CALCIUM, PROT, ALBUMIN, BILITOT, ALKPHOS, AST, ALT, ANIONGAP, EGFRNONAA in the last 48 hours.    Invalid input(s): ESTGFAFRICA  Cardiac Markers: No results for input(s): CKMB, MYOGLOBIN, BNP, TROPISTAT in the last 48 hours.    Significant Imaging: I have reviewed all pertinent imaging results/findings within the past 24 hours.

## 2019-05-30 NOTE — ASSESSMENT & PLAN NOTE
2/2 to COPD and aspiration PNA   S/p mechanical ventilation: resolved   Pulmonology consulted   Supplemental oxygen to maintain sats > 92 %  5/30/19  -Pt. On RA   -O2 sats 94%

## 2019-05-30 NOTE — ASSESSMENT & PLAN NOTE
multifactorial : infection, withdrawal   5/26/19: MRI brain shows acute multi CVA  Cont supportive tx  No neurological deficit   CT of Head was negative for acute findings  Neuro checks  5/30/19  Remains confused, but improving   Continue Neuro checks   Neurology consult

## 2019-05-30 NOTE — PLAN OF CARE
Patient accepted to [facility]. [place] to submit for insurance authorization. Contacted France Green,  Nurse at [facility], discussed [place] request. Update to Ray patient's wife. Patient will discharge to [facility] on Friday once insurance auth obtained.       05/30/19 1508   Post-Acute Status   Post-Acute Authorization Placement   Post-Acute Placement Status Pending Payor Review

## 2019-05-30 NOTE — PT/OT/SLP PROGRESS
Speech Language Pathology Treatment    Patient Name:  Ish Guardado   MRN:  [id number]  Admitting Diagnosis: Aspiration pneumonia of right middle lobe due to vomit    Recommendations:                 General Recommendations:  Speech/language therapy  Diet recommendations:  Mechanical soft, Liquid Diet Level: Thin   Aspiration Precautions: HOB to 90 degrees and Small bites/sips   General Precautions: Standard, aphasia, fall  Communication strategies:  go to room if call light pushed    Subjective     Pt cooperative  Patient goals: unable to state    Pain/Comfort:  · Pain Rating 1: 0/10  · Pain Rating Post-Intervention 1: 0/10    Objective:     Has the patient been evaluated by SLP for swallowing?   Yes  Keep patient NPO? No   Current Respiratory Status: room air      Pt speaking mostly in nonsense words. Comprehension at 20%acc. He ID body parts with 50% acc with repetitions provided. He was able to state his name when asked but not his birthdate. He named objects with 10% acc with cues and 60% acc given verbal, visual and phonemic cues. Pt was able to repeat words with 90% acc. Good participation in tasks with re-direction.     Assessment:     Ish Guardado is a 58 y.o. male with an SLP diagnosis of Aphasia.  He presents with deficits in comprehension and expression.    Goals:   Multidisciplinary Problems     SLP Goals        Problem: SLP Goal    Goal Priority Disciplines Outcome   SLP Goal     SLP Ongoing (interventions implemented as appropriate)   Description:  1. Pt will complete simple expressive speech tasks with mod cues for word finding   2. Pt will complete simple cognitive tasks at 75% acc for increased memory, orientation, problem solving, organization, and safety awareness  3. Pt will complete simple receptive speech tasks with 75% acc                       Plan:     · Patient to be seen:  3 x/week   · Plan of Care expires:  06/03/19  · Plan of Care reviewed with:  patient   · SLP Follow-Up:  Yes        Discharge recommendations:  nursing facility, skilled, rehabilitation facility   Barriers to Discharge:      Time Tracking:     SLP Treatment Date:   05/30/19  Speech Start Time:  0951  Speech Stop Time:  1015     Speech Total Time (min):  24 min    Billable Minutes: Speech Therapy Individual 24    Naye Conti CCC-SLP  05/30/2019

## 2019-05-30 NOTE — PLAN OF CARE
Problem: Occupational Therapy Goal  Goal: Occupational Therapy Goal  OT GOALS TO MET BY 5-31-19  MIN A WITH UE DRESSING  S WITH LE DRESSING  PT WILL TOLERATE 1 SET X 15 REPS B UE ROM EXERCISE WITH MIN A   Outcome: Ongoing (interventions implemented as appropriate)  Bed mobility with CGA; stand pivot t/f to chair using RW with Mod A, donned sock with SBA

## 2019-05-30 NOTE — PLAN OF CARE
Problem: Physical Therapy Goal  Goal: Physical Therapy Goal  LTG'S TO BE MET IN 7 DAYS (5-31-19)  1. PT WILL REQUIRE CGA FOR BED MOBILITY  2. PT WILL REQUIRE DIEGO FOR TF'S  3. PT WILL AMB 50' WITH RW AND DIEGO, RLE PROSTHESIS DONNED  4. PT WILL TOLERATE BLE THEREX X 20 REPS AROM     Outcome: Ongoing (interventions implemented as appropriate)  PT WITH IMPROVED FUNCTIONAL MOBILITY, STILL REQUIRES MAX VERBAL AND TACTILE CUES TO STAY ON TASK, CGA FOR BED MOBILITY, MODA FOR TF'S

## 2019-05-31 VITALS
WEIGHT: 191.13 LBS | OXYGEN SATURATION: 96 % | HEART RATE: 68 BPM | TEMPERATURE: 98 F | SYSTOLIC BLOOD PRESSURE: 118 MMHG | HEIGHT: 69 IN | DIASTOLIC BLOOD PRESSURE: 80 MMHG | BODY MASS INDEX: 28.31 KG/M2 | RESPIRATION RATE: 16 BRPM

## 2019-05-31 PROBLEM — T43.601A OVERDOSE OF ILLICIT DRUG: Status: RESOLVED | Noted: 2019-05-20 | Resolved: 2019-05-31

## 2019-05-31 PROBLEM — G93.41 ENCEPHALOPATHY, METABOLIC: Status: RESOLVED | Noted: 2019-05-22 | Resolved: 2019-05-31

## 2019-05-31 PROBLEM — J96.02 ACUTE RESPIRATORY FAILURE WITH HYPOXIA AND HYPERCAPNIA: Status: RESOLVED | Noted: 2019-05-20 | Resolved: 2019-05-31

## 2019-05-31 PROBLEM — J96.01 ACUTE RESPIRATORY FAILURE WITH HYPOXIA AND HYPERCAPNIA: Status: RESOLVED | Noted: 2019-05-20 | Resolved: 2019-05-31

## 2019-05-31 PROCEDURE — 94640 AIRWAY INHALATION TREATMENT: CPT

## 2019-05-31 PROCEDURE — 97110 THERAPEUTIC EXERCISES: CPT

## 2019-05-31 PROCEDURE — 94761 N-INVAS EAR/PLS OXIMETRY MLT: CPT

## 2019-05-31 PROCEDURE — 97110 THERAPEUTIC EXERCISES: CPT | Performed by: PHYSICAL THERAPIST

## 2019-05-31 PROCEDURE — 25000003 PHARM REV CODE 250: Performed by: PHYSICIAN ASSISTANT

## 2019-05-31 PROCEDURE — 97530 THERAPEUTIC ACTIVITIES: CPT

## 2019-05-31 PROCEDURE — 25000242 PHARM REV CODE 250 ALT 637 W/ HCPCS: Performed by: INTERNAL MEDICINE

## 2019-05-31 PROCEDURE — 25000003 PHARM REV CODE 250: Performed by: INTERNAL MEDICINE

## 2019-05-31 PROCEDURE — 97530 THERAPEUTIC ACTIVITIES: CPT | Performed by: PHYSICAL THERAPIST

## 2019-05-31 PROCEDURE — 92507 TX SP LANG VOICE COMM INDIV: CPT

## 2019-05-31 RX ADMIN — PANTOPRAZOLE SODIUM 40 MG: 40 TABLET, DELAYED RELEASE ORAL at 08:05

## 2019-05-31 RX ADMIN — AMLODIPINE BESYLATE 10 MG: 10 TABLET ORAL at 08:05

## 2019-05-31 RX ADMIN — ARFORMOTEROL TARTRATE 15 MCG: 15 SOLUTION RESPIRATORY (INHALATION) at 08:05

## 2019-05-31 RX ADMIN — ATORVASTATIN CALCIUM 10 MG: 10 TABLET, FILM COATED ORAL at 08:05

## 2019-05-31 RX ADMIN — POLYETHYLENE GLYCOL 3350 17 G: 17 POWDER, FOR SOLUTION ORAL at 08:05

## 2019-05-31 NOTE — PLAN OF CARE
Problem: Adult Inpatient Plan of Care  Goal: Plan of Care Review  Outcome: Ongoing (interventions implemented as appropriate)  Patient only oriented to self. VSS..   Patient remained free of falls this shift.  Plan of care reviewed.  Patient verbalized understanding.  Patient turning independently and up with assist  Frequent weight shifting encouraged.  Patient NSR/SB on monitor.  Telesitter at bedside  Awaiting SNF placement   Bed low, siderails up x2, wheels locked, call light in reach.  Bed alarm maintained for safety.  Patient instructed to call for assistance.  Hourly rounding completed.  Will continue to monitor.

## 2019-05-31 NOTE — PLAN OF CARE
Problem: SLP Goal  Goal: SLP Goal  1. Pt will complete simple expressive speech tasks with mod cues for word finding   2. Pt will complete simple cognitive tasks at 75% acc for increased memory, orientation, problem solving, organization, and safety awareness  3. Pt will complete simple receptive speech tasks with 75% acc      Outcome: Ongoing (interventions implemented as appropriate)  Pt with improved comprehension and expression on simple tasks.

## 2019-05-31 NOTE — PT/OT/SLP PROGRESS
"Occupational Therapy  Treatment    Ish Guardado   MRN: [id number]   Admitting Diagnosis: Aspiration pneumonia of right middle lobe due to vomit    OT Date of Treatment: 05/24/19   OT Start Time: 0945  OT Stop Time: 1010  OT Total Time (min): 25 min    Billable Minutes:  Therapeutic Activity 15 min and Therapeutic Exercise 10 min    General Precautions: Standard, fall, aphasia  Orthopedic Precautions: N/A  Braces: N/A         Subjective:  Communicated with Nurse Roc and epic chart review prior to session.  Pt found supine in bed and agreeable to tx at this time.  Pain/Comfort  Pain Rating 1: 0/10  Pain Rating Post-Intervention 1: 0/10    Objective:  Patient found with: peripheral IV, telemetry     Functional Mobility:  Therapeutic Activities and Exercises:  Pt performed supine>sit with SBA and VCs, scooted to EOB with SBA and VCs, donned sock on (L) foot with Supervision, sit>stand using RW with Min A, stand pivot t/f to chair using RW with Mod A. Pt performed (B) UE ROM exercises 1 x 10 reps with verbal / tactile cues: shoulder flex, bicep curls, finger flex/ ext.    AM-PAC 6 CLICK ADL   How much help from another person does this patient currently need?   1 = Unable, Total/Dependent Assistance  2 = A lot, Maximum/Moderate Assistance  3 = A little, Minimum/Contact Guard/Supervision  4 = None, Modified [place]/Independent    Putting on and taking off regular lower body clothing? : 2  Bathing (including washing, rinsing, drying)?: 2  Toileting, which includes using toilet, bedpan, or urinal? : 2  Putting on and taking off regular upper body clothing?: 3  Taking care of personal grooming such as brushing teeth?: 3  Eating meals?: 3  Daily Activity Total Score: 15     AM-PAC Raw Score CMS "G-Code Modifier Level of Impairment Assistance   6 % Total / Unable   7 - 8 CM 80 - 100% Maximal Assist   9-13 CL 60 - 80% Moderate Assist   14 - 19 CK 40 - 60% Moderate Assist   20 - 22 CJ 20 - 40% Minimal Assist   23 " CI 1-20% SBA / CGA   24 CH 0% Independent/ Mod I       Patient left up in chair with all lines intact, call button in reach, chair alarm on and Nurse Roc notified    ASSESSMENT:  Ish Guardado is a 58 y.o. male with a medical diagnosis of Aspiration pneumonia of right middle lobe due to vomit and presents withimpaired functional mobility and ADLs. Pt will benefit from continued skilled OT in order to address impairments.    Rehab identified problem list/impairments: Rehab identified problem list/impairments: weakness, impaired endurance, impaired self care skills, impaired functional mobilty, decreased coordination, impaired cognition, impaired balance, gait instability, decreased upper extremity function, decreased lower extremity function, decreased safety awareness    Rehab potential is fair.    Activity tolerance: Fair    Discharge recommendations: Discharge Facility/Level of Care Needs: rehabilitation facility, nursing facility, skilled     Barriers to discharge:      Equipment recommendations:       GOALS:   Multidisciplinary Problems     Occupational Therapy Goals        Problem: Occupational Therapy Goal    Goal Priority Disciplines Outcome Interventions   Occupational Therapy Goal     OT, PT/OT Ongoing (interventions implemented as appropriate)    Description:  OT GOALS TO MET BY 6/3/19  MIN A WITH UE DRESSING  S WITH LE DRESSING  PT WILL TOLERATE 1 SET X 15 REPS B UE ROM EXERCISE WITH MIN A                     Plan:  Patient to be seen 3 x/week to address the above listed problems via self-care/home management, therapeutic activities, therapeutic exercises  Plan of Care expires: 06/03/19  Plan of Care reviewed with: patient    OT G-codes  Functional Assessment Tool Used: [facility]  Score: 15    Chelo Black, PT/OT  05/31/2019

## 2019-05-31 NOTE — NURSING
Patient assisted with dressing by staff. Patient transported to front lobby via wheelchair by [facility] staff for [facility] staff to transport to their facility.

## 2019-05-31 NOTE — PLAN OF CARE
Problem: Occupational Therapy Goal  Goal: Occupational Therapy Goal  OT GOALS TO MET BY 6/3/19  MIN A WITH UE DRESSING  S WITH LE DRESSING  PT WILL TOLERATE 1 SET X 15 REPS B UE ROM EXERCISE WITH MIN A   Outcome: Ongoing (interventions implemented as appropriate)  Bed mobility with SBA; t/f using RW with Mod A; (B) UE ROM ex 1 x 15 reps

## 2019-05-31 NOTE — PLAN OF CARE
Problem: Adult Inpatient Plan of Care  Goal: Readiness for Transition of Care    Intervention: Mutually Develop Transition Plan  Patient to discharge to [facility] today       05/21/19 1524 05/31/19 0915   OTHER   Communicated expected length of stay with patient/caregiver  --  yes   Is patient able to care for self after discharge?  --  No   Who are your caregiver(s) and their phone number(s)? Ray Guardado ( spouse ) [phone]  --    Patient currently receives home health services?  --  No   (RETIRED) Current Health   Expected Length of Stay (days)  --    (dc today)   Living Environment   Able to Return to Prior Arrangements  --  no   (RETIRED) Discharge Needs Assessment   How many people do you have in your home that can help with your care after discharge?  --  1   (RETIRED) Social Work Plan   Patient's perception of discharge disposition  --  skilled nursing facility   Discharge Needs Assessment   Readmission Within the Last 30 Days  --  no previous admission in last 30 days   Equipment Currently Used at Home  --  prosthesis;walker, standard   Transportation Anticipated  --  family or friend will provide;other (see comments)  (facility transportation)   Social Work Plan   Patient/Family in Agreement with Plan  --  yes

## 2019-05-31 NOTE — PLAN OF CARE
Problem: Adult Inpatient Plan of Care  Goal: Plan of Care Review  Outcome: Ongoing (interventions implemented as appropriate)  POC reviewed. Remote sitter noted. Telemetry monitoring as ordered. Bed alarm set. Pt Alert and confused. Pt denies any needs at this time. Comfort measures and safety measures addressed.Will continue to monitor.

## 2019-05-31 NOTE — DISCHARGE SUMMARY
[facility] Hospital Medicine  Discharge Summary      Patient Name: Ish Guardado  MRN: [id number]  Admission Date: 5/20/2019  Hospital Length of Stay: 11 days  Discharge Date and Time:  05/31/2019 4:19 PM  Attending Physician: Yobani Harris MD   Discharging Provider: Netta Webster NP  Primary Care Provider: Pollo Arana MD      HPI:   Ish Guardado is a 59 y/o wm with a PMHx of COPD , GERD , Chronic cough , Drug abuse , HTN , HLD , PAD and Tobacco abuse, who presented to the ER with a c/o AMS since yesterday night. The history was taken from EMR and family members due to pt medical condition . Pt was found yesterday night inside his car confused and altered . Pt was taken to his house by his son. He was responsive, but lethargic . Per family the patient used crystal methalamphetamine. This am he was found more conused and lethargic with red colored fluid coming through his nose and mouth . Per family pt was in his usual health of state before last night. They states he has a chronic cough and heartburn. The  denies any fever , sob , palpitation , weakness, chills, diarrhea, or gu problems.   ER course: The ABG show respiratory acidosis , acute hypoxic and hypercapnic respiratory failure . Pt was intubated . The CXR show a right side infiltrate . WBC 14 , lactic acid  3 , troponin level wnl . He was started on Levaquin and vanc . He had 3 lt NS . He was started on levophed due to MAP > than 65 .   ER VS   Initial Vitals   BP Pulse Resp Temp SpO2   05/20/19 0900 05/20/19 0900 05/20/19 0926 -- 05/20/19 0900   139/62 106 (!) 25   (!) 89 %   He was admitted to ICU with drug overdose, aspiration pneumonia. Wife, Ray Guardado, [phone] is the surrogate decision maker.       * No surgery found *      Hospital Course:   Pt found altered and admitted with diagnoses of drug overdose (+ opiates, + amphetamines), combined respiratory failure, aspiration pneumonia, shock and concerns for GI  bleed.   5/21 Pt was seen and examined at bedside . He is  Alert  But confuse .  He is off vasopressor . He was extubated this am .  He has a good UOP .  There was no acute event overnight   5/22 Pt was seen and examined at bedside . He is aaox 1 .  He  Is confuse on and off .  Pt was placed 1 to 1 .  The ct head  from the admission day did not show any acute finding . Pt with a Known history of polysubstance abuse .  The blood pressure was elevated  And respond well to clonidine . He was started on tramadol 50 mg po tid .   5/23 - Wife has explained substance abuse x 1 year with smoking crystal Methelamphetamine. Seroquel 25 mg and tramadol prescribed to prevent withdrawal. This AM, lethargic and arouses some and minimally follows commands. To hold seroquel due to lethargy - tramadol continued.  IV levaquin and DuoNeb treatments in progress. Pt too lethargic to participate with PT/OT - will attempt to increase activity tomorrow.  No signs of active bleeding noted. Spoke with wife regarding findings and plan of care.   5/24/19 - Still confused. Didn't know wife's name and day of the week. Will stop scheduled tramadol.  5/25/19-Patient remains confused. PT/OT recommends SNF. Social work consult for placement.   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 Dr. Valentino continue ASA/Plavix, Carotid u/s, CT Head without contrast. Attempted to contact wife for update.  5/27/19 - Wife brought his Leg. He Must participate in OT/PT so Social Service can arrange SNF. He is more alert today. Repeat CT head showed similar results of bilateral areas of suspected cerebral and cerebellar infarcts with areas of hemorrhagic conversion.   5/29/19 Somewhat better mentally.  contacted 18 SNF facilities, 9 denials.  5/30/19 SNF placement Pending. Receiving ST/OT/PT therapy.  5/31/19 Patient accepted at [facility]. Case discussed with  Dr. Harris. Patient ready for discharge. Patient to follow-up with PCP in 3 days for hospital follow-up. Patient to also follow-up with neurology outpatient. Patient seen and examined on the date of discharge and found suitable for discharge.      Consults:   Consults (From admission, onward)        Status Ordering Provider     Inpatient consult to Gastroenterology  Once     Provider:  Negrito Rousseau PA-C    Completed ANY DIAS     Inpatient consult to Neurology  Once     Provider:  Lena Valentino MD    Completed JAMI HARRIS     Inpatient consult to Pulmonology  Once     Provider:  Cruzito Mcintosh MD    Completed SUNG EASTON     Inpatient consult to Registered Dietitian/Nutritionist  Once     Provider:  (Not yet assigned)    Completed JAMI HARRIS     Inpatient consult to Social Work  Once     Provider:  (Not yet assigned)    Completed CARLOS BOLDEN     Inpatient consult to Social Work  Once     Provider:  (Not yet assigned)    Completed CARLOS BOLDEN     IP consult to case management  Once     Provider:  (Not yet assigned)    Completed ARSENIO LOWRY          No new Assessment & Plan notes have been filed under this hospital service since the last note was generated.  Service: Hospital Medicine    Final Active Diagnoses:    Diagnosis Date Noted POA    PRINCIPAL PROBLEM:  Aspiration pneumonia of right middle lobe due to vomit [J69.0] 05/20/2019 Yes    Dysphagia [R13.10] 05/29/2019 Yes    Acute CVA (cerebrovascular accident) [I63.9] 05/26/2019 Yes    Polysubstance abuse [F19.10] 05/20/2019 Yes     Chronic    Stage 1 mild COPD by GOLD classification [J44.9] 05/23/2017 Yes      Problems Resolved During this Admission:    Diagnosis Date Noted Date Resolved POA    Encephalopathy, metabolic [G93.41] 05/22/2019 05/31/2019 Yes    Aspiration pneumonia of right lung due to gastric secretions [J69.0]  05/23/2019 Yes    Acute respiratory failure with hypoxia and  hypercapnia [J96.01, J96.02] 05/20/2019 05/31/2019 Yes    Acute upper GI bleed [K92.2] 05/20/2019 05/27/2019 Yes    Overdose of illicit drug [T43.601A] 05/20/2019 05/31/2019 Yes       Discharged Condition: stable    Disposition: Skilled Nursing Facility    Follow Up:  Follow-up Information     [facility].    Why:  SNF  Contact information:  [address] [facility] [address]           Pollo Arana MD In 3 days.    Specialty:  Family Medicine  Why:  Hospital follow-up   Contact information:  [address] [facility] [address] [facility] [address]  [facility] [address]  [phone]             Doroteo Aguayo MD In 1 week.    Specialty:  Neurology  Why:  hospital follow-up   Contact information:  [address]  [place] [address]  [phone]                 Patient Instructions:      Other restrictions (specify):   Order Comments: Fall precautions, Right aka     Notify your health care provider if you experience any of the following:  temperature >100.4     Notify your health care provider if you experience any of the following:  difficulty breathing or increased cough     Notify your health care provider if you experience any of the following:  persistent dizziness, light-headedness, or visual disturbances     Notify your health care provider if you experience any of the following:  increased confusion or weakness       Significant Diagnostic Studies: Labs: BMP: No results for input(s): GLU, NA, K, CL, CO2, BUN, CREATININE, CALCIUM, MG in the last 48 hours., CMP No results for input(s): NA, K, CL, CO2, GLU, BUN, CREATININE, CALCIUM, PROT, ALBUMIN, BILITOT, ALKPHOS, AST, ALT, ANIONGAP, ESTGFRAFRICA, EGFRNONAA in the last 48 hours., CBC No results for input(s): WBC, HGB, HCT, PLT in the last 48 hours. and All labs within the past 24 hours have been reviewed    Pending Diagnostic Studies:     None         Medications:  Reconciled Home Medications:      Medication List      CONTINUE taking these  medications    amLODIPine 10 MG tablet  Commonly known as:  NORVASC  Take 10 mg by mouth once daily.     aspirin 81 MG EC tablet  Commonly known as:  ECOTRIN  Take 81 mg by mouth once daily.     atorvastatin 20 MG tablet  Commonly known as:  LIPITOR  Take 10 mg by mouth once daily.     clopidogrel 75 mg tablet  Commonly known as:  PLAVIX  Take 75 mg by mouth once daily.     pantoprazole 20 MG tablet  Commonly known as:  PROTONIX  Take 20 mg by mouth once daily.     triamcinolone acetonide 0.025% 0.025 % cream  Commonly known as:  KENALOG  Apply topically 2 (two) times daily.        STOP taking these medications    benzonatate 200 MG capsule  Commonly known as:  TESSALON     pregabalin 225 MG Cap  Commonly known as:  LYRICA     ranitidine 300 MG tablet  Commonly known as:  ZANTAC            Indwelling Lines/Drains at time of discharge:   Lines/Drains/Airways          None          Time spent on the discharge of patient: 35 minutes  Patient was seen and examined on the date of discharge and determined to be suitable for discharge.         Netta Webster NP  Department of Hospital Medicine  [facility]

## 2019-05-31 NOTE — PLAN OF CARE
Problem: Physical Therapy Goal  Goal: Physical Therapy Goal  LTG'S TO BE MET IN 7 DAYS (5-31-19)  1. PT WILL REQUIRE CGA FOR BED MOBILITY  2. PT WILL REQUIRE DIEGO FOR TF'S  3. PT WILL AMB 50' WITH RW AND DIEGO, RLE PROSTHESIS DONNED  4. PT WILL TOLERATE BLE THEREX X 20 REPS AROM     Outcome: Ongoing (interventions implemented as appropriate)  FAIR TOLERANCE TO P.T. TX., INCREASED LETHARGY BUT AGREEABLE TO TX., SBA FOR BED MOBILITY AND MODA FOR TF, MAX CUES TO STAY ON TASK

## 2019-05-31 NOTE — PLAN OF CARE
Call received from Marj Jones at [facility]. [facility] has received insurance authorization and have accepted patient. Discharge orders faxed to [facility] via [employer]. # for report is [phone]. [facility] will arrange transportation for 4-430 this afternoon. Update to Ray , patient's wife and Magaly, primary nurse.       05/31/19 1511   Post-Acute Status   Post-Acute Authorization Placement   Post-Acute Placement Status Set-up Complete

## 2019-05-31 NOTE — PT/OT/SLP PROGRESS
Physical Therapy  Treatment    Ish Guardado   MRN: [id number]   Admitting Diagnosis: Aspiration pneumonia of right middle lobe due to vomit    PT Received On: 05/31/19  PT Start Time: 0930     PT Stop Time: 0955    PT Total Time (min): 25 min       Billable Minutes:  Therapeutic Activity 15 and Therapeutic Exercise 10    Treatment Type: Treatment  PT/PTA: PT          General Precautions: Standard, aphasia, fall  Orthopedic Precautions: N/A   Braces: N/A    Subjective:  Communicated with NURSE MUKHERJEE prior to session.  Pain/Comfort  Pain Rating 1: 0/10    Objective:   Patient found with: telemetry, peripheral IV    Functional Mobility:  Therapeutic Activities and Exercises:  PT FOUND SUPINE IN BED UPON ARRIVAL, INCREASED LETHARGY BUT ABLE TO AROUSE WITH CONSTANT CUES, PT ABLE TO FOLLOW COMMANDS BUT STILL REQUIRING VERBAL AND TACTILE CUES TO STAY ON TASK, SUP>SIT WITH SBA, SEATED SCOOT TO EOB WITH CGA, DIEGO FOR SIT>STAND AND CUES, SAFETY CUES FOR RW USE, MODA FOR BED TO CHAIR TF, P DYNAMIC BALANCE, ATTEMPTED TAKING STEPS FWD BUT PT UNABLE TO FOLLOW COMMAND VS TOO LETHARGIC, PT EDUCATED IN AND PERFORMED BLE THEREX X 15 REPS AROM, MAX CUES TO STAY ON TASK    AM-PAC 6 CLICK MOBILITY  How much help from another person does this patient currently need?   1 = Unable, Total/Dependent Assistance  2 = A lot, Maximum/Moderate Assistance  3 = A little, Minimum/Contact Guard/Supervision  4 = None, Modified [place]/Independent    Turning over in bed (including adjusting bedclothes, sheets and blankets)?: 3  Sitting down on and standing up from a chair with arms (e.g., wheelchair, bedside commode, etc.): 3  Moving from lying on back to sitting on the side of the bed?: 3  Moving to and from a bed to a chair (including a wheelchair)?: 2  Need to walk in hospital room?: 1  Climbing 3-5 steps with a railing?: 1  Basic Mobility Total Score: 13    AM-PAC Raw Score CMS G-Code Modifier Level of Impairment Assistance   6 % Total  / Unable   7 - 9 CM 80 - 100% Maximal Assist   10 - 14 CL 60 - 80% Moderate Assist   15 - 19 CK 40 - 60% Moderate Assist   20 - 22 CJ 20 - 40% Minimal Assist   23 CI 1-20% SBA / CGA   24 CH 0% Independent/ Mod I     Patient left up in chair with all lines intact, call button in reach, chair alarm on, NURSE notified and ST present.    Assessment:  Ish Guardado is a 58 y.o. male with a medical diagnosis of Aspiration pneumonia of right middle lobe due to vomit and presents with IMPAIRED FUNCTIONAL MOBILITY. PT WILL BENEFIT FROM CONT. SKILLED P.T. TO ADDRESS IMPAIRMENTS    Rehab identified problem list/impairments: Rehab identified problem list/impairments: weakness, impaired endurance, impaired functional mobilty, gait instability, impaired balance, decreased coordination, decreased safety awareness, impaired cognition    Rehab potential is good.    Activity tolerance: Good    Discharge recommendations: Discharge Facility/Level of Care Needs: rehabilitation facility     Barriers to discharge:      Equipment recommendations: Equipment Needed After Discharge: none     GOALS:   Multidisciplinary Problems     Physical Therapy Goals        Problem: Physical Therapy Goal    Goal Priority Disciplines Outcome Goal Variances Interventions   Physical Therapy Goal     PT, PT/OT Ongoing (interventions implemented as appropriate)     Description:  LTG'S TO BE MET IN 7 DAYS (6-7-19)  1. PT WILL REQUIRE SPV FOR BED MOBILITY  2. PT WILL REQUIRE DIEGO FOR TF'S  3. PT WILL AMB 50' WITH RW AND DIEGO, RLE PROSTHESIS DONNED  4. PT WILL TOLERATE BLE THEREX X 20 REPS AROM                       PLAN:    Patient to be seen 5 x/week  to address the above listed problems via gait training, therapeutic activities, therapeutic exercises  Plan of Care expires: 06/07/19  Plan of Care reviewed with: patient    PT ENCOURAGED TO CALL FOR ASSISTANCE WITH ALL NEEDS DUE TO FALL RISK STATUS, PT AGREEABLE    Maria Lorenzo, PT  05/31/2019

## 2019-05-31 NOTE — PLAN OF CARE
05/31/19 1400   Medicare Message   Important Message from Medicare regarding Discharge Appeal Rights Given to patient/caregiver;Signed/date by patient/caregiver;Explained to patient/caregiver   Date IMM was signed 05/31/19   Time IMM was signed 1400

## 2019-05-31 NOTE — PT/OT/SLP PROGRESS
Speech Language Pathology Treatment    Patient Name:  Ish Guardado   MRN:  [id number]  Admitting Diagnosis: Aspiration pneumonia of right middle lobe due to vomit    Recommendations:                 General Recommendations:  Speech/language therapy  Diet recommendations:  Mechanical soft, Liquid Diet Level: Thin   Aspiration Precautions: HOB to 90 degrees and Small bites/sips   General Precautions: Standard, aphasia, fall  Communication strategies:  none, provide increased time to answer and go to room if call light pushed    Subjective     Pt cooperative   Patient goals: none stated    Pain/Comfort:  · Pain Rating 1: 0/10  · Pain Rating Post-Intervention 1: 0/10    Objective:     Has the patient been evaluated by SLP for swallowing?   Yes  Keep patient NPO? No   Current Respiratory Status: room air      Pt named objects with 40% acc I and 80% acc with cues. He answered simple y/n questions with 80% acc and followed 1 step commands with 70% acc without cues and 90% acc with cues. He completed automatic sentence completions with 100% acc and simple sentence completions with  50% acc without cues.     Assessment:     Ish Guardado is a 58 y.o. male with an SLP diagnosis of Aphasia.  He presents with deficits in comprehension and expression.     Goals:   Multidisciplinary Problems     SLP Goals        Problem: SLP Goal    Goal Priority Disciplines Outcome   SLP Goal     SLP Ongoing (interventions implemented as appropriate)   Description:  1. Pt will complete simple expressive speech tasks with mod cues for word finding   2. Pt will complete simple cognitive tasks at 75% acc for increased memory, orientation, problem solving, organization, and safety awareness  3. Pt will complete simple receptive speech tasks with 75% acc                       Plan:     · Patient to be seen:  3 x/week   · Plan of Care expires:  06/03/19  · Plan of Care reviewed with:  patient   · SLP Follow-Up:  Yes       Discharge recommendations:   nursing facility, skilled, rehabilitation facility   Barriers to Discharge:  None    Time Tracking:     SLP Treatment Date:   05/31/19  Speech Start Time:  1000  Speech Stop Time:  1029     Speech Total Time (min):  29 min    Billable Minutes: Speech Therapy Individual 29    Naye Conti CCC-SLP  05/31/2019

## 2019-06-03 NOTE — PHYSICIAN QUERY
PT Name: Ish Guardado  MR #: [id number]    Physician Query Form - Cause and Effect Relationship Clarification      CDS/: Norah Kirby   RN CCDS            Contact information: [email or web address]    This form is a permanent document in the medical record.     Query Date: Janine 3, 2019    By submitting this query, we are merely seeking further clarification of documentation. Please utilize your independent clinical judgment when addressing the question(s) below.    The Medical record contains the following:  Supporting Clinical Findings   Location in record                                                                       Aspiration pneumonia of right middle lobe due to vomit  Cont IVAB  Supplemental oxygen  DuoNebs  Respiratory culture: STREPTOCOCCUS AGALACTIAE (GROUP B)   IV Levaquin downgraded to po and Completed course  Pulmonary consult  Blood cultures remain NGTD                                                                                                                      PN 5/29                                                                                                                                                                                                       Provider, please clarify if there is any correlation between ______Pneumonia _________ and ____Streptococcus Algalactiae______________.           Are the conditions:      [ X ] Due to or associated with each other   [  ] Unrelated to each other   [  ] Other (Please Specify): _________________________   [  ] Clinically Undetermined

## 2019-06-03 NOTE — PLAN OF CARE
06/03/19 0729   Final Note   Assessment Type Final Discharge Note   Anticipated Discharge Disposition SNF   Right Care Referral Info   Post Acute Recommendation SNF / Sub-Acute Rehab   Referral Type SNF   Facility Name [facility]

## 2019-06-03 NOTE — PHYSICIAN QUERY
PT Name: Ish Guardado  MR #: [id number]     Physician Query Form - Diagnosis Clarification      CDS/: Norah Kirby  RN CCDS             Contact information:[email or web address]    This form is a permanent document in the medical record.     Query Date: Jannie 3, 2019    By submitting this query, we are merely seeking further clarification of documentation.  Please utilize your independent clinical judgment when addressing the question(s) below.     The medical record contains the following:      Findings Supporting Clinical Information Location in Medical Record       Sepsis * Septic shock   Most likely due to PNA   Keep vasodepressor to Keep a MAP > 65   Cont IVAB Levaquin and vanc   F/u blood cx   S/p 3 lt ns     The ABG show respiratory acidosis , acute hypoxic and hypercapnic respiratory failure . Pt was intubated . The CXR show a right side infiltrate . WBC 14 , lactic acid 3 , troponin level wnl . He was started on Levaquin and vanc . He had 3 lt NS . He was started on levophed due to MAP > than 65 .     He was admitted to ICU with drug overdose, aspiration pneumonia.      H&P              DS       Please clarify if the __Sepsis/Septic shock_____ diagnosis has been:    [ X ] Ruled In   [  ] Ruled In, Now Resolved   [  ] Ruled Out   [  ] Other/Clarification of findings (please specify):   [  ] Clinically insignificant     [  ] Clinically undetermined     Please document in your progress notes daily for the duration of treatment, until resolved, and include in your discharge summary.

## 2020-09-23 NOTE — CONSULTS
[facility] Hospital Medicine  Consult Note    Patient Name: Ish Guardado  MRN: [id number]  Admission Date: 5/22/2017  Hospital Length of Stay: 1 days  Attending Physician: Avila Houser MD   Primary Care Provider: Pollo Arana MD           Patient information was obtained from patient and ER records.     Inpatient consult to Hospitalist  Consult performed by: MARLEEN HYDE  Consult ordered by: GIA KHAN        Subjective:     Principal Problem: <principal problem not specified>    Chief Complaint:   Chief Complaint   Patient presents with    Chest Wall Pain     pt fell and injured L ribs, was seen at urgent care and had an Xray but results were inconclusive    Circulatory Problem     pt with PMH of two R sided femoral bypasses, states he feels like his circulation is decreased again        HPI: 56 y.o male patient with PMHx of HTN, prediabetic, COPD, stroke, PAD s/p Bypass with graft in the Rt. LE , was referred from his PCP office due to complain of pain in the rt. Foot. Pain started couple days ago and start to get worse, it is more cramping and feel tight, he also noticed discoloration of the foot too. Patient takes Xarelto for PAD.  Patient had fall week ago where he went to urgent care, CXR was done and it was negative for fracture but told him there is spot there.      Past Medical History:   Diagnosis Date    H/O aorto-femoral bypass     Hyperlipemia     Hypertension     Pre-diabetes        Past Surgical History:   Procedure Laterality Date    back fusion      FEMORAL BYPASS         Review of patient's allergies indicates:   Allergen Reactions    Pcn [penicillins] Other (See Comments)     Other      Ceclor [cefaclor] Rash    Latex, natural rubber Rash       No current facility-administered medications on file prior to encounter.      No current outpatient prescriptions on file prior to encounter.     Family History     Problem Relation (Age of Onset)    COPD  Mother, Father    Diabetes Mother, Father    Heart attack Mother    Heart disease Mother    Stroke Mother        Social History Main Topics    Smoking status: Former Smoker    Smokeless tobacco: Former User     Quit date: 10/22/2016    Alcohol use No    Drug use: No    Sexual activity: Yes     Review of Systems   Constitutional: Negative for activity change, appetite change, chills, fatigue and fever.   HENT: Negative for congestion, rhinorrhea, sore throat and tinnitus.    Eyes: Negative for pain, discharge, redness and itching.   Respiratory: Negative for apnea, cough, choking, chest tightness, wheezing and stridor.    Cardiovascular: Negative for chest pain and leg swelling.   Gastrointestinal: Negative for abdominal distention, abdominal pain, constipation, diarrhea, nausea and vomiting.   Genitourinary: Negative for difficulty urinating, dysuria, flank pain, frequency and hematuria.   Musculoskeletal: Negative for arthralgias, back pain and neck pain.        Leg pain    Neurological: Negative for dizziness, tremors, seizures, facial asymmetry, weakness and numbness.     Objective:     Vital Signs (Most Recent):  Temp: 97.8 °F (36.6 °C) (05/23/17 0354)  Pulse: 71 (05/23/17 0354)  Resp: 20 (05/23/17 0354)  BP: (!) 163/88 (05/23/17 0354)  SpO2: 97 % (05/23/17 0354) Vital Signs (24h Range):  Temp:  [97.8 °F (36.6 °C)-98.3 °F (36.8 °C)] 97.8 °F (36.6 °C)  Pulse:  [71-86] 71  Resp:  [13-20] 20  SpO2:  [97 %-100 %] 97 %  BP: (151-191)/(78-97) 163/88     Weight: 81.1 kg (178 lb 12.8 oz)  Body mass index is 25.66 kg/m².    Physical Exam   Constitutional: No distress.   HENT:   Head: Normocephalic and atraumatic.   Eyes: EOM are normal. Pupils are equal, round, and reactive to light.   Neck: Normal range of motion. Neck supple. No JVD present.   Cardiovascular: Normal rate and regular rhythm.  Exam reveals no gallop and no friction rub.    No murmur heard.  Pulmonary/Chest: Effort normal and breath sounds normal. No  stridor. No respiratory distress. He has no wheezes. He has no rales. He exhibits no tenderness.   Abdominal: Soft. Bowel sounds are normal. He exhibits no distension. There is no tenderness. There is no rebound and no guarding.   Musculoskeletal: Normal range of motion. He exhibits no deformity.   There is absent pulse in the Rt. PD, Rt. PT, pale looking of the Rt.foot   Lymphadenopathy:     He has no cervical adenopathy.   Neurological: He displays normal reflexes. No cranial nerve deficit. He exhibits normal muscle tone. Coordination normal.   Skin: Skin is warm and dry. He is not diaphoretic.   Psychiatric: He has a normal mood and affect. His behavior is normal.        Significant Labs:   BMP:   Recent Labs  Lab 05/22/17 2120         K 3.3*      CO2 27   BUN 15   CREATININE 0.9   CALCIUM 9.5     CBC:   Recent Labs  Lab 05/22/17 2120   WBC 5.56   HGB 11.4*   HCT 34.1*          Significant Imaging:   Imaging Results          US Lower Extremity Arteries Right (Final result)  Result time 05/22/17 21:51:18    Final result by Damaris Velasco MD (05/22/17 21:51:18)                 Impression:      No flow is seen distal to the common femoral artery.  The patient appears to have femoral graft and popliteal graft on the right but no flow is seen distally.      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    Comparison:  No prior relevant studies available    Findings: Standard technique was utilized with Doppler imaging.    The findings suggest the patient has a femoral graft with separate popliteal graft.  Normal flow is not identified within the superficial femoral artery, popliteal artery, anterior tibialis artery, posterior tibialis artery, or dorsalis pedis artery.  Profunda  femoral artery velocity is 46.4 cm/s.  Common femoral artery is 90.4 cm/s.                                Assessment/Plan:     Arterial occlusion, lower extremity    - Management per CVT  - On Heparin drip, pain control with morphine           HTN (hypertension)    - Resume home medications once patient is not NPO  - PRN hydralazine for SBP > 160          COPD (chronic obstructive pulmonary disease)    - PRN Breathing tx   - Repeat CXR since patient has abnormal finding, Hx of smoking         Hypokalemia    - Replace and recheck in am            VTE Risk Mitigation         Ordered     Medium Risk of VTE  Once      05/22/17 2318     Reason for No Pharmacological VTE Prophylaxis  Once      05/22/17 2318        Thank you for your consult. I will follow-up with patient. Please contact us if you have any additional questions.    Hiedi Garcia MD  Department of Hospital Medicine   [facility]      2-4 times/mo

## 2020-10-10 ENCOUNTER — HOSPITAL ENCOUNTER (EMERGENCY)
Facility: HOSPITAL | Age: 60
Discharge: HOME | End: 2020-10-10
Attending: EMERGENCY MEDICINE
Payer: MEDICARE

## 2020-10-10 VITALS
TEMPERATURE: 98 F | HEIGHT: 69 IN | WEIGHT: 218 LBS | SYSTOLIC BLOOD PRESSURE: 124 MMHG | OXYGEN SATURATION: 94 % | DIASTOLIC BLOOD PRESSURE: 88 MMHG | BODY MASS INDEX: 32.29 KG/M2 | RESPIRATION RATE: 18 BRPM | HEART RATE: 65 BPM

## 2020-10-10 DIAGNOSIS — Z72.0 TOBACCO ABUSE DISORDER: ICD-10-CM

## 2020-10-10 DIAGNOSIS — R53.81 DEBILITATED PATIENT: ICD-10-CM

## 2020-10-10 DIAGNOSIS — R07.89 CHEST DISCOMFORT: Primary | ICD-10-CM

## 2020-10-10 DIAGNOSIS — R07.9 CHEST PAIN: ICD-10-CM

## 2020-10-10 LAB
ALBUMIN SERPL BCP-MCNC: 3.5 G/DL (ref 3.5–5.2)
ALP SERPL-CCNC: 128 U/L (ref 55–135)
ALT SERPL W/O P-5'-P-CCNC: 16 U/L (ref 10–44)
ANION GAP SERPL CALC-SCNC: 9 MMOL/L (ref 8–16)
APTT BLDCRRT: 26.4 SEC (ref 21–32)
AST SERPL-CCNC: 16 U/L (ref 10–40)
BASOPHILS # BLD AUTO: 0.04 K/UL (ref 0–0.2)
BASOPHILS NFR BLD: 0.5 % (ref 0–1.9)
BILIRUB SERPL-MCNC: 0.3 MG/DL (ref 0.1–1)
BNP SERPL-MCNC: 23 PG/ML (ref 0–99)
BUN SERPL-MCNC: 15 MG/DL (ref 6–20)
CALCIUM SERPL-MCNC: 8.6 MG/DL (ref 8.7–10.5)
CHLORIDE SERPL-SCNC: 104 MMOL/L (ref 95–110)
CO2 SERPL-SCNC: 24 MMOL/L (ref 23–29)
CREAT SERPL-MCNC: 0.9 MG/DL (ref 0.5–1.4)
DIFFERENTIAL METHOD: ABNORMAL
EOSINOPHIL # BLD AUTO: 0.4 K/UL (ref 0–0.5)
EOSINOPHIL NFR BLD: 4.2 % (ref 0–8)
ERYTHROCYTE [DISTWIDTH] IN BLOOD BY AUTOMATED COUNT: 15.1 % (ref 11.5–14.5)
EST. GFR  (AFRICAN AMERICAN): >60 ML/MIN/1.73 M^2
EST. GFR  (NON AFRICAN AMERICAN): >60 ML/MIN/1.73 M^2
GLUCOSE SERPL-MCNC: 110 MG/DL (ref 70–110)
HCT VFR BLD AUTO: 42.7 % (ref 40–54)
HGB BLD-MCNC: 13.6 G/DL (ref 14–18)
IMM GRANULOCYTES # BLD AUTO: 0.03 K/UL (ref 0–0.04)
IMM GRANULOCYTES NFR BLD AUTO: 0.3 % (ref 0–0.5)
INR PPP: 0.9 (ref 0.8–1.2)
LYMPHOCYTES # BLD AUTO: 1.9 K/UL (ref 1–4.8)
LYMPHOCYTES NFR BLD: 21.3 % (ref 18–48)
MCH RBC QN AUTO: 27.7 PG (ref 27–31)
MCHC RBC AUTO-ENTMCNC: 31.9 G/DL (ref 32–36)
MCV RBC AUTO: 87 FL (ref 82–98)
MONOCYTES # BLD AUTO: 0.7 K/UL (ref 0.3–1)
MONOCYTES NFR BLD: 7.9 % (ref 4–15)
NEUTROPHILS # BLD AUTO: 5.8 K/UL (ref 1.8–7.7)
NEUTROPHILS NFR BLD: 65.8 % (ref 38–73)
NRBC BLD-RTO: 0 /100 WBC
OVALOCYTES BLD QL SMEAR: ABNORMAL
PLATELET # BLD AUTO: 420 K/UL (ref 150–350)
PMV BLD AUTO: 9.4 FL (ref 9.2–12.9)
POIKILOCYTOSIS BLD QL SMEAR: SLIGHT
POTASSIUM SERPL-SCNC: 3.9 MMOL/L (ref 3.5–5.1)
PROT SERPL-MCNC: 7 G/DL (ref 6–8.4)
PROTHROMBIN TIME: 10 SEC (ref 9–12.5)
RBC # BLD AUTO: 4.91 M/UL (ref 4.6–6.2)
SARS-COV-2 RDRP RESP QL NAA+PROBE: NEGATIVE
SODIUM SERPL-SCNC: 137 MMOL/L (ref 136–145)
TROPONIN I SERPL DL<=0.01 NG/ML-MCNC: 0.01 NG/ML (ref 0–0.03)
TROPONIN I SERPL DL<=0.01 NG/ML-MCNC: 0.01 NG/ML (ref 0–0.03)
WBC # BLD AUTO: 8.76 K/UL (ref 3.9–12.7)

## 2020-10-10 PROCEDURE — 85025 COMPLETE CBC W/AUTO DIFF WBC: CPT

## 2020-10-10 PROCEDURE — 93005 ELECTROCARDIOGRAM TRACING: CPT

## 2020-10-10 PROCEDURE — 85610 PROTHROMBIN TIME: CPT

## 2020-10-10 PROCEDURE — 96375 TX/PRO/DX INJ NEW DRUG ADDON: CPT

## 2020-10-10 PROCEDURE — 99285 EMERGENCY DEPT VISIT HI MDM: CPT | Mod: 25

## 2020-10-10 PROCEDURE — 25000003 PHARM REV CODE 250: Performed by: EMERGENCY MEDICINE

## 2020-10-10 PROCEDURE — 80053 COMPREHEN METABOLIC PANEL: CPT

## 2020-10-10 PROCEDURE — 93010 ELECTROCARDIOGRAM REPORT: CPT | Mod: ,,, | Performed by: INTERNAL MEDICINE

## 2020-10-10 PROCEDURE — 84484 ASSAY OF TROPONIN QUANT: CPT | Mod: 91

## 2020-10-10 PROCEDURE — 85730 THROMBOPLASTIN TIME PARTIAL: CPT

## 2020-10-10 PROCEDURE — 83880 ASSAY OF NATRIURETIC PEPTIDE: CPT

## 2020-10-10 PROCEDURE — 96374 THER/PROPH/DIAG INJ IV PUSH: CPT

## 2020-10-10 PROCEDURE — U0002 COVID-19 LAB TEST NON-CDC: HCPCS

## 2020-10-10 PROCEDURE — 63600175 PHARM REV CODE 636 W HCPCS: Performed by: EMERGENCY MEDICINE

## 2020-10-10 PROCEDURE — 93010 EKG 12-LEAD: ICD-10-PCS | Mod: ,,, | Performed by: INTERNAL MEDICINE

## 2020-10-10 RX ORDER — HYDROXYZINE PAMOATE 25 MG/1
25 CAPSULE ORAL
Status: COMPLETED | OUTPATIENT
Start: 2020-10-10 | End: 2020-10-10

## 2020-10-10 RX ORDER — MOMETASONE FUROATE 1 MG/ML
SOLUTION TOPICAL
COMMUNITY
Start: 2020-07-28

## 2020-10-10 RX ORDER — MORPHINE SULFATE 4 MG/ML
4 INJECTION, SOLUTION INTRAMUSCULAR; INTRAVENOUS
Status: COMPLETED | OUTPATIENT
Start: 2020-10-10 | End: 2020-10-10

## 2020-10-10 RX ORDER — IPRATROPIUM BROMIDE AND ALBUTEROL SULFATE 2.5; .5 MG/3ML; MG/3ML
3 SOLUTION RESPIRATORY (INHALATION)
COMMUNITY
Start: 2020-06-15

## 2020-10-10 RX ORDER — MONTELUKAST SODIUM 10 MG/1
10 TABLET ORAL
COMMUNITY
Start: 2020-07-27 | End: 2023-06-08 | Stop reason: ALTCHOICE

## 2020-10-10 RX ORDER — MIRTAZAPINE 7.5 MG/1
7.5 TABLET, FILM COATED ORAL
COMMUNITY
Start: 2020-09-17 | End: 2023-06-08 | Stop reason: HOSPADM

## 2020-10-10 RX ORDER — ONDANSETRON 2 MG/ML
4 INJECTION INTRAMUSCULAR; INTRAVENOUS
Status: COMPLETED | OUTPATIENT
Start: 2020-10-10 | End: 2020-10-10

## 2020-10-10 RX ORDER — SERTRALINE HYDROCHLORIDE 50 MG/1
50 TABLET, FILM COATED ORAL NIGHTLY
COMMUNITY
Start: 2020-07-14

## 2020-10-10 RX ORDER — KETOCONAZOLE 20 MG/ML
SHAMPOO, SUSPENSION TOPICAL
COMMUNITY
Start: 2020-08-03

## 2020-10-10 RX ORDER — PREGABALIN 300 MG/1
300 CAPSULE ORAL 2 TIMES DAILY
Status: ON HOLD | COMMUNITY
Start: 2020-08-21 | End: 2023-06-15 | Stop reason: SDUPTHER

## 2020-10-10 RX ORDER — AZELASTINE 1 MG/ML
2 SPRAY, METERED NASAL
COMMUNITY
Start: 2020-07-27 | End: 2023-06-08

## 2020-10-10 RX ADMIN — NITROGLYCERIN 1 INCH: 20 OINTMENT TOPICAL at 07:10

## 2020-10-10 RX ADMIN — ONDANSETRON 4 MG: 2 INJECTION INTRAMUSCULAR; INTRAVENOUS at 07:10

## 2020-10-10 RX ADMIN — HYDROXYZINE PAMOATE 25 MG: 25 CAPSULE ORAL at 07:10

## 2020-10-10 RX ADMIN — MORPHINE SULFATE 4 MG: 4 INJECTION INTRAVENOUS at 07:10

## 2020-10-10 NOTE — ED PROVIDER NOTES
"SCRIBE #1 NOTE: I, Jax Horowitz, am scribing for, and in the presence of, Rosemary Villasenor MD. I have scribed the entire note.      History      Chief Complaint   Patient presents with    Chest Pain     x 1 hour       Review of patient's allergies indicates:   Allergen Reactions    Pcn [penicillins] Other (See Comments)     Other      Bactrim [sulfamethoxazole-trimethoprim] Rash     Patient requests medication be listed as an "Intolerance" rather than an allergy, stating the rash is not "so bad."    Ceclor [cefaclor] Rash    Latex, natural rubber Rash        HPI   HPI    10/10/2020, 7:01 AM   History obtained from the patient   Pt is a poor historian.       History of Present Illness: Ish Guardado is a 60 y.o. male patient with a h/o of neuropathy, drug abuse, HLD and HTN who presents to the Emergency Department for Right sided CP which onset gradually last night. Pt states his sxs onset when he was going to bed. Pt described the pain as sharp. Per [facility], the pt received: ASA 81 Mg (4 baby aspirin) and 2 NTG Spray en route. Symptoms are constant and moderate in severity. No mitigating or exacerbating factors reported. Associated sxs include difficulty breathing. Patient denies SOB, diaphoresis, palpitations, extremity weakness/numbness, leg pain/swelling, dizziness, cough, N/V, and all other sxs at this time. Prior Tx included with mild relief. Pt states he is unsure who is Cardiologist is; pt's last visit was on 9/17 where an ECHO was performed with [facility]. Pt states he is a smoker. No further complaints or concerns at this time.         Arrival mode: [facility]    PCP: Pollo Arana MD       Past Medical History:  Past Medical History:   Diagnosis Date    Drug abuse, amphetamine type     wife states he has been using smoking crystal meth x 1 year    Femoral-popliteal bypass graft occlusion     Left and right    GERD (gastroesophageal reflux disease)     History of substance abuse     History of " transient ischemic attack (TIA)     Hyperlipemia     Hypertension     Neuropathy     Occlusion of right femorotibial bypass graft 5/2017, 6/2017    Pre-diabetes     Status post femorotibial bypass        Past Surgical History:  Past Surgical History:   Procedure Laterality Date    back fusion      FEMORAL BYPASS      left fem-pop bypass 11/2014; right fem-pop bypass 9/2016, right fem-distal bypass 3/2017    LEG AMPUTATION THROUGH KNEE           Family History:  Family History   Problem Relation Age of Onset    COPD Mother     Diabetes Mother     Heart disease Mother     Stroke Mother     Heart attack Mother     COPD Father     Diabetes Father        Social History:  Social History     Tobacco Use    Smoking status: Current Every Day Smoker     Packs/day: 1.50     Years: 41.00     Pack years: 61.50    Smokeless tobacco: Former User     Quit date: 10/22/2016   Substance and Sexual Activity    Alcohol use: No    Drug use: Yes     Types: Methamphetamines     Comment: opiates and methamphetamines: smokes crystal meth Daily    Sexual activity: Yes       ROS   Review of Systems   Constitutional: Negative for diaphoresis and fever.   HENT: Negative for sore throat.    Respiratory: Negative for cough and shortness of breath.         (+) difficulty breathing   Cardiovascular: Positive for chest pain (Right sided). Negative for palpitations and leg swelling.   Gastrointestinal: Negative for diarrhea, nausea and vomiting.   Genitourinary: Negative for dysuria.   Musculoskeletal: Negative for back pain.        (-) leg pain   Skin: Negative for rash.   Neurological: Negative for dizziness, weakness and numbness.   Hematological: Does not bruise/bleed easily.   All other systems reviewed and are negative.    Physical Exam      Initial Vitals [10/10/20 0655]   BP Pulse Resp Temp SpO2   (!) 169/89 110 18 98.4 °F (36.9 °C) 97 %      MAP       --          Physical Exam  Nursing Notes and Vital Signs  "Reviewed.  Constitutional: Patient is in no acute distress. Elderly. Disheveled. Unkept.   Head: Atraumatic. Normocephalic.  Eyes: PERRL. EOM intact. Conjunctivae are not pale. No scleral icterus.  ENT: Mucous membranes are moist. Oropharynx is clear and symmetric.    Neck: Supple. Full ROM. No lymphadenopathy.  Cardiovascular: Regular rate. Regular rhythm. No murmurs, rubs, or gallops. Distal pulses are 2+ and symmetric.  Pulmonary/Chest: No respiratory distress. Clear to auscultation bilaterally. No wheezing or rales.  Abdominal: Soft and non-distended.  There is no tenderness.  No rebound, guarding, or rigidity. Good bowel sounds.  Genitourinary: No CVA tenderness  Musculoskeletal: Moves all extremities. No edema. No calf tenderness. Right AKA.  Skin: Warm and diaphoretic.  Neurological:  Alert, awake, and appropriate.  Normal speech.  No acute focal neurological deficits are appreciated.  Psychiatric: Normal affect. Good eye contact. Appropriate in content.    ED Course    Procedures  ED Vital Signs:  Vitals:    10/10/20 0655 10/10/20 0702 10/10/20 0707 10/10/20 0718   BP: (!) 169/89      Pulse: 110  77    Resp: 18   20   Temp: 98.4 °F (36.9 °C)      TempSrc: Oral      SpO2: 97%      Weight:       Height:  5' 9" (1.753 m)      10/10/20 0721 10/10/20 0901 10/10/20 1001 10/10/20 1055   BP: (!) 166/94 136/79 135/72 124/88   Pulse: 75 69 65 65   Resp: 20 20 20 18   Temp:       TempSrc:       SpO2: 96% 95% (!) 93% (!) 94%   Weight: 98.9 kg (218 lb)      Height:           Abnormal Lab Results:  Labs Reviewed   CBC W/ AUTO DIFFERENTIAL - Abnormal; Notable for the following components:       Result Value    Hemoglobin 13.6 (*)     Mean Corpuscular Hemoglobin Conc 31.9 (*)     RDW 15.1 (*)     Platelets 420 (*)     All other components within normal limits   COMPREHENSIVE METABOLIC PANEL - Abnormal; Notable for the following components:    Calcium 8.6 (*)     All other components within normal limits   B-TYPE NATRIURETIC " PEPTIDE   SARS-COV-2 RNA AMPLIFICATION, QUAL    Narrative:     Possible admit   TROPONIN I   TROPONIN I   APTT   PROTIME-INR        All Lab Results:  Results for orders placed or performed during the hospital encounter of 10/10/20   CBC auto differential   Result Value Ref Range    WBC 8.76 3.90 - 12.70 K/uL    RBC 4.91 4.60 - 6.20 M/uL    Hemoglobin 13.6 (L) 14.0 - 18.0 g/dL    Hematocrit 42.7 40.0 - 54.0 %    Mean Corpuscular Volume 87 82 - 98 fL    Mean Corpuscular Hemoglobin 27.7 27.0 - 31.0 pg    Mean Corpuscular Hemoglobin Conc 31.9 (L) 32.0 - 36.0 g/dL    RDW 15.1 (H) 11.5 - 14.5 %    Platelets 420 (H) 150 - 350 K/uL    MPV 9.4 9.2 - 12.9 fL    Immature Granulocytes 0.3 0.0 - 0.5 %    Gran # (ANC) 5.8 1.8 - 7.7 K/uL    Immature Grans (Abs) 0.03 0.00 - 0.04 K/uL    Lymph # 1.9 1.0 - 4.8 K/uL    Mono # 0.7 0.3 - 1.0 K/uL    Eos # 0.4 0.0 - 0.5 K/uL    Baso # 0.04 0.00 - 0.20 K/uL    nRBC 0 0 /100 WBC    Gran% 65.8 38.0 - 73.0 %    Lymph% 21.3 18.0 - 48.0 %    Mono% 7.9 4.0 - 15.0 %    Eosinophil% 4.2 0.0 - 8.0 %    Basophil% 0.5 0.0 - 1.9 %    Poik Slight     Ovalocytes Occasional     Differential Method Automated    BNP   Result Value Ref Range    BNP 23 0 - 99 pg/mL   COVID-19 Rapid Screening   Result Value Ref Range    SARS-CoV-2 RNA, Amplification, Qual Negative Negative   Comprehensive metabolic panel   Result Value Ref Range    Sodium 137 136 - 145 mmol/L    Potassium 3.9 3.5 - 5.1 mmol/L    Chloride 104 95 - 110 mmol/L    CO2 24 23 - 29 mmol/L    Glucose 110 70 - 110 mg/dL    BUN, Bld 15 6 - 20 mg/dL    Creatinine 0.9 0.5 - 1.4 mg/dL    Calcium 8.6 (L) 8.7 - 10.5 mg/dL    Total Protein 7.0 6.0 - 8.4 g/dL    Albumin 3.5 3.5 - 5.2 g/dL    Total Bilirubin 0.3 0.1 - 1.0 mg/dL    Alkaline Phosphatase 128 55 - 135 U/L    AST 16 10 - 40 U/L    ALT 16 10 - 44 U/L    Anion Gap 9 8 - 16 mmol/L    eGFR if African American >60 >60 mL/min/1.73 m^2    eGFR if non African American >60 >60 mL/min/1.73 m^2   Troponin I    Result Value Ref Range    Troponin I 0.008 0.000 - 0.026 ng/mL   Troponin I   Result Value Ref Range    Troponin I 0.009 0.000 - 0.026 ng/mL   APTT   Result Value Ref Range    aPTT 26.4 21.0 - 32.0 sec   Protime-INR   Result Value Ref Range    Prothrombin Time 10.0 9.0 - 12.5 sec    INR 0.9 0.8 - 1.2         Imaging Results:  Imaging Results          X-Ray Chest AP Portable (Final result)  Result time 10/10/20 07:32:28    Final result by Dank Cruz MD (10/10/20 07:32:28)                 Impression:      1.  Mild vascular crowding or atelectasis in the lung bases.  Lungs are otherwise clear.  Early infectious process, especially in the right lung base, difficult to exclude in the right clinical setting.    2.  Stable findings as noted above.      Electronically signed by: Dank Cruz MD  Date:    10/10/2020  Time:    07:32             Narrative:    EXAMINATION:  XR CHEST AP PORTABLE    CLINICAL HISTORY:  Chest Pain;    COMPARISON:  Studies dating back to May 23, 2017    FINDINGS:  EKG leads overlie the chest which is lordotic in position and rotated to the left.  Mild vascular crowding or atelectasis in the lung bases.  The lungs are otherwise clear.  The cardiac silhouette size is normal. The trachea is midline and the mediastinal width is normal. Negative for focal infiltrate, effusion or pneumothorax. Pulmonary vasculature is normal. Negative for osseous abnormalities. Cardiophrenic fat pads.  Tortuous aorta.  Degenerative changes of the spine with convex-left curvature.                               The EKG was ordered, reviewed, and independently interpreted by the ED provider.  Interpretation time: 0709  Rate: 74 BPM  Rhythm: normal sinus rhythm  Interpretation: RBBB. Inferior infarct, age undetermined. Abnormal ECG. No STEMI.           The Emergency Provider reviewed the vital signs and test results, which are outlined above.    ED Discussion     10:49 AM: Reassessed pt at this time.  Pt states his  condition has improved at this time. Discussed with pt all pertinent ED information and results. Discussed pt dx and plan of tx. Gave pt all f/u and return to the ED instructions. All questions and concerns were addressed at this time. Pt expresses understanding of information and instructions, and is comfortable with plan to discharge. Pt is stable for discharge.    I discussed with patient and/or family/caretaker that evaluation in the ED does not suggest any emergent or life threatening medical conditions requiring immediate intervention beyond what was provided in the ED, and I believe patient is safe for discharge.  Regardless, an unremarkable evaluation in the ED does not preclude the development or presence of a serious of life threatening condition. As such, patient was instructed to return immediately for any worsening or change in current symptoms.    I have discussed with patient and/or family/caretaker chest pain precautions, specifically to return for worsening chest pain, shortness of breath, fever, or any concern.  I have low suspicion for cardiopulmonary, vascular, infectious, respiratory, or other emergent medical condition based on my evaluation in the ED.       ED Medication(s):  Medications   morphine injection 4 mg (4 mg Intravenous Given 10/10/20 0718)   ondansetron injection 4 mg (4 mg Intravenous Given 10/10/20 0718)   nitroGLYCERIN 2% TD oint ointment 1 inch (1 inch Topical (Top) Given 10/10/20 0718)   hydrOXYzine pamoate capsule 25 mg (25 mg Oral Given 10/10/20 0718)     Discharge Medication List as of 10/10/2020 10:50 AM           Medication List      ASK your doctor about these medications    albuterol-ipratropium 2.5 mg-0.5 mg/3 mL nebulizer solution  Commonly known as: DUO-NEB     amLODIPine 10 MG tablet  Commonly known as: NORVASC     aspirin 81 MG EC tablet  Commonly known as: ECOTRIN     atorvastatin 20 MG tablet  Commonly known as: LIPITOR     azelastine 137 mcg (0.1 %) nasal  spray  Commonly known as: ASTELIN     clopidogreL 75 mg tablet  Commonly known as: PLAVIX     ketoconazole 2 % shampoo  Commonly known as: NIZORAL     mirtazapine 7.5 MG Tab  Commonly known as: REMERON     mometasone 0.1 % solution  Commonly known as: ELOCON     montelukast 10 mg tablet  Commonly known as: SINGULAIR     pantoprazole 20 MG tablet  Commonly known as: PROTONIX     pregabalin 300 MG Cap  Commonly known as: LYRICA     sertraline 25 MG tablet  Commonly known as: ZOLOFT     triamcinolone acetonide 0.025% 0.025 % cream  Commonly known as: KENALOG            Follow-up Information     oPllo Arana MD. Schedule an appointment as soon as possible for a visit in 2 days.    Specialty: Family Medicine  Why: Return to the Emergency Room, If symptoms worsen  Contact information:  [address] [address]  [phone]                     Medical Decision Making    Medical Decision Making:   Clinical Tests:   Lab Tests: Ordered and Reviewed  Radiological Study: Ordered and Reviewed  Medical Tests: Ordered and Reviewed     Additional MDM:   Smoking Cessation: The patient is a smoker. The patient was counseled on smoking cessation for: 5 minutes. The patient was counseled on tobacco related  health complications.        Scribe Attestation:   Scribe #1: I performed the above scribed service and the documentation accurately describes the services I performed. I attest to the accuracy of the note.    Attending:   Physician Attestation Statement for Scribe #1: I, Rosemary Villasenor MD, personally performed the services described in this documentation, as scribed by , in my presence, and it is both accurate and complete.          Clinical Impression       ICD-10-CM ICD-9-CM   1. Chest discomfort  R07.89 786.59   2. Chest pain  R07.9 786.50   3. Tobacco abuse disorder  Z72.0 305.1   4. Debilitated patient  R53.81 799.3       Disposition:   Disposition: Discharged  Condition: Stable          Rosemary Villasenor MD  10/10/20 5788

## 2021-02-08 NOTE — ASSESSMENT & PLAN NOTE
- Continue ASA and Lipitor     From: Mayi Gonzales  To: Nancy Singleton  Sent: 2/6/2021 10:41 PM CST  Subject: Other    My hair has always been fine. Past few years a little thin 9n top. I noticed a few weeks ago issues with the top. My  pointed out the left side. The right is ok. What's happening? Someone told me anesthesia can do this. Temporary? Thanks

## 2021-03-28 NOTE — TELEPHONE ENCOUNTER
Contacted pt in reference to appointment request. Pt scheduled 7/12/17 at 2:40p with Dr. Barfield at the [facility] for foot ulcer. Encouraged to seek emergency medical attention if condition worsens. Verbalized understanding.   01-Apr-2021

## 2021-08-12 NOTE — PROGRESS NOTES
Educated patient and wife about the importance of deep breathing and use of IS at bedside. No SOB or wheezing noted at ths time. Pt is tolerating room air well.    per MD BUCKLEY, OK'D TO EAT. GIVEN TURKEY SANDWICH & CHEESE STICK. SITTING UP IN 
GURNEY EATING WITH NO DIFF.

## 2021-09-04 ENCOUNTER — HOSPITAL ENCOUNTER (EMERGENCY)
Facility: HOSPITAL | Age: 61
Discharge: HOME | End: 2021-09-04
Attending: FAMILY MEDICINE
Payer: MEDICARE

## 2021-09-04 VITALS
OXYGEN SATURATION: 97 % | DIASTOLIC BLOOD PRESSURE: 76 MMHG | RESPIRATION RATE: 21 BRPM | HEART RATE: 62 BPM | SYSTOLIC BLOOD PRESSURE: 150 MMHG | TEMPERATURE: 98 F

## 2021-09-04 DIAGNOSIS — M79.605 ACUTE PAIN OF LEFT LOWER EXTREMITY: ICD-10-CM

## 2021-09-04 DIAGNOSIS — R07.9 CHEST PAIN: ICD-10-CM

## 2021-09-04 DIAGNOSIS — R06.02 SOB (SHORTNESS OF BREATH): ICD-10-CM

## 2021-09-04 LAB
ALBUMIN SERPL BCP-MCNC: 3.6 G/DL (ref 3.5–5.2)
ALP SERPL-CCNC: 100 U/L (ref 55–135)
ALT SERPL W/O P-5'-P-CCNC: 16 U/L (ref 10–44)
ANION GAP SERPL CALC-SCNC: 13 MMOL/L (ref 8–16)
AST SERPL-CCNC: 22 U/L (ref 10–40)
BASOPHILS # BLD AUTO: 0.06 K/UL (ref 0–0.2)
BASOPHILS NFR BLD: 0.7 % (ref 0–1.9)
BILIRUB SERPL-MCNC: 0.4 MG/DL (ref 0.1–1)
BILIRUB UR QL STRIP: NEGATIVE
BNP SERPL-MCNC: 117 PG/ML (ref 0–99)
BUN SERPL-MCNC: 15 MG/DL (ref 8–23)
CALCIUM SERPL-MCNC: 9.8 MG/DL (ref 8.7–10.5)
CHLORIDE SERPL-SCNC: 103 MMOL/L (ref 95–110)
CK SERPL-CCNC: 96 U/L (ref 20–200)
CLARITY UR: CLEAR
CO2 SERPL-SCNC: 27 MMOL/L (ref 23–29)
COLOR UR: YELLOW
CREAT SERPL-MCNC: 0.9 MG/DL (ref 0.5–1.4)
DIFFERENTIAL METHOD: ABNORMAL
EOSINOPHIL # BLD AUTO: 0.3 K/UL (ref 0–0.5)
EOSINOPHIL NFR BLD: 3.2 % (ref 0–8)
ERYTHROCYTE [DISTWIDTH] IN BLOOD BY AUTOMATED COUNT: 19.1 % (ref 11.5–14.5)
EST. GFR  (AFRICAN AMERICAN): >60 ML/MIN/1.73 M^2
EST. GFR  (NON AFRICAN AMERICAN): >60 ML/MIN/1.73 M^2
GLUCOSE SERPL-MCNC: 65 MG/DL (ref 70–110)
GLUCOSE UR QL STRIP: NEGATIVE
HCT VFR BLD AUTO: 39.7 % (ref 40–54)
HGB BLD-MCNC: 11.7 G/DL (ref 14–18)
HGB UR QL STRIP: NEGATIVE
IMM GRANULOCYTES # BLD AUTO: 0.03 K/UL (ref 0–0.04)
IMM GRANULOCYTES NFR BLD AUTO: 0.4 % (ref 0–0.5)
KETONES UR QL STRIP: ABNORMAL
LEUKOCYTE ESTERASE UR QL STRIP: NEGATIVE
LYMPHOCYTES # BLD AUTO: 1.5 K/UL (ref 1–4.8)
LYMPHOCYTES NFR BLD: 18.4 % (ref 18–48)
MCH RBC QN AUTO: 23.4 PG (ref 27–31)
MCHC RBC AUTO-ENTMCNC: 29.5 G/DL (ref 32–36)
MCV RBC AUTO: 79 FL (ref 82–98)
MONOCYTES # BLD AUTO: 0.8 K/UL (ref 0.3–1)
MONOCYTES NFR BLD: 10 % (ref 4–15)
NEUTROPHILS # BLD AUTO: 5.4 K/UL (ref 1.8–7.7)
NEUTROPHILS NFR BLD: 67.3 % (ref 38–73)
NITRITE UR QL STRIP: NEGATIVE
NRBC BLD-RTO: 0 /100 WBC
PH UR STRIP: 7 [PH] (ref 5–8)
PLATELET # BLD AUTO: 331 K/UL (ref 150–450)
PMV BLD AUTO: 9.4 FL (ref 9.2–12.9)
POCT GLUCOSE: 111 MG/DL (ref 70–110)
POTASSIUM SERPL-SCNC: 4.6 MMOL/L (ref 3.5–5.1)
PROT SERPL-MCNC: 7.3 G/DL (ref 6–8.4)
PROT UR QL STRIP: NEGATIVE
RBC # BLD AUTO: 5.01 M/UL (ref 4.6–6.2)
SODIUM SERPL-SCNC: 143 MMOL/L (ref 136–145)
SP GR UR STRIP: 1.02 (ref 1–1.03)
TROPONIN I SERPL DL<=0.01 NG/ML-MCNC: 0.01 NG/ML (ref 0–0.03)
URN SPEC COLLECT METH UR: ABNORMAL
UROBILINOGEN UR STRIP-ACNC: NEGATIVE EU/DL
WBC # BLD AUTO: 8.04 K/UL (ref 3.9–12.7)

## 2021-09-04 PROCEDURE — 80053 COMPREHEN METABOLIC PANEL: CPT | Performed by: NURSE PRACTITIONER

## 2021-09-04 PROCEDURE — 82962 GLUCOSE BLOOD TEST: CPT

## 2021-09-04 PROCEDURE — 99285 EMERGENCY DEPT VISIT HI MDM: CPT | Mod: 25

## 2021-09-04 PROCEDURE — 83880 ASSAY OF NATRIURETIC PEPTIDE: CPT | Performed by: NURSE PRACTITIONER

## 2021-09-04 PROCEDURE — 93010 ELECTROCARDIOGRAM REPORT: CPT | Mod: ,,, | Performed by: INTERNAL MEDICINE

## 2021-09-04 PROCEDURE — 93005 ELECTROCARDIOGRAM TRACING: CPT

## 2021-09-04 PROCEDURE — 81003 URINALYSIS AUTO W/O SCOPE: CPT | Performed by: NURSE PRACTITIONER

## 2021-09-04 PROCEDURE — 84484 ASSAY OF TROPONIN QUANT: CPT | Performed by: NURSE PRACTITIONER

## 2021-09-04 PROCEDURE — 25000003 PHARM REV CODE 250: Performed by: FAMILY MEDICINE

## 2021-09-04 PROCEDURE — 93010 ELECTROCARDIOGRAM REPORT: CPT | Mod: 76,,, | Performed by: INTERNAL MEDICINE

## 2021-09-04 PROCEDURE — 93010 EKG 12-LEAD: ICD-10-PCS | Mod: ,,, | Performed by: INTERNAL MEDICINE

## 2021-09-04 PROCEDURE — 85025 COMPLETE CBC W/AUTO DIFF WBC: CPT | Performed by: NURSE PRACTITIONER

## 2021-09-04 PROCEDURE — 82550 ASSAY OF CK (CPK): CPT | Performed by: NURSE PRACTITIONER

## 2021-09-04 RX ORDER — HYDROCODONE BITARTRATE AND ACETAMINOPHEN 10; 325 MG/1; MG/1
1 TABLET ORAL
Status: COMPLETED | OUTPATIENT
Start: 2021-09-04 | End: 2021-09-04

## 2021-09-04 RX ADMIN — HYDROCODONE BITARTRATE AND ACETAMINOPHEN 1 TABLET: 10; 325 TABLET ORAL at 04:09

## 2022-10-15 ENCOUNTER — HOSPITAL ENCOUNTER (EMERGENCY)
Facility: HOSPITAL | Age: 62
Discharge: HOME | End: 2022-10-15
Attending: EMERGENCY MEDICINE
Payer: MEDICARE

## 2022-10-15 VITALS
HEART RATE: 75 BPM | TEMPERATURE: 99 F | HEIGHT: 70 IN | SYSTOLIC BLOOD PRESSURE: 136 MMHG | RESPIRATION RATE: 21 BRPM | DIASTOLIC BLOOD PRESSURE: 64 MMHG | BODY MASS INDEX: 31.28 KG/M2 | OXYGEN SATURATION: 92 %

## 2022-10-15 DIAGNOSIS — R06.02 SHORTNESS OF BREATH: ICD-10-CM

## 2022-10-15 DIAGNOSIS — R60.0 PEDAL EDEMA: ICD-10-CM

## 2022-10-15 DIAGNOSIS — M54.50 ACUTE BILATERAL LOW BACK PAIN WITHOUT SCIATICA: Primary | ICD-10-CM

## 2022-10-15 LAB
ALBUMIN SERPL BCP-MCNC: 3.3 G/DL (ref 3.5–5.2)
ALP SERPL-CCNC: 98 U/L (ref 55–135)
ALT SERPL W/O P-5'-P-CCNC: 16 U/L (ref 10–44)
ANION GAP SERPL CALC-SCNC: 8 MMOL/L (ref 8–16)
AST SERPL-CCNC: 15 U/L (ref 10–40)
BASOPHILS # BLD AUTO: 0.05 K/UL (ref 0–0.2)
BASOPHILS NFR BLD: 0.5 % (ref 0–1.9)
BILIRUB SERPL-MCNC: 0.7 MG/DL (ref 0.1–1)
BNP SERPL-MCNC: 94 PG/ML (ref 0–99)
BUN SERPL-MCNC: 11 MG/DL (ref 8–23)
CALCIUM SERPL-MCNC: 9.2 MG/DL (ref 8.7–10.5)
CHLORIDE SERPL-SCNC: 100 MMOL/L (ref 95–110)
CO2 SERPL-SCNC: 31 MMOL/L (ref 23–29)
CREAT SERPL-MCNC: 0.7 MG/DL (ref 0.5–1.4)
DIFFERENTIAL METHOD: ABNORMAL
EOSINOPHIL # BLD AUTO: 0.5 K/UL (ref 0–0.5)
EOSINOPHIL NFR BLD: 5.2 % (ref 0–8)
ERYTHROCYTE [DISTWIDTH] IN BLOOD BY AUTOMATED COUNT: 21.3 % (ref 11.5–14.5)
EST. GFR  (NO RACE VARIABLE): >60 ML/MIN/1.73 M^2
GLUCOSE SERPL-MCNC: 127 MG/DL (ref 70–110)
HCT VFR BLD AUTO: 31.1 % (ref 40–54)
HGB BLD-MCNC: 8.8 G/DL (ref 14–18)
IMM GRANULOCYTES # BLD AUTO: 0.04 K/UL (ref 0–0.04)
IMM GRANULOCYTES NFR BLD AUTO: 0.4 % (ref 0–0.5)
LYMPHOCYTES # BLD AUTO: 1.1 K/UL (ref 1–4.8)
LYMPHOCYTES NFR BLD: 12 % (ref 18–48)
MCH RBC QN AUTO: 21.3 PG (ref 27–31)
MCHC RBC AUTO-ENTMCNC: 28.3 G/DL (ref 32–36)
MCV RBC AUTO: 75 FL (ref 82–98)
MONOCYTES # BLD AUTO: 0.6 K/UL (ref 0.3–1)
MONOCYTES NFR BLD: 6.5 % (ref 4–15)
NEUTROPHILS # BLD AUTO: 6.9 K/UL (ref 1.8–7.7)
NEUTROPHILS NFR BLD: 75.4 % (ref 38–73)
NRBC BLD-RTO: 0 /100 WBC
PLATELET # BLD AUTO: 284 K/UL (ref 150–450)
PMV BLD AUTO: 9.6 FL (ref 9.2–12.9)
POTASSIUM SERPL-SCNC: 3.7 MMOL/L (ref 3.5–5.1)
PROT SERPL-MCNC: 6.2 G/DL (ref 6–8.4)
RBC # BLD AUTO: 4.14 M/UL (ref 4.6–6.2)
SODIUM SERPL-SCNC: 139 MMOL/L (ref 136–145)
TROPONIN I SERPL DL<=0.01 NG/ML-MCNC: 0.01 NG/ML (ref 0–0.03)
WBC # BLD AUTO: 9.17 K/UL (ref 3.9–12.7)

## 2022-10-15 PROCEDURE — 96374 THER/PROPH/DIAG INJ IV PUSH: CPT

## 2022-10-15 PROCEDURE — 83880 ASSAY OF NATRIURETIC PEPTIDE: CPT | Performed by: EMERGENCY MEDICINE

## 2022-10-15 PROCEDURE — 93010 ELECTROCARDIOGRAM REPORT: CPT | Mod: ,,, | Performed by: INTERNAL MEDICINE

## 2022-10-15 PROCEDURE — 84484 ASSAY OF TROPONIN QUANT: CPT | Performed by: EMERGENCY MEDICINE

## 2022-10-15 PROCEDURE — 80053 COMPREHEN METABOLIC PANEL: CPT | Performed by: EMERGENCY MEDICINE

## 2022-10-15 PROCEDURE — 96375 TX/PRO/DX INJ NEW DRUG ADDON: CPT

## 2022-10-15 PROCEDURE — 63600175 PHARM REV CODE 636 W HCPCS: Performed by: EMERGENCY MEDICINE

## 2022-10-15 PROCEDURE — 99291 CRITICAL CARE FIRST HOUR: CPT | Mod: 25

## 2022-10-15 PROCEDURE — 93005 ELECTROCARDIOGRAM TRACING: CPT

## 2022-10-15 PROCEDURE — 51702 INSERT TEMP BLADDER CATH: CPT

## 2022-10-15 PROCEDURE — 85025 COMPLETE CBC W/AUTO DIFF WBC: CPT | Performed by: EMERGENCY MEDICINE

## 2022-10-15 PROCEDURE — 93010 EKG 12-LEAD: ICD-10-PCS | Mod: ,,, | Performed by: INTERNAL MEDICINE

## 2022-10-15 RX ORDER — FUROSEMIDE 10 MG/ML
80 INJECTION INTRAMUSCULAR; INTRAVENOUS
Status: COMPLETED | OUTPATIENT
Start: 2022-10-15 | End: 2022-10-15

## 2022-10-15 RX ORDER — ONDANSETRON 2 MG/ML
4 INJECTION INTRAMUSCULAR; INTRAVENOUS
Status: COMPLETED | OUTPATIENT
Start: 2022-10-15 | End: 2022-10-15

## 2022-10-15 RX ORDER — OXYCODONE AND ACETAMINOPHEN 10; 325 MG/1; MG/1
1 TABLET ORAL EVERY 8 HOURS PRN
Qty: 12 TABLET | Refills: 0 | Status: SHIPPED | OUTPATIENT
Start: 2022-10-15 | End: 2023-06-08

## 2022-10-15 RX ORDER — HYDROMORPHONE HYDROCHLORIDE 1 MG/ML
1 INJECTION, SOLUTION INTRAMUSCULAR; INTRAVENOUS; SUBCUTANEOUS
Status: COMPLETED | OUTPATIENT
Start: 2022-10-15 | End: 2022-10-15

## 2022-10-15 RX ADMIN — ONDANSETRON 4 MG: 2 INJECTION INTRAMUSCULAR; INTRAVENOUS at 11:10

## 2022-10-15 RX ADMIN — HYDROMORPHONE HYDROCHLORIDE 1 MG: 1 INJECTION, SOLUTION INTRAMUSCULAR; INTRAVENOUS; SUBCUTANEOUS at 11:10

## 2022-10-15 RX ADMIN — FUROSEMIDE 80 MG: 10 INJECTION, SOLUTION INTRAMUSCULAR; INTRAVENOUS at 11:10

## 2022-10-15 NOTE — ED PROVIDER NOTES
"SCRIBE #1 NOTE: I, Yousef Ivone, am scribing for, and in the presence of, Ricky Christopher Jr., MD. I have scribed the entire note.       History     Chief Complaint   Patient presents with    Back Pain     Low back pain started 2 days ago, and left leg pain. Right AKA, states left leg is "getting bad".     Review of patient's allergies indicates:   Allergen Reactions    Pcn [penicillins] Other (See Comments)     Other      Bactrim [sulfamethoxazole-trimethoprim] Rash     Patient requests medication be listed as an "Intolerance" rather than an allergy, stating the rash is not "so bad."    Ceclor [cefaclor] Rash    Latex, natural rubber Rash         History of Present Illness     HPI    10/15/2022, 10:39 AM  History obtained from the patient and wife      History of Present Illness: Ish Guardado is a 62 y.o. male patient with a PMHx of HTN, HLD, and GERD who presents to the Emergency Department for evaluation of back pain which onset gradually 2 weeks pta. Pt is reported to have chronic back pain but wife reports he had a fall 9/29/22. Pt is reports to have fell out of bed and has been having severe back pain since. Pt complains of holding fluid and states he has urinary retention. Symptoms are constant and moderate in severity. No mitigating or exacerbating factors reported. No additional associated sxs reported. Patient denies any fever, chills, numbness, SOB, abd pain, and all other sxs at this time. Prior Tx includes lasix. No further complaints or concerns at this time.  Patient is currently on home oxygen of 2.5 L.      Arrival mode: Personal vehicle      PCP: Pollo Arana MD        Past Medical History:  Past Medical History:   Diagnosis Date    Drug abuse, amphetamine type     wife states he has been using smoking crystal meth x 1 year    Femoral-popliteal bypass graft occlusion     Left and right    GERD (gastroesophageal reflux disease)     History of substance abuse     History of transient ischemic " attack (TIA)     Hyperlipemia     Hypertension     Neuropathy     Occlusion of right femorotibial bypass graft 5/2017, 6/2017    Pre-diabetes     Status post femorotibial bypass        Past Surgical History:  Past Surgical History:   Procedure Laterality Date    back fusion      FEMORAL BYPASS      left fem-pop bypass 11/2014; right fem-pop bypass 9/2016, right fem-distal bypass 3/2017    LEG AMPUTATION THROUGH KNEE           Family History:  Family History   Problem Relation Age of Onset    COPD Mother     Diabetes Mother     Heart disease Mother     Stroke Mother     Heart attack Mother     COPD Father     Diabetes Father        Social History:  Social History     Tobacco Use    Smoking status: Every Day     Packs/day: 1.50     Years: 41.00     Pack years: 61.50     Types: Cigarettes    Smokeless tobacco: Former     Quit date: 10/22/2016   Substance and Sexual Activity    Alcohol use: No    Drug use: Yes     Types: Methamphetamines     Comment: opiates and methamphetamines: smokes crystal meth Daily    Sexual activity: Yes        Review of Systems     Review of Systems   Constitutional:  Negative for chills and fever.        (+) holding fluid   HENT:  Negative for sore throat.    Respiratory:  Negative for shortness of breath.    Cardiovascular:  Negative for chest pain.   Gastrointestinal:  Negative for abdominal pain and nausea.   Genitourinary:  Negative for dysuria.        (+) urinary retention   Musculoskeletal:  Positive for back pain.   Skin:  Negative for rash.   Neurological:  Negative for weakness and numbness.   Hematological:  Does not bruise/bleed easily.   All other systems reviewed and are negative.     Physical Exam     Initial Vitals [10/15/22 1027]   BP Pulse Resp Temp SpO2   (!) 158/86 82 20 98.5 °F (36.9 °C) (!) 93 %      MAP       --          Physical Exam  Nursing Notes and Vital Signs Reviewed.  Constitutional: Patient is in no acute distress. Well-developed and well-nourished.  Head:  Atraumatic. Normocephalic.  Eyes:  EOM intact.  No scleral icterus.  ENT: Mucous membranes are moist.  Nares clear   Neck:  Full ROM. No JVD.  Cardiovascular: Regular rate. Regular rhythm No murmurs, rubs, or gallops. Distal pulses are 2+ and symmetric  Pulmonary/Chest: No respiratory distress. Clear to auscultation bilaterally. No wheezing or rales.  Equal chest wall rise bilaterally  Abdominal: Soft and non-distended.  There is no tenderness.  No rebound, guarding, or rigidity. Good bowel sounds.  Genitourinary: No CVA tenderness.  No suprapubic tenderness  Musculoskeletal: Moves all extremities.  Right leg surgically absent.  5 x 5 strength in all extremities .  There is no palpable cord in the left leg.  2+ pedal edema.  Skin: Warm and dry.  Neurological:  Alert, awake, and appropriate.  Normal speech.  No acute focal neurological deficits are appreciated.  Two through 12 intact bilaterally.  Psychiatric: Normal affect. Good eye contact. Appropriate in content.       ED Course   Critical Care    Date/Time: 10/15/2022 12:00 PM  Performed by: Ricky Christopher Jr., MD  Authorized by: Ricky Christopher Jr., MD   Direct patient critical care time: 14 minutes  Additional history critical care time: 7 minutes  Ordering / reviewing critical care time: 8 minutes  Documentation critical care time: 4 minutes  Consulting other physicians critical care time: 6 minutes  Consult with family critical care time: 2 minutes  Total critical care time (exclusive of procedural time) : 41 minutes  Critical care time was exclusive of teaching time and separately billable procedures and treating other patients.  Critical care was necessary to treat or prevent imminent or life-threatening deterioration of the following conditions: hypoxia.  Critical care was time spent personally by me on the following activities: discussions with consultants, development of treatment plan with patient or surrogate, blood draw for specimens,  "interpretation of cardiac output measurements, evaluation of patient's response to treatment, obtaining history from patient or surrogate, examination of patient, ordering and review of laboratory studies, ordering and performing treatments and interventions, pulse oximetry, ordering and review of radiographic studies, review of old charts and re-evaluation of patient's condition.      ED Vital Signs:  Vitals:    10/15/22 1027 10/15/22 1132 10/15/22 1152 10/15/22 1155   BP: (!) 158/86  (!) 117/57    Pulse: 82  73 69   Resp: 20 20 20 20   Temp: 98.5 °F (36.9 °C)      TempSrc: Oral      SpO2: (!) 93%  (!) 82% (!) 81%   Height: 5' 10" (1.778 m)       10/15/22 1159   BP:    Pulse: 68   Resp: 20   Temp:    TempSrc:    SpO2: (!) 93%   Height:        Abnormal Lab Results:  Labs Reviewed   CBC W/ AUTO DIFFERENTIAL - Abnormal; Notable for the following components:       Result Value    RBC 4.14 (*)     Hemoglobin 8.8 (*)     Hematocrit 31.1 (*)     MCV 75 (*)     MCH 21.3 (*)     MCHC 28.3 (*)     RDW 21.3 (*)     Gran % 75.4 (*)     Lymph % 12.0 (*)     All other components within normal limits   COMPREHENSIVE METABOLIC PANEL - Abnormal; Notable for the following components:    CO2 31 (*)     Glucose 127 (*)     Albumin 3.3 (*)     All other components within normal limits   TROPONIN I   B-TYPE NATRIURETIC PEPTIDE        All Lab Results:  Results for orders placed or performed during the hospital encounter of 10/15/22   CBC auto differential   Result Value Ref Range    WBC 9.17 3.90 - 12.70 K/uL    RBC 4.14 (L) 4.60 - 6.20 M/uL    Hemoglobin 8.8 (L) 14.0 - 18.0 g/dL    Hematocrit 31.1 (L) 40.0 - 54.0 %    MCV 75 (L) 82 - 98 fL    MCH 21.3 (L) 27.0 - 31.0 pg    MCHC 28.3 (L) 32.0 - 36.0 g/dL    RDW 21.3 (H) 11.5 - 14.5 %    Platelets 284 150 - 450 K/uL    MPV 9.6 9.2 - 12.9 fL    Immature Granulocytes 0.4 0.0 - 0.5 %    Gran # (ANC) 6.9 1.8 - 7.7 K/uL    Immature Grans (Abs) 0.04 0.00 - 0.04 K/uL    Lymph # 1.1 1.0 - 4.8 " K/uL    Mono # 0.6 0.3 - 1.0 K/uL    Eos # 0.5 0.0 - 0.5 K/uL    Baso # 0.05 0.00 - 0.20 K/uL    nRBC 0 0 /100 WBC    Gran % 75.4 (H) 38.0 - 73.0 %    Lymph % 12.0 (L) 18.0 - 48.0 %    Mono % 6.5 4.0 - 15.0 %    Eosinophil % 5.2 0.0 - 8.0 %    Basophil % 0.5 0.0 - 1.9 %    Differential Method Automated    Comprehensive metabolic panel   Result Value Ref Range    Sodium 139 136 - 145 mmol/L    Potassium 3.7 3.5 - 5.1 mmol/L    Chloride 100 95 - 110 mmol/L    CO2 31 (H) 23 - 29 mmol/L    Glucose 127 (H) 70 - 110 mg/dL    BUN 11 8 - 23 mg/dL    Creatinine 0.7 0.5 - 1.4 mg/dL    Calcium 9.2 8.7 - 10.5 mg/dL    Total Protein 6.2 6.0 - 8.4 g/dL    Albumin 3.3 (L) 3.5 - 5.2 g/dL    Total Bilirubin 0.7 0.1 - 1.0 mg/dL    Alkaline Phosphatase 98 55 - 135 U/L    AST 15 10 - 40 U/L    ALT 16 10 - 44 U/L    Anion Gap 8 8 - 16 mmol/L    eGFR >60 >60 mL/min/1.73 m^2   Troponin I   Result Value Ref Range    Troponin I 0.009 0.000 - 0.026 ng/mL   Brain natriuretic peptide   Result Value Ref Range    BNP 94 0 - 99 pg/mL         Imaging Results:  Imaging Results              CT Lumbar Spine Without Contrast (Final result)  Result time 10/15/22 12:06:36      Final result by Asa Dykes MD (10/15/22 12:06:36)                   Impression:      No acute lumbar spine injury.  Degenerative and postoperative changes as above.      Electronically signed by: Asa Dykes  Date:    10/15/2022  Time:    12:06               Narrative:    EXAMINATION:  CT LUMBAR SPINE WITHOUT CONTRAST    CLINICAL HISTORY:  Low back pain, trauma;    TECHNIQUE:  Low-dose axial, sagittal and coronal reformations are obtained through the lumbar spine.  Contrast was not administered.    COMPARISON:  None.    FINDINGS:  Chronic bilateral L5 pars interarticularis defects noted with associated grade 1 anterolisthesis of L5 on S1.  Patient is status post L5-S1 posterior fusion with intervertebral disc spacer.  No evidence of postoperative  complication.    Bones are somewhat demineralized.  There is no acute fracture or compression deformity.  No aggressive lytic or blastic lesion.    Multilevel degenerative changes noted including vacuum disc phenomena at L4-L5.  No significant bony spinal canal stenosis.  No severe bony neural foraminal stenosis.    Expected postoperative soft tissue changes noted in the lower back.  Paraspinal soft tissues are otherwise unremarkable.  Atherosclerosis noted in the abdomen pelvis.  Visualized abdominopelvic contents otherwise unremarkable.  Mild pleural thickening noted in the lung bases with calcification in the right base.                                       X-Ray Chest AP Portable (Final result)  Result time 10/15/22 11:20:52      Final result by Asa Dykes MD (10/15/22 11:20:52)                   Impression:      Mildly coarse interstitial markings most prominent in the right lung base possibly representing subsegmental atelectasis versus scarring.  Otherwise, no acute cardiopulmonary abnormality.      Electronically signed by: Asa Dykes  Date:    10/15/2022  Time:    11:20               Narrative:    EXAMINATION:  XR CHEST AP PORTABLE    CLINICAL HISTORY:  CHF;    TECHNIQUE:  Single frontal view of the chest was performed.    COMPARISON:  X-ray dated 09/04/2021    FINDINGS:  Faint aortic arch calcification again noted.  Cardiomediastinal silhouette is unchanged in size and contour.  Trachea remains midline.  Mildly coarse interstitial markings bilaterally most prominent in the right lung base.  Lungs are otherwise clear.  No significant pleural effusion or pneumothorax.  No acute bony abnormality.                                       The EKG was ordered, reviewed, and independently interpreted by the ED provider.  Interpretation time: 11:04  Rate: 68 BPM  Rhythm:  Sinus rhythm with premature atrial complexes.  Interpretation: right bundle branch block. No STEMI.             The Emergency  Provider reviewed the vital signs and test results, which are outlined above.     ED Discussion       1:04 PM: Reassessed pt at this time. Discussed with pt all pertinent ED information and results. Discussed pt dx and plan of tx. Gave pt all f/u and return to the ED instructions. All questions and concerns were addressed at this time. Pt expresses understanding of information and instructions, and is comfortable with plan to discharge. Pt is stable for discharge.    Patient is stable nontoxic.  Patient was hypoxic for a bit here but is on oxygen at home with his home oxygen readily improved to normal.  Differential diagnosis of CHF a possible respiratory failure with the indication for critical care.  This is been dispelled at this time.  Patient was on low-dose Lasix at home once daily.  I will increase this twice daily for the next 3 days and treat his back pain for close follow-up with primary care doctor.  Patient is stable safe for discharge my opinion.  Discussed with him all findings as well as plan of care.  We think appears to be baseline.  He is stable safe for discharge my opinion.     Medical Decision Making:   Clinical Tests:   Lab Tests: Ordered and Reviewed  Radiological Study: Ordered and Reviewed  Medical Tests: Ordered and Reviewed         ED Medication(s):  Medications   furosemide injection 80 mg (80 mg Intravenous Given 10/15/22 1145)   HYDROmorphone injection 1 mg (1 mg Intravenous Given 10/15/22 1132)   ondansetron injection 4 mg (4 mg Intravenous Given 10/15/22 1131)       New Prescriptions    OXYCODONE-ACETAMINOPHEN (PERCOCET)  MG PER TABLET    Take 1 tablet by mouth every 8 (eight) hours as needed for Pain.        Follow-up Information       Pollo Arana MD.    Specialty: Family Medicine  Contact information:  [address] [facility] [address]  [place] [facility] [address]  [phone]                                 Scribe Attestation:   Scribe #1: I performed the above scribed service and the  documentation accurately describes the services I performed. I attest to the accuracy of the note.     Attending:   Physician Attestation Statement for Scribe #1: I, Ricky Christopher Jr., MD, personally performed the services described in this documentation, as scribed by Barbara Wiseman, in my presence, and it is both accurate and complete.           Clinical Impression       ICD-10-CM ICD-9-CM   1. Acute bilateral low back pain without sciatica  M54.50 724.2     338.19   2. Shortness of breath  R06.02 786.05   3. Pedal edema  R60.0 782.3       Disposition:   Disposition: Discharged  Condition: Stable       Ricky Christopher Jr., MD  10/15/22 1307

## 2022-10-15 NOTE — DISCHARGE INSTRUCTIONS
Elevate her leg at rest.  Increase her Lasix dose to twice daily for the next 3 days.  Percocet for pain.  Continue home oxygen.  Follow up with her doctor on Monday for re-evaluation.  Return as needed for any worsening symptoms, problems, questions or concerns.

## 2022-10-15 NOTE — ED NOTES
No cardiac leads available to place pt on cardiac monitor at this time. Charge nurse notified  
Clothing

## 2022-12-21 NOTE — ASSESSMENT & PLAN NOTE
Subjective:       Patient ID:  Magalis Rosales is a 51 y.o. female who presents for No chief complaint on file.    History of Present Illness: The patient presents for follow up of hair loss, last seen on 6/28/22 where she was started on spironolactone 50mg 2 times daily and topical clobetasol. Reports having noticed much improvement in symptoms. Has noticed hair regrowth in the areas on the temporal scalp. Also with minimal itching or flaking. Tolerating medications well and happy with results.     Patient also has been having a worsening rash mostly on the right arm, present for months. Reports developing very itchy, dark, thickened bumps that leave behind scarring. Used TAC in the past with good results.         Review of Systems   Constitutional:  Negative for fever and chills.      Objective:    Physical Exam   Constitutional: She appears well-developed and well-nourished. No distress.   Neurological: She is alert and oriented to person, place, and time. She is not disoriented.   Psychiatric: She has a normal mood and affect.   Skin:   Areas Examined (abnormalities noted in diagram):   Scalp / Hair Palpated and Inspected  Head / Face Inspection Performed  Neck Inspection Performed  RUE Inspected  LUE Inspection Performed                 Diagram Legend     Erythematous scaling macule/papule c/w actinic keratosis       Vascular papule c/w angioma      Pigmented verrucoid papule/plaque c/w seborrheic keratosis      Yellow umbilicated papule c/w sebaceous hyperplasia      Irregularly shaped tan macule c/w lentigo     1-2 mm smooth white papules consistent with Milia      Movable subcutaneous cyst with punctum c/w epidermal inclusion cyst      Subcutaneous movable cyst c/w pilar cyst      Firm pink to brown papule c/w dermatofibroma      Pedunculated fleshy papule(s) c/w skin tag(s)      Evenly pigmented macule c/w junctional nevus     Mildly variegated pigmented, slightly irregular-bordered macule c/w mildly  Cont breathing tx - duonebs and formoterol     atypical nevus      Flesh colored to evenly pigmented papule c/w intradermal nevus       Pink pearly papule/plaque c/w basal cell carcinoma      Erythematous hyperkeratotic cursted plaque c/w SCC      Surgical scar with no sign of skin cancer recurrence      Open and closed comedones      Inflammatory papules and pustules      Verrucoid papule consistent consistent with wart     Erythematous eczematous patches and plaques     Dystrophic onycholytic nail with subungual debris c/w onychomycosis     Umbilicated papule    Erythematous-base heme-crusted tan verrucoid plaque consistent with inflamed seborrheic keratosis     Erythematous Silvery Scaling Plaque c/w Psoriasis     See annotation      Assessment / Plan:        Prurigo nodularis  -     triamcinolone acetonide 0.1% (KENALOG) 0.1 % ointment; AAA bid  Dispense: 454 g; Refill: 1  -     Counseled patient on gentle skin care regimen, including need for sensitive soaps/detergents, as well as need for frequent use of sensitize moisturizers.       Hair loss - much improvement in symptoms. Will continue current regimen at this time (spironolactone and clobetasol). Labs to monitor potassium levels.   -     spironolactone (ALDACTONE) 100 MG tablet; Take 1 tablet (100 mg total) by mouth once daily.  Dispense: 90 tablet; Refill: 2  -     clobetasoL (TEMOVATE) 0.05 % external solution; Apply topically once daily.  Dispense: 50 mL; Refill: 2  -     Basic Metabolic Panel; Future; Expected date: 12/21/2022             Follow up in about 8 weeks (around 2/15/2023).

## 2023-06-07 ENCOUNTER — HOSPITAL ENCOUNTER (INPATIENT)
Facility: HOSPITAL | Age: 63
LOS: 5 days | Discharge: NURSING, REHAB OR LONG-TERM CARE | DRG: 189 | End: 2023-06-15
Attending: EMERGENCY MEDICINE | Admitting: HOSPITALIST
Payer: MEDICARE

## 2023-06-07 DIAGNOSIS — M79.602 LEFT ARM PAIN: ICD-10-CM

## 2023-06-07 DIAGNOSIS — I63.9 ACUTE CVA (CEREBROVASCULAR ACCIDENT): ICD-10-CM

## 2023-06-07 DIAGNOSIS — R29.90 NEUROSENSORY DEFICIT: ICD-10-CM

## 2023-06-07 DIAGNOSIS — I63.9 STROKE: ICD-10-CM

## 2023-06-07 DIAGNOSIS — R29.898 RUE WEAKNESS: Primary | ICD-10-CM

## 2023-06-07 DIAGNOSIS — J96.21 ACUTE ON CHRONIC RESPIRATORY FAILURE WITH HYPOXIA AND HYPERCAPNIA: ICD-10-CM

## 2023-06-07 DIAGNOSIS — J44.9 STAGE 1 MILD COPD BY GOLD CLASSIFICATION: ICD-10-CM

## 2023-06-07 DIAGNOSIS — I69.30 HISTORY OF CVA WITH RESIDUAL DEFICIT: ICD-10-CM

## 2023-06-07 DIAGNOSIS — R41.82 AMS (ALTERED MENTAL STATUS): ICD-10-CM

## 2023-06-07 DIAGNOSIS — J96.22 ACUTE ON CHRONIC RESPIRATORY FAILURE WITH HYPOXIA AND HYPERCAPNIA: ICD-10-CM

## 2023-06-07 LAB
ALBUMIN SERPL BCP-MCNC: 3.4 G/DL (ref 3.5–5.2)
ALP SERPL-CCNC: 141 U/L (ref 55–135)
ALT SERPL W/O P-5'-P-CCNC: 84 U/L (ref 10–44)
AMMONIA PLAS-SCNC: 32 UMOL/L (ref 10–50)
AMPHET+METHAMPHET UR QL: NEGATIVE
ANION GAP SERPL CALC-SCNC: 11 MMOL/L (ref 8–16)
AST SERPL-CCNC: 31 U/L (ref 10–40)
BARBITURATES UR QL SCN>200 NG/ML: NEGATIVE
BASOPHILS # BLD AUTO: 0.07 K/UL (ref 0–0.2)
BASOPHILS NFR BLD: 0.8 % (ref 0–1.9)
BENZODIAZ UR QL SCN>200 NG/ML: NEGATIVE
BILIRUB SERPL-MCNC: 1.2 MG/DL (ref 0.1–1)
BILIRUB UR QL STRIP: NEGATIVE
BUN SERPL-MCNC: 9 MG/DL (ref 8–23)
BZE UR QL SCN: NEGATIVE
CALCIUM SERPL-MCNC: 8.5 MG/DL (ref 8.7–10.5)
CANNABINOIDS UR QL SCN: NEGATIVE
CHLORIDE SERPL-SCNC: 98 MMOL/L (ref 95–110)
CLARITY UR: CLEAR
CO2 SERPL-SCNC: 27 MMOL/L (ref 23–29)
COLOR UR: COLORLESS
CREAT SERPL-MCNC: 0.8 MG/DL (ref 0.5–1.4)
CREAT UR-MCNC: 12.4 MG/DL (ref 23–375)
DIFFERENTIAL METHOD: ABNORMAL
EOSINOPHIL # BLD AUTO: 0.5 K/UL (ref 0–0.5)
EOSINOPHIL NFR BLD: 5.4 % (ref 0–8)
ERYTHROCYTE [DISTWIDTH] IN BLOOD BY AUTOMATED COUNT: 21.4 % (ref 11.5–14.5)
EST. GFR  (NO RACE VARIABLE): >60 ML/MIN/1.73 M^2
GLUCOSE SERPL-MCNC: 77 MG/DL (ref 70–110)
GLUCOSE UR QL STRIP: NEGATIVE
HCT VFR BLD AUTO: 27.6 % (ref 40–54)
HGB BLD-MCNC: 7.5 G/DL (ref 14–18)
HGB UR QL STRIP: NEGATIVE
IMM GRANULOCYTES # BLD AUTO: 0.04 K/UL (ref 0–0.04)
IMM GRANULOCYTES NFR BLD AUTO: 0.5 % (ref 0–0.5)
INR PPP: 1.1 (ref 0.8–1.2)
KETONES UR QL STRIP: NEGATIVE
LACTATE SERPL-SCNC: 1 MMOL/L (ref 0.5–2.2)
LEUKOCYTE ESTERASE UR QL STRIP: NEGATIVE
LYMPHOCYTES # BLD AUTO: 1.2 K/UL (ref 1–4.8)
LYMPHOCYTES NFR BLD: 13.7 % (ref 18–48)
MCH RBC QN AUTO: 18.8 PG (ref 27–31)
MCHC RBC AUTO-ENTMCNC: 27.2 G/DL (ref 32–36)
MCV RBC AUTO: 69 FL (ref 82–98)
METHADONE UR QL SCN>300 NG/ML: NEGATIVE
MONOCYTES # BLD AUTO: 0.5 K/UL (ref 0.3–1)
MONOCYTES NFR BLD: 6 % (ref 4–15)
NEUTROPHILS # BLD AUTO: 6.2 K/UL (ref 1.8–7.7)
NEUTROPHILS NFR BLD: 73.6 % (ref 38–73)
NITRITE UR QL STRIP: NEGATIVE
NRBC BLD-RTO: 2 /100 WBC
OPIATES UR QL SCN: NEGATIVE
PCP UR QL SCN>25 NG/ML: NEGATIVE
PH UR STRIP: 7 [PH] (ref 5–8)
PLATELET # BLD AUTO: 241 K/UL (ref 150–450)
PMV BLD AUTO: 10.5 FL (ref 9.2–12.9)
POCT GLUCOSE: 85 MG/DL (ref 70–110)
POTASSIUM SERPL-SCNC: 4.1 MMOL/L (ref 3.5–5.1)
PROT SERPL-MCNC: 6.4 G/DL (ref 6–8.4)
PROT UR QL STRIP: NEGATIVE
PROTHROMBIN TIME: 11.4 SEC (ref 9–12.5)
RBC # BLD AUTO: 3.98 M/UL (ref 4.6–6.2)
SODIUM SERPL-SCNC: 136 MMOL/L (ref 136–145)
SP GR UR STRIP: 1 (ref 1–1.03)
T4 FREE SERPL-MCNC: 0.86 NG/DL (ref 0.71–1.51)
TOXICOLOGY INFORMATION: ABNORMAL
TROPONIN I SERPL DL<=0.01 NG/ML-MCNC: <0.006 NG/ML (ref 0–0.03)
TSH SERPL DL<=0.005 MIU/L-ACNC: 7 UIU/ML (ref 0.4–4)
URN SPEC COLLECT METH UR: ABNORMAL
UROBILINOGEN UR STRIP-ACNC: NEGATIVE EU/DL
WBC # BLD AUTO: 8.47 K/UL (ref 3.9–12.7)

## 2023-06-07 PROCEDURE — 99285 EMERGENCY DEPT VISIT HI MDM: CPT | Mod: 25

## 2023-06-07 PROCEDURE — 83036 HEMOGLOBIN GLYCOSYLATED A1C: CPT | Performed by: NURSE PRACTITIONER

## 2023-06-07 PROCEDURE — 84443 ASSAY THYROID STIM HORMONE: CPT | Performed by: EMERGENCY MEDICINE

## 2023-06-07 PROCEDURE — 93005 ELECTROCARDIOGRAM TRACING: CPT

## 2023-06-07 PROCEDURE — G0408 INPT/TELE FOLLOW UP 35: HCPCS | Mod: 95,,, | Performed by: STUDENT IN AN ORGANIZED HEALTH CARE EDUCATION/TRAINING PROGRAM

## 2023-06-07 PROCEDURE — 93010 ELECTROCARDIOGRAM REPORT: CPT | Mod: ,,, | Performed by: INTERNAL MEDICINE

## 2023-06-07 PROCEDURE — 80053 COMPREHEN METABOLIC PANEL: CPT | Performed by: EMERGENCY MEDICINE

## 2023-06-07 PROCEDURE — 93010 EKG 12-LEAD: ICD-10-PCS | Mod: ,,, | Performed by: INTERNAL MEDICINE

## 2023-06-07 PROCEDURE — 82140 ASSAY OF AMMONIA: CPT | Performed by: EMERGENCY MEDICINE

## 2023-06-07 PROCEDURE — 25000003 PHARM REV CODE 250: Performed by: EMERGENCY MEDICINE

## 2023-06-07 PROCEDURE — G0408 PR TELHEALTH INPT CONSULT 35MIN: ICD-10-PCS | Mod: 95,,, | Performed by: STUDENT IN AN ORGANIZED HEALTH CARE EDUCATION/TRAINING PROGRAM

## 2023-06-07 PROCEDURE — 85610 PROTHROMBIN TIME: CPT | Performed by: EMERGENCY MEDICINE

## 2023-06-07 PROCEDURE — 84484 ASSAY OF TROPONIN QUANT: CPT | Performed by: EMERGENCY MEDICINE

## 2023-06-07 PROCEDURE — 84439 ASSAY OF FREE THYROXINE: CPT | Performed by: EMERGENCY MEDICINE

## 2023-06-07 PROCEDURE — 80061 LIPID PANEL: CPT | Performed by: NURSE PRACTITIONER

## 2023-06-07 PROCEDURE — 99900035 HC TECH TIME PER 15 MIN (STAT)

## 2023-06-07 PROCEDURE — 83605 ASSAY OF LACTIC ACID: CPT | Performed by: EMERGENCY MEDICINE

## 2023-06-07 PROCEDURE — 85025 COMPLETE CBC W/AUTO DIFF WBC: CPT | Performed by: EMERGENCY MEDICINE

## 2023-06-07 PROCEDURE — 82962 GLUCOSE BLOOD TEST: CPT

## 2023-06-07 PROCEDURE — 80307 DRUG TEST PRSMV CHEM ANLYZR: CPT | Performed by: EMERGENCY MEDICINE

## 2023-06-07 PROCEDURE — 81003 URINALYSIS AUTO W/O SCOPE: CPT | Mod: 59 | Performed by: EMERGENCY MEDICINE

## 2023-06-07 RX ORDER — AMLODIPINE BESYLATE 5 MG/1
10 TABLET ORAL
Status: COMPLETED | OUTPATIENT
Start: 2023-06-07 | End: 2023-06-07

## 2023-06-07 RX ORDER — TIZANIDINE 4 MG/1
4 TABLET ORAL ONCE
Status: COMPLETED | OUTPATIENT
Start: 2023-06-07 | End: 2023-06-07

## 2023-06-07 RX ORDER — TIZANIDINE 4 MG/1
1 TABLET ORAL NIGHTLY
COMMUNITY
Start: 2023-04-02

## 2023-06-07 RX ORDER — SERTRALINE HYDROCHLORIDE 25 MG/1
25 TABLET, FILM COATED ORAL ONCE
Status: COMPLETED | OUTPATIENT
Start: 2023-06-07 | End: 2023-06-07

## 2023-06-07 RX ORDER — PREGABALIN 100 MG/1
300 CAPSULE ORAL ONCE
Status: COMPLETED | OUTPATIENT
Start: 2023-06-07 | End: 2023-06-07

## 2023-06-07 RX ADMIN — AMLODIPINE BESYLATE 10 MG: 5 TABLET ORAL at 11:06

## 2023-06-07 RX ADMIN — APIXABAN 5 MG: 2.5 TABLET, FILM COATED ORAL at 11:06

## 2023-06-07 RX ADMIN — TIZANIDINE 4 MG: 4 TABLET ORAL at 11:06

## 2023-06-07 RX ADMIN — PREGABALIN 300 MG: 100 CAPSULE ORAL at 11:06

## 2023-06-07 RX ADMIN — SERTRALINE HYDROCHLORIDE 25 MG: 25 TABLET ORAL at 11:06

## 2023-06-08 PROBLEM — R41.82 AMS (ALTERED MENTAL STATUS): Status: ACTIVE | Noted: 2023-06-08

## 2023-06-08 PROBLEM — R47.1 DYSARTHRIA: Status: ACTIVE | Noted: 2023-06-08

## 2023-06-08 PROBLEM — K21.9 GERD (GASTROESOPHAGEAL REFLUX DISEASE): Status: ACTIVE | Noted: 2023-06-08

## 2023-06-08 PROBLEM — I69.30 HISTORY OF CVA WITH RESIDUAL DEFICIT: Status: ACTIVE | Noted: 2023-06-08

## 2023-06-08 PROBLEM — J96.22 ACUTE ON CHRONIC RESPIRATORY FAILURE WITH HYPOXIA AND HYPERCAPNIA: Status: ACTIVE | Noted: 2023-06-08

## 2023-06-08 PROBLEM — J96.21 ACUTE ON CHRONIC RESPIRATORY FAILURE WITH HYPOXIA AND HYPERCAPNIA: Status: ACTIVE | Noted: 2023-06-08

## 2023-06-08 PROBLEM — G62.9 NEUROPATHY: Status: ACTIVE | Noted: 2023-06-08

## 2023-06-08 PROBLEM — I10 PRIMARY HYPERTENSION: Status: ACTIVE | Noted: 2017-10-30

## 2023-06-08 LAB
ALLENS TEST: ABNORMAL
APTT PPP: 25.1 SEC (ref 21–32)
CHOLEST SERPL-MCNC: 56 MG/DL (ref 120–199)
CHOLEST/HDLC SERPL: 4 {RATIO} (ref 2–5)
DELSYS: ABNORMAL
EP: 5
EP: 5
ERYTHROCYTE [SEDIMENTATION RATE] IN BLOOD BY WESTERGREN METHOD: 18 MM/H
ERYTHROCYTE [SEDIMENTATION RATE] IN BLOOD BY WESTERGREN METHOD: 18 MM/H
FIO2: 40
FIO2: 60
FIO2: 60
FLOW: 5
HCO3 UR-SCNC: 32.1 MMOL/L (ref 24–28)
HCO3 UR-SCNC: 32.9 MMOL/L (ref 24–28)
HCO3 UR-SCNC: 33.5 MMOL/L (ref 24–28)
HDLC SERPL-MCNC: 14 MG/DL (ref 40–75)
HDLC SERPL: 25 % (ref 20–50)
INR PPP: 1.1 (ref 0.8–1.2)
IP: 12
IP: 14
LDLC SERPL CALC-MCNC: 27.4 MG/DL (ref 63–159)
MIN VOL: 12.6
MIN VOL: 3
MODE: ABNORMAL
NONHDLC SERPL-MCNC: 42 MG/DL
PCO2 BLDA: 55.9 MMHG (ref 35–45)
PCO2 BLDA: 58.1 MMHG (ref 35–45)
PCO2 BLDA: 73.8 MMHG (ref 35–45)
PH SMN: 7.26 [PH] (ref 7.35–7.45)
PH SMN: 7.36 [PH] (ref 7.35–7.45)
PH SMN: 7.37 [PH] (ref 7.35–7.45)
PO2 BLDA: 115 MMHG (ref 80–100)
PO2 BLDA: 24 MMHG (ref 40–60)
PO2 BLDA: 77 MMHG (ref 80–100)
POC BE: 6 MMOL/L
POC BE: 7 MMOL/L
POC BE: 7 MMOL/L
POC SATURATED O2: 32 % (ref 95–100)
POC SATURATED O2: 94 % (ref 95–100)
POC SATURATED O2: 98 % (ref 95–100)
PROCALCITONIN SERPL IA-MCNC: 0.02 NG/ML
PROTHROMBIN TIME: 11.7 SEC (ref 9–12.5)
SAMPLE: ABNORMAL
SITE: ABNORMAL
SP02: 100
SP02: 97
SPONT RATE: 20
SPONT RATE: 20
TRIGL SERPL-MCNC: 73 MG/DL (ref 30–150)
TROPONIN I SERPL DL<=0.01 NG/ML-MCNC: 0.01 NG/ML (ref 0–0.03)

## 2023-06-08 PROCEDURE — G0378 HOSPITAL OBSERVATION PER HR: HCPCS

## 2023-06-08 PROCEDURE — 99900035 HC TECH TIME PER 15 MIN (STAT)

## 2023-06-08 PROCEDURE — 51798 US URINE CAPACITY MEASURE: CPT

## 2023-06-08 PROCEDURE — 36415 COLL VENOUS BLD VENIPUNCTURE: CPT | Performed by: NURSE PRACTITIONER

## 2023-06-08 PROCEDURE — 36600 WITHDRAWAL OF ARTERIAL BLOOD: CPT

## 2023-06-08 PROCEDURE — 27000221 HC OXYGEN, UP TO 24 HOURS

## 2023-06-08 PROCEDURE — 82803 BLOOD GASES ANY COMBINATION: CPT

## 2023-06-08 PROCEDURE — 25000003 PHARM REV CODE 250: Performed by: NURSE PRACTITIONER

## 2023-06-08 PROCEDURE — 87081 CULTURE SCREEN ONLY: CPT | Performed by: NURSE PRACTITIONER

## 2023-06-08 PROCEDURE — 63600175 PHARM REV CODE 636 W HCPCS: Performed by: NURSE PRACTITIONER

## 2023-06-08 PROCEDURE — 27000190 HC CPAP FULL FACE MASK W/VALVE

## 2023-06-08 PROCEDURE — 85610 PROTHROMBIN TIME: CPT | Performed by: NURSE PRACTITIONER

## 2023-06-08 PROCEDURE — 84484 ASSAY OF TROPONIN QUANT: CPT | Performed by: NURSE PRACTITIONER

## 2023-06-08 PROCEDURE — 87040 BLOOD CULTURE FOR BACTERIA: CPT | Performed by: NURSE PRACTITIONER

## 2023-06-08 PROCEDURE — 85730 THROMBOPLASTIN TIME PARTIAL: CPT | Performed by: NURSE PRACTITIONER

## 2023-06-08 PROCEDURE — 51702 INSERT TEMP BLADDER CATH: CPT

## 2023-06-08 PROCEDURE — 63600175 PHARM REV CODE 636 W HCPCS: Performed by: INTERNAL MEDICINE

## 2023-06-08 PROCEDURE — 94761 N-INVAS EAR/PLS OXIMETRY MLT: CPT

## 2023-06-08 PROCEDURE — 94660 CPAP INITIATION&MGMT: CPT

## 2023-06-08 PROCEDURE — 25000003 PHARM REV CODE 250: Performed by: INTERNAL MEDICINE

## 2023-06-08 PROCEDURE — 84145 PROCALCITONIN (PCT): CPT | Performed by: NURSE PRACTITIONER

## 2023-06-08 RX ORDER — ENOXAPARIN SODIUM 100 MG/ML
40 INJECTION SUBCUTANEOUS EVERY 24 HOURS
Status: DISCONTINUED | OUTPATIENT
Start: 2023-06-08 | End: 2023-06-08

## 2023-06-08 RX ORDER — TRAMADOL HYDROCHLORIDE 50 MG/1
50 TABLET ORAL EVERY 6 HOURS PRN
Status: DISCONTINUED | OUTPATIENT
Start: 2023-06-08 | End: 2023-06-15 | Stop reason: HOSPADM

## 2023-06-08 RX ORDER — MUPIROCIN 20 MG/G
OINTMENT TOPICAL
COMMUNITY
Start: 2023-04-03 | End: 2023-06-08 | Stop reason: ALTCHOICE

## 2023-06-08 RX ORDER — MOMETASONE FUROATE 1 MG/G
CREAM TOPICAL 2 TIMES DAILY PRN
COMMUNITY
Start: 2023-01-19

## 2023-06-08 RX ORDER — TALC
6 POWDER (GRAM) TOPICAL NIGHTLY PRN
Status: DISCONTINUED | OUTPATIENT
Start: 2023-06-08 | End: 2023-06-15 | Stop reason: HOSPADM

## 2023-06-08 RX ORDER — ACETAMINOPHEN 500 MG
1 TABLET ORAL NIGHTLY PRN
COMMUNITY

## 2023-06-08 RX ORDER — SODIUM CHLORIDE 0.9 % (FLUSH) 0.9 %
10 SYRINGE (ML) INJECTION
Status: DISCONTINUED | OUTPATIENT
Start: 2023-06-08 | End: 2023-06-15 | Stop reason: HOSPADM

## 2023-06-08 RX ORDER — HYDROXYZINE HYDROCHLORIDE 25 MG/1
25 TABLET, FILM COATED ORAL EVERY 6 HOURS PRN
COMMUNITY
End: 2023-06-08

## 2023-06-08 RX ORDER — FUROSEMIDE 40 MG/1
40 TABLET ORAL EVERY MORNING
Status: DISCONTINUED | OUTPATIENT
Start: 2023-06-08 | End: 2023-06-15 | Stop reason: HOSPADM

## 2023-06-08 RX ORDER — MIRTAZAPINE 7.5 MG/1
7.5 TABLET, FILM COATED ORAL NIGHTLY
Status: DISCONTINUED | OUTPATIENT
Start: 2023-06-08 | End: 2023-06-15 | Stop reason: HOSPADM

## 2023-06-08 RX ORDER — PANTOPRAZOLE SODIUM 40 MG/1
1 TABLET, DELAYED RELEASE ORAL EVERY MORNING
COMMUNITY
Start: 2023-03-06

## 2023-06-08 RX ORDER — HYDROXYZINE HYDROCHLORIDE 25 MG/1
50 TABLET, FILM COATED ORAL 3 TIMES DAILY PRN
Status: DISCONTINUED | OUTPATIENT
Start: 2023-06-08 | End: 2023-06-15 | Stop reason: HOSPADM

## 2023-06-08 RX ORDER — ATORVASTATIN CALCIUM 40 MG/1
40 TABLET, FILM COATED ORAL NIGHTLY
COMMUNITY
Start: 2023-04-13

## 2023-06-08 RX ORDER — GENTAMICIN SULFATE 3 MG/ML
SOLUTION/ DROPS OPHTHALMIC
COMMUNITY
Start: 2022-12-23 | End: 2023-06-08

## 2023-06-08 RX ORDER — HYDROXYZINE HYDROCHLORIDE 50 MG/1
50 TABLET, FILM COATED ORAL 2 TIMES DAILY
COMMUNITY
Start: 2023-01-10

## 2023-06-08 RX ORDER — CLOPIDOGREL BISULFATE 75 MG/1
75 TABLET ORAL DAILY
Status: DISCONTINUED | OUTPATIENT
Start: 2023-06-08 | End: 2023-06-15 | Stop reason: HOSPADM

## 2023-06-08 RX ORDER — METOPROLOL SUCCINATE 25 MG/1
25 TABLET, EXTENDED RELEASE ORAL DAILY
COMMUNITY
Start: 2023-05-23

## 2023-06-08 RX ORDER — PREGABALIN 300 MG/1
300 CAPSULE ORAL 2 TIMES DAILY
COMMUNITY
End: 2023-06-08 | Stop reason: CLARIF

## 2023-06-08 RX ORDER — ATORVASTATIN CALCIUM 10 MG/1
10 TABLET, FILM COATED ORAL DAILY
Status: DISCONTINUED | OUTPATIENT
Start: 2023-06-08 | End: 2023-06-09

## 2023-06-08 RX ORDER — FUROSEMIDE 40 MG/1
1 TABLET ORAL EVERY MORNING
COMMUNITY
Start: 2023-01-10

## 2023-06-08 RX ORDER — HYDRALAZINE HYDROCHLORIDE 20 MG/ML
10 INJECTION INTRAMUSCULAR; INTRAVENOUS EVERY 6 HOURS PRN
Status: DISCONTINUED | OUTPATIENT
Start: 2023-06-08 | End: 2023-06-15 | Stop reason: HOSPADM

## 2023-06-08 RX ORDER — ASPIRIN 81 MG/1
81 TABLET ORAL DAILY
Status: DISCONTINUED | OUTPATIENT
Start: 2023-06-08 | End: 2023-06-08

## 2023-06-08 RX ORDER — ONDANSETRON 2 MG/ML
4 INJECTION INTRAMUSCULAR; INTRAVENOUS EVERY 8 HOURS PRN
Status: DISCONTINUED | OUTPATIENT
Start: 2023-06-08 | End: 2023-06-15 | Stop reason: HOSPADM

## 2023-06-08 RX ORDER — AMLODIPINE BESYLATE 10 MG/1
10 TABLET ORAL DAILY
Status: DISCONTINUED | OUTPATIENT
Start: 2023-06-08 | End: 2023-06-15 | Stop reason: HOSPADM

## 2023-06-08 RX ORDER — METOPROLOL SUCCINATE 25 MG/1
25 TABLET, EXTENDED RELEASE ORAL DAILY
Status: DISCONTINUED | OUTPATIENT
Start: 2023-06-08 | End: 2023-06-15 | Stop reason: HOSPADM

## 2023-06-08 RX ORDER — TRAMADOL HYDROCHLORIDE 50 MG/1
50 TABLET ORAL 4 TIMES DAILY PRN
Status: ON HOLD | COMMUNITY
Start: 2023-05-30 | End: 2023-06-15 | Stop reason: SDUPTHER

## 2023-06-08 RX ORDER — PANTOPRAZOLE SODIUM 40 MG/1
40 TABLET, DELAYED RELEASE ORAL EVERY MORNING
Status: DISCONTINUED | OUTPATIENT
Start: 2023-06-08 | End: 2023-06-15 | Stop reason: HOSPADM

## 2023-06-08 RX ORDER — ACETAMINOPHEN 325 MG/1
650 TABLET ORAL EVERY 6 HOURS PRN
Status: DISCONTINUED | OUTPATIENT
Start: 2023-06-08 | End: 2023-06-15 | Stop reason: HOSPADM

## 2023-06-08 RX ORDER — PREGABALIN 75 MG/1
150 CAPSULE ORAL 2 TIMES DAILY
Status: DISCONTINUED | OUTPATIENT
Start: 2023-06-08 | End: 2023-06-09

## 2023-06-08 RX ORDER — SERTRALINE HYDROCHLORIDE 50 MG/1
1 TABLET, FILM COATED ORAL NIGHTLY
COMMUNITY
Start: 2023-03-06 | End: 2023-06-08 | Stop reason: CLARIF

## 2023-06-08 RX ADMIN — HYDROXYZINE HYDROCHLORIDE 50 MG: 25 TABLET ORAL at 09:06

## 2023-06-08 RX ADMIN — HYDRALAZINE HYDROCHLORIDE 10 MG: 20 INJECTION, SOLUTION INTRAMUSCULAR; INTRAVENOUS at 04:06

## 2023-06-08 RX ADMIN — PREGABALIN 150 MG: 75 CAPSULE ORAL at 09:06

## 2023-06-08 RX ADMIN — APIXABAN 5 MG: 2.5 TABLET, FILM COATED ORAL at 09:06

## 2023-06-08 RX ADMIN — TRAMADOL HYDROCHLORIDE 50 MG: 50 TABLET, COATED ORAL at 09:06

## 2023-06-08 RX ADMIN — MIRTAZAPINE 7.5 MG: 7.5 TABLET, FILM COATED ORAL at 09:06

## 2023-06-08 RX ADMIN — CEFTRIAXONE 1 G: 1 INJECTION, POWDER, FOR SOLUTION INTRAMUSCULAR; INTRAVENOUS at 11:06

## 2023-06-08 RX ADMIN — AZITHROMYCIN MONOHYDRATE 500 MG: 500 INJECTION, POWDER, LYOPHILIZED, FOR SOLUTION INTRAVENOUS at 12:06

## 2023-06-08 NOTE — PLAN OF CARE
SW notified by [facility] rep that patient was referred to them by Marcelo (NP group). Agency would like to continue working with family, if hospice is recommended.

## 2023-06-08 NOTE — ED NOTES
Cpap mask slipping due to beard. Attempted to adjust due to dropping sats. Unsuccessful. RRT called

## 2023-06-08 NOTE — ASSESSMENT & PLAN NOTE
Chronic.  Stable.  Compliant with home medications.    Plan:  -hold Lyrica and pain medication secondary to altered mental status

## 2023-06-08 NOTE — ASSESSMENT & PLAN NOTE
Patient is chronically on statin.will continue for now. Last Lipid Panel:   Lab Results   Component Value Date    CHOL 166 10/30/2017    HDL 62 10/30/2017    LDLCALC 94.2 10/30/2017    TRIG 49 10/30/2017    CHOLHDL 37.3 10/30/2017     Plan:  -Continue home medication  -low fat/low calorie diet

## 2023-06-08 NOTE — ED PROVIDER NOTES
"SCRIBE #1 NOTE: I, Marlene Webster, am scribing for, and in the presence of, Adalid Polk MD. I have scribed the entire note.      History      Chief Complaint   Patient presents with    Fall     Pt had a fall this morning and was found on the floor next to the bed, unwitnessed fall unknown LOC, pt had a second fall this evening where he slid down off of the toilet, this was witnessed -LOC, pt is on thinners, c/o L arm pain and L ankle pain and numbness to R arm, hx of cva with deficits to the R side, hx of copd, and R AKA         Review of patient's allergies indicates:   Allergen Reactions    Pcn [penicillins] Other (See Comments)     Other      Bactrim [sulfamethoxazole-trimethoprim] Rash     Patient requests medication be listed as an "Intolerance" rather than an allergy, stating the rash is not "so bad."    Ceclor [cefaclor] Rash    Latex, natural rubber Rash        HPI   HPI    6/7/2023, 8:58 PM   History obtained from the patient, the pt's wife, and the pt's son  HPI/ROS limited secondary to dementia      History of Present Illness: Ish Guardado is a 62 y.o. male patient with a PMHx of substance abuse, TIA, CVA with residual R-sided deficits, vascular dementia, HLD, HTN and a PSHx of left carotid endarterectomy who presents to the Emergency Department for an evaluation because he had 2 falls today. Per the pt's wife, the pt had a L carotid endarterectomy on 5/19/23 and has had multiple complications since then. For 1 week, the pt has been experiencing RUE numbness, RUE weakness, and L shoulder pain. On 6/4/23, the pt was evaluated at an [facility] ER because he had a fall where he injured his L ankle. Pt had X-rays of his L foot, L ankle, and chest as well as a head CT during his [facility] ER visit. Since his fall on 6/4/23, the pt has had continued L ankle pain. Today, the pt's wife came home and found the pt on the floor by his bed. The pt was unable to remember why he was on the floor because he has dementia. The " pt's wife called the fire department and the pt was put back into his bed. Later in the day, the pt was on the toilet and fell off. The pt's second fall was witnessed and he did not lose consciousness. After the fall, the pt's wife called 911, so that he could be brought to the ER for an evaluation. Now, for the past 1 hour, the pt has been more confused and not speaking normally, per the pt's wife. Pt is on Eliquis. No further complaints or concerns at this time. RUE has been weaker than normal for past 1 week        Arrival mode: EMS    PCP: Pollo Arana MD       Past Medical History:  Past Medical History:   Diagnosis Date    Drug abuse, amphetamine type     wife states he has been using smoking crystal meth x 1 year    Femoral-popliteal bypass graft occlusion     Left and right    GERD (gastroesophageal reflux disease)     History of substance abuse     History of transient ischemic attack (TIA)     Hyperlipemia     Hypertension     Neuropathy     Occlusion of right femorotibial bypass graft 5/2017, 6/2017    Pre-diabetes     Status post femorotibial bypass        Past Surgical History:  Past Surgical History:   Procedure Laterality Date    back fusion      FEMORAL BYPASS      left fem-pop bypass 11/2014; right fem-pop bypass 9/2016, right fem-distal bypass 3/2017    LEG AMPUTATION THROUGH KNEE           Family History:  Family History   Problem Relation Age of Onset    COPD Mother     Diabetes Mother     Heart disease Mother     Stroke Mother     Heart attack Mother     COPD Father     Diabetes Father        Social History:  Social History     Tobacco Use    Smoking status: Every Day     Packs/day: 1.50     Years: 41.00     Pack years: 61.50     Types: Cigarettes    Smokeless tobacco: Former     Quit date: 10/22/2016   Substance and Sexual Activity    Alcohol use: No    Drug use: Yes     Types: Methamphetamines     Comment: opiates and methamphetamines: smokes crystal meth Daily    Sexual activity: Yes  "      ROS   Review of Systems   Unable to perform ROS: Dementia     Physical Exam      Initial Vitals [06/07/23 2026]   BP Pulse Resp Temp SpO2   (!) 178/91 63 (!) 22 97.9 °F (36.6 °C) (!) 91 %      MAP       --          Physical Exam  Nursing Notes and Vital Signs Reviewed.  Constitutional: Patient is in no acute distress. Well-developed and well-nourished.  Head: Atraumatic. Normocephalic.  Eyes: PERRL. EOM intact. Conjunctivae are not pale. No scleral icterus.  ENT: Mucous membranes are moist. Oropharynx is clear and symmetric.    Neck: Supple. Full ROM. No lymphadenopathy.  Cardiovascular: Regular rate. Regular rhythm. No murmurs, rubs, or gallops. Distal pulses are 2+ and symmetric.  Pulmonary/Chest: No respiratory distress. Clear to auscultation bilaterally. No wheezing or rales.  Abdominal: Soft and non-distended.  There is no tenderness.  No rebound, guarding, or rigidity.   Musculoskeletal:  Right lower extremity above-the-knee amputation Skin: Warm and dry.  Neurological:  Pt is alert. Pt does not answer LOC questions correctly. Pt cannot move his RUE against gravity. Pt is aphasic.    ED Course    Procedures  ED Vital Signs:  Vitals:    06/07/23 2026 06/07/23 2045 06/07/23 2133 06/07/23 2203   BP: (!) 178/91 (!) 203/96 (!) 214/99 (!) 227/100   Pulse: 63 64 64 63   Resp: (!) 22 14 20 20   Temp: 97.9 °F (36.6 °C)      TempSrc: Oral      SpO2: (!) 91% 96% 97% 97%   Weight: 117.9 kg (260 lb)      Height: 5' 9" (1.753 m)       06/07/23 2221 06/07/23 2233 06/07/23 2307 06/07/23 2335   BP:  (!) 198/86 (!) 203/95 (!) 163/95   Pulse: 72  67 68   Resp: (!) 24  (!) 26    Temp:       TempSrc:       SpO2: 97%  95%    Weight:       Height:           Abnormal Lab Results:  Labs Reviewed   CBC W/ AUTO DIFFERENTIAL - Abnormal; Notable for the following components:       Result Value    RBC 3.98 (*)     Hemoglobin 7.5 (*)     Hematocrit 27.6 (*)     MCV 69 (*)     MCH 18.8 (*)     MCHC 27.2 (*)     RDW 21.4 (*)     nRBC 2 " (*)     Gran % 73.6 (*)     Lymph % 13.7 (*)     All other components within normal limits   COMPREHENSIVE METABOLIC PANEL - Abnormal; Notable for the following components:    Calcium 8.5 (*)     Albumin 3.4 (*)     Total Bilirubin 1.2 (*)     Alkaline Phosphatase 141 (*)     ALT 84 (*)     All other components within normal limits   TSH - Abnormal; Notable for the following components:    TSH 7.005 (*)     All other components within normal limits   URINALYSIS, REFLEX TO URINE CULTURE - Abnormal; Notable for the following components:    Color, UA Colorless (*)     All other components within normal limits    Narrative:     Specimen Source->Urine   DRUG SCREEN PANEL, URINE EMERGENCY - Abnormal; Notable for the following components:    Creatinine, Urine 12.4 (*)     All other components within normal limits    Narrative:     Specimen Source->Urine   PROTIME-INR   TROPONIN I   T4, FREE   DRUG SCREEN PANEL, URINE EMERGENCY   AMMONIA   LACTIC ACID, PLASMA   POCT GLUCOSE, HAND-HELD DEVICE   POCT GLUCOSE        All Lab Results:  Results for orders placed or performed during the hospital encounter of 06/07/23   CBC W/ AUTO DIFFERENTIAL   Result Value Ref Range    WBC 8.47 3.90 - 12.70 K/uL    RBC 3.98 (L) 4.60 - 6.20 M/uL    Hemoglobin 7.5 (L) 14.0 - 18.0 g/dL    Hematocrit 27.6 (L) 40.0 - 54.0 %    MCV 69 (L) 82 - 98 fL    MCH 18.8 (L) 27.0 - 31.0 pg    MCHC 27.2 (L) 32.0 - 36.0 g/dL    RDW 21.4 (H) 11.5 - 14.5 %    Platelets 241 150 - 450 K/uL    MPV 10.5 9.2 - 12.9 fL    Immature Granulocytes 0.5 0.0 - 0.5 %    Gran # (ANC) 6.2 1.8 - 7.7 K/uL    Immature Grans (Abs) 0.04 0.00 - 0.04 K/uL    Lymph # 1.2 1.0 - 4.8 K/uL    Mono # 0.5 0.3 - 1.0 K/uL    Eos # 0.5 0.0 - 0.5 K/uL    Baso # 0.07 0.00 - 0.20 K/uL    nRBC 2 (A) 0 /100 WBC    Gran % 73.6 (H) 38.0 - 73.0 %    Lymph % 13.7 (L) 18.0 - 48.0 %    Mono % 6.0 4.0 - 15.0 %    Eosinophil % 5.4 0.0 - 8.0 %    Basophil % 0.8 0.0 - 1.9 %    Differential Method Automated     Comprehensive metabolic panel   Result Value Ref Range    Sodium 136 136 - 145 mmol/L    Potassium 4.1 3.5 - 5.1 mmol/L    Chloride 98 95 - 110 mmol/L    CO2 27 23 - 29 mmol/L    Glucose 77 70 - 110 mg/dL    BUN 9 8 - 23 mg/dL    Creatinine 0.8 0.5 - 1.4 mg/dL    Calcium 8.5 (L) 8.7 - 10.5 mg/dL    Total Protein 6.4 6.0 - 8.4 g/dL    Albumin 3.4 (L) 3.5 - 5.2 g/dL    Total Bilirubin 1.2 (H) 0.1 - 1.0 mg/dL    Alkaline Phosphatase 141 (H) 55 - 135 U/L    AST 31 10 - 40 U/L    ALT 84 (H) 10 - 44 U/L    Anion Gap 11 8 - 16 mmol/L    eGFR >60 >60 mL/min/1.73 m^2   Protime-INR   Result Value Ref Range    Prothrombin Time 11.4 9.0 - 12.5 sec    INR 1.1 0.8 - 1.2   TSH   Result Value Ref Range    TSH 7.005 (H) 0.400 - 4.000 uIU/mL   Troponin I   Result Value Ref Range    Troponin I <0.006 0.000 - 0.026 ng/mL   Urinalysis, Reflex to Urine Culture Urine, Clean Catch    Specimen: Urine   Result Value Ref Range    Specimen UA Urine, Clean Catch     Color, UA Colorless (A) Yellow, Straw, Suzan    Appearance, UA Clear Clear    pH, UA 7.0 5.0 - 8.0    Specific Gravity, UA 1.005 1.005 - 1.030    Protein, UA Negative Negative    Glucose, UA Negative Negative    Ketones, UA Negative Negative    Bilirubin (UA) Negative Negative    Occult Blood UA Negative Negative    Nitrite, UA Negative Negative    Urobilinogen, UA Negative <2.0 EU/dL    Leukocytes, UA Negative Negative   T4, Free   Result Value Ref Range    Free T4 0.86 0.71 - 1.51 ng/dL   Ammonia   Result Value Ref Range    Ammonia 32 10 - 50 umol/L   Lactic acid, plasma   Result Value Ref Range    Lactate (Lactic Acid) 1.0 0.5 - 2.2 mmol/L   Drug screen panel, in-house   Result Value Ref Range    Benzodiazepines Negative Negative    Methadone metabolites Negative Negative    Cocaine (Metab.) Negative Negative    Opiate Scrn, Ur Negative Negative    Barbiturate Screen, Ur Negative Negative    Amphetamine Screen, Ur Negative Negative    THC Negative Negative    Phencyclidine  Negative Negative    Creatinine, Urine 12.4 (L) 23.0 - 375.0 mg/dL    Toxicology Information SEE COMMENT    POCT glucose   Result Value Ref Range    POCT Glucose 85 70 - 110 mg/dL             Imaging Results:  Imaging Results              X-Ray Tibia Fibula 2 View Left (Final result)  Result time 06/07/23 21:50:59      Final result by Awais Purdy MD (06/07/23 21:50:59)                   Impression:      No acute abnormality.      Electronically signed by: Awais Purdy  Date:    06/07/2023  Time:    21:50               Narrative:    EXAMINATION:  XR TIBIA FIBULA 2 VIEW LEFT    CLINICAL HISTORY:  XR TIBIA FIBULA 2 VIEW LEFTPain in left arm    COMPARISON:  None    FINDINGS:  Multiple radiographic views  were obtained.    No evidence of acute fracture or dislocation.  Mild degenerative joint disease.  Postsurgical changes in the posterior fossa.                                       X-Ray Humerus 2 View Left (Final result)  Result time 06/07/23 21:52:59      Final result by Awais Purdy MD (06/07/23 21:52:59)                   Impression:      As above      Electronically signed by: Awais Purdy  Date:    06/07/2023  Time:    21:52               Narrative:    EXAMINATION:  XR HUMERUS 2 VIEW LEFT    CLINICAL HISTORY:  XR HUMERUS 2 VIEW LEFTPain in left arm    COMPARISON:  None    FINDINGS:  Multiple radiographic views  were obtained.    No acute fracture.  Degenerative joint disease.  Suboptimal positioning                                       X-Ray Chest AP Portable (Final result)  Result time 06/07/23 22:06:06      Final result by Awais Purdy MD (06/07/23 22:06:06)                   Impression:      As above      Electronically signed by: Awais Purdy  Date:    06/07/2023  Time:    22:06               Narrative:    EXAMINATION:  XR CHEST AP PORTABLE    CLINICAL HISTORY:  Stroke;    TECHNIQUE:  Single frontal view of the chest was performed.    COMPARISON:  None    FINDINGS:  Cardiomegaly.  Mild central  pulmonary interstitial opacities.  Similar findings to prior exam.    Bones are intact.                                       CT Head Without Contrast (Final result)  Result time 06/07/23 21:26:22      Final result by Awais Purdy MD (06/07/23 21:26:22)                   Impression:      No acute abnormality.    Atrophy and chronic white matter changes    Multifocal old infarcts    All CT scans   are performed using dose optimization techniques including the following: automated exposure control; adjustment of the mA and/or kV; use of iterative reconstruction technique.  Dose modulation was employed for ALARA by means of: Automated exposure control; adjustment of the mA and/or kV according to patient size (this includes techniques or standardized protocols for targeted exams where dose is matched to indication/reason for exam; i.e. extremities or head); and/or use of iterative reconstructive technique.      Electronically signed by: Awais Purdy  Date:    06/07/2023  Time:    21:26               Narrative:    EXAMINATION:  CT HEAD WITHOUT CONTRAST    CLINICAL HISTORY:  Neuro deficit, acute, stroke suspected;    TECHNIQUE:  Low dose axial CT images obtained throughout the head without intravenous contrast. Sagittal and coronal reconstructions were performed.    COMPARISON:  None.    FINDINGS:  Mild motion artifacts.  Atrophy and chronic white matter changes.  Geographic area of low density scattered throughout the frontal parietal occipital and posterior fossa consistent with old infarcts.    No parenchymal mass, hemorrhage, edema or major vascular distribution infarct.    Skull/extracranial contents (limited evaluation): No fracture. Mastoid air cells and paranasal sinuses are essentially clear.                                     The EKG was ordered, reviewed, and independently interpreted by the ED provider.  Interpretation time: 21:25  Rate: 62 BPM  Rhythm: normal sinus rhythm  Interpretation: RBBB. Left  posterior fascicular block. Bifascicular block. Possible Inferior infarct, age undetermined. No STEMI.             The Emergency Provider reviewed the vital signs and test results, which are outlined above.    ED Discussion     9:28 PM: Discussed pt's case with Dr. Chang (Vascular Neurology) who does not recommend thrombolytics, but recommends work up for toxic metabolic encephalopathy and obtaining a MRI.    10:55 PM: Re-evaluated pt. Pt is restless and sitting up at the edge of the bed. Per the pt's family, the pt normally takes Lyrica, Zoloft, and Tizanidine at home. Family is asking for these medications.    11:27 PM: Discussed case with Angel Granados NP ([facility]). Dr. Granados agrees with current care and management of pt and accepts admission.   Admitting Service: Hospital Medicine   Admitting Physician: Dr. Granados  Admit to: Obs Med Tele    11:28 PM: Re-evaluated pt. I have discussed test results, shared treatment plan, and the need for admission with patient and family at bedside. Pt and family express understanding at this time and agree with all information. All questions answered. Pt and family have no further questions or concerns at this time. Pt is ready for admit.           ED Medication(s):  Medications   amLODIPine tablet 10 mg (10 mg Oral Given 6/7/23 2302)   apixaban tablet 5 mg (5 mg Oral Given 6/7/23 2302)   sertraline tablet 25 mg (25 mg Oral Given 6/7/23 2302)   pregabalin capsule 300 mg (300 mg Oral Given 6/7/23 2305)   tiZANidine tablet 4 mg (4 mg Oral Given 6/7/23 2349)           New Prescriptions    No medications on file         Medical Decision Making    Medical Decision Making:   History:   I obtained history from: someone other than patient.       <> Summary of History: History provided by wife.  Patient's altered mental status prevents him from providing those recommendations.  Old Medical Records: I decided to obtain old medical records.  Old Records Summarized:  "other records.       <> Summary of Records: History of CVA with right-sided residual deficits  Initial Assessment:   Altered mental status with reports 2 falls today.  Wife at bedside stating that his confusion and speech are significantly different over the past hour.  He has a history of CVA with right-sided residual deficits, but wife notes that his right upper extremity has been "dead weight" for the past week.  This is different from his baseline  Differential Diagnosis:   CVA, encephalopathy  Clinical Tests:   Lab Tests: Ordered and Reviewed  Radiological Study: Ordered and Reviewed  Medical Tests: Ordered and Reviewed  ED Management:  Tolerated neurology consulted.  Patient not a thrombolytic candidate.  Admitted to Medicine for further workup of his altered mental status   Additional MDM:     NIH Stroke Scale:   Interval = baseline (upon arrival/admit)  Level of consciousness = 0 - alert  LOC questions = 2 - answers none correctly  LOC commands = 0 - performs both correctly  Best gaze = 0 - normal  Visual = 0 - no visual loss  Facial palsy = 0 - normal  Motor left arm =  0 - no drift  Motor right arm =  4 - no movement  Motor left leg = 0 - no drift  Motor right leg =  0 - no drift  Limb ataxia = 0 - absent  Sensory = 0 - normal  Best language = 1 - mild to moderate aphasia  Dysarthria = 2 - near to unintelligible  Extinction and inattention = 0 - no neglect  NIH Stroke Scale Total = 9     Scribe Attestation:   Scribe #1: I performed the above scribed service and the documentation accurately describes the services I performed. I attest to the accuracy of the note.    Attending:   Physician Attestation Statement for Scribe #1: I, Adalid Polk MD, personally performed the services described in this documentation, as scribed by Marlene Webster, in my presence, and it is both accurate and complete.          Clinical Impression     AMS (altered mental status)  2.  RUE weakness    Disposition:   Disposition: " Placed in Observation  Condition: Adrienne Polk MD  06/08/23 0052

## 2023-06-08 NOTE — ASSESSMENT & PLAN NOTE
Worsen RUE weakness from baseline. Tele Vascular Neurology consulted. Outside window for thromboembolytics.  Currently being treated with Eliquis, Plavix, and aspirin.  Recommends MRI.  Plan:  -Neuro checks q4hrs  -MRI brain w/o contrast  -PT/OT eval in am  -continue home meds

## 2023-06-08 NOTE — SUBJECTIVE & OBJECTIVE
"Past Medical History:   Diagnosis Date    Drug abuse, amphetamine type     wife states he has been using smoking crystal meth x 1 year    Femoral-popliteal bypass graft occlusion     Left and right    GERD (gastroesophageal reflux disease)     History of substance abuse     History of transient ischemic attack (TIA)     Hyperlipemia     Hypertension     Neuropathy     Occlusion of right femorotibial bypass graft 5/2017, 6/2017    Pre-diabetes     Status post femorotibial bypass        Past Surgical History:   Procedure Laterality Date    back fusion      FEMORAL BYPASS      left fem-pop bypass 11/2014; right fem-pop bypass 9/2016, right fem-distal bypass 3/2017    LEG AMPUTATION THROUGH KNEE         Review of patient's allergies indicates:   Allergen Reactions    Pcn [penicillins] Other (See Comments)     Other      Bactrim [sulfamethoxazole-trimethoprim] Rash     Patient requests medication be listed as an "Intolerance" rather than an allergy, stating the rash is not "so bad."    Ceclor [cefaclor] Rash    Latex, natural rubber Rash       No current facility-administered medications on file prior to encounter.     Current Outpatient Medications on File Prior to Encounter   Medication Sig    albuterol-ipratropium (DUO-NEB) 2.5 mg-0.5 mg/3 mL nebulizer solution Inhale 3 mLs into the lungs.    amlodipine (NORVASC) 10 MG tablet Take 10 mg by mouth once daily.    aspirin (ECOTRIN) 81 MG EC tablet Take 81 mg by mouth once daily.    atorvastatin (LIPITOR) 20 MG tablet Take 10 mg by mouth once daily.     DUPIXENT  mg/2 mL PnIj Inject into the skin.    ELIQUIS 5 mg Tab Take 5 mg by mouth 2 (two) times daily.    furosemide (LASIX) 40 MG tablet Take 1 tablet by mouth every morning.    ketoconazole (NIZORAL) 2 % shampoo Apply topically.    melatonin (MELATIN) 5 mg Take 1 tablet by mouth nightly as needed.    metoprolol succinate (TOPROL-XL) 25 MG 24 hr tablet Take 25 mg by mouth.    pantoprazole (PROTONIX) 20 MG tablet " Take 20 mg by mouth once daily.    pantoprazole (PROTONIX) 40 MG tablet Take 1 tablet by mouth every morning.    pregabalin (LYRICA) 300 MG Cap Take 300 mg by mouth.    sertraline (ZOLOFT) 25 MG tablet TAKE 1 TABLET EVERY DAY    tiZANidine (ZANAFLEX) 4 MG tablet Take 1 tablet by mouth every evening.    azelastine (ASTELIN) 137 mcg (0.1 %) nasal spray 2 sprays.    clopidogrel (PLAVIX) 75 mg tablet Take 75 mg by mouth once daily.    gentamicin (GARAMYCIN) 0.3 % ophthalmic solution Place into the left eye.    hydrOXYzine HCL (ATARAX) 25 MG tablet Take 25 mg by mouth every 6 (six) hours as needed.    mometasone (ELOCON) 0.1 % solution APPLY 1 ML TOPICALLY TWICE DAILY TO RASHY AREAS IN SCALP AND BEARD AREA TWICE DAILY FOR 2 WEEKS AS NEED FOR FLARES    mupirocin (BACTROBAN) 2 % ointment Apply topically.    oxyCODONE-acetaminophen (PERCOCET)  mg per tablet Take 1 tablet by mouth every 8 (eight) hours as needed for Pain.    traMADoL (ULTRAM) 50 mg tablet Take 50 mg by mouth 4 (four) times daily as needed.    triamcinolone acetonide 0.025% (KENALOG) 0.025 % cream Apply topically 2 (two) times daily.    [DISCONTINUED] mirtazapine (REMERON) 7.5 MG Tab Take 7.5 mg by mouth.    [DISCONTINUED] montelukast (SINGULAIR) 10 mg tablet Take 10 mg by mouth.    [DISCONTINUED] pregabalin (LYRICA) 300 MG Cap Take 300 mg by mouth 2 (two) times daily.    [DISCONTINUED] sertraline (ZOLOFT) 50 MG tablet Take 1 tablet by mouth every evening.     Family History       Problem Relation (Age of Onset)    COPD Mother, Father    Diabetes Mother, Father    Heart attack Mother    Heart disease Mother    Stroke Mother          Tobacco Use    Smoking status: Every Day     Packs/day: 1.50     Years: 41.00     Pack years: 61.50     Types: Cigarettes    Smokeless tobacco: Former     Quit date: 10/22/2016   Substance and Sexual Activity    Alcohol use: No    Drug use: Yes     Types: Methamphetamines     Comment: opiates and methamphetamines: smokes  crystal meth Daily    Sexual activity: Yes     Review of Systems   Unable to perform ROS: Mental status change   Objective:     Vital Signs (Most Recent):  Temp: 97.9 °F (36.6 °C) (06/07/23 2026)  Pulse: 65 (06/08/23 0103)  Resp: (!) 22 (06/08/23 0103)  BP: (!) 116/55 (06/08/23 0103)  SpO2: (!) 93 % (06/08/23 0103) Vital Signs (24h Range):  Temp:  [97.9 °F (36.6 °C)] 97.9 °F (36.6 °C)  Pulse:  [63-72] 65  Resp:  [14-26] 22  SpO2:  [91 %-97 %] 93 %  BP: (112-227)/() 116/55     Weight: 117.9 kg (260 lb)  Body mass index is 38.4 kg/m².     Physical Exam  Vitals and nursing note reviewed.   Constitutional:       General: He is awake. He is not in acute distress.     Appearance: He is obese. He is ill-appearing. He is not toxic-appearing or diaphoretic.      Comments: Appears restless in stretcher   HENT:      Head: Normocephalic and atraumatic.   Eyes:      Extraocular Movements: Extraocular movements intact.      Conjunctiva/sclera: Conjunctivae normal.      Pupils: Pupils are equal, round, and reactive to light.   Cardiovascular:      Rate and Rhythm: Normal rate and regular rhythm.      Pulses: Normal pulses.      Heart sounds: Normal heart sounds. No murmur heard.  Pulmonary:      Effort: Pulmonary effort is normal.      Breath sounds: Normal breath sounds.   Abdominal:      General: Abdomen is protuberant. Bowel sounds are normal.      Tenderness: There is no abdominal tenderness.   Musculoskeletal:      Cervical back: Normal range of motion and neck supple.      Comments: MAEW      Right Lower Extremity: Right leg is amputated above knee.   Skin:     General: Skin is warm and dry.      Capillary Refill: Capillary refill takes less than 2 seconds.   Neurological:      Mental Status: He is alert. He is confused.      GCS: GCS eye subscore is 4. GCS verbal subscore is 4. GCS motor subscore is 6.      Comments:  NIH Stroke Scale  Time: 11:30 PM    1a  Level of consciousness: 0=alert; keenly responsive  1b. LOC  questions:  0=Answers both tasks correctly  1c. LOC commands: 0=Answers both tasks correctly  2.  Best Gaze: 0=normal  3.  Visual: 0=No visual loss  4. Facial Palsy: 0=Normal symmetric movement  5a.  Motor left arm: 0=No drift, limb holds 90 (or 45) degrees for full 10 seconds  5b.  Motor right arm: 4=No movement  6a. motor left le=No drift, limb holds 90 (or 45) degrees for full 10 seconds  6b  Motor right le=No drift, limb holds 90 (or 45) degrees for full 10 seconds  7. Limb Ataxia: 0=Absent  8.  Sensory: 2=Severe to total sensory loss; patient is not aware of being touched in face, arm, leg  9. Best Language:  1=Mild to moderate aphasia; some obvious loss of fluency or facility of comprehension without significant limitation on ideas expressed or form of expression.  10. Dysarthria: 1=Mild to moderate, patient slurs at least some words and at worst, can be understood with some difficulty  11. Extinction and Inattention: 0=No abnormality  12. Distal motor function: 0=Normal   Total:   8         Psychiatric:         Mood and Affect: Mood is anxious.         Behavior: Behavior is cooperative.          CRANIAL NERVES     CN III, IV, VI   Pupils are equal, round, and reactive to light.   LABS:  Recent Results (from the past 24 hour(s))   POCT glucose    Collection Time: 23  9:02 PM   Result Value Ref Range    POCT Glucose 85 70 - 110 mg/dL   Urinalysis, Reflex to Urine Culture Urine, Clean Catch    Collection Time: 23  9:05 PM    Specimen: Urine   Result Value Ref Range    Specimen UA Urine, Clean Catch     Color, UA Colorless (A) Yellow, Straw, Suzan    Appearance, UA Clear Clear    pH, UA 7.0 5.0 - 8.0    Specific Gravity, UA 1.005 1.005 - 1.030    Protein, UA Negative Negative    Glucose, UA Negative Negative    Ketones, UA Negative Negative    Bilirubin (UA) Negative Negative    Occult Blood UA Negative Negative    Nitrite, UA Negative Negative    Urobilinogen, UA Negative <2.0 EU/dL     Leukocytes, UA Negative Negative   Drug screen panel, in-house    Collection Time: 06/07/23  9:05 PM   Result Value Ref Range    Benzodiazepines Negative Negative    Methadone metabolites Negative Negative    Cocaine (Metab.) Negative Negative    Opiate Scrn, Ur Negative Negative    Barbiturate Screen, Ur Negative Negative    Amphetamine Screen, Ur Negative Negative    THC Negative Negative    Phencyclidine Negative Negative    Creatinine, Urine 12.4 (L) 23.0 - 375.0 mg/dL    Toxicology Information SEE COMMENT    CBC W/ AUTO DIFFERENTIAL    Collection Time: 06/07/23  9:09 PM   Result Value Ref Range    WBC 8.47 3.90 - 12.70 K/uL    RBC 3.98 (L) 4.60 - 6.20 M/uL    Hemoglobin 7.5 (L) 14.0 - 18.0 g/dL    Hematocrit 27.6 (L) 40.0 - 54.0 %    MCV 69 (L) 82 - 98 fL    MCH 18.8 (L) 27.0 - 31.0 pg    MCHC 27.2 (L) 32.0 - 36.0 g/dL    RDW 21.4 (H) 11.5 - 14.5 %    Platelets 241 150 - 450 K/uL    MPV 10.5 9.2 - 12.9 fL    Immature Granulocytes 0.5 0.0 - 0.5 %    Gran # (ANC) 6.2 1.8 - 7.7 K/uL    Immature Grans (Abs) 0.04 0.00 - 0.04 K/uL    Lymph # 1.2 1.0 - 4.8 K/uL    Mono # 0.5 0.3 - 1.0 K/uL    Eos # 0.5 0.0 - 0.5 K/uL    Baso # 0.07 0.00 - 0.20 K/uL    nRBC 2 (A) 0 /100 WBC    Gran % 73.6 (H) 38.0 - 73.0 %    Lymph % 13.7 (L) 18.0 - 48.0 %    Mono % 6.0 4.0 - 15.0 %    Eosinophil % 5.4 0.0 - 8.0 %    Basophil % 0.8 0.0 - 1.9 %    Differential Method Automated    Comprehensive metabolic panel    Collection Time: 06/07/23  9:09 PM   Result Value Ref Range    Sodium 136 136 - 145 mmol/L    Potassium 4.1 3.5 - 5.1 mmol/L    Chloride 98 95 - 110 mmol/L    CO2 27 23 - 29 mmol/L    Glucose 77 70 - 110 mg/dL    BUN 9 8 - 23 mg/dL    Creatinine 0.8 0.5 - 1.4 mg/dL    Calcium 8.5 (L) 8.7 - 10.5 mg/dL    Total Protein 6.4 6.0 - 8.4 g/dL    Albumin 3.4 (L) 3.5 - 5.2 g/dL    Total Bilirubin 1.2 (H) 0.1 - 1.0 mg/dL    Alkaline Phosphatase 141 (H) 55 - 135 U/L    AST 31 10 - 40 U/L    ALT 84 (H) 10 - 44 U/L    Anion Gap 11 8 - 16  mmol/L    eGFR >60 >60 mL/min/1.73 m^2   Protime-INR    Collection Time: 06/07/23  9:09 PM   Result Value Ref Range    Prothrombin Time 11.4 9.0 - 12.5 sec    INR 1.1 0.8 - 1.2   TSH    Collection Time: 06/07/23  9:09 PM   Result Value Ref Range    TSH 7.005 (H) 0.400 - 4.000 uIU/mL   Troponin I    Collection Time: 06/07/23  9:09 PM   Result Value Ref Range    Troponin I <0.006 0.000 - 0.026 ng/mL   T4, Free    Collection Time: 06/07/23  9:09 PM   Result Value Ref Range    Free T4 0.86 0.71 - 1.51 ng/dL   Ammonia    Collection Time: 06/07/23 10:55 PM   Result Value Ref Range    Ammonia 32 10 - 50 umol/L   Lactic acid, plasma    Collection Time: 06/07/23 10:55 PM   Result Value Ref Range    Lactate (Lactic Acid) 1.0 0.5 - 2.2 mmol/L   ISTAT PROCEDURE    Collection Time: 06/08/23 12:45 AM   Result Value Ref Range    POC PH 7.367 7.35 - 7.45    POC PCO2 55.9 (H) 35 - 45 mmHg    POC PO2 77 (L) 80 - 100 mmHg    POC HCO3 32.1 (H) 24 - 28 mmol/L    POC BE 7 -2 to 2 mmol/L    POC SATURATED O2 94 (L) 95 - 100 %    Sample ARTERIAL     Site LR     Allens Test Pass     DelSys Nasal Can     Mode SPONT     Flow 5     FiO2 40        RADIOLOGY  X-Ray Chest 1 View    Result Date: 6/4/2023  XR CHEST 1 VIEW CLINICAL INDICATION: shortness of breath COMPARISON:6/3/2023 FINDINGS: Single portable radiograph of the chest. The lungs are well expanded. No focal consolidation, effusion, or pneumothorax.  Cardiomegaly.     No evidence of an acute pulmonary process.    X-Ray Chest 1 View    Result Date: 6/3/2023  XR CHEST 1 VIEW CLINICAL INDICATION: chest pain,  other COMPARISON:5/28/2023 FINDINGS: Single portable radiograph of the chest. The lungs are well expanded. No focal consolidation, effusion, or pneumothorax.  Cardiomegaly.     No evidence of an acute pulmonary process.    X-Ray Chest 1 View    Result Date: 5/28/2023  XR CHEST 1 VIEW Indication: Pacs merged reason for exam. hypoxia Comparison: 5/27/2023 Discussion:   There is  cardiomegaly.  There is no evidence of pneumothorax. Right mid to lower lung field pulmonary opacification has progressed compared to prior study. Diaphragm and heart border partially obscured. The mediastinal structures appear to be slightly shifted toward the right. The patient is rotated in projection and there is lordotic projection of imaging which causes distortion of the normal anatomy. There is increased prominence of the perihilar and interstitial markings on the left which is similar to the prior exam.    1. Cardiomegaly 2. Mixed interstitial and hazy alveolar pulmonary parenchymal opacities with suspected right-sided pleural effusion. Correlate for pulmonary edema/CHF. Differential includes pneumonia.    X-Ray Chest 1 View    Result Date: 5/27/2023  XR CHEST 1 VIEW Indication: Pacs merged reason for exam. chest pain on breathing Comparison: May 24, 2023 Discussion:   Cardiomegaly. No evidence of pneumothorax. Electronic monitoring device is projected over the chest. Increased interstitial pulmonary parenchymal opacities and hazy airspace opacification. Apical lordotic projection. There may be a small right-sided pleural effusion versus adhesion. Thickening of the pleural stripe.    Increased pulmonary parenchymal opacification compared to previous exam. Correlate for acute pulmonary parenchymal disease such as pulmonary edema/CHF.  Differential includes infectious/inflammatory process.    X-Ray Chest 1 View    Result Date: 5/24/2023  XR CHEST 1 VIEW HISTORY:  shortness of breath AP view of the chest was obtained. The cardiac size is technically magnified, cannot exclude a true degree of cardiomegaly. The mediastinal and hilar structures are normal. The lungs are clear and normally inflated. The osseous structures are intact.    Cardiac size is technically magnified, cannot exclude a true degree of cardiomegaly. No pulmonary edema.    X-Ray Chest 1 View    Result Date: 5/20/2023  XR CHEST 1 VIEW  Indication: Pacs merged reason for exam. respiratory failure Comparison: April 12, 2023 Discussion:   There is cardiomegaly.  There is no evidence of pneumothorax. Tubing projected over the left neck with multiple surgical clips. Correlate for drain. Mixed interstitial and hazy alveolar pulmonary parenchymal opacification present bilaterally. Right upper lung field opacification has improved.    No significant detrimental change appreciated from the prior exam.    X-Ray Humerus 2 View Left    Result Date: 6/7/2023  EXAMINATION: XR HUMERUS 2 VIEW LEFT CLINICAL HISTORY: XR HUMERUS 2 VIEW LEFTPain in left arm COMPARISON: None FINDINGS: Multiple radiographic views  were obtained. No acute fracture.  Degenerative joint disease.  Suboptimal positioning     As above Electronically signed by: Awais Purdy Date:    06/07/2023 Time:    21:52    X-Ray Tibia Fibula 2 View Left    Result Date: 6/7/2023  EXAMINATION: XR TIBIA FIBULA 2 VIEW LEFT CLINICAL HISTORY: XR TIBIA FIBULA 2 VIEW LEFTPain in left arm COMPARISON: None FINDINGS: Multiple radiographic views  were obtained. No evidence of acute fracture or dislocation.  Mild degenerative joint disease.  Postsurgical changes in the posterior fossa.     No acute abnormality. Electronically signed by: Awais Purdy Date:    06/07/2023 Time:    21:50    X-Ray Ankle Complete Left    Result Date: 6/4/2023  XR ANKLE 3+ VIEW LEFT CLINICAL INDICATION: ankle injury COMPARISON:None. FINDINGS: 4 views of the left ankle. No acute fracture or dislocation. The ankle mortise is preserved. No ankle joint effusion.     No acute findings.    X-Ray Foot Complete Left    Result Date: 6/4/2023  XR FOOT 3+ VIEW LEFT CLINICAL INDICATION: foot pain COMPARISON:None. FINDINGS: 3 views of the left foot. No acute fracture or dislocation. Joint spaces are preserved.     No acute findings.    CT Head Without Contrast    Result Date: 6/7/2023  EXAMINATION: CT HEAD WITHOUT CONTRAST CLINICAL HISTORY: Neuro  deficit, acute, stroke suspected; TECHNIQUE: Low dose axial CT images obtained throughout the head without intravenous contrast. Sagittal and coronal reconstructions were performed. COMPARISON: None. FINDINGS: Mild motion artifacts.  Atrophy and chronic white matter changes.  Geographic area of low density scattered throughout the frontal parietal occipital and posterior fossa consistent with old infarcts. No parenchymal mass, hemorrhage, edema or major vascular distribution infarct. Skull/extracranial contents (limited evaluation): No fracture. Mastoid air cells and paranasal sinuses are essentially clear.     No acute abnormality. Atrophy and chronic white matter changes Multifocal old infarcts All CT scans   are performed using dose optimization techniques including the following: automated exposure control; adjustment of the mA and/or kV; use of iterative reconstruction technique.  Dose modulation was employed for ALARA by means of: Automated exposure control; adjustment of the mA and/or kV according to patient size (this includes techniques or standardized protocols for targeted exams where dose is matched to indication/reason for exam; i.e. extremities or head); and/or use of iterative reconstructive technique. Electronically signed by: Awais Purdy Date:    06/07/2023 Time:    21:26    CT Head Without Contrast    Result Date: 6/4/2023  CT HEAD WO CONTRAST HISTORY:  fall,  hit head COMPARISON: CT head without contrast May 27, 2023 Axial images were obtained from the base of the skull to the vertex without the administration of intravenous contrast.  Automated exposure technique was utilized for dose reduction. Multifocal remote infarcts in both hemispheres and the cerebellum. There is cerebral atrophy with resultant dilatation of the ventricular system.  There is no hemorrhage, mass, or midline shift. There is no extra-axial fluid collection. No acute infarct. The calvarium is intact. The paranasal sinuses  are well aerated.    Cerebral atrophy and multifocal remote infarcts.    CT Head Without Contrast    Result Date: 5/27/2023  INDICATION: Seizure,  new-onset,  no history of trauma TECHNIQUE: Noncontrast head CT. Automated exposure control was used to reduce radiation dose.   COMPARISON: 4/12/2023 FINDINGS: No acute intracranial hemorrhage. Chronic left temporo-occipital, right frontal and right cerebellar infarcts without significant change. Mild chronic dilatation of the left occipital horn. Patent basal CSF cisterns. Carotid/vertebral vascular calcifications. Normally aerated paranasal sinuses and mastoid air cells. Intact calvarium. Intact scalp. Intact intraorbital structures.    1. No acute intracranial hemorrhage. 2. Chronic bilateral infarcts. 3. Other findings as described.    CTA Neck    Result Date: 5/25/2023  INDICATION: Evaluate for hematoma. Post endarterectomy. TECHNIQUE: CT angiogram neck performed. 3-D reformats were reviewed. 3-D processing was performed on an independent workstation. Where applicable, stenosis measurements were performed per NASCET criteria: mild (less than 50%), moderate (50- 69 %), severe (70-99%). Automated exposure control was used to reduce radiation dose. COMPARISON: Correlation with neck ultrasound 5/24/2023. FINDINGS: NECK CTA: Aortic arch: Proximal arch vessels are widely patent. Calcified plaque. Right carotid: No stenosis or intraluminal filling defects. Mild carotid bulb calcified plaque. Left carotid:  Left carotid bulb postoperative change, contour irregularity, wall thickening and minimal calcified plaque. Right vertebral: No stenosis or intraluminal filling defects. Left vertebral: No stenosis or intraluminal filling defects. Other: No active bleeding. Reversal of the cervical lordosis. Degenerative spine changes. Scoliosis.    1. Left carotid bulb postsurgical change/nonspecific wall thickening. 2. No active bleeding. 3. Other findings as described.    US Soft  Tissue Head Neck Thyroid    Result Date: 2023  INDICATION: Evaluate for hematoma. Patient 5 days post endarterectomy left CCA. TECHNIQUE: Ultrasound of the soft tissues of the neck using grayscale and color Doppler technique. COMPARISON: Correlation with CTA neck 2023. FINDINGS: Limited visualization of the left CCA due to swelling. A heterogeneous nonvascular/nonspecific soft tissue/questionable collection in the left neck 5.3 x 2.9 x 3.5 cm.    1. Limited visualization. 2. Other findings as described.    X-Ray Chest AP Portable    Result Date: 2023  EXAMINATION: XR CHEST AP PORTABLE CLINICAL HISTORY: Stroke; TECHNIQUE: Single frontal view of the chest was performed. COMPARISON: None FINDINGS: Cardiomegaly.  Mild central pulmonary interstitial opacities.  Similar findings to prior exam. Bones are intact.     As above Electronically signed by: Awais Purdy Date:    2023 Time:    22:06    Transthoracic echo (TTE) complete    Result Date: 2023  Transthoracic Echocardiographic Report Patient Name: AMBER NGUYEN M Patient ID: [id number] Account #: : [id number] (62y 10m) Gender: M Study Date: 2023 08:54:10 AM Ht(Cm): 175 Wt(Kg): 118.9 BSA: 2.4 Accession #: [id number] Sonographer: aftab Location: [facility] [place] Provider: ROBERT MANNING Heart Rate: 69 Quality: Technically difficult study due to limited acoustic windows. Ref.Provider: ROBERT MANNING -----------------------  CONCLUSIONS: 1. Normal left ventricular systolic function. LVEF 55 - 65%. Left ventricular diastolic function could not be assessed due to the presence of atrial fibrillation during the study. Normal wall motion. 2. Normal right ventricular size. Normal right ventricular systolic function. 3. No or trivial tricuspid valve regurgitation. The estimated right ventricular systolic pressure/PASP is <35 mmHg. No pulmonary hypertension. 4. No significant functional valvular abnormalities. 5. Technically difficult study with poor  acoustic windows. -----------------------  PROCEDURES: Echocardiographic Report: Transthoracic complete echo, 2D, spectral and tissue Doppler, color flow Doppler, M-mode. Sonographer: Ronit Lombardo -----------------------  INDICATIONS: Heart failure. -----------------------  FINDINGS: Left Ventricle: Normal left ventricular cavity size. Normal left ventricular systolic function. LVEF 55 - 65%. Left ventricular diastolic function could not be assessed due to the presence of atrial fibrillation during the study. Normal wall thickness. Normal wall motion. Right Ventricle: Normal right ventricular size. Normal right ventricular systolic function. Left Atrium: The left atrium is normal in size. Right Atrium: The right atrium is normal in size. Mitral Valve: Normal mitral valve structure. No or trivial mitral valve regurgitation. No mitral valve stenosis. Aortic Valve: The aortic valve appears to have a tricuspid configuration (suboptimal visualization). No or trivial aortic valve regurgitation. No aortic valve stenosis. AV peak PG 7 mmHg Tricuspid Valve: Normal tricuspid valve structure. No or trivial tricuspid valve regurgitation. No tricuspid valve stenosis. The estimated right ventricular systolic pressure/PASP is <35 mmHg. No pulmonary hypertension. Pulmonic Valve: The pulmonic valve is not well visualized. Pericardium: Normal pericardium without evidence of pericardial effusion. Aorta: Aorta not well visualized IVC: Normal IVC size with <50% collapse with inspiration. Intermediate estimated RAP around 5-10 mmHg. Rhythm: The rhythm during the study was atrial fibrillation. -----------------------  MEASUREMENTS: 2D/MM Value Doppler Value LVIDd 2D 4.20 cm AV Peak Yoandy 134.00 cm/sec LVIDs 2D 2.30 cm AV Peak PG 7.00 mmHg LV FS Teich 2D 45.20 % TR Peak Yoandy 216.00 cm/sec IVSd 2D 1.00 cm TR Peak PG 19.00 mmHg IVS/LVPW 2D 1.00 ratio RA Pressure 3.00 mmHg EDV 2D 78.58 mL RVSP 22.00 ESV 2D 18.12 mL EF Teich 2D 77 % LA  Dimension 2D 4.00 cm LA Area 4C 17.50 cm2 LA Volume 4C 45.00 mL IVC Diam 2.20 sec Electronically Signed By: Osvaldo Valadez 2023-05-22 15:28:58 CDT CC: CC:    Cardiovascular    Result Date: 5/19/2023  This order was auto-finalized. Please see relevant op note for procedure result.    USV Carotid Duplex Carotid Left    Result Date: 5/26/2023  See Vascular Ultrasound Result Document See Vascular Ultrasound Result Document      EKG    MICROBIOLOGY    MDM     Amount and/or Complexity of Data Reviewed  Clinical lab tests: reviewed  Tests in the radiology section of CPT®: reviewed  Tests in the medicine section of CPT®: reviewed  Discussion of test results with the performing providers: yes  Decide to obtain previous medical records or to obtain history from someone other than the patient: yes  Obtain history from someone other than the patient: yes  Review and summarize past medical records: yes  Discuss the patient with other providers: yes  Independent visualization of images, tracings, or specimens: yes

## 2023-06-08 NOTE — ASSESSMENT & PLAN NOTE
Right sided deficits with dysarthria. Worsen RUE weakness, dysarthria, and confusion. Tele Vascular Neurology consulted. Outside window for thromboembolytics.  Currently being treated with Eliquis, Plavix, and aspirin.  Recommends MRI.  Plan:  -Neuro checks q4hrs  -MRI brain w/o contrast  -PT/OT eval in am  -continue home meds

## 2023-06-08 NOTE — CODE DOCUMENTATION
"Critical Care Stroke Alert Evaluation    Patient Information:  Name: Ish Guardado  : 1960  Age: 62 y.o.  Sex: male  MRN: [id number]    Stroke Alert Level 2    Last Known Normal Date & Time: 23 with progressive worsening of symptoms over the past few days, with acute worsening around 1930 today, information provided by patient's wife and son as well as previous medical records and ED staff/notes    History of present illness:  Ish Guardado is a 62 year old male with past medical history of previous strokes with right sided deficits, ON ELIQUIS, CAD/MI s/p stent in 2019, CHF, A-fib, DVT, pre-diabetes, dementia, hypertension, hyperlipidemia, neuropathy, PVD, COPD, right AKA, and most recently left carotid artery endarterectomy at [facility] per Dr. Leroy Hartley on 2023 discharged on 23, tobacco abuse, and drug abuse (crystal meth in the past).  He presented to [facility] ED per EMS for evaluation after patient had two falls today. He has been having right arm numbness and worsening weakness over the past 7 days. He has had multiple falls resulting in left ankle injury and continues to have left ankle pain. His wife came home from work today and found him on the floor by his bed, unable to remember why he was on the floor, or how he ended up on the floor (dementia at baseline). EMS was called and he was assisted back up to the bed. Later on in the afternoon, he was on the toilet and fell to the floor. EMS was called again and he was brought to ED for evaluation of worsening weakness, falls, increased confusion, slurred speech, and now unable to move right arm at all (flaccid).    Vital Signs:  Vitals:    235 23 2133   BP: (!) 178/91 (!) 203/96 (!) 214/99   BP Location: Right arm     Patient Position: Lying     Pulse: 63 64 64   Resp: (!) 22 14 20   Temp: 97.9 °F (36.6 °C)     TempSrc: Oral     SpO2: (!) 91% 96% 97%   Weight: 117.9 kg (260 lb)     Height: 5' 9" (1.753 m)      "     Pre-Stroke Modified Nicholas Score:  : Nicholas Score of 4 - Moderately severe disability; unable to walk without assistance and unable to attend to own bodily needs without assistance    National [place] [employer] Stroke Scale (NIHSS): 12                                     1a. Level of Consciousness 0 = Alert; keenly responsive.   1 = Not alert; but arousable by minor stimulation to obey,  answer, or respond.   2 = Not alert; requires repeated stimulation to attend, or is  obtunded and requires strong or painful stimulation to  make movements (not stereotyped).   3 = Responds only with reflex motor or autonomic effects or  totally unresponsive, flaccid, and areflexic 0   1b. LOC Questions 0 = Answers both questions correctly.   1 = Answers one question correctly.   2 = Answers neither question correctly.  2   1c. LOC Commands 0 = Performs both tasks correctly.   1 = Performs one task correctly.   2 = Performs neither task correctly.  0   2. Best Gaze 0 = Normal.   1 = Partial gaze palsy; gaze is abnormal in one or both eyes,  but forced deviation or total gaze paresis is not present.   2 = Forced deviation, or total gaze  0   3. Visual 0 = No visual loss.   1 = Partial hemianopia.   2 = Complete hemianopia.   3 = Bilateral hemianopia (blind including cortical blindness).  0   4. Facial Palsy 0 = Normal symmetrical movements.   1 = Minor paralysis (flattened nasolabial fold, asymmetry on  smiling).   2 = Partial paralysis (total or near-total paralysis of lower  face).   3 = Complete paralysis of one or both sides (absence of  facial movement in the upper and lower face).  0   5a. Motor Arm - Left  0 = No drift; limb holds 90 (or 45) degrees for full 10 seconds.   1 = Drift; limb holds 90 (or 45) degrees, but drifts down before  full 10 seconds; does not hit bed or other support.   2 = Some effort against gravity; limb cannot get to or  maintain (if cued) 90 (or 45) degrees, drifts down to bed,  but has some  effort against gravity.   3 = No effort against gravity; limb falls.   4 = No movement.   UN = Amputation or joint fusion 0   5b. Motor Arm - Right 0 = No drift; limb holds 90 (or 45) degrees for full 10 seconds.   1 = Drift; limb holds 90 (or 45) degrees, but drifts down before  full 10 seconds; does not hit bed or other support.   2 = Some effort against gravity; limb cannot get to or  maintain (if cued) 90 (or 45) degrees, drifts down to bed,  but has some effort against gravity.   3 = No effort against gravity; limb falls.   4 = No movement.   UN = Amputation or joint fusion 4   6a. Motor Leg - Left 0 = No drift; leg holds 30-degree position for full 5 seconds.   1 = Drift; leg falls by the end of the 5-second period but does  not hit bed.   2 = Some effort against gravity; leg falls to bed by 5  seconds, but has some effort against gravity.   3 = No effort against gravity; leg falls to bed immediately.   4 = No movement.   UN = Amputation or joint fusion 0   6b. Motor Leg - Right 0 = No drift; leg holds 30-degree position for full 5 seconds.   1 = Drift; leg falls by the end of the 5-second period but does  not hit bed.   2 = Some effort against gravity; leg falls to bed by 5  seconds, but has some effort against gravity.   3 = No effort against gravity; leg falls to bed immediately.   4 = No movement.   UN = Amputation or joint fusion UN   7. Limb Ataxia 0 = Absent.   1 = Present in one limb.   2 = Present in two limbs.   UN = Amputation or joint fusion 0   8. Sensory 0 = Normal; no sensory loss.   1 = Mild-to-moderate sensory loss; patient feels pinprick is  less sharp or is dull on the affected side; or there is a  loss of superficial pain with pinprick, but patient is aware  of being touched.   2 = Severe to total sensory loss; patient is not aware of  being touched in the face, arm, and leg.  2   9. Best Language 0 = No aphasia; normal.   1 = Mild-to-moderate aphasia; some obvious loss of fluency  or  facility of comprehension, without significant  limitation on ideas expressed or form of expression.  Reduction of speech and/or comprehension, however,  makes conversation about provided materials difficult  or impossible. For example, in conversation about  provided materials, examiner can identify picture or  naming card content from patients response.   2 = Severe aphasia; all communication is through fragmentary  expression; great need for inference, questioning, and guessing  by the listener. Range of information that can be exchanged is  limited; listener carries burden of communication. Examiner  cannot identify materials provided from patient response.   3 = Mute, global aphasia; no usable speech or auditory  comprehension.  2   10. Dysarthria 0 = Normal.   1 = Mild-to-moderate dysarthria; patient slurs at least some  words and, at worst, can be understood with some  difficulty.   2 = Severe dysarthria; patient's speech is so slurred as to be  unintelligible in the absence of or out of proportion to  any dysphasia, or is mute/anarthric.  UN = Intubated or other physical barrier 2   11. Extinction and Inattention (formerly Neglect 0 = No abnormality.   1 = Visual, tactile, auditory, spatial, or personal inattention  or extinction to bilateral simultaneous stimulation in one  of the sensory modalities.   2 = Profound lyric-inattention or extinction to more than  one modality; does not recognize own hand or orients  to only one side of space.    0   Total (NIH Stroke Scale): 12    Radiology Results:  Imaging Results              X-Ray Tibia Fibula 2 View Left (In process)                      X-Ray Humerus 2 View Left (In process)                      X-Ray Chest AP Portable (In process)                      CT Head Without Contrast (Final result)  Result time 06/07/23 21:26:22      Final result by Awais Purdy MD (06/07/23 21:26:22)                   Impression:      No acute abnormality.    Atrophy and  chronic white matter changes    Multifocal old infarcts    All CT scans   are performed using dose optimization techniques including the following: automated exposure control; adjustment of the mA and/or kV; use of iterative reconstruction technique.  Dose modulation was employed for ALARA by means of: Automated exposure control; adjustment of the mA and/or kV according to patient size (this includes techniques or standardized protocols for targeted exams where dose is matched to indication/reason for exam; i.e. extremities or head); and/or use of iterative reconstructive technique.      Electronically signed by: Awais Purdy  Date:    06/07/2023  Time:    21:26               Narrative:    EXAMINATION:  CT HEAD WITHOUT CONTRAST    CLINICAL HISTORY:  Neuro deficit, acute, stroke suspected;    TECHNIQUE:  Low dose axial CT images obtained throughout the head without intravenous contrast. Sagittal and coronal reconstructions were performed.    COMPARISON:  None.    FINDINGS:  Mild motion artifacts.  Atrophy and chronic white matter changes.  Geographic area of low density scattered throughout the frontal parietal occipital and posterior fossa consistent with old infarcts.    No parenchymal mass, hemorrhage, edema or major vascular distribution infarct.    Skull/extracranial contents (limited evaluation): No fracture. Mastoid air cells and paranasal sinuses are essentially clear.                                     Patient noted to be confused, restless, +dysarthria, follows simple commands, +aphasia, unable to name objects, unable to describe what he sees in picture cards, no movement or sensation in RUE (flaccid)    Case discussed with (date and time):  6/7/2023 at 2115  Dr. Facundo Chang (Vascular Neurology via tele-consult)-- not a candidate for thrombolytics as patient is out of treatment window/not candidate for possible interventional revascularization due to significant pre-stroke disability,   CT  "shows "chronic bihemispheric infarcts in the right cerebellum, right occipital lobe, left parieto-occipital lobe, right frontal, left parietal and right MCA/Sharon borderzone territory    Recommendations:  Out of window for acute intervention  Consider MRI brain  Continue Eliquis  Telemetry monitoring  Avoid aggressive lowering of BP, gradual lowering of BP  Additional workup of encephalopathy per ED provider    Denisha Delgado NP  06/07/2023   9:44 PM    This is not a formal neurology consultation. If further neurology evaluation and / or recommendations are needed, formally consult neurology for further assistance.    "

## 2023-06-08 NOTE — ASSESSMENT & PLAN NOTE
63 y/o male with a history of left MCA stroke s/p L CEA (May 19th, 2023) w/ baseline RSW and dysarthria, prediabetes, HTN, HLD, CAD s/p stent (2019), PAD s/p R AKA, polysubstance use who presented with worsening RUE weakness since 3 days  along with confusion and worsening dysarthria since today.     NIHSS 12.   Right arm appears flaccid with no sensation.   Severe dysarthria and aphasia. Does follow simple commands.    CT shows chronic bihemispheric infarcts in the right cerebellum, right occipital lobe, left parieto-occipital lobe, right frontal, left parietal and right MCA/Sharon borderzone territory    DDx includes recrudescence of prior stroke vs new ischemic stroke  Recrudescence is more likely (given 3 days of symptoms, would expect the CT scan to show new areas of hypodensity to explain his symptoms)    Recommendations:  -- OOW for any acute intervention.  -- Gradually lower BP to nomotensive goal of <140/90. Avoid aggressive lowering of BP.  -- CBC, CMP, Utox, ammonia, lactate, UA, CXR  -- If no cause identified, consider MRI brain.  -- Resume Eliquis.

## 2023-06-08 NOTE — ASSESSMENT & PLAN NOTE
Patient with Hypercapnic and Hypoxic Respiratory failure which is Acute on chronic.  he is on home oxygen at 5 L NC LPM. Supplemental oxygen was provided and noted- Oxygen Concentration (%):  [50-60] 60    .   Signs/symptoms of respiratory failure include- lethargy. Contributing diagnoses includes - COPD and Obesity Hypoventilation Labs and images were reviewed. Patient Has recent ABG, which has been reviewed. Will treat underlying causes and adjust management of respiratory failure as follows    Recent Labs     06/08/23  1251   PH 7.361   PCO2 58.1*   PO2 115*   HCO3 32.9*   POCSATURATED 98   BE 7     Improved ABG  Wean bipap as able   Initiated on Rocephin and Azithro  BC x2  MRSA nasal swab

## 2023-06-08 NOTE — HPI
63 y/o male with a history of left MCA stroke s/p L CEA (May 19th, 2023) w/ baseline RSW and dysarthria, prediabetes, HTN, HLD, CAD s/p stent (2019), PAD s/p R AKA, polysubstance use who presented with worsening RUE weakness, dysarthria and confusion.   Symptom onset was 3 days ago (6/4).His wife noticed that he had fallen off his wheelchair and his right arm appeared weaker than usual. She also suspects that he may have had head trauma. His right continued to get progressively weaker. Today, he appeared more confused and irritable than usual with worsening dysarthria as well.    /89  BG 85    On Eliquis since his CEA.

## 2023-06-08 NOTE — HPI
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  Presented to the ER accompanied by his wife and son to be evaluated for altered mental status which started earlier this morning.  Per wife, the patient has been having altered mental status for 3 days prior to hospital admission, but states he became more confused shortly after waking on the morning of hospital presentation.  Patient has history of CVA which left him with deficits of weakness on right side, as well as, dysarthria and memory issues. She also stated patient had been falling out of his wheelchair and needed help off the floor. Reports increased weakness of RUE and slurred speech; a code stroke was called and the patient was not deemed a candidate to receive thrombolytics as he was outside the window. CTH negative for acute findings. ER workup unremarkable with the exception to H/H of 7.5/27.6 (previously 8.8/31.1 on 10/15/2022). UA and drug screen negative. Ammonia level negative. Plain film imaging of left arm and left lower leg negative for acute findings. Chest x-ray shows cardiomegaly. Per chart review, patient recently had a left carotid endarterectomy performed on 05/19/2023 performed by Dr. Hartley. Patient became increasingly more unresponsive and obtunded. ABG revealed severe hypercapnia; the patient's wife manages the CPAP and states she only puts on him as needed and is afraid to let the patient handle it given he smokes a pack a day and will light himself on fire. Patient was admitted to the ICU.    Interval history:  6/9: Placed on BiPap overnight with improvement in pCO2. Currently on baseline oxygen use 4-5L NC. Upper extremity US negative for occlusion. MRI brain ordered for persistent right  sided weakness. Patient is already on Eliquis, Plavix, and Statin.  6/10: Patient communicates some needs; however, unable to prolonged conversation. Pulmonary status stable; currently on 4L/NC with nocturnal PAP therapy.  6/11: Appears less agitated / restless today. Sitting up in bed without acute complaints. Pulmonary status appears stable.  6/13: pulm status stable on RA, awaiting SNF placement, no family at bedside

## 2023-06-08 NOTE — PT/OT/SLP PROGRESS
Physical Therapy      Patient Name:  Ish Guardado   MRN:  [id number]    PFA[facility] INITIATED THIS AM VIA CHART REVIEW, HOLD PER NURSE CANNON DUE TO PT UNABLE TO COME OFF BIPAP AT THIS TIME, BRADYCARDIA, UNABLE TO ACTIVELY PARTICIPATE, WILL CONTINUE EFFORTS    Maria Lorenzo, PT  6/8/2023  [id number]

## 2023-06-08 NOTE — PHARMACY MED REC
"Admission Medication History     The home medication history was taken by Alberto Selby.    You may go to "Admission" then "Reconcile Home Medications" tabs to review and/or act upon these items.     The home medication list has been updated by the Pharmacy department.   Please read ALL comments highlighted in yellow.   Please address this information as you see fit.    Feel free to contact us if you have any questions or require assistance.      The medications listed below were removed from the home medication list. Please reorder if appropriate:  Patient reports no longer taking the following medication(s):  ASPIRIN 81MG  NORVASC 10MG    Medications listed below were obtained from: Patient/family and Analytic software- [employer]  (Not in a hospital admission)      Alberto Selby  [id number]    Current Outpatient Medications on File Prior to Encounter   Medication Sig Dispense Refill Last Dose    albuterol-ipratropium (DUO-NEB) 2.5 mg-0.5 mg/3 mL nebulizer solution Inhale 3 mLs into the lungs.   6/8/2023    apixaban (ELIQUIS) 5 mg Tab Take 5 mg by mouth 2 (two) times daily.   6/8/2023    dupilumab 300 mg/2 mL PnIj Inject 300 mg into the skin every 14 (fourteen) days.   Past Week    furosemide (LASIX) 40 MG tablet Take 1 tablet by mouth every morning.   6/8/2023    ketoconazole (NIZORAL) 2 % shampoo Apply topically 3 (three) times a week.   Past Month    melatonin (MELATIN) 5 mg Take 1 tablet by mouth nightly as needed.   6/7/2023    metoprolol succinate (TOPROL-XL) 25 MG 24 hr tablet Take 25 mg by mouth once daily.   6/7/2023    pantoprazole (PROTONIX) 40 MG tablet Take 1 tablet by mouth every morning.   6/7/2023    pregabalin (LYRICA) 300 MG Cap Take 300 mg by mouth 2 (two) times daily.   6/8/2023    sertraline (ZOLOFT) 50 MG tablet Take 50 mg by mouth every evening.   6/8/2023    tiZANidine (ZANAFLEX) 4 MG tablet Take 1 tablet by mouth every evening.   6/8/2023    [DISCONTINUED] atorvastatin (LIPITOR) 20 " MG tablet Take 10 mg by mouth once daily.    6/7/2023    [DISCONTINUED] pantoprazole (PROTONIX) 20 MG tablet Take 20 mg by mouth once daily.   6/7/2023    amlodipine (NORVASC) 10 MG tablet Take 10 mg by mouth once daily.   Not taking    aspirin (ECOTRIN) 81 MG EC tablet Take 81 mg by mouth once daily.   Not taking    atorvastatin (LIPITOR) 40 MG tablet Take 40 mg by mouth every evening.       clopidogrel (PLAVIX) 75 mg tablet Take 75 mg by mouth once daily.   Unknown    hydrOXYzine (ATARAX) 50 MG tablet Take 50 mg by mouth 2 (two) times daily.       mometasone (ELOCON) 0.1 % solution APPLY 1 ML TOPICALLY TWICE DAILY TO RASHY AREAS IN SCALP AND BEARD AREA TWICE DAILY FOR 2 WEEKS AS NEED FOR FLARES       mometasone 0.1% (ELOCON) 0.1 % cream Apply topically 2 (two) times daily as needed.       traMADoL (ULTRAM) 50 mg tablet Take 50 mg by mouth 4 (four) times daily as needed.   Unknown   .

## 2023-06-08 NOTE — ASSESSMENT & PLAN NOTE
Current BP mildly to moderately elevated likely 2/2 to missing home medication dosing. BP usually well controlled per patient with home medications.  Plan:  -Optimize pain control   -Continue home medications (norvasc, metoprolol, lasix), titrate as needed   -Monitor BP  -Low salt/cardiac diet when not NPO  -IV hydralazine prn for SBP>160 or DBP>90

## 2023-06-08 NOTE — ED NOTES
Pt transported to ICU with RRT, portable monitor, o2. Bedside handoff. Medications from home sent with pt

## 2023-06-08 NOTE — PLAN OF CARE
Pt is oriented to self. VSS on continuous BiPAP. Sinus bradycardia on monitor. PRN hydralazine to maintain SBP < 180. NPO. Adequate UOP. Pt turned q2h for skin breakdown prevention. POC reviewed with family at bedside. Bed is locked in lowest position, side rails up x2, call light within reach, bed alarm set. Pt instructed to call staff for assistance with mobility.  Temp:  [93.4 °F (34.1 °C)-97.5 °F (36.4 °C)]   Pulse:  [44-57]   Resp:  [8-30]   BP: (113-184)/(55-81)   SpO2:  [93 %-100 %]      Problem: Adult Inpatient Plan of Care  Goal: Plan of Care Review  Outcome: Ongoing, Progressing  Goal: Patient-Specific Goal (Individualized)  Outcome: Ongoing, Progressing  Goal: Absence of Hospital-Acquired Illness or Injury  Outcome: Ongoing, Progressing  Goal: Optimal Comfort and Wellbeing  Outcome: Ongoing, Progressing  Goal: Readiness for Transition of Care  Outcome: Ongoing, Progressing     Problem: Infection  Goal: Absence of Infection Signs and Symptoms  Outcome: Ongoing, Progressing     Problem: Adjustment to Illness (Stroke, Ischemic/Transient Ischemic Attack)  Goal: Optimal Coping  Outcome: Ongoing, Progressing     Problem: Bowel Elimination Impaired (Stroke, Ischemic/Transient Ischemic Attack)  Goal: Effective Bowel Elimination  Outcome: Ongoing, Progressing     Problem: Cerebral Tissue Perfusion (Stroke, Ischemic/Transient Ischemic Attack)  Goal: Optimal Cerebral Tissue Perfusion  Outcome: Ongoing, Progressing     Problem: Cognitive Impairment (Stroke, Ischemic/Transient Ischemic Attack)  Goal: Optimal Cognitive Function  Outcome: Ongoing, Progressing     Problem: Communication Impairment (Stroke, Ischemic/Transient Ischemic Attack)  Goal: Improved Communication Skills  Outcome: Ongoing, Progressing     Problem: Functional Ability Impaired (Stroke, Ischemic/Transient Ischemic Attack)  Goal: Optimal Functional Ability  Outcome: Ongoing, Progressing     Problem: Respiratory Compromise (Stroke, Ischemic/Transient  Ischemic Attack)  Goal: Effective Oxygenation and Ventilation  Outcome: Ongoing, Progressing     Problem: Sensorimotor Impairment (Stroke, Ischemic/Transient Ischemic Attack)  Goal: Improved Sensorimotor Function  Outcome: Ongoing, Progressing     Problem: Swallowing Impairment (Stroke, Ischemic/Transient Ischemic Attack)  Goal: Optimal Eating and Swallowing without Aspiration  Outcome: Ongoing, Progressing     Problem: Urinary Elimination Impaired (Stroke, Ischemic/Transient Ischemic Attack)  Goal: Effective Urinary Elimination  Outcome: Ongoing, Progressing     Problem: Fluid and Electrolyte Imbalance (Acute Kidney Injury/Impairment)  Goal: Fluid and Electrolyte Balance  Outcome: Ongoing, Progressing     Problem: Oral Intake Inadequate (Acute Kidney Injury/Impairment)  Goal: Optimal Nutrition Intake  Outcome: Ongoing, Progressing     Problem: Renal Function Impairment (Acute Kidney Injury/Impairment)  Goal: Effective Renal Function  Outcome: Ongoing, Progressing     Problem: Fall Injury Risk  Goal: Absence of Fall and Fall-Related Injury  Outcome: Ongoing, Progressing     Problem: Skin Injury Risk Increased  Goal: Skin Health and Integrity  Outcome: Ongoing, Progressing     Problem: Impaired Wound Healing  Goal: Optimal Wound Healing  Outcome: Ongoing, Progressing

## 2023-06-08 NOTE — ASSESSMENT & PLAN NOTE
Chronic.  Stable.  Compliant with home medications.    Plan:  -hold Lyrica secondary to altered mental status

## 2023-06-08 NOTE — CONSULTS
[facility]  Vascular Neurology  Comprehensive Stroke Center  TeleVascular Neurology Acute Consultation Note      Consults    Consulting Provider: CHANDLER MOYA  Current Providers  No providers found    Patient Location:  [facility] EMERGENCY DEPARTMENT Emergency Department  Spoke hospital nurse at bedside with patient assisting consultant.     Patient information was obtained from spouse/SO, past medical records, and ER records.         Assessment/Plan:       Diagnoses:   Orthopedic  Right arm weakness  63 y/o male with a history of left MCA stroke s/p L CEA (May 19th, 2023) w/ baseline RSW and dysarthria, prediabetes, HTN, HLD, CAD s/p stent (2019), PAD s/p R AKA, polysubstance use who presented with worsening RUE weakness since 3 days  along with confusion and worsening dysarthria since today.     NIHSS 12.   Right arm appears flaccid with no sensation.   Severe dysarthria and aphasia. Does follow simple commands.    CT shows chronic bihemispheric infarcts in the right cerebellum, right occipital lobe, left parieto-occipital lobe, right frontal, left parietal and right MCA/Sharon borderzone territory    DDx includes recrudescence of prior stroke vs new ischemic stroke  Recrudescence is more likely (given 3 days of symptoms, would expect the CT scan to show new areas of hypodensity to explain his symptoms)    Recommendations:  -- OOW for any acute intervention.  -- Gradually lower BP to nomotensive goal of <140/90. Avoid aggressive lowering of BP.  -- CBC, CMP, Utox, ammonia, lactate, UA, CXR  -- If no cause identified, consider MRI brain.  -- Resume Eliquis.  -- Telemetry        STROKE DOCUMENTATION     Acute Stroke Times:   Acute Stroke Times   Last Known Normal Date: 06/04/23  Unknown Normal Time: Unknown Time  Stroke Team Called Date: 06/07/23  Stroke Team Called Time: 2102  Stroke Team Arrival Date: 06/07/23  Stroke Team Arrival Time: 2104  CT Interpretation Time:  "2123  Thrombolytic Therapy Recommended: No    NIH Scale:  1a. Level of Consciousness: 0-->Alert, keenly responsive  1b. LOC Questions: 2-->Answers neither question correctly  1c. LOC Commands: 0-->Performs both tasks correctly  2. Best Gaze: 0-->Normal  3. Visual: 0-->No visual loss  4. Facial Palsy: 0-->Normal symmetrical movements  5a. Motor Arm, Left: 0-->No drift, limb holds 90 (or 45) degrees for full 10 secs  5b. Motor Arm, Right: 4-->No movement  6a. Motor Leg, Left: 0-->No drift, leg holds 30 degree position for full 5 secs  6b. Motor Leg, Right: (UN) Amputation or joint fusion  7. Limb Ataxia: 0-->Absent  8. Sensory: 2-->Severe to total sensory loss, patient is not aware of being touched in the face, arm, and leg  9. Best Language: 2-->Severe aphasia, all communication is through fragmentary expression, great need for inference, questioning, and guessing by the listener. Range of information that can be exchanged is limited, listener carries burden of. . . (see row details)  10. Dysarthria: 2-->Severe dysarthria, patients speech is so slurred as to be unintelligible in the absence of or out of proportion to any dysphasia, or is mute/anarthric  11. Extinction and Inattention (formerly Neglect): 0-->No abnormality  Total (NIH Stroke Scale): 12     Modified Alis Score: 4  [place] Coma Scale:    ABCD2 Score:    [id number] Score:   HAS -BLED Score:   ICH Score:   Hunt & Higginbotham Classification:       Blood pressure (!) 227/100, pulse 72, temperature 97.9 °F (36.6 °C), temperature source Oral, resp. rate (!) 24, height 5' 9" (1.753 m), weight 117.9 kg (260 lb), SpO2 97 %.  Eligible for thrombolytic therapy?: No  Thrombolytic therapy recomended: Thrombolytic therapy not recommended due to Outside of treatment window   Possible Interventional Revascularization Candidate? No; significant pre-stroke disability     Disposition Recommendation: admit to inpatient    Subjective:     History of Present Illness:  63 y/o " "male with a history of left MCA stroke s/p L CEA (May 19th, 2023) w/ baseline RSW and dysarthria, prediabetes, HTN, HLD, CAD s/p stent (2019), PAD s/p R AKA, polysubstance use who presented with worsening RUE weakness, dysarthria and confusion.   Symptom onset was 3 days ago (6/4).His wife noticed that he had fallen off his wheelchair and his right arm appeared weaker than usual. She also suspects that he may have had head trauma. His right continued to get progressively weaker. Today, he appeared more confused and irritable than usual with worsening dysarthria as well.    /89  BG 85    On Eliquis since his CEA.        Woke up with symptoms?: Unknonw    Recent bleeding noted: no  Does the patient take any Blood Thinners? yes  Medications: Anticoagulants:  apixaban/Eliquis      Past Medical History: hypertension, diabetes, hyperlipidemia, MI/CAD and stroke    Past Surgical History: L CEA on May 19th    Family History: no relevant history    Social History: former smoker    Allergies: Pcn [Penicillins]  Bactrim [Sulfamethoxazole-Trimethoprim]  Ceclor [Cefaclor]  Latex, Natural Rubber No relevant allergies    Review of Systems   Unable to perform ROS: Mental status change     Objective:   Vitals: Blood pressure (!) 203/96, pulse 64, temperature 97.9 °F (36.6 °C), temperature source Oral, resp. rate 14, height 5' 9" (1.753 m), weight 117.9 kg (260 lb), SpO2 96 %.    CT READ: Yes  Abnormal CT Chronic appearing infarcts involcing the the right cerebellum, left pariieto-occipital cortex, right MCA/YAEL border zone territory, left parietal cortex and right frontal lobe..     Physical Exam  Neurological:      Comments: Alert, not oriented to place or time.  Appears restless and with orolingual movements.  Follows simple commands but unable to name objects, repeat or describe the cookie jar picture.  Severe dysarthria  No movement or sensation in the RUE.  Some movement in the RLE stump.               Recommended the " emergency room physician to have a brief discussion with the patient and/or family if available regarding the  risks and benefits of treatment, and to briefly document the occurrence of that discussion in his clinical encounter note.     The attending portion of this evaluation, treatment, and documentation was performed per Facundo Chang MD via audiovisual.    Billing code:  (non-stroke, some mimics)    This patient has neurological symptom(s)/condition/illness, with minimal potential for morbidity and mortality.  There is a low probability for acute neurological change leading to clinical and possibly life-threatening deterioration requiring highest level of physician preparedness for urgent intervention.  Care was coordinated with other physicians involved in the patient's care.  Radiologic studies and laboratory data were reviewed and interpreted, and plan of care was re-assessed based on the results.  Diagnosis, treatment options and prognosis may have been discussed with the patient and/or family members or caregiver.    In your opinion, this was a: Tier 2 Van Positive    Consult End Time: 10:35 PM     Facundo Chang MD  [facility] Stroke Center  Vascular Neurology   [facility]

## 2023-06-08 NOTE — SUBJECTIVE & OBJECTIVE
"  Woke up with symptoms?: Unknonw    Recent bleeding noted: no  Does the patient take any Blood Thinners? yes  Medications: Anticoagulants:  apixaban/Eliquis      Past Medical History: hypertension, diabetes, hyperlipidemia, MI/CAD and stroke    Past Surgical History: L CEA on May 19th    Family History: no relevant history    Social History: former smoker    Allergies: Pcn [Penicillins]  Bactrim [Sulfamethoxazole-Trimethoprim]  Ceclor [Cefaclor]  Latex, Natural Rubber No relevant allergies    Review of Systems   Unable to perform ROS: Mental status change     Objective:   Vitals: Blood pressure (!) 203/96, pulse 64, temperature 97.9 °F (36.6 °C), temperature source Oral, resp. rate 14, height 5' 9" (1.753 m), weight 117.9 kg (260 lb), SpO2 96 %.    CT READ: Yes  Abnormal CT Chronic appearing infarcts involcing the the right cerebellum, left pariieto-occipital cortex, right MCA/YAEL border zone territory, left parietal cortex and right frontal lobe..     Physical Exam  Neurological:      Comments: Alert, not oriented to place or time.  Appears restless and with orolingual movements.  Follows simple commands but unable to name objects, repeat or describe the cookie jar picture.  Severe dysarthria  No movement or sensation in the RUE.  Some movement in the RLE stump.             "

## 2023-06-08 NOTE — ASSESSMENT & PLAN NOTE
Patient has acute metabolic, toxic and anoxic encephalopathy that is secondary to Cerebral ischemia, Drug/chemicals/toxic substances (Offending substance(s)- Hx of known substance abuse) and Metabolic derangements- Hx of known substance abuse. Patient's current mental status is Awake, Alert, Confused. Patient's baseline mental status is.  Work, alert, oriented to self, person, and situation. Evaluation and for underlying cause(s) is underway and inclusive of Blood Chemistries, Consult to Neurology, Neuro-Imaging (MRI/CT), Toxic metabolite eval  and Urine Drug screen. Will monitor neuro checks carefully, avoid narcotics and benzos that will exacerbate agitation, and use PRN medications for controls of behavior for self harm.

## 2023-06-08 NOTE — ED NOTES
Attempted NIH scale, pt opens eyes to verbal stimuli but does not seem to make any effort to participate in scoring. He has moved left upper extremity and lower extremities independently , however

## 2023-06-08 NOTE — SUBJECTIVE & OBJECTIVE
"Past Medical History:   Diagnosis Date    Drug abuse, amphetamine type     wife states he has been using smoking crystal meth x 1 year    Femoral-popliteal bypass graft occlusion     Left and right    GERD (gastroesophageal reflux disease)     History of substance abuse     History of transient ischemic attack (TIA)     Hyperlipemia     Hypertension     Neuropathy     Occlusion of right femorotibial bypass graft 5/2017, 6/2017    Pre-diabetes     Status post femorotibial bypass        Past Surgical History:   Procedure Laterality Date    back fusion      FEMORAL BYPASS      left fem-pop bypass 11/2014; right fem-pop bypass 9/2016, right fem-distal bypass 3/2017    LEG AMPUTATION THROUGH KNEE         Review of patient's allergies indicates:   Allergen Reactions    Pcn [penicillins] Other (See Comments)     Other      Bactrim [sulfamethoxazole-trimethoprim] Rash     Patient requests medication be listed as an "Intolerance" rather than an allergy, stating the rash is not "so bad."    Ceclor [cefaclor] Rash    Latex, natural rubber Rash       Family History       Problem Relation (Age of Onset)    COPD Mother, Father    Diabetes Mother, Father    Heart attack Mother    Heart disease Mother    Stroke Mother          Tobacco Use    Smoking status: Every Day     Packs/day: 1.50     Years: 41.00     Pack years: 61.50     Types: Cigarettes    Smokeless tobacco: Former     Quit date: 10/22/2016   Substance and Sexual Activity    Alcohol use: No    Drug use: Yes     Types: Methamphetamines     Comment: opiates and methamphetamines: smokes crystal meth Daily    Sexual activity: Yes         Review of Systems   Unable to perform ROS: Mental status change   Objective:     Vital Signs (Most Recent):  Temp: 97.9 °F (36.6 °C) (06/07/23 2026)  Pulse: (!) 49 (06/08/23 1300)  Resp: 12 (06/08/23 1300)  BP: (!) 168/72 (06/08/23 1300)  SpO2: 100 % (06/08/23 1300) Vital Signs (24h Range):  Temp:  [97.9 °F (36.6 °C)] 97.9 °F (36.6 " °C)  Pulse:  [45-72] 49  Resp:  [8-26] 12  SpO2:  [91 %-100 %] 100 %  BP: (112-227)/() 168/72     Weight: 117.9 kg (260 lb)  Body mass index is 38.4 kg/m².      Intake/Output Summary (Last 24 hours) at 6/8/2023 1335  Last data filed at 6/8/2023 1136  Gross per 24 hour   Intake --   Output 800 ml   Net -800 ml        Physical Exam  Vitals and nursing note reviewed.   Constitutional:       General: He is not in acute distress.     Appearance: He is obese. He is ill-appearing. He is not toxic-appearing or diaphoretic.   HENT:      Head: Normocephalic and atraumatic.   Eyes:      Extraocular Movements: Extraocular movements intact.      Conjunctiva/sclera: Conjunctivae normal.      Pupils: Pupils are equal, round, and reactive to light.   Cardiovascular:      Rate and Rhythm: Normal rate and regular rhythm.      Pulses: Normal pulses.      Heart sounds: Normal heart sounds. No murmur heard.  Pulmonary:      Effort: Bradypnea present.   Abdominal:      General: Abdomen is protuberant. Bowel sounds are normal.      Tenderness: There is no abdominal tenderness.   Musculoskeletal:      Cervical back: Normal range of motion and neck supple.      Comments: MAEW      Right Lower Extremity: Right leg is amputated above knee.   Skin:     General: Skin is warm and dry.      Capillary Refill: Capillary refill takes less than 2 seconds.   Neurological:      Mental Status: He is lethargic and confused.      Motor: Weakness present.      Comments:          Vents:  Oxygen Concentration (%): 60 (06/08/23 1200)    Lines/Drains/Airways       Peripheral Intravenous Line  Duration                  Peripheral IV - Single Lumen 06/07/23 2044 20 G Anterior;Left Forearm <1 day                    Significant Labs:    CBC/Anemia Profile:  Recent Labs   Lab 06/07/23 2109   WBC 8.47   HGB 7.5*   HCT 27.6*      MCV 69*   RDW 21.4*        Chemistries:  Recent Labs   Lab 06/07/23 2109      K 4.1   CL 98   CO2 27   BUN 9    CREATININE 0.8   CALCIUM 8.5*   ALBUMIN 3.4*   PROT 6.4   BILITOT 1.2*   ALKPHOS 141*   ALT 84*   AST 31       All pertinent labs within the past 24 hours have been reviewed.    Significant Imaging:   I have reviewed all pertinent imaging results/findings within the past 24 hours.

## 2023-06-08 NOTE — TREATMENT PLAN
Patient admitted overnight to [facility] with AMS after several falls at home.   CT head no acute findings.  He was placed on oxygen and upgraded to CPAP.   He became more lethargic and dropped sats to 80 when taken off, he was then placed on BiPAP 12/5 by respiratory.   ABGs obtained pH 7.265, PCO2 73.8, PO2 24.     Critical care medicine team was consulted, patient will be upgraded to ICU.     [facility] will resume care upon downgrade to the medical floor.

## 2023-06-08 NOTE — PROGRESS NOTES
ST evaluation orders received and chart reviewed.  Pt with decline in medical status and requiring CPAP support at this time.  ST to return once status improves for assessment as clinically indicated.

## 2023-06-08 NOTE — PT/OT/SLP PROGRESS
Occupational Therapy      Patient Name:  Ish Guardado   MRN:  [id number]    OT eval orders received. Chart review completed. Presented to room at 0735 and spoke to nurseJanet. Patient on continuous CPAP, confused, and bradycardic.  Will hold evaluation at this time and initiate when patient appropriate.    Madeline Saldivar OT    6/8/2023

## 2023-06-08 NOTE — HPI
Ish Guardado is a 62 y.o. male with a PMH  has a past medical history of Drug abuse, amphetamine type, Femoral-popliteal bypass graft occlusion, GERD (gastroesophageal reflux disease), History of substance abuse, History of transient ischemic attack (TIA), Hyperlipemia, Hypertension, Neuropathy, Occlusion of right femorotibial bypass graft (5/2017, 6/2017), Pre-diabetes, and Status post femorotibial bypass.  Presented to the ER accompanied by his wife and son to be evaluated for altered mental status which started earlier this morning.  History obtained by wife at bedside secondary to patient's change in mental status.  Per wife, patient has been having altered mental status for the last 3 days (06/04/2023), but states that he became more confused shortly after waking this morning.  Patient has history of CVA which left him with deficits of weakness on right side as well as dysarthria and memory issues.  She also states that patient has been falling off his wheelchair and needed help off the floor.  Patient has had visits to the hospital for evaluation for these falls no acute findings.  It is noted that patient is on Eliquis.  She also notices at his right arm her.  Which weaker than usual patient has been unable to move it.  Furthermore, she noticed that the patient's speech has become more slurred and patient has more confused in his responses when spoken to.  She also states that patient recently had a left carotid endarterectomy performed on 05/19/2023 performed by Dr. Hartley.    ER workup unremarkable with the exception to H/H of 7.5/27.6 (previously 8.8/31.1 on 10/15/2022).  UA and drug screen negative.  Ammonia level negative.  CT head without acute findings.  Plain film imaging of left arm and left lower leg negative for acute findings.  Chest x-ray shows cardiomegaly.  Tele vascular Neurology consult placed.  Outside window for any thromboembolic lytic therapy.  Patient currently being treated with  Eliquis, aspirin, and Plavix.  Hospital Medicine consulted to admit patient for further stroke workup/management.  Wife and son at bedside are in agreement with treatment plan.  Patient will be placed under observation status.    PCP: Pollo Arana

## 2023-06-08 NOTE — H&P
[facility] - Emergency Dept.  [facility] Medicine  History & Physical    Patient Name: Ish Guardado  MRN: [id number]  Patient Class: OP- Observation  Admission Date: 6/7/2023  Attending Physician: Ortiz Granados MD   Primary Care Provider: Pollo Arana MD         Patient information was obtained from spouse/SO, past medical records and ER records.     Subjective:     Principal Problem:AMS (altered mental status)    Chief Complaint:   Chief Complaint   Patient presents with    Fall     Pt had a fall this morning and was found on the floor next to the bed, unwitnessed fall unknown LOC, pt had a second fall this evening where he slid down off of the toilet, this was witnessed -LOC, pt is on thinners, c/o L arm pain and L ankle pain and numbness to R arm, hx of cva with deficits to the R side, hx of copd, and R AKA          HPI: Ish Guardado is a 62 y.o. male with a PMH  has a past medical history of Drug abuse, amphetamine type, Femoral-popliteal bypass graft occlusion, GERD (gastroesophageal reflux disease), History of substance abuse, History of transient ischemic attack (TIA), Hyperlipemia, Hypertension, Neuropathy, Occlusion of right femorotibial bypass graft (5/2017, 6/2017), Pre-diabetes, and Status post femorotibial bypass.  Presented to the ER accompanied by his wife and son to be evaluated for altered mental status which started earlier this morning.  History obtained by wife at bedside secondary to patient's change in mental status.  Per wife, patient has been having altered mental status for the last 3 days (06/04/2023), but states that he became more confused shortly after waking this morning.  Patient has history of CVA which left him with deficits of weakness on right side as well as dysarthria and memory issues.  She also states that patient has been falling off his wheelchair and needed help off the floor.  Patient has had visits to the hospital for evaluation for these falls no acute findings.  It  is noted that patient is on Eliquis.  She also notices at his right arm her.  Which weaker than usual patient has been unable to move it.  Furthermore, she noticed that the patient's speech has become more slurred and patient has more confused in his responses when spoken to.  She also states that patient recently had a left carotid endarterectomy performed on 05/19/2023 performed by Dr. Hartley.    ER workup unremarkable with the exception to H/H of 7.5/27.6 (previously 8.8/31.1 on 10/15/2022).  UA and drug screen negative.  Ammonia level negative.  CT head without acute findings.  Plain film imaging of left arm and left lower leg negative for acute findings.  Chest x-ray shows cardiomegaly.  Tele vascular Neurology consult placed.  Outside window for any thromboembolic lytic therapy.  Patient currently being treated with Eliquis, aspirin, and Plavix.  Hospital Medicine consulted to admit patient for further stroke workup/management.  Wife and son at bedside are in agreement with treatment plan.  Patient will be placed under observation status.    PCP: Pollo Arana      Past Medical History:   Diagnosis Date    Drug abuse, amphetamine type     wife states he has been using smoking crystal meth x 1 year    Femoral-popliteal bypass graft occlusion     Left and right    GERD (gastroesophageal reflux disease)     History of substance abuse     History of transient ischemic attack (TIA)     Hyperlipemia     Hypertension     Neuropathy     Occlusion of right femorotibial bypass graft 5/2017, 6/2017    Pre-diabetes     Status post femorotibial bypass        Past Surgical History:   Procedure Laterality Date    back fusion      FEMORAL BYPASS      left fem-pop bypass 11/2014; right fem-pop bypass 9/2016, right fem-distal bypass 3/2017    LEG AMPUTATION THROUGH KNEE         Review of patient's allergies indicates:   Allergen Reactions    Pcn [penicillins] Other (See Comments)     Other      Bactrim  "[sulfamethoxazole-trimethoprim] Rash     Patient requests medication be listed as an "Intolerance" rather than an allergy, stating the rash is not "so bad."    Ceclor [cefaclor] Rash    Latex, natural rubber Rash       No current facility-administered medications on file prior to encounter.     Current Outpatient Medications on File Prior to Encounter   Medication Sig    albuterol-ipratropium (DUO-NEB) 2.5 mg-0.5 mg/3 mL nebulizer solution Inhale 3 mLs into the lungs.    amlodipine (NORVASC) 10 MG tablet Take 10 mg by mouth once daily.    aspirin (ECOTRIN) 81 MG EC tablet Take 81 mg by mouth once daily.    atorvastatin (LIPITOR) 20 MG tablet Take 10 mg by mouth once daily.     DUPIXENT  mg/2 mL PnIj Inject into the skin.    ELIQUIS 5 mg Tab Take 5 mg by mouth 2 (two) times daily.    furosemide (LASIX) 40 MG tablet Take 1 tablet by mouth every morning.    ketoconazole (NIZORAL) 2 % shampoo Apply topically.    melatonin (MELATIN) 5 mg Take 1 tablet by mouth nightly as needed.    metoprolol succinate (TOPROL-XL) 25 MG 24 hr tablet Take 25 mg by mouth.    pantoprazole (PROTONIX) 20 MG tablet Take 20 mg by mouth once daily.    pantoprazole (PROTONIX) 40 MG tablet Take 1 tablet by mouth every morning.    pregabalin (LYRICA) 300 MG Cap Take 300 mg by mouth.    sertraline (ZOLOFT) 25 MG tablet TAKE 1 TABLET EVERY DAY    tiZANidine (ZANAFLEX) 4 MG tablet Take 1 tablet by mouth every evening.    azelastine (ASTELIN) 137 mcg (0.1 %) nasal spray 2 sprays.    clopidogrel (PLAVIX) 75 mg tablet Take 75 mg by mouth once daily.    gentamicin (GARAMYCIN) 0.3 % ophthalmic solution Place into the left eye.    hydrOXYzine HCL (ATARAX) 25 MG tablet Take 25 mg by mouth every 6 (six) hours as needed.    mometasone (ELOCON) 0.1 % solution APPLY 1 ML TOPICALLY TWICE DAILY TO RASHY AREAS IN SCALP AND BEARD AREA TWICE DAILY FOR 2 WEEKS AS NEED FOR FLARES    mupirocin (BACTROBAN) 2 % ointment Apply topically.    " oxyCODONE-acetaminophen (PERCOCET)  mg per tablet Take 1 tablet by mouth every 8 (eight) hours as needed for Pain.    traMADoL (ULTRAM) 50 mg tablet Take 50 mg by mouth 4 (four) times daily as needed.    triamcinolone acetonide 0.025% (KENALOG) 0.025 % cream Apply topically 2 (two) times daily.    [DISCONTINUED] mirtazapine (REMERON) 7.5 MG Tab Take 7.5 mg by mouth.    [DISCONTINUED] montelukast (SINGULAIR) 10 mg tablet Take 10 mg by mouth.    [DISCONTINUED] pregabalin (LYRICA) 300 MG Cap Take 300 mg by mouth 2 (two) times daily.    [DISCONTINUED] sertraline (ZOLOFT) 50 MG tablet Take 1 tablet by mouth every evening.     Family History       Problem Relation (Age of Onset)    COPD Mother, Father    Diabetes Mother, Father    Heart attack Mother    Heart disease Mother    Stroke Mother          Tobacco Use    Smoking status: Every Day     Packs/day: 1.50     Years: 41.00     Pack years: 61.50     Types: Cigarettes    Smokeless tobacco: Former     Quit date: 10/22/2016   Substance and Sexual Activity    Alcohol use: No    Drug use: Yes     Types: Methamphetamines     Comment: opiates and methamphetamines: smokes crystal meth Daily    Sexual activity: Yes     Review of Systems   Unable to perform ROS: Mental status change   Objective:     Vital Signs (Most Recent):  Temp: 97.9 °F (36.6 °C) (06/07/23 2026)  Pulse: 65 (06/08/23 0103)  Resp: (!) 22 (06/08/23 0103)  BP: (!) 116/55 (06/08/23 0103)  SpO2: (!) 93 % (06/08/23 0103) Vital Signs (24h Range):  Temp:  [97.9 °F (36.6 °C)] 97.9 °F (36.6 °C)  Pulse:  [63-72] 65  Resp:  [14-26] 22  SpO2:  [91 %-97 %] 93 %  BP: (112-227)/() 116/55     Weight: 117.9 kg (260 lb)  Body mass index is 38.4 kg/m².     Physical Exam  Vitals and nursing note reviewed.   Constitutional:       General: He is awake. He is not in acute distress.     Appearance: He is obese. He is ill-appearing. He is not toxic-appearing or diaphoretic.      Comments: Appears restless in  stretcher   HENT:      Head: Normocephalic and atraumatic.   Eyes:      Extraocular Movements: Extraocular movements intact.      Conjunctiva/sclera: Conjunctivae normal.      Pupils: Pupils are equal, round, and reactive to light.   Cardiovascular:      Rate and Rhythm: Normal rate and regular rhythm.      Pulses: Normal pulses.      Heart sounds: Normal heart sounds. No murmur heard.  Pulmonary:      Effort: Pulmonary effort is normal.      Breath sounds: Normal breath sounds.   Abdominal:      General: Abdomen is protuberant. Bowel sounds are normal.      Tenderness: There is no abdominal tenderness.   Musculoskeletal:      Cervical back: Normal range of motion and neck supple.      Comments: MAEW      Right Lower Extremity: Right leg is amputated above knee.   Skin:     General: Skin is warm and dry.      Capillary Refill: Capillary refill takes less than 2 seconds.   Neurological:      Mental Status: He is alert. He is confused.      GCS: GCS eye subscore is 4. GCS verbal subscore is 4. GCS motor subscore is 6.      Comments:  NIH Stroke Scale  Time: 11:30 PM    1a  Level of consciousness: 0=alert; keenly responsive  1b. LOC questions:  0=Answers both tasks correctly  1c. LOC commands: 0=Answers both tasks correctly  2.  Best Gaze: 0=normal  3.  Visual: 0=No visual loss  4. Facial Palsy: 0=Normal symmetric movement  5a.  Motor left arm: 0=No drift, limb holds 90 (or 45) degrees for full 10 seconds  5b.  Motor right arm: 4=No movement  6a. motor left le=No drift, limb holds 90 (or 45) degrees for full 10 seconds  6b  Motor right le=No drift, limb holds 90 (or 45) degrees for full 10 seconds  7. Limb Ataxia: 0=Absent  8.  Sensory: 2=Severe to total sensory loss; patient is not aware of being touched in face, arm, leg  9. Best Language:  1=Mild to moderate aphasia; some obvious loss of fluency or facility of comprehension without significant limitation on ideas expressed or form of  expression.  10. Dysarthria: 1=Mild to moderate, patient slurs at least some words and at worst, can be understood with some difficulty  11. Extinction and Inattention: 0=No abnormality  12. Distal motor function: 0=Normal   Total:   8         Psychiatric:         Mood and Affect: Mood is anxious.         Behavior: Behavior is cooperative.          CRANIAL NERVES     CN III, IV, VI   Pupils are equal, round, and reactive to light.   LABS:  Recent Results (from the past 24 hour(s))   POCT glucose    Collection Time: 06/07/23  9:02 PM   Result Value Ref Range    POCT Glucose 85 70 - 110 mg/dL   Urinalysis, Reflex to Urine Culture Urine, Clean Catch    Collection Time: 06/07/23  9:05 PM    Specimen: Urine   Result Value Ref Range    Specimen UA Urine, Clean Catch     Color, UA Colorless (A) Yellow, Straw, Suzan    Appearance, UA Clear Clear    pH, UA 7.0 5.0 - 8.0    Specific Gravity, UA 1.005 1.005 - 1.030    Protein, UA Negative Negative    Glucose, UA Negative Negative    Ketones, UA Negative Negative    Bilirubin (UA) Negative Negative    Occult Blood UA Negative Negative    Nitrite, UA Negative Negative    Urobilinogen, UA Negative <2.0 EU/dL    Leukocytes, UA Negative Negative   Drug screen panel, in-house    Collection Time: 06/07/23  9:05 PM   Result Value Ref Range    Benzodiazepines Negative Negative    Methadone metabolites Negative Negative    Cocaine (Metab.) Negative Negative    Opiate Scrn, Ur Negative Negative    Barbiturate Screen, Ur Negative Negative    Amphetamine Screen, Ur Negative Negative    THC Negative Negative    Phencyclidine Negative Negative    Creatinine, Urine 12.4 (L) 23.0 - 375.0 mg/dL    Toxicology Information SEE COMMENT    CBC W/ AUTO DIFFERENTIAL    Collection Time: 06/07/23  9:09 PM   Result Value Ref Range    WBC 8.47 3.90 - 12.70 K/uL    RBC 3.98 (L) 4.60 - 6.20 M/uL    Hemoglobin 7.5 (L) 14.0 - 18.0 g/dL    Hematocrit 27.6 (L) 40.0 - 54.0 %    MCV 69 (L) 82 - 98 fL    MCH 18.8  (L) 27.0 - 31.0 pg    MCHC 27.2 (L) 32.0 - 36.0 g/dL    RDW 21.4 (H) 11.5 - 14.5 %    Platelets 241 150 - 450 K/uL    MPV 10.5 9.2 - 12.9 fL    Immature Granulocytes 0.5 0.0 - 0.5 %    Gran # (ANC) 6.2 1.8 - 7.7 K/uL    Immature Grans (Abs) 0.04 0.00 - 0.04 K/uL    Lymph # 1.2 1.0 - 4.8 K/uL    Mono # 0.5 0.3 - 1.0 K/uL    Eos # 0.5 0.0 - 0.5 K/uL    Baso # 0.07 0.00 - 0.20 K/uL    nRBC 2 (A) 0 /100 WBC    Gran % 73.6 (H) 38.0 - 73.0 %    Lymph % 13.7 (L) 18.0 - 48.0 %    Mono % 6.0 4.0 - 15.0 %    Eosinophil % 5.4 0.0 - 8.0 %    Basophil % 0.8 0.0 - 1.9 %    Differential Method Automated    Comprehensive metabolic panel    Collection Time: 06/07/23  9:09 PM   Result Value Ref Range    Sodium 136 136 - 145 mmol/L    Potassium 4.1 3.5 - 5.1 mmol/L    Chloride 98 95 - 110 mmol/L    CO2 27 23 - 29 mmol/L    Glucose 77 70 - 110 mg/dL    BUN 9 8 - 23 mg/dL    Creatinine 0.8 0.5 - 1.4 mg/dL    Calcium 8.5 (L) 8.7 - 10.5 mg/dL    Total Protein 6.4 6.0 - 8.4 g/dL    Albumin 3.4 (L) 3.5 - 5.2 g/dL    Total Bilirubin 1.2 (H) 0.1 - 1.0 mg/dL    Alkaline Phosphatase 141 (H) 55 - 135 U/L    AST 31 10 - 40 U/L    ALT 84 (H) 10 - 44 U/L    Anion Gap 11 8 - 16 mmol/L    eGFR >60 >60 mL/min/1.73 m^2   Protime-INR    Collection Time: 06/07/23  9:09 PM   Result Value Ref Range    Prothrombin Time 11.4 9.0 - 12.5 sec    INR 1.1 0.8 - 1.2   TSH    Collection Time: 06/07/23  9:09 PM   Result Value Ref Range    TSH 7.005 (H) 0.400 - 4.000 uIU/mL   Troponin I    Collection Time: 06/07/23  9:09 PM   Result Value Ref Range    Troponin I <0.006 0.000 - 0.026 ng/mL   T4, Free    Collection Time: 06/07/23  9:09 PM   Result Value Ref Range    Free T4 0.86 0.71 - 1.51 ng/dL   Ammonia    Collection Time: 06/07/23 10:55 PM   Result Value Ref Range    Ammonia 32 10 - 50 umol/L   Lactic acid, plasma    Collection Time: 06/07/23 10:55 PM   Result Value Ref Range    Lactate (Lactic Acid) 1.0 0.5 - 2.2 mmol/L   ISTAT PROCEDURE    Collection Time:  06/08/23 12:45 AM   Result Value Ref Range    POC PH 7.367 7.35 - 7.45    POC PCO2 55.9 (H) 35 - 45 mmHg    POC PO2 77 (L) 80 - 100 mmHg    POC HCO3 32.1 (H) 24 - 28 mmol/L    POC BE 7 -2 to 2 mmol/L    POC SATURATED O2 94 (L) 95 - 100 %    Sample ARTERIAL     Site LR     Allens Test Pass     DelSys Nasal Can     Mode SPONT     Flow 5     FiO2 40        RADIOLOGY  X-Ray Chest 1 View    Result Date: 6/4/2023  XR CHEST 1 VIEW CLINICAL INDICATION: shortness of breath COMPARISON:6/3/2023 FINDINGS: Single portable radiograph of the chest. The lungs are well expanded. No focal consolidation, effusion, or pneumothorax.  Cardiomegaly.     No evidence of an acute pulmonary process.    X-Ray Chest 1 View    Result Date: 6/3/2023  XR CHEST 1 VIEW CLINICAL INDICATION: chest pain,  other COMPARISON:5/28/2023 FINDINGS: Single portable radiograph of the chest. The lungs are well expanded. No focal consolidation, effusion, or pneumothorax.  Cardiomegaly.     No evidence of an acute pulmonary process.    X-Ray Chest 1 View    Result Date: 5/28/2023  XR CHEST 1 VIEW Indication: Pacs merged reason for exam. hypoxia Comparison: 5/27/2023 Discussion:   There is cardiomegaly.  There is no evidence of pneumothorax. Right mid to lower lung field pulmonary opacification has progressed compared to prior study. Diaphragm and heart border partially obscured. The mediastinal structures appear to be slightly shifted toward the right. The patient is rotated in projection and there is lordotic projection of imaging which causes distortion of the normal anatomy. There is increased prominence of the perihilar and interstitial markings on the left which is similar to the prior exam.    1. Cardiomegaly 2. Mixed interstitial and hazy alveolar pulmonary parenchymal opacities with suspected right-sided pleural effusion. Correlate for pulmonary edema/CHF. Differential includes pneumonia.    X-Ray Chest 1 View    Result Date: 5/27/2023  XR CHEST 1 VIEW  Indication: Pacs merged reason for exam. chest pain on breathing Comparison: May 24, 2023 Discussion:   Cardiomegaly. No evidence of pneumothorax. Electronic monitoring device is projected over the chest. Increased interstitial pulmonary parenchymal opacities and hazy airspace opacification. Apical lordotic projection. There may be a small right-sided pleural effusion versus adhesion. Thickening of the pleural stripe.    Increased pulmonary parenchymal opacification compared to previous exam. Correlate for acute pulmonary parenchymal disease such as pulmonary edema/CHF.  Differential includes infectious/inflammatory process.    X-Ray Chest 1 View    Result Date: 5/24/2023  XR CHEST 1 VIEW HISTORY:  shortness of breath AP view of the chest was obtained. The cardiac size is technically magnified, cannot exclude a true degree of cardiomegaly. The mediastinal and hilar structures are normal. The lungs are clear and normally inflated. The osseous structures are intact.    Cardiac size is technically magnified, cannot exclude a true degree of cardiomegaly. No pulmonary edema.    X-Ray Chest 1 View    Result Date: 5/20/2023  XR CHEST 1 VIEW Indication: Pacs merged reason for exam. respiratory failure Comparison: April 12, 2023 Discussion:   There is cardiomegaly.  There is no evidence of pneumothorax. Tubing projected over the left neck with multiple surgical clips. Correlate for drain. Mixed interstitial and hazy alveolar pulmonary parenchymal opacification present bilaterally. Right upper lung field opacification has improved.    No significant detrimental change appreciated from the prior exam.    X-Ray Humerus 2 View Left    Result Date: 6/7/2023  EXAMINATION: XR HUMERUS 2 VIEW LEFT CLINICAL HISTORY: XR HUMERUS 2 VIEW LEFTPain in left arm COMPARISON: None FINDINGS: Multiple radiographic views  were obtained. No acute fracture.  Degenerative joint disease.  Suboptimal positioning     As above Electronically signed  by: Awais Purdy Date:    06/07/2023 Time:    21:52    X-Ray Tibia Fibula 2 View Left    Result Date: 6/7/2023  EXAMINATION: XR TIBIA FIBULA 2 VIEW LEFT CLINICAL HISTORY: XR TIBIA FIBULA 2 VIEW LEFTPain in left arm COMPARISON: None FINDINGS: Multiple radiographic views  were obtained. No evidence of acute fracture or dislocation.  Mild degenerative joint disease.  Postsurgical changes in the posterior fossa.     No acute abnormality. Electronically signed by: Awais Purdy Date:    06/07/2023 Time:    21:50    X-Ray Ankle Complete Left    Result Date: 6/4/2023  XR ANKLE 3+ VIEW LEFT CLINICAL INDICATION: ankle injury COMPARISON:None. FINDINGS: 4 views of the left ankle. No acute fracture or dislocation. The ankle mortise is preserved. No ankle joint effusion.     No acute findings.    X-Ray Foot Complete Left    Result Date: 6/4/2023  XR FOOT 3+ VIEW LEFT CLINICAL INDICATION: foot pain COMPARISON:None. FINDINGS: 3 views of the left foot. No acute fracture or dislocation. Joint spaces are preserved.     No acute findings.    CT Head Without Contrast    Result Date: 6/7/2023  EXAMINATION: CT HEAD WITHOUT CONTRAST CLINICAL HISTORY: Neuro deficit, acute, stroke suspected; TECHNIQUE: Low dose axial CT images obtained throughout the head without intravenous contrast. Sagittal and coronal reconstructions were performed. COMPARISON: None. FINDINGS: Mild motion artifacts.  Atrophy and chronic white matter changes.  Geographic area of low density scattered throughout the frontal parietal occipital and posterior fossa consistent with old infarcts. No parenchymal mass, hemorrhage, edema or major vascular distribution infarct. Skull/extracranial contents (limited evaluation): No fracture. Mastoid air cells and paranasal sinuses are essentially clear.     No acute abnormality. Atrophy and chronic white matter changes Multifocal old infarcts All CT scans   are performed using dose optimization techniques including the following:  automated exposure control; adjustment of the mA and/or kV; use of iterative reconstruction technique.  Dose modulation was employed for ALARA by means of: Automated exposure control; adjustment of the mA and/or kV according to patient size (this includes techniques or standardized protocols for targeted exams where dose is matched to indication/reason for exam; i.e. extremities or head); and/or use of iterative reconstructive technique. Electronically signed by: Awais Purdy Date:    06/07/2023 Time:    21:26    CT Head Without Contrast    Result Date: 6/4/2023  CT HEAD WO CONTRAST HISTORY:  fall,  hit head COMPARISON: CT head without contrast May 27, 2023 Axial images were obtained from the base of the skull to the vertex without the administration of intravenous contrast.  Automated exposure technique was utilized for dose reduction. Multifocal remote infarcts in both hemispheres and the cerebellum. There is cerebral atrophy with resultant dilatation of the ventricular system.  There is no hemorrhage, mass, or midline shift. There is no extra-axial fluid collection. No acute infarct. The calvarium is intact. The paranasal sinuses are well aerated.    Cerebral atrophy and multifocal remote infarcts.    CT Head Without Contrast    Result Date: 5/27/2023  INDICATION: Seizure,  new-onset,  no history of trauma TECHNIQUE: Noncontrast head CT. Automated exposure control was used to reduce radiation dose.   COMPARISON: 4/12/2023 FINDINGS: No acute intracranial hemorrhage. Chronic left temporo-occipital, right frontal and right cerebellar infarcts without significant change. Mild chronic dilatation of the left occipital horn. Patent basal CSF cisterns. Carotid/vertebral vascular calcifications. Normally aerated paranasal sinuses and mastoid air cells. Intact calvarium. Intact scalp. Intact intraorbital structures.    1. No acute intracranial hemorrhage. 2. Chronic bilateral infarcts. 3. Other findings as  described.    CTA Neck    Result Date: 5/25/2023  INDICATION: Evaluate for hematoma. Post endarterectomy. TECHNIQUE: CT angiogram neck performed. 3-D reformats were reviewed. 3-D processing was performed on an independent workstation. Where applicable, stenosis measurements were performed per NASCET criteria: mild (less than 50%), moderate (50- 69 %), severe (70-99%). Automated exposure control was used to reduce radiation dose. COMPARISON: Correlation with neck ultrasound 5/24/2023. FINDINGS: NECK CTA: Aortic arch: Proximal arch vessels are widely patent. Calcified plaque. Right carotid: No stenosis or intraluminal filling defects. Mild carotid bulb calcified plaque. Left carotid:  Left carotid bulb postoperative change, contour irregularity, wall thickening and minimal calcified plaque. Right vertebral: No stenosis or intraluminal filling defects. Left vertebral: No stenosis or intraluminal filling defects. Other: No active bleeding. Reversal of the cervical lordosis. Degenerative spine changes. Scoliosis.    1. Left carotid bulb postsurgical change/nonspecific wall thickening. 2. No active bleeding. 3. Other findings as described.    US Soft Tissue Head Neck Thyroid    Result Date: 5/25/2023  INDICATION: Evaluate for hematoma. Patient 5 days post endarterectomy left CCA. TECHNIQUE: Ultrasound of the soft tissues of the neck using grayscale and color Doppler technique. COMPARISON: Correlation with CTA neck 5/24/2023. FINDINGS: Limited visualization of the left CCA due to swelling. A heterogeneous nonvascular/nonspecific soft tissue/questionable collection in the left neck 5.3 x 2.9 x 3.5 cm.    1. Limited visualization. 2. Other findings as described.    X-Ray Chest AP Portable    Result Date: 6/7/2023  EXAMINATION: XR CHEST AP PORTABLE CLINICAL HISTORY: Stroke; TECHNIQUE: Single frontal view of the chest was performed. COMPARISON: None FINDINGS: Cardiomegaly.  Mild central pulmonary interstitial opacities.   Similar findings to prior exam. Bones are intact.     As above Electronically signed by: Awais Purdy Date:    2023 Time:    22:06    Transthoracic echo (TTE) complete    Result Date: 2023  Transthoracic Echocardiographic Report Patient Name: AMBER NGUYEN M Patient ID: [id number] Account #: : [id number] (62y 10m) Gender: M Study Date: 2023 08:54:10 AM Ht(Cm): 175 Wt(Kg): 118.9 BSA: 2.4 Accession #: [id number] Sonographer: aftab Location: [facility] Provider: ROBERT MANNING Heart Rate: 69 Quality: Technically difficult study due to limited acoustic windows. Ref.Provider: ROBERT MANNING -----------------------  CONCLUSIONS: 1. Normal left ventricular systolic function. LVEF 55 - 65%. Left ventricular diastolic function could not be assessed due to the presence of atrial fibrillation during the study. Normal wall motion. 2. Normal right ventricular size. Normal right ventricular systolic function. 3. No or trivial tricuspid valve regurgitation. The estimated right ventricular systolic pressure/PASP is <35 mmHg. No pulmonary hypertension. 4. No significant functional valvular abnormalities. 5. Technically difficult study with poor acoustic windows. -----------------------  PROCEDURES: Echocardiographic Report: Transthoracic complete echo, 2D, spectral and tissue Doppler, color flow Doppler, M-mode. Sonographer: Ronit Lombardo -----------------------  INDICATIONS: Heart failure. -----------------------  FINDINGS: Left Ventricle: Normal left ventricular cavity size. Normal left ventricular systolic function. LVEF 55 - 65%. Left ventricular diastolic function could not be assessed due to the presence of atrial fibrillation during the study. Normal wall thickness. Normal wall motion. Right Ventricle: Normal right ventricular size. Normal right ventricular systolic function. Left Atrium: The left atrium is normal in size. Right Atrium: The right atrium is normal in size. Mitral Valve: Normal mitral  valve structure. No or trivial mitral valve regurgitation. No mitral valve stenosis. Aortic Valve: The aortic valve appears to have a tricuspid configuration (suboptimal visualization). No or trivial aortic valve regurgitation. No aortic valve stenosis. AV peak PG 7 mmHg Tricuspid Valve: Normal tricuspid valve structure. No or trivial tricuspid valve regurgitation. No tricuspid valve stenosis. The estimated right ventricular systolic pressure/PASP is <35 mmHg. No pulmonary hypertension. Pulmonic Valve: The pulmonic valve is not well visualized. Pericardium: Normal pericardium without evidence of pericardial effusion. Aorta: Aorta not well visualized IVC: Normal IVC size with <50% collapse with inspiration. Intermediate estimated RAP around 5-10 mmHg. Rhythm: The rhythm during the study was atrial fibrillation. -----------------------  MEASUREMENTS: 2D/MM Value Doppler Value LVIDd 2D 4.20 cm AV Peak Yoandy 134.00 cm/sec LVIDs 2D 2.30 cm AV Peak PG 7.00 mmHg LV FS Teich 2D 45.20 % TR Peak Yoandy 216.00 cm/sec IVSd 2D 1.00 cm TR Peak PG 19.00 mmHg IVS/LVPW 2D 1.00 ratio RA Pressure 3.00 mmHg EDV 2D 78.58 mL RVSP 22.00 ESV 2D 18.12 mL EF Teich 2D 77 % LA Dimension 2D 4.00 cm LA Area 4C 17.50 cm2 LA Volume 4C 45.00 mL IVC Diam 2.20 sec Electronically Signed By: Osvaldo Valadez 2023-05-22 15:28:58 CDT CC: CC:    Cardiovascular    Result Date: 5/19/2023  This order was auto-finalized. Please see relevant op note for procedure result.    USV Carotid Duplex Carotid Left    Result Date: 5/26/2023  See Vascular Ultrasound Result Document See Vascular Ultrasound Result Document      EKG    MICROBIOLOGY    MDM     Amount and/or Complexity of Data Reviewed  Clinical lab tests: reviewed  Tests in the radiology section of CPT®: reviewed  Tests in the medicine section of CPT®: reviewed  Discussion of test results with the performing providers: yes  Decide to obtain previous medical records or to obtain history from someone other than the  patient: yes  Obtain history from someone other than the patient: yes  Review and summarize past medical records: yes  Discuss the patient with other providers: yes  Independent visualization of images, tracings, or specimens: yes          Assessment/Plan:     * AMS (altered mental status)  Patient has acute metabolic, toxic and anoxic encephalopathy that is secondary to Cerebral ischemia, Drug/chemicals/toxic substances (Offending substance(s)- Hx of known substance abuse) and Metabolic derangements- Hx of known substance abuse. Patient's current mental status is Awake, Alert, Confused. Patient's baseline mental status is.  Work, alert, oriented to self, person, and situation. Evaluation and for underlying cause(s) is underway and inclusive of Blood Chemistries, Consult to Neurology, Neuro-Imaging (MRI/CT), Toxic metabolite eval  and Urine Drug screen. Will monitor neuro checks carefully, avoid narcotics and benzos that will exacerbate agitation, and use PRN medications for controls of behavior for self harm.          Right arm weakness   Worsen RUE weakness from baseline. Tele Vascular Neurology consulted. Outside window for thromboembolytics.  Currently being treated with Eliquis, Plavix, and aspirin.  Recommends MRI.  Plan:  -Neuro checks q4hrs  -MRI brain w/o contrast  -PT/OT eval in am  -continue home meds      History of CVA with residual deficit  Right sided deficits with dysarthria. Worsen RUE weakness, dysarthria, and confusion. Tele Vascular Neurology consulted. Outside window for thromboembolytics.  Currently being treated with Eliquis, Plavix, and aspirin.  Recommends MRI.  Plan:  -Neuro checks q4hrs  -MRI brain w/o contrast  -PT/OT eval in am  -continue home meds      Dysarthria  See above      Mixed hyperlipidemia  Patient is chronically on statin.will continue for now. Last Lipid Panel:   Lab Results   Component Value Date    CHOL 166 10/30/2017    HDL 62 10/30/2017    LDLCALC 94.2 10/30/2017    TRIG  49 10/30/2017    CHOLHDL 37.3 10/30/2017     Plan:  -Continue home medication  -low fat/low calorie diet        Primary hypertension  Current BP mildly to moderately elevated likely 2/2 to missing home medication dosing. BP usually well controlled per patient with home medications.  Plan:  -Optimize pain control   -Continue home medications (norvasc, metoprolol, lasix), titrate as needed   -Monitor BP  -Low salt/cardiac diet when not NPO  -IV hydralazine prn for SBP>160 or DBP>90           DVT (deep venous thrombosis)  Chronic.  Stable.  Compliant with home medication.    Plan:  -continue aspirin, Plavix, Eliquis      PAD (peripheral artery disease)  Chronic.  Stable.  Compliant with home medications.    Plan:  -hold Lyrica and pain medication secondary to altered mental status      Neuropathy  Chronic.  Stable.  Compliant with home medications.    Plan:  -hold Lyrica secondary to altered mental status      GERD (gastroesophageal reflux disease)  Chronic. Stable. Currently asymptomatic. Home medications include PPI/Antacids as needed.  Plan:  -Continue PPI/Antacids as needed         VTE Risk Mitigation (From admission, onward)         Ordered     apixaban tablet 5 mg  2 times daily         06/08/23 0243     IP VTE HIGH RISK PATIENT  Once         06/08/23 0039     Place sequential compression device  Until discontinued         06/08/23 0039              //Core Measures   -DVT proph: SCDs, currently on asa, eliquis, plavix   -Code status Full    -Surrogate:spouse    Components of this note were documented using a voice recognition system and are subject to errors not corrected at the time the document was proof read. Please contact the author for any clarifications.     Angel Granados NP  Department of Hospital Medicine  [facility] - Emergency Dept.

## 2023-06-08 NOTE — CONSULTS
[facility]  Critical [facility] Medicine  Consult Note    Patient Name: Ish Guardado  MRN: [id number]  Admission Date: 6/7/2023  Hospital Length of Stay: 0 days  Code Status: DNR  Attending Physician: Adalid Lombardo MD   Primary Care Provider: Pollo Arana MD   Principal Problem: Acute on chronic respiratory failure with hypoxia and hypercapnia    [unfilled]  Subjective:     HPI:  Ish Guarddao is a 62 y.o. male with a PMH  has a past medical history of Drug abuse, amphetamine type, Femoral-popliteal bypass graft occlusion, GERD, (TIA), Hyperlipemia, Hypertension, Neuropathy, Occlusion of right femorotibial bypass graft (5/2017, 6/2017), Pre-diabetes, and Status post femorotibial bypass.  Presented to the ER accompanied by his wife and son to be evaluated for altered mental status which started earlier this morning.     Per wife, patient has been having altered mental status for the last 3 days (06/04/2023), but states that he became more confused shortly after waking this morning.  Patient has history of CVA which left him with deficits of weakness on right side as well as dysarthria and memory issues.  She also states that patient has been falling off his wheelchair and needed help off the floor. It is noted that patient is on Eliquis.  reports increased weakness of RUE and slurred speech; a code stroke was called and the patient was not deemed a candidate to receive TN as he was outside the window. CTH negative for acute findings. ER workup unremarkable with the exception to H/H of 7.5/27.6 (previously 8.8/31.1 on 10/15/2022).  UA and drug screen negative.  Ammonia level negative.  CT head without acute findings.  Plain film imaging of left arm and left lower leg negative for acute findings.  Chest x-ray shows cardiomegaly.   Per chart review, patient recently had a left carotid endarterectomy performed on 05/19/2023 performed by Dr. Hartley.  Subsequently, patient was increasingly more  "unresponsive and obtunded. ABG revealed severe hypercapnia; the patient's wife manages the CPAP and states she only puts on him as needed and is afraid to let the patient handle it given he smokes a pack a day and will light himself on fire.   Basically non compliant with CPAP and oxygen  ICU to assume care for continuous BIPAP      Hospital/ICU Course:  No notes on file    Past Medical History:   Diagnosis Date    Drug abuse, amphetamine type     wife states he has been using smoking crystal meth x 1 year    Femoral-popliteal bypass graft occlusion     Left and right    GERD (gastroesophageal reflux disease)     History of substance abuse     History of transient ischemic attack (TIA)     Hyperlipemia     Hypertension     Neuropathy     Occlusion of right femorotibial bypass graft 5/2017, 6/2017    Pre-diabetes     Status post femorotibial bypass        Past Surgical History:   Procedure Laterality Date    back fusion      FEMORAL BYPASS      left fem-pop bypass 11/2014; right fem-pop bypass 9/2016, right fem-distal bypass 3/2017    LEG AMPUTATION THROUGH KNEE         Review of patient's allergies indicates:   Allergen Reactions    Pcn [penicillins] Other (See Comments)     Other      Bactrim [sulfamethoxazole-trimethoprim] Rash     Patient requests medication be listed as an "Intolerance" rather than an allergy, stating the rash is not "so bad."    Ceclor [cefaclor] Rash    Latex, natural rubber Rash       Family History       Problem Relation (Age of Onset)    COPD Mother, Father    Diabetes Mother, Father    Heart attack Mother    Heart disease Mother    Stroke Mother          Tobacco Use    Smoking status: Every Day     Packs/day: 1.50     Years: 41.00     Pack years: 61.50     Types: Cigarettes    Smokeless tobacco: Former     Quit date: 10/22/2016   Substance and Sexual Activity    Alcohol use: No    Drug use: Yes     Types: Methamphetamines     Comment: opiates and methamphetamines: " smokes crystal meth Daily    Sexual activity: Yes         Review of Systems   Unable to perform ROS: Mental status change   Objective:     Vital Signs (Most Recent):  Temp: 97.9 °F (36.6 °C) (06/07/23 2026)  Pulse: (!) 49 (06/08/23 1300)  Resp: 12 (06/08/23 1300)  BP: (!) 168/72 (06/08/23 1300)  SpO2: 100 % (06/08/23 1300) Vital Signs (24h Range):  Temp:  [97.9 °F (36.6 °C)] 97.9 °F (36.6 °C)  Pulse:  [45-72] 49  Resp:  [8-26] 12  SpO2:  [91 %-100 %] 100 %  BP: (112-227)/() 168/72     Weight: 117.9 kg (260 lb)  Body mass index is 38.4 kg/m².      Intake/Output Summary (Last 24 hours) at 6/8/2023 1335  Last data filed at 6/8/2023 1136  Gross per 24 hour   Intake --   Output 800 ml   Net -800 ml        Physical Exam  Vitals and nursing note reviewed.   Constitutional:       General: He is not in acute distress.     Appearance: He is obese. He is ill-appearing. He is not toxic-appearing or diaphoretic.   HENT:      Head: Normocephalic and atraumatic.   Eyes:      Extraocular Movements: Extraocular movements intact.      Conjunctiva/sclera: Conjunctivae normal.      Pupils: Pupils are equal, round, and reactive to light.   Cardiovascular:      Rate and Rhythm: Normal rate and regular rhythm.      Pulses: Normal pulses.      Heart sounds: Normal heart sounds. No murmur heard.  Pulmonary:      Effort: Bradypnea present.   Abdominal:      General: Abdomen is protuberant. Bowel sounds are normal.      Tenderness: There is no abdominal tenderness.   Musculoskeletal:      Cervical back: Normal range of motion and neck supple.      Comments: MAEW      Right Lower Extremity: Right leg is amputated above knee.   Skin:     General: Skin is warm and dry.      Capillary Refill: Capillary refill takes less than 2 seconds.   Neurological:      Mental Status: He is lethargic and confused.      Motor: Weakness present.      Comments:          Vents:  Oxygen Concentration (%): 60 (06/08/23 1200)    Lines/Drains/Airways        Peripheral Intravenous Line  Duration                  Peripheral IV - Single Lumen 06/07/23 2044 20 G Anterior;Left Forearm <1 day                    Significant Labs:    CBC/Anemia Profile:  Recent Labs   Lab 06/07/23 2109   WBC 8.47   HGB 7.5*   HCT 27.6*      MCV 69*   RDW 21.4*        Chemistries:  Recent Labs   Lab 06/07/23 2109      K 4.1   CL 98   CO2 27   BUN 9   CREATININE 0.8   CALCIUM 8.5*   ALBUMIN 3.4*   PROT 6.4   BILITOT 1.2*   ALKPHOS 141*   ALT 84*   AST 31       All pertinent labs within the past 24 hours have been reviewed.    Significant Imaging:   I have reviewed all pertinent imaging results/findings within the past 24 hours.      ABG  Recent Labs   Lab 06/08/23  1251   PH 7.361   PO2 115*   PCO2 58.1*   HCO3 32.9*   BE 7     Assessment/Plan:     Neuro  History of CVA with residual deficit  MRI pending  Neuro checks  PT OT     AMS (altered mental status)  S/t acute resp failure  See plan for resp failure      Pulmonary  * Acute on chronic respiratory failure with hypoxia and hypercapnia  Patient with Hypercapnic and Hypoxic Respiratory failure which is Acute on chronic.  he is on home oxygen at 5 L NC LPM. Supplemental oxygen was provided and noted- Oxygen Concentration (%):  [50-60] 60    .   Signs/symptoms of respiratory failure include- lethargy. Contributing diagnoses includes - COPD and Obesity Hypoventilation Labs and images were reviewed. Patient Has recent ABG, which has been reviewed. Will treat underlying causes and adjust management of respiratory failure as follows    Recent Labs     06/08/23  1251   PH 7.361   PCO2 58.1*   PO2 115*   HCO3 32.9*   POCSATURATED 98   BE 7     Improved ABG  Wean bipap as able   Initiated on Rocephin and Azithro  BC x2  MRSA nasal swab      Cardiac/Vascular  Primary hypertension  -Continue home medications (norvasc, metoprolol, lasix),     Mixed hyperlipidemia  Statin when able to swallow    PAD (peripheral artery disease)  Chronic.   Stable.  Compliant with home medications.        Hematology  DVT (deep venous thrombosis)  Continue Eliquis    GI  GERD (gastroesophageal reflux disease)  PPI    Orthopedic  Right arm weakness  Worsening RUE weakness and cool to touch  Check dopplers  Arterial US negative   Neurovascular checks q4h       DVT ppx: Eliquis  GI ppx: PPI  Disp/plan for the day: Wean BiPAP      Critical Care Time: 45 minutes  Critical secondary to Patient has a condition that poses threat to life and bodily function: Severe Respiratory Distress     Critical care was time spent personally by me on the following activities: development of treatment plan with patient or surrogate and bedside caregivers, discussions with consultants, evaluation of patient's response to treatment, examination of patient, ordering and performing treatments and interventions, ordering and review of laboratory studies, ordering and review of radiographic studies, pulse oximetry, re-evaluation of patient's condition. This critical care time did not overlap with that of any other provider or involve time for any procedures.    Thank you for your consult. I will follow-up with patient. Please contact us if you have any additional questions.     Tatiana Miller NP  Critical Care Medicine  [facility] - Emergency Dept.

## 2023-06-09 LAB
ALBUMIN SERPL BCP-MCNC: 3 G/DL (ref 3.5–5.2)
ALLENS TEST: ABNORMAL
ALP SERPL-CCNC: 123 U/L (ref 55–135)
ALT SERPL W/O P-5'-P-CCNC: 60 U/L (ref 10–44)
ANION GAP SERPL CALC-SCNC: 12 MMOL/L (ref 8–16)
AST SERPL-CCNC: 33 U/L (ref 10–40)
BASOPHILS # BLD AUTO: 0.04 K/UL (ref 0–0.2)
BASOPHILS NFR BLD: 0.5 % (ref 0–1.9)
BILIRUB SERPL-MCNC: 0.9 MG/DL (ref 0.1–1)
BUN SERPL-MCNC: 6 MG/DL (ref 8–23)
CALCIUM SERPL-MCNC: 8.8 MG/DL (ref 8.7–10.5)
CHLORIDE SERPL-SCNC: 102 MMOL/L (ref 95–110)
CO2 SERPL-SCNC: 24 MMOL/L (ref 23–29)
CREAT SERPL-MCNC: 0.7 MG/DL (ref 0.5–1.4)
DELSYS: ABNORMAL
DIFFERENTIAL METHOD: ABNORMAL
EOSINOPHIL # BLD AUTO: 0.4 K/UL (ref 0–0.5)
EOSINOPHIL NFR BLD: 4.6 % (ref 0–8)
ERYTHROCYTE [DISTWIDTH] IN BLOOD BY AUTOMATED COUNT: 21.7 % (ref 11.5–14.5)
EST. GFR  (NO RACE VARIABLE): >60 ML/MIN/1.73 M^2
ESTIMATED AVG GLUCOSE: 140 MG/DL (ref 68–131)
FLOW: 4
GLUCOSE SERPL-MCNC: 70 MG/DL (ref 70–110)
HBA1C MFR BLD: 6.5 % (ref 4–5.6)
HCO3 UR-SCNC: 30.3 MMOL/L (ref 24–28)
HCT VFR BLD AUTO: 29.1 % (ref 40–54)
HGB BLD-MCNC: 7.9 G/DL (ref 14–18)
IMM GRANULOCYTES # BLD AUTO: 0.05 K/UL (ref 0–0.04)
IMM GRANULOCYTES NFR BLD AUTO: 0.6 % (ref 0–0.5)
LYMPHOCYTES # BLD AUTO: 1.3 K/UL (ref 1–4.8)
LYMPHOCYTES NFR BLD: 15.9 % (ref 18–48)
MAGNESIUM SERPL-MCNC: 2.1 MG/DL (ref 1.6–2.6)
MCH RBC QN AUTO: 19 PG (ref 27–31)
MCHC RBC AUTO-ENTMCNC: 27.1 G/DL (ref 32–36)
MCV RBC AUTO: 70 FL (ref 82–98)
MODE: ABNORMAL
MONOCYTES # BLD AUTO: 0.5 K/UL (ref 0.3–1)
MONOCYTES NFR BLD: 6.1 % (ref 4–15)
NEUTROPHILS # BLD AUTO: 5.8 K/UL (ref 1.8–7.7)
NEUTROPHILS NFR BLD: 72.3 % (ref 38–73)
NRBC BLD-RTO: 0 /100 WBC
PCO2 BLDA: 47.4 MMHG (ref 35–45)
PH SMN: 7.41 [PH] (ref 7.35–7.45)
PHOSPHATE SERPL-MCNC: 2.8 MG/DL (ref 2.7–4.5)
PLATELET # BLD AUTO: 216 K/UL (ref 150–450)
PMV BLD AUTO: 10 FL (ref 9.2–12.9)
PO2 BLDA: 70 MMHG (ref 80–100)
POC BE: 6 MMOL/L
POC SATURATED O2: 94 % (ref 95–100)
POCT GLUCOSE: 143 MG/DL (ref 70–110)
POCT GLUCOSE: 86 MG/DL (ref 70–110)
POTASSIUM SERPL-SCNC: 4.2 MMOL/L (ref 3.5–5.1)
PROT SERPL-MCNC: 5.9 G/DL (ref 6–8.4)
RBC # BLD AUTO: 4.15 M/UL (ref 4.6–6.2)
SAMPLE: ABNORMAL
SITE: ABNORMAL
SODIUM SERPL-SCNC: 138 MMOL/L (ref 136–145)
WBC # BLD AUTO: 8.06 K/UL (ref 3.9–12.7)

## 2023-06-09 PROCEDURE — 63600175 PHARM REV CODE 636 W HCPCS: Performed by: NURSE PRACTITIONER

## 2023-06-09 PROCEDURE — 92610 EVALUATE SWALLOWING FUNCTION: CPT

## 2023-06-09 PROCEDURE — 25000003 PHARM REV CODE 250: Performed by: NURSE PRACTITIONER

## 2023-06-09 PROCEDURE — 92523 SPEECH SOUND LANG COMPREHEN: CPT

## 2023-06-09 PROCEDURE — 27000221 HC OXYGEN, UP TO 24 HOURS

## 2023-06-09 PROCEDURE — 97530 THERAPEUTIC ACTIVITIES: CPT

## 2023-06-09 PROCEDURE — 25000003 PHARM REV CODE 250: Performed by: INTERNAL MEDICINE

## 2023-06-09 PROCEDURE — 80053 COMPREHEN METABOLIC PANEL: CPT | Performed by: NURSE PRACTITIONER

## 2023-06-09 PROCEDURE — 83735 ASSAY OF MAGNESIUM: CPT | Performed by: NURSE PRACTITIONER

## 2023-06-09 PROCEDURE — G0378 HOSPITAL OBSERVATION PER HR: HCPCS

## 2023-06-09 PROCEDURE — 36415 COLL VENOUS BLD VENIPUNCTURE: CPT | Performed by: NURSE PRACTITIONER

## 2023-06-09 PROCEDURE — 84100 ASSAY OF PHOSPHORUS: CPT | Performed by: NURSE PRACTITIONER

## 2023-06-09 PROCEDURE — 99900035 HC TECH TIME PER 15 MIN (STAT)

## 2023-06-09 PROCEDURE — 96372 THER/PROPH/DIAG INJ SC/IM: CPT | Performed by: NURSE PRACTITIONER

## 2023-06-09 PROCEDURE — 85025 COMPLETE CBC W/AUTO DIFF WBC: CPT | Performed by: NURSE PRACTITIONER

## 2023-06-09 PROCEDURE — 94761 N-INVAS EAR/PLS OXIMETRY MLT: CPT

## 2023-06-09 PROCEDURE — 97166 OT EVAL MOD COMPLEX 45 MIN: CPT

## 2023-06-09 PROCEDURE — 97162 PT EVAL MOD COMPLEX 30 MIN: CPT

## 2023-06-09 RX ORDER — IBUPROFEN 200 MG
16 TABLET ORAL
Status: DISCONTINUED | OUTPATIENT
Start: 2023-06-09 | End: 2023-06-15 | Stop reason: HOSPADM

## 2023-06-09 RX ORDER — HALOPERIDOL 5 MG/ML
5 INJECTION INTRAMUSCULAR ONCE
Status: COMPLETED | OUTPATIENT
Start: 2023-06-09 | End: 2023-06-09

## 2023-06-09 RX ORDER — GLUCAGON 1 MG
1 KIT INJECTION
Status: DISCONTINUED | OUTPATIENT
Start: 2023-06-09 | End: 2023-06-15 | Stop reason: HOSPADM

## 2023-06-09 RX ORDER — INSULIN ASPART 100 [IU]/ML
1-10 INJECTION, SOLUTION INTRAVENOUS; SUBCUTANEOUS
Status: DISCONTINUED | OUTPATIENT
Start: 2023-06-09 | End: 2023-06-15 | Stop reason: HOSPADM

## 2023-06-09 RX ORDER — MUPIROCIN 20 MG/G
OINTMENT TOPICAL 2 TIMES DAILY
Status: DISPENSED | OUTPATIENT
Start: 2023-06-09 | End: 2023-06-14

## 2023-06-09 RX ORDER — IBUPROFEN 200 MG
24 TABLET ORAL
Status: DISCONTINUED | OUTPATIENT
Start: 2023-06-09 | End: 2023-06-15 | Stop reason: HOSPADM

## 2023-06-09 RX ORDER — ATORVASTATIN CALCIUM 40 MG/1
40 TABLET, FILM COATED ORAL NIGHTLY
Status: DISCONTINUED | OUTPATIENT
Start: 2023-06-09 | End: 2023-06-15 | Stop reason: HOSPADM

## 2023-06-09 RX ADMIN — HALOPERIDOL LACTATE 5 MG: 5 INJECTION, SOLUTION INTRAMUSCULAR at 12:06

## 2023-06-09 RX ADMIN — FUROSEMIDE 40 MG: 40 TABLET ORAL at 08:06

## 2023-06-09 RX ADMIN — APIXABAN 5 MG: 2.5 TABLET, FILM COATED ORAL at 08:06

## 2023-06-09 RX ADMIN — CLOPIDOGREL BISULFATE 75 MG: 75 TABLET ORAL at 08:06

## 2023-06-09 RX ADMIN — PANTOPRAZOLE SODIUM 40 MG: 40 TABLET, DELAYED RELEASE ORAL at 08:06

## 2023-06-09 RX ADMIN — ATORVASTATIN CALCIUM 10 MG: 10 TABLET, FILM COATED ORAL at 08:06

## 2023-06-09 RX ADMIN — AMLODIPINE BESYLATE 10 MG: 10 TABLET ORAL at 08:06

## 2023-06-09 RX ADMIN — METOPROLOL SUCCINATE 25 MG: 25 TABLET, EXTENDED RELEASE ORAL at 08:06

## 2023-06-09 RX ADMIN — PREGABALIN 150 MG: 75 CAPSULE ORAL at 08:06

## 2023-06-09 RX ADMIN — AZITHROMYCIN MONOHYDRATE 500 MG: 500 INJECTION, POWDER, LYOPHILIZED, FOR SOLUTION INTRAVENOUS at 01:06

## 2023-06-09 NOTE — ASSESSMENT & PLAN NOTE
Patient with Hypercapnic and Hypoxic Respiratory failure which is Acute on chronic.  he is on home oxygen at 5 L NC LPM. Supplemental oxygen was provided and noted- Oxygen Concentration (%):  [50-60] 50    .   Signs/symptoms of respiratory failure include- lethargy. Contributing diagnoses includes - COPD and Obesity Hypoventilation Labs and images were reviewed. Patient Has recent ABG, which has been reviewed. Will treat underlying causes and adjust management of respiratory failure as follows    Recent Labs     06/09/23  0100   PH 7.413   PCO2 47.4*   PO2 70*   HCO3 30.3*   POCSATURATED 94*   BE 6     Improved ABG  Wean bipap as able   Initiated on Rocephin and Azithro  BC x2  MRSA nasal swab    6/9:  No signs of leukocytosis or fever  procal negative  azithro added for anti-inflam properties  Cont treating as COPD exac  BIPAP QHS

## 2023-06-09 NOTE — PT/OT/SLP EVAL
"Speech Language Pathology Evaluation  Cognitive/Bedside Swallow    Patient Name:  Ish Guardado   MRN:  [id number]  Admitting Diagnosis: Acute on chronic respiratory failure with hypoxia and hypercapnia    Recommendations:                  General Recommendations:  Dysphagia therapy, Speech/language therapy, and Cognitive-linguistic therapy  Diet recommendations:  Soft & Bite Sized Diet - IDDSI Level 6, Thin liquids - IDDSI Level 0   Aspiration Precautions: Standard aspiration precautions   General Precautions: Standard, aspiration  Communication strategies:  provide increased time to answer    Assessment:     Ish Guardado is a 62 y.o. male with an SLP diagnosis of Cognitive-Linguistic Impairment.  He presents with difficulty with communicating basic wants/needs, difficulty following directions, and decreased orientation.    History:     Past Medical History:   Diagnosis Date    Drug abuse, amphetamine type     wife states he has been using smoking crystal meth x 1 year    Femoral-popliteal bypass graft occlusion     Left and right    GERD (gastroesophageal reflux disease)     History of substance abuse     History of transient ischemic attack (TIA)     Hyperlipemia     Hypertension     Neuropathy     Occlusion of right femorotibial bypass graft 5/2017, 6/2017    Pre-diabetes     Status post femorotibial bypass        Past Surgical History:   Procedure Laterality Date    back fusion      FEMORAL BYPASS      left fem-pop bypass 11/2014; right fem-pop bypass 9/2016, right fem-distal bypass 3/2017    LEG AMPUTATION THROUGH KNEE         Social History: Patient lives with his wife.    Modified Barium Swallow: N/A    6/7/2023 Chest X-Ray with findings per Awais Purdy MD:   "FINDINGS:  Cardiomegaly.  Mild central pulmonary interstitial opacities.  Similar findings to prior exam.  Bones are intact.  Impression:  As above"     Prior diet: Thin liquids/regular consistencies.    Subjective     Patient seen at bedside with " wife present. He was positioned upright to participate in swallow evaluation.     Patient goals: Unable to state. His wife reported her goals are for him to improve medically.      Pain/Comfort:  Pain Rating 1: 0/10  Pain Rating Post-Intervention 1: 0/10  Pain Rating 2: 0/10  Pain Rating Post-Intervention 2: 0/10    Respiratory Status:  Nasal cannula    Objective:     Cognitive Status:    Patient orientated to person (name) only. Unable to answer any temporal orientation questions.       Receptive Language:   Comprehension:   Patient able to follow one step directions with 60% accuracy independently. Patient able to answer basic yes/no questions with 85% accuracy.     Expressive Language:  Verbal:    Patient unable to express basic wants/needs verbally.  Patient able to repeat single words with 80% accuracy. Unable to name objects at this time.     Voice:   Wet vocal quality prior to PO trials.     Oral Musculature Evaluation  Oral Musculature: general weakness  Dentition: edentulous  Secretion Management: adequate  Mucosal Quality: good  Mandibular Strength and Mobility: impaired  Oral Labial Strength and Mobility: impaired coordination, impaired seal, impaired pursing  Lingual Strength and Mobility: impaired strength  Velar Elevation: other (see comments) (unable to visualize due to inability to follow directions)  Buccal Strength and Mobility: impaired  Volitional Cough: Present  Volitional Swallow: Present  Voice Prior to PO Intake: Wet vocal quality  Oral Musculature Comments: Generalized weakness/decreased coordination    Bedside Swallow Eval:   Consistencies Assessed:  Thin liquids : cup sip/self regulated  Puree : Pudding/self regulated  Solids : Cracker       Oral Phase:   Oral phase characterized by labial and lingual strength and ROM adequate for bolus prep. No anterior loss of bolus observed. No oral residue observed following any PO trials.     Pharyngeal Phase:   No overt s/s of aspiration observed  with any PO trials.       Goals:   Multidisciplinary Problems       SLP Goals          Problem: SLP    Goal Priority Disciplines Outcome   SLP Goal     SLP    Description: 1. Patient will tolerate diet with no overt s/s of aspiration.   2. Patient will answer orientation questions with 70% accuracy given external aids as needed.  3. Patient will improve cognitive skills in order to promote safety awareness, decrease confusion, and improve quality of life.    4. Patient will improve expressive and receptive language skills in order to communicate basic wants/needs.                        Plan:     Patient to be seen:  2 x/week, 3 x/week   Plan of Care expires:  06/16/23  Plan of Care reviewed with:  patient, spouse   SLP Follow-Up:  Yes       Discharge recommendations:  Discharge Facility/Level of Care Needs: nursing facility, skilled   Barriers to Discharge:  Level of Skilled Assistance Needed      Time Tracking:     SLP Treatment Date:   06/09/23  Speech Start Time:  1505  Speech Stop Time:  1525     Speech Total Time (min):  20 min    Billable Minutes: Eval 10  and Eval Swallow and Oral Function 10    06/09/2023

## 2023-06-09 NOTE — ASSESSMENT & PLAN NOTE
Patient with Hypercapnic Respiratory failure which is Acute on chronic.  he is on home oxygen at 4-5 LPM. Supplemental oxygen was provided and noted- Oxygen Concentration (%):  [50-60] 50    .   Signs/symptoms of respiratory failure include- tachypnea, increased work of breathing and respiratory distress. Contributing diagnoses includes - Aspiration, CHF and COPD Labs and images were reviewed. Patient Has recent ABG, which has been reviewed. Will treat underlying causes and adjust management of respiratory failure as follows- continue breathing treatments, antibiotic(s), steroids, supplemental oxygen

## 2023-06-09 NOTE — ASSESSMENT & PLAN NOTE
Right sided deficits with dysarthria. Worsen RUE weakness, dysarthria, and confusion. Tele Vascular Neurology consulted. Outside window for thromboembolytics.  Currently being treated with Eliquis, Plavix, and aspirin.  Recommends MRI.  Plan:  -Neuro checks q4hrs  -MRI brain w/o contrast c/w cva  -PT/OT recommending skilled nursing facility   -continue home meds

## 2023-06-09 NOTE — CONSULTS
Consulted on this 63 y/o M patient due to nonblanchable redness to buttock present on admission. Patient seen in ICU, awake and alert, follows commands. RLE old healed amputation noted. Patient does have an eczema  skin condition reported by wife over phone that he is treated with injections of Dupixent prescribed by Dr. Marie in [place]. Scattered dry pink rashed areas noted to abdomen, and 1 lesion with scab noted to left posterior calf.  Patient did turn with min assistance to right side for sacral assessment. Sacral foam dressing peeled back for assessment. Sacrum, coccyx, bilateral buttocks intact with blanchable redness, preventative foam dressing reapplied. There is also an irregular red/maroon patch of intact skin noted to mid upper back region consistent with abrasion and does not appear pressure-related. Recommend turn q2 hours, float left heel. Will sign off. Please reconsult prn.

## 2023-06-09 NOTE — HOSPITAL COURSE
6/9 transfer to floor status from icu. Physical/occupational therapy recommending skilled nursing facility placement. Weaned to 4L. Consider blood transfusion for hb<7 or <8 if symptomatic given vascular disease.  6/10 cva on mri. Symptoms improving. Continue speech, physical/occupational therapy.Awaiting skilled nursing facility placement  06/11: sats stable on room air, awaiting SNF placement     Admitted s/p CVA. Stable for d/c to SNF

## 2023-06-09 NOTE — PLAN OF CARE
Nutrition Recs: 6/9  1. Recommend pt PO diet is advanced to Cardiac diet; texture per SLP recs when medically appropriate.   2. Recommend bedside swallow evaluation to determined appropriate diet texture.   3. Recommend to weigh pt weekly.    Goals:   1. Pt diet order will be advanced within 24 hrs.   2. Bedside swallow evaluation will be preformed by RD follow up  3. Pt will consume >50% of energy needs by RD follow up.  Nutrition Goal Status: new  Communication of RD Recs: other (comment) (POC: Sticky Note)  Lance Garcia, Registration Eligible, Provisional LDN

## 2023-06-09 NOTE — ASSESSMENT & PLAN NOTE
Patient is chronically on statin.will continue for now. Last Lipid Panel:   Lab Results   Component Value Date    CHOL 56 (L) 06/07/2023    HDL 14 (L) 06/07/2023    LDLCALC 27.4 (L) 06/07/2023    TRIG 73 06/07/2023    CHOLHDL 25.0 06/07/2023     Plan:  -Continue home medication  -low fat/low calorie diet

## 2023-06-09 NOTE — SUBJECTIVE & OBJECTIVE
"Past Medical History:   Diagnosis Date    Drug abuse, amphetamine type     wife states he has been using smoking crystal meth x 1 year    Femoral-popliteal bypass graft occlusion     Left and right    GERD (gastroesophageal reflux disease)     History of substance abuse     History of transient ischemic attack (TIA)     Hyperlipemia     Hypertension     Neuropathy     Occlusion of right femorotibial bypass graft 5/2017, 6/2017    Pre-diabetes     Status post femorotibial bypass        Past Surgical History:   Procedure Laterality Date    back fusion      FEMORAL BYPASS      left fem-pop bypass 11/2014; right fem-pop bypass 9/2016, right fem-distal bypass 3/2017    LEG AMPUTATION THROUGH KNEE         Review of patient's allergies indicates:   Allergen Reactions    Pcn [penicillins] Other (See Comments)     Other      Bactrim [sulfamethoxazole-trimethoprim] Rash     Patient requests medication be listed as an "Intolerance" rather than an allergy, stating the rash is not "so bad."    Ceclor [cefaclor] Rash    Latex, natural rubber Rash       No current facility-administered medications on file prior to encounter.     Current Outpatient Medications on File Prior to Encounter   Medication Sig    albuterol-ipratropium (DUO-NEB) 2.5 mg-0.5 mg/3 mL nebulizer solution Inhale 3 mLs into the lungs.    apixaban (ELIQUIS) 5 mg Tab Take 5 mg by mouth 2 (two) times daily.    atorvastatin (LIPITOR) 40 MG tablet Take 40 mg by mouth every evening.    dupilumab 300 mg/2 mL PnIj Inject 300 mg into the skin every 14 (fourteen) days.    furosemide (LASIX) 40 MG tablet Take 1 tablet by mouth every morning.    ketoconazole (NIZORAL) 2 % shampoo Apply topically 3 (three) times a week.    melatonin (MELATIN) 5 mg Take 1 tablet by mouth nightly as needed.    metoprolol succinate (TOPROL-XL) 25 MG 24 hr tablet Take 25 mg by mouth once daily.    pantoprazole (PROTONIX) 40 MG tablet Take 1 tablet by mouth every morning.    pregabalin (LYRICA) " 300 MG Cap Take 300 mg by mouth 2 (two) times daily.    sertraline (ZOLOFT) 50 MG tablet Take 50 mg by mouth every evening.    tiZANidine (ZANAFLEX) 4 MG tablet Take 1 tablet by mouth every evening.    amlodipine (NORVASC) 10 MG tablet Take 10 mg by mouth once daily.    aspirin (ECOTRIN) 81 MG EC tablet Take 81 mg by mouth once daily.    clopidogrel (PLAVIX) 75 mg tablet Take 75 mg by mouth once daily.    hydrOXYzine (ATARAX) 50 MG tablet Take 50 mg by mouth 2 (two) times daily.    mometasone (ELOCON) 0.1 % solution APPLY 1 ML TOPICALLY TWICE DAILY TO RASHY AREAS IN SCALP AND BEARD AREA TWICE DAILY FOR 2 WEEKS AS NEED FOR FLARES    mometasone 0.1% (ELOCON) 0.1 % cream Apply topically 2 (two) times daily as needed.    traMADoL (ULTRAM) 50 mg tablet Take 50 mg by mouth 4 (four) times daily as needed.     Family History       Problem Relation (Age of Onset)    COPD Mother, Father    Diabetes Mother, Father    Heart attack Mother    Heart disease Mother    Stroke Mother          Tobacco Use    Smoking status: Every Day     Packs/day: 1.50     Years: 41.00     Pack years: 61.50     Types: Cigarettes    Smokeless tobacco: Former     Quit date: 10/22/2016   Substance and Sexual Activity    Alcohol use: No    Drug use: Yes     Types: Methamphetamines     Comment: opiates and methamphetamines: smokes crystal meth Daily    Sexual activity: Yes     Review of Systems   Unable to perform ROS: Mental status change   Objective:     Vital Signs (Most Recent):  Temp: 98.2 °F (36.8 °C) (06/09/23 1100)  Pulse: 61 (06/09/23 1100)  Resp: (!) 21 (06/09/23 1100)  BP: (!) 166/75 (06/09/23 1100)  SpO2: 98 % (06/09/23 1100) Vital Signs (24h Range):  Temp:  [93.4 °F (34.1 °C)-99 °F (37.2 °C)] 98.2 °F (36.8 °C)  Pulse:  [44-70] 61  Resp:  [12-91] 21  SpO2:  [89 %-100 %] 98 %  BP: (113-184)/(55-78) 166/75     Weight: 117.5 kg (259 lb 0.7 oz)  Body mass index is 38.25 kg/m².     Physical Exam  Vitals and nursing note reviewed.   Constitutional:        General: He is not in acute distress.     Appearance: He is ill-appearing. He is not toxic-appearing.      Interventions: Nasal cannula in place.   HENT:      Head: Normocephalic and atraumatic.   Cardiovascular:      Rate and Rhythm: Normal rate.   Pulmonary:      Effort: Tachypnea and respiratory distress present.   Abdominal:      Palpations: Abdomen is soft.      Tenderness: There is no abdominal tenderness.   Genitourinary:     Comments: valerio  Musculoskeletal:         General: Deformity present.      Right Lower Extremity: Right leg is amputated above knee.   Skin:     General: Skin is warm.      Capillary Refill: Capillary refill takes 2 to 3 seconds.   Neurological:      Mental Status: He is disoriented.      Motor: Weakness (right sided) present.      Comments: Able to follow simple commands   Psychiatric:         Mood and Affect: Affect is flat.         Behavior: Behavior is cooperative.         Cognition and Memory: He exhibits impaired recent memory.        Significant Labs: All pertinent labs within the past 24 hours have been reviewed.  ABGs:   Recent Labs   Lab 06/08/23  0844 06/08/23  1251 06/09/23  0100   PH 7.265* 7.361 7.413   PCO2 73.8* 58.1* 47.4*   HCO3 33.5* 32.9* 30.3*   POCSATURATED 32* 98 94*   BE 6 7 6   PO2 24* 115* 70*     Blood Culture:   Recent Labs   Lab 06/08/23  1228   LABBLOO No Growth to date  No Growth to date     CBC:   Recent Labs   Lab 06/07/23 2109 06/09/23  0357   WBC 8.47 8.06   HGB 7.5* 7.9*   HCT 27.6* 29.1*    216     CMP:   Recent Labs   Lab 06/07/23 2109 06/09/23  0357    138   K 4.1 4.2   CL 98 102   CO2 27 24   GLU 77 70   BUN 9 6*   CREATININE 0.8 0.7   CALCIUM 8.5* 8.8   PROT 6.4 5.9*   ALBUMIN 3.4* 3.0*   BILITOT 1.2* 0.9   ALKPHOS 141* 123   AST 31 33   ALT 84* 60*   ANIONGAP 11 12       Significant Imaging: I have reviewed all pertinent imaging results/findings within the past 24 hours.

## 2023-06-09 NOTE — PROGRESS NOTES
[facility] - Intensive Care ([facility])  Critical Care Medicine  Progress Note    Patient Name: Ish Guardado  MRN: [id number]  Admission Date: 6/7/2023  Hospital Length of Stay: 0 days  Code Status: DNR  Attending Provider: Adalid Lombardo MD  Primary Care Provider: Pollo Arana MD   Principal Problem: Acute on chronic respiratory failure with hypoxia and hypercapnia    Subjective:     HPI:  Ish Guardado is a 62 y.o. male with a PMH  has a past medical history of Drug abuse, amphetamine type, Femoral-popliteal bypass graft occlusion, GERD, (TIA), Hyperlipemia, Hypertension, Neuropathy, Occlusion of right femorotibial bypass graft (5/2017, 6/2017), Pre-diabetes, and Status post femorotibial bypass.  Presented to the ER accompanied by his wife and son to be evaluated for altered mental status which started earlier this morning.     Per wife, patient has been having altered mental status for the last 3 days (06/04/2023), but states that he became more confused shortly after waking this morning.  Patient has history of CVA which left him with deficits of weakness on right side as well as dysarthria and memory issues.  She also states that patient has been falling off his wheelchair and needed help off the floor. It is noted that patient is on Eliquis.  reports increased weakness of RUE and slurred speech; a code stroke was called and the patient was not deemed a candidate to receive TN as he was outside the window. CTH negative for acute findings. ER workup unremarkable with the exception to H/H of 7.5/27.6 (previously 8.8/31.1 on 10/15/2022).  UA and drug screen negative.  Ammonia level negative.  CT head without acute findings.  Plain film imaging of left arm and left lower leg negative for acute findings.  Chest x-ray shows cardiomegaly.   Per chart review, patient recently had a left carotid endarterectomy performed on 05/19/2023 performed by Dr. Hartley.  Subsequently, patient was increasingly more unresponsive  and obtunded. ABG revealed severe hypercapnia; the patient's wife manages the CPAP and states she only puts on him as needed and is afraid to let the patient handle it given he smokes a pack a day and will light himself on fire.   Basically non compliant with CPAP and oxygen  ICU to assume care for continuous BIPAP      Hospital/ICU Course:  6/9: Placed on bipap overnight with improvement in pco2. Currently on baseline oxygen use 4-5L NC. Upper ext US negative for occlusion. MRI brain ordered, although CTH negative--continues to endorse right sided weakness. Patient is already on Eliquis, Plavix, and Statin. Family needs to be educated on PAP use and compliance. POC explained in detail to son who is currently at the bedside. Stable to step down to the floor.       Interval History: agitated overnight and needed a dose of Haldol.       Objective:     Vital Signs (Most Recent):  Temp: 98.2 °F (36.8 °C) (06/09/23 0600)  Pulse: 64 (06/09/23 0600)  Resp: 18 (06/09/23 0746)  BP: 137/65 (06/09/23 0822)  SpO2: 95 % (06/09/23 0600) Vital Signs (24h Range):  Temp:  [93.4 °F (34.1 °C)-99 °F (37.2 °C)] 98.2 °F (36.8 °C)  Pulse:  [44-70] 64  Resp:  [8-91] 18  SpO2:  [92 %-100 %] 95 %  BP: (113-184)/(55-78) 137/65     Weight: 117.5 kg (259 lb 0.7 oz)  Body mass index is 38.25 kg/m².      Intake/Output Summary (Last 24 hours) at 6/9/2023 0853  Last data filed at 6/9/2023 0500  Gross per 24 hour   Intake --   Output 4235 ml   Net -4235 ml        Physical Exam  Vitals and nursing note reviewed.   Constitutional:       General: He is not in acute distress.     Appearance: He is obese. He is ill-appearing. He is not toxic-appearing or diaphoretic.   HENT:      Head: Normocephalic and atraumatic.   Eyes:      Extraocular Movements: Extraocular movements intact.      Conjunctiva/sclera: Conjunctivae normal.      Pupils: Pupils are equal, round, and reactive to light.   Cardiovascular:      Rate and Rhythm: Normal rate and regular  rhythm.      Pulses: Normal pulses.      Heart sounds: Normal heart sounds. No murmur heard.  Pulmonary:      Effort: Bradypnea present.   Abdominal:      General: Abdomen is protuberant. Bowel sounds are normal.      Tenderness: There is no abdominal tenderness.   Musculoskeletal:      Cervical back: Normal range of motion and neck supple.      Comments: MAEW      Right Lower Extremity: Right leg is amputated above knee.   Skin:     General: Skin is warm and dry.      Capillary Refill: Capillary refill takes less than 2 seconds.   Neurological:      Mental Status: He is lethargic, disoriented and confused.      Motor: Weakness present.      Comments:           Review of Systems    Vents:  Oxygen Concentration (%): 50 (06/08/23 2322)    Lines/Drains/Airways       Drain  Duration                  Urethral Catheter 06/08/23 1445 <1 day              Peripheral Intravenous Line  Duration                  Peripheral IV - Single Lumen 06/07/23 2044 20 G Anterior;Left Forearm 1 day                    Significant Labs:    CBC/Anemia Profile:  Recent Labs   Lab 06/07/23 2109 06/09/23  0357   WBC 8.47 8.06   HGB 7.5* 7.9*   HCT 27.6* 29.1*    216   MCV 69* 70*   RDW 21.4* 21.7*        Chemistries:  Recent Labs   Lab 06/07/23 2109 06/09/23  0357    138   K 4.1 4.2   CL 98 102   CO2 27 24   BUN 9 6*   CREATININE 0.8 0.7   CALCIUM 8.5* 8.8   ALBUMIN 3.4* 3.0*   PROT 6.4 5.9*   BILITOT 1.2* 0.9   ALKPHOS 141* 123   ALT 84* 60*   AST 31 33   MG  --  2.1   PHOS  --  2.8       All pertinent labs within the past 24 hours have been reviewed.    Significant Imaging:  I have reviewed all pertinent imaging results/findings within the past 24 hours.      ABG  Recent Labs   Lab 06/09/23  0100   PH 7.413   PO2 70*   PCO2 47.4*   HCO3 30.3*   BE 6     Assessment/Plan:     Neuro  History of CVA with residual deficit  MRI pending  Neuro checks  PT OT     Dysarthria  SLP eval  Check MRI brain    AMS (altered mental status)  S/t  acute resp failure  See plan for resp failure      Psychiatric  Polysubstance abuse  Tox screen + for crystal meth   on cessation     Pulmonary  * Acute on chronic respiratory failure with hypoxia and hypercapnia  Patient with Hypercapnic and Hypoxic Respiratory failure which is Acute on chronic.  he is on home oxygen at 5 L NC LPM. Supplemental oxygen was provided and noted- Oxygen Concentration (%):  [50-60] 50    .   Signs/symptoms of respiratory failure include- lethargy. Contributing diagnoses includes - COPD and Obesity Hypoventilation Labs and images were reviewed. Patient Has recent ABG, which has been reviewed. Will treat underlying causes and adjust management of respiratory failure as follows    Recent Labs     06/09/23  0100   PH 7.413   PCO2 47.4*   PO2 70*   HCO3 30.3*   POCSATURATED 94*   BE 6     Improved ABG  Wean bipap as able   Initiated on Rocephin and Azithro  BC x2  MRSA nasal swab    6/9:  No signs of leukocytosis or fever  procal negative  azithro added for anti-inflam properties  Cont treating as COPD exac  BIPAP QHS        Cardiac/Vascular  Primary hypertension  -Continue home medications (norvasc, metoprolol, lasix),     Mixed hyperlipidemia  Statin    PAD (peripheral artery disease)  Chronic.  Stable.  Compliant with home medications.        Hematology  DVT (deep venous thrombosis)  Continue Eliquis    GI  GERD (gastroesophageal reflux disease)  PPI    Orthopedic  Right arm weakness  Worsening RUE weakness and cool to touch  Check dopplers  Arterial US negative   Neurovascular checks q4h    6/9:   Check MRI brain  Able to feel pulse in BUE  Neurovascular checks q4h      DVT ppx: Eliquis for DVT  GI ppx: PPI  Disp/plan for the day: Stable to step down to the floor      Critical Care Time: 55 minutes  Critical secondary to Patient has a condition that poses threat to life and bodily function: Severe Respiratory Distress      Critical care was time spent personally by me on the  following activities: development of treatment plan with patient or surrogate and bedside caregivers, discussions with consultants, evaluation of patient's response to treatment, examination of patient, ordering and performing treatments and interventions, ordering and review of laboratory studies, ordering and review of radiographic studies, pulse oximetry, re-evaluation of patient's condition. This critical care time did not overlap with that of any other provider or involve time for any procedures.     Tatiana Miller NP  Critical Care Medicine  [facility] - Intensive Care ([facility])

## 2023-06-09 NOTE — ASSESSMENT & PLAN NOTE
Worsening RUE weakness and cool to touch  Check dopplers  Arterial US negative   Neurovascular checks q4h    6/9:   Check MRI brain  Able to feel pulse in BUE  Neurovascular checks q4h

## 2023-06-09 NOTE — PT/OT/SLP EVAL
Physical Therapy Evaluation    Patient Name:  Ish Guardado   MRN:  [id number]    Recommendations:     Discharge Recommendations: nursing facility, skilled   Discharge Equipment Recommendations: to be determined by next level of care   Barriers to discharge: Decreased caregiver support    Assessment:     Ish Guardado is a 62 y.o. male admitted with a medical diagnosis of Acute on chronic respiratory failure with hypoxia and hypercapnia.  He presents with the following impairments/functional limitations: weakness, impaired endurance, impaired cognition, impaired balance, impaired functional mobility, decreased safety awareness, decreased lower extremity function, decreased upper extremity function, decreased coordination.    Rehab Prognosis: Fair; patient would benefit from acute skilled PT services to address these deficits and reach maximum level of function.    Recent Surgery: * No surgery found *     Plan:     During this hospitalization, patient to be seen 3 x/week to address the identified rehab impairments via therapeutic activities, therapeutic exercises and progress toward the following goals:    Plan of Care Expires:  06/23/23    Subjective     Chief Complaint: NONE, PT NON-VERBAL THIS VISIT, ABLE TO MAKE EYE CONTACT INTERMITTENTLY  Patient/Family Comments/goals:     Pain/Comfort:  Pain Rating 1: 0/10 (NO NON-VERBAL INDICATORS OF PAIN)    Patients cultural, spiritual, Mandaen conflicts given the current situation:      Living Environment:  PT NON-VERBAL, INFO OBTAINED FROM CHART AND NURSE, MAY NOT BE ACCURATE:  PT LIVES AT HOME WITH WIFE, IS W/C BOUND, NON-AMBULATORY, WIFE WORKS AT NIGHT SO HOME ALONE AT NIGHT  Prior to admission, patients level of function was ?.  Equipment used at home: bedside commode, wheelchair, walker, rolling (PT NON-VERBAL, INFO OBTAINED FROM CHART).  DME owned (not currently used): none.  Upon discharge, patient will have assistance from ?.    Objective:     Communicated with  "NURSE BENJAMIN prior to session.  Patient found supine with telemetry, pulse ox (continuous), oxygen, peripheral IV, blood pressure cuff, bed alarm, valerio catheter, pressure relief boots  upon PT entry to room.    General Precautions: Standard, fall, sternal, respiratory, R AKA  Orthopedic Precautions:N/A   Braces: N/A  Respiratory Status: Nasal cannula, flow 4 L/min    Exams:  Cognitive Exam:  Patient is oriented to PT CONFUSED/DISORIENTED, NON-VERBAL, ABLE TO MAKE EYE CONTACT MINIMALLY, ABLE TO FOLLOW APPROX. 10% OF COMMANDS  RLE ROM: WFL except AAROM, ACTIVE BUT NOT PURPOSEFUL  RLE Strength: UNABLE TO ASSESS  LLE ROM: WFL except AAROM, ACTIVE BUT NOT PURPOSEFUL  LLE Strength: UNABLE TO ASSESS    Functional Mobility:  Bed Mobility:     Rolling Left:  total assistance X 2  Rolling Right: total assistance X 2  Scooting: total assistance X 2-SUPINE SCOOT TOWARD HOB    AM-PAC 6 CLICK MOBILITY  Total Score:6     Treatment & Education:  PT EDUCATED IN ROLE OF P.T. AND POC IN ACUTE CARE HOSPITAL SETTING, EDUCATED IN SUPINE BLE THEREX BUT PT UNABLE TO ACTIVELY PARTICIPATE SO TOLERATED BLE PROM WHILE SUPINE IN ALL AVAILABLE PLANES OF MOVEMENT 10 REPS  PT EDUCATED ON RISK FOR FALLS DUE TO GENERALIZED WEAKNESS, EDUCATED ON "CALL DON'T FALL", ENCOURAGED TO CALL FOR ASSISTANCE WITH ALL NEEDS     Patient left HOB elevated with all lines intact, call button in reach, bed alarm on, and NURSE notified.    GOALS:   Multidisciplinary Problems       Physical Therapy Goals          Problem: Physical Therapy    Goal Priority Disciplines Outcome Goal Variances Interventions   Physical Therapy Goal     PT, PT/OT      Description: LTG'S TO BE MET IN 14 DAYS (6-23-23)  PT WILL REQUIRE [facility] FOR BED MOBILITY  PT WILL REQUIRE MODA FOR SIT<>STAND TF  PT WILL REQUIRE MODA FOR BED<>CHAIR TF                           History:     Past Medical History:   Diagnosis Date    Drug abuse, amphetamine type     wife states he has been using smoking " crystal meth x 1 year    Femoral-popliteal bypass graft occlusion     Left and right    GERD (gastroesophageal reflux disease)     History of substance abuse     History of transient ischemic attack (TIA)     Hyperlipemia     Hypertension     Neuropathy     Occlusion of right femorotibial bypass graft 5/2017, 6/2017    Pre-diabetes     Status post femorotibial bypass        Past Surgical History:   Procedure Laterality Date    back fusion      FEMORAL BYPASS      left fem-pop bypass 11/2014; right fem-pop bypass 9/2016, right fem-distal bypass 3/2017    LEG AMPUTATION THROUGH KNEE         Time Tracking:     PT Received On: 06/09/23  PT Start Time: 0920     PT Stop Time: 0945  PT Total Time (min): 25 min     Billable Minutes: Evaluation 15 and Therapeutic Activity 10    06/09/2023

## 2023-06-09 NOTE — PLAN OF CARE
[facility] - Intensive Care (Hospital)  Initial Discharge Assessment       Primary Care Provider: Pollo Arana MD    Admission Diagnosis: Stroke [I63.9]  Left arm pain [M79.602]  RUE weakness [R29.898]  Neurosensory deficit [R29.90]  AMS (altered mental status) [R41.82]    Admission Date: 6/7/2023  Expected Discharge Date:     Transition of Care Barriers: None    Payor: HU[employer] MANAGED MEDICARE / Plan: HU[employer] MEDICARE HMO / Product Type: Capitation /     Extended Emergency Contact Information  Primary Emergency Contact: Ray Guardado  Address: [address] [place] [address] [place] [address] [facility]  Home Phone: [phone]  Mobile Phone: [phone]  Relation: Spouse  Secondary Emergency Contact: Dusty Guardado  Mobile Phone: [phone]  Relation: Son  Preferred language: English   needed? No    Discharge Plan A: Skilled Nursing Facility  Discharge Plan B: [facility] - [place], [place] - [address] [facility] [address]  [address] [place] [address]  Phone: [phone] Fax: [phone]    [facility] [address] [place] [address]  Phone: [phone] Fax: [phone]      Initial Assessment (most recent)       Adult Discharge Assessment - 06/09/23 1151          Discharge Assessment    Assessment Type Discharge Planning Assessment     Confirmed/corrected address, phone number and insurance Yes     Confirmed Demographics Correct on Facesheet     Source of Information patient     Communicated SHAHRAM with patient/caregiver Date not available/Unable to determine     People in Home spouse     Do you expect to return to your current living situation? No     Do you have help at home or someone to help you manage your care at home? Yes     Walking or Climbing Stairs ambulation difficulty, dependent;transferring difficulty, dependent     Mobility Management wc and rw that patient is unable to use, family working with  [employer] for an electric wheelchair     Equipment Currently Used at Home walker, standard;wheelchair;bedside commode;orthotic device     Readmission within 30 days? No     Patient currently being followed by outpatient case management? No     Do you currently have service(s) that help you manage your care at home? Yes     Name and Contact number of [facility]     Is the pt/caregiver preference to resume services with current agency Yes     Do you take prescription medications? Yes     Do you have prescription coverage? Yes     Do you have any problems affording any of your prescribed medications? No     Is the patient taking medications as prescribed? yes     How do you get to doctors appointments? other (see comments)   np at home    Are you on dialysis? No     Do you take coumadin? No     Discharge Plan A Skilled Nursing Facility     Discharge Plan B Home Health     DME Needed Upon Discharge  none     Discharge Plan discussed with: Spouse/sig other     Transition of Care Barriers None                      Spoke to wife, reports unable to care for patient if unable to transfer, requests SNF.  Will provide list.

## 2023-06-09 NOTE — PLAN OF CARE
OT eval completed. B rolling total A of 2 and supine scooting total A of 2. Recommending SNF at d/c.

## 2023-06-09 NOTE — SUBJECTIVE & OBJECTIVE
Interval History: agitated overnight and needed a dose of Haldol.       Objective:     Vital Signs (Most Recent):  Temp: 98.2 °F (36.8 °C) (06/09/23 0600)  Pulse: 64 (06/09/23 0600)  Resp: 18 (06/09/23 0746)  BP: 137/65 (06/09/23 0822)  SpO2: 95 % (06/09/23 0600) Vital Signs (24h Range):  Temp:  [93.4 °F (34.1 °C)-99 °F (37.2 °C)] 98.2 °F (36.8 °C)  Pulse:  [44-70] 64  Resp:  [8-91] 18  SpO2:  [92 %-100 %] 95 %  BP: (113-184)/(55-78) 137/65     Weight: 117.5 kg (259 lb 0.7 oz)  Body mass index is 38.25 kg/m².      Intake/Output Summary (Last 24 hours) at 6/9/2023 0853  Last data filed at 6/9/2023 0500  Gross per 24 hour   Intake --   Output 4235 ml   Net -4235 ml        Physical Exam  Vitals and nursing note reviewed.   Constitutional:       General: He is not in acute distress.     Appearance: He is obese. He is ill-appearing. He is not toxic-appearing or diaphoretic.   HENT:      Head: Normocephalic and atraumatic.   Eyes:      Extraocular Movements: Extraocular movements intact.      Conjunctiva/sclera: Conjunctivae normal.      Pupils: Pupils are equal, round, and reactive to light.   Cardiovascular:      Rate and Rhythm: Normal rate and regular rhythm.      Pulses: Normal pulses.      Heart sounds: Normal heart sounds. No murmur heard.  Pulmonary:      Effort: Bradypnea present.   Abdominal:      General: Abdomen is protuberant. Bowel sounds are normal.      Tenderness: There is no abdominal tenderness.   Musculoskeletal:      Cervical back: Normal range of motion and neck supple.      Comments: MAEW      Right Lower Extremity: Right leg is amputated above knee.   Skin:     General: Skin is warm and dry.      Capillary Refill: Capillary refill takes less than 2 seconds.   Neurological:      Mental Status: He is lethargic, disoriented and confused.      Motor: Weakness present.      Comments:           Review of Systems    Vents:  Oxygen Concentration (%): 50 (06/08/23 8731)    Lines/Drains/Airways       Drain   Duration                  Urethral Catheter 06/08/23 1445 <1 day              Peripheral Intravenous Line  Duration                  Peripheral IV - Single Lumen 06/07/23 2044 20 G Anterior;Left Forearm 1 day                    Significant Labs:    CBC/Anemia Profile:  Recent Labs   Lab 06/07/23 2109 06/09/23  0357   WBC 8.47 8.06   HGB 7.5* 7.9*   HCT 27.6* 29.1*    216   MCV 69* 70*   RDW 21.4* 21.7*        Chemistries:  Recent Labs   Lab 06/07/23 2109 06/09/23 0357    138   K 4.1 4.2   CL 98 102   CO2 27 24   BUN 9 6*   CREATININE 0.8 0.7   CALCIUM 8.5* 8.8   ALBUMIN 3.4* 3.0*   PROT 6.4 5.9*   BILITOT 1.2* 0.9   ALKPHOS 141* 123   ALT 84* 60*   AST 31 33   MG  --  2.1   PHOS  --  2.8       All pertinent labs within the past 24 hours have been reviewed.    Significant Imaging:  I have reviewed all pertinent imaging results/findings within the past 24 hours.

## 2023-06-09 NOTE — PLAN OF CARE
Pt more alert; oriented to self and place.  Speech clearer, but still slurred.  MRI of brain done.  Results discussed with wife by Gutierrez Miller NP.  Pt now med/tele status.  MARIA JIMENEZ.

## 2023-06-09 NOTE — PT/OT/SLP EVAL
Occupational Therapy Evaluation and Treatment    Name: Ish Guardado  MRN: [id number]  Admitting Diagnosis: Acute on chronic respiratory failure with hypoxia and hypercapnia  Recent Surgery: * No surgery found *      Recommendations:     Discharge Recommendations: nursing facility, skilled  Level of Assistance Recommended: 24 hours significant assistance  Discharge Equipment Recommendations: to be determined by next level of care  Barriers to discharge:  (unknown level of physical support at home)    Assessment:     Ish Guardado is a 62 y.o. male with a medical diagnosis of Acute on chronic respiratory failure with hypoxia and hypercapnia. He presents with performance deficits affecting function including weakness, impaired endurance, impaired self care skills, impaired functional mobility, impaired balance, impaired cardiopulmonary response to activity, decreased safety awareness, impaired cognition, decreased ROM, abnormal tone, edema, impaired fine motor, impaired coordination.    Rehab Prognosis:  Questionable ; patient would benefit from acute OT services to address these deficits and reach maximum level of function.    Plan:     Patient to be seen 2 x/week to address the above listed problems via self-care/home management, therapeutic activities, therapeutic exercises, neuromuscular re-education  Plan of Care Expires: 06/23/23  Plan of Care Reviewed with: patient    Subjective     Chief Complaint: nonverbal throughout  Patient Comments/Goals: unable to report  Pain/Comfort:  Pain Rating 1:  (no nonverbal indicators of pain)    Social History:  PLOF unknown. Patient unable to report and no family present in room. Per chart, patient with wheelchair. Per nurse, patient lives at home with his wife who works at night.    Objective:     Communicated with Irena miller prior to session. Patient found supine with telemetry, blood pressure cuff, pulse ox (continuous), peripheral IV, bed alarm, valerio catheter,  pressure relief boots, oxygen upon OT entry to room.    General Precautions: Standard, fall   Orthopedic Precautions: N/A   Braces: N/A    Respiratory Status: Nasal cannula, flow 4 L/min    Occupational Performance    Bed Mobility:   Rolling/Turning to Left with total assistance and of 2 persons  Rolling/Turning to Right with total assistance and of 2 persons  Scooting to HOB in supine: total assistance and of 2 persons    Functional Mobility/Transfers:  Not appropriate at this time    Activities of Daily Living:  Grooming: total assistance  Upper Body Dressing: total assistance    Cognitive/Visual Perceptual:  Cognitive/Psychosocial Skills:    -     Oriented to: Not oriented to person, place, and time  -     Follows Commands/attention: following 1 step commands at ~10%     Physical Exam:  Edema:  Moderate B UE  Upper Extremity Range of Motion:     -       Right Upper Extremity: PROM WFL  -       Left Upper Extremity: PROM WFL  Upper Extremity Strength:    -       Right Upper Extremity: flaccid  -       Left Upper Extremity: unable to assess 2/2 cognition. Intermittently moving L arm   Strength:    -       Right Upper Extremity: flaccid  -       Left Upper Extremity: unable to follow commands to complete.    AMPA 6 Click ADL:  AMPAC Total Score: 6    Treatment & Education:  Therapist provided facilitation and instruction of proper body mechanics, energy conservation, and fall prevention strategies during tasks listed above  Patient educated on role of OT, POC, and goals for therapy  Provided with PROM to B UE AROM to promote joint integrity x10 reps at ea. Joint plane in supine.  Poor comprehension of all education. No family present. Will require reinforcement.    Patient left HOB elevated with all lines intact, call button in reach, and RN notified.    GOALS:   Multidisciplinary Problems       Occupational Therapy Goals          Problem: Occupational Therapy    Goal Priority Disciplines Outcome Interventions    Occupational Therapy Goal     OT, PT/OT     Description: Goals to be met by: 6/23/23     Patient will increase functional independence with ADLs by performing:    Grooming while EOB with Moderate Assistance.  Supine to sit with Moderate Assistance.  Increased functional strength in L UE grossly by 1/2 MM grade.                         History:     Past Medical History:   Diagnosis Date    Drug abuse, amphetamine type     wife states he has been using smoking crystal meth x 1 year    Femoral-popliteal bypass graft occlusion     Left and right    GERD (gastroesophageal reflux disease)     History of substance abuse     History of transient ischemic attack (TIA)     Hyperlipemia     Hypertension     Neuropathy     Occlusion of right femorotibial bypass graft 5/2017, 6/2017    Pre-diabetes     Status post femorotibial bypass          Past Surgical History:   Procedure Laterality Date    back fusion      FEMORAL BYPASS      left fem-pop bypass 11/2014; right fem-pop bypass 9/2016, right fem-distal bypass 3/2017    LEG AMPUTATION THROUGH KNEE         Time Tracking:     OT Date of Treatment: 06/09/23  OT Start Time: 0840  OT Stop Time: 0905  OT Total Time (min): 25 min    Billable Minutes: Evaluation 15 and Therapeutic Activity 10    6/9/2023

## 2023-06-09 NOTE — CONSULTS
[facility] - Intensive Care (Hospital)  Adult Nutrition  Consult Note    SUMMARY     Recommendations  1. Recommend pt PO diet is advanced to Cardiac diet; texture per SLP recs when medically appropriate.   2. Recommend bedside swallow evaluation to determined appropriate diet texture.   3. Recommend to weigh pt weekly.    Goals:   1. Pt diet order will be advanced within 24 hrs.   2. Bedside swallow evaluation will be preformed by RD follow up  3. Pt will consume >50% of energy needs by RD follow up.  Nutrition Goal Status: new  Communication of RD Recs: other (comment) (POC: Sticky Note)    Assessment and Plan    Nutrition Problem  Inadequate PO intake    Related to (etiology):   Decreased ability to consume sufficient nutrition.    Signs and Symptoms (as evidenced by):   NPO, AMS,    Interventions(treatment strategy):  1. Fat/ Sodium modified diet  2. Referral to other provider   3. Collaboration with medical providers.    Nutrition Diagnosis Status:   New       Malnutrition Assessment     Skin (Micronutrient): dry, bruised                                 Reason for Assessment    Reason For Assessment: consult  Diagnosis: pulmonary disease  Relevant Medical History: Drug abuse, GERD, HLD, HTN, COPD  Interdisciplinary Rounds: did not attend  General Information Comments:   6/9: 63 y/o male admitted w/ active principal problem of Acute on chronic respiratory failure with hypoxia and hypercapnia. Pt is currently on NPO diet w/ sips of water. Dietitian not able to screen pt d/t pt is MRI. Pt is currently charted to weigh 259 lbs 0.7 oz, BMI 38.25 (Obese). Per chart review, Pt placed on bipap overnight with improvement in pco2. Currently on baseline oxygen use 4-5L NC. Upper ext US negative for occlusion. MRI brain ordered, although CTH negative--continues to endorse right sided weakness. Pt stable to step down to the floor. Per chart review (PT/OT/SLP Eval note), the pt presents with the following impairments/functional  "limitations: weakness, impaired endurance, impaired cognition, impaired balance, impaired functional mobility, decreased safety awareness, decreased lower extremity function, decreased upper extremity function, decreased coordination.  The pt LBM was 6/7, no stool consistency noted. Dietitian will continue to monitor.  Nutrition Discharge Planning: Cardiac texture per SLP    Wt Readings from Last 20 Encounters:   06/09/23 117.5 kg (259 lb 0.7 oz)   10/10/20 98.9 kg (218 lb)   05/28/19 86.7 kg (191 lb 2.2 oz)   11/02/17 85.7 kg (188 lb 15 oz)   07/27/17 83 kg (182 lb 15.7 oz)   06/14/17 85.1 kg (187 lb 9.8 oz)   06/07/17 86.2 kg (190 lb)   06/04/17 81.6 kg (179 lb 14.3 oz)   05/29/17 81.6 kg (180 lb)   05/27/17 86 kg (189 lb 8 oz)   ]  Nutrition Risk Screen    Nutrition Risk Screen: no indicators present    Nutrition/Diet History    Spiritual, Cultural Beliefs, Mandaeism Practices, Values that Affect Care: no  Food Allergies:  (Latex)  Factors Affecting Nutritional Intake: NPO    Anthropometrics    Temp: 98.2 °F (36.8 °C)  Height Method: Stated  Height: 5' 9" (175.3 cm)  Height (inches): 69 in  Weight Method: Bed Scale  Weight: 117.5 kg (259 lb 0.7 oz)  Weight (lb): 259.04 lb  Ideal Body Weight (IBW), Male: 160 lb  % Ideal Body Weight, Male (lb): 161.9 %  BMI (Calculated): 38.2  BMI Grade: 35 - 39.9 - obesity - grade II       Lab/Procedures/Meds  BMP  Lab Results   Component Value Date     06/09/2023    K 4.2 06/09/2023     06/09/2023    CO2 24 06/09/2023    BUN 6 (L) 06/09/2023    CREATININE 0.7 06/09/2023    CALCIUM 8.8 06/09/2023    ANIONGAP 12 06/09/2023    EGFRNORACEVR >60 06/09/2023       Pertinent Labs Reviewed: reviewed  Pertinent Medications Reviewed: reviewed  Scheduled Meds:   amLODIPine  10 mg Oral Daily    apixaban  5 mg Oral BID    atorvastatin  40 mg Oral QHS    azithromycin  500 mg Intravenous Q24H    clopidogreL  75 mg Oral Daily    furosemide  40 mg Oral QAM    metoprolol succinate  25 " mg Oral Daily    mirtazapine  7.5 mg Oral QHS    mupirocin   Nasal BID    pantoprazole  40 mg Oral QAM     Continuous Infusions:  PRN Meds:.acetaminophen, hydrALAZINE, hydrOXYzine HCL, melatonin, ondansetron, pneumoc 20-meka conj-dip cr(PF), sodium chloride 0.9%, traMADoL    Estimated/Assessed Needs    Weight Used For Calorie Calculations: 84 kg (185 lb 2.3 oz) (Adj BW used per BMI 38.25)  Energy Calorie Requirements (kcal): 2100-2519 (25-30 kcal/kg per COPD)  Energy Need Method: Kcal/kg  Protein Requirements:  (15-20% total calories)  Weight Used For Protein Calculations: 84 kg (185 lb 2.3 oz)  Fluid Requirements (mL): 2100-2519  Estimated Fluid Requirement Method: RDA Method  RDA Method (mL): 2100  CHO Requirement: 263-315      Nutrition Prescription Ordered    Current Diet Order: NPO Except for: Medication (sips of water ok), Ice Chips, Sips with Medication    Evaluation of Received Nutrient/Fluid Intake    I/O: -4235 Since Admite  Energy Calories Required: not meeting needs  Protein Required: not meeting needs  Fluid Required: not meeting needs  Tolerance: not tolerating  % Intake of Estimated Energy Needs: 0 - 25 %  % Meal Intake: NPO    Nutrition Risk    Level of Risk/Frequency of Follow-up: low (x1 weekly)       Monitor and Evaluation    Food and Nutrient Intake: energy intake, food and beverage intake  Food and Nutrient Adminstration: diet order  Knowledge/Beliefs/Attitudes: food and nutrition knowledge/skill, beliefs and attitudes  Physical Activity and Function: nutrition-related ADLs and IADLs, factors affecting access to physical activity  Anthropometric Measurements: weight, weight change, body mass index, growth pattern indices/percentile ranks  Biochemical Data, Medical Tests and Procedures: electrolyte and renal panel, gastrointestinal profile, glucose/endocrine profile, inflammatory profile, lipid profile  Nutrition-Focused Physical Findings: overall appearance, skin       Nutrition  Follow-Up    RD Follow-up?: Yes  Lance Garcia, Registration Eligible, Provisional LDN

## 2023-06-09 NOTE — CARE UPDATE
MRI showed:      Acute infarcts throughout the left cerebral hemisphere in a watershed type distribution.     Remote right cerebral hemisphere watershed distribution infarcts.     Large remote left temporal occipital infarct.     Small remote infarcts of both thalami and both cerebellar hemispheres      Wife at bedside and informed about results.   Patient outside the window of permissive HTN and obviously TNK administration.  Tele stroke consult pending.

## 2023-06-09 NOTE — ASSESSMENT & PLAN NOTE
Worsen RUE weakness from baseline. Tele Vascular Neurology consulted. Outside window for thromboembolytics.  Currently being treated with Eliquis, Plavix, and aspirin.  Recommends MRI.  Plan:  -Neuro checks q4hrs  -MRI brain w/o contrast with acute infarcts  -PT/OT recommending skilled nursing facility   -continue home meds

## 2023-06-09 NOTE — CONSULTS
[facility] - Intensive Care ([facility] Medicine  Consult Note    Patient Name: Ish Guardado  MRN: [id number]  Admission Date: 6/7/2023  Hospital Length of Stay: 0 days  Attending Physician: Adalid Lombardo MD   Primary Care Provider: Pollo Arana MD           Patient information was obtained from ER records and primary team.     Consults  Subjective:     Principal Problem: Acute on chronic respiratory failure with hypoxia and hypercapnia    Chief Complaint:   Chief Complaint   Patient presents with    Fall     Pt had a fall this morning and was found on the floor next to the bed, unwitnessed fall unknown LOC, pt had a second fall this evening where he slid down off of the toilet, this was witnessed -LOC, pt is on thinners, c/o L arm pain and L ankle pain and numbness to R arm, hx of cva with deficits to the R side, hx of copd, and R AKA          HPI: Ish Guardado is a 62 y.o. male with a PMH  has a past medical history of Drug abuse, amphetamine type, Femoral-popliteal bypass graft occlusion, GERD (gastroesophageal reflux disease), History of substance abuse, History of transient ischemic attack (TIA), Hyperlipemia, Hypertension, Neuropathy, Occlusion of right femorotibial bypass graft (5/2017, 6/2017), Pre-diabetes, and Status post femorotibial bypass.  Presented to the ER accompanied by his wife and son to be evaluated for altered mental status which started earlier this morning.  History obtained by wife at bedside secondary to patient's change in mental status.  Per wife, patient has been having altered mental status for the last 3 days (06/04/2023), but states that he became more confused shortly after waking this morning.  Patient has history of CVA which left him with deficits of weakness on right side as well as dysarthria and memory issues.  She also states that patient has been falling off his wheelchair and needed help off the floor.  Patient has had visits to the hospital for evaluation for  these falls no acute findings.  It is noted that patient is on Eliquis.  She also notices at his right arm her.  Which weaker than usual patient has been unable to move it.  Furthermore, she noticed that the patient's speech has become more slurred and patient has more confused in his responses when spoken to.  She also states that patient recently had a left carotid endarterectomy performed on 05/19/2023 performed by Dr. Hartley.    ER workup unremarkable with the exception to H/H of 7.5/27.6 (previously 8.8/31.1 on 10/15/2022).  UA and drug screen negative.  Ammonia level negative.  CT head without acute findings.  Plain film imaging of left arm and left lower leg negative for acute findings.  Chest x-ray shows cardiomegaly.  Tele vascular Neurology consult placed.  Outside window for any thromboembolic lytic therapy.  Patient currently being treated with Eliquis, aspirin, and Plavix.  Hospital Medicine consulted to admit patient for further stroke workup/management.  Wife and son at bedside are in agreement with treatment plan.  Patient will be placed under observation status.    PCP: Pollo Arana          Past Medical History:   Diagnosis Date    Drug abuse, amphetamine type     wife states he has been using smoking crystal meth x 1 year    Femoral-popliteal bypass graft occlusion     Left and right    GERD (gastroesophageal reflux disease)     History of substance abuse     History of transient ischemic attack (TIA)     Hyperlipemia     Hypertension     Neuropathy     Occlusion of right femorotibial bypass graft 5/2017, 6/2017    Pre-diabetes     Status post femorotibial bypass        Past Surgical History:   Procedure Laterality Date    back fusion      FEMORAL BYPASS      left fem-pop bypass 11/2014; right fem-pop bypass 9/2016, right fem-distal bypass 3/2017    LEG AMPUTATION THROUGH KNEE         Review of patient's allergies indicates:   Allergen Reactions    Pcn [penicillins] Other (See  "Comments)     Other      Bactrim [sulfamethoxazole-trimethoprim] Rash     Patient requests medication be listed as an "Intolerance" rather than an allergy, stating the rash is not "so bad."    Ceclor [cefaclor] Rash    Latex, natural rubber Rash       No current facility-administered medications on file prior to encounter.     Current Outpatient Medications on File Prior to Encounter   Medication Sig    albuterol-ipratropium (DUO-NEB) 2.5 mg-0.5 mg/3 mL nebulizer solution Inhale 3 mLs into the lungs.    apixaban (ELIQUIS) 5 mg Tab Take 5 mg by mouth 2 (two) times daily.    atorvastatin (LIPITOR) 40 MG tablet Take 40 mg by mouth every evening.    dupilumab 300 mg/2 mL PnIj Inject 300 mg into the skin every 14 (fourteen) days.    furosemide (LASIX) 40 MG tablet Take 1 tablet by mouth every morning.    ketoconazole (NIZORAL) 2 % shampoo Apply topically 3 (three) times a week.    melatonin (MELATIN) 5 mg Take 1 tablet by mouth nightly as needed.    metoprolol succinate (TOPROL-XL) 25 MG 24 hr tablet Take 25 mg by mouth once daily.    pantoprazole (PROTONIX) 40 MG tablet Take 1 tablet by mouth every morning.    pregabalin (LYRICA) 300 MG Cap Take 300 mg by mouth 2 (two) times daily.    sertraline (ZOLOFT) 50 MG tablet Take 50 mg by mouth every evening.    tiZANidine (ZANAFLEX) 4 MG tablet Take 1 tablet by mouth every evening.    amlodipine (NORVASC) 10 MG tablet Take 10 mg by mouth once daily.    aspirin (ECOTRIN) 81 MG EC tablet Take 81 mg by mouth once daily.    clopidogrel (PLAVIX) 75 mg tablet Take 75 mg by mouth once daily.    hydrOXYzine (ATARAX) 50 MG tablet Take 50 mg by mouth 2 (two) times daily.    mometasone (ELOCON) 0.1 % solution APPLY 1 ML TOPICALLY TWICE DAILY TO RASHY AREAS IN SCALP AND BEARD AREA TWICE DAILY FOR 2 WEEKS AS NEED FOR FLARES    mometasone 0.1% (ELOCON) 0.1 % cream Apply topically 2 (two) times daily as needed.    traMADoL (ULTRAM) 50 mg tablet Take 50 mg by mouth 4 " (four) times daily as needed.     Family History       Problem Relation (Age of Onset)    COPD Mother, Father    Diabetes Mother, Father    Heart attack Mother    Heart disease Mother    Stroke Mother          Tobacco Use    Smoking status: Every Day     Packs/day: 1.50     Years: 41.00     Pack years: 61.50     Types: Cigarettes    Smokeless tobacco: Former     Quit date: 10/22/2016   Substance and Sexual Activity    Alcohol use: No    Drug use: Yes     Types: Methamphetamines     Comment: opiates and methamphetamines: smokes crystal meth Daily    Sexual activity: Yes     Review of Systems   Unable to perform ROS: Mental status change   Objective:     Vital Signs (Most Recent):  Temp: 98.2 °F (36.8 °C) (06/09/23 1100)  Pulse: 61 (06/09/23 1100)  Resp: (!) 21 (06/09/23 1100)  BP: (!) 166/75 (06/09/23 1100)  SpO2: 98 % (06/09/23 1100) Vital Signs (24h Range):  Temp:  [93.4 °F (34.1 °C)-99 °F (37.2 °C)] 98.2 °F (36.8 °C)  Pulse:  [44-70] 61  Resp:  [12-91] 21  SpO2:  [89 %-100 %] 98 %  BP: (113-184)/(55-78) 166/75     Weight: 117.5 kg (259 lb 0.7 oz)  Body mass index is 38.25 kg/m².     Physical Exam  Vitals and nursing note reviewed.   Constitutional:       General: He is not in acute distress.     Appearance: He is ill-appearing. He is not toxic-appearing.      Interventions: Nasal cannula in place.   HENT:      Head: Normocephalic and atraumatic.   Cardiovascular:      Rate and Rhythm: Normal rate.   Pulmonary:      Effort: Tachypnea and respiratory distress present.   Abdominal:      Palpations: Abdomen is soft.      Tenderness: There is no abdominal tenderness.   Genitourinary:     Comments: valerio  Musculoskeletal:         General: Deformity present.      Right Lower Extremity: Right leg is amputated above knee.   Skin:     General: Skin is warm.      Capillary Refill: Capillary refill takes 2 to 3 seconds.   Neurological:      Mental Status: He is disoriented.      Motor: Weakness (right sided) present.       Comments: Able to follow simple commands   Psychiatric:         Mood and Affect: Affect is flat.         Behavior: Behavior is cooperative.         Cognition and Memory: He exhibits impaired recent memory.        Significant Labs: All pertinent labs within the past 24 hours have been reviewed.  ABGs:   Recent Labs   Lab 06/08/23  0844 06/08/23  1251 06/09/23  0100   PH 7.265* 7.361 7.413   PCO2 73.8* 58.1* 47.4*   HCO3 33.5* 32.9* 30.3*   POCSATURATED 32* 98 94*   BE 6 7 6   PO2 24* 115* 70*     Blood Culture:   Recent Labs   Lab 06/08/23  1228   LABBLOO No Growth to date  No Growth to date     CBC:   Recent Labs   Lab 06/07/23 2109 06/09/23  0357   WBC 8.47 8.06   HGB 7.5* 7.9*   HCT 27.6* 29.1*    216     CMP:   Recent Labs   Lab 06/07/23 2109 06/09/23  0357    138   K 4.1 4.2   CL 98 102   CO2 27 24   GLU 77 70   BUN 9 6*   CREATININE 0.8 0.7   CALCIUM 8.5* 8.8   PROT 6.4 5.9*   ALBUMIN 3.4* 3.0*   BILITOT 1.2* 0.9   ALKPHOS 141* 123   AST 31 33   ALT 84* 60*   ANIONGAP 11 12       Significant Imaging: I have reviewed all pertinent imaging results/findings within the past 24 hours.    Assessment/Plan:     * Acute on chronic respiratory failure with hypoxia and hypercapnia  Patient with Hypercapnic Respiratory failure which is Acute on chronic.  he is on home oxygen at 4-5 LPM. Supplemental oxygen was provided and noted- Oxygen Concentration (%):  [50-60] 50    .   Signs/symptoms of respiratory failure include- tachypnea, increased work of breathing and respiratory distress. Contributing diagnoses includes - Aspiration, CHF and COPD Labs and images were reviewed. Patient Has recent ABG, which has been reviewed. Will treat underlying causes and adjust management of respiratory failure as follows- continue breathing treatments, antibiotic(s), steroids, supplemental oxygen     GERD (gastroesophageal reflux disease)  Chronic. Stable. Currently asymptomatic. Home medications include PPI/Antacids as  needed.  Plan:  -Continue PPI/Antacids as needed         Neuropathy  Chronic.  Stable.  Compliant with home medications.    Plan:  -hold Lyrica secondary to altered mental status      History of CVA with residual deficit  Right sided deficits with dysarthria. Worsen RUE weakness, dysarthria, and confusion. Tele Vascular Neurology consulted. Outside window for thromboembolytics.  Currently being treated with Eliquis, Plavix, and aspirin.  Recommends MRI.  Plan:  -Neuro checks q4hrs  -MRI brain w/o contrast c/w cva  -PT/OT recommending skilled nursing facility   -continue home meds      Dysarthria  See above      AMS (altered mental status)  Patient has acute metabolic, toxic and anoxic encephalopathy that is secondary to Cerebral ischemia, Drug/chemicals/toxic substances (Offending substance(s)- Hx of known substance abuse) and Metabolic derangements- Hx of known substance abuse. Patient's current mental status is Awake, Alert, Confused. Patient's baseline mental status is.  Work, alert, oriented to self, person, and situation. Evaluation and for underlying cause(s) is underway and inclusive of Blood Chemistries, Consult to Neurology, Neuro-Imaging (MRI/CT), Toxic metabolite eval  and Urine Drug screen. Will monitor neuro checks carefully, avoid narcotics and benzos that will exacerbate agitation, and use PRN medications for controls of behavior for self harm.          Right arm weakness   Worsen RUE weakness from baseline. Tele Vascular Neurology consulted. Outside window for thromboembolytics.  Currently being treated with Eliquis, Plavix, and aspirin.  Recommends MRI.  Plan:  -Neuro checks q4hrs  -MRI brain w/o contrast with acute infarcts  -PT/OT recommending skilled nursing facility   -continue home meds      Polysubstance abuse   when appropriate      DVT (deep venous thrombosis)  Chronic.  Stable.  Compliant with home medication.    Plan:  -continue aspirin, Plavix, Eliquis      Primary  hypertension  Current BP mildly to moderately elevated likely 2/2 to missing home medication dosing. BP usually well controlled per patient with home medications.  Plan:  -Optimize pain control   -Continue home medications (norvasc, metoprolol, lasix), titrate as needed   -Monitor BP  -Low salt/cardiac diet when not NPO  -IV hydralazine prn for SBP>160 or DBP>90           Mixed hyperlipidemia  Patient is chronically on statin.will continue for now. Last Lipid Panel:   Lab Results   Component Value Date    CHOL 56 (L) 06/07/2023    HDL 14 (L) 06/07/2023    LDLCALC 27.4 (L) 06/07/2023    TRIG 73 06/07/2023    CHOLHDL 25.0 06/07/2023     Plan:  -Continue home medication  -low fat/low calorie diet        PAD (peripheral artery disease)  Chronic.  Stable.  Compliant with home medications.    Plan:  -hold Lyrica and pain medication secondary to altered mental status      VTE Risk Mitigation (From admission, onward)         Ordered     apixaban tablet 5 mg  2 times daily         06/08/23 0243     IP VTE HIGH RISK PATIENT  Once         06/08/23 0039     Place sequential compression device  Until discontinued         06/08/23 0039                Critical care time spent on the evaluation and treatment of severe organ dysfunction, review of pertinent labs and imaging studies, discussions with consulting providers and discussions with patient/family: 41 minutes.    Thank you for your consult. I will follow-up with patient. Please contact us if you have any additional questions.    Zabrina Ozuna MD  Department of Hospital Medicine   [facility] - Intensive Care ([facility])

## 2023-06-10 PROBLEM — F17.200 TOBACCO DEPENDENCE: Status: ACTIVE | Noted: 2023-06-10

## 2023-06-10 LAB
ALBUMIN SERPL BCP-MCNC: 3.1 G/DL (ref 3.5–5.2)
ALP SERPL-CCNC: 124 U/L (ref 55–135)
ALT SERPL W/O P-5'-P-CCNC: 50 U/L (ref 10–44)
ANION GAP SERPL CALC-SCNC: 10 MMOL/L (ref 8–16)
AST SERPL-CCNC: 26 U/L (ref 10–40)
BASOPHILS # BLD AUTO: 0.04 K/UL (ref 0–0.2)
BASOPHILS NFR BLD: 0.5 % (ref 0–1.9)
BILIRUB SERPL-MCNC: 1 MG/DL (ref 0.1–1)
BUN SERPL-MCNC: 6 MG/DL (ref 8–23)
CALCIUM SERPL-MCNC: 8.9 MG/DL (ref 8.7–10.5)
CHLORIDE SERPL-SCNC: 98 MMOL/L (ref 95–110)
CO2 SERPL-SCNC: 30 MMOL/L (ref 23–29)
CREAT SERPL-MCNC: 0.7 MG/DL (ref 0.5–1.4)
DIFFERENTIAL METHOD: ABNORMAL
EOSINOPHIL # BLD AUTO: 0.4 K/UL (ref 0–0.5)
EOSINOPHIL NFR BLD: 5.2 % (ref 0–8)
ERYTHROCYTE [DISTWIDTH] IN BLOOD BY AUTOMATED COUNT: 21.7 % (ref 11.5–14.5)
EST. GFR  (NO RACE VARIABLE): >60 ML/MIN/1.73 M^2
GLUCOSE SERPL-MCNC: 91 MG/DL (ref 70–110)
HCT VFR BLD AUTO: 30.8 % (ref 40–54)
HGB BLD-MCNC: 8.4 G/DL (ref 14–18)
IMM GRANULOCYTES # BLD AUTO: 0.02 K/UL (ref 0–0.04)
IMM GRANULOCYTES NFR BLD AUTO: 0.2 % (ref 0–0.5)
LYMPHOCYTES # BLD AUTO: 1.1 K/UL (ref 1–4.8)
LYMPHOCYTES NFR BLD: 14 % (ref 18–48)
MCH RBC QN AUTO: 18.9 PG (ref 27–31)
MCHC RBC AUTO-ENTMCNC: 27.3 G/DL (ref 32–36)
MCV RBC AUTO: 69 FL (ref 82–98)
MONOCYTES # BLD AUTO: 0.6 K/UL (ref 0.3–1)
MONOCYTES NFR BLD: 7.5 % (ref 4–15)
MRSA SPEC QL CULT: NORMAL
NEUTROPHILS # BLD AUTO: 5.9 K/UL (ref 1.8–7.7)
NEUTROPHILS NFR BLD: 72.6 % (ref 38–73)
NRBC BLD-RTO: 0 /100 WBC
PLATELET # BLD AUTO: 236 K/UL (ref 150–450)
PMV BLD AUTO: 10.7 FL (ref 9.2–12.9)
POCT GLUCOSE: 107 MG/DL (ref 70–110)
POCT GLUCOSE: 119 MG/DL (ref 70–110)
POCT GLUCOSE: 146 MG/DL (ref 70–110)
POCT GLUCOSE: 173 MG/DL (ref 70–110)
POCT GLUCOSE: 95 MG/DL (ref 70–110)
POTASSIUM SERPL-SCNC: 4.1 MMOL/L (ref 3.5–5.1)
PROT SERPL-MCNC: 6.4 G/DL (ref 6–8.4)
RBC # BLD AUTO: 4.45 M/UL (ref 4.6–6.2)
SODIUM SERPL-SCNC: 138 MMOL/L (ref 136–145)
WBC # BLD AUTO: 8.09 K/UL (ref 3.9–12.7)

## 2023-06-10 PROCEDURE — 36415 COLL VENOUS BLD VENIPUNCTURE: CPT | Performed by: NURSE PRACTITIONER

## 2023-06-10 PROCEDURE — 25000003 PHARM REV CODE 250: Performed by: NURSE PRACTITIONER

## 2023-06-10 PROCEDURE — 21400001 HC TELEMETRY ROOM

## 2023-06-10 PROCEDURE — 94660 CPAP INITIATION&MGMT: CPT

## 2023-06-10 PROCEDURE — 25000242 PHARM REV CODE 250 ALT 637 W/ HCPCS: Performed by: NURSE PRACTITIONER

## 2023-06-10 PROCEDURE — 63600175 PHARM REV CODE 636 W HCPCS: Performed by: NURSE PRACTITIONER

## 2023-06-10 PROCEDURE — 11000001 HC ACUTE MED/SURG PRIVATE ROOM

## 2023-06-10 PROCEDURE — 92526 ORAL FUNCTION THERAPY: CPT

## 2023-06-10 PROCEDURE — 85025 COMPLETE CBC W/AUTO DIFF WBC: CPT | Performed by: NURSE PRACTITIONER

## 2023-06-10 PROCEDURE — 94640 AIRWAY INHALATION TREATMENT: CPT

## 2023-06-10 PROCEDURE — 25000003 PHARM REV CODE 250: Performed by: INTERNAL MEDICINE

## 2023-06-10 PROCEDURE — 80053 COMPREHEN METABOLIC PANEL: CPT | Performed by: NURSE PRACTITIONER

## 2023-06-10 PROCEDURE — 94761 N-INVAS EAR/PLS OXIMETRY MLT: CPT

## 2023-06-10 PROCEDURE — 99900035 HC TECH TIME PER 15 MIN (STAT)

## 2023-06-10 PROCEDURE — 27000221 HC OXYGEN, UP TO 24 HOURS

## 2023-06-10 RX ORDER — ARFORMOTEROL TARTRATE 15 UG/2ML
15 SOLUTION RESPIRATORY (INHALATION) 2 TIMES DAILY
Status: DISCONTINUED | OUTPATIENT
Start: 2023-06-10 | End: 2023-06-15 | Stop reason: HOSPADM

## 2023-06-10 RX ORDER — BUDESONIDE 0.5 MG/2ML
0.5 INHALANT ORAL EVERY 12 HOURS
Status: DISCONTINUED | OUTPATIENT
Start: 2023-06-10 | End: 2023-06-15 | Stop reason: HOSPADM

## 2023-06-10 RX ORDER — IPRATROPIUM BROMIDE AND ALBUTEROL SULFATE 2.5; .5 MG/3ML; MG/3ML
3 SOLUTION RESPIRATORY (INHALATION) EVERY 6 HOURS PRN
Status: DISCONTINUED | OUTPATIENT
Start: 2023-06-10 | End: 2023-06-15 | Stop reason: HOSPADM

## 2023-06-10 RX ADMIN — APIXABAN 5 MG: 2.5 TABLET, FILM COATED ORAL at 08:06

## 2023-06-10 RX ADMIN — CLOPIDOGREL BISULFATE 75 MG: 75 TABLET ORAL at 09:06

## 2023-06-10 RX ADMIN — AZITHROMYCIN MONOHYDRATE 500 MG: 500 INJECTION, POWDER, LYOPHILIZED, FOR SOLUTION INTRAVENOUS at 11:06

## 2023-06-10 RX ADMIN — METOPROLOL SUCCINATE 25 MG: 25 TABLET, EXTENDED RELEASE ORAL at 09:06

## 2023-06-10 RX ADMIN — ATORVASTATIN CALCIUM 40 MG: 40 TABLET, FILM COATED ORAL at 08:06

## 2023-06-10 RX ADMIN — ARFORMOTEROL TARTRATE 15 MCG: 15 SOLUTION RESPIRATORY (INHALATION) at 08:06

## 2023-06-10 RX ADMIN — AMLODIPINE BESYLATE 10 MG: 10 TABLET ORAL at 09:06

## 2023-06-10 RX ADMIN — MIRTAZAPINE 7.5 MG: 7.5 TABLET, FILM COATED ORAL at 08:06

## 2023-06-10 RX ADMIN — MUPIROCIN: 20 OINTMENT TOPICAL at 09:06

## 2023-06-10 RX ADMIN — MELATONIN TAB 3 MG 6 MG: 3 TAB at 08:06

## 2023-06-10 RX ADMIN — BUDESONIDE 0.5 MG: 0.5 INHALANT ORAL at 08:06

## 2023-06-10 RX ADMIN — TRAMADOL HYDROCHLORIDE 50 MG: 50 TABLET, COATED ORAL at 08:06

## 2023-06-10 RX ADMIN — APIXABAN 5 MG: 2.5 TABLET, FILM COATED ORAL at 09:06

## 2023-06-10 RX ADMIN — FUROSEMIDE 40 MG: 40 TABLET ORAL at 07:06

## 2023-06-10 RX ADMIN — PANTOPRAZOLE SODIUM 40 MG: 40 TABLET, DELAYED RELEASE ORAL at 07:06

## 2023-06-10 NOTE — NURSING
Patient restless in bed. Alert and appropriate most of time. Speaks his name clearly, confused to place, time, year, situation. Follows commands easily, denies pain. Assisted patient with breakfast, he ate 100% with no signs of aspiration. Patient pulls off nasal cannula and ekg leads, and pulse ox sensor frequently. No family here this AM

## 2023-06-10 NOTE — ASSESSMENT & PLAN NOTE
Right sided deficits with dysarthria. Worsen RUE weakness, dysarthria, and confusion. Tele Vascular Neurology consulted. Outside window for thromboembolytics.  Currently being treated with Eliquis, Plavix, and aspirin.  Recommends MRI.  Plan:  -Neuro checks q4hrs  -MRI brain w/o contrast c/w cva  -PT/OT recommending skilled nursing facility   -continue home meds    High risk for recurrence  MRI confirms

## 2023-06-10 NOTE — PT/OT/SLP PROGRESS
Speech Language Pathology Treatment    Patient Name:  Ish Guardado   MRN:  [id number]  Admitting Diagnosis: Acute on chronic respiratory failure with hypoxia and hypercapnia    Recommendations:                 General Recommendations:  Dysphagia therapy, Speech/language therapy, and Cognitive-linguistic therapy  Diet recommendations:  Soft & Bite Sized Diet - IDDSI Level 6, Liquid Diet Level: Thin liquids - IDDSI Level 0   Aspiration Precautions: Alternating bites/sips, Eliminate distractions, HOB to 90 degrees, Meds whole buried in puree, Remain upright 30 minutes post meal, and Standard aspiration precautions   General Precautions: Standard, aspiration, fall, respiratory  Communication strategies:  provide increased time to answer    Assessment:     Ish Guardado is a 62 y.o. male with an SLP diagnosis of Dysphagia, Dysarthria, and Cognitive-Linguistic Impairment.  He presents with Cognitive-Linguistic Impairment.  He presents with difficulty with communicating basic wants/needs, difficulty following directions, and decreased orientation.    Subjective     Patient alert, cooperative, and seen at bedside. He denied any difficulty eating or drinking.  Patient goals: n/a     Pain/Comfort:  Pain Rating 1: 0/10  Pain Rating Post-Intervention 1: 0/10    Respiratory Status:  nasal cannula    Objective:     Has the patient been evaluated by SLP for swallowing?   Yes  Keep patient NPO? No   Current Respiratory Status:        Patient with poor intelligibility of speech due to misarticulation, harsh vocal quality, and decrease volume. He was disoriented to person, time, place, and situation. Observed po intake of thin liquids via straw and soft solid consistency. Patient with extended oral prep and oral stasis required spontaneous liquid wash to clear. ST to continue POC.    Goals:   Multidisciplinary Problems       SLP Goals          Problem: SLP    Goal Priority Disciplines Outcome   SLP Goal     SLP    Description: 1.  Patient will tolerate diet with no overt s/s of aspiration.   2. Patient will answer orientation questions with 70% accuracy given external aids as needed.  3. Patient will improve cognitive skills in order to promote safety awareness, decrease confusion, and improve quality of life.    4. Patient will improve expressive and receptive language skills in order to communicate basic wants/needs.                        Plan:     Patient to be seen:  2 x/week   Plan of Care expires:  06/16/23  Plan of Care reviewed with:  patient   SLP Follow-Up:  Yes       Discharge recommendations:  nursing facility, skilled     Time Tracking:     SLP Treatment Date:   06/10/23  Speech Start Time:  0925  Speech Stop Time:  0941     Speech Total Time (min):  16 min    Billable Minutes: Treatment Swallowing Dysfunction 16    06/10/2023

## 2023-06-10 NOTE — ASSESSMENT & PLAN NOTE
Patient with acute on chronic hypoxic and hypercapnic respiratory failure is on home oxygen at 5 L NC LPM. Supplemental oxygen has been provided at 4L/NC and nocturnal PAP therapy. Signs/symptoms of respiratory failure include lethargy. Contributing diagnoses includes COPD and obesity hypoventilation. Labs and images were reviewed. Patient has recent ABG, which has been reviewed.    Will treat underlying causes and adjust management of respiratory failure as follows:  · Continue supplemental oxygen and titrate to maintain sats > 90%  · Currently on 4L/NC  · Continue nocturnal PAP therapy  · Add Brovana and Pulmicort nebs q12H  · Add prn DuoNebs as needed  · Monitor for bronchospasm  · Continue Lasix daily  · Monitor chest imaging and ABGs as needed  · Monitor fever and WBC trends

## 2023-06-10 NOTE — PROGRESS NOTES
[facility] - [facility] Medicine  Progress Note    Patient Name: Ish Guardado  MRN: [id number]  Patient Class: OP- Observation   Admission Date: 6/7/2023  Length of Stay: 0 days  Attending Physician: Zabrina Ozuna MD  Primary Care Provider: Pollo Arana MD        Subjective:     Principal Problem:Acute on chronic respiratory failure with hypoxia and hypercapnia        HPI:  Ish Guardado is a 62 y.o. male with a PMH  has a past medical history of Drug abuse, amphetamine type, Femoral-popliteal bypass graft occlusion, GERD (gastroesophageal reflux disease), History of substance abuse, History of transient ischemic attack (TIA), Hyperlipemia, Hypertension, Neuropathy, Occlusion of right femorotibial bypass graft (5/2017, 6/2017), Pre-diabetes, and Status post femorotibial bypass.  Presented to the ER accompanied by his wife and son to be evaluated for altered mental status which started earlier this morning.  History obtained by wife at bedside secondary to patient's change in mental status.  Per wife, patient has been having altered mental status for the last 3 days (06/04/2023), but states that he became more confused shortly after waking this morning.  Patient has history of CVA which left him with deficits of weakness on right side as well as dysarthria and memory issues.  She also states that patient has been falling off his wheelchair and needed help off the floor.  Patient has had visits to the hospital for evaluation for these falls no acute findings.  It is noted that patient is on Eliquis.  She also notices at his right arm her.  Which weaker than usual patient has been unable to move it.  Furthermore, she noticed that the patient's speech has become more slurred and patient has more confused in his responses when spoken to.  She also states that patient recently had a left carotid endarterectomy performed on 05/19/2023 performed by Dr. Hartley.    ER workup unremarkable with the  exception to H/H of 7.5/27.6 (previously 8.8/31.1 on 10/15/2022).  UA and drug screen negative.  Ammonia level negative.  CT head without acute findings.  Plain film imaging of left arm and left lower leg negative for acute findings.  Chest x-ray shows cardiomegaly.  Tele vascular Neurology consult placed.  Outside window for any thromboembolic lytic therapy.  Patient currently being treated with Eliquis, aspirin, and Plavix.  Hospital Medicine consulted to admit patient for further stroke workup/management.  Wife and son at bedside are in agreement with treatment plan.  Patient will be placed under observation status.    PCP: Pollo Arana          Overview/Hospital Course:  6/9 transfer to floor status from icu. Physical/occupational therapy recommending skilled nursing facility placement. Weaned to 4L. Consider blood transfusion for hb<7 or <8 if symptomatic given vascular disease.  6/10 cva on mri. Symptoms improving. Continue speech, physical/occupational therapy.Awaiting skilled nursing facility placement        Interval History: See hospital course for today      Review of Systems   Constitutional:  Positive for activity change.   Neurological:  Positive for speech difficulty and weakness.   Objective:     Vital Signs (Most Recent):  Temp: 98.9 °F (37.2 °C) (06/10/23 1100)  Pulse: 67 (06/10/23 1100)  Resp: (!) 36 (06/10/23 1100)  BP: (!) 143/65 (06/10/23 1100)  SpO2: 99 % (06/10/23 1100) Vital Signs (24h Range):  Temp:  [98.1 °F (36.7 °C)-98.9 °F (37.2 °C)] 98.9 °F (37.2 °C)  Pulse:  [56-88] 67  Resp:  [18-43] 36  SpO2:  [88 %-99 %] 99 %  BP: (130-173)/(60-76) 143/65     Weight: 117.5 kg (259 lb 0.7 oz)  Body mass index is 38.25 kg/m².    Intake/Output Summary (Last 24 hours) at 6/10/2023 1131  Last data filed at 6/10/2023 1100  Gross per 24 hour   Intake 367.8 ml   Output 4325 ml   Net -3957.2 ml         Physical Exam  Vitals and nursing note reviewed. Exam conducted with a chaperone present (nursing).    Constitutional:       General: He is awake. He is not in acute distress.     Appearance: He is obese. He is ill-appearing. He is not toxic-appearing.   HENT:      Head: Normocephalic and atraumatic.   Cardiovascular:      Rate and Rhythm: Normal rate.   Pulmonary:      Effort: No tachypnea or respiratory distress.   Abdominal:      Palpations: Abdomen is soft.      Tenderness: There is no abdominal tenderness.   Genitourinary:     Comments: valerio  Musculoskeletal:         General: Deformity present.      Right Lower Extremity: Right leg is amputated above knee.   Skin:     General: Skin is warm.      Capillary Refill: Capillary refill takes 2 to 3 seconds.   Neurological:      Cranial Nerves: Dysarthria present.      Motor: Weakness present.   Psychiatric:         Behavior: Behavior is cooperative.           Significant Labs: All pertinent labs within the past 24 hours have been reviewed.  CBC:   Recent Labs   Lab 06/09/23 0357 06/10/23  0336   WBC 8.06 8.09   HGB 7.9* 8.4*   HCT 29.1* 30.8*    236     CMP:   Recent Labs   Lab 06/09/23 0357 06/10/23  0336    138   K 4.2 4.1    98   CO2 24 30*   GLU 70 91   BUN 6* 6*   CREATININE 0.7 0.7   CALCIUM 8.8 8.9   PROT 5.9* 6.4   ALBUMIN 3.0* 3.1*   BILITOT 0.9 1.0   ALKPHOS 123 124   AST 33 26   ALT 60* 50*   ANIONGAP 12 10       Significant Imaging: I have reviewed all pertinent imaging results/findings within the past 24 hours.  Mri acute infarct, left cerebral      Assessment/Plan:      * Acute on chronic respiratory failure with hypoxia and hypercapnia  Patient with Hypercapnic Respiratory failure which is Acute on chronic.  he is on home oxygen at 4-5 LPM. Supplemental oxygen was provided and noted- Oxygen Concentration (%):  [30] 30    .   Signs/symptoms of respiratory failure include- tachypnea, increased work of breathing and respiratory distress. Contributing diagnoses includes - Aspiration, CHF and COPD Labs and images were reviewed.  Patient Has recent ABG, which has been reviewed. Will treat underlying causes and adjust management of respiratory failure as follows- continue breathing treatments, antibiotic(s), steroids, supplemental oxygen     Respiratory status stable  Discontinue antibiotic(s)     GERD (gastroesophageal reflux disease)  Chronic. Stable. Currently asymptomatic. Home medications include PPI/Antacids as needed.  Plan:  -Continue PPI/Antacids as needed         Neuropathy  Chronic.  Stable.  Compliant with home medications.    Plan:  -hold Lyrica secondary to altered mental status      History of CVA with residual deficit  Right sided deficits with dysarthria. Worsen RUE weakness, dysarthria, and confusion. Tele Vascular Neurology consulted. Outside window for thromboembolytics.  Currently being treated with Eliquis, Plavix, and aspirin.  Recommends MRI.  Plan:  -Neuro checks q4hrs  -MRI brain w/o contrast c/w cva  -PT/OT recommending skilled nursing facility   -continue home meds    High risk for recurrence  MRI confirms       Dysarthria  See above      AMS (altered mental status)  Patient has acute metabolic, toxic and anoxic encephalopathy that is secondary to Cerebral ischemia, Drug/chemicals/toxic substances (Offending substance(s)- Hx of known substance abuse) and Metabolic derangements- Hx of known substance abuse. Patient's current mental status is Awake, Alert, Confused. Patient's baseline mental status is.  Work, alert, oriented to self, person, and situation. Evaluation and for underlying cause(s) is underway and inclusive of Blood Chemistries, Consult to Neurology, Neuro-Imaging (MRI/CT), Toxic metabolite eval  and Urine Drug screen. Will monitor neuro checks carefully, avoid narcotics and benzos that will exacerbate agitation, and use PRN medications for controls of behavior for self harm.    Improving         Right arm weakness   Worsen RUE weakness from baseline. Tele Vascular Neurology consulted. Outside window for  thromboembolytics.  Currently being treated with Eliquis, Plavix, and aspirin.  Recommends MRI.  Plan:  -Neuro checks q4hrs  -MRI brain w/o contrast with acute infarcts  -PT/OT recommending skilled nursing facility   -continue home meds    Awaiting skilled nursing facility placement      Polysubstance abuse   when appropriate      DVT (deep venous thrombosis)  Chronic.  Stable.  Compliant with home medication.    Plan:  -continue aspirin, Plavix, Eliquis      Primary hypertension  Current BP mildly to moderately elevated likely 2/2 to missing home medication dosing. BP usually well controlled per patient with home medications.  Plan:  -Optimize pain control   -Continue home medications (norvasc, metoprolol, lasix), titrate as needed   -Monitor BP  -Low salt/cardiac diet when not NPO  -IV hydralazine prn for SBP>160 or DBP>90           Mixed hyperlipidemia  Patient is chronically on statin.will continue for now. Last Lipid Panel:   Lab Results   Component Value Date    CHOL 56 (L) 06/07/2023    HDL 14 (L) 06/07/2023    LDLCALC 27.4 (L) 06/07/2023    TRIG 73 06/07/2023    CHOLHDL 25.0 06/07/2023     Plan:  -Continue home medication  -low fat/low calorie diet        PAD (peripheral artery disease)  Chronic.  Stable.  Compliant with home medications.    Plan:  -hold Lyrica and pain medication secondary to altered mental status      VTE Risk Mitigation (From admission, onward)         Ordered     apixaban tablet 5 mg  2 times daily         06/08/23 0243     IP VTE HIGH RISK PATIENT  Once         06/08/23 0039     Place sequential compression device  Until discontinued         06/08/23 0039                Discharge Planning   SHAHRAM:      Code Status: DNR   Is the patient medically ready for discharge?:     Reason for patient still in hospital (select all that apply): Pending disposition  Discharge Plan A: Skilled Nursing Facility            Critical care time spent on the evaluation and treatment of severe organ  dysfunction, review of pertinent labs and imaging studies, discussions with consulting providers and discussions with patient/family: 41 minutes.      Zabrina Ozuna MD  Department of Hospital Medicine   '[facility] - Intensive [facility] ([facility])

## 2023-06-10 NOTE — SUBJECTIVE & OBJECTIVE
Interval History: See hospital course for today      Review of Systems   Constitutional:  Positive for activity change.   Neurological:  Positive for speech difficulty and weakness.   Objective:     Vital Signs (Most Recent):  Temp: 98.9 °F (37.2 °C) (06/10/23 1100)  Pulse: 67 (06/10/23 1100)  Resp: (!) 36 (06/10/23 1100)  BP: (!) 143/65 (06/10/23 1100)  SpO2: 99 % (06/10/23 1100) Vital Signs (24h Range):  Temp:  [98.1 °F (36.7 °C)-98.9 °F (37.2 °C)] 98.9 °F (37.2 °C)  Pulse:  [56-88] 67  Resp:  [18-43] 36  SpO2:  [88 %-99 %] 99 %  BP: (130-173)/(60-76) 143/65     Weight: 117.5 kg (259 lb 0.7 oz)  Body mass index is 38.25 kg/m².    Intake/Output Summary (Last 24 hours) at 6/10/2023 1131  Last data filed at 6/10/2023 1100  Gross per 24 hour   Intake 367.8 ml   Output 4325 ml   Net -3957.2 ml         Physical Exam  Vitals and nursing note reviewed. Exam conducted with a chaperone present (nursing).   Constitutional:       General: He is awake. He is not in acute distress.     Appearance: He is obese. He is ill-appearing. He is not toxic-appearing.   HENT:      Head: Normocephalic and atraumatic.   Cardiovascular:      Rate and Rhythm: Normal rate.   Pulmonary:      Effort: No tachypnea or respiratory distress.   Abdominal:      Palpations: Abdomen is soft.      Tenderness: There is no abdominal tenderness.   Genitourinary:     Comments: valerio  Musculoskeletal:         General: Deformity present.      Right Lower Extremity: Right leg is amputated above knee.   Skin:     General: Skin is warm.      Capillary Refill: Capillary refill takes 2 to 3 seconds.   Neurological:      Cranial Nerves: Dysarthria present.      Motor: Weakness present.   Psychiatric:         Behavior: Behavior is cooperative.           Significant Labs: All pertinent labs within the past 24 hours have been reviewed.  CBC:   Recent Labs   Lab 06/09/23  0357 06/10/23  0336   WBC 8.06 8.09   HGB 7.9* 8.4*   HCT 29.1* 30.8*    236     CMP:    Recent Labs   Lab 06/09/23  0357 06/10/23  0336    138   K 4.2 4.1    98   CO2 24 30*   GLU 70 91   BUN 6* 6*   CREATININE 0.7 0.7   CALCIUM 8.8 8.9   PROT 5.9* 6.4   ALBUMIN 3.0* 3.1*   BILITOT 0.9 1.0   ALKPHOS 123 124   AST 33 26   ALT 60* 50*   ANIONGAP 12 10       Significant Imaging: I have reviewed all pertinent imaging results/findings within the past 24 hours.  Mri acute infarct, left cerebral

## 2023-06-10 NOTE — PLAN OF CARE
Patient doing well today, VSS, no c/o discomfort or SOB. Patient eating 100% of meals without any signs of aspiration. Neuro checks done with no noted changes. Spouse visited, update given, POC discussed.

## 2023-06-10 NOTE — PROGRESS NOTES
[facility]  Pulmonology Progress Note    Patient Name: Ish Guardado  MRN: [id number]  Admission Date: 6/7/2023   Hospital Length of Stay: 0 days  Code Status: DNR     Attending Provider: Zabrina Ozuna MD  Principal Problem: Acute on chronic respiratory failure with hypoxia and hypercapnia  Subjective:   History of present illness:  Ish Guardado is a 62-year-old male with a past medical history significant for amphetamine drug abuse, femoral-popliteal bypass graft occlusion, GERD, TIA/CVA, hyperlipemia, hypertension, neuropathy, occlusion of right femorotibial bypass graft (5/2017, 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  Presented to the ER accompanied by his wife and son to be evaluated for altered mental status which started earlier this morning.  Per wife, the patient has been having altered mental status for 3 days prior to hospital admission, but states he became more confused shortly after waking on the morning of hospital presentation.  Patient has history of CVA which left him with deficits of weakness on right side, as well as, dysarthria and memory issues. She also stated patient had been falling out of his wheelchair and needed help off the floor. Reports increased weakness of RUE and slurred speech; a code stroke was called and the patient was not deemed a candidate to receive thrombolytics as he was outside the window. CTH negative for acute findings. ER workup unremarkable with the exception to H/H of 7.5/27.6 (previously 8.8/31.1 on 10/15/2022). UA and drug screen negative. Ammonia level negative. Plain film imaging of left arm and left lower leg negative for acute findings. Chest x-ray shows cardiomegaly. Per chart review, patient recently had a left carotid endarterectomy performed on 05/19/2023 performed by Dr. Hartley. Patient became increasingly more unresponsive and obtunded. ABG revealed severe hypercapnia; the patient's wife manages the CPAP and states  she only puts on him as needed and is afraid to let the patient handle it given he smokes a pack a day and will light himself on fire. Patient was admitted to the ICU.    Interval history:   6/9: Placed on BiPap overnight with improvement in pCO2. Currently on baseline oxygen use 4-5L NC. Upper extremity US negative for occlusion. MRI brain ordered for persistent right sided weakness. Patient is already on Eliquis, Plavix, and Statin.   6/10: Patient communicates some needs; however, unable to prolonged conversation. Pulmonary status stable; currently on 4L/NC with nocturnal PAP therapy.    Objective:     Vital Signs (Most Recent):  Temp: 98.9 °F (37.2 °C) (06/10/23 1100)  Pulse: 69 (06/10/23 1230)  Resp: (!) 26 (06/10/23 1230)  BP: (!) 143/65 (06/10/23 1100)  SpO2: 99 % (06/10/23 1230) Vital Signs (24h Range):  Temp:  [98.3 °F (36.8 °C)-98.9 °F (37.2 °C)] 98.9 °F (37.2 °C)  Pulse:  [56-88] 69  Resp:  [18-41] 26  SpO2:  [88 %-99 %] 99 %  BP: (130-173)/(60-76) 143/65   Weight: 117.5 kg (259 lb 0.7 oz);  Body mass index is 38.25 kg/m².    Intake/Output Summary (Last 24 hours) at 6/10/2023 1411  Last data filed at 6/10/2023 1319  Gross per 24 hour   Intake 120 ml   Output 3175 ml   Net -3055 ml      Physical Exam  Vitals and nursing note reviewed.   Constitutional:       Appearance: He is obese.   HENT:      Head: Normocephalic.   Eyes:      Conjunctiva/sclera: Conjunctivae normal.   Cardiovascular:      Rate and Rhythm: Normal rate.   Pulmonary:      Effort: Pulmonary effort is normal.      Breath sounds: Rhonchi present.   Abdominal:      Palpations: Abdomen is soft.   Musculoskeletal:      Cervical back: Normal range of motion and neck supple.      Comments: RUE hemiplegia   Skin:     General: Skin is warm and dry.   Neurological:      Mental Status: He is alert.      Comments: Aphasia, RUE hemiparesis       Review of Systems: Negative except as indicated in HPI    Significant Labs:  CBC/Anemia Profile:  Recent  Labs   Lab 06/09/23  0357 06/10/23  0336   WBC 8.06 8.09   HGB 7.9* 8.4*   HCT 29.1* 30.8*    236   MCV 70* 69*   RDW 21.7* 21.7*   Chemistries:  Recent Labs   Lab 06/09/23  0357 06/10/23  0336    138   K 4.2 4.1    98   CO2 24 30*   BUN 6* 6*   CREATININE 0.7 0.7   CALCIUM 8.8 8.9   ALBUMIN 3.0* 3.1*   PROT 5.9* 6.4   BILITOT 0.9 1.0   ALKPHOS 123 124   ALT 60* 50*   AST 33 26   MG 2.1  --    PHOS 2.8  --        Significant Imaging:  MRI reviewed and noted per my interpretation: acute left cerebral hemispheric watershed appearing infarcts  with remote right watershed appearing infarcts. Small remote thalamic and cerebellar infarcts and large remote left temporal occipital infarction.    ABG  Recent Labs   Lab 06/09/23  0100   PH 7.413   PO2 70*   PCO2 47.4*   HCO3 30.3*   BE 6     Assessment/Plan:   Pulmonary  * Acute on chronic respiratory failure with hypoxia and hypercapnia  Stage 1 mild COPD by GOLD classificationPatient with acute on chronic hypoxic and hypercapnic respiratory failure is on home oxygen at 5 L NC LPM. Supplemental oxygen has been provided at 4L/NC and nocturnal PAP therapy. Signs/symptoms of respiratory failure include lethargy. Contributing diagnoses includes COPD and obesity hypoventilation. Labs and images were reviewed. Patient has recent ABG, which has been reviewed.    Will treat underlying causes and adjust management of respiratory failure as follows:  · Continue supplemental oxygen and titrate to maintain sats > 90%  · Currently on 4L/NC  · Continue nocturnal PAP therapy  · Add Brovana and Pulmicort nebs q12H  · Add prn DuoNebs as needed  · Monitor for bronchospasm  · Continue Lasix daily  · Monitor chest imaging and ABGs as needed  · Monitor fever and WBC trends    Tobacco dependence  Assistance with smoking cessation was offered, including:  [x]  Medications  [x]  Counseling  [x]  Printed Information on Smoking Cessation; to be completed upon discharge  [x]   Referral to a Smoking Cessation Program, declined    Patient was counseled regarding smoking for 3-10 minutes.     Sydnie Elliott NP  Pulmonary and Critical Care  [facility]

## 2023-06-10 NOTE — ASSESSMENT & PLAN NOTE
Assistance with smoking cessation was offered, including:  [x]  Medications  [x]  Counseling  [x]  Printed Information on Smoking Cessation; to be completed upon discharge  [x]  Referral to a Smoking Cessation Program, declined    Patient was counseled regarding smoking for 3-10 minutes.

## 2023-06-10 NOTE — PLAN OF CARE
Met with pt and wife to discuss SNF. Pt and wife both agree on SNF. Provided SNF list to wife and pt for review. Pt and wife prefer [facility] and [facility] as options for SNF. CM encouraged pt and wife to pick one to two additional facilities just in case pt does not get accepted into the preferred facilities. CM sent referrals to [facility] and [facility].

## 2023-06-10 NOTE — ASSESSMENT & PLAN NOTE
Patient with Hypercapnic Respiratory failure which is Acute on chronic.  he is on home oxygen at 4-5 LPM. Supplemental oxygen was provided and noted- Oxygen Concentration (%):  [30] 30    .   Signs/symptoms of respiratory failure include- tachypnea, increased work of breathing and respiratory distress. Contributing diagnoses includes - Aspiration, CHF and COPD Labs and images were reviewed. Patient Has recent ABG, which has been reviewed. Will treat underlying causes and adjust management of respiratory failure as follows- continue breathing treatments, antibiotic(s), steroids, supplemental oxygen     Respiratory status stable  Discontinue antibiotic(s)

## 2023-06-10 NOTE — PLAN OF CARE
Call from wife requesting update on condition.  Explained CM focus is on DC planning, not daily care, unable to provide update on status of medical condition.  Did discuss dc plans.  Wife reiterated that patient will need to be more functional for her to manage his care at home.  Therapy rec is SNF.  Will provide wife with SNF list today.

## 2023-06-10 NOTE — ASSESSMENT & PLAN NOTE
Worsen RUE weakness from baseline. Tele Vascular Neurology consulted. Outside window for thromboembolytics.  Currently being treated with Eliquis, Plavix, and aspirin.  Recommends MRI.  Plan:  -Neuro checks q4hrs  -MRI brain w/o contrast with acute infarcts  -PT/OT recommending skilled nursing facility   -continue home meds    Awaiting skilled nursing facility placement

## 2023-06-10 NOTE — SUBJECTIVE & OBJECTIVE
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  Presented to the ER accompanied by his wife and son to be evaluated for altered mental status which started earlier this morning.  Per wife, the patient has been having altered mental status for 3 days prior to hospital admission, but states he became more confused shortly after waking on the morning of hospital presentation.  Patient has history of CVA which left him with deficits of weakness on right side, as well as, dysarthria and memory issues. She also stated patient had been falling out of his wheelchair and needed help off the floor. Reports increased weakness of RUE and slurred speech; a code stroke was called and the patient was not deemed a candidate to receive thrombolytics as he was outside the window. CTH negative for acute findings. ER workup unremarkable with the exception to H/H of 7.5/27.6 (previously 8.8/31.1 on 10/15/2022). UA and drug screen negative. Ammonia level negative. Plain film imaging of left arm and left lower leg negative for acute findings. Chest x-ray shows cardiomegaly. Per chart review, patient recently had a left carotid endarterectomy performed on 05/19/2023 performed by Dr. Hartley. Patient became increasingly more unresponsive and obtunded. ABG revealed severe hypercapnia; the patient's wife manages the CPAP and states she only puts on him as needed and is afraid to let the patient handle it given he smokes a pack a day and will light himself on fire. Patient was admitted to the ICU.    Interval history:  6/9: Placed on BiPap overnight with improvement in pCO2. Currently on baseline oxygen use 4-5L NC. Upper extremity US negative for occlusion. MRI brain ordered for persistent right  sided weakness. Patient is already on Eliquis, Plavix, and Statin.  6/10: Patient communicates some needs; however, unable to prolonged conversation. Pulmonary status stable; currently on 4L/NC with nocturnal PAP therapy.    Objective:     Vital Signs (Most Recent):  Temp: 98.9 °F (37.2 °C) (06/10/23 1100)  Pulse: 69 (06/10/23 1230)  Resp: (!) 26 (06/10/23 1230)  BP: (!) 143/65 (06/10/23 1100)  SpO2: 99 % (06/10/23 1230) Vital Signs (24h Range):  Temp:  [98.3 °F (36.8 °C)-98.9 °F (37.2 °C)] 98.9 °F (37.2 °C)  Pulse:  [56-88] 69  Resp:  [18-41] 26  SpO2:  [88 %-99 %] 99 %  BP: (130-173)/(60-76) 143/65   Weight: 117.5 kg (259 lb 0.7 oz);  Body mass index is 38.25 kg/m².    Intake/Output Summary (Last 24 hours) at 6/10/2023 1411  Last data filed at 6/10/2023 1319  Gross per 24 hour   Intake 120 ml   Output 3175 ml   Net -3055 ml      Physical Exam  Vitals and nursing note reviewed.   Constitutional:       Appearance: He is obese.   HENT:      Head: Normocephalic.   Eyes:      Conjunctiva/sclera: Conjunctivae normal.   Cardiovascular:      Rate and Rhythm: Normal rate.   Pulmonary:      Effort: Pulmonary effort is normal.      Breath sounds: Rhonchi present.   Abdominal:      Palpations: Abdomen is soft.   Musculoskeletal:      Cervical back: Normal range of motion and neck supple.      Comments: RUE hemiplegia   Skin:     General: Skin is warm and dry.   Neurological:      Mental Status: He is alert.      Comments: Aphasia, RUE hemiparesis       Review of Systems: Negative except as indicated in HPI    Significant Labs:  CBC/Anemia Profile:  Recent Labs   Lab 06/09/23  0357 06/10/23  0336   WBC 8.06 8.09   HGB 7.9* 8.4*   HCT 29.1* 30.8*    236   MCV 70* 69*   RDW 21.7* 21.7*   Chemistries:  Recent Labs   Lab 06/09/23  0357 06/10/23  0336    138   K 4.2 4.1    98   CO2 24 30*   BUN 6* 6*   CREATININE 0.7 0.7   CALCIUM 8.8 8.9   ALBUMIN 3.0* 3.1*   PROT 5.9* 6.4   BILITOT 0.9 1.0   ALKPHOS 123 124    ALT 60* 50*   AST 33 26   MG 2.1  --    PHOS 2.8  --        Significant Imaging:  MRI reviewed and noted per my interpretation: acute left cerebral hemispheric watershed appearing infarcts  with remote right watershed appearing infarcts. Small remote thalamic and cerebellar infarcts and large remote left temporal occipital infarction.

## 2023-06-10 NOTE — ASSESSMENT & PLAN NOTE
Patient has acute metabolic, toxic and anoxic encephalopathy that is secondary to Cerebral ischemia, Drug/chemicals/toxic substances (Offending substance(s)- Hx of known substance abuse) and Metabolic derangements- Hx of known substance abuse. Patient's current mental status is Awake, Alert, Confused. Patient's baseline mental status is.  Work, alert, oriented to self, person, and situation. Evaluation and for underlying cause(s) is underway and inclusive of Blood Chemistries, Consult to Neurology, Neuro-Imaging (MRI/CT), Toxic metabolite eval  and Urine Drug screen. Will monitor neuro checks carefully, avoid narcotics and benzos that will exacerbate agitation, and use PRN medications for controls of behavior for self harm.    Improving

## 2023-06-11 PROBLEM — I65.29 CAROTID STENOSIS: Chronic | Status: ACTIVE | Noted: 2023-06-11

## 2023-06-11 PROBLEM — R47.1 DYSARTHRIA: Status: RESOLVED | Noted: 2023-06-08 | Resolved: 2023-06-11

## 2023-06-11 PROBLEM — K21.9 GERD (GASTROESOPHAGEAL REFLUX DISEASE): Status: RESOLVED | Noted: 2023-06-08 | Resolved: 2023-06-11

## 2023-06-11 PROBLEM — I63.9 CEREBROVASCULAR ACCIDENT (CVA) INVOLVING LEFT CEREBRAL HEMISPHERE: Status: ACTIVE | Noted: 2023-06-07

## 2023-06-11 LAB
POCT GLUCOSE: 100 MG/DL (ref 70–110)
POCT GLUCOSE: 129 MG/DL (ref 70–110)
POCT GLUCOSE: 129 MG/DL (ref 70–110)
POCT GLUCOSE: 134 MG/DL (ref 70–110)

## 2023-06-11 PROCEDURE — 11000001 HC ACUTE MED/SURG PRIVATE ROOM

## 2023-06-11 PROCEDURE — 25000003 PHARM REV CODE 250: Performed by: NURSE PRACTITIONER

## 2023-06-11 PROCEDURE — 25000242 PHARM REV CODE 250 ALT 637 W/ HCPCS: Performed by: NURSE PRACTITIONER

## 2023-06-11 PROCEDURE — 94640 AIRWAY INHALATION TREATMENT: CPT

## 2023-06-11 PROCEDURE — 25000003 PHARM REV CODE 250: Performed by: INTERNAL MEDICINE

## 2023-06-11 PROCEDURE — 94761 N-INVAS EAR/PLS OXIMETRY MLT: CPT

## 2023-06-11 PROCEDURE — 21400001 HC TELEMETRY ROOM

## 2023-06-11 PROCEDURE — 99900035 HC TECH TIME PER 15 MIN (STAT)

## 2023-06-11 PROCEDURE — 97530 THERAPEUTIC ACTIVITIES: CPT | Mod: CQ

## 2023-06-11 RX ADMIN — TRAMADOL HYDROCHLORIDE 50 MG: 50 TABLET, COATED ORAL at 04:06

## 2023-06-11 RX ADMIN — FUROSEMIDE 40 MG: 40 TABLET ORAL at 06:06

## 2023-06-11 RX ADMIN — MELATONIN TAB 3 MG 6 MG: 3 TAB at 09:06

## 2023-06-11 RX ADMIN — METOPROLOL SUCCINATE 25 MG: 25 TABLET, EXTENDED RELEASE ORAL at 08:06

## 2023-06-11 RX ADMIN — APIXABAN 5 MG: 2.5 TABLET, FILM COATED ORAL at 08:06

## 2023-06-11 RX ADMIN — ARFORMOTEROL TARTRATE 15 MCG: 15 SOLUTION RESPIRATORY (INHALATION) at 08:06

## 2023-06-11 RX ADMIN — BUDESONIDE 0.5 MG: 0.5 INHALANT ORAL at 08:06

## 2023-06-11 RX ADMIN — CLOPIDOGREL BISULFATE 75 MG: 75 TABLET ORAL at 08:06

## 2023-06-11 RX ADMIN — MUPIROCIN: 20 OINTMENT TOPICAL at 08:06

## 2023-06-11 RX ADMIN — APIXABAN 5 MG: 2.5 TABLET, FILM COATED ORAL at 09:06

## 2023-06-11 RX ADMIN — AMLODIPINE BESYLATE 10 MG: 10 TABLET ORAL at 08:06

## 2023-06-11 RX ADMIN — PANTOPRAZOLE SODIUM 40 MG: 40 TABLET, DELAYED RELEASE ORAL at 06:06

## 2023-06-11 RX ADMIN — TRAMADOL HYDROCHLORIDE 50 MG: 50 TABLET, COATED ORAL at 05:06

## 2023-06-11 RX ADMIN — MIRTAZAPINE 7.5 MG: 7.5 TABLET, FILM COATED ORAL at 09:06

## 2023-06-11 RX ADMIN — MUPIROCIN: 20 OINTMENT TOPICAL at 09:06

## 2023-06-11 RX ADMIN — ATORVASTATIN CALCIUM 40 MG: 40 TABLET, FILM COATED ORAL at 09:06

## 2023-06-11 NOTE — ASSESSMENT & PLAN NOTE
Patient with Hypercapnic Respiratory failure which is Acute on chronic.  he is on home oxygen at 4-5 LPM. Supplemental oxygen was provided and noted-      .   Signs/symptoms of respiratory failure include- tachypnea, increased work of breathing and lethargy.. Contributing diagnoses includes - CHF and COPD Labs and images were reviewed. Patient Has recent ABG, which has been reviewed. Will treat underlying causes and adjust management of respiratory failure as follows-   - continue nebs as needed   - continue CPAP qhs   - sats stable on room air

## 2023-06-11 NOTE — PROGRESS NOTES
[facility]  Pulmonology Progress Note    Patient Name: Ish Guardado  MRN: [id number]  Admission Date: 6/7/2023   Hospital Length of Stay: 1 days  Code Status: DNR     Attending Provider: Brit Sher MD    Principal Problem: Acute on chronic respiratory failure with hypoxia and hypercapnia  Subjective:   History of present illness:  Ish Guardado is a 62-year-old male with a past medical history significant for amphetamine drug abuse, femoral-popliteal bypass graft occlusion, GERD, TIA/CVA, hyperlipemia, hypertension, neuropathy, occlusion of right femorotibial bypass graft (5/2017, 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  Presented to the ER accompanied by his wife and son to be evaluated for altered mental status which started earlier this morning.  Per wife, the patient has been having altered mental status for 3 days prior to hospital admission, but states he became more confused shortly after waking on the morning of hospital presentation.  Patient has history of CVA which left him with deficits of weakness on right side, as well as, dysarthria and memory issues. She also stated patient had been falling out of his wheelchair and needed help off the floor. Reports increased weakness of RUE and slurred speech; a code stroke was called and the patient was not deemed a candidate to receive thrombolytics as he was outside the window. CTH negative for acute findings. ER workup unremarkable with the exception to H/H of 7.5/27.6 (previously 8.8/31.1 on 10/15/2022). UA and drug screen negative. Ammonia level negative. Plain film imaging of left arm and left lower leg negative for acute findings. Chest x-ray shows cardiomegaly. Per chart review, patient recently had a left carotid endarterectomy performed on 05/19/2023 performed by Dr. Hartley. Patient became increasingly more unresponsive and obtunded. ABG revealed severe hypercapnia; the patient's wife manages the CPAP and  states she only puts on him as needed and is afraid to let the patient handle it given he smokes a pack a day and will light himself on fire. Patient was admitted to the ICU.    Interval history:   6/9: Placed on BiPap overnight with improvement in pCO2. Currently on baseline oxygen use 4-5L NC. Upper extremity US negative for occlusion. MRI brain ordered for persistent right sided weakness. Patient is already on Eliquis, Plavix, and Statin.   6/10: Patient communicates some needs; however, unable to prolonged conversation. Pulmonary status stable; currently on 4L/NC with nocturnal PAP therapy.   6/11: Appears less agitated / restless today. Sitting up in bed without acute complaints. Pulmonary status appears stable.    Objective:     Vital Signs (Most Recent):  Temp: 98.2 °F (36.8 °C) (06/11/23 0728)  Pulse: 71 (06/11/23 0857)  Resp: 20 (06/11/23 0857)  BP: (!) 143/64 (06/11/23 0728)  SpO2: (!) 93 % (06/11/23 0857) Vital Signs (24h Range):  Temp:  [97.8 °F (36.6 °C)-99.2 °F (37.3 °C)] 98.2 °F (36.8 °C)  Pulse:  [67-77] 71  Resp:  [14-41] 20  SpO2:  [92 %-99 %] 93 %  BP: (126-162)/(58-78) 143/64   Weight: 117.5 kg (259 lb 0.7 oz);  Body mass index is 38.25 kg/m².    Intake/Output Summary (Last 24 hours) at 6/11/2023 1020  Last data filed at 6/10/2023 1847  Gross per 24 hour   Intake 475 ml   Output 1050 ml   Net -575 ml      Physical Exam  Vitals and nursing note reviewed.   Constitutional:       Appearance: He is obese.   HENT:      Head: Normocephalic.   Eyes:      Conjunctiva/sclera: Conjunctivae normal.   Cardiovascular:      Rate and Rhythm: Normal rate.   Pulmonary:      Effort: Pulmonary effort is normal.      Breath sounds: Normal breath sounds.   Abdominal:      Palpations: Abdomen is soft.   Musculoskeletal:      Cervical back: Normal range of motion and neck supple.      Comments: RUE hemiplegia, right lower extremity AKA   Skin:     General: Skin is warm and dry.   Neurological:      Mental Status: He  is alert.      Comments: RUE hemiplegia, expressive aphasia       Review of Systems: Negative except as indicated in HPI    Significant Labs:  CBC/Anemia Profile:  Recent Labs   Lab 06/10/23  0336   WBC 8.09   HGB 8.4*   HCT 30.8*      MCV 69*   RDW 21.7*   Chemistries:  Recent Labs   Lab 06/10/23  0336      K 4.1   CL 98   CO2 30*   BUN 6*   CREATININE 0.7   CALCIUM 8.9   ALBUMIN 3.1*   PROT 6.4   BILITOT 1.0   ALKPHOS 124   ALT 50*   AST 26   ABG  Recent Labs   Lab 06/09/23  0100   PH 7.413   PO2 70*   PCO2 47.4*   HCO3 30.3*   BE 6     Assessment/Plan:   Pulmonary  * Acute on chronic respiratory failure with hypoxia and hypercapnia  Stage 1 mild COPD by GOLD classification  Patient with acute on chronic hypoxic and hypercapnic respiratory failure is on home oxygen at 5 L NC LPM. Supplemental oxygen has been provided at 4L/NC and nocturnal PAP therapy. Signs/symptoms of respiratory failure include lethargy. Contributing diagnoses includes COPD and obesity hypoventilation. Labs and images were reviewed. Patient has recent ABG, which has been reviewed.    Will treat underlying causes and adjust management of respiratory failure as follows:  · Continue supplemental oxygen and titrate to maintain sats > 90%; currently on room air  · Continue nocturnal PAP therapy  · Continue Brovana and Pulmicort nebs q12H  · Continue prn DuoNebs as needed  · Monitor for bronchospasm  · Continue Lasix daily  · Monitor chest imaging and ABGs as needed  · Monitor fever and WBC trends    Tobacco dependence  Assistance with smoking cessation was offered, including:  [x]  Medications  [x]  Counseling  [x]  Printed Information on Smoking Cessation; to be completed upon discharge  [x]  Referral to a Smoking Cessation Program, declined    Patient was counseled regarding smoking for 3-10 minutes.     Sydnie Elliott NP  Pulmonary and Critical Care  [facility]

## 2023-06-11 NOTE — ASSESSMENT & PLAN NOTE
Patient with acute on chronic hypoxic and hypercapnic respiratory failure is on home oxygen at 5 L NC LPM. Supplemental oxygen has been provided at 4L/NC and nocturnal PAP therapy. Signs/symptoms of respiratory failure include lethargy. Contributing diagnoses includes COPD and obesity hypoventilation. Labs and images were reviewed. Patient has recent ABG, which has been reviewed.    Will treat underlying causes and adjust management of respiratory failure as follows:  · Continue supplemental oxygen and titrate to maintain sats > 90%; currently on room air  · Continue nocturnal PAP therapy  · Continue Brovana and Pulmicort nebs q12H  · Continue prn DuoNebs as needed  · Monitor for bronchospasm  · Continue Lasix daily  · Monitor chest imaging and ABGs as needed  · Monitor fever and WBC trends

## 2023-06-11 NOTE — ASSESSMENT & PLAN NOTE
- likely multifactorial due to CVA as above and hypercapnia   - treat CVA as above   - continue CPAP qhs   - delirium and fall precautions   - continue PT/OT

## 2023-06-11 NOTE — SUBJECTIVE & OBJECTIVE
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  Presented to the ER accompanied by his wife and son to be evaluated for altered mental status which started earlier this morning.  Per wife, the patient has been having altered mental status for 3 days prior to hospital admission, but states he became more confused shortly after waking on the morning of hospital presentation.  Patient has history of CVA which left him with deficits of weakness on right side, as well as, dysarthria and memory issues. She also stated patient had been falling out of his wheelchair and needed help off the floor. Reports increased weakness of RUE and slurred speech; a code stroke was called and the patient was not deemed a candidate to receive thrombolytics as he was outside the window. CTH negative for acute findings. ER workup unremarkable with the exception to H/H of 7.5/27.6 (previously 8.8/31.1 on 10/15/2022). UA and drug screen negative. Ammonia level negative. Plain film imaging of left arm and left lower leg negative for acute findings. Chest x-ray shows cardiomegaly. Per chart review, patient recently had a left carotid endarterectomy performed on 05/19/2023 performed by Dr. Hartley. Patient became increasingly more unresponsive and obtunded. ABG revealed severe hypercapnia; the patient's wife manages the CPAP and states she only puts on him as needed and is afraid to let the patient handle it given he smokes a pack a day and will light himself on fire. Patient was admitted to the ICU.    Interval history:  6/9: Placed on BiPap overnight with improvement in pCO2. Currently on baseline oxygen use 4-5L NC. Upper extremity US negative for occlusion. MRI brain ordered for persistent right  sided weakness. Patient is already on Eliquis, Plavix, and Statin.  6/10: Patient communicates some needs; however, unable to prolonged conversation. Pulmonary status stable; currently on 4L/NC with nocturnal PAP therapy.  6/11: Appears less agitated / restless today. Sitting up in bed without acute complaints. Pulmonary status appears stable.    Objective:     Vital Signs (Most Recent):  Temp: 98.2 °F (36.8 °C) (06/11/23 0728)  Pulse: 71 (06/11/23 0857)  Resp: 20 (06/11/23 0857)  BP: (!) 143/64 (06/11/23 0728)  SpO2: (!) 93 % (06/11/23 0857) Vital Signs (24h Range):  Temp:  [97.8 °F (36.6 °C)-99.2 °F (37.3 °C)] 98.2 °F (36.8 °C)  Pulse:  [67-77] 71  Resp:  [14-41] 20  SpO2:  [92 %-99 %] 93 %  BP: (126-162)/(58-78) 143/64   Weight: 117.5 kg (259 lb 0.7 oz);  Body mass index is 38.25 kg/m².    Intake/Output Summary (Last 24 hours) at 6/11/2023 1020  Last data filed at 6/10/2023 1847  Gross per 24 hour   Intake 475 ml   Output 1050 ml   Net -575 ml      Physical Exam  Vitals and nursing note reviewed.   Constitutional:       Appearance: He is obese.   HENT:      Head: Normocephalic.   Eyes:      Conjunctiva/sclera: Conjunctivae normal.   Cardiovascular:      Rate and Rhythm: Normal rate.   Pulmonary:      Effort: Pulmonary effort is normal.      Breath sounds: Normal breath sounds.   Abdominal:      Palpations: Abdomen is soft.   Musculoskeletal:      Cervical back: Normal range of motion and neck supple.      Comments: RUE hemiplegia, right lower extremity AKA   Skin:     General: Skin is warm and dry.   Neurological:      Mental Status: He is alert.      Comments: RUE hemiplegia, expressive aphasia       Review of Systems: Negative except as indicated in HPI    Significant Labs:  CBC/Anemia Profile:  Recent Labs   Lab 06/10/23  0336   WBC 8.09   HGB 8.4*   HCT 30.8*      MCV 69*   RDW 21.7*   Chemistries:  Recent Labs   Lab 06/10/23  0336      K 4.1   CL 98   CO2 30*   BUN 6*   CREATININE 0.7   CALCIUM  8.9   ALBUMIN 3.1*   PROT 6.4   BILITOT 1.0   ALKPHOS 124   ALT 50*   AST 26

## 2023-06-11 NOTE — NURSING
Patient on tele monitor, NSR. Patient pleasantly confused. Patient moved to room 556, due to trying to get out of bed multiple times. Tele sitter ordered. No new orders.

## 2023-06-11 NOTE — PROGRESS NOTES
[facility] Medicine  Progress Note    Patient Name: Ish Guardado  MRN: [id number]  Patient Class: IP- Inpatient   Admission Date: 6/7/2023  Length of Stay: 1 days  Attending Physician: Brti Sher MD  Primary Care Provider: Pollo Arana MD        Subjective:     Principal Problem:Acute on chronic respiratory failure with hypoxia and hypercapnia        HPI:  Ish Guardado is a 62 y.o. male with a PMH  has a past medical history of Drug abuse, amphetamine type, Femoral-popliteal bypass graft occlusion, GERD (gastroesophageal reflux disease), History of substance abuse, History of transient ischemic attack (TIA), Hyperlipemia, Hypertension, Neuropathy, Occlusion of right femorotibial bypass graft (5/2017, 6/2017), Pre-diabetes, and Status post femorotibial bypass.  Presented to the ER accompanied by his wife and son to be evaluated for altered mental status which started earlier this morning.  History obtained by wife at bedside secondary to patient's change in mental status.  Per wife, patient has been having altered mental status for the last 3 days (06/04/2023), but states that he became more confused shortly after waking this morning.  Patient has history of CVA which left him with deficits of weakness on right side as well as dysarthria and memory issues.  She also states that patient has been falling off his wheelchair and needed help off the floor.  Patient has had visits to the hospital for evaluation for these falls no acute findings.  It is noted that patient is on Eliquis.  She also notices at his right arm her.  Which weaker than usual patient has been unable to move it.  Furthermore, she noticed that the patient's speech has become more slurred and patient has more confused in his responses when spoken to.  She also states that patient recently had a left carotid endarterectomy performed on 05/19/2023 performed by Dr. Hartley.    ER workup unremarkable with the exception to H/H of  7.5/27.6 (previously 8.8/31.1 on 10/15/2022).  UA and drug screen negative.  Ammonia level negative.  CT head without acute findings.  Plain film imaging of left arm and left lower leg negative for acute findings.  Chest x-ray shows cardiomegaly.  Tele vascular Neurology consult placed.  Outside window for any thromboembolic lytic therapy.  Patient currently being treated with Eliquis, aspirin, and Plavix.  Hospital Medicine consulted to admit patient for further stroke workup/management.  Wife and son at bedside are in agreement with treatment plan.  Patient will be placed under observation status.    PCP: Pollo Arana          Overview/Hospital Course:  6/9 transfer to floor status from icu. Physical/occupational therapy recommending skilled nursing facility placement. Weaned to 4L. Consider blood transfusion for hb<7 or <8 if symptomatic given vascular disease.  6/10 cva on mri. Symptoms improving. Continue speech, physical/occupational therapy.Awaiting skilled nursing facility placement  06/11: sats stable on room air, awaiting SNF placement       Interval History: NAEON. Pt awake, alert, sitting up in bed. No family at bedside. History is limited due to dysarthria, aphasia. Pt denies any pain. Sats stable on room air.     Review of Systems  Objective:     Vital Signs (Most Recent):  Temp: 98.8 °F (37.1 °C) (06/11/23 1108)  Pulse: 68 (06/11/23 1108)  Resp: 18 (06/11/23 1108)  BP: (!) 141/65 (06/11/23 1108)  SpO2: 95 % (06/11/23 1108) Vital Signs (24h Range):  Temp:  [97.8 °F (36.6 °C)-99.2 °F (37.3 °C)] 98.8 °F (37.1 °C)  Pulse:  [68-77] 68  Resp:  [14-41] 18  SpO2:  [92 %-96 %] 95 %  BP: (126-162)/(58-78) 141/65     Weight: 117.5 kg (259 lb 0.7 oz)  Body mass index is 38.25 kg/m².    Intake/Output Summary (Last 24 hours) at 6/11/2023 1255  Last data filed at 6/10/2023 1847  Gross per 24 hour   Intake 225 ml   Output 600 ml   Net -375 ml         Physical Exam  Vitals and nursing note reviewed.   Constitutional:        General: He is not in acute distress.     Appearance: He is obese. He is ill-appearing.   Cardiovascular:      Rate and Rhythm: Normal rate and regular rhythm.      Heart sounds: No murmur heard.    No friction rub. No gallop.   Pulmonary:      Effort: Pulmonary effort is normal.      Breath sounds: No wheezing, rhonchi or rales.      Comments: Decreased breath sounds bilaterally, on room air, no increased work of breathing   Abdominal:      General: Bowel sounds are normal. There is no distension.      Palpations: Abdomen is soft.      Tenderness: There is no abdominal tenderness. There is no guarding or rebound.   Musculoskeletal:      Comments: R AKA, no LLE edema    Neurological:      Mental Status: He is alert.      Comments: Oriented to self, dysarthric, R sided weakness noted            Significant Labs: All pertinent labs within the past 24 hours have been reviewed.    Significant Imaging: I have reviewed all pertinent imaging results/findings within the past 24 hours.      Assessment/Plan:      * Acute on chronic respiratory failure with hypoxia and hypercapnia  Patient with Hypercapnic Respiratory failure which is Acute on chronic.  he is on home oxygen at 4-5 LPM. Supplemental oxygen was provided and noted-      .   Signs/symptoms of respiratory failure include- tachypnea, increased work of breathing and lethargy.. Contributing diagnoses includes - CHF and COPD Labs and images were reviewed. Patient Has recent ABG, which has been reviewed. Will treat underlying causes and adjust management of respiratory failure as follows-   - continue nebs as needed   - continue CPAP qhs   - sats stable on room air     Cerebrovascular accident (CVA) involving left cerebral hemisphere   Worsen RUE weakness from baseline. Tele Vascular Neurology consulted. Outside window for thromboembolytics.  Currently being treated with Eliquis, Plavix, and statin.  Recommends MRI.  - MRI Brain with acute infacrcs throughout L  cerebral hemisphere in watershed type distribution. Also with multiple remote infarcts noted in R cerebral hemisphere, L temporal/occipital lobe, both thalami and both cerebellar hemispheres   - Continue Eliquis, Plavix, statin   - continue PT/OT/ST  - pending SNF placement     AMS (altered mental status)  - likely multifactorial due to CVA as above and hypercapnia   - treat CVA as above   - continue CPAP qhs   - delirium and fall precautions   - continue PT/OT       Carotid stenosis  Hx recent L CEA with Vascular Surgery Dr. Javed on 05/19/23  Continue Plavix, statin, Eliquis       Neuropathy   -hold Lyrica secondary to altered mental status      History of CVA with residual deficit  - see above       Polysubstance abuse       DVT (deep venous thrombosis)  - continue Eliquis       Primary hypertension   - continue home medications   - monitor BP     Mixed hyperlipidemia  - continue statin       PAD (peripheral artery disease)  -hold Lyrica and pain medication secondary to altered mental status      Stage 1 mild COPD by GOLD classification  - Pulm consulted and following as above  - continue nebs as needed         VTE Risk Mitigation (From admission, onward)         Ordered     apixaban tablet 5 mg  2 times daily         06/08/23 0243     IP VTE HIGH RISK PATIENT  Once         06/08/23 0039     Place sequential compression device  Until discontinued         06/08/23 0039                Discharge Planning   SHAHRAM:      Code Status: DNR   Is the patient medically ready for discharge?:     Reason for patient still in hospital (select all that apply): PT / OT recommendations and Pending disposition  Discharge Plan A: Skilled Nursing Facility                  Brit Sher MD  Department of Hospital Medicine   [facility] - Med Surg

## 2023-06-11 NOTE — PLAN OF CARE
MAX A BED MOB    SITTING EOB WITH SBA WITH DYNAMIC BALANCE WITH LONG ARCH QUAD, AND AP EACH 2 X 20     CHALLENGED HIS SITTING BALANCE WITH PT REACHING FOR TARGETS AT MULTIPLE LEVELS REQUIRING HIM TO LEAN L/R.     SCOOTING TO HOB WITH LOWER EXTREMITY LOWER EXTREMITY/UPPER EXTREMITY. PT IS NOT ABLE TO USE HIS R UPPER EXTREMITY FUNCTIONALLY

## 2023-06-11 NOTE — SUBJECTIVE & OBJECTIVE
Interval History: NAEON. Pt awake, alert, sitting up in bed. No family at bedside. History is limited due to dysarthria, aphasia. Pt denies any pain. Sats stable on room air.     Review of Systems  Objective:     Vital Signs (Most Recent):  Temp: 98.8 °F (37.1 °C) (06/11/23 1108)  Pulse: 68 (06/11/23 1108)  Resp: 18 (06/11/23 1108)  BP: (!) 141/65 (06/11/23 1108)  SpO2: 95 % (06/11/23 1108) Vital Signs (24h Range):  Temp:  [97.8 °F (36.6 °C)-99.2 °F (37.3 °C)] 98.8 °F (37.1 °C)  Pulse:  [68-77] 68  Resp:  [14-41] 18  SpO2:  [92 %-96 %] 95 %  BP: (126-162)/(58-78) 141/65     Weight: 117.5 kg (259 lb 0.7 oz)  Body mass index is 38.25 kg/m².    Intake/Output Summary (Last 24 hours) at 6/11/2023 1255  Last data filed at 6/10/2023 1847  Gross per 24 hour   Intake 225 ml   Output 600 ml   Net -375 ml         Physical Exam  Vitals and nursing note reviewed.   Constitutional:       General: He is not in acute distress.     Appearance: He is obese. He is ill-appearing.   Cardiovascular:      Rate and Rhythm: Normal rate and regular rhythm.      Heart sounds: No murmur heard.    No friction rub. No gallop.   Pulmonary:      Effort: Pulmonary effort is normal.      Breath sounds: No wheezing, rhonchi or rales.      Comments: Decreased breath sounds bilaterally, on room air, no increased work of breathing   Abdominal:      General: Bowel sounds are normal. There is no distension.      Palpations: Abdomen is soft.      Tenderness: There is no abdominal tenderness. There is no guarding or rebound.   Musculoskeletal:      Comments: R AKA, no LLE edema    Neurological:      Mental Status: He is alert.      Comments: Oriented to self, dysarthric, R sided weakness noted            Significant Labs: All pertinent labs within the past 24 hours have been reviewed.    Significant Imaging: I have reviewed all pertinent imaging results/findings within the past 24 hours.

## 2023-06-11 NOTE — PT/OT/SLP PROGRESS
Physical Therapy  Treatment    Ish Guardado   MRN: [id number]   Admitting Diagnosis: Acute on chronic respiratory failure with hypoxia and hypercapnia       PT Start Time: 1105     PT Stop Time: 1130    PT Total Time (min): 25 min       Billable Minutes:  Therapeutic Activity 25    Treatment Type: Treatment  PT/PTA: PTA     Number of PTA visits since last PT visit: 1       General Precautions: Standard, fall  Orthopedic Precautions: N/A  Braces: N/A       Subjective:  Communicated with ARNIE prior to session.      Pain/Comfort  Pain Rating 1: 0/10  Pain Rating Post-Intervention 1: 0/10    Treatment and Education:    MAX A BED MOB    SITTING EOB WITH SBA WITH DYNAMIC BALANCE WITH LONG ARCH QUAD, AND AP EACH 2 X 20     CHALLENGED HIS SITTING BALANCE WITH PT REACHING FOR TARGETS AT MULTIPLE LEVELS REQUIRING HIM TO LEAN L/R.     SCOOTING TO HOB WITH LOWER EXTREMITY LOWER EXTREMITY/UPPER EXTREMITY. PT IS NOT ABLE TO USE HIS R UPPER EXTREMITY FUNCTIONALLY     AM-PAC 6 CLICK MOBILITY  How much help from another person does this patient currently need?   1 = Unable, Total/Dependent Assistance  2 = A lot, Maximum/Moderate Assistance  3 = A little, Minimum/Contact Guard/Supervision  4 = None, Modified [place]/Independent    Turning over in bed (including adjusting bedclothes, sheets and blankets)?: 2  Sitting down on and standing up from a chair with arms (e.g., wheelchair, bedside commode, etc.): 1  Moving from lying on back to sitting on the side of the bed?: 2  Moving to and from a bed to a chair (including a wheelchair)?: 1  Need to walk in hospital room?: 1  Climbing 3-5 steps with a railing?: 1  Basic Mobility Total Score: 8    AM-PAC Raw Score CMS G-Code Modifier Level of Impairment Assistance   6 % Total / Unable   7 - 9 CM 80 - 100% Maximal Assist   10 - 14 CL 60 - 80% Moderate Assist   15 - 19 CK 40 - 60% Moderate Assist   20 - 22 CJ 20 - 40% Minimal Assist   23 CI 1-20% SBA / CGA   24 CH 0%  Independent/ Mod I     Patient left supine with all lines intact and call button in reach.    Assessment:  Ish Guardado is a 62 y.o. male with a medical diagnosis of Acute on chronic respiratory failure with hypoxia and hypercapnia and presents with .    Rehab identified problem list/impairments: decreased upper extremity function, decreased ROM, decreased lower extremity function, impaired endurance, impaired self care skills, impaired functional mobility, impaired fine motor    Rehab potential is fair.    Activity tolerance: Fair    Discharge recommendations: nursing facility, skilled      Barriers to discharge:      Equipment recommendations: bedside commode, bath bench, lift device, hospital bed, wheelchair     GOALS:   Multidisciplinary Problems       Physical Therapy Goals          Problem: Physical Therapy    Goal Priority Disciplines Outcome Goal Variances Interventions   Physical Therapy Goal     PT, PT/OT      Description: LTG'S TO BE MET IN 14 DAYS (6-23-23)  PT WILL REQUIRE MODA FOR BED MOBILITY  PT WILL REQUIRE MODA FOR SIT<>STAND TF  PT WILL REQUIRE MODA FOR BED<>CHAIR TF                           PLAN:    Patient to be seen 3 x/week to address the above listed problems via therapeutic activities, therapeutic exercises  Plan of Care expires: 06/23/23  Plan of Care reviewed with: patient         06/11/2023

## 2023-06-11 NOTE — PLAN OF CARE
Remains injury free. AVASYS at bedside for safety. Bed alarm in use for safety. Reoriented, not successful. Right arm flaccid, no changes.  Will monitor.

## 2023-06-11 NOTE — ASSESSMENT & PLAN NOTE
Worsen RUE weakness from baseline. Tele Vascular Neurology consulted. Outside window for thromboembolytics.  Currently being treated with Eliquis, Plavix, and statin.  Recommends MRI.  - MRI Brain with acute infacrcs throughout L cerebral hemisphere in watershed type distribution. Also with multiple remote infarcts noted in R cerebral hemisphere, L temporal/occipital lobe, both thalami and both cerebellar hemispheres   - Continue Eliquis, Plavix, statin   - continue PT/OT/ST  - pending SNF placement

## 2023-06-12 LAB
ACANTHOCYTES BLD QL SMEAR: PRESENT
ANION GAP SERPL CALC-SCNC: 11 MMOL/L (ref 8–16)
ANISOCYTOSIS BLD QL SMEAR: SLIGHT
BASOPHILS # BLD AUTO: 0.1 K/UL (ref 0–0.2)
BASOPHILS NFR BLD: 1.2 % (ref 0–1.9)
BUN SERPL-MCNC: 12 MG/DL (ref 8–23)
CALCIUM SERPL-MCNC: 9.3 MG/DL (ref 8.7–10.5)
CHLORIDE SERPL-SCNC: 101 MMOL/L (ref 95–110)
CO2 SERPL-SCNC: 27 MMOL/L (ref 23–29)
CREAT SERPL-MCNC: 0.8 MG/DL (ref 0.5–1.4)
DACRYOCYTES BLD QL SMEAR: ABNORMAL
DIFFERENTIAL METHOD: ABNORMAL
EOSINOPHIL # BLD AUTO: 0.6 K/UL (ref 0–0.5)
EOSINOPHIL NFR BLD: 7.7 % (ref 0–8)
ERYTHROCYTE [DISTWIDTH] IN BLOOD BY AUTOMATED COUNT: 22.3 % (ref 11.5–14.5)
EST. GFR  (NO RACE VARIABLE): >60 ML/MIN/1.73 M^2
GLUCOSE SERPL-MCNC: 120 MG/DL (ref 70–110)
HCT VFR BLD AUTO: 30.7 % (ref 40–54)
HGB BLD-MCNC: 8.5 G/DL (ref 14–18)
IMM GRANULOCYTES # BLD AUTO: 0.13 K/UL (ref 0–0.04)
IMM GRANULOCYTES NFR BLD AUTO: 1.6 % (ref 0–0.5)
LYMPHOCYTES # BLD AUTO: 1.3 K/UL (ref 1–4.8)
LYMPHOCYTES NFR BLD: 15.6 % (ref 18–48)
MCH RBC QN AUTO: 19.2 PG (ref 27–31)
MCHC RBC AUTO-ENTMCNC: 27.7 G/DL (ref 32–36)
MCV RBC AUTO: 70 FL (ref 82–98)
MONOCYTES # BLD AUTO: 0.9 K/UL (ref 0.3–1)
MONOCYTES NFR BLD: 10.4 % (ref 4–15)
NEUTROPHILS # BLD AUTO: 5.2 K/UL (ref 1.8–7.7)
NEUTROPHILS NFR BLD: 63.5 % (ref 38–73)
NRBC BLD-RTO: 0 /100 WBC
PLATELET # BLD AUTO: 259 K/UL (ref 150–450)
PLATELET BLD QL SMEAR: ABNORMAL
PMV BLD AUTO: 9.9 FL (ref 9.2–12.9)
POCT GLUCOSE: 115 MG/DL (ref 70–110)
POCT GLUCOSE: 124 MG/DL (ref 70–110)
POCT GLUCOSE: 143 MG/DL (ref 70–110)
POCT GLUCOSE: 173 MG/DL (ref 70–110)
POTASSIUM SERPL-SCNC: 4.2 MMOL/L (ref 3.5–5.1)
RBC # BLD AUTO: 4.42 M/UL (ref 4.6–6.2)
SCHISTOCYTES BLD QL SMEAR: PRESENT
SODIUM SERPL-SCNC: 139 MMOL/L (ref 136–145)
TARGETS BLD QL SMEAR: ABNORMAL
WBC # BLD AUTO: 8.18 K/UL (ref 3.9–12.7)

## 2023-06-12 PROCEDURE — 99900035 HC TECH TIME PER 15 MIN (STAT)

## 2023-06-12 PROCEDURE — 25000242 PHARM REV CODE 250 ALT 637 W/ HCPCS: Performed by: NURSE PRACTITIONER

## 2023-06-12 PROCEDURE — 92507 TX SP LANG VOICE COMM INDIV: CPT | Performed by: SPEECH-LANGUAGE PATHOLOGIST

## 2023-06-12 PROCEDURE — 97112 NEUROMUSCULAR REEDUCATION: CPT

## 2023-06-12 PROCEDURE — 21400001 HC TELEMETRY ROOM

## 2023-06-12 PROCEDURE — 25000003 PHARM REV CODE 250: Performed by: NURSE PRACTITIONER

## 2023-06-12 PROCEDURE — 36415 COLL VENOUS BLD VENIPUNCTURE: CPT | Performed by: INTERNAL MEDICINE

## 2023-06-12 PROCEDURE — 85025 COMPLETE CBC W/AUTO DIFF WBC: CPT | Performed by: INTERNAL MEDICINE

## 2023-06-12 PROCEDURE — 25000003 PHARM REV CODE 250: Performed by: INTERNAL MEDICINE

## 2023-06-12 PROCEDURE — 97530 THERAPEUTIC ACTIVITIES: CPT

## 2023-06-12 PROCEDURE — 97110 THERAPEUTIC EXERCISES: CPT

## 2023-06-12 PROCEDURE — 94640 AIRWAY INHALATION TREATMENT: CPT

## 2023-06-12 PROCEDURE — 63600175 PHARM REV CODE 636 W HCPCS: Performed by: NURSE PRACTITIONER

## 2023-06-12 PROCEDURE — 94761 N-INVAS EAR/PLS OXIMETRY MLT: CPT

## 2023-06-12 PROCEDURE — 80048 BASIC METABOLIC PNL TOTAL CA: CPT | Performed by: INTERNAL MEDICINE

## 2023-06-12 RX ORDER — IBUPROFEN 200 MG
1 TABLET ORAL DAILY
Status: DISCONTINUED | OUTPATIENT
Start: 2023-06-13 | End: 2023-06-15 | Stop reason: HOSPADM

## 2023-06-12 RX ADMIN — APIXABAN 5 MG: 2.5 TABLET, FILM COATED ORAL at 08:06

## 2023-06-12 RX ADMIN — APIXABAN 5 MG: 2.5 TABLET, FILM COATED ORAL at 09:06

## 2023-06-12 RX ADMIN — ATORVASTATIN CALCIUM 40 MG: 40 TABLET, FILM COATED ORAL at 09:06

## 2023-06-12 RX ADMIN — PANTOPRAZOLE SODIUM 40 MG: 40 TABLET, DELAYED RELEASE ORAL at 06:06

## 2023-06-12 RX ADMIN — MIRTAZAPINE 7.5 MG: 7.5 TABLET, FILM COATED ORAL at 09:06

## 2023-06-12 RX ADMIN — METOPROLOL SUCCINATE 25 MG: 25 TABLET, EXTENDED RELEASE ORAL at 08:06

## 2023-06-12 RX ADMIN — CLOPIDOGREL BISULFATE 75 MG: 75 TABLET ORAL at 08:06

## 2023-06-12 RX ADMIN — AMLODIPINE BESYLATE 10 MG: 10 TABLET ORAL at 08:06

## 2023-06-12 RX ADMIN — INSULIN ASPART 2 UNITS: 100 INJECTION, SOLUTION INTRAVENOUS; SUBCUTANEOUS at 05:06

## 2023-06-12 RX ADMIN — BUDESONIDE 0.5 MG: 0.5 INHALANT ORAL at 07:06

## 2023-06-12 RX ADMIN — MUPIROCIN: 20 OINTMENT TOPICAL at 09:06

## 2023-06-12 RX ADMIN — TRAMADOL HYDROCHLORIDE 50 MG: 50 TABLET, COATED ORAL at 11:06

## 2023-06-12 RX ADMIN — FUROSEMIDE 40 MG: 40 TABLET ORAL at 06:06

## 2023-06-12 RX ADMIN — ARFORMOTEROL TARTRATE 15 MCG: 15 SOLUTION RESPIRATORY (INHALATION) at 07:06

## 2023-06-12 NOTE — PT/OT/SLP PROGRESS
Occupational Therapy   Treatment    Name: Ish Guardado  MRN: [id number]  Admitting Diagnosis:  Acute on chronic respiratory failure with hypoxia and hypercapnia       Recommendations:     Discharge Recommendations: nursing facility, skilled  Discharge Equipment Recommendations:  to be determined by next level of care  Barriers to discharge:  Other (Comment) (unknown)    Assessment:     Ish Guardado is a 62 y.o. male with a medical diagnosis of Acute on chronic respiratory failure with hypoxia and hypercapnia.  He presents with the following performance deficits affecting function are weakness, impaired endurance, impaired self care skills, impaired functional mobility, gait instability, impaired balance, impaired cognition, decreased coordination, decreased upper extremity function, decreased lower extremity function, decreased safety awareness, abnormal tone, decreased ROM, edema.     Rehab Prognosis:  Fair; patient would benefit from acute skilled OT services to address these deficits and reach maximum level of function.       Plan:     Patient to be seen 2 x/week to address the above listed problems via self-care/home management, therapeutic activities, therapeutic exercises  Plan of Care Expires: 06/23/23  Plan of Care Reviewed with: patient    Subjective     Chief Complaint: tired  Patient/Family Comments/goals: none reported  Pain/Comfort:  Pain Rating 1: 0/10    Objective:     Communicated with: Nurse and epic chart review prior to session.  Patient found supine with telemetry, peripheral IV, PureWick, bed alarm, Other (comments) (AVASYS) upon OT entry to room.    General Precautions: Standard, fall, aphasia, aspiration    Orthopedic Precautions:N/A  Braces: N/A  Respiratory Status: Room air    Bed Mobility:    Patient completed Rolling/Turning to Left with  maximal assistance and 2 persons  Patient completed Supine to Sit with maximal assistance and 2 persons  Patient completed Sit to Supine with maximal  assistance and 2 persons   Forward scoot to EOB with Max A x2.    Functional Mobility/Transfers:  Patient completed Sit <> Stand Transfer with moderate assistance  with  no assistive device  x3 trials. Pt pushing up with BUE on bed, requiring assist for RUE . Pt able to achieve 1/2 stand.     Activities of Daily Living:  Grooming: stand by assistance washed face with LUE while weightbearing through RUE. Attempted with RUE and hand-over-hand assist, but pt unable to complete d/t poor grasp and RUE strength.   Lower body dressing: Total assistance. Negrito sock in supine.     [facility] 6 Click ADL: 8    Treatment & Education:  Pt lethargic and confused. Pt demonstrating RUE scapular protraction/retraction today, performing x5 reps AAROM. Absent active wrist extension/flexion and . Weightbearing through RUE hand for proprioceptive input and feedback during ADL task, therex, and standing; provided facilitation with wrist and digit extension. Completed x10 reps RUE PROM shoulder flexion, elbow flexion/ext, wrist flexion/ext, and digit flexion/ext. Reviewed role of OT in acute setting and benefits of participation. Educated on techniques to use to increase independence and decrease fall risk with functional transfers. Educated on importance of EOB/OOB activity and calling for A to transfer and meet needs. Encouraged completion of BUE AROM/PROM therex throughout the day to tolerance to increase functional strength and activity tolerance. Patient stated understanding and in agreement with POC.     Patient left HOB elevated with all lines intact, call button in reach, bed alarm on, and nurse notified    GOALS:   Multidisciplinary Problems       Occupational Therapy Goals          Problem: Occupational Therapy    Goal Priority Disciplines Outcome Interventions   Occupational Therapy Goal     OT, PT/OT Ongoing, Progressing    Description: Goals to be met by: 6/23/23     Patient will increase functional independence with ADLs  by performing:    Grooming while EOB with Moderate Assistance.  Supine to sit with Moderate Assistance.  Increased functional strength in L UE grossly by 1/2 MM grade.                         Time Tracking:     OT Date of Treatment: 06/12/23  OT Start Time: 1130  OT Stop Time: 1210  OT Total Time (min): 40 min    Billable Minutes:Therapeutic Activity 15  Neuromuscular Re-education 25    OT/LAURENT: OT      Marcie Garcia OT     6/12/2023

## 2023-06-12 NOTE — RESPIRATORY THERAPY
Pt appeared to be very sleepy at this time. Pt was able to tell me his name and date of birth but went right back to sleep. Pt took his Tx with no problem. Pt stated that he had been up all night and just was tired this morning. Spoke with RN about Pt and Pt was awake when RN went to the room prior to me Will continue to monitor

## 2023-06-12 NOTE — PLAN OF CARE
Chart reviewed, VSS, patient free of falls and injuries, purposeful rounding every 2 hrs, bed alarm on

## 2023-06-12 NOTE — PLAN OF CARE
Continue OT POC.  Max A x2 for all bed mobility. Mod A for sit>1/2 stand, pushing up with BUE and assist with RUE . Pt demonstrates RUE scapular protraction/retraction.

## 2023-06-12 NOTE — PT/OT/SLP PROGRESS
"Speech Language Pathology Treatment    Patient Name:  Ish Guardado   MRN:  [id number]  Admitting Diagnosis: Acute on chronic respiratory failure with hypoxia and hypercapnia    Recommendations:                 General Recommendations:  Speech/language therapy  Diet recommendations:  Soft & Bite Sized Diet - IDDSI Level 6, Liquid Diet Level: Thin liquids - IDDSI Level 0   Aspiration Precautions: Standard aspiration precautions   General Precautions: Standard, aphasia, aspiration  Communication strategies:  provide increased time to answer and provide repetitions/cueing when needed      Subjective     Patient easily altered when his name was called. He required cueing throughout therapy session.      Respiratory Status: Room air    Objective:     Has the patient been evaluated by SLP for swallowing?   Yes  Keep patient NPO? No   Current Respiratory Status: room air    Auditory Comprehension;  1 step simple commands: 100% accuracy with minimal cues for attention to directions   2 step simple commands: 50% accuracy with moderate cues for attention to directions     Verbal Expression  Yes/No Questions: ~40% accuracy with maximum cueing for repetition of questions   Naming: ~40% accuracy with maximum phonemic and semantic cueing. Initial phonemic cue was most successful for facilitating naming. Verbal expression also characterized by preservations of neologistic paraphasias ("nonamlity"). Additionally patient used gestures to facilitate word finding.     Goals:   Multidisciplinary Problems       SLP Goals          Problem: SLP    Goal Priority Disciplines Outcome   SLP Goal     SLP Ongoing, Progressing   Description: 1. Patient will tolerate diet with no overt s/s of aspiration. MET  2. Patient will improve expressive and receptive language skills in order to communicate basic wants/needs.                      Assessment:     Ish Guardado is a 62 y.o. male with a medical diagnosis of acute CVA.  He presents with global " aphasia characterized by auditory comprehension deficits characterized by ability to follow 1 step simple commands and 2 step simple commands with verbal repetition of command. Verbal expression characterized by word finding deficit, perseverations, non-fluent/effortful output, use of gestures to facilitate verbal expression, neologistic paraphasias, primarily use of content words. ST will continue to follow for language therapy. Of note patient with observed reduced attention throughout session as evidenced by requiring frequent redirection to task and likely related to history of several CVAs. However cannot adequately differentially diagnose cognitive disorder in the presence of linguistic deficits.     Plan:     Patient to be seen:  2 x/week, 3 x/week   Plan of Care expires:  06/16/23  Plan of Care reviewed with:  patient   SLP Follow-Up:  Yes       Discharge recommendations:  nursing facility, skilled   Barriers to Discharge:  None    Time Tracking:     SLP Treatment Date:   06/12/23  Speech Start Time:  1240  Speech Stop Time:  1249     Speech Total Time (min):  9 min    Billable Minutes: Speech Therapy Individual 9 minutes     06/12/2023    ARTIE Casiano  Student Speech Language Pathologist     Leandra Lincoln MA, CCC-SLP  Speech Language Pathologist  6/12/2023

## 2023-06-12 NOTE — PT/OT/SLP PROGRESS
Physical Therapy Treatment    Patient Name:  Ish Guardado   MRN:  [id number]    Recommendations:     Discharge Recommendations: nursing facility, skilled  Discharge Equipment Recommendations: to be determined by next level of care  Barriers to discharge: None    Assessment:     Ish Guardado is a 62 y.o. male admitted with a medical diagnosis of Acute on chronic respiratory failure with hypoxia and hypercapnia.  He presents with the following impairments/functional limitations: weakness, impaired endurance, impaired cognition, decreased ROM, impaired sensation, decreased coordination, impaired self care skills, decreased upper extremity function, impaired functional mobility, decreased lower extremity function, impaired fine motor, decreased safety awareness, impaired balance, abnormal tone .    Rehab Prognosis: Good; patient would benefit from acute skilled PT services to address these deficits and reach maximum level of function.    Recent Surgery: * No surgery found *      Plan:     During this hospitalization, patient to be seen 3 x/week to address the identified rehab impairments via therapeutic activities, therapeutic exercises, neuromuscular re-education and progress toward the following goals:    Plan of Care Expires:  06/23/23    Subjective     Chief Complaint: PT WAS VERY TIRED  Patient/Family Comments/goals: NONE STATED  Pain/Comfort:  Pain Rating 1: 0/10      Objective:     Communicated with NURSE RIVERO prior to session.  Patient found supine with PureWick, telemetry, peripheral IV, bed alarm, Other (comments) (AVASYS) upon PT entry to room.     General Precautions: Standard, fall, aphasia  Orthopedic Precautions: N/A  Braces: N/A  Respiratory Status: Room air     Functional Mobility:  Bed Mobility:     Rolling Left:  moderate assistance  Scooting: minimal assistance  Supine to Sit: maximal assistance and of 2 persons  Sit to Supine: moderate assistance  Transfers:     Sit to Stand:  moderate  assistance and of 2 persons using bed railing      AM-PAC 6 CLICK MOBILITY  Turning over in bed (including adjusting bedclothes, sheets and blankets)?: 2  Sitting down on and standing up from a chair with arms (e.g., wheelchair, bedside commode, etc.): 2  Moving from lying on back to sitting on the side of the bed?: 2  Moving to and from a bed to a chair (including a wheelchair)?: 1 (NOT TESTED)  Need to walk in hospital room?: 1 (NOT TESTED)  Climbing 3-5 steps with a railing?: 1 (NOT TESTED)  Basic Mobility Total Score: 9       Treatment & Education:  PT REQUIRED MOD A FOR ROLLING LEFT AND MAX A X2 FOR SUPINE TO SIT. WHILE EOB, PT PERFORMED AP AND LAQ X10 EACH ON THE L LEG. PT THEN PERFORMED SIT TO HALF-STAND X3 WITH MOD A X2 WITH FACILITATION TO RUE  TO HANDRAIL. TACTILE CUES TO FACILITATE RUE SHOULDER FLEX AND EXTENSION 2X8 REPS EACH WITH EMPHASIS ON RUE STRENGTHENING. PT LETHARGIC AND DIFFICULTY ATTENDING TO TASK. APPEARED TO RESPOND BEST TO SHORT, ONE-WORD CUES/INSTRUCTIONS. PT RETURNED TO SUPINE POSITION WITH MOD A AND SCOOTED TO THE LEFT WITH MIN A.    Patient left supine with all lines intact and call button in reach..    GOALS:   Multidisciplinary Problems       Physical Therapy Goals          Problem: Physical Therapy    Goal Priority Disciplines Outcome Goal Variances Interventions   Physical Therapy Goal     PT, PT/OT Ongoing, Progressing     Description: LTG'S TO BE MET IN 14 DAYS (6-23-23)  PT WILL REQUIRE MODA FOR BED MOBILITY  PT WILL REQUIRE MODA FOR SIT<>STAND TF  PT WILL REQUIRE MODA FOR BED<>CHAIR TF                           Time Tracking:     PT Received On: 06/12/23  PT Start Time: 1131     PT Stop Time: 1209  PT Total Time (min): 38 min     Billable Minutes: Therapeutic Activity 14, Therapeutic Exercise 12, and Neuromuscular Re-education 12    Treatment Type: Treatment  PT/PTA: PT     Number of PTA visits since last PT visit: 0     06/12/2023

## 2023-06-12 NOTE — PLAN OF CARE
Pt remains free from falls/injuries this shift. Safety precautions maintained. No s/s of acute distress noted. VSS. No c/o pain. Tele monitoring per orders. Tele sitter at bedside.  Continuing with plan of care. Chart check complete and all orders reviewed.

## 2023-06-12 NOTE — PLAN OF CARE
Problem: Adult Inpatient Plan of Care  Goal: Plan of Care Review  6/11/2023 2352 by Kailey Hernandez RN  Outcome: Ongoing, Progressing  6/11/2023 2346 by Kailey Hernandez RN  Outcome: Ongoing, Progressing  Goal: Patient-Specific Goal (Individualized)  6/11/2023 2352 by Kailey Hernandez RN  Outcome: Ongoing, Progressing  6/11/2023 2346 by Kailey Hernandez RN  Outcome: Ongoing, Progressing  Goal: Absence of Hospital-Acquired Illness or Injury  6/11/2023 2352 by Kailey Hernandez RN  Outcome: Ongoing, Progressing  6/11/2023 2346 by Kailey Hernandez RN  Outcome: Ongoing, Progressing  Goal: Optimal Comfort and Wellbeing  6/11/2023 2352 by Kailey Hernandez RN  Outcome: Ongoing, Progressing  6/11/2023 2346 by Kailey Hernandez RN  Outcome: Ongoing, Progressing  Goal: Readiness for Transition of Care  6/11/2023 2352 by Kailey Hernandez RN  Outcome: Ongoing, Progressing  6/11/2023 2346 by Kailey Hernandez RN  Outcome: Ongoing, Progressing     Problem: Infection  Goal: Absence of Infection Signs and Symptoms  6/11/2023 2352 by Kailey Hernandez RN  Outcome: Ongoing, Progressing  6/11/2023 2346 by Kailey Hernandez RN  Outcome: Ongoing, Progressing     Problem: Adjustment to Illness (Stroke, Ischemic/Transient Ischemic Attack)  Goal: Optimal Coping  6/11/2023 2352 by Kailey Hernandez RN  Outcome: Ongoing, Progressing  6/11/2023 2346 by Kailey Hernandez RN  Outcome: Ongoing, Progressing     Problem: Bowel Elimination Impaired (Stroke, Ischemic/Transient Ischemic Attack)  Goal: Effective Bowel Elimination  6/11/2023 2352 by Kailey Hernandez RN  Outcome: Ongoing, Progressing  6/11/2023 2346 by Kailey Hernandez RN  Outcome: Ongoing, Progressing     Problem: Cerebral Tissue Perfusion (Stroke, Ischemic/Transient Ischemic Attack)  Goal: Optimal Cerebral Tissue Perfusion  6/11/2023 2352 by Kailey Hernandez RN  Outcome: Ongoing, Progressing  6/11/2023 2346 by Kailey Hernandez  RN  Outcome: Ongoing, Progressing     Problem: Cognitive Impairment (Stroke, Ischemic/Transient Ischemic Attack)  Goal: Optimal Cognitive Function  6/11/2023 2352 by Kailey Hernandez RN  Outcome: Ongoing, Progressing  6/11/2023 2346 by Kailey Hernandez RN  Outcome: Ongoing, Progressing     Problem: Communication Impairment (Stroke, Ischemic/Transient Ischemic Attack)  Goal: Improved Communication Skills  6/11/2023 2352 by Kailey Hernandez RN  Outcome: Ongoing, Progressing  6/11/2023 2346 by Kailey Hernandez RN  Outcome: Ongoing, Progressing     Problem: Functional Ability Impaired (Stroke, Ischemic/Transient Ischemic Attack)  Goal: Optimal Functional Ability  6/11/2023 2352 by Kailey Hernandez RN  Outcome: Ongoing, Progressing  6/11/2023 2346 by Kailey Hernandez RN  Outcome: Ongoing, Progressing     Problem: Respiratory Compromise (Stroke, Ischemic/Transient Ischemic Attack)  Goal: Effective Oxygenation and Ventilation  6/11/2023 2352 by Kailey Hernandez RN  Outcome: Ongoing, Progressing  6/11/2023 2346 by Kailey Hernandez RN  Outcome: Ongoing, Progressing     Problem: Sensorimotor Impairment (Stroke, Ischemic/Transient Ischemic Attack)  Goal: Improved Sensorimotor Function  6/11/2023 2352 by Kailey Hernandez RN  Outcome: Ongoing, Progressing  6/11/2023 2346 by Kailey Hernandez RN  Outcome: Ongoing, Progressing     Problem: Swallowing Impairment (Stroke, Ischemic/Transient Ischemic Attack)  Goal: Optimal Eating and Swallowing without Aspiration  6/11/2023 2352 by Kailey Hernandez RN  Outcome: Ongoing, Progressing  6/11/2023 2346 by Kailey Hernandez RN  Outcome: Ongoing, Progressing     Problem: Urinary Elimination Impaired (Stroke, Ischemic/Transient Ischemic Attack)  Goal: Effective Urinary Elimination  6/11/2023 2352 by Kailey Hernandez RN  Outcome: Ongoing, Progressing  6/11/2023 2346 by Kailey Hernandez RN  Outcome: Ongoing, Progressing     Problem: Fluid and Electrolyte  Imbalance (Acute Kidney Injury/Impairment)  Goal: Fluid and Electrolyte Balance  6/11/2023 2352 by Kailey Hernandez RN  Outcome: Ongoing, Progressing  6/11/2023 2346 by Kailey Hernandez RN  Outcome: Ongoing, Progressing     Problem: Oral Intake Inadequate (Acute Kidney Injury/Impairment)  Goal: Optimal Nutrition Intake  6/11/2023 2352 by Kailey Hernandez RN  Outcome: Ongoing, Progressing  6/11/2023 2346 by Kailey Hernandez RN  Outcome: Ongoing, Progressing     Problem: Renal Function Impairment (Acute Kidney Injury/Impairment)  Goal: Effective Renal Function  6/11/2023 2352 by Kailey Hernandez RN  Outcome: Ongoing, Progressing  6/11/2023 2346 by Kailey Hernandez RN  Outcome: Ongoing, Progressing     Problem: Fall Injury Risk  Goal: Absence of Fall and Fall-Related Injury  6/11/2023 2352 by Kailey Hernandez RN  Outcome: Ongoing, Progressing  6/11/2023 2346 by Kailey Hernandez RN  Outcome: Ongoing, Progressing     Problem: Skin Injury Risk Increased  Goal: Skin Health and Integrity  6/11/2023 2352 by Kailey Hernandez RN  Outcome: Ongoing, Progressing  6/11/2023 2346 by Kailey Hernandez RN  Outcome: Ongoing, Progressing     Problem: Impaired Wound Healing  Goal: Optimal Wound Healing  6/11/2023 2352 by Kailey Hernandez RN  Outcome: Ongoing, Progressing  6/11/2023 2346 by Kailey Hernandez RN  Outcome: Ongoing, Progressing

## 2023-06-13 LAB
ANION GAP SERPL CALC-SCNC: 10 MMOL/L (ref 8–16)
BACTERIA BLD CULT: NORMAL
BACTERIA BLD CULT: NORMAL
BASOPHILS # BLD AUTO: 0.09 K/UL (ref 0–0.2)
BASOPHILS NFR BLD: 1.2 % (ref 0–1.9)
BSA FOR ECHO PROCEDURE: 2.39 M2
BUN SERPL-MCNC: 14 MG/DL (ref 8–23)
CALCIUM SERPL-MCNC: 9.1 MG/DL (ref 8.7–10.5)
CHLORIDE SERPL-SCNC: 100 MMOL/L (ref 95–110)
CO2 SERPL-SCNC: 27 MMOL/L (ref 23–29)
CREAT SERPL-MCNC: 0.9 MG/DL (ref 0.5–1.4)
DIFFERENTIAL METHOD: ABNORMAL
EJECTION FRACTION: 60 %
EOSINOPHIL # BLD AUTO: 0.6 K/UL (ref 0–0.5)
EOSINOPHIL NFR BLD: 7.6 % (ref 0–8)
ERYTHROCYTE [DISTWIDTH] IN BLOOD BY AUTOMATED COUNT: 22.4 % (ref 11.5–14.5)
EST. GFR  (NO RACE VARIABLE): >60 ML/MIN/1.73 M^2
FERRITIN SERPL-MCNC: 33 NG/ML (ref 20–300)
GLUCOSE SERPL-MCNC: 180 MG/DL (ref 70–110)
HCT VFR BLD AUTO: 32.2 % (ref 40–54)
HGB BLD-MCNC: 8.7 G/DL (ref 14–18)
IMM GRANULOCYTES # BLD AUTO: 0.02 K/UL (ref 0–0.04)
IMM GRANULOCYTES NFR BLD AUTO: 0.3 % (ref 0–0.5)
IRON SERPL-MCNC: 18 UG/DL (ref 45–160)
LYMPHOCYTES # BLD AUTO: 1.1 K/UL (ref 1–4.8)
LYMPHOCYTES NFR BLD: 14.8 % (ref 18–48)
MCH RBC QN AUTO: 18.8 PG (ref 27–31)
MCHC RBC AUTO-ENTMCNC: 27 G/DL (ref 32–36)
MCV RBC AUTO: 70 FL (ref 82–98)
MONOCYTES # BLD AUTO: 0.4 K/UL (ref 0.3–1)
MONOCYTES NFR BLD: 6.1 % (ref 4–15)
NEUTROPHILS # BLD AUTO: 5 K/UL (ref 1.8–7.7)
NEUTROPHILS NFR BLD: 70 % (ref 38–73)
NRBC BLD-RTO: 0 /100 WBC
PLATELET # BLD AUTO: 292 K/UL (ref 150–450)
PMV BLD AUTO: 10.1 FL (ref 9.2–12.9)
POCT GLUCOSE: 122 MG/DL (ref 70–110)
POCT GLUCOSE: 126 MG/DL (ref 70–110)
POCT GLUCOSE: 162 MG/DL (ref 70–110)
POCT GLUCOSE: 90 MG/DL (ref 70–110)
POTASSIUM SERPL-SCNC: 3.7 MMOL/L (ref 3.5–5.1)
RBC # BLD AUTO: 4.62 M/UL (ref 4.6–6.2)
SATURATED IRON: 4 % (ref 20–50)
SODIUM SERPL-SCNC: 137 MMOL/L (ref 136–145)
TOTAL IRON BINDING CAPACITY: 487 UG/DL (ref 250–450)
TRANSFERRIN SERPL-MCNC: 329 MG/DL (ref 200–375)
WBC # BLD AUTO: 7.21 K/UL (ref 3.9–12.7)

## 2023-06-13 PROCEDURE — 27000221 HC OXYGEN, UP TO 24 HOURS

## 2023-06-13 PROCEDURE — 36415 COLL VENOUS BLD VENIPUNCTURE: CPT | Performed by: FAMILY MEDICINE

## 2023-06-13 PROCEDURE — S4991 NICOTINE PATCH NONLEGEND: HCPCS | Performed by: FAMILY MEDICINE

## 2023-06-13 PROCEDURE — 84466 ASSAY OF TRANSFERRIN: CPT | Performed by: FAMILY MEDICINE

## 2023-06-13 PROCEDURE — 85025 COMPLETE CBC W/AUTO DIFF WBC: CPT | Performed by: FAMILY MEDICINE

## 2023-06-13 PROCEDURE — 97530 THERAPEUTIC ACTIVITIES: CPT

## 2023-06-13 PROCEDURE — 94660 CPAP INITIATION&MGMT: CPT

## 2023-06-13 PROCEDURE — 94761 N-INVAS EAR/PLS OXIMETRY MLT: CPT

## 2023-06-13 PROCEDURE — 25000242 PHARM REV CODE 250 ALT 637 W/ HCPCS: Performed by: NURSE PRACTITIONER

## 2023-06-13 PROCEDURE — 25000003 PHARM REV CODE 250: Performed by: FAMILY MEDICINE

## 2023-06-13 PROCEDURE — 97112 NEUROMUSCULAR REEDUCATION: CPT

## 2023-06-13 PROCEDURE — 21400001 HC TELEMETRY ROOM

## 2023-06-13 PROCEDURE — 80048 BASIC METABOLIC PNL TOTAL CA: CPT | Performed by: FAMILY MEDICINE

## 2023-06-13 PROCEDURE — 99900035 HC TECH TIME PER 15 MIN (STAT)

## 2023-06-13 PROCEDURE — 25000003 PHARM REV CODE 250: Performed by: NURSE PRACTITIONER

## 2023-06-13 PROCEDURE — 94640 AIRWAY INHALATION TREATMENT: CPT

## 2023-06-13 PROCEDURE — 25000003 PHARM REV CODE 250: Performed by: INTERNAL MEDICINE

## 2023-06-13 PROCEDURE — 82728 ASSAY OF FERRITIN: CPT | Performed by: FAMILY MEDICINE

## 2023-06-13 PROCEDURE — 92507 TX SP LANG VOICE COMM INDIV: CPT

## 2023-06-13 RX ORDER — NAPROXEN SODIUM 220 MG/1
81 TABLET, FILM COATED ORAL DAILY
Status: DISCONTINUED | OUTPATIENT
Start: 2023-06-13 | End: 2023-06-15 | Stop reason: HOSPADM

## 2023-06-13 RX ORDER — PREGABALIN 100 MG/1
300 CAPSULE ORAL 2 TIMES DAILY
Status: DISCONTINUED | OUTPATIENT
Start: 2023-06-13 | End: 2023-06-15 | Stop reason: HOSPADM

## 2023-06-13 RX ADMIN — BUDESONIDE 0.5 MG: 0.5 INHALANT ORAL at 08:06

## 2023-06-13 RX ADMIN — CLOPIDOGREL BISULFATE 75 MG: 75 TABLET ORAL at 09:06

## 2023-06-13 RX ADMIN — APIXABAN 5 MG: 2.5 TABLET, FILM COATED ORAL at 09:06

## 2023-06-13 RX ADMIN — AMLODIPINE BESYLATE 10 MG: 10 TABLET ORAL at 09:06

## 2023-06-13 RX ADMIN — MUPIROCIN: 20 OINTMENT TOPICAL at 09:06

## 2023-06-13 RX ADMIN — PREGABALIN 300 MG: 100 CAPSULE ORAL at 09:06

## 2023-06-13 RX ADMIN — HYDROXYZINE HYDROCHLORIDE 50 MG: 25 TABLET ORAL at 09:06

## 2023-06-13 RX ADMIN — NICOTINE 1 PATCH: 21 PATCH, EXTENDED RELEASE TRANSDERMAL at 09:06

## 2023-06-13 RX ADMIN — TRAMADOL HYDROCHLORIDE 50 MG: 50 TABLET, COATED ORAL at 10:06

## 2023-06-13 RX ADMIN — ATORVASTATIN CALCIUM 40 MG: 40 TABLET, FILM COATED ORAL at 09:06

## 2023-06-13 RX ADMIN — ARFORMOTEROL TARTRATE 15 MCG: 15 SOLUTION RESPIRATORY (INHALATION) at 08:06

## 2023-06-13 RX ADMIN — METOPROLOL SUCCINATE 25 MG: 25 TABLET, EXTENDED RELEASE ORAL at 09:06

## 2023-06-13 RX ADMIN — PANTOPRAZOLE SODIUM 40 MG: 40 TABLET, DELAYED RELEASE ORAL at 08:06

## 2023-06-13 RX ADMIN — ASPIRIN 81 MG CHEWABLE TABLET 81 MG: 81 TABLET CHEWABLE at 11:06

## 2023-06-13 RX ADMIN — TRAMADOL HYDROCHLORIDE 50 MG: 50 TABLET, COATED ORAL at 03:06

## 2023-06-13 RX ADMIN — ARFORMOTEROL TARTRATE 15 MCG: 15 SOLUTION RESPIRATORY (INHALATION) at 07:06

## 2023-06-13 RX ADMIN — BUDESONIDE 0.5 MG: 0.5 INHALANT ORAL at 07:06

## 2023-06-13 RX ADMIN — MIRTAZAPINE 7.5 MG: 7.5 TABLET, FILM COATED ORAL at 09:06

## 2023-06-13 RX ADMIN — FUROSEMIDE 40 MG: 40 TABLET ORAL at 08:06

## 2023-06-13 NOTE — ASSESSMENT & PLAN NOTE
Patient with acute on chronic hypoxic and hypercapnic respiratory failure is on home oxygen at 5 L NC LPM. Supplemental oxygen has been provided at 4L/NC and nocturnal PAP therapy. Signs/symptoms of respiratory failure include lethargy. Contributing diagnoses includes COPD and obesity hypoventilation. Labs and images were reviewed. Patient has recent ABG, which has been reviewed.    Will treat underlying causes and adjust management of respiratory failure as follows:  · Continue supplemental oxygen and titrate to maintain sats > 90%; remains on room air  · Continue nocturnal PAP therapy  · Continue Brovana and Pulmicort nebs q12H  · Continue prn DuoNebs as needed  · Continue Lasix daily  · Monitor chest imaging and ABGs as needed

## 2023-06-13 NOTE — PT/OT/SLP PROGRESS
"Speech Language Pathology Treatment    Patient Name:  Ish Guardado   MRN:  [id number]  Admitting Diagnosis: Acute on chronic respiratory failure with hypoxia and hypercapnia    Recommendations:                 General Recommendations:  Speech/language therapy and Cognitive-linguistic therapy  Diet recommendations:  Soft & Bite Sized Diet - IDDSI Level 6, Liquid Diet Level: Thin liquids - IDDSI Level 0   Aspiration Precautions: Standard aspiration precautions   General Precautions: Standard, aphasia  Communication strategies:   Patient with increased accuracy with yes/no questions    Assessment:     Ish Guardado is a 62 y.o. male with an SLP diagnosis of Aphasia and Cognitive-Linguistic Impairment.  He presents with moderate to severe aphasia characterized by unintelligible and low-volume speech and inability to make wants/needs known verbally. Auditory comprehension appears to be less affected than expressive language although still impaired.     Subjective     Patient seen at bedside. Patient replied "yes" when asked about pain. However, unable to rate pain or give location. Patient did report "all the time" when referring to pain.   Patient goals: unable to state.      Pain/Comfort:   See above    Respiratory Status: Room air    Objective:     Has the patient been evaluated by SLP for swallowing?   Yes  Keep patient NPO? No   Current Respiratory Status:        Western Aphasia Battery - Revised Bedside Record Form   The Western Aphasia Battery -R (WAB-R) Bedside record form is a tool used to identify language impairment or baseline language functioning when time constraints prevent the administration of the full test. According to the Western Aphasia Battery-R manual, "Interpretation of the sections and tasks are consistent with the full test."     The WAB-R bedside record form was utilized today to quickly assess language function. See below for results:     SECTION SCORE   Spontaneous Speech: Content 3/10 "   Spontaneous Speech: Fluency 3/10   Auditory Verbal Comprehension: Yes/No Questions 9/10   Sequential Commands 2/10   Repetition 9/10   Object Naming 8/10   SUM OF SCORES 34     Bedside Aphasia Score 56.6   Aphasia Classification Transcortical Motor       According to the WAB-R bedside record form, the patient scored a bedside aphasia score of 56.6 and scores are indicative of Transcortical Motor aphasia. As seen in the scores above, the patient demonstrate relative strengths in answering yes/no questions and repetition, and Significiant deficits in spontaneous speech, speech fluency, and sequential commands, which aligns with the aphasia classification.       Goals:   Multidisciplinary Problems       SLP Goals          Problem: SLP    Goal Priority Disciplines Outcome   SLP Goal     SLP Ongoing, Progressing   Description: 1. Patient will tolerate diet with no overt s/s of aspiration. MET  2. Patient will improve expressive and receptive language skills in order to communicate basic wants/needs.                        Plan:     Patient to be seen:  2 x/week, 3 x/week   Plan of Care expires:  06/16/23  Plan of Care reviewed with:  patient   SLP Follow-Up:  Yes       Discharge recommendations:  nursing facility, skilled   Barriers to Discharge:  Level of Skilled Assistance Needed      Time Tracking:     SLP Treatment Date:   06/13/23  Speech Start Time:  1150  Speech Stop Time:  1217     Speech Total Time (min):  27 min    Billable Minutes: Speech Therapy Individual 27 minutes    06/13/2023

## 2023-06-13 NOTE — PLAN OF CARE
Continue OT POC.  Mod A x2 for roll, sup>sit, and forward scoot to EOB. Max A x2 for lateral scoot and sit>stand with HHA.

## 2023-06-13 NOTE — SUBJECTIVE & OBJECTIVE
Interval History: No issues overnight. Awaiting snf placement.     Review of Systems   Unable to perform ROS: Acuity of condition   Objective:     Vital Signs (Most Recent):  Temp: 97.4 °F (36.3 °C) (06/13/23 0748)  Pulse: 60 (06/13/23 0919)  Resp: 18 (06/13/23 0748)  BP: 130/61 (06/13/23 0748)  SpO2: (!) 94 % (06/13/23 0748) Vital Signs (24h Range):  Temp:  [97.4 °F (36.3 °C)-98.7 °F (37.1 °C)] 97.4 °F (36.3 °C)  Pulse:  [59-76] 60  Resp:  [17-19] 18  SpO2:  [90 %-98 %] 94 %  BP: ()/(52-63) 130/61     Weight: 117.5 kg (259 lb 0.7 oz)  Body mass index is 38.25 kg/m².  No intake or output data in the 24 hours ending 06/13/23 1038      Physical Exam  Vitals and nursing note reviewed.   Constitutional:       General: He is not in acute distress.     Appearance: He is obese. He is ill-appearing.   Cardiovascular:      Rate and Rhythm: Normal rate and regular rhythm.      Heart sounds: No murmur heard.    No friction rub. No gallop.   Pulmonary:      Effort: Pulmonary effort is normal.      Breath sounds: No wheezing, rhonchi or rales.      Comments: Decreased breath sounds bilaterally, on room air, no increased work of breathing   Abdominal:      General: Bowel sounds are normal. There is no distension.      Palpations: Abdomen is soft.      Tenderness: There is no abdominal tenderness. There is no guarding or rebound.   Musculoskeletal:      Comments: R AKA, no LLE edema    Neurological:      Mental Status: He is alert.      Comments: Oriented to self, dysarthric, R sided weakness noted            Significant Labs: All pertinent labs within the past 24 hours have been reviewed.    Significant Imaging: I have reviewed all pertinent imaging results/findings within the past 24 hours.

## 2023-06-13 NOTE — PROGRESS NOTES
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  Presented to the ER accompanied by his wife and son to be evaluated for altered mental status which started earlier this morning.  Per wife, the patient has been having altered mental status for 3 days prior to hospital admission, but states he became more confused shortly after waking on the morning of hospital presentation.  Patient has history of CVA which left him with deficits of weakness on right side, as well as, dysarthria and memory issues. She also stated patient had been falling out of his wheelchair and needed help off the floor. Reports increased weakness of RUE and slurred speech; a code stroke was called and the patient was not deemed a candidate to receive thrombolytics as he was outside the window. CTH negative for acute findings. ER workup unremarkable with the exception to H/H of 7.5/27.6 (previously 8.8/31.1 on 10/15/2022). UA and drug screen negative. Ammonia level negative. Plain film imaging of left arm and left lower leg negative for acute findings. Chest x-ray shows cardiomegaly. Per chart review, patient recently had a left carotid endarterectomy performed on 05/19/2023 performed by Dr. Hartley. Patient became increasingly more unresponsive and obtunded. ABG revealed severe hypercapnia; the patient's wife manages the CPAP and states she only puts on him as  needed and is afraid to let the patient handle it given he smokes a pack a day and will light himself on fire. Patient was admitted to the ICU.    Interval history:   6/9: Placed on BiPap overnight with improvement in pCO2. Currently on baseline oxygen use 4-5L NC. Upper extremity US negative for occlusion. MRI brain ordered for persistent right sided weakness. Patient is already on Eliquis, Plavix, and Statin.   6/10: Patient communicates some needs; however, unable to prolonged conversation. Pulmonary status stable; currently on 4L/NC with nocturnal PAP therapy.   6/11: Appears less agitated / restless today. Sitting up in bed without acute complaints. Pulmonary status appears stable.   6/13: pulm status stable on RA, awaiting SNF placement, no family at bedside     ROS complete and negative unless stated in the interval HPI        Objective:     Vital Signs (Most Recent):  Temp: 98.4 °F (36.9 °C) (06/13/23 1136)  Pulse: 62 (06/13/23 1139)  Resp: 19 (06/13/23 1136)  BP: 128/74 (06/13/23 1136)  SpO2: 95 % (06/13/23 1136) Vital Signs (24h Range):  Temp:  [97.4 °F (36.3 °C)-98.7 °F (37.1 °C)] 98.4 °F (36.9 °C)  Pulse:  [59-76] 62  Resp:  [17-19] 19  SpO2:  [90 %-98 %] 95 %  BP: ()/(52-74) 128/74     Weight: 117.5 kg (259 lb)  Body mass index is 38.25 kg/m².      Intake/Output Summary (Last 24 hours) at 6/13/2023 1359  Last data filed at 6/13/2023 1221  Gross per 24 hour   Intake --   Output 300 ml   Net -300 ml        Physical Exam  Vitals reviewed.   Constitutional:       General: He is awake. He is not in acute distress.     Appearance: He is obese. He is ill-appearing.      Comments: Chronically ill appearing male on RA in NAD   Pulmonary:      Effort: Pulmonary effort is normal.      Breath sounds: Decreased breath sounds present.   Neurological:      Mental Status: He is alert.             Vents:  Oxygen Concentration (%): 30 (06/10/23 0003)    Lines/Drains/Airways       Peripheral Intravenous Line   Duration                  Peripheral IV - Single Lumen 06/07/23 2044 20 G Anterior;Left Forearm 5 days                    Significant Labs:    CBC/Anemia Profile:  Recent Labs   Lab 06/12/23  0608 06/13/23  0930   WBC 8.18 7.21   HGB 8.5* 8.7*   HCT 30.7* 32.2*    292   MCV 70* 70*   RDW 22.3* 22.4*   FERRITIN  --  33        Chemistries:  Recent Labs   Lab 06/12/23  0608 06/13/23  0930    137   K 4.2 3.7    100   CO2 27 27   BUN 12 14   CREATININE 0.8 0.9   CALCIUM 9.3 9.1       All pertinent labs within the past 24 hours have been reviewed.    Significant Imaging:  I have reviewed all pertinent imaging results/findings within the past 24 hours.      ABG  Recent Labs   Lab 06/09/23  0100   PH 7.413   PO2 70*   PCO2 47.4*   HCO3 30.3*   BE 6     Assessment/Plan:     Pulmonary  * Acute on chronic respiratory failure with hypoxia and hypercapnia  Patient with acute on chronic hypoxic and hypercapnic respiratory failure is on home oxygen at 5 L NC LPM. Supplemental oxygen has been provided at 4L/NC and nocturnal PAP therapy. Signs/symptoms of respiratory failure include lethargy. Contributing diagnoses includes COPD and obesity hypoventilation. Labs and images were reviewed. Patient has recent ABG, which has been reviewed.    Will treat underlying causes and adjust management of respiratory failure as follows:  · Continue supplemental oxygen and titrate to maintain sats > 90%; remains on room air  · Continue nocturnal PAP therapy  · Continue Brovana and Pulmicort nebs q12H  · Continue prn DuoNebs as needed  · Continue Lasix daily  · Monitor chest imaging and ABGs as needed        Stage 1 mild COPD by GOLD classification  · see respiratory failure    Other  Tobacco dependence  · Cessation education as appropriate       Pulmonary team will not follow routinely but remain available. Please call if we can be of assistance sooner or if questions should arise.       Rao Ma,  NP  Pulmonology  [facility]

## 2023-06-13 NOTE — PT/OT/SLP PROGRESS
Physical Therapy Treatment    Patient Name:  Ish Guardado   MRN:  [id number]    Recommendations:     Discharge Recommendations: nursing facility, skilled  Discharge Equipment Recommendations: to be determined by next level of care  Barriers to discharge: None    Assessment:     Ish Guardado is a 62 y.o. male admitted with a medical diagnosis of Acute on chronic respiratory failure with hypoxia and hypercapnia.  He presents with the following impairments/functional limitations: weakness, visual deficits, abnormal tone, impaired endurance, impaired self care skills, impaired functional mobility, gait instability, impaired balance, impaired cognition, decreased coordination, decreased upper extremity function, decreased lower extremity function, decreased safety awareness, decreased ROM, impaired fine motor .    Rehab Prognosis: Good; patient would benefit from acute skilled PT services to address these deficits and reach maximum level of function.    Recent Surgery: * No surgery found *      Plan:     During this hospitalization, patient to be seen 3 x/week to address the identified rehab impairments via gait training, therapeutic exercises, therapeutic activities and progress toward the following goals:    Plan of Care Expires:  07/23/23    Subjective     Chief Complaint: PT VISIBLY VERY SLEEPY  Patient/Family Comments/goals: PT DIFFICULT TO KEEP AWAKE AND ENGAGED.  Pain/Comfort:  Pain Rating 1: 0/10      Objective:     Communicated with NURSE POLO AND Epic CHART REVIEW prior to session.  Patient found supine with telemetry, peripheral IV, PureWick, bed alarm (AVASYS) upon PT entry to room.     General Precautions: Standard, fall, aphasia  Orthopedic Precautions: N/A  Braces: N/A  Respiratory Status: Room air     Functional Mobility:  Bed Mobility:     Rolling Left:  moderate assistance and of 2 persons  Scooting: moderate assistance and of 2 persons  Supine to Sit: moderate assistance and of 2  persons  Transfers:     Sit to Stand:  maximal assistance and of 2 persons with no AD      AM-PAC 6 CLICK MOBILITY  Turning over in bed (including adjusting bedclothes, sheets and blankets)?: 2  Sitting down on and standing up from a chair with arms (e.g., wheelchair, bedside commode, etc.): 2  Moving from lying on back to sitting on the side of the bed?: 2  Moving to and from a bed to a chair (including a wheelchair)?: 1 (NOT TESTED)  Need to walk in hospital room?: 1 (NOT TESTED)  Climbing 3-5 steps with a railing?: 1 (NOT TESTED)  Basic Mobility Total Score: 9       Treatment & Education:  PT RECEIVING BUBBLE TEST UPON P.T. ENTRY TO ROOM. PT IN AND OUT OF SLEEP DURING TEST AND DURATION OF THERAPY. PT REQUIRED MOD A X2 TO ROLL TO LEFT AND MOVE FROM SUP>SIT. PT REQUIRED MOD A X2, VERBAL AND TACTILE CUES FOR ALERTNESS AND ATTENTION TO TASK TO SCOOT TO EDGE OF BED. ONCE EOB, PT PERFORMED AP AND LAQ X10 WITH CONTINUOUS VERBAL AND TACTILE CUES FOR ENGAGEMENT. PT ASKED TO COUNT REPS ALOUD TO ASSIST WITH ATTENTION TO TASK. PT REQUIRED MOD AX2, VC, AND TC TO PERFORM SCOOTS TO LEFT X5. PT PERFORMED B SIDE SITS ON ELBOW X5 EACH SIDE FOR TRUNK CONTROL AND UE WB WITH MOD A X2, VC, AND TC. NEURO RE-ED ACTIVITIES REQUIRING ATTENTION TO LEFT VISUAL FIELD WERE PERFORMED TO ADDRESS LEFT VISUAL NEGLECT. PT WAS ASKED TO ACKNOWLEDGE ITEMS TO LEFT AND STATE COLOR OF ITEM. PT REQUIRED MAX A X2 AND VC TO PERFORM SIT TO HALF-STAND X2 ATTEMPTS. PT REQUIRED SHORT AND ASSERTIVE, 1-STEP INSTRUCTIONS FOR ALL TASKS. PT STATED THAT HE WAS TIRED AND REQUIRED MOD A X2 TO RETURN TO SUPINE.    Patient left supine with all lines intact, call button in reach, and bed alarm on..    GOALS:   Multidisciplinary Problems       Physical Therapy Goals          Problem: Physical Therapy    Goal Priority Disciplines Outcome Goal Variances Interventions   Physical Therapy Goal     PT, PT/OT Ongoing, Progressing     Description: LTG'S TO BE MET IN 14 DAYS  (6-23-23)  PT WILL REQUIRE MODA FOR BED MOBILITY  PT WILL REQUIRE MODA FOR SIT<>STAND TF  PT WILL REQUIRE MODA FOR BED<>CHAIR TF                           Time Tracking:     PT Received On: 06/13/23  PT Start Time: 1050     PT Stop Time: 1123  PT Total Time (min): 33 min     Billable Minutes: Therapeutic Activity 20 and Neuromuscular Re-education 13    Treatment Type: Treatment  PT/PTA: PT     Number of PTA visits since last PT visit: 0     06/13/2023

## 2023-06-13 NOTE — PLAN OF CARE
PT PERFORMED B SIDE SITS TO ELBOW X5 EACH SIDE WITH MOD A  AND SIT>HALF STAND X2 WITH MOD A X2. CONTINUOUS AND ASSERTIVE VC AND TC REQUIRED FOR ALL ACTIVITIES FOR TASK ENGAGEMENT AND WAKEFULNESS.

## 2023-06-13 NOTE — ASSESSMENT & PLAN NOTE
Patient with Hypercapnic Respiratory failure which is Acute on chronic.  he is on home oxygen at 4-5 LPM. Supplemental oxygen was provided and noted-      .   Signs/symptoms of respiratory failure include- tachypnea, increased work of breathing and lethargy.. Contributing diagnoses includes - CHF and COPD Labs and images were reviewed. Patient Has recent ABG, which has been reviewed. Will treat underlying causes and adjust management of respiratory failure as follows-   - continue nebs as needed   - continue CPAP qhs   - sats stable on room air     Awaiting snf

## 2023-06-13 NOTE — SUBJECTIVE & OBJECTIVE
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  Presented to the ER accompanied by his wife and son to be evaluated for altered mental status which started earlier this morning.  Per wife, the patient has been having altered mental status for 3 days prior to hospital admission, but states he became more confused shortly after waking on the morning of hospital presentation.  Patient has history of CVA which left him with deficits of weakness on right side, as well as, dysarthria and memory issues. She also stated patient had been falling out of his wheelchair and needed help off the floor. Reports increased weakness of RUE and slurred speech; a code stroke was called and the patient was not deemed a candidate to receive thrombolytics as he was outside the window. CTH negative for acute findings. ER workup unremarkable with the exception to H/H of 7.5/27.6 (previously 8.8/31.1 on 10/15/2022). UA and drug screen negative. Ammonia level negative. Plain film imaging of left arm and left lower leg negative for acute findings. Chest x-ray shows cardiomegaly. Per chart review, patient recently had a left carotid endarterectomy performed on 05/19/2023 performed by Dr. Hartley. Patient became increasingly more unresponsive and obtunded. ABG revealed severe hypercapnia; the patient's wife manages the CPAP and states she only puts on him as needed and is afraid to let the patient handle it given he smokes a pack a day and will light himself on fire. Patient was admitted to the ICU.    Interval history:  6/9: Placed on BiPap overnight with improvement in pCO2. Currently on baseline oxygen use 4-5L NC. Upper extremity US negative for occlusion. MRI brain ordered for persistent right  sided weakness. Patient is already on Eliquis, Plavix, and Statin.  6/10: Patient communicates some needs; however, unable to prolonged conversation. Pulmonary status stable; currently on 4L/NC with nocturnal PAP therapy.  6/11: Appears less agitated / restless today. Sitting up in bed without acute complaints. Pulmonary status appears stable.  6/13: pulm status stable on RA, awaiting SNF placement, no family at bedside     ROS complete and negative unless stated in the interval HPI        Objective:     Vital Signs (Most Recent):  Temp: 98.4 °F (36.9 °C) (06/13/23 1136)  Pulse: 62 (06/13/23 1139)  Resp: 19 (06/13/23 1136)  BP: 128/74 (06/13/23 1136)  SpO2: 95 % (06/13/23 1136) Vital Signs (24h Range):  Temp:  [97.4 °F (36.3 °C)-98.7 °F (37.1 °C)] 98.4 °F (36.9 °C)  Pulse:  [59-76] 62  Resp:  [17-19] 19  SpO2:  [90 %-98 %] 95 %  BP: ()/(52-74) 128/74     Weight: 117.5 kg (259 lb)  Body mass index is 38.25 kg/m².      Intake/Output Summary (Last 24 hours) at 6/13/2023 1359  Last data filed at 6/13/2023 1221  Gross per 24 hour   Intake --   Output 300 ml   Net -300 ml        Physical Exam  Vitals reviewed.   Constitutional:       General: He is awake. He is not in acute distress.     Appearance: He is obese. He is ill-appearing.      Comments: Chronically ill appearing male on RA in NAD   Pulmonary:      Effort: Pulmonary effort is normal.      Breath sounds: Decreased breath sounds present.   Neurological:      Mental Status: He is alert.             Vents:  Oxygen Concentration (%): 30 (06/10/23 0003)    Lines/Drains/Airways       Peripheral Intravenous Line  Duration                  Peripheral IV - Single Lumen 06/07/23 2044 20 G Anterior;Left Forearm 5 days                    Significant Labs:    CBC/Anemia Profile:  Recent Labs   Lab 06/12/23  0608 06/13/23  0930   WBC 8.18 7.21   HGB 8.5* 8.7*   HCT 30.7* 32.2*    292   MCV 70* 70*   RDW 22.3* 22.4*   FERRITIN  --  33        Chemistries:  Recent  Labs   Lab 06/12/23  0608 06/13/23  0930    137   K 4.2 3.7    100   CO2 27 27   BUN 12 14   CREATININE 0.8 0.9   CALCIUM 9.3 9.1       All pertinent labs within the past 24 hours have been reviewed.    Significant Imaging:  I have reviewed all pertinent imaging results/findings within the past 24 hours.

## 2023-06-13 NOTE — PLAN OF CARE
"12 SNF denials at this time, "care needs exceed current capacity" per careport as reasoning for denial. Anticipate denials due to lethargy, difficulty following tasks during therapy sessions. CM to f/u with patient/family.  "

## 2023-06-13 NOTE — PLAN OF CARE
Discussed plan of care with pt and spouse Pt verbalized understanding. No signs of acute distress. Q2h turns/repositioning performed. Bed alarm set with bed at lowest position. Video monitoring in progress. Pain managed with ordered medication. Tele monitor [id number] in place. Call light within reach. Purposeful rounding Q2h.      Chart check complete.     Problem: Adult Inpatient Plan of Care  Goal: Plan of Care Review  Outcome: Ongoing, Progressing  Goal: Patient-Specific Goal (Individualized)  Outcome: Ongoing, Progressing  Goal: Absence of Hospital-Acquired Illness or Injury  Outcome: Ongoing, Progressing  Goal: Optimal Comfort and Wellbeing  Outcome: Ongoing, Progressing  Goal: Readiness for Transition of Care  Outcome: Ongoing, Progressing     Problem: Infection  Goal: Absence of Infection Signs and Symptoms  Outcome: Ongoing, Progressing     Problem: Adjustment to Illness (Stroke, Ischemic/Transient Ischemic Attack)  Goal: Optimal Coping  Outcome: Ongoing, Progressing     Problem: Bowel Elimination Impaired (Stroke, Ischemic/Transient Ischemic Attack)  Goal: Effective Bowel Elimination  Outcome: Ongoing, Progressing     Problem: Cerebral Tissue Perfusion (Stroke, Ischemic/Transient Ischemic Attack)  Goal: Optimal Cerebral Tissue Perfusion  Outcome: Ongoing, Progressing     Problem: Cognitive Impairment (Stroke, Ischemic/Transient Ischemic Attack)  Goal: Optimal Cognitive Function  Outcome: Ongoing, Progressing     Problem: Communication Impairment (Stroke, Ischemic/Transient Ischemic Attack)  Goal: Improved Communication Skills  Outcome: Ongoing, Progressing     Problem: Functional Ability Impaired (Stroke, Ischemic/Transient Ischemic Attack)  Goal: Optimal Functional Ability  Outcome: Ongoing, Progressing     Problem: Respiratory Compromise (Stroke, Ischemic/Transient Ischemic Attack)  Goal: Effective Oxygenation and Ventilation  Outcome: Ongoing, Progressing     Problem: Sensorimotor Impairment (Stroke,  Ischemic/Transient Ischemic Attack)  Goal: Improved Sensorimotor Function  Outcome: Ongoing, Progressing     Problem: Swallowing Impairment (Stroke, Ischemic/Transient Ischemic Attack)  Goal: Optimal Eating and Swallowing without Aspiration  Outcome: Ongoing, Progressing     Problem: Urinary Elimination Impaired (Stroke, Ischemic/Transient Ischemic Attack)  Goal: Effective Urinary Elimination  Outcome: Ongoing, Progressing     Problem: Fluid and Electrolyte Imbalance (Acute Kidney Injury/Impairment)  Goal: Fluid and Electrolyte Balance  Outcome: Ongoing, Progressing     Problem: Oral Intake Inadequate (Acute Kidney Injury/Impairment)  Goal: Optimal Nutrition Intake  Outcome: Ongoing, Progressing     Problem: Renal Function Impairment (Acute Kidney Injury/Impairment)  Goal: Effective Renal Function  Outcome: Ongoing, Progressing     Problem: Fall Injury Risk  Goal: Absence of Fall and Fall-Related Injury  Outcome: Ongoing, Progressing     Problem: Skin Injury Risk Increased  Goal: Skin Health and Integrity  Outcome: Ongoing, Progressing     Problem: Impaired Wound Healing  Goal: Optimal Wound Healing  Outcome: Ongoing, Progressing     Problem: Pain Acute  Goal: Acceptable Pain Control and Functional Ability  Outcome: Ongoing, Progressing

## 2023-06-13 NOTE — ASSESSMENT & PLAN NOTE
Worsen RUE weakness from baseline. Tele Vascular Neurology consulted. Outside window for thromboembolytics.  Currently being treated with Eliquis, Plavix, and statin.  Recommends MRI.  - MRI Brain with acute infacrcs throughout L cerebral hemisphere in watershed type distribution. Also with multiple remote infarcts noted in R cerebral hemisphere, L temporal/occipital lobe, both thalami and both cerebellar hemispheres   - Continue Eliquis, Plavix, statin   - continue PT/OT/ST  - pending SNF placement     Will cont on asa, plavix, statin      Denies any travel in thw last 14 days - denies any recent exposure to anyone with known os suspected covid - denies any current covid symptoms

## 2023-06-13 NOTE — PT/OT/SLP PROGRESS
Occupational Therapy   Treatment    Name: Ish Guardado  MRN: [id number]  Admitting Diagnosis:  Acute on chronic respiratory failure with hypoxia and hypercapnia       Recommendations:     Discharge Recommendations: nursing facility, skilled  Discharge Equipment Recommendations:  to be determined by next level of care  Barriers to discharge:  Other (Comment) (unknown)    Assessment:     Ish Guardado is a 62 y.o. male with a medical diagnosis of Acute on chronic respiratory failure with hypoxia and hypercapnia.  He presents with the following performance deficits affecting function are weakness, impaired endurance, impaired self care skills, impaired functional mobility, gait instability, impaired balance, impaired cognition, decreased coordination, decreased upper extremity function, decreased lower extremity function, decreased safety awareness, abnormal tone, decreased ROM, edema.     Rehab Prognosis:  Fair; patient would benefit from acute skilled OT services to address these deficits and reach maximum level of function.       Plan:     Patient to be seen 2 x/week to address the above listed problems via self-care/home management, therapeutic activities, therapeutic exercises  Plan of Care Expires: 06/23/23  Plan of Care Reviewed with: patient    Subjective     Chief Complaint: lethargic  Patient/Family Comments/goals: wants to lie back down  Pain/Comfort:  Pain Rating 1: 0/10    Objective:     Communicated with: Nurse Reese and epic chart review prior to session.  Patient found supine with peripheral IV, telemetry, PureWick, bed alarm, Other (comments) (AVASYS) upon OT entry to room.    General Precautions: Standard, fall, aphasia    Orthopedic Precautions:N/A  Braces: N/A  Respiratory Status: Room air    Bed Mobility:    Patient completed Rolling/Turning to Left with  moderate assistance and 2 persons  Patient completed Rolling/Turning to Right with moderate assistance and 2 persons  Patient completed Supine  to Sit with moderate assistance and 2 persons  Patient completed Sit to Supine with moderate assistance   Forward scoot to EOB with Mod A x2.  X5 reps Lateral scoot to L with Max A x2.    Functional Mobility/Transfers:  Patient completed Sit <> Stand Transfer with maximal assistance and of 2 persons  with  hand-held assist  x2 trials. Pt able to perform half stands only.    Activities of Daily Living:  Grooming: maximal assistance wash face with RUE with hand over hand assist, switching to LUE to complete task while weightbearing through RUE.    [facility] [address] 6 Click ADL: 8    Treatment & Education:  Pt lethargic, requiring frequent verbal cues to keep eyes open. Pt oriented to person only; attempted to re-orient pt to time and place. Pt requiring consistent verbal and tactile cueing throughout OT session for all tasks, as well as frequent re-direction to task. Requires short and straightforward, one-step instructions for tasks. Pt performed x5 reps each bilateral side sitting onto forearms to promote weightbearing and strengthening of UE, requiring Mod A x2 to complete. Pt performed visual scanning activities to L side to encourage attention to left visual field as pt demonstrates R visual gaze. Completed x3 reps each UE forward reaching task to promote weight shifting, as well as weight bearing and functional movement of RUE.  Educated on techniques to use to increase independence and decrease fall risk with functional transfers. Educated on importance of OOB activity and calling for A to transfer and meet needs. Encouraged completion of B UE AROM/AAROM therex throughout the day to tolerance to increase functional strength and activity tolerance. Patient stated understanding and in agreement with POC.       Patient left HOB elevated with all lines intact, call button in reach, and bed alarm on    GOALS:   Multidisciplinary Problems       Occupational Therapy Goals          Problem: Occupational Therapy    Goal Priority  Disciplines Outcome Interventions   Occupational Therapy Goal     OT, PT/OT Ongoing, Progressing    Description: Goals to be met by: 6/23/23     Patient will increase functional independence with ADLs by performing:    Grooming while EOB with Moderate Assistance.  Supine to sit with Moderate Assistance.  Increased functional strength in L UE grossly by 1/2 MM grade.                         Time Tracking:     OT Date of Treatment: 06/13/23  OT Start Time: 1045  OT Stop Time: 1125  OT Total Time (min): 40 min    Billable Minutes:Therapeutic Activity 15  Neuromuscular Re-education 25    OT/LAURENT: KATHRYN Garcia OT     6/13/2023

## 2023-06-13 NOTE — PLAN OF CARE
CM called and spoke with wife over phone. Informed at this time no accepting SNF facilities. CM awaiting decision from alternate  area SNFs, discussed possible options if no SNFs accept. Wife does not want SNF referrals sent far from home or FDC placement at this time. Wife is primary caretaker as both son's live out of town. States she was going to have a meeting with [facility] but then patient came to hospital. Wife requests CM assistance with coordinating informational session with [facility].    Referral sent via careport and CM spoke with Hospice admissions (Olivia). Confirmed agency received referral prior to hospitalization and staff will contact wife.

## 2023-06-13 NOTE — PLAN OF CARE
Discussed poc with pt and pt's wife, verbalized understanding    Purposeful rounding every 2hours    VS wnl  Cardiac monitoring in use, pt is between Sinus gretchen and NSR, tele monitor # [id number]  Blood glucose monitoring   Fall precautions in place, remains injury free    Bed locked at lowest position  Call light within reach  Chart check complete  Will cont with POC

## 2023-06-13 NOTE — PROGRESS NOTES
[facility] Medicine  Progress Note    Patient Name: Ish Guardado  MRN: [id number]  Patient Class: IP- Inpatient   Admission Date: 6/7/2023  Length of Stay: 3 days  Attending Physician: Casimiro Mckeon MD  Primary Care Provider: Pollo Arana MD        Subjective:     Principal Problem:Acute on chronic respiratory failure with hypoxia and hypercapnia        HPI:  Ish Guardado is a 62 y.o. male with a PMH  has a past medical history of Drug abuse, amphetamine type, Femoral-popliteal bypass graft occlusion, GERD (gastroesophageal reflux disease), History of substance abuse, History of transient ischemic attack (TIA), Hyperlipemia, Hypertension, Neuropathy, Occlusion of right femorotibial bypass graft (5/2017, 6/2017), Pre-diabetes, and Status post femorotibial bypass.  Presented to the ER accompanied by his wife and son to be evaluated for altered mental status which started earlier this morning.  History obtained by wife at bedside secondary to patient's change in mental status.  Per wife, patient has been having altered mental status for the last 3 days (06/04/2023), but states that he became more confused shortly after waking this morning.  Patient has history of CVA which left him with deficits of weakness on right side as well as dysarthria and memory issues.  She also states that patient has been falling off his wheelchair and needed help off the floor.  Patient has had visits to the hospital for evaluation for these falls no acute findings.  It is noted that patient is on Eliquis.  She also notices at his right arm her.  Which weaker than usual patient has been unable to move it.  Furthermore, she noticed that the patient's speech has become more slurred and patient has more confused in his responses when spoken to.  She also states that patient recently had a left carotid endarterectomy performed on 05/19/2023 performed by Dr. Hartley.    ER workup unremarkable with the exception to H/H of 7.5/27.6  (previously 8.8/31.1 on 10/15/2022).  UA and drug screen negative.  Ammonia level negative.  CT head without acute findings.  Plain film imaging of left arm and left lower leg negative for acute findings.  Chest x-ray shows cardiomegaly.  Tele vascular Neurology consult placed.  Outside window for any thromboembolic lytic therapy.  Patient currently being treated with Eliquis, aspirin, and Plavix.  Hospital Medicine consulted to admit patient for further stroke workup/management.  Wife and son at bedside are in agreement with treatment plan.  Patient will be placed under observation status.    PCP: Pollo Arana          Overview/Hospital Course:  6/9 transfer to floor status from icu. Physical/occupational therapy recommending skilled nursing facility placement. Weaned to 4L. Consider blood transfusion for hb<7 or <8 if symptomatic given vascular disease.  6/10 cva on mri. Symptoms improving. Continue speech, physical/occupational therapy.Awaiting skilled nursing facility placement  06/11: sats stable on room air, awaiting SNF placement   6/13: awaiting snf placement       Interval History: No issues overnight. Awaiting snf placement.     Review of Systems   Unable to perform ROS: Acuity of condition   Objective:     Vital Signs (Most Recent):  Temp: 97.4 °F (36.3 °C) (06/13/23 0748)  Pulse: 60 (06/13/23 0919)  Resp: 18 (06/13/23 0748)  BP: 130/61 (06/13/23 0748)  SpO2: (!) 94 % (06/13/23 0748) Vital Signs (24h Range):  Temp:  [97.4 °F (36.3 °C)-98.7 °F (37.1 °C)] 97.4 °F (36.3 °C)  Pulse:  [59-76] 60  Resp:  [17-19] 18  SpO2:  [90 %-98 %] 94 %  BP: ()/(52-63) 130/61     Weight: 117.5 kg (259 lb 0.7 oz)  Body mass index is 38.25 kg/m².  No intake or output data in the 24 hours ending 06/13/23 1038      Physical Exam  Vitals and nursing note reviewed.   Constitutional:       General: He is not in acute distress.     Appearance: He is obese. He is ill-appearing.   Cardiovascular:      Rate and Rhythm: Normal  rate and regular rhythm.      Heart sounds: No murmur heard.    No friction rub. No gallop.   Pulmonary:      Effort: Pulmonary effort is normal.      Breath sounds: No wheezing, rhonchi or rales.      Comments: Decreased breath sounds bilaterally, on room air, no increased work of breathing   Abdominal:      General: Bowel sounds are normal. There is no distension.      Palpations: Abdomen is soft.      Tenderness: There is no abdominal tenderness. There is no guarding or rebound.   Musculoskeletal:      Comments: R AKA, no LLE edema    Neurological:      Mental Status: He is alert.      Comments: Oriented to self, dysarthric, R sided weakness noted            Significant Labs: All pertinent labs within the past 24 hours have been reviewed.    Significant Imaging: I have reviewed all pertinent imaging results/findings within the past 24 hours.          Assessment/Plan:      * Acute on chronic respiratory failure with hypoxia and hypercapnia  Patient with Hypercapnic Respiratory failure which is Acute on chronic.  he is on home oxygen at 4-5 LPM. Supplemental oxygen was provided and noted-      .   Signs/symptoms of respiratory failure include- tachypnea, increased work of breathing and lethargy.. Contributing diagnoses includes - CHF and COPD Labs and images were reviewed. Patient Has recent ABG, which has been reviewed. Will treat underlying causes and adjust management of respiratory failure as follows-   - continue nebs as needed   - continue CPAP qhs   - sats stable on room air     Awaiting snf     Carotid stenosis  Hx recent L CEA with Vascular Surgery Dr. Javed on 05/19/23  Continue Plavix, statin, Eliquis       Neuropathy   -hold Lyrica secondary to altered mental status      History of CVA with residual deficit  - see above       AMS (altered mental status)  - likely multifactorial due to CVA as above and hypercapnia   - treat CVA as above   - continue CPAP qhs   - delirium and fall precautions   -  continue PT/OT       Cerebrovascular accident (CVA) involving left cerebral hemisphere   Worsen RUE weakness from baseline. Tele Vascular Neurology consulted. Outside window for thromboembolytics.  Currently being treated with Eliquis, Plavix, and statin.  Recommends MRI.  - MRI Brain with acute infacrcs throughout L cerebral hemisphere in watershed type distribution. Also with multiple remote infarcts noted in R cerebral hemisphere, L temporal/occipital lobe, both thalami and both cerebellar hemispheres   - Continue Eliquis, Plavix, statin   - continue PT/OT/ST  - pending SNF placement     Will cont on asa, plavix, statin       Polysubstance abuse       DVT (deep venous thrombosis)  - continue Eliquis       Primary hypertension   - continue home medications   - monitor BP     Mixed hyperlipidemia  - continue statin       PAD (peripheral artery disease)  -hold Lyrica and pain medication secondary to altered mental status      Stage 1 mild COPD by GOLD classification  - Pulm consulted and following as above  - continue nebs as needed         VTE Risk Mitigation (From admission, onward)         Ordered     apixaban tablet 5 mg  2 times daily         06/08/23 0243     IP VTE HIGH RISK PATIENT  Once         06/08/23 0039     Place sequential compression device  Until discontinued         06/08/23 0039                Discharge Planning   SHAHRAM:      Code Status: DNR   Is the patient medically ready for discharge?:     Reason for patient still in hospital (select all that apply): Patient trending condition  Discharge Plan A: Skilled Nursing Facility                  Casimiro Mckeon MD  Department of Hospital Medicine   [facility] - Med Surg

## 2023-06-14 PROBLEM — R41.82 AMS (ALTERED MENTAL STATUS): Status: RESOLVED | Noted: 2023-06-08 | Resolved: 2023-06-14

## 2023-06-14 LAB
POCT GLUCOSE: 107 MG/DL (ref 70–110)
POCT GLUCOSE: 108 MG/DL (ref 70–110)
POCT GLUCOSE: 119 MG/DL (ref 70–110)
POCT GLUCOSE: 127 MG/DL (ref 70–110)
POCT GLUCOSE: 86 MG/DL (ref 70–110)

## 2023-06-14 PROCEDURE — 63600175 PHARM REV CODE 636 W HCPCS: Performed by: FAMILY MEDICINE

## 2023-06-14 PROCEDURE — 25000003 PHARM REV CODE 250: Performed by: FAMILY MEDICINE

## 2023-06-14 PROCEDURE — 25000242 PHARM REV CODE 250 ALT 637 W/ HCPCS: Performed by: NURSE PRACTITIONER

## 2023-06-14 PROCEDURE — 97110 THERAPEUTIC EXERCISES: CPT

## 2023-06-14 PROCEDURE — 97112 NEUROMUSCULAR REEDUCATION: CPT

## 2023-06-14 PROCEDURE — 94640 AIRWAY INHALATION TREATMENT: CPT

## 2023-06-14 PROCEDURE — 25000003 PHARM REV CODE 250: Performed by: NURSE PRACTITIONER

## 2023-06-14 PROCEDURE — 94761 N-INVAS EAR/PLS OXIMETRY MLT: CPT

## 2023-06-14 PROCEDURE — 97530 THERAPEUTIC ACTIVITIES: CPT

## 2023-06-14 PROCEDURE — 25000003 PHARM REV CODE 250: Performed by: INTERNAL MEDICINE

## 2023-06-14 PROCEDURE — 99900035 HC TECH TIME PER 15 MIN (STAT)

## 2023-06-14 PROCEDURE — 21400001 HC TELEMETRY ROOM

## 2023-06-14 PROCEDURE — S4991 NICOTINE PATCH NONLEGEND: HCPCS | Performed by: FAMILY MEDICINE

## 2023-06-14 RX ORDER — LANOLIN ALCOHOL/MO/W.PET/CERES
1 CREAM (GRAM) TOPICAL DAILY
Status: DISCONTINUED | OUTPATIENT
Start: 2023-06-14 | End: 2023-06-15 | Stop reason: HOSPADM

## 2023-06-14 RX ADMIN — ARFORMOTEROL TARTRATE 15 MCG: 15 SOLUTION RESPIRATORY (INHALATION) at 07:06

## 2023-06-14 RX ADMIN — MUPIROCIN: 20 OINTMENT TOPICAL at 09:06

## 2023-06-14 RX ADMIN — FERROUS SULFATE TAB 325 MG (65 MG ELEMENTAL FE) 1 EACH: 325 (65 FE) TAB at 09:06

## 2023-06-14 RX ADMIN — PREGABALIN 300 MG: 100 CAPSULE ORAL at 09:06

## 2023-06-14 RX ADMIN — PANTOPRAZOLE SODIUM 40 MG: 40 TABLET, DELAYED RELEASE ORAL at 06:06

## 2023-06-14 RX ADMIN — FUROSEMIDE 40 MG: 40 TABLET ORAL at 06:06

## 2023-06-14 RX ADMIN — APIXABAN 5 MG: 2.5 TABLET, FILM COATED ORAL at 08:06

## 2023-06-14 RX ADMIN — BUDESONIDE 0.5 MG: 0.5 INHALANT ORAL at 07:06

## 2023-06-14 RX ADMIN — NICOTINE 1 PATCH: 21 PATCH, EXTENDED RELEASE TRANSDERMAL at 09:06

## 2023-06-14 RX ADMIN — AMLODIPINE BESYLATE 10 MG: 10 TABLET ORAL at 09:06

## 2023-06-14 RX ADMIN — ASPIRIN 81 MG CHEWABLE TABLET 81 MG: 81 TABLET CHEWABLE at 09:06

## 2023-06-14 RX ADMIN — APIXABAN 5 MG: 2.5 TABLET, FILM COATED ORAL at 09:06

## 2023-06-14 RX ADMIN — METOPROLOL SUCCINATE 25 MG: 25 TABLET, EXTENDED RELEASE ORAL at 09:06

## 2023-06-14 RX ADMIN — MIRTAZAPINE 7.5 MG: 7.5 TABLET, FILM COATED ORAL at 08:06

## 2023-06-14 RX ADMIN — PREGABALIN 300 MG: 100 CAPSULE ORAL at 08:06

## 2023-06-14 RX ADMIN — CLOPIDOGREL BISULFATE 75 MG: 75 TABLET ORAL at 09:06

## 2023-06-14 RX ADMIN — ATORVASTATIN CALCIUM 40 MG: 40 TABLET, FILM COATED ORAL at 08:06

## 2023-06-14 RX ADMIN — IRON SUCROSE 200 MG: 20 INJECTION, SOLUTION INTRAVENOUS at 09:06

## 2023-06-14 NOTE — PLAN OF CARE
CM called to f/u with spouse. Wife states she does not want to pursue hospice as they do not offer PT/OT services. States she was hopeful a SNF would accept. CM explained no accepting SNFs at this time, awaiting return call from [facility]. Wife is hopeful this facility will accept. CM discussed options of nursing home placement vs home with HH, sitter services if unable to find accepting SNF. Wife states patient has had HHC before and it was not beneficial.     Over 35 SNF referrals have been sent. Wife requests time to think about options and will f/u with CM this evening. CM will continue to contact SNF agencies regarding patient.

## 2023-06-14 NOTE — NURSING
Pt has exhibited multiple episodes of agitation and confusion throughout the night. Pt has asked to call wife repeatedly and, after speaking with her, does not remember talking to her and demands to call again. Pt has requested water and when water was received, he proceeded to knock the container on the floor. Pt has accused the nurse of not giving his medication and drinking his drink. Pt is also exhibiting slurred speech and loss for words when communicating. Wife notified of pt's behavior and requests medication for anxiety. Hospital medicine notified, but no new orders placed. Pt has had incontinence care performed and linen changed and for now is resting in bed. Will continue to monitor for any additional changes in mentation.

## 2023-06-14 NOTE — PT/OT/SLP PROGRESS
Physical Therapy Treatment    Patient Name:  Ish Guardado   MRN:  [id number]    Recommendations:     Discharge Recommendations: nursing facility, skilled  Discharge Equipment Recommendations: to be determined by next level of care  Barriers to discharge: None    Assessment:     Ish Guardado is a 62 y.o. male admitted with a medical diagnosis of Acute on chronic respiratory failure with hypoxia and hypercapnia.  He presents with the following impairments/functional limitations: weakness, visual deficits, impaired joint extensibility, abnormal tone, impaired endurance, impaired cognition, decreased ROM, impaired coordination, impaired self care skills, decreased upper extremity function, impaired fine motor, impaired functional mobility, decreased lower extremity function, gait instability, decreased safety awareness, impaired balance .    Rehab Prognosis: Good; patient would benefit from acute skilled PT services to address these deficits and reach maximum level of function.    Recent Surgery: * No surgery found *      Plan:     During this hospitalization, patient to be seen 3 x/week to address the identified rehab impairments via therapeutic activities, therapeutic exercises, gait training and progress toward the following goals:    Plan of Care Expires:  06/23/23    Subjective     Chief Complaint: PT WANTED TO TAKE A NAP  Patient/Family Comments/goals: PT ASLEEP UPON P.T. ARRIVAL AND REQUIRED CONTINUOUS VC AND TC TO MAINTAIN WAKEFULNESS. OPENED WINDOW SHADES AND TURNED ON LIGHTS FOR PT ORIENTATION TO TIME OF DAY. PT DEMONSTRATED INC COMMUNICATION TODAY.  Pain/Comfort:  Pain Rating 1: 0/10      Objective:     Communicated with NURSE POLO AND Epic CHART REVIEW prior to session.  Patient found supine with telemetry, peripheral IV, bed alarm, Other (comments) (AVASYS) upon PT entry to room.     General Precautions: Standard, fall, aphasia  Orthopedic Precautions: N/A  Braces: N/A  Respiratory Status: Room air      Functional Mobility:  Bed Mobility:     Scooting: moderate assistance and MOD A FOR SITTING SCOOTS; SBA FOR SUPINE SCOOTS  Supine to Sit: minimum assistance, moderate assistance, and of 2 persons  Sit to Supine: contact guard assistance  Transfers:     Sit to Stand:  moderate assistance and of 2 persons with rolling walker      AM-PAC 6 CLICK MOBILITY  Turning over in bed (including adjusting bedclothes, sheets and blankets)?: 3  Sitting down on and standing up from a chair with arms (e.g., wheelchair, bedside commode, etc.): 2  Moving from lying on back to sitting on the side of the bed?: 3  Moving to and from a bed to a chair (including a wheelchair)?: 1 (NOT TESTED)  Need to walk in hospital room?: 1 (NOT TESTED)  Climbing 3-5 steps with a railing?: 1 (NOT TESTED)  Basic Mobility Total Score: 11       Treatment & Education:  PT IN AND OUT OF SLEEP FOR MAJORITY OF TREATMENT SESSION. PT REQUIRED CONTINUOUS AND ASSERTIVE VC AND TC FOR SAFETY AND ATTENTION TO TASKS THROUGHOUT SESSION. PT REQUIRED MIN/MOD A X2 FOR SUP>SIT AND MOD A FOR FWD SCOOT. WHILE SEATED EOB, PT PERFORMED FERNANDO AP X10, L LAQ X10, AND L MIP X10 WITH VC INSTRUCTING THE PT TO COUNT REPS OUT LOUD AND TC FOR FULL ROM. PT THEN PERFORMED BUE REACHING TASKS, CROSSING MIDLINE X5 WITH VC AND TC. PT PERFORMED FERNANDO SIDE-SITTING ON AND PUSH UP FROM ELBOW X5 EACH ARM WITH MIN A, VC, AND TC. PT PERFORMED STS X3 WITH MOD A X2 PLUS RW FOR LAST REP. FROM EOB, PT RETURNED SUPINE WITH CGA AND SCOOTED UP IN BED WITH SBA AND VC. PT RE-EDUCATED ON HOW TO USE CALL BUTTON AND TO CALL NURSE FOR ANY NEEDS.    Patient left HOB elevated with all lines intact, call button in reach, and bed alarm on..    GOALS:   Multidisciplinary Problems       Physical Therapy Goals          Problem: Physical Therapy    Goal Priority Disciplines Outcome Goal Variances Interventions   Physical Therapy Goal     PT, PT/OT Ongoing, Progressing     Description: LTG'S TO BE MET IN 14 DAYS  (6-23-23)  PT WILL REQUIRE MODA FOR BED MOBILITY  PT WILL REQUIRE MODA FOR SIT<>STAND TF  PT WILL REQUIRE MODA FOR BED<>CHAIR TF                           Time Tracking:     PT Received On: 06/14/23  PT Start Time: 1044     PT Stop Time: 1120  PT Total Time (min): 36 min     Billable Minutes: Therapeutic Activity 20 and Therapeutic Exercise 16    Treatment Type: Treatment  PT/PTA: PT     Number of PTA visits since last PT visit: 0     06/14/2023

## 2023-06-14 NOTE — PLAN OF CARE
Continue OT POC.  Pt continuing to improve with mobility and BUE strength. Pt with increased communication today.

## 2023-06-14 NOTE — ASSESSMENT & PLAN NOTE
Patient with Hypercapnic Respiratory failure which is Acute on chronic.  he is on home oxygen at 4-5 LPM. Supplemental oxygen was provided and noted- Oxygen Concentration (%):  [24] 24    .   Signs/symptoms of respiratory failure include- tachypnea, increased work of breathing and lethargy.. Contributing diagnoses includes - CHF and COPD Labs and images were reviewed. Patient Has recent ABG, which has been reviewed. Will treat underlying causes and adjust management of respiratory failure as follows-   - continue nebs as needed   - continue CPAP qhs   - sats stable on room air     snf declined   Meeting with hospice today

## 2023-06-14 NOTE — PT/OT/SLP PROGRESS
Speech Language Pathology      Ish Guardado  MRN: [id number]    Patient not seen today secondary to Unarousable. Attempted to wake patient for therapy x6 and he would wake for a few seconds before falling back asleep. Will follow up tomorrow.    Leandra Lincoln MA, CCC-SLP  Speech Language Pathologist  6/14/2023

## 2023-06-14 NOTE — SUBJECTIVE & OBJECTIVE
Interval History: No acute events overnight.     Review of Systems   Unable to perform ROS: Dementia   Objective:     Vital Signs (Most Recent):  Temp: 98.2 °F (36.8 °C) (06/14/23 0718)  Pulse: 66 (06/14/23 0729)  Resp: 18 (06/14/23 0729)  BP: (!) 144/66 (06/14/23 0718)  SpO2: (!) 92 % (06/14/23 0729) Vital Signs (24h Range):  Temp:  [97.3 °F (36.3 °C)-99 °F (37.2 °C)] 98.2 °F (36.8 °C)  Pulse:  [62-75] 66  Resp:  [16-20] 18  SpO2:  [92 %-96 %] 92 %  BP: (128-169)/() 144/66     Weight: 112.9 kg (248 lb 14.4 oz)  Body mass index is 36.76 kg/m².    Intake/Output Summary (Last 24 hours) at 6/14/2023 1008  Last data filed at 6/14/2023 0952  Gross per 24 hour   Intake --   Output 900 ml   Net -900 ml         Physical Exam  Vitals and nursing note reviewed.   Constitutional:       General: He is not in acute distress.     Appearance: He is obese. He is ill-appearing.   Cardiovascular:      Rate and Rhythm: Normal rate and regular rhythm.      Heart sounds: No murmur heard.    No friction rub. No gallop.   Pulmonary:      Effort: Pulmonary effort is normal.      Breath sounds: No wheezing, rhonchi or rales.      Comments: Decreased breath sounds bilaterally, on room air, no increased work of breathing   Abdominal:      General: Bowel sounds are normal. There is no distension.      Palpations: Abdomen is soft.      Tenderness: There is no abdominal tenderness. There is no guarding or rebound.   Musculoskeletal:      Comments: R AKA, no LLE edema    Neurological:      Mental Status: He is alert.      Comments: Oriented to self, dysarthric, R sided weakness noted            Significant Labs: All pertinent labs within the past 24 hours have been reviewed.    Significant Imaging: I have reviewed all pertinent imaging results/findings within the past 24 hours.

## 2023-06-14 NOTE — PT/OT/SLP PROGRESS
Occupational Therapy   Treatment    Name: Ish Guardado  MRN: [id number]  Admitting Diagnosis:  Acute on chronic respiratory failure with hypoxia and hypercapnia       Recommendations:     Discharge Recommendations: nursing facility, skilled  Discharge Equipment Recommendations:  to be determined by next level of care  Barriers to discharge:  Other (Comment) (unknown)    Assessment:     Ish Guardado is a 62 y.o. male with a medical diagnosis of Acute on chronic respiratory failure with hypoxia and hypercapnia.  He presents with the following performance deficits affecting function are weakness, impaired endurance, impaired sensation, impaired self care skills, impaired functional mobility, gait instability, impaired balance, visual deficits, impaired cognition, decreased coordination, decreased upper extremity function, decreased lower extremity function, decreased safety awareness, abnormal tone, decreased ROM, impaired fine motor, edema, impaired cardiopulmonary response to activity, impaired joint extensibility.     Rehab Prognosis:  Fair; patient would benefit from acute skilled OT services to address these deficits and reach maximum level of function.       Plan:     Patient to be seen 2 x/week to address the above listed problems via self-care/home management, therapeutic activities, therapeutic exercises  Plan of Care Expires: 06/23/23  Plan of Care Reviewed with: patient    Subjective     Chief Complaint: tired  Patient/Family Comments/goals: wants to take a nap  Pain/Comfort:  Pain Rating 1: 0/10    Objective:     Communicated with: Nurse and epic chart review prior to session.  Patient found supine with telemetry, peripheral IV, bed alarm, Other (comments) (AVASYS) upon OT entry to room.    General Precautions: Standard, fall, aphasia    Orthopedic Precautions:N/A  Braces: N/A  Respiratory Status: Room air     Occupational Performance:     Bed Mobility:    Patient completed Rolling/Turning to Left with   minimum assistance  Patient completed Supine to Sit with minimum assistance, moderate assistance, and 2 persons  Patient completed Sit to Supine with contact guard assistance   Forward scoot to EOB with Mod A.  Supine scoot to HOB with SBA.    Functional Mobility/Transfers:  Patient completed Sit <> Stand Transfer with moderate assistance and of 2 persons  with  no assistive device x3 trials.  Pt also completed Sit<>Stand t/f with Mod A x2 and RW, requiring assist with RUE grasp on RW.     [facility] 6 Click ADL: 9    Treatment & Education:  Pt lethargic during OT tx and requiring consistent verbal cueing to maintain alertness. Opened the window curtain and turned on lights to promote re-orientation to time of day. Requires cueing for attention to tasks and safety. Pt with increased verbalization, effort, and attention to task today. Pt performed x5 reps BUE forward reaching tasks and crossing midline to promote weight shifting, as well as weightbearing and functional movement of RUE. Pt performed x5 reps bilateral side sitting onto forearms and pushing up from bed to promote weightbearing and strengthening of UE, requiring Min A to complete. Pt demonstrating increased strength in RUE. Pt performed visual scanning activities to L side to encourage attention to left visual field as pt continues to demonstrate R visual gaze. Educated on techniques to use to increase independence and decrease fall risk with functional transfers. Educated on importance of OOB activity and calling for A to transfer and meet needs. Encouraged completion of B UE AROM/AAROM therex throughout the day to tolerance to increase functional strength and activity tolerance. Patient stated understanding and in agreement with POC.     Patient left HOB elevated with all lines intact, call button in reach, bed alarm on, and nurse notified    GOALS:   Multidisciplinary Problems       Occupational Therapy Goals          Problem: Occupational Therapy    Goal  Priority Disciplines Outcome Interventions   Occupational Therapy Goal     OT, PT/OT Ongoing, Progressing    Description: Goals to be met by: 6/23/23     Patient will increase functional independence with ADLs by performing:    Grooming while EOB with Moderate Assistance.  Supine to sit with Moderate Assistance.  Increased functional strength in L UE grossly by 1/2 MM grade.                         Time Tracking:     OT Date of Treatment: 06/14/23  OT Start Time: 1040  OT Stop Time: 1110  OT Total Time (min): 30 min    Billable Minutes:Therapeutic Activity 15  Neuromuscular Re-education 15    OT/LAURENT: KATHRYN Garcia OT     6/14/2023

## 2023-06-14 NOTE — ASSESSMENT & PLAN NOTE
Worsen RUE weakness from baseline. Tele Vascular Neurology consulted. Outside window for thromboembolytics.  Currently being treated with Eliquis, Plavix, and statin.  Recommends MRI.  - MRI Brain with acute infacrcs throughout L cerebral hemisphere in watershed type distribution. Also with multiple remote infarcts noted in R cerebral hemisphere, L temporal/occipital lobe, both thalami and both cerebellar hemispheres   - Continue Eliquis, Plavix, statin   - continue PT/OT/ST  - pending SNF placement     Will cont on asa, plavix, statin

## 2023-06-14 NOTE — PLAN OF CARE
Pt remains free from falls/injuries this shift. Safety precautions maintained. No complaints of pain. No s/s of acute distress noted. Will continue to monitor. Chart check completed.

## 2023-06-14 NOTE — PLAN OF CARE
Discussed plan of care with pt and spouse. Pt verbalized understanding. No signs of acute distress. Q2h turns/repositioning performed. Bed alarm set with bed at lowest position. Video monitoring in progress. Tele monitor [id number] in place. Call light within reach. Purposeful rounding Q2h.      Chart check complete.     Problem: Adult Inpatient Plan of Care  Goal: Plan of Care Review  Outcome: Ongoing, Progressing  Goal: Patient-Specific Goal (Individualized)  Outcome: Ongoing, Progressing  Goal: Absence of Hospital-Acquired Illness or Injury  Outcome: Ongoing, Progressing  Goal: Optimal Comfort and Wellbeing  Outcome: Ongoing, Progressing  Goal: Readiness for Transition of Care  Outcome: Ongoing, Progressing     Problem: Infection  Goal: Absence of Infection Signs and Symptoms  Outcome: Ongoing, Progressing     Problem: Adjustment to Illness (Stroke, Ischemic/Transient Ischemic Attack)  Goal: Optimal Coping  Outcome: Ongoing, Progressing     Problem: Bowel Elimination Impaired (Stroke, Ischemic/Transient Ischemic Attack)  Goal: Effective Bowel Elimination  Outcome: Ongoing, Progressing     Problem: Cerebral Tissue Perfusion (Stroke, Ischemic/Transient Ischemic Attack)  Goal: Optimal Cerebral Tissue Perfusion  Outcome: Ongoing, Progressing     Problem: Cognitive Impairment (Stroke, Ischemic/Transient Ischemic Attack)  Goal: Optimal Cognitive Function  Outcome: Ongoing, Progressing     Problem: Communication Impairment (Stroke, Ischemic/Transient Ischemic Attack)  Goal: Improved Communication Skills  Outcome: Ongoing, Progressing     Problem: Functional Ability Impaired (Stroke, Ischemic/Transient Ischemic Attack)  Goal: Optimal Functional Ability  Outcome: Ongoing, Progressing     Problem: Respiratory Compromise (Stroke, Ischemic/Transient Ischemic Attack)  Goal: Effective Oxygenation and Ventilation  Outcome: Ongoing, Progressing     Problem: Sensorimotor Impairment (Stroke, Ischemic/Transient Ischemic Attack)  Goal:  Improved Sensorimotor Function  Outcome: Ongoing, Progressing     Problem: Swallowing Impairment (Stroke, Ischemic/Transient Ischemic Attack)  Goal: Optimal Eating and Swallowing without Aspiration  Outcome: Ongoing, Progressing     Problem: Urinary Elimination Impaired (Stroke, Ischemic/Transient Ischemic Attack)  Goal: Effective Urinary Elimination  Outcome: Ongoing, Progressing     Problem: Fluid and Electrolyte Imbalance (Acute Kidney Injury/Impairment)  Goal: Fluid and Electrolyte Balance  Outcome: Ongoing, Progressing     Problem: Oral Intake Inadequate (Acute Kidney Injury/Impairment)  Goal: Optimal Nutrition Intake  Outcome: Ongoing, Progressing     Problem: Renal Function Impairment (Acute Kidney Injury/Impairment)  Goal: Effective Renal Function  Outcome: Ongoing, Progressing     Problem: Fall Injury Risk  Goal: Absence of Fall and Fall-Related Injury  Outcome: Ongoing, Progressing     Problem: Skin Injury Risk Increased  Goal: Skin Health and Integrity  Outcome: Ongoing, Progressing     Problem: Impaired Wound Healing  Goal: Optimal Wound Healing  Outcome: Ongoing, Progressing     Problem: Pain Acute  Goal: Acceptable Pain Control and Functional Ability  Outcome: Ongoing, Progressing

## 2023-06-14 NOTE — PROGRESS NOTES
[facility] Medicine  Progress Note    Patient Name: Ish Guardado  MRN: [id number]  Patient Class: IP- Inpatient   Admission Date: 6/7/2023  Length of Stay: 4 days  Attending Physician: Casimiro Mckeon MD  Primary Care Provider: Pollo Arana MD        Subjective:     Principal Problem:Acute on chronic respiratory failure with hypoxia and hypercapnia        HPI:  Ish Guardado is a 62 y.o. male with a PMH  has a past medical history of Drug abuse, amphetamine type, Femoral-popliteal bypass graft occlusion, GERD (gastroesophageal reflux disease), History of substance abuse, History of transient ischemic attack (TIA), Hyperlipemia, Hypertension, Neuropathy, Occlusion of right femorotibial bypass graft (5/2017, 6/2017), Pre-diabetes, and Status post femorotibial bypass.  Presented to the ER accompanied by his wife and son to be evaluated for altered mental status which started earlier this morning.  History obtained by wife at bedside secondary to patient's change in mental status.  Per wife, patient has been having altered mental status for the last 3 days (06/04/2023), but states that he became more confused shortly after waking this morning.  Patient has history of CVA which left him with deficits of weakness on right side as well as dysarthria and memory issues.  She also states that patient has been falling off his wheelchair and needed help off the floor.  Patient has had visits to the hospital for evaluation for these falls no acute findings.  It is noted that patient is on Eliquis.  She also notices at his right arm her.  Which weaker than usual patient has been unable to move it.  Furthermore, she noticed that the patient's speech has become more slurred and patient has more confused in his responses when spoken to.  She also states that patient recently had a left carotid endarterectomy performed on 05/19/2023 performed by Dr. Hartley.    ER workup unremarkable with the exception to H/H of 7.5/27.6  (previously 8.8/31.1 on 10/15/2022).  UA and drug screen negative.  Ammonia level negative.  CT head without acute findings.  Plain film imaging of left arm and left lower leg negative for acute findings.  Chest x-ray shows cardiomegaly.  Tele vascular Neurology consult placed.  Outside window for any thromboembolic lytic therapy.  Patient currently being treated with Eliquis, aspirin, and Plavix.  Hospital Medicine consulted to admit patient for further stroke workup/management.  Wife and son at bedside are in agreement with treatment plan.  Patient will be placed under observation status.    PCP: Pollo Arana          Overview/Hospital Course:  6/9 transfer to floor status from icu. Physical/occupational therapy recommending skilled nursing facility placement. Weaned to 4L. Consider blood transfusion for hb<7 or <8 if symptomatic given vascular disease.  6/10 cva on mri. Symptoms improving. Continue speech, physical/occupational therapy.Awaiting skilled nursing facility placement  06/11: sats stable on room air, awaiting SNF placement   6/13: awaiting snf placement   6/14: SNF declined, meeting with [facility] held today. Iron deficiency noted. Will start ferrous sulfate and give one dose of venofer.       Interval History: No acute events overnight.     Review of Systems   Unable to perform ROS: Dementia   Objective:     Vital Signs (Most Recent):  Temp: 98.2 °F (36.8 °C) (06/14/23 0718)  Pulse: 66 (06/14/23 0729)  Resp: 18 (06/14/23 0729)  BP: (!) 144/66 (06/14/23 0718)  SpO2: (!) 92 % (06/14/23 0729) Vital Signs (24h Range):  Temp:  [97.3 °F (36.3 °C)-99 °F (37.2 °C)] 98.2 °F (36.8 °C)  Pulse:  [62-75] 66  Resp:  [16-20] 18  SpO2:  [92 %-96 %] 92 %  BP: (128-169)/() 144/66     Weight: 112.9 kg (248 lb 14.4 oz)  Body mass index is 36.76 kg/m².    Intake/Output Summary (Last 24 hours) at 6/14/2023 1008  Last data filed at 6/14/2023 0973  Gross per 24 hour   Intake --   Output 900 ml   Net -900 ml          Physical Exam  Vitals and nursing note reviewed.   Constitutional:       General: He is not in acute distress.     Appearance: He is obese. He is ill-appearing.   Cardiovascular:      Rate and Rhythm: Normal rate and regular rhythm.      Heart sounds: No murmur heard.    No friction rub. No gallop.   Pulmonary:      Effort: Pulmonary effort is normal.      Breath sounds: No wheezing, rhonchi or rales.      Comments: Decreased breath sounds bilaterally, on room air, no increased work of breathing   Abdominal:      General: Bowel sounds are normal. There is no distension.      Palpations: Abdomen is soft.      Tenderness: There is no abdominal tenderness. There is no guarding or rebound.   Musculoskeletal:      Comments: R AKA, no LLE edema    Neurological:      Mental Status: He is alert.      Comments: Oriented to self, dysarthric, R sided weakness noted            Significant Labs: All pertinent labs within the past 24 hours have been reviewed.    Significant Imaging: I have reviewed all pertinent imaging results/findings within the past 24 hours.          Assessment/Plan:      * Acute on chronic respiratory failure with hypoxia and hypercapnia  Patient with Hypercapnic Respiratory failure which is Acute on chronic.  he is on home oxygen at 4-5 LPM. Supplemental oxygen was provided and noted- Oxygen Concentration (%):  [24] 24    .   Signs/symptoms of respiratory failure include- tachypnea, increased work of breathing and lethargy.. Contributing diagnoses includes - CHF and COPD Labs and images were reviewed. Patient Has recent ABG, which has been reviewed. Will treat underlying causes and adjust management of respiratory failure as follows-   - continue nebs as needed   - continue CPAP qhs   - sats stable on room air     snf declined   Meeting with hospice today    Carotid stenosis  Hx recent L CEA with Vascular Surgery Dr. Javed on 05/19/23  Continue Plavix, statin, Eliquis       Neuropathy   -hold Lyrica  secondary to altered mental status      History of CVA with residual deficit  - see above       Cerebrovascular accident (CVA) involving left cerebral hemisphere   Worsen RUE weakness from baseline. Tele Vascular Neurology consulted. Outside window for thromboembolytics.  Currently being treated with Eliquis, Plavix, and statin.  Recommends MRI.  - MRI Brain with acute infacrcs throughout L cerebral hemisphere in watershed type distribution. Also with multiple remote infarcts noted in R cerebral hemisphere, L temporal/occipital lobe, both thalami and both cerebellar hemispheres   - Continue Eliquis, Plavix, statin   - continue PT/OT/ST  - pending SNF placement     Will cont on asa, plavix, statin       Polysubstance abuse       DVT (deep venous thrombosis)  - continue Eliquis       Primary hypertension   - continue home medications   - monitor BP     Mixed hyperlipidemia  - continue statin       PAD (peripheral artery disease)  -hold Lyrica and pain medication secondary to altered mental status      Stage 1 mild COPD by GOLD classification  - Pulm consulted and following as above  - continue nebs as needed         VTE Risk Mitigation (From admission, onward)         Ordered     apixaban tablet 5 mg  2 times daily         06/08/23 0243     IP VTE HIGH RISK PATIENT  Once         06/08/23 0039     Place sequential compression device  Until discontinued         06/08/23 0039                Discharge Planning   SHAHRAM:      Code Status: DNR   Is the patient medically ready for discharge?:     Reason for patient still in hospital (select all that apply): Patient trending condition  Discharge Plan A: Skilled Nursing Facility                  Casimiro Mckeon MD  Department of Hospital Medicine   [facility] - Med Surg

## 2023-06-15 VITALS
WEIGHT: 243.19 LBS | HEIGHT: 69 IN | BODY MASS INDEX: 36.02 KG/M2 | TEMPERATURE: 99 F | SYSTOLIC BLOOD PRESSURE: 137 MMHG | OXYGEN SATURATION: 92 % | HEART RATE: 80 BPM | RESPIRATION RATE: 17 BRPM | DIASTOLIC BLOOD PRESSURE: 63 MMHG

## 2023-06-15 LAB
POCT GLUCOSE: 108 MG/DL (ref 70–110)
POCT GLUCOSE: 99 MG/DL (ref 70–110)

## 2023-06-15 PROCEDURE — 94761 N-INVAS EAR/PLS OXIMETRY MLT: CPT

## 2023-06-15 PROCEDURE — S4991 NICOTINE PATCH NONLEGEND: HCPCS | Performed by: FAMILY MEDICINE

## 2023-06-15 PROCEDURE — 25000003 PHARM REV CODE 250: Performed by: INTERNAL MEDICINE

## 2023-06-15 PROCEDURE — 25000242 PHARM REV CODE 250 ALT 637 W/ HCPCS: Performed by: NURSE PRACTITIONER

## 2023-06-15 PROCEDURE — 99900035 HC TECH TIME PER 15 MIN (STAT)

## 2023-06-15 PROCEDURE — 97110 THERAPEUTIC EXERCISES: CPT

## 2023-06-15 PROCEDURE — 27000221 HC OXYGEN, UP TO 24 HOURS

## 2023-06-15 PROCEDURE — 25000003 PHARM REV CODE 250: Performed by: NURSE PRACTITIONER

## 2023-06-15 PROCEDURE — 94640 AIRWAY INHALATION TREATMENT: CPT

## 2023-06-15 PROCEDURE — 25000003 PHARM REV CODE 250: Performed by: FAMILY MEDICINE

## 2023-06-15 PROCEDURE — 97530 THERAPEUTIC ACTIVITIES: CPT

## 2023-06-15 RX ORDER — IBUPROFEN 200 MG
1 TABLET ORAL DAILY
Qty: 28 PATCH | Refills: 0 | Status: SHIPPED | OUTPATIENT
Start: 2023-06-16 | End: 2023-07-16

## 2023-06-15 RX ORDER — TRAMADOL HYDROCHLORIDE 50 MG/1
50 TABLET ORAL EVERY 6 HOURS PRN
Qty: 28 TABLET | Refills: 0 | Status: SHIPPED | OUTPATIENT
Start: 2023-06-15 | End: 2023-06-22

## 2023-06-15 RX ORDER — FERROUS SULFATE 324(65)MG
324 TABLET, DELAYED RELEASE (ENTERIC COATED) ORAL EVERY OTHER DAY
Qty: 15 TABLET | Refills: 2 | Status: SHIPPED | OUTPATIENT
Start: 2023-06-15 | End: 2023-09-13

## 2023-06-15 RX ORDER — CLOPIDOGREL BISULFATE 75 MG/1
75 TABLET ORAL DAILY
Qty: 30 TABLET | Refills: 2 | Status: SHIPPED | OUTPATIENT
Start: 2023-06-15 | End: 2023-09-13

## 2023-06-15 RX ORDER — FERROUS SULFATE 324(65)MG
324 TABLET, DELAYED RELEASE (ENTERIC COATED) ORAL EVERY OTHER DAY
Qty: 15 TABLET | Refills: 2 | Status: SHIPPED | OUTPATIENT
Start: 2023-06-15 | End: 2023-06-15 | Stop reason: SDUPTHER

## 2023-06-15 RX ORDER — PREGABALIN 300 MG/1
300 CAPSULE ORAL 2 TIMES DAILY
Qty: 60 CAPSULE | Refills: 0 | Status: SHIPPED | OUTPATIENT
Start: 2023-06-15 | End: 2023-07-15

## 2023-06-15 RX ORDER — IBUPROFEN 200 MG
1 TABLET ORAL DAILY
Qty: 28 PATCH | Refills: 0 | Status: SHIPPED | OUTPATIENT
Start: 2023-06-16 | End: 2023-06-15 | Stop reason: SDUPTHER

## 2023-06-15 RX ADMIN — TRAMADOL HYDROCHLORIDE 50 MG: 50 TABLET, COATED ORAL at 08:06

## 2023-06-15 RX ADMIN — FUROSEMIDE 40 MG: 40 TABLET ORAL at 06:06

## 2023-06-15 RX ADMIN — ARFORMOTEROL TARTRATE 15 MCG: 15 SOLUTION RESPIRATORY (INHALATION) at 07:06

## 2023-06-15 RX ADMIN — ASPIRIN 81 MG CHEWABLE TABLET 81 MG: 81 TABLET CHEWABLE at 08:06

## 2023-06-15 RX ADMIN — PREGABALIN 300 MG: 100 CAPSULE ORAL at 08:06

## 2023-06-15 RX ADMIN — AMLODIPINE BESYLATE 10 MG: 10 TABLET ORAL at 08:06

## 2023-06-15 RX ADMIN — BUDESONIDE 0.5 MG: 0.5 INHALANT ORAL at 07:06

## 2023-06-15 RX ADMIN — CLOPIDOGREL BISULFATE 75 MG: 75 TABLET ORAL at 08:06

## 2023-06-15 RX ADMIN — APIXABAN 5 MG: 2.5 TABLET, FILM COATED ORAL at 08:06

## 2023-06-15 RX ADMIN — FERROUS SULFATE TAB 325 MG (65 MG ELEMENTAL FE) 1 EACH: 325 (65 FE) TAB at 08:06

## 2023-06-15 RX ADMIN — PANTOPRAZOLE SODIUM 40 MG: 40 TABLET, DELAYED RELEASE ORAL at 06:06

## 2023-06-15 RX ADMIN — NICOTINE 1 PATCH: 21 PATCH, EXTENDED RELEASE TRANSDERMAL at 08:06

## 2023-06-15 RX ADMIN — METOPROLOL SUCCINATE 25 MG: 25 TABLET, EXTENDED RELEASE ORAL at 08:06

## 2023-06-15 NOTE — ASSESSMENT & PLAN NOTE
Patient with Hypercapnic Respiratory failure which is Acute on chronic.  he is on home oxygen at 4-5 LPM. Supplemental oxygen was provided and noted-      .   Signs/symptoms of respiratory failure include- tachypnea, increased work of breathing and lethargy.. Contributing diagnoses includes - CHF and COPD Labs and images were reviewed. Patient Has recent ABG, which has been reviewed. Will treat underlying causes and adjust management of respiratory failure as follows-   - continue nebs as needed   - continue CPAP qhs   - sats stable on room air     snf declined   Meeting with hospice today

## 2023-06-15 NOTE — PT/OT/SLP PROGRESS
Physical Therapy Treatment    Patient Name:  Ish Guardado   MRN:  [id number]    Recommendations:     Discharge Recommendations: nursing facility, skilled  Discharge Equipment Recommendations: to be determined by next level of care  Barriers to discharge: None    Assessment:     Ish Guardado is a 62 y.o. male admitted with a medical diagnosis of Acute on chronic respiratory failure with hypoxia and hypercapnia.  He presents with the following impairments/functional limitations: weakness, impaired endurance, impaired cognition, decreased ROM, impaired sensation, impaired coordination, impaired self care skills, decreased upper extremity function, impaired functional mobility, decreased lower extremity function, gait instability, decreased safety awareness, impaired balance, impaired cardiopulmonary response to activity, impaired joint extensibility, abnormal tone, impaired fine motor .    Rehab Prognosis: Good; patient would benefit from acute skilled PT services to address these deficits and reach maximum level of function.    Recent Surgery: * No surgery found *      Plan:     During this hospitalization, patient to be seen 3 x/week to address the identified rehab impairments via therapeutic activities, gait training, therapeutic exercises and progress toward the following goals:    Plan of Care Expires:  06/23/23    Subjective     Chief Complaint: PT REALLY WANTED TO ATTEMPT T/F TO CHAIR  Patient/Family Comments/goals: NONE STATED BUT PT AWAKE AND CONVERSATIONAL THROUGHOUT ENTIRE SESSION.  Pain/Comfort:  Pain Rating 1: 0/10      Objective:     Communicated with NURSE TARUN GUIDRY AND [facility] CHART REVIEW prior to session.  Patient found supine with bed alarm, peripheral IV, telemetry upon PT entry to room.     General Precautions: Standard, fall  Orthopedic Precautions: N/A  Braces: N/A  Respiratory Status: Room air     Functional Mobility:  Bed Mobility:     Scooting: stand by assistance  Supine to Sit: stand by  assistance  Sit to Supine: CGA TO RUE D/T NEGLECT  Transfers:     Sit to Stand:  moderate assistance, of 2 persons, and (FOR SAFETY) with platform walker      AM-PAC 6 CLICK MOBILITY  Turning over in bed (including adjusting bedclothes, sheets and blankets)?: 3  Sitting down on and standing up from a chair with arms (e.g., wheelchair, bedside commode, etc.): 2  Moving from lying on back to sitting on the side of the bed?: 3  Moving to and from a bed to a chair (including a wheelchair)?: 1 (NOT TESTED)  Need to walk in hospital room?: 1 (NOT TESTED)  Climbing 3-5 steps with a railing?: 1 (NOT TESTED)  Basic Mobility Total Score: 11       Treatment & Education:  PT MUCH MORE AWAKE AND ENGAGED TODAY. PT PERFORMED ALL BED MOB WITH SBA AND CGA TO RUE D/T NEGLECT. ONCE EOB, PT PERFORMED TE INCLUDING L AP, LAQ, MIP X10 EACH WITH VERBAL AND TACTILE CUES FOR REP COUNT AND FULL ROM. PT'S R ARM REQUIRED REPOSITIONING BY P.T. THROUGHOUT THE SESSION. PT PERFORMED SIT TO STAND X3 WITH PLATFORM WALKER AND MOD A X2 FOR SAFETY. PT PERFORMED FERNANDO SIDE-SITTING ON AND PUSH UP FROM ELBOW X5 EACH ARM WITH MIN A, VC, AND TC. PT EXPRESSED WANTING TO T/F TO CHAIR NEXT TO BED BUT P.T. DEEMED IT NOT SAFE DUE TO CHAIR ARMS NOT BEING REMOVABLE. DISCUSSED WITH PT THAT WE COULD WORK ON T/F TO HIS WC TOMORROW IF HIS WIFE IS ABLE TO BRING IT TO HOSPITAL. PT RETURN TO SUPINE IN BED WITH SBA.    Patient left HOB elevated with all lines intact, call button in reach, and bed alarm on..    GOALS:   Multidisciplinary Problems       Physical Therapy Goals          Problem: Physical Therapy    Goal Priority Disciplines Outcome Goal Variances Interventions   Physical Therapy Goal     PT, PT/OT Ongoing, Progressing     Description: LTG'S TO BE MET IN 14 DAYS (6-23-23)  PT WILL REQUIRE MODA FOR BED MOBILITY  PT WILL REQUIRE MODA FOR SIT<>STAND TF  PT WILL REQUIRE MODA FOR BED<>CHAIR TF                           Time Tracking:     PT Received On: 06/15/23  PT  Start Time: 1148     PT Stop Time: 1219  PT Total Time (min): 31 min     Billable Minutes: Therapeutic Activity 20 and Therapeutic Exercise 11    Treatment Type: Treatment  PT/PTA: PT     Number of PTA visits since last PT visit: 0     06/15/2023

## 2023-06-15 NOTE — PLAN OF CARE
Recommendations  1. Recommend modify diet to Cardiac diet; IDDSI Level 6, Soft and Bite Sized, per SLP, when medically appropriate.   2. Recommend bedside swallow evaluation to determined appropriate diet texture. (Resolved)  3. Recommend to weigh pt weekly.     Goals:   1. Pt diet order will be advanced within 24 hrs.   2. Bedside swallow evaluation will be preformed by RD follow up  3. Pt will continue to consume >75% of energy needs by RD follow up.  Nutrition Goal Status: new/continues/resolved   Communication of RD Recs: other (comment) (POC: Sticky Note)  Caitlin Cisneros, Dietetic Intern

## 2023-06-15 NOTE — DISCHARGE SUMMARY
[facility]  Discharge Summary      Patient Name: Ish Guardado  MRN: [id number]  [facility]: [id number]  Patient Class: IP- Inpatient  Admission Date: 6/7/2023  Hospital Length of Stay: 5 days  Discharge Date and Time:  06/15/2023 1:00 PM  Attending Physician: Carlos Miller MD   Discharging Provider: Carlos Miller MD  Primary Care Provider: Pollo Arana MD    Primary Care Team: Networked reference to record PCT     HPI:   Ish Guardado is a 62 y.o. male with a PMH  has a past medical history of Drug abuse, amphetamine type, Femoral-popliteal bypass graft occlusion, GERD (gastroesophageal reflux disease), History of substance abuse, History of transient ischemic attack (TIA), Hyperlipemia, Hypertension, Neuropathy, Occlusion of right femorotibial bypass graft (5/2017, 6/2017), Pre-diabetes, and Status post femorotibial bypass.  Presented to the ER accompanied by his wife and son to be evaluated for altered mental status which started earlier this morning.  History obtained by wife at bedside secondary to patient's change in mental status.  Per wife, patient has been having altered mental status for the last 3 days (06/04/2023), but states that he became more confused shortly after waking this morning.  Patient has history of CVA which left him with deficits of weakness on right side as well as dysarthria and memory issues.  She also states that patient has been falling off his wheelchair and needed help off the floor.  Patient has had visits to the hospital for evaluation for these falls no acute findings.  It is noted that patient is on Eliquis.  She also notices at his right arm her.  Which weaker than usual patient has been unable to move it.  Furthermore, she noticed that the patient's speech has become more slurred and patient has more confused in his responses when spoken to.  She also states that patient recently had a left carotid endarterectomy performed on 05/19/2023 performed by  Dr. Hartley.    ER workup unremarkable with the exception to H/H of 7.5/27.6 (previously 8.8/31.1 on 10/15/2022).  UA and drug screen negative.  Ammonia level negative.  CT head without acute findings.  Plain film imaging of left arm and left lower leg negative for acute findings.  Chest x-ray shows cardiomegaly.  Tele vascular Neurology consult placed.  Outside window for any thromboembolic lytic therapy.  Patient currently being treated with Eliquis, aspirin, and Plavix.  Hospital Medicine consulted to admit patient for further stroke workup/management.  Wife and son at bedside are in agreement with treatment plan.  Patient will be placed under observation status.    PCP: Pollo Arana          Hospital Course:   6/9 transfer to floor status from icu. Physical/occupational therapy recommending skilled nursing facility placement. Weaned to 4L. Consider blood transfusion for hb<7 or <8 if symptomatic given vascular disease.  6/10 cva on mri. Symptoms improving. Continue speech, physical/occupational therapy.Awaiting skilled nursing facility placement  06/11: sats stable on room air, awaiting SNF placement     Admitted s/p CVA. Stable for d/c to SNF        Goals of Care Treatment Preferences:  Code Status: DNR      Consults:   Consults (From admission, onward)        Status Ordering Provider     Inpatient consult to Hospitalist  Once        Provider:  (Not yet assigned)    Acknowledged NILO MATHUR     Inpatient consult to Registered Dietitian/Nutritionist  Once        Provider:  (Not yet assigned)    Completed ULYSSES ANTOINE.     Inpatient consult to Critical Care Medicine  Once        Provider:  (Not yet assigned)    Completed MIKE WHITFIELD     IP consult to case management/social work  Once        Provider:  (Not yet assigned)    Completed CASSANDRA ANTOINE     Consult to Telemedicine - Acute Stroke  Once        Provider:  Facundo Chang MD    Acknowledged CHANDLER MOYA           Neuro  History of CVA with residual deficit  - see above       Cerebrovascular accident (CVA) involving left cerebral hemisphere   Worsen RUE weakness from baseline. Tele Vascular Neurology consulted. Outside window for thromboembolytics.  Currently being treated with Eliquis, Plavix, and statin.  Recommends MRI.  - MRI Brain with acute infacrcs throughout L cerebral hemisphere in watershed type distribution. Also with multiple remote infarcts noted in R cerebral hemisphere, L temporal/occipital lobe, both thalami and both cerebellar hemispheres   - Continue Eliquis, Plavix, statin   - continue PT/OT/ST  - pending SNF placement     Will cont on asa, plavix, statin       Pulmonary  * Acute on chronic respiratory failure with hypoxia and hypercapnia  Patient with Hypercapnic Respiratory failure which is Acute on chronic.  he is on home oxygen at 4-5 LPM. Supplemental oxygen was provided and noted-      .   Signs/symptoms of respiratory failure include- tachypnea, increased work of breathing and lethargy.. Contributing diagnoses includes - CHF and COPD Labs and images were reviewed. Patient Has recent ABG, which has been reviewed. Will treat underlying causes and adjust management of respiratory failure as follows-   - continue nebs as needed   - continue CPAP qhs   - sats stable on room air     snf declined   Meeting with hospice today    Stage 1 mild COPD by GOLD classification  - Pulm consulted and following as above  - continue nebs as needed       Cardiac/Vascular  Carotid stenosis  Hx recent L CEA with Vascular Surgery Dr. Javed on 05/19/23  Continue Plavix, statin, Eliquis       PAD (peripheral artery disease)  -hold Lyrica and pain medication secondary to altered mental status      Other  Polysubstance abuse       DVT (deep venous thrombosis)  - continue Eliquis       Final Active Diagnoses:    Diagnosis Date Noted POA    PRINCIPAL PROBLEM:  Acute on chronic respiratory failure with hypoxia and  hypercapnia [J96.21, J96.22] 06/08/2023 Yes    Carotid stenosis [I65.29] 06/11/2023 Yes     Chronic    Tobacco dependence [F17.200] 06/10/2023 Yes    History of CVA with residual deficit [I69.30] 06/08/2023 Not Applicable    Neuropathy [G62.9] 06/08/2023 Yes    Cerebrovascular accident (CVA) involving left cerebral hemisphere [I63.9] 06/07/2023 Yes    Polysubstance abuse [F19.10] 05/20/2019 Yes     Chronic    DVT (deep venous thrombosis) [I82.409] 10/30/2017 Yes    Primary hypertension [I10] 10/30/2017 Yes    PAD (peripheral artery disease) [I73.9] 05/24/2017 Yes     Chronic    Mixed hyperlipidemia [E78.2] 05/24/2017 Yes     Chronic    Stage 1 mild COPD by GOLD classification [J44.9] 05/23/2017 Yes      Problems Resolved During this Admission:    Diagnosis Date Noted Date Resolved POA    AMS (altered mental status) [R41.82] 06/08/2023 06/14/2023 Yes    Dysarthria [R47.1] 06/08/2023 06/11/2023 Yes    GERD (gastroesophageal reflux disease) [K21.9] 06/08/2023 06/11/2023 Yes       Discharged Condition: stable    Disposition: Skilled Nursing Facility    Follow Up:   Follow-up Information     Pollo Arana MD Follow up in 3 week(s).    Specialty: Family Medicine  Contact information:  [address] [address] [phone]                       Patient Instructions:      Ambulatory referral/consult to Smoking Cessation Program   Standing Status: Future   Referral Priority: Routine Referral Type: Consultation   Referral Reason: Specialty Services Required   Requested Specialty: CTTS   Number of Visits Requested: 1     Diet Cardiac     Reason for not Prescribing Nicotine Replacement     Order Specific Question Answer Comments   Reason for not Prescribing: Not medically appropriate at this time ordered already     Activity as tolerated       Significant Diagnostic Studies: Labs: All labs within the past 24 hours have been reviewed    Pending Diagnostic Studies:     None          Medications:  Reconciled Home Medications:      Medication List      START taking these medications    ferrous sulfate 324 mg (65 mg iron) Tbec  Take 1 tablet (324 mg total) by mouth every other day.     nicotine 21 mg/24 hr  Commonly known as: NICODERM CQ  Place 1 patch onto the skin once daily.  Start taking on: June 16, 2023        CONTINUE taking these medications    albuterol-ipratropium 2.5 mg-0.5 mg/3 mL nebulizer solution  Commonly known as: DUO-NEB  Inhale 3 mLs into the lungs.     amLODIPine 10 MG tablet  Commonly known as: NORVASC  Take 10 mg by mouth once daily.     apixaban 5 mg Tab  Commonly known as: ELIQUIS  Take 5 mg by mouth 2 (two) times daily.     atorvastatin 40 MG tablet  Commonly known as: LIPITOR  Take 40 mg by mouth every evening.     clopidogreL 75 mg tablet  Commonly known as: PLAVIX  Take 1 tablet (75 mg total) by mouth once daily.     dupilumab 300 mg/2 mL Pnij  Inject 300 mg into the skin every 14 (fourteen) days.     furosemide 40 MG tablet  Commonly known as: LASIX  Take 1 tablet by mouth every morning.     hydrOXYzine 50 MG tablet  Commonly known as: ATARAX  Take 50 mg by mouth 2 (two) times daily.     ketoconazole 2 % shampoo  Commonly known as: NIZORAL  Apply topically 3 (three) times a week.     melatonin 5 mg  Commonly known as: MELATIN  Take 1 tablet by mouth nightly as needed.     metoprolol succinate 25 MG 24 hr tablet  Commonly known as: TOPROL-XL  Take 25 mg by mouth once daily.     * mometasone 0.1 % solution  Commonly known as: ELOCON  APPLY 1 ML TOPICALLY TWICE DAILY TO RASHY AREAS IN SCALP AND BEARD AREA TWICE DAILY FOR 2 WEEKS AS NEED FOR FLARES     * mometasone 0.1% 0.1 % cream  Commonly known as: ELOCON  Apply topically 2 (two) times daily as needed.     pantoprazole 40 MG tablet  Commonly known as: PROTONIX  Take 1 tablet by mouth every morning.     pregabalin 300 MG Cap  Commonly known as: LYRICA  Take 300 mg by mouth 2 (two) times daily.     sertraline 50 MG  tablet  Commonly known as: ZOLOFT  Take 50 mg by mouth every evening.     tiZANidine 4 MG tablet  Commonly known as: ZANAFLEX  Take 1 tablet by mouth every evening.     traMADoL 50 mg tablet  Commonly known as: ULTRAM  Take 50 mg by mouth 4 (four) times daily as needed.         * This list has 2 medication(s) that are the same as other medications prescribed for you. Read the directions carefully, and ask your doctor or other care provider to review them with you.            STOP taking these medications    aspirin 81 MG EC tablet  Commonly known as: ECOTRIN            Indwelling Lines/Drains at time of discharge:   Lines/Drains/Airways     None                 Time spent on the discharge of patient: 40 minutes         Carlos Miller MD  Department of Hospital Medicine  [facility]

## 2023-06-15 NOTE — PLAN OF CARE
[facility]  Discharge Final Note    Primary Care Provider: Pollo Arana MD    Expected Discharge Date: 6/15/2023    Final Discharge Note (most recent)       Final Note - 06/15/23 1319          Final Note    Assessment Type Final Discharge Note     Anticipated Discharge Disposition Skilled Nursing Facility        Post-Acute Status    Post-Acute Authorization Placement     Post-Acute Placement Status Set-up Complete/Auth obtained                     Important Message from Medicare             Contact Info       Pollo Arana MD   Specialty: Family Medicine   Relationship: PCP - General    [address] [place] [address]   [address]  [facility] [address]   Phone: [phone]       Next Steps: Follow up in 3 week(s)          [facility] Dispo[facility]  Transportation: w/c van per facility with O2 @ 2 liters  Family notified: CM notified wife over phone and patient at bedside.    [facility] and hard faxed per facility request. Number for nurse report provided.

## 2023-06-15 NOTE — PLAN OF CARE
Patient is awake, alert and oriented x 2. Vitally the patient is stable and denies any concerns regarding treatment. Will continue to monitor the patient and administer scheduled medications.

## 2023-06-15 NOTE — PLAN OF CARE
Patient remains free of hospital injury. VSS. Cardiac monitoring continued as ordered. Call light in reach, bed in low position, bed alarm and Avasys in use. Awaiting SNF placement. All patient questions answered. Purposeful rounding Q2 hours. Will continue to monitor. Chart review complete.     Problem: Adult Inpatient Plan of Care  Goal: Patient-Specific Goal (Individualized)  Outcome: Ongoing, Progressing  Flowsheets (Taken 6/15/2023 8024)  Anxieties, Fears or Concerns: none  Individualized Care Needs: safety     Problem: Infection  Goal: Absence of Infection Signs and Symptoms  Outcome: Ongoing, Progressing     Problem: Communication Impairment (Stroke, Ischemic/Transient Ischemic Attack)  Goal: Improved Communication Skills  Outcome: Ongoing, Progressing     Problem: Renal Function Impairment (Acute Kidney Injury/Impairment)  Goal: Effective Renal Function  Outcome: Ongoing, Progressing     Problem: Oral Intake Inadequate (Acute Kidney Injury/Impairment)  Goal: Optimal Nutrition Intake  Outcome: Ongoing, Progressing     Problem: Skin Injury Risk Increased  Goal: Skin Health and Integrity  Outcome: Ongoing, Progressing     Problem: Impaired Wound Healing  Goal: Optimal Wound Healing  Outcome: Ongoing, Progressing

## 2023-06-15 NOTE — PROGRESS NOTES
Patient O2 saturation was 87% on room air. Placed patient on 4LPM NC, O2 Saturation increased to 96%.

## 2023-06-15 NOTE — PROGRESS NOTES
[facility] - Intensive Care ([facility])  Adult Nutrition  Progress Note    SUMMARY     Recommendations  1. Recommend modify diet to Cardiac diet; IDDSI Level 6, Soft and Bite Sized, per SLP, when medically appropriate.   2. Recommend bedside swallow evaluation to determined appropriate diet texture. (Resolved)  3. Recommend to weigh pt weekly.    Goals:   1. Pt diet order will be advanced within 24 hrs.   2. Bedside swallow evaluation will be preformed by RD follow up  3. Pt will continue to consume >75% of energy needs by RD follow up.  Nutrition Goal Status: new/continues/resolved   Communication of RD Recs: other (comment) (POC: Sticky Note)    Assessment and Plan    Nutrition Problem  Inadequate PO intake (Resolved)    Related to (etiology):   Decreased ability to consume sufficient nutrition.    Signs and Symptoms (as evidenced by):   NPO, AMS    Interventions(treatment strategy):  1. Fat/ Sodium modified diet  2. Referral to other provider   3. Collaboration with medical providers.    Nutrition Diagnosis Status:   Resolved    Malnutrition Assessment     Skin (Micronutrient): bruised, dry, red (Vincent Score: 15 (mild risk))                                 Reason for Assessment    Reason For Assessment: consult  Diagnosis: pulmonary disease  Relevant Medical History: Drug abuse, GERD, HLD, HTN, COPD  Interdisciplinary Rounds: did not attend  General Information Comments:     6/9: 61 y/o male admitted w/ active principal problem of Acute on chronic respiratory failure with hypoxia and hypercapnia. Pt is currently on NPO diet w/ sips of water. Dietitian not able to screen pt d/t pt is MRI. Pt is currently charted to weigh 259 lbs 0.7 oz, BMI 38.25 (Obese). Per chart review, Pt placed on bipap overnight with improvement in pco2. Currently on baseline oxygen use 4-5L NC. Upper ext US negative for occlusion. MRI brain ordered, although CTH negative--continues to endorse right sided weakness. Pt stable to step down to  "the floor. Per chart review (PT/OT/SLP Eval note), the pt presents with the following impairments/functional limitations: weakness, impaired endurance, impaired cognition, impaired balance, impaired functional mobility, decreased safety awareness, decreased lower extremity function, decreased upper extremity function, decreased coordination.  The pt LBM was 6/7, no stool consistency noted. Dietitian will continue to monitor.  Nutrition Discharge Planning: Cardiac texture per SLP    6/15: RD follow up. Pt is having episodes of agitation and confusion. Pt is also experiencing slurred speech and loss for words. SNF has declined pt. Iron deficiency has also developed and is being treated with ferrous sulfate. Pt is currently prescribed a diabetic IDDSI Level 5 diet. Spoke with RN via secure chat. She stated "He's eating the diet and tolerating it. He is eating it all and asking for more." PO intake is charted at 100%. Skin is red, dry, and bruised. Altered skin integrity: L buttocks intact skin with non-blanchable redness of localized area. Vincent score: 15 (mild risk). LBM: 6/14, no stool consistency noted. Reviewed Labs, Meds, and Weight. Labs 6/13: Glucose (H), Iron (L). Weight currently charted at 243 lb. 16 lb wt loss (6% wt change)  x 1 week. Re weigh for accuracy warranted. BMI: 35.91 (Obese). RD will continue to monitor.       Nutrition Risk Screen    Nutrition Risk Screen: no indicators present    Nutrition/Diet History    Spiritual, Cultural Beliefs, Sikh Practices, Values that Affect Care: no  Food Allergies:  (Latex)  Factors Affecting Nutritional Intake: impaired cognitive status/motor control    Anthropometrics    Temp: 98.7 °F (37.1 °C)  Height Method: Stated  Height: 5' 9" (175.3 cm)  Height (inches): 69 in  Weight Method: Bed Scale  Weight: 110.3 kg (243 lb 2.7 oz)  Weight (lb): 243.17 lb  Ideal Body Weight (IBW), Male: 160 lb  % Ideal Body Weight, Male (lb): 161.88 %  BMI (Calculated): 35.9  BMI " Grade: 35 - 39.9 - obesity - grade II     Wt Readings from Last 15 Encounters:   06/15/23 110.3 kg (243 lb 2.7 oz)   10/10/20 98.9 kg (218 lb)   05/28/19 86.7 kg (191 lb 2.2 oz)   11/02/17 85.7 kg (188 lb 15 oz)   07/27/17 83 kg (182 lb 15.7 oz)   06/14/17 85.1 kg (187 lb 9.8 oz)   06/07/17 86.2 kg (190 lb)   06/04/17 81.6 kg (179 lb 14.3 oz)   05/29/17 81.6 kg (180 lb)   05/27/17 86 kg (189 lb 8 oz)       Lab/Procedures/Meds  BMP  Lab Results   Component Value Date     06/13/2023    K 3.7 06/13/2023     06/13/2023    CO2 27 06/13/2023    BUN 14 06/13/2023    CREATININE 0.9 06/13/2023    CALCIUM 9.1 06/13/2023    ANIONGAP 10 06/13/2023    EGFRNORACEVR >60 06/13/2023     Lab Results   Component Value Date    ALBUMIN 3.1 (L) 06/10/2023     Recent Labs   Lab 06/15/23  0551   POCTGLUCOSE 99     Lab Results   Component Value Date    ALT 50 (H) 06/10/2023    AST 26 06/10/2023    ALKPHOS 124 06/10/2023    BILITOT 1.0 06/10/2023     Pertinent Labs Reviewed: reviewed  Pertinent Medications Reviewed: reviewed  Scheduled Meds:   amLODIPine  10 mg Oral Daily    apixaban  5 mg Oral BID    arformoteroL  15 mcg Nebulization BID    aspirin  81 mg Oral Daily    atorvastatin  40 mg Oral QHS    budesonide  0.5 mg Nebulization Q12H    clopidogreL  75 mg Oral Daily    ferrous sulfate  1 tablet Oral Daily    furosemide  40 mg Oral QAM    metoprolol succinate  25 mg Oral Daily    mirtazapine  7.5 mg Oral QHS    nicotine  1 patch Transdermal Daily    pantoprazole  40 mg Oral QAM    pregabalin  300 mg Oral BID     Continuous Infusions:  PRN Meds:.acetaminophen, albuterol-ipratropium, dextrose 10%, dextrose 10%, glucagon (human recombinant), glucose, glucose, hydrALAZINE, hydrOXYzine HCL, insulin aspart U-100, melatonin, ondansetron, pneumoc 20-meka conj-dip cr(PF), sodium chloride 0.9%, traMADoL    Estimated/Assessed Needs    Weight Used For Calorie Calculations: 82.5 kg (181 lb 14.1 oz) (Adj BW)  Energy Calorie Requirements  (kcal): 9635-9150 kcal (25-30 kcal/kg (COPD, Obese (BMI: 35.91)))  Energy Need Method: Kcal/kg  Protein Requirements:  (15-20% total calories (COPD, Obese (161.88%IBW)))  Weight Used For Protein Calculations: 82.5 kg (181 lb 14.1 oz) (Adj BW)  Fluid Requirements (mL): 7229-7388 mL (1mL/kcal)  Estimated Fluid Requirement Method: RDA Method  RDA Method (mL): 2062  CHO Requirement: 257-361g (50% daily kcals)      Nutrition Prescription Ordered    Current Diet Order: IDDSI Level 5, Soft Diabetic Diet     Evaluation of Received Nutrient/Fluid Intake    I/O: (Net since admit)  6/9: -4235 mL  6/15: -600 mL    Energy Calories Required: meeting needs  Protein Required: meeting needs  Fluid Required: not meeting needs  Tolerance: tolerating  % Intake of Estimated Energy Needs: 100%  % Meal Intake: 100%     Nutrition Risk    Level of Risk/Frequency of Follow-up: low (x1 weekly)       Monitor and Evaluation    Food and Nutrient Intake: energy intake, food and beverage intake  Food and Nutrient Adminstration: diet order  Knowledge/Beliefs/Attitudes: food and nutrition knowledge/skill, beliefs and attitudes  Physical Activity and Function: nutrition-related ADLs and IADLs, factors affecting access to physical activity  Anthropometric Measurements: weight, weight change, body mass index, growth pattern indices/percentile ranks  Biochemical Data, Medical Tests and Procedures: electrolyte and renal panel, gastrointestinal profile, glucose/endocrine profile, inflammatory profile, lipid profile  Nutrition-Focused Physical Findings: overall appearance, skin       Nutrition Follow-Up    RD Follow-up?: Yes  Caitlin Cisneros, Dietetic Intern

## 2023-06-26 NOTE — ASSESSMENT & PLAN NOTE
- S/p fem-distal bypass [facility] thrombolysis with angioplasty on 05/25/17  - Awaiting Vascular surgery input  - Continue ASA, Statin, and Eliquis     MOHS REPAIR POST-OP INSTRUCTIONS    Dr. Fitzpatrick is a plastic surgeon and has closed your skin cancer defect and requests that any questions or concerns about your wound be forwarded to her office rather than Dermatology. Typical concerns that may arise include a lumpy, red, or firm wound, or slight opening of the wound due to stress on the wound. Please know that Dr. Fitzpatrick has extensive experience with wound healing and she will help you with these concerns.  It is important to understand that these concerns will not inhibit the long-term healing process.  If you have any questions or concerns, please contact the main [place] office at [phone] and they can communicate with her at any time, even if after hours.     For pain, headaches, etc., you may take Tylenol (Acetaminophen) products.  You may resume aspirin, ibuprofen, and all other herbal supplements in 1 week.     Bruising and swelling is maximal 24-48 hours after the procedure. You may notice persistent swelling and/or bruising on the third day. This is normal and is not an infection.       HOW TO IMPROVE SWELLING:    Keep your head elevated for the first 2 nights by propping yourself up with pillows or sleeping in a recliner.     Apply ice to the affected area. Icing may be beneficial in the first 24 hours after your procedure. Ice at your own pace, but more is better.     Keep the pressure dressing intact until your doctor tells you to take it off (if applicable).       WOUND CARE:     Keep Vaseline ointment on the incision at all times for 1 week. You may use Polysporin or Neosporin ointment. However, they may cause dermatitis (blistering and redness) at the incision site.     If applicable, keep all bandages clean and dry. A bandage may initially be applied for wound drainage. The bandage may be removed or changed in the first 24 hours and prior to showering. You may shower the next day. However, do not actively wash/scrub the area and avoid extensive  water contact (soaking or swimming) for 1-2 weeks. You may choose to reapply a bandage, but covering the incision is not necessary after the initial bandage has been removed. Most incisions on the face only require ointment on the surface.     If a wrapped pressure dressing is applied, you may shower once the wrap is removed.     Pressure dressing may be removed on __________________________ (enter date).      If dissolvable sutures were used, they typically go away in 1-1½ weeks. After this time, you may stop using the ointment, wash lightly on the area, and apply sunscreen to prevent darkening of the scar.     If intermittent or persistent bleeding is noted, you may hold pressure to the area for a steady 15 minutes; this will likely stop the oozing.       RECOMMENDED ACTIVITY: (check one)    _____ Avoid strenuous activity for 1 week. You may do light activity after 2-3 days.   _____ Avoid strenuous activity for the next 24 hours.   _____ You may return to normal activity.      APPOINTMENTS:     If you do not already have an appointment, please stop at the front reception desk or call to schedule (check all that apply).     _____You are already scheduled on ________________at ________ a.m. / p.m. at the__________________Clinic.   _____Schedule a 1-week post-op appointment  _____Schedule a 2-week post-op appointment  _____Schedule a 3-week post-op appointment  _____An appointment is not needed      For Questions or concerns, please contact the office directly at [phone].  If the office is closed, please listen carefully to the prompts, and skip the option #1 to get the call center.

## 2023-07-16 ENCOUNTER — HOSPITAL ENCOUNTER (EMERGENCY)
Facility: HOSPITAL | Age: 63
Discharge: HOME | End: 2023-07-16
Attending: EMERGENCY MEDICINE
Payer: MEDICARE

## 2023-07-16 VITALS
RESPIRATION RATE: 16 BRPM | HEART RATE: 64 BPM | DIASTOLIC BLOOD PRESSURE: 69 MMHG | TEMPERATURE: 99 F | OXYGEN SATURATION: 95 % | SYSTOLIC BLOOD PRESSURE: 137 MMHG

## 2023-07-16 DIAGNOSIS — S22.31XA CLOSED FRACTURE OF ONE RIB OF RIGHT SIDE, INITIAL ENCOUNTER: Primary | ICD-10-CM

## 2023-07-16 PROCEDURE — 25000003 PHARM REV CODE 250: Performed by: EMERGENCY MEDICINE

## 2023-07-16 PROCEDURE — 99284 EMERGENCY DEPT VISIT MOD MDM: CPT | Mod: 25

## 2023-07-16 RX ORDER — HYDROCODONE BITARTRATE AND ACETAMINOPHEN 5; 325 MG/1; MG/1
1 TABLET ORAL EVERY 6 HOURS PRN
Qty: 18 TABLET | Refills: 0 | Status: SHIPPED | OUTPATIENT
Start: 2023-07-16

## 2023-07-16 RX ORDER — HYDROCODONE BITARTRATE AND ACETAMINOPHEN 5; 325 MG/1; MG/1
1 TABLET ORAL
Status: COMPLETED | OUTPATIENT
Start: 2023-07-16 | End: 2023-07-16

## 2023-07-16 RX ADMIN — HYDROCODONE BITARTRATE AND ACETAMINOPHEN 1 TABLET: 5; 325 TABLET ORAL at 03:07

## 2023-07-16 NOTE — ED PROVIDER NOTES
"SCRIBE #1 NOTE: I, Me-Rodolfo Blue, am scribing for, and in the presence of, Adalid Polk MD. I have scribed the entire note.       History     Chief Complaint   Patient presents with    Fall     Fell PTA while trying to get out of wheelchair. Hx R AKA, CVA last month with residual R side hemiparesis, hit head, on blood thinners, bruising noted to R flank, c/o R rib pain and L arm pain, GCS 14 unsure of baseline     Review of patient's allergies indicates:   Allergen Reactions    Pcn [penicillins] Other (See Comments)     Other      Bactrim [sulfamethoxazole-trimethoprim] Rash     Patient requests medication be listed as an "Intolerance" rather than an allergy, stating the rash is not "so bad."    Ceclor [cefaclor] Rash    Latex, natural rubber Rash         History of Present Illness     HPI    7/16/2023, 2:08 PM  History obtained from the patient and family member      History of Present Illness: Ish Guardado is a 63 y.o. male patient with a PMHx of CVA, drug abuse, dysarthria, femoral-popliteal bypass graft occlusion, GERD, TIA, HLD, HTN, neuropathy, and pre-diabetes who presents to the Emergency Department for evaluation of a fall which happened PTA. Per family member, pt fell off of his wheelchair while maneuvering over a ramp. Pt fell on his right side and hit his head. Pt denies LOC. Per family member, pt has residual R side hemiparesis from a CVA in May 2023. Symptoms are constant and moderate in severity. No mitigating or exacerbating factors reported. Associated sxs include R-sided back pain. Patient denies any syncope, neck pain, CP, abdominal pain, weakness, and all other sxs at this time. No prior Tx reported. Pt is on Eliquis. No further complaints or concerns at this time.       Arrival mode: EMS    PCP: Pollo Araan MD        Past Medical History:  Past Medical History:   Diagnosis Date    Drug abuse, amphetamine type     wife states he has been using smoking crystal meth x 1 year    Dysarthria " 6/8/2023    Femoral-popliteal bypass graft occlusion     Left and right    GERD (gastroesophageal reflux disease)     History of substance abuse     History of transient ischemic attack (TIA)     Hyperlipemia     Hypertension     Neuropathy     Occlusion of right femorotibial bypass graft 5/2017, 6/2017    Pre-diabetes     Status post femorotibial bypass        Past Surgical History:  Past Surgical History:   Procedure Laterality Date    back fusion      FEMORAL BYPASS      left fem-pop bypass 11/2014; right fem-pop bypass 9/2016, right fem-distal bypass 3/2017    LEG AMPUTATION THROUGH KNEE           Family History:  Family History   Problem Relation Age of Onset    COPD Mother     Diabetes Mother     Heart disease Mother     Stroke Mother     Heart attack Mother     COPD Father     Diabetes Father        Social History:  Social History     Tobacco Use    Smoking status: Every Day     Packs/day: 1.50     Years: 41.00     Pack years: 61.50     Types: Cigarettes    Smokeless tobacco: Former     Quit date: 10/22/2016   Substance and Sexual Activity    Alcohol use: No    Drug use: Yes     Types: Methamphetamines     Comment: opiates and methamphetamines: smokes crystal meth Daily    Sexual activity: Yes        Review of Systems     Review of Systems   Constitutional:  Negative for fever.   HENT:  Negative for sore throat.    Respiratory:  Negative for shortness of breath.    Cardiovascular:  Negative for chest pain.   Gastrointestinal:  Negative for abdominal pain and nausea.   Genitourinary:  Negative for dysuria.   Musculoskeletal:  Positive for back pain (R side). Negative for neck pain.        (-) hip pain   Neurological:  Negative for syncope and weakness.   All other systems reviewed and are negative.     Physical Exam     Initial Vitals [07/16/23 1316]   BP Pulse Resp Temp SpO2   121/66 62 16 98.7 °F (37.1 °C) (!) 92 %      MAP       --          Physical Exam  Nursing Notes and Vital Signs  Reviewed.  Constitutional: Patient is in no acute distress. Well-developed and well-nourished.  Head: Atraumatic. Normocephalic.  Eyes: PERRL. EOM intact. Conjunctivae are not pale. No scleral icterus.  ENT: Mucous membranes are moist.  Neck: Supple. Full ROM. No lymphadenopathy. No cervical midline bony tenderness, deformities, or step-offs.   Back: No midline bony tenderness, deformities, or step-offs of the T-spine or L-spine. There is some R lower posterior rib tenderness with ecchymosis.   Cardiovascular: Regular rate. Regular rhythm. No murmurs, rubs, or gallops. Distal pulses are 2+ and symmetric.  Pulmonary/Chest: No respiratory distress. Clear to auscultation bilaterally. No wheezing or rales.  Abdominal: Soft and non-distended.  There is no tenderness.  No rebound, guarding, or rigidity.  Genitourinary: No CVA tenderness  Musculoskeletal: Moves all extremities.  Chronic right hand edema. Right AKA.  Skin:  Abrasion to the right hand  Neurological:  Old right-sided hemiparesis  Psychiatric: Normal affect. Good eye contact. Appropriate in content.     ED Course   Procedures  ED Vital Signs:  Vitals:    07/16/23 1316 07/16/23 1332 07/16/23 1509 07/16/23 1511   BP: 121/66   137/69   Pulse: 62   64   Resp: 16 19 16 16   Temp: 98.7 °F (37.1 °C)      TempSrc: Oral      SpO2: (!) 92%   95%       Abnormal Lab Results:  Labs Reviewed - No data to display     All Lab Results:        Imaging Results:  Imaging Results               XR Ribs Min 3 Views w/PA Chest Right (Final result)  Result time 07/16/23 15:06:31      Final result by Gerry Marcial MD (07/16/23 15:06:31)                   Impression:      Right posterior acute displaced 6th rib fracture without definite pleural fluid or pneumothorax.    This report was flagged in Epic as abnormal.      Electronically signed by: Gerry Marcial  Date:    07/16/2023  Time:    15:06               Narrative:    EXAMINATION:  XR RIBS MIN 3 VIEWS W/ PA CHEST  RIGHT    CLINICAL HISTORY:  fall with posterior right lower rib pain;    TECHNIQUE:  Six views right ribs PA chest.    COMPARISON:  None    FINDINGS:  Lungs are clear.  Heart is enlarged.  No pleural fluid or pneumothorax.    Right posterior acute displaced 6th rib fracture.  No definite additional rib fractures identified.                                       CT Head Without Contrast (Final result)  Result time 07/16/23 14:29:26      Final result by Gerry Marcial MD (07/16/23 14:29:26)                   Impression:      No acute intracranial CT abnormality.    Multifocal encephalomalacia as above.    All CT scans at this facility are performed  using dose modulation techniques as appropriate to performed exam including the following:  automated exposure control; adjustment of mA and/or kV according to the patients size (this includes techniques or standardized protocols for targeted exams where dose is matched to indication/reason for exam: i.e. extremities or head);  iterative reconstruction technique.      Electronically signed by: Gerry Marcial  Date:    07/16/2023  Time:    14:29               Narrative:    EXAMINATION:  CT HEAD WITHOUT CONTRAST    CLINICAL HISTORY:  Head trauma, moderate-severe;    TECHNIQUE:  Low dose axial CT images obtained throughout the head without intravenous contrast. Sagittal and coronal reconstructions were performed.    COMPARISON:  Multiple priors.    FINDINGS:  Intracranial compartment:    Ventricles and sulci are normal in size for age without evidence of hydrocephalus. No extra-axial blood or fluid collections.    Multifocal encephalomalacia throughout the brain involving the right frontal and parietal lobes, left occipital and temporal lobes and bilateral cerebellum.  No parenchymal mass, hemorrhage, edema or major vascular distribution infarct.    Skull/extracranial contents (limited evaluation): No fracture. Mastoid air cells and paranasal sinuses are essentially  clear.                                              The Emergency Provider reviewed the vital signs and test results, which are outlined above.     ED Discussion     3:21 PM: Reassessed pt at this time. Discussed with pt all pertinent ED information and results. Discussed pt dx and plan of tx. Gave pt all f/u and return to the ED instructions. All questions and concerns were addressed at this time. Pt expresses understanding of information and instructions, and is comfortable with plan to discharge. Pt is stable for discharge.    I discussed with patient and/or family/caretaker that evaluation in the ED does not suggest any emergent or life threatening medical conditions requiring immediate intervention beyond what was provided in the ED, and I believe patient is safe for discharge.  Regardless, an unremarkable evaluation in the ED does not preclude the development or presence of a serious of life threatening condition. As such, patient was instructed to return immediately for any worsening or change in current symptoms.         Medical Decision Making:   History:   I obtained history from: someone other than patient.       <> Summary of History: Individual at bedside helps provide the history that patient fell while trying to maneuver in his wheelchair.  No reports of loss of consciousness.  She also reports that patient has a history of a CVA with resultant right-sided hemiparesis.  Confirms that patient is anticoagulated on Eliquis  Old Medical Records: I decided to obtain old medical records.  Old Records Summarized: other records.       <> Summary of Records: Medication list reviewed.  Eliquis noted  Independently Interpreted Test(s):   I have ordered and independently interpreted X-rays - see prior notes.  Clinical Tests:   Radiological Study: Ordered and Reviewed  ED Management:  Rib fracture noted.  Will treat symptomatically with hydrocodone.  Outpatient follow-up         ED Medication(s):  Medications    HYDROcodone-acetaminophen 5-325 mg per tablet 1 tablet (1 tablet Oral Given 7/16/23 1502)       New Prescriptions    HYDROCODONE-ACETAMINOPHEN (NORCO) 5-325 MG PER TABLET    Take 1 tablet by mouth every 6 (six) hours as needed for Pain.        Follow-up Information       Call  Pollo Arana MD.    Specialty: Family Medicine  Contact information:  [address]  [place] [address]  [phone]               [facility] - Emergency Dept..    Specialty: Emergency Medicine  Why: As needed, If symptoms worsen  Contact information:  [address] [facility] [address] [phone]                               Scribe Attestation:   Scribe #1: I performed the above scribed service and the documentation accurately describes the services I performed. I attest to the accuracy of the note.     Attending:   Physician Attestation Statement for Scribe #1: I, Adalid Polk MD, personally performed the services described in this documentation, as scribed by Delroy Blue, in my presence, and it is both accurate and complete.           Clinical Impression       ICD-10-CM ICD-9-CM   1. Closed fracture of one rib of right side, initial encounter  S22.31XA 807.01       Disposition:   Disposition: Discharged  Condition: Stable       Adalid Polk MD  07/16/23 6751

## 2023-09-11 PROBLEM — I63.9 CEREBROVASCULAR ACCIDENT (CVA) INVOLVING LEFT CEREBRAL HEMISPHERE: Status: RESOLVED | Noted: 2023-06-07 | Resolved: 2023-09-11

## 2023-09-11 PROBLEM — J96.22 ACUTE ON CHRONIC RESPIRATORY FAILURE WITH HYPOXIA AND HYPERCAPNIA: Status: RESOLVED | Noted: 2023-06-08 | Resolved: 2023-09-11

## 2023-09-11 PROBLEM — J96.21 ACUTE ON CHRONIC RESPIRATORY FAILURE WITH HYPOXIA AND HYPERCAPNIA: Status: RESOLVED | Noted: 2023-06-08 | Resolved: 2023-09-11

## 2024-11-20 NOTE — SUBJECTIVE & OBJECTIVE
"Past Medical History:   Diagnosis Date    Femoral-popliteal bypass graft occlusion     Left and right    GERD (gastroesophageal reflux disease)     History of substance abuse     History of transient ischemic attack (TIA)     Hyperlipemia     Hypertension     Neuropathy     Occlusion of right femorotibial bypass graft 5/2017, 6/2017    Pre-diabetes     Status post femorotibial bypass        Past Surgical History:   Procedure Laterality Date    ANGIOGRAM-EXTREMITY-LOWER N/A 10/31/2017    Performed by Charanjit Mclean [facility] at [facility] C[facility]    ANGIOGRAM-EXTREMITY-LOWER N/A 10/30/2017    Performed by Avila Houser MD at [facility] CATH LAB    ANGIOGRAM-EXTREMITY-LOWER Right 5/24/2017    Performed by Jack John IV, MD at [facility] CATH LAB    back fusion      FEMORAL BYPASS      left fem-pop bypass 11/2014; right fem-pop bypass 9/2016, right fem-distal bypass 3/2017    RE-LOOK-PERIPHERAL Right 5/25/2017    Performed by Charanjit Mclean MD at [facility] CATH LAB       Review of patient's allergies indicates:   Allergen Reactions    Pcn [penicillins] Other (See Comments)     Other      Bactrim [sulfamethoxazole-trimethoprim] Rash     Patient requests medication be listed as an "Intolerance" rather than an allergy, stating the rash is not "so bad."    Ceclor [cefaclor] Rash    Latex, natural rubber Rash       No current facility-administered medications on file prior to encounter.      Current Outpatient Medications on File Prior to Encounter   Medication Sig    amlodipine (NORVASC) 10 MG tablet Take 10 mg by mouth once daily.    aspirin (ECOTRIN) 81 MG EC tablet Take 81 mg by mouth once daily.    atorvastatin (LIPITOR) 20 MG tablet Take 10 mg by mouth once daily.     benzonatate (TESSALON) 200 MG capsule Take 200 mg by mouth 3 (three) times daily as needed for Cough.    clopidogrel (PLAVIX) 75 mg tablet Take 75 mg by mouth once daily.    pantoprazole (PROTONIX) 20 MG tablet Take 20 mg by mouth once daily.    " pregabalin (LYRICA) 225 MG Cap Take 225 mg by mouth 3 (three) times daily.    ranitidine (ZANTAC) 300 MG tablet Take 300 mg by mouth every evening.    triamcinolone acetonide 0.025% (KENALOG) 0.025 % cream Apply topically 2 (two) times daily.    [DISCONTINUED] aspirin 81 MG Chew Take 81 mg by mouth once daily.    [DISCONTINUED] gabapentin (NEURONTIN) 100 MG capsule Take 1 capsule (100 mg total) by mouth 3 (three) times daily.    [DISCONTINUED] naftifine (NAFTIN) 2 % Crea Apply 1 application topically once daily.    [DISCONTINUED] oxycodone-acetaminophen (PERCOCET)  mg per tablet Take 1 tablet by mouth every 6 (six) hours as needed for Pain (for breakthrough pain).    [DISCONTINUED] oxyCODONE-acetaminophen (PERCOCET)  mg per tablet Take 1 tablet by mouth every 4 (four) hours as needed.     Family History     Problem Relation (Age of Onset)    COPD Mother, Father    Diabetes Mother, Father    Heart attack Mother    Heart disease Mother    Stroke Mother        Tobacco Use    Smoking status: Former Smoker    Smokeless tobacco: Former User     Quit date: 10/22/2016   Substance and Sexual Activity    Alcohol use: No    Drug use: No     Comment: Former illicit drug user.  On Suboxone    Sexual activity: Yes     Review of Systems   Unable to perform ROS: Intubated     Objective:     Vital Signs (Most Recent):  Pulse: 89 (05/20/19 1212)  Resp: (!) 25 (05/20/19 1212)  BP: 97/73 (05/20/19 1117)  SpO2: 100 % (05/20/19 1212) Vital Signs (24h Range):  Pulse:  [] 89  Resp:  [19-32] 25  SpO2:  [61 %-100 %] 100 %  BP: ()/(61-81) 97/73     Weight: 95.8 kg (211 lb 3.2 oz)  Body mass index is 31.19 kg/m².    Physical Exam   Constitutional: He appears well-developed and well-nourished. No distress.   HENT:   Head: Normocephalic and atraumatic.   Eyes: Pupils are equal, round, and reactive to light. Right eye exhibits no discharge.   Neck: Normal range of motion. Neck supple. No JVD present. No  tracheal deviation present. No thyromegaly present.   Cardiovascular: Normal rate, regular rhythm and normal heart sounds.   Pulmonary/Chest: Effort normal. He has wheezes. He has rales.   Abdominal: Soft. Bowel sounds are normal. He exhibits no distension and no mass. There is no tenderness. There is no guarding.   Musculoskeletal: He exhibits no edema or deformity.   Right BKA   Skin: Skin is dry. No rash noted. No erythema.   Nursing note and vitals reviewed.        CRANIAL NERVES     CN III, IV, VI   Pupils are equal, round, and reactive to light.       Significant Labs: All pertinent labs within the past 24 hours have been reviewed.    Significant Imaging: I have reviewed all pertinent imaging results/findings within the past 24 hours.   yes